# Patient Record
Sex: FEMALE | Race: WHITE | NOT HISPANIC OR LATINO | Employment: OTHER | URBAN - METROPOLITAN AREA
[De-identification: names, ages, dates, MRNs, and addresses within clinical notes are randomized per-mention and may not be internally consistent; named-entity substitution may affect disease eponyms.]

---

## 2017-01-09 ENCOUNTER — HOSPITAL ENCOUNTER (EMERGENCY)
Facility: MEDICAL CENTER | Age: 57
End: 2017-01-09
Attending: EMERGENCY MEDICINE
Payer: MEDICARE

## 2017-01-09 VITALS
DIASTOLIC BLOOD PRESSURE: 71 MMHG | BODY MASS INDEX: 40.92 KG/M2 | TEMPERATURE: 98.3 F | SYSTOLIC BLOOD PRESSURE: 111 MMHG | RESPIRATION RATE: 20 BRPM | HEART RATE: 63 BPM | WEIGHT: 270 LBS | HEIGHT: 68 IN

## 2017-01-09 DIAGNOSIS — M54.50 CHRONIC BILATERAL LOW BACK PAIN WITHOUT SCIATICA: ICD-10-CM

## 2017-01-09 DIAGNOSIS — F32.A DEPRESSION, UNSPECIFIED DEPRESSION TYPE: ICD-10-CM

## 2017-01-09 DIAGNOSIS — G89.29 CHRONIC BILATERAL LOW BACK PAIN WITHOUT SCIATICA: ICD-10-CM

## 2017-01-09 DIAGNOSIS — F11.90 CHRONIC, CONTINUOUS USE OF OPIOIDS: ICD-10-CM

## 2017-01-09 PROCEDURE — 96375 TX/PRO/DX INJ NEW DRUG ADDON: CPT

## 2017-01-09 PROCEDURE — 96374 THER/PROPH/DIAG INJ IV PUSH: CPT

## 2017-01-09 PROCEDURE — 700111 HCHG RX REV CODE 636 W/ 250 OVERRIDE (IP): Performed by: EMERGENCY MEDICINE

## 2017-01-09 PROCEDURE — 99284 EMERGENCY DEPT VISIT MOD MDM: CPT

## 2017-01-09 PROCEDURE — 99283 EMERGENCY DEPT VISIT LOW MDM: CPT

## 2017-01-09 RX ORDER — HYDROMORPHONE HYDROCHLORIDE 2 MG/ML
2 INJECTION, SOLUTION INTRAMUSCULAR; INTRAVENOUS; SUBCUTANEOUS ONCE
Status: DISCONTINUED | OUTPATIENT
Start: 2017-01-09 | End: 2017-01-09

## 2017-01-09 RX ORDER — KETOROLAC TROMETHAMINE 30 MG/ML
30 INJECTION, SOLUTION INTRAMUSCULAR; INTRAVENOUS ONCE
Status: DISCONTINUED | OUTPATIENT
Start: 2017-01-09 | End: 2017-01-09

## 2017-01-09 RX ORDER — KETOROLAC TROMETHAMINE 30 MG/ML
30 INJECTION, SOLUTION INTRAMUSCULAR; INTRAVENOUS ONCE
Status: COMPLETED | OUTPATIENT
Start: 2017-01-09 | End: 2017-01-09

## 2017-01-09 RX ADMIN — KETOROLAC TROMETHAMINE 30 MG: 30 INJECTION, SOLUTION INTRAMUSCULAR; INTRAVENOUS at 00:57

## 2017-01-09 RX ADMIN — HYDROMORPHONE HYDROCHLORIDE 1 MG: 1 INJECTION, SOLUTION INTRAMUSCULAR; INTRAVENOUS; SUBCUTANEOUS at 00:57

## 2017-01-09 ASSESSMENT — PAIN SCALES - GENERAL: PAINLEVEL_OUTOF10: 8

## 2017-01-09 NOTE — ED PROVIDER NOTES
"ED Provider Note    Scribed for Lonnie Cheatham M.D. by Yoselin Kennedy. 1/9/2017, 12:27 AM.    Primary care provider: Toni Grijalva M.D.  Means of arrival: [place]  History obtained from: Patient  History limited by: None    CHIEF COMPLAINT  Chief Complaint   Patient presents with   • Back Pain     pt co back pain for 2 hours denies any injury to back recently states in past has had injury to back       HPI  Kailey Velarde is a 56 y.o. female who presents to the Emergency Department brought in by ambulance for gradual onset of severe nonradiating lower back pain that feels like her prior back pains. She denies any recent injury to her back. She states that she has \"been working too hard over the past week bending over\". She denies any numbness, weakness, urinary incontinence, dysuria, or abdominal pain. The patient states that she has had an injury to her back in the past. She has a history of chronic back pain and has surgery in 2009.     REVIEW OF SYSTEMS  See HPI,  Negative for numbness, weakness, urinary incontinence, dysuria, or abdominal pain.     PAST MEDICAL HISTORY   has a past medical history of Indigestion; Backpain; Sciatica; Sick sinus syndrome (HCC) (November 2011); Chronic pain; Peripheral edema; Hypertension; Hyperlipidemia ( ); Dizziness ( ); Status post mitral valve repair (November 2011); Atrial myxoma (November 2011); Hypertriglyceridemia ( ); and CAD (coronary artery disease).    SURGICAL HISTORY   has past surgical history that includes mitral valve repair (11/17/2011); other orthopedic surgery; and pacemaker insertion (November 2011).    SOCIAL HISTORY  Social History   Substance Use Topics   • Smoking status: Current Every Day Smoker -- 0.25 packs/day for 30 years     Types: Cigarettes   • Smokeless tobacco: Never Used   • Alcohol Use: No      Comment: sober since 7/14/16      History   Drug Use No     Comment: hx of, denies currently       FAMILY HISTORY  Family History " "  Problem Relation Age of Onset   • Heart Attack Father        CURRENT MEDICATIONS  No current facility-administered medications on file prior to encounter.     Current Outpatient Prescriptions on File Prior to Encounter   Medication Sig Dispense Refill   • simvastatin (ZOCOR) 40 MG Tab Take 1 Tab by mouth every evening. 30 Tab 11   • furosemide (LASIX) 40 MG Tab Take 1 Tab by mouth every day. (Patient not taking: Reported on 11/21/2016) 30 Tab 3   • potassium chloride SA (K-DUR) 20 MEQ Tab CR Take 1 Tab by mouth 2 times a day. (Patient not taking: Reported on 11/21/2016) 60 Tab 11   • Omega-3 Fatty Acids (FISH OIL) 1000 MG Cap capsule Take 1,000 mg by mouth 3 times a day, with meals.     • vitamin D (CHOLECALCIFEROL) 1000 UNIT Tab Take 1,000 Units by mouth every day.     • Citalopram Hydrobromide (CELEXA PO) Take  by mouth.     • tizanidine (ZANAFLEX) 4 MG TABS Take  by mouth every day at 6 PM.     • methocarbamol (ROBAXIN) 500 MG TABS Take  by mouth as needed.     • gabapentin (NEURONTIN) 600 MG tablet Take 600 mg by mouth 3 times a day.     • oxycodone CR (OXYCONTIN) 10 MG TB12 Take 10 mg by mouth 3 times a day.     • morphine SR (MS CONTIN) 15 MG TB12 Take 1 Tab by mouth 3 times a day. 60 Tab 0   Reviewed.  See Encounter Summary.     ALLERGIES  Allergies   Allergen Reactions   • Penicillins Rash     rash       PHYSICAL EXAM  VITAL SIGNS: /71 mmHg  Pulse 63  Temp(Src) 36.8 °C (98.3 °F)  Resp 20  Ht 1.727 m (5' 8\")  Wt 122.471 kg (270 lb)  BMI 41.06 kg/m2  LMP 01/01/2010  Constitutional: Awake, alert in no apparent distress. Obese, appears uncomfortable.  HENT: Normocephalic, Bilateral external ears normal. Nose normal.   Eyes: Conjunctiva normal, non-icteric, EOMI.    Thorax & Lungs: Easy unlabored respirations clear to auscultation bilaterally.  Regular rate and rhythm, no murmurs rubs or gallops  Abdomen: Nondistended , obese, soft, nontender, no masses.  Skin: Visualized skin is  Dry, No erythema, " No rash.   Extremities:   No cyanosis, clubbing or edema  Neurologic: Alert, Grossly non-focal. No saddle paresthesias, straight leg raise reproduces pain, DTR 1+ bilateral patella, 5/5 dorsiflexion and plantar flexion bilateral lower extremities. Back does not have step-off, the patient's location of tenderness is lateral to the L5-S1 region bilaterally. There is no midline tenderness.  Psychiatric: Affect and Mood normal    DIAGNOSTIC STUDIES / PROCEDURES    COURSE & MEDICAL DECISION MAKING  Nursing notes and vital signs were reviewed. Pertinent Labs & Imaging studies reviewed. (See chart for details)    12:27 AM - Patient seen and examined at bedside. Patient will be treated with 30mg Toradol and 1mg Dilaudid. The patient was informed that she will be given pain medication. She was informed that this is most likely musculoskeletal and that stretching and icing the area will help alleviate these symptoms. She understood and verbalized agreement.     1:34 AM- patient notes moderate improvement in her back pain. She is asking to be admitted for her low back pain. She gives multiple reasons such as she states she is having too much pain to walk, she also reports that she has a dog at home and she needs help walking the dog. She states her neighbor were walk the dog if she is admitted but will not help her with the dog if she is at home. She also states that she has depression. At this time I do not have a medical indication for admission. We will attempt to ambulate the patient out of the emergency department.    1:58 AM- patient is irate about being discharged. She is tearful and states that that she would like to stay for a few more hours. She was able to get up from a seated position under her own power. She is not having signs of weakness. She was able to ambulate out of the emergency department under her own power.      Decision Making:  This is a 56 y.o. year old female who presents with acute on chronic lower  back pain. She does not have any direct trauma, she is neurologically intact, she does not have any signs of cauda equina. The location of her pain is not consistent with acute aortic dissection or abdominal aneurysm. The location is also not over the midline, not concerned about discitis. I do not feel that there is any indication for emergent imaging. She was given pain medications with some improvement of her symptoms. She does have chronic pain at baseline and takes morphine and oxycodone regularly. I do recommend she follow-up with her primary care physician and pain management for additional management of this.    DISPOSITION:  Patient will be discharged home in good condition.    The patient was discharged home (see d/c instructions) and told to return immediately for any signs or symptoms listed, or any worsening at all.  The patient verbally agreed to the discharge precautions and follow-up plan which is documented in EPIC.    FINAL IMPRESSION  1. Chronic bilateral low back pain without sciatica    2. Chronic, continuous use of opioids    3. Depression, unspecified depression type          I, Yoselin Kennedy (Alan), am scribing for, and in the presence of, Lonnie Cheatham M.D..    Electronically signed by: Yoselin Kennedy (Genovevaibmary), 1/9/2017    I, Lonnie Cheatham M.D. personally performed the services described in this documentation, as scribed by Yoselin Kennedy in my presence, and it is both accurate and complete.    The note accurately reflects work and decisions made by me.  Lonnie Cheatham  1/9/2017  1:59 AM

## 2017-01-09 NOTE — ED AVS SNAPSHOT
1/9/2017          Kailey Velarde  [address] [facility] [address]  [address]  [place] [address]    Dear Kailey:    [facility] wants to ensure your discharge home is safe and you or your loved ones have had all your questions answered regarding your care after you leave the hospital.    You may receive a telephone call within two days of your discharge.  This call is to make certain you understand your discharge instructions as well as ensure we provided you with the best care possible during your stay with us.     The call will only last approximately 3-5 minutes and will be done by a nurse.    Once again, we want to ensure your discharge home is safe and that you have a clear understanding of any next steps in your care.  If you have any questions or concerns, please do not hesitate to contact us, we are here for you.  Thank you for choosing [facility] for your healthcare needs.    Sincerely,    Lonnie Cortes    [facility]

## 2017-01-09 NOTE — DISCHARGE INSTRUCTIONS
Back Exercises  Back exercises help treat and prevent back injuries. The goal of back exercises is to increase the strength of your abdominal and back muscles and the flexibility of your back. These exercises should be started when you no longer have back pain. Back exercises include:  · Pelvic Tilt. Lie on your back with your knees bent. Tilt your pelvis until the lower part of your back is against the floor. Hold this position 5 to 10 sec and repeat 5 to 10 times.  · Knee to Chest. Pull first 1 knee up against your chest and hold for 20 to 30 seconds, repeat this with the other knee, and then both knees. This may be done with the other leg straight or bent, whichever feels better.  · Sit-Ups or Curl-Ups. Bend your knees 90 degrees. Start with tilting your pelvis, and do a partial, slow sit-up, lifting your trunk only 30 to 45 degrees off the floor. Take at least 2 to 3 seconds for each sit-up. Do not do sit-ups with your knees out straight. If partial sit-ups are difficult, simply do the above but with only tightening your abdominal muscles and holding it as directed.  · Hip-Lift. Lie on your back with your knees flexed 90 degrees. Push down with your feet and shoulders as you raise your hips a couple inches off the floor; hold for 10 seconds, repeat 5 to 10 times.  · Back arches. Lie on your stomach, propping yourself up on bent elbows. Slowly press on your hands, causing an arch in your low back. Repeat 3 to 5 times. Any initial stiffness and discomfort should lessen with repetition over time.  · Shoulder-Lifts. Lie face down with arms beside your body. Keep hips and torso pressed to floor as you slowly lift your head and shoulders off the floor.  Do not overdo your exercises, especially in the beginning. Exercises may cause you some mild back discomfort which lasts for a few minutes; however, if the pain is more severe, or lasts for more than 15 minutes, do not continue exercises until you see your caregiver.  Improvement with exercise therapy for back problems is slow.   See your caregivers for assistance with developing a proper back exercise program.     This information is not intended to replace advice given to you by your health care provider. Make sure you discuss any questions you have with your health care provider.     Document Released: 01/25/2006 Document Revised: 03/11/2013 Document Reviewed: 02/11/2016  Else[employer]ibe[employer] Interactive Patient Education ©2016 Elsevier Inc.

## 2017-01-09 NOTE — ED NOTES
..  Chief Complaint   Patient presents with   • Back Pain     pt co back pain for 2 hours denies any injury to back recently states in past has had injury to back     Pt able to transfer from ems gurney with stand and pivot method to er bed

## 2017-01-09 NOTE — ED AVS SNAPSHOT
After Visit Summary                                                                                                                Kailey Velarde   MRN: [id number]    Department:  [facility], Emergency Dept   Date of Visit:  1/9/2017            [facility], Emergency Dept    [address] [address]    Phone:  [phone]      You were seen by     Lonnie Cheatham M.D.      Your Diagnosis Was     Chronic bilateral low back pain without sciatica     M54.5, G89.29       These are the medications you received during your hospitalization from 01/09/2017 0022 to 01/09/2017 0147     Date/Time Order Dose Route Action    01/09/2017 0057 ketorolac (TORADOL) injection 30 mg 30 mg Intravenous Given    01/09/2017 0057 HYDROmorphone (DILAUDID) injection 1 mg 1 mg Intravenous Given      Follow-up Information     1. Follow up with Toni Grijalva M.D..    Specialty:  Family Medicine    Contact information    [address] [facility] [address]  [phone]        Medication Information     Review all of your home medications and newly ordered medications with your primary doctor and/or pharmacist as soon as possible. Follow medication instructions as directed by your doctor and/or pharmacist.     Please keep your complete medication list with you and share with your physician. Update the information when medications are discontinued, doses are changed, or new medications (including over-the-counter products) are added; and carry medication information at all times in the event of emergency situations.               Medication List      ASK your doctor about these medications        Instructions    CELEXA PO    Take  by mouth.       fish oil 1000 MG Caps capsule    Take 1,000 mg by mouth 3 times a day, with meals.   Dose:  1000 mg       furosemide 40 MG Tabs   Commonly known as:  LASIX    Take 1 Tab by mouth every day.   Dose:  40 mg       gabapentin 600 MG tablet   Commonly  known as:  NEURONTIN    Take 600 mg by mouth 3 times a day.   Dose:  600 mg       methocarbamol 500 MG Tabs   Commonly known as:  ROBAXIN    Take  by mouth as needed.       morphine SR 15 MG Tb12   Commonly known as:  MS CONTIN    Take 1 Tab by mouth 3 times a day.   Dose:  15 mg       oxycodone CR 10 MG Tb12   Commonly known as:  OXYCONTIN    Take 10 mg by mouth 3 times a day.   Dose:  10 mg       potassium chloride SA 20 MEQ Tbcr   Commonly known as:  K-DUR    Take 1 Tab by mouth 2 times a day.   Dose:  20 mEq       simvastatin 40 MG Tabs   Commonly known as:  ZOCOR    Take 1 Tab by mouth every evening.   Dose:  40 mg       tizanidine 4 MG Tabs   Commonly known as:  ZANAFLEX    Take  by mouth every day at 6 PM.       vitamin D 1000 UNIT Tabs   Commonly known as:  cholecalciferol    Take 1,000 Units by mouth every day.   Dose:  1000 Units                 Discharge Instructions       Back Exercises  Back exercises help treat and prevent back injuries. The goal of back exercises is to increase the strength of your abdominal and back muscles and the flexibility of your back. These exercises should be started when you no longer have back pain. Back exercises include:  · Pelvic Tilt. Lie on your back with your knees bent. Tilt your pelvis until the lower part of your back is against the floor. Hold this position 5 to 10 sec and repeat 5 to 10 times.  · Knee to Chest. Pull first 1 knee up against your chest and hold for 20 to 30 seconds, repeat this with the other knee, and then both knees. This may be done with the other leg straight or bent, whichever feels better.  · Sit-Ups or Curl-Ups. Bend your knees 90 degrees. Start with tilting your pelvis, and do a partial, slow sit-up, lifting your trunk only 30 to 45 degrees off the floor. Take at least 2 to 3 seconds for each sit-up. Do not do sit-ups with your knees out straight. If partial sit-ups are difficult, simply do the above but with only tightening your abdominal  muscles and holding it as directed.  · Hip-Lift. Lie on your back with your knees flexed 90 degrees. Push down with your feet and shoulders as you raise your hips a couple inches off the floor; hold for 10 seconds, repeat 5 to 10 times.  · Back arches. Lie on your stomach, propping yourself up on bent elbows. Slowly press on your hands, causing an arch in your low back. Repeat 3 to 5 times. Any initial stiffness and discomfort should lessen with repetition over time.  · Shoulder-Lifts. Lie face down with arms beside your body. Keep hips and torso pressed to floor as you slowly lift your head and shoulders off the floor.  Do not overdo your exercises, especially in the beginning. Exercises may cause you some mild back discomfort which lasts for a few minutes; however, if the pain is more severe, or lasts for more than 15 minutes, do not continue exercises until you see your caregiver. Improvement with exercise therapy for back problems is slow.   See your caregivers for assistance with developing a proper back exercise program.     This information is not intended to replace advice given to you by your health care provider. Make sure you discuss any questions you have with your health care provider.     Document Released: 01/25/2006 Document Revised: 03/11/2013 Document Reviewed: 02/11/2016  Elsevier Interactive Patient Education ©2016 Elsevier Inc.            Patient Information     Patient Information    Following emergency treatment: all patient requiring follow-up care must return either to a private physician or a clinic if your condition worsens before you are able to obtain further medical attention, please return to the emergency room.     Billing Information    At [facility], we work to make the billing process streamlined for our patients.  Our Representatives are here to answer any questions you may have regarding your hospital bill.  If you have insurance coverage and have supplied your insurance  information to us, we will submit a claim to your insurer on your behalf.  Should you have any questions regarding your bill, we can be reached online or by phone as follows:  Online: You are able pay your bills online or live chat with our representatives about any billing questions you may have. We are here to help Monday - Friday from 8:00am to 7:30pm and 9:00am - 12:00pm on Saturdays.  Please visit [email or web address]  for more information.   Phone:  [phone] or [phone]    Please note that your emergency physician, surgeon, pathologist, radiologist, anesthesiologist, and other specialists are not employed by [facility] and will therefore bill separately for their services.  Please contact them directly for any questions concerning their bills at the numbers below:     Emergency Physician Services:  [phone]  [facility]:  [phone]  [facility]:  [phone]  [facility]:  [phone]    1. Your final bill may vary from the amount quoted upon discharge if all procedures are not complete at that time, or if your doctor has additional procedures of which we are not aware. You will receive an additional bill if you return to the Emergency Department at [facility] for suture removal regardless of the facility of which the sutures were placed.     2. Please arrange for settlement of this account at the emergency registration.    3. All self-pay accounts are due in full at the time of treatment.  If you are unable to meet this obligation then payment is expected within 4-5 days.     4. If you have had radiology studies (CT, X-ray, Ultrasound, MRI), you have received a preliminary result during your emergency department visit. Please contact the radiology department [phone] to receive a copy of your final result. Please discuss the Final result with your primary physician or with the follow up physician  provided.     Crisis Hotline:  [place] Crisis Hotline:  2-859-ECCJRUQ or 1-727.755.1342  [place] Crisis Hotline:    1-882.810.4895 or 172-982-9697         ED Discharge Follow Up Questions    1. In order to provide you with very good care, we would like to follow up with a phone call in the next few days.  May we have your permission to contact you?     YES /  NO    2. What is the best phone number to call you? (       )_____-__________    3. What is the best time to call you?      Morning  /  Afternoon  /  Evening                   Patient Signature:  ____________________________________________________________    Date:  ____________________________________________________________      Your appointments     May 31, 2017 10:30 AM   PACER CHECK ONLY with PACER PHONE CHECK   [facility] (--)    [address] [place] [id number] [phone]            May 31, 2017 10:45 AM   FOLLOW UP with Toni Mullins M.D.   [facility] (--)    [address] [place] [address] [address]  [facility] [address]   [phone]

## 2017-01-09 NOTE — ED AVS SNAPSHOT
BountyJobs Access Code: [email or web address]  Expires: 1/16/2017  8:14 AM    Your email address is not on file at [facility].  Email Addresses are required for you to sign up for BountyJobs, please contact [phone] to verify your personal information and to provide your email address prior to attempting to register for BountyJobs.    Kailey Velarde  [address]  [address]  [place] [address]    BountyJobs  A secure, online tool to manage your health information     [facility]’s BountyJobs® is a secure, online tool that connects you to your personalized health information from the privacy of your home -- day or night - making it very easy for you to manage your healthcare. Once the activation process is completed, you can even access your medical information using the BountyJobs sue, which is available for free in the Apple Use store or Google Play store.     To learn more about BountyJobs, visit [email or web address]    There are two levels of access available (as shown below):   My Chart Features  [facility] Primary Care Doctor [facility]  Specialists [facility]  Urgent  Care Non-[facility] Primary Care Doctor   Email your healthcare team securely and privately 24/7 X X X    Manage appointments: schedule your next appointment; view details of past/upcoming appointments X      Request prescription refills. X      View recent personal medical records, including lab and immunizations X X X X   View health record, including health history, allergies, medications X X X X   Read reports about your outpatient visits, procedures, consult and ER notes X X X X   See your discharge summary, which is a recap of your hospital and/or ER visit that includes your diagnosis, lab results, and care plan X X  X     How to register for Viveraet:  Once your e-mail address has been verified, follow the following steps to sign up for Viveraet.     1. Go to  [email or web address]  2. Click on the Sign Up Now box, which takes you to the New Member Sign Up page.  You will need to provide the following information:  a. Enter your [employer] Access Code exactly as it appears at the top of this page. (You will not need to use this code after you’ve completed the sign-up process. If you do not sign up before the expiration date, you must request a new code.)   b. Enter your date of birth.   c. Enter your home email address.   d. Click Submit, and follow the next screen’s instructions.  3. Create a Bigpointt ID. This will be your [employer] login ID and cannot be changed, so think of one that is secure and easy to remember.  4. Create a [employer] password. You can change your password at any time.  5. Enter your Password Reset Question and Answer. This can be used at a later time if you forget your password.   6. Enter your e-mail address. This allows you to receive e-mail notifications when new information is available in [employer].  7. Click Sign Up. You can now view your health information.    For assistance activating your [employer] account, call [phone]

## 2017-01-09 NOTE — ED NOTES
Pt D/C'd.  Discharge instructions provided to pt.  Pt states understanding.  Pt states all questions have been answered.  Copy of discharge provided to pt.  Signed copy in chart. No prescriptions provided this visit. Pt states that all personal belongings are in possession.  Pt escorted off unit without incident.

## 2017-06-12 ENCOUNTER — HOSPITAL ENCOUNTER (OUTPATIENT)
Dept: RADIOLOGY | Facility: MEDICAL CENTER | Age: 57
End: 2017-06-12

## 2017-06-19 ENCOUNTER — OFFICE VISIT (OUTPATIENT)
Dept: PULMONOLOGY | Facility: HOSPICE | Age: 57
End: 2017-06-19
Payer: MEDICARE

## 2017-06-19 VITALS
HEART RATE: 63 BPM | OXYGEN SATURATION: 97 % | TEMPERATURE: 97 F | WEIGHT: 270 LBS | BODY MASS INDEX: 41.06 KG/M2 | RESPIRATION RATE: 16 BRPM

## 2017-06-19 DIAGNOSIS — G89.4 CHRONIC PAIN SYNDROME: ICD-10-CM

## 2017-06-19 DIAGNOSIS — I27.20 PULMONARY HYPERTENSION (HCC): ICD-10-CM

## 2017-06-19 DIAGNOSIS — J90 PLEURAL EFFUSION: ICD-10-CM

## 2017-06-19 DIAGNOSIS — G47.19 EXCESSIVE DAYTIME SLEEPINESS: ICD-10-CM

## 2017-06-19 PROCEDURE — 99204 OFFICE O/P NEW MOD 45 MIN: CPT | Performed by: INTERNAL MEDICINE

## 2017-06-19 NOTE — PROGRESS NOTES
Kailey Velarde is a 57 y.o. female here for right pleural effusion. Patient was referred by her primary care.    History of Present Illness:      This lady comes in for evaluation of a right pleural effusion. She has chronic pain syndrome on narcotics. She has alcohol use history, finished rehabilitation one week ago and has stopped drinking again.    She sees a cardiologist, an echocardiogram done in the past showed pulmonary hypertension, she has a follow-up in the near future to repeat, and I suspect she may have a myopathy with heart failure. She has chronic peripheral edema, the right pleural effusion, but also a smoking history. I reviewed the outside CAT scans, there is no obvious mass but obvious cardiomegaly with pacemaker are noted. She still smokes one half pack per day.    Options for the right pleural effusion at this time could include tapping, but it appears relatively small on CAT scan, although described as moderate is not easily ultrasound accessible for sampling. She also has marked peripheral edema, and with the cardiac history I suspect this is related to congestion and might resolve with diuresis. Fortunately, she restarted her Lasix one week ago, with potassium, and I think our first effort will be to review her x-ray after diuresis and if the pleural effusion has resolved then no need for considering Or further workup. However this will have to be followed closely as she does have a smoking history, but this points to a congestive pattern and we will tap if it enlarges or persists after diuresis. Cardiology assessment is scheduled as well in the near future. Stopping smoking is strongly encouraged.    She also is medically significant excessive weight, chronic pain, uses trazodone for insomnia which is in part related to chronic pain, but also with pulmonary hypertension and oropharyngeal crowding I think we should screen for sleep apnea. Overnight oximetry is requested, reassess that as  well at the next visit    Constitutional ROS: No unexpected change in weight, No unexplained fevers, sweats, or chills  Eyes: No change in vision or blurring or double vision  Mouth/Throat ROS: No sore throat, No recent change in voice or hoarseness  Pulmonary ROS: See present history for pertinent positives  Cardiovascular ROS: No chest pain  Gastrointestinal ROS: No abdominal pain, No abdominal bloating or early satiety  Musculoskeletal/Extremities ROS: No clubbing, No cyanosis  Hematologic/Lymphatic ROS: No abnormal bleeding  Neurologic ROS: No weakness  Psychiatric ROS: No depression  Allergic/Immunologic: No rhinitis or urticaria     Current Outpatient Prescriptions   Medication Sig Dispense Refill   • furosemide (LASIX) 40 MG Tab Take 1 Tab by mouth every day. 30 Tab 3   • potassium chloride SA (K-DUR) 20 MEQ Tab CR Take 1 Tab by mouth 2 times a day. 60 Tab 11   • simvastatin (ZOCOR) 40 MG Tab Take 1 Tab by mouth every evening. 30 Tab 11   • Omega-3 Fatty Acids (FISH OIL) 1000 MG Cap capsule Take 1,000 mg by mouth 3 times a day, with meals.     • vitamin D (CHOLECALCIFEROL) 1000 UNIT Tab Take 1,000 Units by mouth every day.     • Citalopram Hydrobromide (CELEXA PO) Take  by mouth.     • tizanidine (ZANAFLEX) 4 MG TABS Take  by mouth every day at 6 PM.     • methocarbamol (ROBAXIN) 500 MG TABS Take  by mouth as needed.     • gabapentin (NEURONTIN) 600 MG tablet Take 600 mg by mouth 3 times a day.     • oxycodone CR (OXYCONTIN) 10 MG TB12 Take 10 mg by mouth 3 times a day.     • morphine SR (MS CONTIN) 15 MG TB12 Take 1 Tab by mouth 3 times a day. 60 Tab 0     No current facility-administered medications for this visit.       Social History   Substance Use Topics   • Smoking status: Current Every Day Smoker -- 0.25 packs/day for 30 years     Types: Cigarettes   • Smokeless tobacco: Never Used   • Alcohol Use: No      Comment: sober since 7/14/16        Past Medical History   Diagnosis Date   • Indigestion    •  Backpain    • Sciatica    • Sick sinus syndrome (CMS-HCC) November 2011     Status post PPM implantation.   • Chronic pain    • Peripheral edema    • Hypertension    • Hyperlipidemia     • Dizziness     • Status post mitral valve repair November 2011     MV repair with 32mm Jonnie-Carpenter flexible annuloplasty ring.   • Atrial myxoma November 2011     Status post LA myxoma resection   • Hypertriglyceridemia     • CAD (coronary artery disease)      pacemaker       Past Surgical History   Procedure Laterality Date   • Mitral valve repair  11/17/2011     Performed by MARY ORTIZ at [facility]   • Other orthopedic surgery       Neck and back    • Pacemaker insertion  November 2011     Medtronic Versa [id number] implanted by Dr. Amaya.       Allergies: Penicillins    Family History   Problem Relation Age of Onset   • Heart Attack Father        Physical Examination    Filed Vitals:    06/19/17 1356   Weight: 122.471 kg (270 lb)   Weight % change since last entry.: 0 %   Pulse: 63   Resp: 16   Temp: 36.1 °C (97 °F)       General Appearance: alert, no distress  Skin: Skin color, texture, turgor normal. No rashes or lesions.  Eyes: negative  Oropharynx: Lips, mucosa, and tongue normal. Teeth and gums normal. Oropharynx moist and without lesion  Lungs: positive findings: Slightly diminished in the right base but clear otherwise  Heart: negative. RRR without murmur, gallop, or rubs.  No ectopy.  Abdomen: Abdomen soft, non-tender. BS normal. No masses,  No organomegaly  Extremities: 3+ pretibial edema, chronic skin discoloration  Peripheral Pulses: Normal  Neurologic: intact  Oropharyngeal crowding without pathology    III (soft palate, base of uvula visible)    Imaging: As discussed    PFTS: None presently      Assessment and Plan  1. Excessive daytime sleepiness  - OVERNIGHT OXIMETRY; Future    2. Pulmonary hypertension (CMS-HCC)  - OVERNIGHT OXIMETRY; Future    3. Pleural effusion  - DX-CHEST-2 VIEWS;  Future    4. Chronic pain syndrome    This lady comes in for evaluation of a right pleural effusion. She has chronic pain syndrome on narcotics. She has alcohol use history, finished rehabilitation one week ago and has stopped drinking again.    She sees a cardiologist, an echocardiogram done in the past showed pulmonary hypertension, she has a follow-up in the near future to repeat, and I suspect she may have a myopathy with heart failure. She has chronic peripheral edema, the right pleural effusion, but also a smoking history. I reviewed the outside CAT scans, there is no obvious mass but obvious cardiomegaly with pacemaker are noted. She still smokes one half pack per day.    Options for the right pleural effusion at this time could include tapping, but it appears relatively small on CAT scan, although described as moderate is not easily ultrasound accessible for sampling. She also has marked peripheral edema, and with the cardiac history I suspect this is related to congestion and might resolve with diuresis. Fortunately, she restarted her Lasix one week ago, with potassium, and I think our first effort will be to review her x-ray after diuresis and if the pleural effusion has resolved then no need for considering Or further workup. However this will have to be followed closely as she does have a smoking history, but this points to a congestive pattern and we will tap if it enlarges or persists after diuresis. Cardiology assessment is scheduled as well in the near future. Stopping smoking is strongly encouraged.    She also is medically significant excessive weight, chronic pain, uses trazodone for insomnia which is in part related to chronic pain, but also with pulmonary hypertension and oropharyngeal crowding I think we should screen for sleep apnea. Overnight oximetry is requested, reassess that as well at the next visit  Followup Return in about 8 weeks (around 8/14/2017) for follow up visit with pulmonary  physician, with Chest X-ray.

## 2017-06-19 NOTE — PATIENT INSTRUCTIONS
This lady comes in for evaluation of a right pleural effusion. She has chronic pain syndrome on narcotics. She has alcohol use history, finished rehabilitation one week ago and has stopped drinking again.    She sees a cardiologist, an echocardiogram done in the past showed pulmonary hypertension, she has a follow-up in the near future to repeat, and I suspect she may have a myopathy with heart failure. She has chronic peripheral edema, the right pleural effusion, but also a smoking history. I reviewed the outside CAT scans, there is no obvious mass but obvious cardiomegaly with pacemaker are noted. She still smokes one half pack per day.    Options for the right pleural effusion at this time could include tapping, but it appears relatively small on CAT scan, although described as moderate is not easily ultrasound accessible for sampling. She also has marked peripheral edema, and with the cardiac history I suspect this is related to congestion and might resolve with diuresis. Fortunately, she restarted her Lasix one week ago, with potassium, and I think our first effort will be to review her x-ray after diuresis and if the pleural effusion has resolved then no need for considering Or further workup. However this will have to be followed closely as she does have a smoking history, but this points to a congestive pattern and we will tap if it enlarges or persists after diuresis. Cardiology assessment is scheduled as well in the near future. Stopping smoking is strongly encouraged.    She also is medically significant excessive weight, chronic pain, uses trazodone for insomnia which is in part related to chronic pain, but also with pulmonary hypertension and oropharyngeal crowding I think we should screen for sleep apnea. Overnight oximetry is requested, reassess that as well at the next visit

## 2017-06-19 NOTE — MR AVS SNAPSHOT
Kailey F Syd   2017 2:00 PM   Office Visit   MRN: [id number]    Department:  [facility]   Dept Phone:  [phone]    Description:  Female : 1960   Provider:  Chayito Patel M.D.           Reason for Visit     Establish Care           Allergies as of 2017     Allergen Noted Reactions    Penicillins 2009   Rash    rash      You were diagnosed with     Excessive daytime sleepiness   [7251908]       Pulmonary hypertension (CMS-HCC)   [322790]       Pleural effusion   [764452]       Chronic pain syndrome   [338.4.ICD-9-CM]         Vital Signs     Pulse Temperature Respirations Weight Oxygen Saturation Last Menstrual Period    63 36.1 °C (97 °F) 16 122.471 kg (270 lb) 97% 2010    Smoking Status                   Current Every Day Smoker           Basic Information     Date Of Birth Sex Race Ethnicity Preferred Language    1960 Female White Non- English      Your appointments     Sep 25, 2017  1:00 PM   XRAY 15 with PULMONARY DX 1   [facility] - Pulmonary ([address])    [address]  [place] [address]               Sep 25, 2017  1:20 PM   Established Patient Pul with A Rotation   [facility] (--)    [address]  [address]  [place] [address]   [phone]            Sep 27, 2017 11:00 AM   PACER CHECK ONLY with PACER CHECK-[facility]   [facility] (--)    [address], [address]  [place] [address]   [phone]            Sep 27, 2017 11:30 AM   FOLLOW UP with Toni Mullins M.D.   [facility] (--)    [address], [address]  [place] [address]   [phone]              Problem List              ICD-10-CM Priority Class Noted - Resolved    Chronic low back pain M54.5, G89.29 Low  2011 - Present    Atrial myxoma D15.1 High  2011 - Present    Status post mitral valve repair Z98.890 High  2011 - Present    Chronic pain G89.29  Low  1/3/2012 - Present    Mixed hyperlipidemia E78.2 High  7/3/2012 - Present    Hypertriglyceridemia E78.1 High  7/3/2012 - Present    Vertigo R42 Medium  9/5/2012 - Present    Pacemaker Z95.0 High  11/26/2012 - Present    Sick sinus syndrome I49.5 High  10/1/2013 - Present    Chest pain R07.9   7/24/2014 - Present    Rib pain on right side R07.81   9/30/2015 - Present    Tobacco use disorder F17.200   9/30/2015 - Present    Excessive daytime sleepiness G47.19   8/4/2016 - Present    Localized edema R60.0   8/29/2016 - Present    Pulmonary hypertension (CMS-HCC) I27.2   6/19/2017 - Present    Pleural effusion J90   6/19/2017 - Present      Health Maintenance        Date Due Completion Dates    IMM DTaP/Tdap/Td Vaccine (1 - Tdap) 5/29/1980 ---    IMM PNEUMOCOCCAL 19-64 (ADULT) MEDIUM RISK SERIES (1 of 1 - PPSV23) 5/29/1980 ---    PAP SMEAR 5/29/1982 ---    MAMMOGRAM 5/29/2001 ---    COLONOSCOPY 5/29/2011 ---            Current Immunizations     No immunizations on file.      Below and/or attached are the medications your provider expects you to take. Review all of your home medications and newly ordered medications with your provider and/or pharmacist. Follow medication instructions as directed by your provider and/or pharmacist. Please keep your medication list with you and share with your provider. Update the information when medications are discontinued, doses are changed, or new medications (including over-the-counter products) are added; and carry medication information at all times in the event of emergency situations     Allergies:  PENICILLINS - Rash               Medications  Valid as of: June 19, 2017 -  3:02 PM    Generic Name Brand Name Tablet Size Instructions for use    Cholecalciferol (Tab) cholecalciferol 1000 UNIT Take 1,000 Units by mouth every day.        Citalopram Hydrobromide   Take  by mouth.        Furosemide (Tab) LASIX 40 MG Take 1 Tab by mouth every day.        Gabapentin (Tab) NEURONTIN  600 MG Take 600 mg by mouth 3 times a day.        Methocarbamol (Tab) ROBAXIN 500 MG Take  by mouth as needed.        Morphine Sulfate (TABLET SR 12 HR) MS CONTIN 15 MG Take 1 Tab by mouth 3 times a day.        Omega-3 Fatty Acids (Cap) fish oil 1000 MG Take 1,000 mg by mouth 3 times a day, with meals.        OxyCODONE HCl (TABLET SR 12 HR) OXYCONTIN 10 MG Take 10 mg by mouth 3 times a day.        Potassium Chloride Hazel CR (Tab CR) Kdur 20 MEQ Take 1 Tab by mouth 2 times a day.        Simvastatin (Tab) ZOCOR 40 MG Take 1 Tab by mouth every evening.        TiZANidine HCl (Tab) ZANAFLEX 4 MG Take  by mouth every day at 6 PM.        .                 Medicines prescribed today were sent to:     [id number] - [place], [place] - [address] [address]    Phone: [phone] Fax: [phone]    Open 24 Hours?: No      Medication refill instructions:       If your prescription bottle indicates you have medication refills left, it is not necessary to call your provider’s office. Please contact your pharmacy and they will refill your medication.    If your prescription bottle indicates you do not have any refills left, you may request refills at any time through one of the following ways: The online [employer] system (except Urgent Care), by calling your provider’s office, or by asking your pharmacy to contact your provider’s office with a refill request. Medication refills are processed only during regular business hours and may not be available until the next business day. Your provider may request additional information or to have a follow-up visit with you prior to refilling your medication.   *Please Note: Medication refills are assigned a new Rx number when refilled electronically. Your pharmacy may indicate that no refills were authorized even though a new prescription for the same medication is available at the pharmacy. Please request the medicine by name with the pharmacy before  contacting your provider for a refill.        Your To Do List     Future Labs/Procedures Complete By Expires    DX-CHEST-2 VIEWS  As directed 6/19/2018    OVERNIGHT OXIMETRY  As directed 6/19/2018      Instructions    This lady comes in for evaluation of a right pleural effusion. She has chronic pain syndrome on narcotics. She has alcohol use history, finished rehabilitation one week ago and has stopped drinking again.    She sees a cardiologist, an echocardiogram done in the past showed pulmonary hypertension, she has a follow-up in the near future to repeat, and I suspect she may have a myopathy with heart failure. She has chronic peripheral edema, the right pleural effusion, but also a smoking history. I reviewed the outside CAT scans, there is no obvious mass but obvious cardiomegaly with pacemaker are noted. She still smokes one half pack per day.    Options for the right pleural effusion at this time could include tapping, but it appears relatively small on CAT scan, although described as moderate is not easily ultrasound accessible for sampling. She also has marked peripheral edema, and with the cardiac history I suspect this is related to congestion and might resolve with diuresis. Fortunately, she restarted her Lasix one week ago, with potassium, and I think our first effort will be to review her x-ray after diuresis and if the pleural effusion has resolved then no need for considering Or further workup. However this will have to be followed closely as she does have a smoking history, but this points to a congestive pattern and we will tap if it enlarges or persists after diuresis. Cardiology assessment is scheduled as well in the near future. Stopping smoking is strongly encouraged.    She also is medically significant excessive weight, chronic pain, uses trazodone for insomnia which is in part related to chronic pain, but also with pulmonary hypertension and oropharyngeal crowding I think we should screen  for sleep apnea. Overnight oximetry is requested, reassess that as well at the next visit          Live[employer] Access Code: [email or web address]  Expires: 7/9/2017  4:15 AM    Your email address is not on file at [employer]  Email Addresses are required for you to sign up for [employer], please contact [phone] to verify your personal information and to provide your email address prior to attempting to register for Live[employer].    Kailey Velarde  [address]  [place] [address]    LiveClips  A secure, online tool to manage your health information     [employer]’s LiveClips® is a secure, online tool that connects you to your personalized health information from the privacy of your home -- day or night - making it very easy for you to manage your healthcare. Once the activation process is completed, you can even access your medical information using the LiveClips sue, which is available for free in the [employer] Sue store or Google Play store.     To learn more about LiveClips, visit [email or web address]    There are two levels of access available (as shown below):   My Chart Features  [facility] Primary Care Doctor [facility]  Specialists [facility]  Urgent  Care Non-[facility] Primary Care Doctor   Email your healthcare team securely and privately 24/7 X X X    Manage appointments: schedule your next appointment; view details of past/upcoming appointments X      Request prescription refills. X      View recent personal medical records, including lab and immunizations X X X X   View health record, including health history, allergies, medications X X X X   Read reports about your outpatient visits, procedures, consult and ER notes X X X X   See your discharge summary, which is a recap of your hospital and/or ER visit that includes your diagnosis, lab results, and care plan X X  X     How to register for LiveClips:  Once your e-mail address has been verified, follow the following steps to sign up for LiveClips.     1. Go to   [email or web address]  2. Click on the Sign Up Now box, which takes you to the New Member Sign Up page. You will need to provide the following information:  a. Enter your webme Access Code exactly as it appears at the top of this page. (You will not need to use this code after you’ve completed the sign-up process. If you do not sign up before the expiration date, you must request a new code.)   b. Enter your date of birth.   c. Enter your home email address.   d. Click Submit, and follow the next screen’s instructions.  3. Create a webme ID. This will be your webme login ID and cannot be changed, so think of one that is secure and easy to remember.  4. Create a webme password. You can change your password at any time.  5. Enter your Password Reset Question and Answer. This can be used at a later time if you forget your password.   6. Enter your e-mail address. This allows you to receive e-mail notifications when new information is available in webme.  7. Click Sign Up. You can now view your health information.    For assistance activating your webme account, call [phone]         Quit Tobacco Information     Do you want to quit using tobacco?    Quitting tobacco decreases risks of cancer, heart and lung disease, increases life expectancy, improves sense of taste and smell, and increases spending money, among other benefits.    If you are thinking about quitting, we can help.  • [facility] Quit Tobacco Program: [phone]  o Program occurs weekly for four weeks and includes pharmacist consultation on products to support quitting smoking or chewing tobacco. A provider referral is needed for pharmacist consultation.  • Tobacco Users Help Hotline: 0-800-QUIT-NOW (768-0331) or https://[place].quitlogix.org/  o Free, confidential telephone and online coaching for [place] residents. Sessions are designed on a schedule that is convenient for you. Eligible clients receive free nicotine replacement  therapy.  • Nationally: www.smokefree.gov  o Information and professional assistance to support both immediate and long-term needs as you become, and remain, a non-smoker. Smokefree.gov allows you to choose the help that best fits your needs.

## 2017-09-25 ENCOUNTER — APPOINTMENT (OUTPATIENT)
Dept: RADIOLOGY | Facility: IMAGING CENTER | Age: 57
End: 2017-09-25
Attending: INTERNAL MEDICINE
Payer: MEDICARE

## 2017-09-25 ENCOUNTER — APPOINTMENT (OUTPATIENT)
Dept: PULMONOLOGY | Facility: HOSPICE | Age: 57
End: 2017-09-25
Payer: MEDICARE

## 2017-09-27 ENCOUNTER — OFFICE VISIT (OUTPATIENT)
Dept: CARDIOLOGY | Facility: MEDICAL CENTER | Age: 57
End: 2017-09-27
Payer: MEDICARE

## 2017-09-27 ENCOUNTER — NON-PROVIDER VISIT (OUTPATIENT)
Dept: CARDIOLOGY | Facility: MEDICAL CENTER | Age: 57
End: 2017-09-27
Payer: MEDICARE

## 2017-09-27 VITALS
HEIGHT: 68 IN | WEIGHT: 264 LBS | SYSTOLIC BLOOD PRESSURE: 130 MMHG | BODY MASS INDEX: 40.01 KG/M2 | OXYGEN SATURATION: 97 % | DIASTOLIC BLOOD PRESSURE: 70 MMHG | HEART RATE: 98 BPM

## 2017-09-27 DIAGNOSIS — I27.20 PULMONARY HYPERTENSION (HCC): ICD-10-CM

## 2017-09-27 DIAGNOSIS — Z95.0 PACEMAKER: ICD-10-CM

## 2017-09-27 DIAGNOSIS — E78.2 MIXED HYPERLIPIDEMIA: ICD-10-CM

## 2017-09-27 DIAGNOSIS — Z98.890 STATUS POST MITRAL VALVE REPAIR: ICD-10-CM

## 2017-09-27 DIAGNOSIS — D15.1 ATRIAL MYXOMA: ICD-10-CM

## 2017-09-27 DIAGNOSIS — I49.5 SICK SINUS SYNDROME (HCC): ICD-10-CM

## 2017-09-27 PROCEDURE — 93280 PM DEVICE PROGR EVAL DUAL: CPT | Performed by: INTERNAL MEDICINE

## 2017-09-27 PROCEDURE — 99214 OFFICE O/P EST MOD 30 MIN: CPT | Performed by: INTERNAL MEDICINE

## 2017-09-27 RX ORDER — LISINOPRIL 10 MG/1
10 TABLET ORAL DAILY
COMMUNITY
End: 2018-03-12

## 2017-09-27 RX ORDER — PREGABALIN 200 MG/1
200 CAPSULE ORAL 3 TIMES DAILY
COMMUNITY

## 2017-09-27 ASSESSMENT — ENCOUNTER SYMPTOMS
BRUISES/BLEEDS EASILY: 0
SHORTNESS OF BREATH: 1
FALLS: 0
FOCAL WEAKNESS: 0
PALPITATIONS: 0
COUGH: 0
SORE THROAT: 0
CHILLS: 0
CLAUDICATION: 0
DIZZINESS: 0
WEAKNESS: 0
ABDOMINAL PAIN: 0
FEVER: 0
NAUSEA: 0
PND: 0
BLURRED VISION: 0

## 2017-09-27 NOTE — PROGRESS NOTES
Subjective:   Kailey Velarde is a 57 y.o. female who presents today For follow-up of her history of pacemaker with prior mitral valve repair with myxoma    Since her last she's been doing well she reports that her edema has been better    She continues to smoke and uses e-cigarette    She followed up with pulmonary was recommended testing which hasn't completed     Past Medical History:   Diagnosis Date   • Sick sinus syndrome (CMS-HCC) November 2011    Status post PPM implantation.   • Status post mitral valve repair November 2011    MV repair with 32mm Jonnie-Carpenter flexible annuloplasty ring.   • Atrial myxoma November 2011    Status post LA myxoma resection   • Backpain    • CAD (coronary artery disease)     pacemaker   • Chronic pain    • Dizziness     • Hyperlipidemia     • Hypertension    • Hypertriglyceridemia     • Indigestion    • Peripheral edema    • Sciatica      Past Surgical History:   Procedure Laterality Date   • MITRAL VALVE REPAIR  11/17/2011    Performed by MARY ORTIZ at SURGERY [facility]   • PACEMAKER INSERTION  November 2011    Medtronic Versa VEDR01 implanted by Dr. Amaya.   • OTHER ORTHOPEDIC SURGERY      Neck and back      Family History   Problem Relation Age of Onset   • Heart Attack Father      History   Smoking Status   • Current Every Day Smoker   • Packs/day: 0.25   • Years: 30.00   • Types: Cigarettes   Smokeless Tobacco   • Never Used     Allergies   Allergen Reactions   • Penicillins Rash     rash     Outpatient Encounter Prescriptions as of 9/27/2017   Medication Sig Dispense Refill   • Venlafaxine HCl (EFFEXOR PO) Take  by mouth.     • pregabalin (LYRICA) 200 MG capsule Take 200 mg by mouth 3 times a day.     • lisinopril (PRINIVIL) 10 MG Tab Take 10 mg by mouth every day.     • simvastatin (ZOCOR) 40 MG Tab Take 1 Tab by mouth every evening. 30 Tab 11   • furosemide (LASIX) 40 MG Tab Take 1 Tab by mouth every day. (Patient taking differently: Take 20 mg by mouth  "every day.) 30 Tab 3   • potassium chloride SA (K-DUR) 20 MEQ Tab CR Take 1 Tab by mouth 2 times a day. (Patient taking differently: Take 20 mEq by mouth.) 60 Tab 11   • vitamin D (CHOLECALCIFEROL) 1000 UNIT Tab Take 1,000 Units by mouth every day.     • oxycodone CR (OXYCONTIN) 10 MG TB12 Take 10 mg by mouth 3 times a day.     • morphine SR (MS CONTIN) 15 MG TB12 Take 1 Tab by mouth 3 times a day. 60 Tab 0   • Omega-3 Fatty Acids (FISH OIL) 1000 MG Cap capsule Take 1,000 mg by mouth 3 times a day, with meals.     • Citalopram Hydrobromide (CELEXA PO) Take  by mouth.     • tizanidine (ZANAFLEX) 4 MG TABS Take  by mouth every day at 6 PM.     • methocarbamol (ROBAXIN) 500 MG TABS Take  by mouth as needed.     • gabapentin (NEURONTIN) 600 MG tablet Take 600 mg by mouth 3 times a day.       No facility-administered encounter medications on file as of 9/27/2017.      Review of Systems   Constitutional: Positive for malaise/fatigue. Negative for chills and fever.   HENT: Negative for sore throat.    Eyes: Negative for blurred vision.   Respiratory: Positive for shortness of breath. Negative for cough.    Cardiovascular: Positive for leg swelling (intermittent). Negative for chest pain, palpitations, claudication and PND.   Gastrointestinal: Negative for abdominal pain and nausea.   Musculoskeletal: Negative for falls and joint pain.   Skin: Negative for rash.   Neurological: Negative for dizziness, focal weakness and weakness.   Endo/Heme/Allergies: Does not bruise/bleed easily.        Objective:   /70   Pulse 98   Ht 1.727 m (5' 8\")   Wt 119.7 kg (264 lb)   LMP 01/01/2010   SpO2 97%   BMI 40.14 kg/m²     Physical Exam   Constitutional: No distress.   HENT:   Mouth/Throat: Oropharynx is clear and moist.   Eyes: No scleral icterus.   Cardiovascular: Normal rate, regular rhythm and intact distal pulses.  Exam reveals no gallop and no friction rub.    No murmur heard.  Pulmonary/Chest: Effort normal and breath " sounds normal. She has no rales.   Abdominal: Bowel sounds are normal. There is no tenderness.   Musculoskeletal: She exhibits no edema.   Neurological: She is alert.   Skin: No rash noted. She is not diaphoretic.   Psychiatric: She has a normal mood and affect.     We reviewed the images of her CAT scan shows a right pleural effusion    Assessment:     1. Atrial myxoma     2. Status post mitral valve repair     3. Mixed hyperlipidemia     4. Pacemaker     5. Pulmonary hypertension (CMS-HCC)         Medical Decision Making:  Today's Assessment / Status / Plan:     It was my pleasure to meet with Ms. Velarde.    Her pacemaker check today finds his functioning appropriately    Encouraged her to complete overnight pulse oximeter and chest x-ray is recommended by pulmonary and follow-up with them on likely severe sleep apnea she doesn't believe that she would wear CPAP mask    I encouraged her to continue work on smoking cessation    Ms. Velarde will see Ms. Luis Eduardo RENNER in 6 months and can followup with me in 12 months, I encouraged her to call or e-mail using my [employer] in the interim should issues arise.    It is my pleasure to participate in the care of Ms. Velarde.  Please do not hesitate to contact me with questions or concerns.    Toni Mullins MD PhD  Cardiologist [facility]

## 2017-09-27 NOTE — LETTER
[facility]   [address]  [place] [address]  Phone: [phone]  Fax: [phone]              Kailey Velarde  1960    Encounter Date: 9/27/2017    Toni Mullins M.D.          PROGRESS NOTE:  Subjective:   Kailey Velarde is a 57 y.o. female who presents today For follow-up of her history of pacemaker with prior mitral valve repair with myxoma    Since her last she's been doing well she reports that her edema has been better    She continues to smoke and uses e-cigarette    She followed up with pulmonary was recommended testing which hasn't completed     Past Medical History:   Diagnosis Date   • Sick sinus syndrome (CMS-HCC) November 2011    Status post PPM implantation.   • Status post mitral valve repair November 2011    MV repair with 32mm Jonnie-Carpenter flexible annuloplasty ring.   • Atrial myxoma November 2011    Status post LA myxoma resection   • Backpain    • CAD (coronary artery disease)     pacemaker   • Chronic pain    • Dizziness     • Hyperlipidemia     • Hypertension    • Hypertriglyceridemia     • Indigestion    • Peripheral edema    • Sciatica      Past Surgical History:   Procedure Laterality Date   • MITRAL VALVE REPAIR  11/17/2011    Performed by MARY ORTIZ at SURGERY [facility]   • PACEMAKER INSERTION  November 2011    Medtronic Versa [id number] implanted by Dr. Amaya.   • OTHER ORTHOPEDIC SURGERY      Neck and back      Family History   Problem Relation Age of Onset   • Heart Attack Father      History   Smoking Status   • Current Every Day Smoker   • Packs/day: 0.25   • Years: 30.00   • Types: Cigarettes   Smokeless Tobacco   • Never Used     Allergies   Allergen Reactions   • Penicillins Rash     rash     Outpatient Encounter Prescriptions as of 9/27/2017   Medication Sig Dispense Refill   • Venlafaxine HCl (EFFEXOR PO) Take  by mouth.     • pregabalin (LYRICA) 200 MG capsule Take 200 mg by mouth 3 times  "a day.     • lisinopril (PRINIVIL) 10 MG Tab Take 10 mg by mouth every day.     • simvastatin (ZOCOR) 40 MG Tab Take 1 Tab by mouth every evening. 30 Tab 11   • furosemide (LASIX) 40 MG Tab Take 1 Tab by mouth every day. (Patient taking differently: Take 20 mg by mouth every day.) 30 Tab 3   • potassium chloride SA (K-DUR) 20 MEQ Tab CR Take 1 Tab by mouth 2 times a day. (Patient taking differently: Take 20 mEq by mouth.) 60 Tab 11   • vitamin D (CHOLECALCIFEROL) 1000 UNIT Tab Take 1,000 Units by mouth every day.     • oxycodone CR (OXYCONTIN) 10 MG TB12 Take 10 mg by mouth 3 times a day.     • morphine SR (MS CONTIN) 15 MG TB12 Take 1 Tab by mouth 3 times a day. 60 Tab 0   • Omega-3 Fatty Acids (FISH OIL) 1000 MG Cap capsule Take 1,000 mg by mouth 3 times a day, with meals.     • Citalopram Hydrobromide (CELEXA PO) Take  by mouth.     • tizanidine (ZANAFLEX) 4 MG TABS Take  by mouth every day at 6 PM.     • methocarbamol (ROBAXIN) 500 MG TABS Take  by mouth as needed.     • gabapentin (NEURONTIN) 600 MG tablet Take 600 mg by mouth 3 times a day.       No facility-administered encounter medications on file as of 9/27/2017.      Review of Systems   Constitutional: Positive for malaise/fatigue. Negative for chills and fever.   HENT: Negative for sore throat.    Eyes: Negative for blurred vision.   Respiratory: Positive for shortness of breath. Negative for cough.    Cardiovascular: Positive for leg swelling (intermittent). Negative for chest pain, palpitations, claudication and PND.   Gastrointestinal: Negative for abdominal pain and nausea.   Musculoskeletal: Negative for falls and joint pain.   Skin: Negative for rash.   Neurological: Negative for dizziness, focal weakness and weakness.   Endo/Heme/Allergies: Does not bruise/bleed easily.        Objective:   /70   Pulse 98   Ht 1.727 m (5' 8\")   Wt 119.7 kg (264 lb)   LMP 01/01/2010   SpO2 97%   BMI 40.14 kg/m²      Physical Exam   Constitutional: No " distress.   HENT:   Mouth/Throat: Oropharynx is clear and moist.   Eyes: No scleral icterus.   Cardiovascular: Normal rate, regular rhythm and intact distal pulses.  Exam reveals no gallop and no friction rub.    No murmur heard.  Pulmonary/Chest: Effort normal and breath sounds normal. She has no rales.   Abdominal: Bowel sounds are normal. There is no tenderness.   Musculoskeletal: She exhibits no edema.   Neurological: She is alert.   Skin: No rash noted. She is not diaphoretic.   Psychiatric: She has a normal mood and affect.     We reviewed the images of her CAT scan shows a right pleural effusion    Assessment:     1. Atrial myxoma     2. Status post mitral valve repair     3. Mixed hyperlipidemia     4. Pacemaker     5. Pulmonary hypertension (CMS-HCC)         Medical Decision Making:  Today's Assessment / Status / Plan:     It was my pleasure to meet with Ms. Velarde.    Her pacemaker check today finds his functioning appropriately    Encouraged her to complete overnight pulse oximeter and chest x-ray is recommended by pulmonary and follow-up with them on likely severe sleep apnea she doesn't believe that she would wear CPAP mask    I encouraged her to continue work on smoking cessation    Ms. Velarde will see Ms. Luis Eduardo RENNER in 6 months and can followup with me in 12 months, I encouraged her to call or e-mail using my [employer] in the interim should issues arise.    It is my pleasure to participate in the care of Ms. Velarde.  Please do not hesitate to contact me with questions or concerns.    Toni Mullins MD PhD  Cardiologist [facility]        Toni Grijalva M.D.  [address] [place] [id number]  VIA Facsimile: [phone]

## 2018-02-12 ENCOUNTER — TELEPHONE (OUTPATIENT)
Dept: CARDIOLOGY | Facility: MEDICAL CENTER | Age: 58
End: 2018-02-12

## 2018-02-12 NOTE — TELEPHONE ENCOUNTER
----- Message from Viky Weathers R.N. sent at 2/12/2018  3:03 PM PST -----  Regarding: FW: ADD call in absence of CW   Contact: [phone]      ----- Message -----  From: Ashley Peterson  Sent: 2/12/2018   2:26 PM  To: Viky Weathers R.N.  Subject: ADD call in absence of CW                        CR/Viky    Dr. Grijalva with [facility] is requesting to speak to ADD CR in the absence of CW. Pt in their office now with increased heart rate. He would please like a call back asap at [phone].

## 2018-02-12 NOTE — TELEPHONE ENCOUNTER
Dr. Sosa (ADD) attempted to call # without success.    2nd attempt to contact Dr. Grijalva for Dr. SHERWOOD.   Call put through to Dr. RICHEY

## 2018-02-14 ENCOUNTER — TELEPHONE (OUTPATIENT)
Dept: CARDIOLOGY | Facility: MEDICAL CENTER | Age: 58
End: 2018-02-14

## 2018-02-14 NOTE — TELEPHONE ENCOUNTER
----- Message from Toni Mullins M.D. sent at 2/14/2018  8:39 AM PST -----  That's fine but she needs a pm check on the same day    ----- Message -----  From: Mally Chowdhury R.N.  Sent: 2/13/2018   4:36 PM  To: Toni Mullins M.D.    Why can't she see SM on 2/22, Ailyn and Keerthi have nothing until March 7th, and the earl she has is next week?   ----- Message -----  From: Toni Mullins M.D.  Sent: 2/13/2018   4:32 PM  To: Mally Chowdhury R.N.    Yes it looks like flutter she should come in to see Kinza or Ailyn for an appointment with pacemaker check and she needs to start her on Coumadin    Thank you      ----- Message -----  From: Mally Chowdhury R.N.  Sent: 2/13/2018   3:59 PM  To: Toni Mullins M.D.    Please review EKG in media.  Yesterday ARISTEO MANSFIELD as ADD got a call from Dr. Grijalva who was not sure if patient was in flutter. CR never received the EKG and I got it today from medical records.   He advised metoprolol and patient is scheduled with [facility] on 2/22.  Dr. Grijalva phone [phone].  Thanks, let me know if anything else needs to be done.

## 2018-02-22 ENCOUNTER — OFFICE VISIT (OUTPATIENT)
Dept: CARDIOLOGY | Facility: MEDICAL CENTER | Age: 58
End: 2018-02-22
Payer: MEDICARE

## 2018-02-22 ENCOUNTER — NON-PROVIDER VISIT (OUTPATIENT)
Dept: CARDIOLOGY | Facility: MEDICAL CENTER | Age: 58
End: 2018-02-22
Payer: MEDICARE

## 2018-02-22 VITALS
BODY MASS INDEX: 41.28 KG/M2 | HEIGHT: 68 IN | SYSTOLIC BLOOD PRESSURE: 138 MMHG | WEIGHT: 272.4 LBS | HEART RATE: 130 BPM | OXYGEN SATURATION: 98 % | DIASTOLIC BLOOD PRESSURE: 84 MMHG

## 2018-02-22 DIAGNOSIS — R00.2 PALPITATIONS: ICD-10-CM

## 2018-02-22 DIAGNOSIS — E78.2 MIXED HYPERLIPIDEMIA: ICD-10-CM

## 2018-02-22 DIAGNOSIS — Z95.0 PACEMAKER: ICD-10-CM

## 2018-02-22 DIAGNOSIS — I10 ESSENTIAL HYPERTENSION: ICD-10-CM

## 2018-02-22 DIAGNOSIS — I48.0 PAF (PAROXYSMAL ATRIAL FIBRILLATION) (HCC): ICD-10-CM

## 2018-02-22 PROCEDURE — 93000 ELECTROCARDIOGRAM COMPLETE: CPT | Mod: 59 | Performed by: INTERNAL MEDICINE

## 2018-02-22 PROCEDURE — 93279 PRGRMG DEV EVAL PM/LDLS PM: CPT | Performed by: INTERNAL MEDICINE

## 2018-02-22 PROCEDURE — 99214 OFFICE O/P EST MOD 30 MIN: CPT | Mod: 25 | Performed by: NURSE PRACTITIONER

## 2018-02-22 RX ORDER — ATORVASTATIN CALCIUM 20 MG/1
20 TABLET, FILM COATED ORAL EVERY EVENING
Qty: 30 TAB | Refills: 11 | Status: SHIPPED | OUTPATIENT
Start: 2018-02-22 | End: 2019-02-28 | Stop reason: SDUPTHER

## 2018-02-22 RX ORDER — ARIPIPRAZOLE 10 MG/1
10 TABLET ORAL DAILY
COMMUNITY
End: 2018-03-28

## 2018-02-22 RX ORDER — DILTIAZEM HYDROCHLORIDE 180 MG/1
180 CAPSULE, EXTENDED RELEASE ORAL DAILY
Qty: 30 CAP | Refills: 11 | Status: SHIPPED | OUTPATIENT
Start: 2018-02-22 | End: 2018-03-12

## 2018-02-22 ASSESSMENT — ENCOUNTER SYMPTOMS
MYALGIAS: 0
SHORTNESS OF BREATH: 1
WEAKNESS: 0
PALPITATIONS: 1
ORTHOPNEA: 0
DIZZINESS: 0
DEPRESSION: 1
COUGH: 1
ABDOMINAL PAIN: 0
PND: 0
CLAUDICATION: 0

## 2018-02-22 NOTE — PROGRESS NOTES
"Subjective:   Kailey \"Melanie\" MUKUL Velarde is a 57 y.o. female who presents today  Due to referral from her primary care doctor, Dr. Grijalva, for a rapid heart rate.    She was seen in his office where she had a heart rate of 130 to 140. EKG revealed a rapid regular heart rate. It was felt this with either atrial flutter or SVT. She was started on metoprolol 25 mg half tablet twice a day and to continue on aspirin 81 mg.    She has a history of a pacemaker, mitral valve repair and atrial myxoma. She does have some pulmonary hypertension.    Today she complains of occasional palpitations particularly in the morning where she wakes up with a fast heart rate. She states she feels a fluttering sensation in her diaphragm. She denies any true chest pain. She has her usual amount of shortness of breath.    She fell metoprolol made her very fatigued and made her depression worse. She is seeing a psychiatrist and having her medications adjusted currently.    Unfortunately she continues to smoke and has started drinking some beer recently.    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   • Status post mitral valve repair November 2011    MV repair with 32mm Jonnie-Carpenter flexible annuloplasty ring.     Past Surgical History:   Procedure Laterality Date   • MITRAL VALVE REPAIR  11/17/2011    Performed by MARY ORTIZ at SURGERY [facility] ORS   • PACEMAKER INSERTION  November 2011    Medtronic Versa VEDR01 implanted by Dr. Amaya.   • OTHER ORTHOPEDIC SURGERY      Neck and back      Family History   Problem Relation Age of Onset   • Heart Attack Father      History "   Smoking Status   • Current Every Day Smoker   • Packs/day: 0.25   • Years: 30.00   • Types: Cigarettes   Smokeless Tobacco   • Never Used     Allergies   Allergen Reactions   • Penicillins Rash     rash     Outpatient Encounter Prescriptions as of 2/22/2018   Medication Sig Dispense Refill   • ARIPiprazole (ABILIFY) 10 MG Tab Take 10 mg by mouth every day.     • metoprolol (LOPRESSOR) 25 MG Tab Take 12.5 mg by mouth 2 times a day.     • rivaroxaban (XARELTO) 20 MG Tab tablet Take 1 Tab by mouth with dinner. 30 Tab 11   • diltiazem (DILACOR XR) 180 MG XR capsule Take 1 Cap by mouth every day. 30 Cap 11   • atorvastatin (LIPITOR) 20 MG Tab Take 1 Tab by mouth every evening. 30 Tab 11   • Venlafaxine HCl (EFFEXOR PO) Take  by mouth.     • pregabalin (LYRICA) 200 MG capsule Take 200 mg by mouth 3 times a day.     • lisinopril (PRINIVIL) 10 MG Tab Take 10 mg by mouth every day.     • furosemide (LASIX) 40 MG Tab Take 1 Tab by mouth every day. (Patient taking differently: Take 20 mg by mouth every day.) 30 Tab 3   • potassium chloride SA (K-DUR) 20 MEQ Tab CR Take 1 Tab by mouth 2 times a day. 60 Tab 11   • vitamin D (CHOLECALCIFEROL) 1000 UNIT Tab Take 1,000 Units by mouth every day.     • oxycodone CR (OXYCONTIN) 10 MG TB12 Take 10 mg by mouth 3 times a day.     • morphine SR (MS CONTIN) 15 MG TB12 Take 1 Tab by mouth 3 times a day. 60 Tab 0   • [DISCONTINUED] aspirin EC (ECOTRIN) 81 MG Tablet Delayed Response Take 81 mg by mouth every day.     • [DISCONTINUED] simvastatin (ZOCOR) 40 MG Tab Take 1 Tab by mouth every evening. 30 Tab 11   • [DISCONTINUED] Omega-3 Fatty Acids (FISH OIL) 1000 MG Cap capsule Take 1,000 mg by mouth 3 times a day, with meals.     • [DISCONTINUED] Citalopram Hydrobromide (CELEXA PO) Take  by mouth.     • [DISCONTINUED] tizanidine (ZANAFLEX) 4 MG TABS Take  by mouth every day at 6 PM.     • [DISCONTINUED] methocarbamol (ROBAXIN) 500 MG TABS Take  by mouth as needed.     • [DISCONTINUED]  "gabapentin (NEURONTIN) 600 MG tablet Take 600 mg by mouth 3 times a day.       No facility-administered encounter medications on file as of 2/22/2018.      Review of Systems   Constitutional: Positive for malaise/fatigue.   Respiratory: Positive for cough (in am.) and shortness of breath (walking on a flat surface.).    Cardiovascular: Positive for palpitations (rapid heart rate in am). Negative for chest pain, orthopnea, claudication, leg swelling and PND.   Gastrointestinal: Negative for abdominal pain.   Musculoskeletal: Negative for myalgias.   Neurological: Negative for dizziness and weakness.   Psychiatric/Behavioral: Positive for depression (followed by psychiatry.).        Objective:   /84   Pulse (!) 130   Ht 1.727 m (5' 8\")   Wt 123.6 kg (272 lb 6.4 oz)   LMP 01/01/2010   SpO2 98%   BMI 41.42 kg/m²     Physical Exam   Constitutional: She is oriented to person, place, and time. She appears well-developed and well-nourished.   HENT:   Head: Normocephalic.   Eyes: Conjunctivae are normal.   Neck: No JVD present. No thyromegaly present.   Cardiovascular: S1 normal and S2 normal.  An irregularly irregular rhythm present. Tachycardia present.  Exam reveals no gallop, no S3, no S4 and no friction rub.    Murmur heard.   Systolic murmur is present with a grade of 2/6   Pulmonary/Chest: Effort normal. No respiratory distress. She has decreased breath sounds (throughout with rare rhonchi and scattered wheezes. No rales.). She has no wheezes. She has no rales. She exhibits no tenderness.   Abdominal: Soft. Bowel sounds are normal. She exhibits no distension. There is no tenderness. There is no rebound and no guarding.   Chowdhury sign is negative.   Musculoskeletal: She exhibits edema (trace bilateral.).   Neurological: She is alert and oriented to person, place, and time.   Skin: Skin is warm and dry.   Psychiatric: She has a normal mood and affect.     Today: EKG: Shows a rapid irregular rhythm probably " atrial fibrillation.    Pacemaker check showed that she was having atrial fibrillation atrial fibrillation with a rapid response.    February 21, 2018: Current gr of metabolic panels within normal limits with exception of alkaline phosphatase 170, AST 97, ALT 40. TSH 0.016, free T4 1 0.26.      Assessment:     1. Palpitations     2. PAF (paroxysmal atrial fibrillation) (CMS-HCC)     3. Pacemaker     4. Essential hypertension     5. Mixed hyperlipidemia         Medical Decision Making:  Today's Assessment / Status / Plan:     Palpitations: They wake her at night and are probably due to the atrial fibrillation.    Paroxysmal atrial fibrillation: She is in atrial fibrillation as day as confirmed by her pacemaker check. I consulted with Dr Sosa regarding rate control and anticoagulation. Patient wants to be off metoprolol as it is making her very fatigued and worsening her depression. She is CHADS-vasc 2 based on  hypertension and female sex.    We will have her discontinue metoprolol and aspirin. She will start Xarelto 20 mg for anticoagulation. She will start diltiazem 180 mg for rate control.    Pacemaker appears to be functioning well.    Hypertension: Her blood pressure is well-controlled.    Hyperlipidemia: There is an interaction between simvastatin and diltiazem therefore I will discontinue the simvastatin. I will start her on atorvastatin 20 mg daily. Will check lipids in the future.    She will follow-up in 2 weeks with myself to monitor her rate and rhythm control. She will follow-up sooner if problems.    Collaborating Provider: Dr Sosa.

## 2018-02-22 NOTE — LETTER
"     [facility]   [address]  [place] [address]  Phone: [phone]  Fax: [phone]              Kailey Velarde  1960    Encounter Date: 2/22/2018    [facility]          PROGRESS NOTE:  Subjective:   Kailey \"Melanie\" MUKUL Velarde is a 57 y.o. female who presents today  under aggressive her primary care doctor, Dr. Grijalva, for a rapid heart rate.    She was seen in his office where she had a heart rate of 1:30 to 140. EKG revealed a rapid regular heart rate. It was felt this with either atrial flutter or SVT. She was started on metoprolol 25 mg half tablet twice a day and to continue on aspirin 81 mg.    She has a history of a pacemaker, mitral valve repair and atrial myxoma. She does have some pulmonary hypertension.    Today she complains of occasional palpitations particularly in the morning where she wakes up with a fast heart rate. She states she feels a fluttering sensation in her diaphragm. She denies any true chest pain. She has her usual amount of shortness of breath.    She fell metoprolol made her very fatigued and made her depression worse. She is seeing a psychiatrist and having her medications adjusted currently.    Unfortunately she continues to smoke and has started drinking some beer recently.    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   • Status post mitral valve repair November 2011    MV repair with 32mm Jonnie-Carpenter flexible annuloplasty ring.     Past Surgical History:   Procedure Laterality Date   • MITRAL VALVE REPAIR  11/17/2011    Performed by " MARY ORTIZ at SURGERY [facility] ORS   • PACEMAKER INSERTION  November 2011    Medtronic Versa VEDR01 implanted by Dr. Amaya.   • OTHER ORTHOPEDIC SURGERY      Neck and back      Family History   Problem Relation Age of Onset   • Heart Attack Father      History   Smoking Status   • Current Every Day Smoker   • Packs/day: 0.25   • Years: 30.00   • Types: Cigarettes   Smokeless Tobacco   • Never Used     Allergies   Allergen Reactions   • Penicillins Rash     rash     Outpatient Encounter Prescriptions as of 2/22/2018   Medication Sig Dispense Refill   • ARIPiprazole (ABILIFY) 10 MG Tab Take 10 mg by mouth every day.     • metoprolol (LOPRESSOR) 25 MG Tab Take 12.5 mg by mouth 2 times a day.     • rivaroxaban (XARELTO) 20 MG Tab tablet Take 1 Tab by mouth with dinner. 30 Tab 11   • diltiazem (DILACOR XR) 180 MG XR capsule Take 1 Cap by mouth every day. 30 Cap 11   • atorvastatin (LIPITOR) 20 MG Tab Take 1 Tab by mouth every evening. 30 Tab 11   • Venlafaxine HCl (EFFEXOR PO) Take  by mouth.     • pregabalin (LYRICA) 200 MG capsule Take 200 mg by mouth 3 times a day.     • lisinopril (PRINIVIL) 10 MG Tab Take 10 mg by mouth every day.     • furosemide (LASIX) 40 MG Tab Take 1 Tab by mouth every day. (Patient taking differently: Take 20 mg by mouth every day.) 30 Tab 3   • potassium chloride SA (K-DUR) 20 MEQ Tab CR Take 1 Tab by mouth 2 times a day. 60 Tab 11   • vitamin D (CHOLECALCIFEROL) 1000 UNIT Tab Take 1,000 Units by mouth every day.     • oxycodone CR (OXYCONTIN) 10 MG TB12 Take 10 mg by mouth 3 times a day.     • morphine SR (MS CONTIN) 15 MG TB12 Take 1 Tab by mouth 3 times a day. 60 Tab 0   • [DISCONTINUED] aspirin EC (ECOTRIN) 81 MG Tablet Delayed Response Take 81 mg by mouth every day.     • [DISCONTINUED] simvastatin (ZOCOR) 40 MG Tab Take 1 Tab by mouth every evening. 30 Tab 11   • [DISCONTINUED] Omega-3 Fatty Acids (FISH OIL) 1000 MG Cap capsule Take 1,000 mg by mouth 3 times a day, with meals.     "  • [DISCONTINUED] Citalopram Hydrobromide (CELEXA PO) Take  by mouth.     • [DISCONTINUED] tizanidine (ZANAFLEX) 4 MG TABS Take  by mouth every day at 6 PM.     • [DISCONTINUED] methocarbamol (ROBAXIN) 500 MG TABS Take  by mouth as needed.     • [DISCONTINUED] gabapentin (NEURONTIN) 600 MG tablet Take 600 mg by mouth 3 times a day.       No facility-administered encounter medications on file as of 2/22/2018.      Review of Systems   Constitutional: Positive for malaise/fatigue.   Respiratory: Positive for cough (in am.) and shortness of breath (walking on a flat surface.).    Cardiovascular: Positive for palpitations (rapid heart rate in am). Negative for chest pain, orthopnea, claudication, leg swelling and PND.   Gastrointestinal: Negative for abdominal pain.   Musculoskeletal: Negative for myalgias.   Neurological: Negative for dizziness and weakness.   Psychiatric/Behavioral: Positive for depression (followed by psychiatry.).        Objective:   /84   Pulse (!) 130   Ht 1.727 m (5' 8\")   Wt 123.6 kg (272 lb 6.4 oz)   LMP 01/01/2010   SpO2 98%   BMI 41.42 kg/m²      Physical Exam   Constitutional: She is oriented to person, place, and time. She appears well-developed and well-nourished.   HENT:   Head: Normocephalic.   Eyes: Conjunctivae are normal.   Neck: No JVD present. No thyromegaly present.   Cardiovascular: S1 normal and S2 normal.  An irregularly irregular rhythm present. Tachycardia present.  Exam reveals no gallop, no S3, no S4 and no friction rub.    Murmur heard.   Systolic murmur is present with a grade of 2/6   Pulmonary/Chest: Effort normal. No respiratory distress. She has decreased breath sounds (throughout with rare rhonchi and scattered wheezes. No rales.). She has no wheezes. She has no rales. She exhibits no tenderness.   Abdominal: Soft. Bowel sounds are normal. She exhibits no distension. There is no tenderness. There is no rebound and no guarding.   Chowdhury sign is negative. "   Musculoskeletal: She exhibits edema (trace bilateral.).   Neurological: She is alert and oriented to person, place, and time.   Skin: Skin is warm and dry.   Psychiatric: She has a normal mood and affect.     Today: EKG: Shows a rapid irregular rhythm probably atrial fibrillation.    Pacemaker check showed that she was having atrial fibrillation atrial fibrillation with a rapid response.    February 21, 2018: Current gr of metabolic panels within normal limits with exception of alkaline phosphatase 170, AST 97, ALT 40. TSH 0.016, free T4 1 0.26.      Assessment:     1. Palpitations     2. PAF (paroxysmal atrial fibrillation) (CMS-HCC)     3. Pacemaker     4. Essential hypertension     5. Mixed hyperlipidemia         Medical Decision Making:  Today's Assessment / Status / Plan:     Palpitations: They wake her at night and are probably due to the atrial fibrillation.    Paroxysmal atrial fibrillation: She is in atrial fibrillation as day as confirmed by her pacemaker check. I consulted with Dr Sosa regarding rate control and anticoagulation. Patient wants to be off metoprolol as it is making her very fatigued and worsening her depression. She is CHADS-vasc 2 based on  hypertension and female sex.    We will have her discontinue metoprolol and aspirin. She will start Xarelto 20 mg for anticoagulation. She will start diltiazem 180 mg for rate control.    Pacemaker appears to be functioning well.    Hypertension: Her blood pressure is well-controlled.    Hyperlipidemia: There is an interaction between simvastatin and diltiazem therefore I will discontinue the simvastatin. I will start her on atorvastatin 20 mg daily. Will check lipids in the future.    She will follow-up in 2 weeks with myself to monitor her rate and rhythm control. She will follow-up sooner if problems.    Collaborating Provider: Dr Sosa.        No Recipients

## 2018-02-23 LAB — EKG IMPRESSION: NORMAL

## 2018-03-12 ENCOUNTER — OFFICE VISIT (OUTPATIENT)
Dept: CARDIOLOGY | Facility: MEDICAL CENTER | Age: 58
End: 2018-03-12
Payer: MEDICARE

## 2018-03-12 VITALS
HEART RATE: 94 BPM | SYSTOLIC BLOOD PRESSURE: 122 MMHG | HEIGHT: 68 IN | OXYGEN SATURATION: 96 % | BODY MASS INDEX: 41.22 KG/M2 | DIASTOLIC BLOOD PRESSURE: 74 MMHG | WEIGHT: 272 LBS

## 2018-03-12 DIAGNOSIS — Z87.09 HX OF PLEURAL EFFUSION: ICD-10-CM

## 2018-03-12 DIAGNOSIS — R06.02 SHORTNESS OF BREATH: ICD-10-CM

## 2018-03-12 DIAGNOSIS — I48.0 PAF (PAROXYSMAL ATRIAL FIBRILLATION) (HCC): ICD-10-CM

## 2018-03-12 DIAGNOSIS — I10 ESSENTIAL HYPERTENSION: ICD-10-CM

## 2018-03-12 DIAGNOSIS — F17.200 TOBACCO USE DISORDER: ICD-10-CM

## 2018-03-12 PROCEDURE — 99214 OFFICE O/P EST MOD 30 MIN: CPT | Performed by: NURSE PRACTITIONER

## 2018-03-12 RX ORDER — DILTIAZEM HYDROCHLORIDE 240 MG/1
240 CAPSULE, COATED, EXTENDED RELEASE ORAL DAILY
Qty: 30 CAP | Refills: 11 | Status: ON HOLD | OUTPATIENT
Start: 2018-03-12 | End: 2018-04-06

## 2018-03-12 ASSESSMENT — ENCOUNTER SYMPTOMS
ORTHOPNEA: 0
CLAUDICATION: 0
DIZZINESS: 0
COUGH: 1
SHORTNESS OF BREATH: 1
MYALGIAS: 0
PALPITATIONS: 0
ABDOMINAL PAIN: 0
WEAKNESS: 0
PND: 0
DEPRESSION: 1

## 2018-03-12 NOTE — PROGRESS NOTES
"Subjective:   Kailey \"Melanie\"  MUKUL Velarde is a 57 y.o. female who presents today to follow-up on rapid atrial fibrillation. She was last seen by myself on February 22, 2018 at which time she was in rapid atrial rhythm. She was anticoagulated on Xarelto.    She was having rapid atrial fibrillation felt very fatigued which she thought was due to the metoprolol. We stopped metoprolol and lisinopril and gave her diltiazem 180 mg. He continues to feel fatigued and short of breath. She tells me that she's having trouble breathing when she lays down at night. She is awake every couple hours having trouble with her breathing. She remains on Lasix 20 mg daily.    She has a history of pleural effusions after her mitral valve repair in 2011. In June 2017 she also had a right pleural effusion. She was seen by pulmonary medicine in June 2017 and overnight oximetry was ordered which she did not complete.    Unfortunately, she continues to smoke about half pack of cigarettes. She would like to quit but finds it difficult.      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   • Status post mitral valve repair November 2011    MV repair with 32mm Jonnie-Carpenter flexible annuloplasty ring.     Past Surgical History:   Procedure Laterality Date   • MITRAL VALVE REPAIR  11/17/2011    Performed by MARY ORTIZ at SURGERY [facility] ORS   • PACEMAKER INSERTION  November 2011    Medtronic Versa VEDR01 implanted by Dr. Amaya.   • OTHER ORTHOPEDIC SURGERY      Neck and back      Family History   Problem Relation Age of Onset   • Heart Attack Father      History   Smoking Status   • " Current Every Day Smoker   • Packs/day: 0.25   • Years: 30.00   • Types: Cigarettes   Smokeless Tobacco   • Never Used     Allergies   Allergen Reactions   • Penicillins Rash     rash     Outpatient Encounter Prescriptions as of 3/12/2018   Medication Sig Dispense Refill   • Multiple Vitamin (MULTI-DAY PO) Take  by mouth.     • DILTIAZem CD (CARDIZEM CD) 240 MG CAPSULE SR 24 HR Take 1 Cap by mouth every day. 30 Cap 11   • ARIPiprazole (ABILIFY) 10 MG Tab Take 10 mg by mouth every day.     • rivaroxaban (XARELTO) 20 MG Tab tablet Take 1 Tab by mouth with dinner. 30 Tab 11   • atorvastatin (LIPITOR) 20 MG Tab Take 1 Tab by mouth every evening. 30 Tab 11   • Venlafaxine HCl (EFFEXOR PO) Take  by mouth.     • pregabalin (LYRICA) 200 MG capsule Take 200 mg by mouth 3 times a day.     • furosemide (LASIX) 40 MG Tab Take 1 Tab by mouth every day. (Patient taking differently: Take 20 mg by mouth every day.) 30 Tab 3   • potassium chloride SA (K-DUR) 20 MEQ Tab CR Take 1 Tab by mouth 2 times a day. 60 Tab 11   • vitamin D (CHOLECALCIFEROL) 1000 UNIT Tab Take 1,000 Units by mouth every day.     • oxycodone CR (OXYCONTIN) 10 MG TB12 Take 10 mg by mouth 3 times a day.     • morphine SR (MS CONTIN) 15 MG TB12 Take 1 Tab by mouth 3 times a day. 60 Tab 0   • [DISCONTINUED] metoprolol (LOPRESSOR) 25 MG Tab Take 12.5 mg by mouth 2 times a day.     • [DISCONTINUED] diltiazem (DILACOR XR) 180 MG XR capsule Take 1 Cap by mouth every day. 30 Cap 11   • [DISCONTINUED] lisinopril (PRINIVIL) 10 MG Tab Take 10 mg by mouth every day.       No facility-administered encounter medications on file as of 3/12/2018.      Review of Systems   Constitutional: Positive for malaise/fatigue.   Respiratory: Positive for cough (in am.) and shortness of breath (walking on a flat surface.).    Cardiovascular: Negative for chest pain, palpitations, orthopnea, claudication, leg swelling and PND.   Gastrointestinal: Negative for abdominal pain.  "  Musculoskeletal: Negative for myalgias.   Neurological: Negative for dizziness and weakness.   Psychiatric/Behavioral: Positive for depression (followed by psychiatry.).        Objective:   /74   Pulse 94   Ht 1.727 m (5' 8\")   Wt 123.4 kg (272 lb)   LMP 01/01/2010   SpO2 96%   BMI 41.36 kg/m²     Physical Exam   Constitutional: She is oriented to person, place, and time. She appears well-developed and well-nourished.   HENT:   Head: Normocephalic.   Eyes: Conjunctivae are normal.   Neck: No JVD present. No thyromegaly present.   Cardiovascular: Normal rate, S1 normal, S2 normal and normal heart sounds.  An irregularly irregular rhythm present. Exam reveals no gallop, no S3, no S4 and no friction rub.    No murmur heard.  Pulmonary/Chest: Effort normal and breath sounds normal. No respiratory distress. She has no wheezes. She has no rales. She exhibits no tenderness.   Pacemaker left upper chest without erosion, redness or fluctuance.   Abdominal: Soft. Bowel sounds are normal. She exhibits no distension. There is no tenderness. There is no rebound and no guarding.   Chowdhury sign is negative.   Musculoskeletal: She exhibits edema (trace bilateral.).   Neurological: She is alert and oriented to person, place, and time.   Skin: Skin is warm and dry.   Psychiatric: She has a normal mood and affect.     August 22, 2016: Transthoracic Echo Report:  Normal left ventricular size, wall thickness, and systolic function.  Left ventricular ejection fraction is visually estimated to be 60%.  Known mitral valve bioprosthesis which is functioning normally with   appropriate transvalvular gradient.  Aortic sclerosis without stenosis.  Estimated right ventricular systolic pressure is 60 mmHg.  Right heart pressures are consistent with moderate pulmonary   Hypertension.    Assessment:     1. PAF (paroxysmal atrial fibrillation) (CMS-HCC)     2. Shortness of breath     3. Essential hypertension     4. Tobacco use " disorder     5. Hx of pleural effusion      on right after mitral valve replacement 2011.       Medical Decision Making:  Today's Assessment / Status / Plan:     Paroxysmal atrial fibrillation: She is in atrial fibrillation today and she does have a rapid rate with any movement. Her heart rate which was in the 80s to 90s went to one 25 bpm when she went from the chair to the exam table. I will increase diltiazem to 240 mg daily. If her blood pressure becomes too low and her heart rate is controlled B can consider going back to diltiazem 180 mg.    Shortness of breath: She is short of breath at night which could possibly be some fluid overload. I would like her to take Lasix 40 mg for 2 days in a row and then return to 20 mg daily. I would like a chest x-ray to rule out pleural effusion. Also I would like her to have a updated echo since her last one was in 2016 and she may have a reduction in ejection fraction. She does have pulmonary hypertension which could be contributing to her shortness of breath.    Tobacco use: Unfortunately, she is continuing to smoke but wishes to quit. I've encouraged her to at least cut down.    History of pleural effusion: I do not hear anything that sounds like a pleural effusion on her exam today. However I would like a chest x-ray to verify.    She will follow-up in 2 weeks with myself to monitor her for rate control and shortness of breath. She will follow-up sooner if problems.    She would like to do chest x-ray and echocardiogram through the [facility] clinic as it will be much less cost to her. She will ask her primary care, Dr. Grijalva to order chest x-ray and echocardiogram.    Collaborating Provider: Dr Agee.

## 2018-03-12 NOTE — LETTER
"     [facility]   [address], [address]  [place] [address]  Phone: [phone]  Fax: [phone]              Kailey Velarde  1960    Encounter Date: 3/12/2018    [facility]          PROGRESS NOTE:  Subjective:   Kailey \"Melanie\"  MUKUL Velarde is a 57 y.o. female who presents today to follow-up on rapid atrial fibrillation. She was last seen by myself on February 22, 2018 at which time she was in rapid atrial rhythm. She was anticoagulated on Xarelto.    She was having rapid atrial fibrillation felt very fatigued which she thought was due to the metoprolol. We stopped metoprolol and lisinopril and gave her diltiazem 180 mg. He continues to feel fatigued and short of breath. She tells me that she's having trouble breathing when she lays down at night. She is awake every couple hours having trouble with her breathing. She remains on Lasix 20 mg daily.    She has a history of pleural effusions after her mitral valve repair in 2011. In June 2017 she also had a right pleural effusion. She was seen by pulmonary medicine in June 2017 and overnight oximetry was ordered which she did not complete.    Unfortunately, she continues to smoke about half pack of cigarettes. She would like to quit but finds it difficult.      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   • Status post mitral valve repair November 2011    MV repair with 32mm Jonnie-Carpenter flexible annuloplasty ring.     Past Surgical History:   Procedure Laterality Date   • MITRAL VALVE REPAIR  11/17/2011    Performed by MARY ORTIZ at " SURGERY [facility] ORS   • PACEMAKER INSERTION  November 2011    Medtronic Versa VEDR01 implanted by Dr. Amaya.   • OTHER ORTHOPEDIC SURGERY      Neck and back      Family History   Problem Relation Age of Onset   • Heart Attack Father      History   Smoking Status   • Current Every Day Smoker   • Packs/day: 0.25   • Years: 30.00   • Types: Cigarettes   Smokeless Tobacco   • Never Used     Allergies   Allergen Reactions   • Penicillins Rash     rash     Outpatient Encounter Prescriptions as of 3/12/2018   Medication Sig Dispense Refill   • Multiple Vitamin (MULTI-DAY PO) Take  by mouth.     • DILTIAZem CD (CARDIZEM CD) 240 MG CAPSULE SR 24 HR Take 1 Cap by mouth every day. 30 Cap 11   • ARIPiprazole (ABILIFY) 10 MG Tab Take 10 mg by mouth every day.     • rivaroxaban (XARELTO) 20 MG Tab tablet Take 1 Tab by mouth with dinner. 30 Tab 11   • atorvastatin (LIPITOR) 20 MG Tab Take 1 Tab by mouth every evening. 30 Tab 11   • Venlafaxine HCl (EFFEXOR PO) Take  by mouth.     • pregabalin (LYRICA) 200 MG capsule Take 200 mg by mouth 3 times a day.     • furosemide (LASIX) 40 MG Tab Take 1 Tab by mouth every day. (Patient taking differently: Take 20 mg by mouth every day.) 30 Tab 3   • potassium chloride SA (K-DUR) 20 MEQ Tab CR Take 1 Tab by mouth 2 times a day. 60 Tab 11   • vitamin D (CHOLECALCIFEROL) 1000 UNIT Tab Take 1,000 Units by mouth every day.     • oxycodone CR (OXYCONTIN) 10 MG TB12 Take 10 mg by mouth 3 times a day.     • morphine SR (MS CONTIN) 15 MG TB12 Take 1 Tab by mouth 3 times a day. 60 Tab 0   • [DISCONTINUED] metoprolol (LOPRESSOR) 25 MG Tab Take 12.5 mg by mouth 2 times a day.     • [DISCONTINUED] diltiazem (DILACOR XR) 180 MG XR capsule Take 1 Cap by mouth every day. 30 Cap 11   • [DISCONTINUED] lisinopril (PRINIVIL) 10 MG Tab Take 10 mg by mouth every day.       No facility-administered encounter medications on file as of 3/12/2018.      Review of Systems   Constitutional: Positive for  "malaise/fatigue.   Respiratory: Positive for cough (in am.) and shortness of breath (walking on a flat surface.).    Cardiovascular: Negative for chest pain, palpitations, orthopnea, claudication, leg swelling and PND.   Gastrointestinal: Negative for abdominal pain.   Musculoskeletal: Negative for myalgias.   Neurological: Negative for dizziness and weakness.   Psychiatric/Behavioral: Positive for depression (followed by psychiatry.).        Objective:   /74   Pulse 94   Ht 1.727 m (5' 8\")   Wt 123.4 kg (272 lb)   LMP 01/01/2010   SpO2 96%   BMI 41.36 kg/m²      Physical Exam   Constitutional: She is oriented to person, place, and time. She appears well-developed and well-nourished.   HENT:   Head: Normocephalic.   Eyes: Conjunctivae are normal.   Neck: No JVD present. No thyromegaly present.   Cardiovascular: Normal rate, S1 normal, S2 normal and normal heart sounds.  An irregularly irregular rhythm present. Exam reveals no gallop, no S3, no S4 and no friction rub.    No murmur heard.  Pulmonary/Chest: Effort normal and breath sounds normal. No respiratory distress. She has no wheezes. She has no rales. She exhibits no tenderness.   Pacemaker left upper chest without erosion, redness or fluctuance.   Abdominal: Soft. Bowel sounds are normal. She exhibits no distension. There is no tenderness. There is no rebound and no guarding.   Chowdhury sign is negative.   Musculoskeletal: She exhibits edema (trace bilateral.).   Neurological: She is alert and oriented to person, place, and time.   Skin: Skin is warm and dry.   Psychiatric: She has a normal mood and affect.     August 22, 2016: Transthoracic Echo Report:  Normal left ventricular size, wall thickness, and systolic function.  Left ventricular ejection fraction is visually estimated to be 60%.  Known mitral valve bioprosthesis which is functioning normally with   appropriate transvalvular gradient.  Aortic sclerosis without stenosis.  Estimated right " ventricular systolic pressure is 60 mmHg.  Right heart pressures are consistent with moderate pulmonary   Hypertension.    Assessment:     1. PAF (paroxysmal atrial fibrillation) (CMS-HCC)     2. Shortness of breath     3. Essential hypertension     4. Tobacco use disorder     5. Hx of pleural effusion      on right after mitral valve replacement 2011.       Medical Decision Making:  Today's Assessment / Status / Plan:     Paroxysmal atrial fibrillation: She is in atrial fibrillation today and she does have a rapid rate with any movement. Her heart rate which was in the 80s to 90s went to one 25 bpm when she went from the chair to the exam table. I will increase diltiazem to 240 mg daily. If her blood pressure becomes too low and her heart rate is controlled B can consider going back to diltiazem 180 mg.    Shortness of breath: She is short of breath at night which could possibly be some fluid overload. I would like her to take Lasix 40 mg for 2 days in a row and then return to 20 mg daily. I would like a chest x-ray to rule out pleural effusion. Also I would like her to have a updated echo since her last one was in 2016 and she may have a reduction in ejection fraction. She does have pulmonary hypertension which could be contributing to her shortness of breath.    Tobacco use: Unfortunately, she is continuing to smoke but wishes to quit. I've encouraged her to at least cut down.    History of pleural effusion: I do not hear anything that sounds like a pleural effusion on her exam today. However I would like a chest x-ray to verify.    She will follow-up in 2 weeks with myself to monitor her for rate control and shortness of breath. She will follow-up sooner if problems.    She would like to do chest x-ray and echocardiogram through the [facility] clinic as it will be much less cost to her. She will ask her primary care, Dr. Grijalva to order chest x-ray and echocardiogram.    Collaborating Provider:   Anuel.            No Recipients

## 2018-03-28 ENCOUNTER — RESOLUTE PROFESSIONAL BILLING HOSPITAL PROF FEE (OUTPATIENT)
Dept: HOSPITALIST | Facility: MEDICAL CENTER | Age: 58
End: 2018-03-28
Payer: MEDICARE

## 2018-03-28 ENCOUNTER — APPOINTMENT (OUTPATIENT)
Dept: RADIOLOGY | Facility: MEDICAL CENTER | Age: 58
DRG: 286 | End: 2018-03-28
Attending: EMERGENCY MEDICINE
Payer: MEDICARE

## 2018-03-28 ENCOUNTER — HOSPITAL ENCOUNTER (INPATIENT)
Facility: MEDICAL CENTER | Age: 58
LOS: 9 days | DRG: 286 | End: 2018-04-06
Attending: EMERGENCY MEDICINE | Admitting: INTERNAL MEDICINE
Payer: MEDICARE

## 2018-03-28 ENCOUNTER — NON-PROVIDER VISIT (OUTPATIENT)
Dept: CARDIOLOGY | Facility: MEDICAL CENTER | Age: 58
End: 2018-03-28
Payer: MEDICARE

## 2018-03-28 ENCOUNTER — OFFICE VISIT (OUTPATIENT)
Dept: CARDIOLOGY | Facility: MEDICAL CENTER | Age: 58
End: 2018-03-28
Payer: MEDICARE

## 2018-03-28 VITALS
HEIGHT: 68 IN | DIASTOLIC BLOOD PRESSURE: 56 MMHG | WEIGHT: 281 LBS | HEART RATE: 88 BPM | BODY MASS INDEX: 42.59 KG/M2 | SYSTOLIC BLOOD PRESSURE: 88 MMHG | OXYGEN SATURATION: 83 %

## 2018-03-28 DIAGNOSIS — I50.33 ACUTE ON CHRONIC DIASTOLIC CONGESTIVE HEART FAILURE (HCC): ICD-10-CM

## 2018-03-28 DIAGNOSIS — F17.200 TOBACCO USE DISORDER: ICD-10-CM

## 2018-03-28 DIAGNOSIS — R06.02 SHORTNESS OF BREATH: ICD-10-CM

## 2018-03-28 DIAGNOSIS — R00.2 PALPITATIONS: ICD-10-CM

## 2018-03-28 DIAGNOSIS — I27.20 PULMONARY HYPERTENSION (HCC): ICD-10-CM

## 2018-03-28 DIAGNOSIS — R09.02 HYPOXIA: ICD-10-CM

## 2018-03-28 DIAGNOSIS — Z95.0 PACEMAKER: ICD-10-CM

## 2018-03-28 DIAGNOSIS — I48.0 PAF (PAROXYSMAL ATRIAL FIBRILLATION) (HCC): ICD-10-CM

## 2018-03-28 PROBLEM — E66.9 OBESITY: Status: ACTIVE | Noted: 2018-03-28

## 2018-03-28 PROBLEM — E87.70 VOLUME OVERLOAD: Status: ACTIVE | Noted: 2018-03-28

## 2018-03-28 PROBLEM — D68.318 CIRCULATING ANTICOAGULANTS (HCC): Status: ACTIVE | Noted: 2018-03-28

## 2018-03-28 PROBLEM — I50.43 ACUTE ON CHRONIC SYSTOLIC AND DIASTOLIC HEART FAILURE, NYHA CLASS 3 (HCC): Status: ACTIVE | Noted: 2018-03-28

## 2018-03-28 LAB
ALBUMIN SERPL BCP-MCNC: 4 G/DL (ref 3.2–4.9)
ALBUMIN/GLOB SERPL: 1.4 G/DL
ALP SERPL-CCNC: 91 U/L (ref 30–99)
ALT SERPL-CCNC: 24 U/L (ref 2–50)
ANION GAP SERPL CALC-SCNC: 8 MMOL/L (ref 0–11.9)
APTT PPP: 45.7 SEC (ref 24.7–36)
AST SERPL-CCNC: 42 U/L (ref 12–45)
BASOPHILS # BLD AUTO: 0.6 % (ref 0–1.8)
BASOPHILS # BLD: 0.04 K/UL (ref 0–0.12)
BILIRUB SERPL-MCNC: 0.9 MG/DL (ref 0.1–1.5)
BLOOD CULTURE HOLD CXBCH: NORMAL
BNP SERPL-MCNC: 93 PG/ML (ref 0–100)
BUN SERPL-MCNC: 8 MG/DL (ref 8–22)
CALCIUM SERPL-MCNC: 9.6 MG/DL (ref 8.5–10.5)
CHLORIDE SERPL-SCNC: 100 MMOL/L (ref 96–112)
CO2 SERPL-SCNC: 23 MMOL/L (ref 20–33)
CREAT SERPL-MCNC: 0.62 MG/DL (ref 0.5–1.4)
EKG IMPRESSION: NORMAL
EOSINOPHIL # BLD AUTO: 0.15 K/UL (ref 0–0.51)
EOSINOPHIL NFR BLD: 2.1 % (ref 0–6.9)
ERYTHROCYTE [DISTWIDTH] IN BLOOD BY AUTOMATED COUNT: 49 FL (ref 35.9–50)
GLOBULIN SER CALC-MCNC: 2.9 G/DL (ref 1.9–3.5)
GLUCOSE SERPL-MCNC: 111 MG/DL (ref 65–99)
HCT VFR BLD AUTO: 39 % (ref 37–47)
HGB BLD-MCNC: 12.6 G/DL (ref 12–16)
IMM GRANULOCYTES # BLD AUTO: 0.03 K/UL (ref 0–0.11)
IMM GRANULOCYTES NFR BLD AUTO: 0.4 % (ref 0–0.9)
INR PPP: 2.06 (ref 0.87–1.13)
LIPASE SERPL-CCNC: 101 U/L (ref 11–82)
LYMPHOCYTES # BLD AUTO: 1.49 K/UL (ref 1–4.8)
LYMPHOCYTES NFR BLD: 20.7 % (ref 22–41)
MCH RBC QN AUTO: 30.4 PG (ref 27–33)
MCHC RBC AUTO-ENTMCNC: 32.3 G/DL (ref 33.6–35)
MCV RBC AUTO: 94 FL (ref 81.4–97.8)
MONOCYTES # BLD AUTO: 0.71 K/UL (ref 0–0.85)
MONOCYTES NFR BLD AUTO: 9.9 % (ref 0–13.4)
NEUTROPHILS # BLD AUTO: 4.77 K/UL (ref 2–7.15)
NEUTROPHILS NFR BLD: 66.3 % (ref 44–72)
NRBC # BLD AUTO: 0 K/UL
NRBC BLD-RTO: 0 /100 WBC
PLATELET # BLD AUTO: 137 K/UL (ref 164–446)
PMV BLD AUTO: 10.2 FL (ref 9–12.9)
POTASSIUM SERPL-SCNC: 4.5 MMOL/L (ref 3.6–5.5)
PROCALCITONIN SERPL-MCNC: <0.05 NG/ML
PROT SERPL-MCNC: 6.9 G/DL (ref 6–8.2)
PROTHROMBIN TIME: 22.9 SEC (ref 12–14.6)
RBC # BLD AUTO: 4.15 M/UL (ref 4.2–5.4)
SODIUM SERPL-SCNC: 131 MMOL/L (ref 135–145)
TROPONIN I SERPL-MCNC: <0.01 NG/ML (ref 0–0.04)
WBC # BLD AUTO: 7.2 K/UL (ref 4.8–10.8)

## 2018-03-28 PROCEDURE — 96374 THER/PROPH/DIAG INJ IV PUSH: CPT

## 2018-03-28 PROCEDURE — 304561 HCHG STAT O2

## 2018-03-28 PROCEDURE — 770020 HCHG ROOM/CARE - TELE (206)

## 2018-03-28 PROCEDURE — A9270 NON-COVERED ITEM OR SERVICE: HCPCS | Performed by: INTERNAL MEDICINE

## 2018-03-28 PROCEDURE — 93005 ELECTROCARDIOGRAM TRACING: CPT | Performed by: EMERGENCY MEDICINE

## 2018-03-28 PROCEDURE — 84145 PROCALCITONIN (PCT): CPT

## 2018-03-28 PROCEDURE — 83880 ASSAY OF NATRIURETIC PEPTIDE: CPT

## 2018-03-28 PROCEDURE — 83690 ASSAY OF LIPASE: CPT

## 2018-03-28 PROCEDURE — 99214 OFFICE O/P EST MOD 30 MIN: CPT | Performed by: NURSE PRACTITIONER

## 2018-03-28 PROCEDURE — 700111 HCHG RX REV CODE 636 W/ 250 OVERRIDE (IP): Performed by: EMERGENCY MEDICINE

## 2018-03-28 PROCEDURE — 700102 HCHG RX REV CODE 250 W/ 637 OVERRIDE(OP): Performed by: INTERNAL MEDICINE

## 2018-03-28 PROCEDURE — 85025 COMPLETE CBC W/AUTO DIFF WBC: CPT

## 2018-03-28 PROCEDURE — 85610 PROTHROMBIN TIME: CPT

## 2018-03-28 PROCEDURE — 99285 EMERGENCY DEPT VISIT HI MDM: CPT

## 2018-03-28 PROCEDURE — 99223 1ST HOSP IP/OBS HIGH 75: CPT | Performed by: INTERNAL MEDICINE

## 2018-03-28 PROCEDURE — 84484 ASSAY OF TROPONIN QUANT: CPT

## 2018-03-28 PROCEDURE — 93279 PRGRMG DEV EVAL PM/LDLS PM: CPT | Performed by: INTERNAL MEDICINE

## 2018-03-28 PROCEDURE — 71275 CT ANGIOGRAPHY CHEST: CPT

## 2018-03-28 PROCEDURE — 80053 COMPREHEN METABOLIC PANEL: CPT

## 2018-03-28 PROCEDURE — 71045 X-RAY EXAM CHEST 1 VIEW: CPT

## 2018-03-28 PROCEDURE — 700117 HCHG RX CONTRAST REV CODE 255: Performed by: EMERGENCY MEDICINE

## 2018-03-28 PROCEDURE — 85730 THROMBOPLASTIN TIME PARTIAL: CPT

## 2018-03-28 RX ORDER — BISACODYL 10 MG
10 SUPPOSITORY, RECTAL RECTAL
Status: DISCONTINUED | OUTPATIENT
Start: 2018-03-28 | End: 2018-04-06 | Stop reason: HOSPADM

## 2018-03-28 RX ORDER — OXYCODONE HYDROCHLORIDE 10 MG/1
10 TABLET ORAL 3 TIMES DAILY
Status: DISCONTINUED | OUTPATIENT
Start: 2018-03-28 | End: 2018-04-05

## 2018-03-28 RX ORDER — LORAZEPAM 2 MG/ML
0.5 INJECTION INTRAMUSCULAR ONCE
Status: DISPENSED | OUTPATIENT
Start: 2018-03-28 | End: 2018-03-29

## 2018-03-28 RX ORDER — VENLAFAXINE HYDROCHLORIDE 150 MG/1
CAPSULE, EXTENDED RELEASE ORAL EVERY MORNING
COMMUNITY
End: 2021-02-22

## 2018-03-28 RX ORDER — FUROSEMIDE 20 MG/1
20 TABLET ORAL EVERY MORNING
COMMUNITY
End: 2018-04-12 | Stop reason: SDUPTHER

## 2018-03-28 RX ORDER — DILTIAZEM HYDROCHLORIDE 240 MG/1
240 CAPSULE, COATED, EXTENDED RELEASE ORAL DAILY
Status: DISCONTINUED | OUTPATIENT
Start: 2018-03-29 | End: 2018-03-30

## 2018-03-28 RX ORDER — NICOTINE 21 MG/24HR
14 PATCH, TRANSDERMAL 24 HOURS TRANSDERMAL
Status: DISCONTINUED | OUTPATIENT
Start: 2018-03-28 | End: 2018-04-02

## 2018-03-28 RX ORDER — OXYCODONE HYDROCHLORIDE 10 MG/1
10 TABLET ORAL 2 TIMES DAILY
COMMUNITY

## 2018-03-28 RX ORDER — MORPHINE SULFATE 15 MG/1
15 TABLET, FILM COATED, EXTENDED RELEASE ORAL EVERY 8 HOURS
Status: DISCONTINUED | OUTPATIENT
Start: 2018-03-28 | End: 2018-04-04

## 2018-03-28 RX ORDER — VENLAFAXINE HYDROCHLORIDE 75 MG/1
150 CAPSULE, EXTENDED RELEASE ORAL DAILY
Status: DISCONTINUED | OUTPATIENT
Start: 2018-03-29 | End: 2018-04-06 | Stop reason: HOSPADM

## 2018-03-28 RX ORDER — AMOXICILLIN 250 MG
2 CAPSULE ORAL 2 TIMES DAILY
Status: DISCONTINUED | OUTPATIENT
Start: 2018-03-28 | End: 2018-04-06 | Stop reason: HOSPADM

## 2018-03-28 RX ORDER — ARIPIPRAZOLE 10 MG/1
10 TABLET ORAL DAILY
Status: DISCONTINUED | OUTPATIENT
Start: 2018-03-29 | End: 2018-04-06 | Stop reason: HOSPADM

## 2018-03-28 RX ORDER — ARIPIPRAZOLE 5 MG/1
5 TABLET ORAL EVERY MORNING
COMMUNITY
End: 2020-07-21

## 2018-03-28 RX ORDER — FUROSEMIDE 10 MG/ML
20 INJECTION INTRAMUSCULAR; INTRAVENOUS
Status: DISCONTINUED | OUTPATIENT
Start: 2018-03-28 | End: 2018-03-29

## 2018-03-28 RX ORDER — POLYETHYLENE GLYCOL 3350 17 G/17G
1 POWDER, FOR SOLUTION ORAL
Status: DISCONTINUED | OUTPATIENT
Start: 2018-03-28 | End: 2018-04-06 | Stop reason: HOSPADM

## 2018-03-28 RX ORDER — POTASSIUM CHLORIDE 20 MEQ/1
10 TABLET, EXTENDED RELEASE ORAL 2 TIMES DAILY
COMMUNITY
End: 2018-04-12 | Stop reason: SDUPTHER

## 2018-03-28 RX ORDER — POTASSIUM CHLORIDE 20 MEQ/1
20 TABLET, EXTENDED RELEASE ORAL 2 TIMES DAILY
Status: DISCONTINUED | OUTPATIENT
Start: 2018-03-28 | End: 2018-04-03

## 2018-03-28 RX ORDER — ATORVASTATIN CALCIUM 20 MG/1
20 TABLET, FILM COATED ORAL EVERY EVENING
Status: DISCONTINUED | OUTPATIENT
Start: 2018-03-28 | End: 2018-04-06 | Stop reason: HOSPADM

## 2018-03-28 RX ORDER — PREGABALIN 100 MG/1
200 CAPSULE ORAL 3 TIMES DAILY
Status: DISCONTINUED | OUTPATIENT
Start: 2018-03-28 | End: 2018-04-06 | Stop reason: HOSPADM

## 2018-03-28 RX ORDER — FUROSEMIDE 10 MG/ML
20 INJECTION INTRAMUSCULAR; INTRAVENOUS ONCE
Status: COMPLETED | OUTPATIENT
Start: 2018-03-28 | End: 2018-03-28

## 2018-03-28 RX ADMIN — RIVAROXABAN 20 MG: 20 TABLET, FILM COATED ORAL at 21:00

## 2018-03-28 RX ADMIN — MORPHINE SULFATE 15 MG: 15 TABLET, EXTENDED RELEASE ORAL at 21:00

## 2018-03-28 RX ADMIN — PREGABALIN 200 MG: 100 CAPSULE ORAL at 21:00

## 2018-03-28 RX ADMIN — OXYCODONE HYDROCHLORIDE 10 MG: 10 TABLET ORAL at 21:00

## 2018-03-28 RX ADMIN — ATORVASTATIN CALCIUM 20 MG: 20 TABLET, FILM COATED ORAL at 21:01

## 2018-03-28 RX ADMIN — FUROSEMIDE 20 MG: 10 INJECTION, SOLUTION INTRAMUSCULAR; INTRAVENOUS at 16:51

## 2018-03-28 RX ADMIN — POTASSIUM CHLORIDE 20 MEQ: 1500 TABLET, EXTENDED RELEASE ORAL at 21:01

## 2018-03-28 RX ADMIN — STANDARDIZED SENNA CONCENTRATE AND DOCUSATE SODIUM 2 TABLET: 8.6; 5 TABLET, FILM COATED ORAL at 21:01

## 2018-03-28 RX ADMIN — IOHEXOL 60 ML: 350 INJECTION, SOLUTION INTRAVENOUS at 15:00

## 2018-03-28 ASSESSMENT — ENCOUNTER SYMPTOMS
EYE PAIN: 0
ORTHOPNEA: 0
CLAUDICATION: 0
BLOOD IN STOOL: 0
CONSTIPATION: 0
COUGH: 1
CHILLS: 0
VOMITING: 0
DEPRESSION: 1
WEAKNESS: 0
ORTHOPNEA: 1
PALPITATIONS: 0
LOSS OF CONSCIOUSNESS: 0
MYALGIAS: 0
NAUSEA: 0
DIARRHEA: 0
FEVER: 0
WHEEZING: 0
DIZZINESS: 0
PND: 0
INSOMNIA: 0
MYALGIAS: 1
EYE REDNESS: 0
ABDOMINAL PAIN: 0
WEAKNESS: 0
PALPITATIONS: 0
HEMOPTYSIS: 0
COUGH: 1
SEIZURES: 0
TREMORS: 0
NERVOUS/ANXIOUS: 0
DIZZINESS: 0
SHORTNESS OF BREATH: 1
ABDOMINAL PAIN: 0
SHORTNESS OF BREATH: 1
FALLS: 0
FOCAL WEAKNESS: 0
BACK PAIN: 1
HEADACHES: 0

## 2018-03-28 ASSESSMENT — PATIENT HEALTH QUESTIONNAIRE - PHQ9
4. FEELING TIRED OR HAVING LITTLE ENERGY: NEARLY EVERY DAY
1. LITTLE INTEREST OR PLEASURE IN DOING THINGS: SEVERAL DAYS
3. TROUBLE FALLING OR STAYING ASLEEP OR SLEEPING TOO MUCH: NEARLY EVERY DAY
6. FEELING BAD ABOUT YOURSELF - OR THAT YOU ARE A FAILURE OR HAVE LET YOURSELF OR YOUR FAMILY DOWN: SEVERAL DAYS
5. POOR APPETITE OR OVEREATING: SEVERAL DAYS
8. MOVING OR SPEAKING SO SLOWLY THAT OTHER PEOPLE COULD HAVE NOTICED. OR THE OPPOSITE, BEING SO FIGETY OR RESTLESS THAT YOU HAVE BEEN MOVING AROUND A LOT MORE THAN USUAL: SEVERAL DAYS
9. THOUGHTS THAT YOU WOULD BE BETTER OFF DEAD, OR OF HURTING YOURSELF: NOT AT ALL
SUM OF ALL RESPONSES TO PHQ9 QUESTIONS 1 AND 2: 2
2. FEELING DOWN, DEPRESSED, IRRITABLE, OR HOPELESS: SEVERAL DAYS
SUM OF ALL RESPONSES TO PHQ QUESTIONS 1-9: 12
7. TROUBLE CONCENTRATING ON THINGS, SUCH AS READING THE NEWSPAPER OR WATCHING TELEVISION: SEVERAL DAYS

## 2018-03-28 ASSESSMENT — CHA2DS2 SCORE
SEX: FEMALE
HYPERTENSION: YES
PRIOR STROKE OR TIA OR THROMBOEMBOLISM: NO
DIABETES: NO
VASCULAR DISEASE: NO
CHF OR LEFT VENTRICULAR DYSFUNCTION: NO
AGE 75 OR GREATER: NO
AGE 65 TO 74: NO
CHA2DS2 VASC SCORE: 2

## 2018-03-28 ASSESSMENT — LIFESTYLE VARIABLES
TOTAL SCORE: 2
AVERAGE NUMBER OF DAYS PER WEEK YOU HAVE A DRINK CONTAINING ALCOHOL: 3
CONSUMPTION TOTAL: POSITIVE
HAVE PEOPLE ANNOYED YOU BY CRITICIZING YOUR DRINKING: YES
DOES PATIENT WANT TO STOP DRINKING: NO
HAVE YOU EVER FELT YOU SHOULD CUT DOWN ON YOUR DRINKING: YES
TOTAL SCORE: 2
TOTAL SCORE: 2
EVER HAD A DRINK FIRST THING IN THE MORNING TO STEADY YOUR NERVES TO GET RID OF A HANGOVER: NO
HOW MANY TIMES IN THE PAST YEAR HAVE YOU HAD 5 OR MORE DRINKS IN A DAY: 20
EVER FELT BAD OR GUILTY ABOUT YOUR DRINKING: NO
EVER_SMOKED: YES
ON A TYPICAL DAY WHEN YOU DRINK ALCOHOL HOW MANY DRINKS DO YOU HAVE: 3
EVER_SMOKED: YES
ALCOHOL_USE: YES

## 2018-03-28 ASSESSMENT — COPD QUESTIONNAIRES
COPD SCREENING SCORE: 5
DO YOU EVER COUGH UP ANY MUCUS OR PHLEGM?: NO/ONLY WITH OCCASIONAL COLDS OR INFECTIONS
HAVE YOU SMOKED AT LEAST 100 CIGARETTES IN YOUR ENTIRE LIFE: YES
DURING THE PAST 4 WEEKS HOW MUCH DID YOU FEEL SHORT OF BREATH: SOME OF THE TIME

## 2018-03-28 ASSESSMENT — PAIN SCALES - GENERAL
PAINLEVEL_OUTOF10: 8
PAINLEVEL_OUTOF10: 8
PAINLEVEL_OUTOF10: 5

## 2018-03-28 NOTE — PROGRESS NOTES
"Chief Complaint   Patient presents with   • Atrial Fibrillation     follow up        Subjective:   Kailey \"Melanie\" MUKUL Velarde is a 57 y.o. female who presents today to follow-up on atrial fibrillation and shortness of breath. I last saw her on March 12, 2018 at which time her atrial fibrillation was not rate controlled. I increased diltiazem from 180 mg to 240 mg daily.    She comes in today complaining of shortness of breath which has worsened. She has not been sleeping well and has some paroxysmal nocturnal dyspnea. She admits to weight gain and drinking lots of liquids.    She has some palpitations particularly at night. Her ankles have become swollen.    Unfortunately she continues to smoke but is trying to quit.    She has a history of a mitral valve repair, and atrial myxoma, hypertension, hyperlipidemia and pacemaker for sick sinus syndrome.      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   • Status post mitral valve repair November 2011    MV repair with 32mm Jonnie-Carpenter flexible annuloplasty ring.     Past Surgical History:   Procedure Laterality Date   • MITRAL VALVE REPAIR  11/17/2011    Performed by MARY ORTIZ at [facility]   • PACEMAKER INSERTION  November 2011    Medtronic Versa VEDR01 implanted by Dr. Amaya.   • OTHER ORTHOPEDIC SURGERY      Neck and back      Family History   Problem Relation Age of Onset   • Heart Attack Father      Social History     Social History   • Marital status: Single     Spouse name: N/A   • Number of children: N/A   • Years of education: N/A     Occupational History   • Not on file.     Social " History Main Topics   • Smoking status: Current Every Day Smoker     Packs/day: 0.25     Years: 30.00     Types: Cigarettes   • Smokeless tobacco: Never Used   • Alcohol use No      Comment: sober since 7/14/16   • Drug use: No      Comment: hx of, denies currently   • Sexual activity: Not on file     Other Topics Concern   • Not on file     Social History Narrative   • No narrative on file     Allergies   Allergen Reactions   • Penicillins Rash     rash     Outpatient Encounter Prescriptions as of 3/28/2018   Medication Sig Dispense Refill   • Multiple Vitamin (MULTI-DAY PO) Take  by mouth.     • DILTIAZem CD (CARDIZEM CD) 240 MG CAPSULE SR 24 HR Take 1 Cap by mouth every day. 30 Cap 11   • ARIPiprazole (ABILIFY) 10 MG Tab Take 10 mg by mouth every day.     • rivaroxaban (XARELTO) 20 MG Tab tablet Take 1 Tab by mouth with dinner. 30 Tab 11   • atorvastatin (LIPITOR) 20 MG Tab Take 1 Tab by mouth every evening. 30 Tab 11   • Venlafaxine HCl (EFFEXOR PO) Take  by mouth.     • pregabalin (LYRICA) 200 MG capsule Take 200 mg by mouth 3 times a day.     • furosemide (LASIX) 40 MG Tab Take 1 Tab by mouth every day. (Patient taking differently: Take 20 mg by mouth every day.) 30 Tab 3   • potassium chloride SA (K-DUR) 20 MEQ Tab CR Take 1 Tab by mouth 2 times a day. 60 Tab 11   • vitamin D (CHOLECALCIFEROL) 1000 UNIT Tab Take 1,000 Units by mouth every day.     • oxycodone CR (OXYCONTIN) 10 MG TB12 Take 10 mg by mouth 3 times a day.     • morphine SR (MS CONTIN) 15 MG TB12 Take 1 Tab by mouth 3 times a day. 60 Tab 0     No facility-administered encounter medications on file as of 3/28/2018.      Review of Systems   Constitutional: Positive for malaise/fatigue.   Respiratory: Positive for cough (in am.) and shortness of breath (walking on a flat surface.).    Cardiovascular: Negative for chest pain, palpitations, orthopnea, claudication, leg swelling and PND.   Gastrointestinal: Negative for abdominal pain.  "  Musculoskeletal: Negative for myalgias.   Neurological: Negative for dizziness and weakness.   Psychiatric/Behavioral: Positive for depression (followed by psychiatry.).        Objective:   BP (!) 88/56   Pulse 88   Ht 1.727 m (5' 8\")   Wt (!) 127.5 kg (281 lb)   LMP 01/01/2010   SpO2 (!) 83%   BMI 42.73 kg/m²     Physical Exam   Constitutional: She is oriented to person, place, and time. She appears well-developed and well-nourished.   HENT:   Head: Normocephalic.   Eyes: EOM are normal.   Neck: No JVD present.   Cardiovascular: Normal rate and normal heart sounds.  An irregularly irregular rhythm present.   Pulmonary/Chest: Effort normal. She has rales (faint rales in the bases. Otherwise clear.).   Abdominal: Soft. Bowel sounds are normal.   Musculoskeletal: She exhibits edema (2+ bilateral ankle edema.).   Neurological: She is alert and oriented to person, place, and time.   Skin: Skin is warm and dry.   Psychiatric: She has a normal mood and affect.   Tearful at times.     2016 last echocardiogram had normal ejection fraction.    Chest x-ray and echocardiogram were to be ordered through her primary care but were not done.    Assessment:     1. Hypoxia     2. Shortness of breath     3. PAF (paroxysmal atrial fibrillation) (CMS-HCC)     4. Palpitations     5. Tobacco use disorder         Medical Decision Making:  Today's Assessment / Status / Plan:     Hypoxia: Her oxygen saturation on room air at rest is in the low 80s. It came up to the low 90s with 2 L of oxygen by nasal cannula. The cause for her hypoxia is unclear. She does tell me that she missed some doses of the Xarelto therefore I'm concerned about pulmonary embolism.    Shortness of breath: She is dyspneic with exertion and also had some paroxysmal nocturnal dyspnea. She is probably in congestive heart failure as she has gained some water weight. However I do not hear significant rales.    Atrial fibrillation: She is in atrial fibrillation with " a good rate today in the office. She will continue on Xarelto.    Tobacco use: She would like to quit but finds it difficult.    She is hypoxic and hypotensive and it does not appear that she can be managed on outpatient basis. I'm concerned about pulmonary embolism, pleural effusion and congestive heart failure. For these reasons I am sending her to the emergency room for evaluation and treatment.    She will follow-up after her hospitalization as scheduled.

## 2018-03-28 NOTE — LETTER
"     [facility]   [address], [address]  [place] [address]  Phone: [phone]  Fax: [phone]              Kailey Velarde  1960    Encounter Date: 3/28/2018    [facility]          PROGRESS NOTE:  Chief Complaint   Patient presents with   • Atrial Fibrillation     follow up        Subjective:   Kailey \"Melanie\" MUKUL Velarde is a 57 y.o. female who presents today to follow-up on atrial fibrillation and shortness of breath. I last saw her on March 12, 2018 at which time her atrial fibrillation was not rate controlled. I increased diltiazem from 180 mg to 240 mg daily.    She comes in today complaining of shortness of breath which has worsened. She has not been sleeping well and has some paroxysmal nocturnal dyspnea. She admits to weight gain and drinking lots of liquids.    She has some palpitations particularly at night. Her ankles have become swollen.    Unfortunately she continues to smoke but is trying to quit.    She has a history of a mitral valve repair, and atrial myxoma, hypertension, hyperlipidemia and pacemaker for sick sinus syndrome.      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   • Status post mitral valve repair November 2011    MV repair with 32mm Jonnie-Carpenter flexible annuloplasty ring.     Past Surgical History:   Procedure Laterality Date   • MITRAL VALVE REPAIR  11/17/2011    Performed by MARY ORTIZ at [facility]   • PACEMAKER INSERTION  November 2011    Medtronic Versa VEDR01 implanted by Dr. Amaya.   • OTHER ORTHOPEDIC SURGERY      Neck and back      "     Family History   Problem Relation Age of Onset   • Heart Attack Father      Social History     Social History   • Marital status: Single     Spouse name: N/A   • Number of children: N/A   • Years of education: N/A     Occupational History   • Not on file.     Social History Main Topics   • Smoking status: Current Every Day Smoker     Packs/day: 0.25     Years: 30.00     Types: Cigarettes   • Smokeless tobacco: Never Used   • Alcohol use No      Comment: sober since 7/14/16   • Drug use: No      Comment: hx of, denies currently   • Sexual activity: Not on file     Other Topics Concern   • Not on file     Social History Narrative   • No narrative on file     Allergies   Allergen Reactions   • Penicillins Rash     rash     Outpatient Encounter Prescriptions as of 3/28/2018   Medication Sig Dispense Refill   • Multiple Vitamin (MULTI-DAY PO) Take  by mouth.     • DILTIAZem CD (CARDIZEM CD) 240 MG CAPSULE SR 24 HR Take 1 Cap by mouth every day. 30 Cap 11   • ARIPiprazole (ABILIFY) 10 MG Tab Take 10 mg by mouth every day.     • rivaroxaban (XARELTO) 20 MG Tab tablet Take 1 Tab by mouth with dinner. 30 Tab 11   • atorvastatin (LIPITOR) 20 MG Tab Take 1 Tab by mouth every evening. 30 Tab 11   • Venlafaxine HCl (EFFEXOR PO) Take  by mouth.     • pregabalin (LYRICA) 200 MG capsule Take 200 mg by mouth 3 times a day.     • furosemide (LASIX) 40 MG Tab Take 1 Tab by mouth every day. (Patient taking differently: Take 20 mg by mouth every day.) 30 Tab 3   • potassium chloride SA (K-DUR) 20 MEQ Tab CR Take 1 Tab by mouth 2 times a day. 60 Tab 11   • vitamin D (CHOLECALCIFEROL) 1000 UNIT Tab Take 1,000 Units by mouth every day.     • oxycodone CR (OXYCONTIN) 10 MG TB12 Take 10 mg by mouth 3 times a day.     • morphine SR (MS CONTIN) 15 MG TB12 Take 1 Tab by mouth 3 times a day. 60 Tab 0     No facility-administered encounter medications on file as of 3/28/2018.      Review of Systems   Constitutional: Positive for  "malaise/fatigue.   Respiratory: Positive for cough (in am.) and shortness of breath (walking on a flat surface.).    Cardiovascular: Negative for chest pain, palpitations, orthopnea, claudication, leg swelling and PND.   Gastrointestinal: Negative for abdominal pain.   Musculoskeletal: Negative for myalgias.   Neurological: Negative for dizziness and weakness.   Psychiatric/Behavioral: Positive for depression (followed by psychiatry.).        Objective:   BP (!) 88/56   Pulse 88   Ht 1.727 m (5' 8\")   Wt (!) 127.5 kg (281 lb)   LMP 01/01/2010   SpO2 (!) 83%   BMI 42.73 kg/m²      Physical Exam   Constitutional: She is oriented to person, place, and time. She appears well-developed and well-nourished.   HENT:   Head: Normocephalic.   Eyes: EOM are normal.   Neck: No JVD present.   Cardiovascular: Normal rate and normal heart sounds.  An irregularly irregular rhythm present.   Pulmonary/Chest: Effort normal. She has rales (faint rales in the bases. Otherwise clear.).   Abdominal: Soft. Bowel sounds are normal.   Musculoskeletal: She exhibits edema (2+ bilateral ankle edema.).   Neurological: She is alert and oriented to person, place, and time.   Skin: Skin is warm and dry.   Psychiatric: She has a normal mood and affect.   Tearful at times.     2016 last echocardiogram had normal ejection fraction.    Chest x-ray and echocardiogram were to be ordered through her primary care but were not done.    Assessment:     1. Hypoxia     2. Shortness of breath     3. PAF (paroxysmal atrial fibrillation) (CMS-HCC)     4. Palpitations     5. Tobacco use disorder         Medical Decision Making:  Today's Assessment / Status / Plan:     Hypoxia: Her oxygen saturation on room air at rest is in the low 80s. It came up to the low 90s with 2 L of oxygen by nasal cannula. The cause for her hypoxia is unclear. She does tell me that she missed some doses of the Xarelto therefore I'm concerned about pulmonary embolism.    Shortness " of breath: She is dyspneic with exertion and also had some paroxysmal nocturnal dyspnea. She is probably in congestive heart failure as she has gained some water weight. However I do not hear significant rales.    Atrial fibrillation: She is in atrial fibrillation with a good rate today in the office. She will continue on Xarelto.    Tobacco use: She would like to quit but finds it difficult.    She is hypoxic and hypotensive and it does not appear that she can be managed on outpatient basis. I'm concerned about pulmonary embolism, pleural effusion and congestive heart failure. For these reasons I am sending her to the emergency room for evaluation and treatment.    She will follow-up after her hospitalization as scheduled.      No Recipients

## 2018-03-28 NOTE — ED TRIAGE NOTES
Chief Complaint   Patient presents with   • Insomnia     pt reports increased SOB for the last 5-6 mos. pt sent by Cardiology due to pt's low sat/ pt denies N/V/fever/chills   • Shaking   • Shortness of Breath   • Fatigue     Explained to pt triage process, made pt aware to tell this RN of any changes/concerns, pt verbalized understanding of process and instructions given. Pt to ER nathan.

## 2018-03-28 NOTE — ED PROVIDER NOTES
"ED Provider Note    Scribed for Lit Conrad M.D. by Mary Trevizo. 3/28/2018, 1:00 PM.    Primary care provider: Toni Grijalva M.D.  Means of arrival: Walk-in  History obtained from: Patient  History limited by: None    CHIEF COMPLAINT  Chief Complaint   Patient presents with   • Insomnia     pt reports increased SOB for the last 5-6 mos. pt sent by Cardiology due to pt's low sat/ pt denies N/V/fever/chills   • Shaking   • Shortness of Breath   • Fatigue       HPI  Kailey Velarde is a 57 y.o. Female with a history of intermittent A-Fib who presents to the Emergency Department for evaluation of shortness of breath, \"low oxygen\", and \"low blood pressure\", onset today. The patient is not on home O2. She had an appointment with her Cardiologist this morning for medication follow-up and was sent here as her O2 levels were low. Patient endorses bilateral leg swelling and weight gain of 10 lbs over the past few weeks.      REVIEW OF SYSTEMS  Review of Systems   Constitutional:        Weight gain   Respiratory: Positive for shortness of breath.    Cardiovascular: Positive for leg swelling.   All other systems reviewed and are negative.  C.    PAST MEDICAL HISTORY   has a past medical history of Atrial myxoma (November 2011); Backpain; CAD (coronary artery disease); Chronic pain; Dizziness ( ); Hyperlipidemia ( ); Hypertension; Hypertriglyceridemia ( ); Indigestion; Peripheral edema; Sciatica; Sick sinus syndrome (CMS-HCC) (November 2011); and Status post mitral valve repair (November 2011).    SURGICAL HISTORY   has a past surgical history that includes mitral valve repair (11/17/2011); other orthopedic surgery; and pacemaker insertion (November 2011).    SOCIAL HISTORY  Social History   Substance Use Topics   • Smoking status: Current Every Day Smoker     Packs/day: 0.25     Years: 30.00     Types: Cigarettes   • Smokeless tobacco: Never Used   • Alcohol use No      Comment: sober since 7/14/16    "   History   Drug Use No     Comment: hx of, denies currently       FAMILY HISTORY  Family History   Problem Relation Age of Onset   • Heart Attack Father        CURRENT MEDICATIONS    Current Facility-Administered Medications:   •  LORazepam (ATIVAN) injection 0.5 mg, 0.5 mg, Intravenous, Once, Lit Conrad M.D., Stopped at 03/28/18 1500  •  [START ON 3/29/2018] ARIPiprazole (ABILIFY) tablet 10 mg, 10 mg, Oral, DAILY, Prashanth Damian M.D.  •  atorvastatin (LIPITOR) tablet 20 mg, 20 mg, Oral, Q EVENING, Prashanth Damian M.D.  •  [START ON 3/29/2018] DILTIAZem CD (CARDIZEM CD) capsule 240 mg, 240 mg, Oral, DAILY, Prashanth Damian M.D.  •  morphine ER (MS CONTIN) tablet 15 mg, 15 mg, Oral, Q8HRS, Prashanth Damian M.D.  •  oxyCODONE immediate release (ROXICODONE) tablet 10 mg, 10 mg, Oral, TID, Prashanth Damian M.D.  •  [START ON 3/29/2018] multivitamin (THERAGRAN) tablet 1 Tab, 1 Tab, Oral, DAILY, Prashanth Damian M.D.  •  potassium chloride SA (Kdur) tablet 20 mEq, 20 mEq, Oral, BID, Prashanth Damian M.D.  •  pregabalin (LYRICA) capsule 200 mg, 200 mg, Oral, TID, Prashanth Damian M.D.  •  rivaroxaban (XARELTO) tablet 20 mg, 20 mg, Oral, PM MEAL, Prashanth Damian M.D.  •  [START ON 3/29/2018] vitamin D (cholecalciferol) tablet 1,000 Units, 1,000 Units, Oral, DAILY, Prashanth Damian M.D.  •  [START ON 3/29/2018] venlafaxine XR (EFFEXOR XR) capsule 150 mg, 150 mg, Oral, DAILY, Prashanth Damian M.D.  •  senna-docusate (PERICOLACE or SENOKOT S) 8.6-50 MG per tablet 2 Tab, 2 Tab, Oral, BID **AND** polyethylene glycol/lytes (MIRALAX) PACKET 1 Packet, 1 Packet, Oral, QDAY PRN **AND** magnesium hydroxide (MILK OF MAGNESIA) suspension 30 mL, 30 mL, Oral, QDAY PRN **AND** bisacodyl (DULCOLAX) suppository 10 mg, 10 mg, Rectal, QDAY PRN, Prashanth Damian M.D.  •  Respiratory Care per Protocol, , Nebulization, Continuous RT, Prashanth Damian M.D.  •  INITIATE NICOTINE REPLACEMENT PROTOCOL , , , Once **AND**  "nicotine (NICODERM) 14 MG/24HR 14 mg, 14 mg, Transdermal, Daily-0600 **AND** Protocol 205 PATIENT EDUCATION MATERIALS, , , Once **AND** Protocol 205 Rotate nicotine patch application sites daily , , , CONTINUOUS **AND** nicotine polacrilex (NICORETTE) 2 MG piece 2 mg, 2 mg, Oral, Q HOUR PRN, Prashanth Damian M.D.  •  furosemide (LASIX) injection 20 mg, 20 mg, Intravenous, BID DIURETIC, Prashanth Damian M.D.    Current Outpatient Prescriptions:   •  oxyCODONE immediate release (ROXICODONE) 10 MG immediate release tablet, Take 10 mg by mouth 3 times a day. TID with MS Contin, Disp: , Rfl:   •  aripiprazole (ABILIFY) 5 MG tablet, Take 5 mg by mouth every morning., Disp: , Rfl:   •  furosemide (LASIX) 20 MG Tab, Take 20 mg by mouth every morning., Disp: , Rfl:   •  potassium chloride SA (KDUR) 20 MEQ Tab CR, Take 20 mEq by mouth every morning., Disp: , Rfl:   •  venlafaxine (EFFEXOR-XR) 150 MG extended-release capsule, Take 150 mg by mouth every morning., Disp: , Rfl:   •  Multiple Vitamin (MULTI-DAY PO), Take 1 Tab by mouth every morning., Disp: , Rfl:   •  DILTIAZem CD (CARDIZEM CD) 240 MG CAPSULE SR 24 HR, Take 1 Cap by mouth every day., Disp: 30 Cap, Rfl: 11  •  rivaroxaban (XARELTO) 20 MG Tab tablet, Take 1 Tab by mouth with dinner., Disp: 30 Tab, Rfl: 11  •  atorvastatin (LIPITOR) 20 MG Tab, Take 1 Tab by mouth every evening., Disp: 30 Tab, Rfl: 11  •  pregabalin (LYRICA) 200 MG capsule, Take 200 mg by mouth 3 times a day., Disp: , Rfl:   •  vitamin D (CHOLECALCIFEROL) 1000 UNIT Tab, Take 1,000 Units by mouth every morning., Disp: , Rfl:   •  morphine SR (MS CONTIN) 15 MG TB12, Take 1 Tab by mouth 3 times a day., Disp: 60 Tab, Rfl: 0    ALLERGIES  Allergies   Allergen Reactions   • Penicillins Rash             PHYSICAL EXAM  VITAL SIGNS: /78   Pulse 92   Temp 37.2 °C (99 °F)   Resp 18   Ht 1.727 m (5' 8\")   Wt (!) 127.5 kg (281 lb)   LMP 01/01/2010   SpO2 92%   BMI 42.73 kg/m²     Constitutional: " Well developed, Well nourished, No acute distress, Non-toxic appearance.   HENT: Normocephalic, Atraumatic, Bilateral external ears normal, oropharynx moist, No oral exudates, Nose normal.   Eyes:conjunctiva is normal, there are no signs of exudate.   Neck: Supple, no JVD   Cardiovascular: Diminished but regular rate and rhythm without murmurs gallops or rubs.   Thorax & Lungs: No respiratory distress. Breathing comfortably. Lungs are diminished but clear to auscultation bilaterally, there are no wheezes no rales. Chest wall is nontender.  Abdomen: Soft, nontender, nondistended. Bowel sounds are present.   Skin: Warm, Dry, No erythema,   Musculoskeletal: 1+ pitting edema to BLE. Good range of motion in all major joints. No tenderness to palpation or major deformities noted. Intact distal pulses, no clubbing, no cyanosis  Neurologic: Alert & oriented x 3, Moving all extremities. No gross abnormalities.    Psychiatric: Affect normal, Judgment normal, Mood normal.      LABS  Results for orders placed or performed during the hospital encounter of 03/28/18   CBC with Differential   Result Value Ref Range    WBC 7.2 4.8 - 10.8 K/uL    RBC 4.15 (L) 4.20 - 5.40 M/uL    Hemoglobin 12.6 12.0 - 16.0 g/dL    Hematocrit 39.0 37.0 - 47.0 %    MCV 94.0 81.4 - 97.8 fL    MCH 30.4 27.0 - 33.0 pg    MCHC 32.3 (L) 33.6 - 35.0 g/dL    RDW 49.0 35.9 - 50.0 fL    Platelet Count 137 (L) 164 - 446 K/uL    MPV 10.2 9.0 - 12.9 fL    Neutrophils-Polys 66.30 44.00 - 72.00 %    Lymphocytes 20.70 (L) 22.00 - 41.00 %    Monocytes 9.90 0.00 - 13.40 %    Eosinophils 2.10 0.00 - 6.90 %    Basophils 0.60 0.00 - 1.80 %    Immature Granulocytes 0.40 0.00 - 0.90 %    Nucleated RBC 0.00 /100 WBC    Neutrophils (Absolute) 4.77 2.00 - 7.15 K/uL    Lymphs (Absolute) 1.49 1.00 - 4.80 K/uL    Monos (Absolute) 0.71 0.00 - 0.85 K/uL    Eos (Absolute) 0.15 0.00 - 0.51 K/uL    Baso (Absolute) 0.04 0.00 - 0.12 K/uL    Immature Granulocytes (abs) 0.03 0.00 - 0.11  K/uL    NRBC (Absolute) 0.00 K/uL   Complete Metabolic Panel (CMP)   Result Value Ref Range    Sodium 131 (L) 135 - 145 mmol/L    Potassium 4.5 3.6 - 5.5 mmol/L    Chloride 100 96 - 112 mmol/L    Co2 23 20 - 33 mmol/L    Anion Gap 8.0 0.0 - 11.9    Glucose 111 (H) 65 - 99 mg/dL    Bun 8 8 - 22 mg/dL    Creatinine 0.62 0.50 - 1.40 mg/dL    Calcium 9.6 8.5 - 10.5 mg/dL    AST(SGOT) 42 12 - 45 U/L    ALT(SGPT) 24 2 - 50 U/L    Alkaline Phosphatase 91 30 - 99 U/L    Total Bilirubin 0.9 0.1 - 1.5 mg/dL    Albumin 4.0 3.2 - 4.9 g/dL    Total Protein 6.9 6.0 - 8.2 g/dL    Globulin 2.9 1.9 - 3.5 g/dL    A-G Ratio 1.4 g/dL   Btype Natriuretic Peptide (BNP)   Result Value Ref Range    B Natriuretic Peptide 93 0 - 100 pg/mL   Prothrombin Time (PT/INR)   Result Value Ref Range    PT 22.9 (H) 12.0 - 14.6 sec    INR 2.06 (H) 0.87 - 1.13   APTT   Result Value Ref Range    APTT 45.7 (H) 24.7 - 36.0 sec   Lipase   Result Value Ref Range    Lipase 101 (H) 11 - 82 U/L   Troponin STAT   Result Value Ref Range    Troponin I <0.01 0.00 - 0.04 ng/mL   ESTIMATED GFR   Result Value Ref Range    GFR If African American >60 >60 mL/min/1.73 m 2    GFR If Non African American >60 >60 mL/min/1.73 m 2   PROCALCITONIN   Result Value Ref Range    Procalcitonin <0.05 <0.25 ng/mL   BLOOD CULTURE,HOLD   Result Value Ref Range    Blood Culture Hold Collected    EKG (ER)   Result Value Ref Range    Report       [facility] Emergency Dept.    Test Date:  2018  Pt Name:    TR SANTOYO              Department: ER  MRN:        [id number]                      Room:        14  Gender:     Female                       Technician: [id number]  :        1960                   Requested By:DUNG BALTAZAR  Order #:    [id number]                    Reading MD: DUNG BALTAZAR MD    Measurements  Intervals                                Axis  Rate:       75                           P:  CA:                                      QRS:         90  QRSD:       92                           T:          51  QT:         420  QTc:        470    Interpretive Statements  A-FLUTTER W/ PREDOM 4:1 AV BLOCK, A-RATE 306  CONSIDER RIGHT VENTRICULAR HYPERTROPHY  Compared to ECG 02/22/2018 13:31:24  No significant changes    Electronically Signed On 3- 17:29:27 PDT by DUNG BALTAZAR MD     All labs reviewed by me.    EKG  Interpreted by me as above.    RADIOLOGY  CT-CTA CHEST PULMONARY ARTERY W/ RECONS   Final Result      No evidence pulmonary emboli.   Right lower lobe atelectasis.   Groundglass opacities throughout both lungs consistent with mild edema or pneumonitis.   Trace right pleural effusion.   Coronary artery calcifications.   Hepatic steatosis and splenomegaly.            DX-CHEST-PORTABLE (1 VIEW)   Final Result      Mild lung base linear atelectasis. No consolidation identified.      ECHOCARDIOGRAM COMP W/O CONT    (Results Pending)   The radiologist's interpretation of all radiological studies have been reviewed by me.    COURSE & MEDICAL DECISION MAKING  Pertinent Labs & Imaging studies reviewed. (See chart for details)    1:00 PM - Patient seen and examined at bedside. Ordered DX Chest, CTA Chest, CBC, CMP, BNP, PT/INR, APTT, Lipase, Troponin STAT, Estimated GFR, EKG to evaluate her symptoms. The differential diagnoses include but are not limited to: CHF, PE.     2:35 PM - Per nurse's report, patient is becoming increasingly anxious. Will order Ativan 0.5 mg at this time.    4:12 PM - Discussed patient's case and above findings with Dr. Damian, Hospitalist, who agreed to admit patient.    4:19 PM - Discussed lab and imaging results as noted above with patient. No evidence of blood clots. She appears to have trace fluid in her lungs. She denies any fevers or chills. Discussed plan for admission. The patient understands and agrees to plan.       Decision Making:  Patient presents with hypoxemia. She is not on oxygen home and yet here. She  was 81% on room air. CT scan as he above findings. White count normal. Laboratory studies are as above. At this point. She does have a history of atrial flutter, but with her increasing weight gain, leg swelling, its rate. Possible she may have heart failure. BNP is normal, but there is some pulmonary edema. Groundglass appearance. I did start the patient on Lasix. At this point, I feel the patient should be admitted to the hospital for further evaluation of her hypoxemia. I have spoken to hospice for this admission.    DISPOSITION:  Patient will be admitted to Dr. Damian, Hospitalist, in guarded condition.      FINAL IMPRESSION  1. Hypoxemia  2. , Shortness of breath       I, Mary Trevizo (Scribe), am scribing for, and in the presence of, Lit Conrad M.D..    Electronically signed by: Mary Trevizo (Genovevaibe), 3/28/2018    ILit M.D. personally performed the services described in this documentation, as scribed by Mary Trevizo in my presence, and it is both accurate and complete.    The note accurately reflects work and decisions made by me.  Lit Conrad  3/28/2018  7:33 PM

## 2018-03-29 LAB
ANION GAP SERPL CALC-SCNC: 8 MMOL/L (ref 0–11.9)
BNP SERPL-MCNC: 76 PG/ML (ref 0–100)
BUN SERPL-MCNC: 11 MG/DL (ref 8–22)
CALCIUM SERPL-MCNC: 9.1 MG/DL (ref 8.5–10.5)
CHLORIDE SERPL-SCNC: 102 MMOL/L (ref 96–112)
CO2 SERPL-SCNC: 25 MMOL/L (ref 20–33)
CREAT SERPL-MCNC: 0.73 MG/DL (ref 0.5–1.4)
ERYTHROCYTE [DISTWIDTH] IN BLOOD BY AUTOMATED COUNT: 50.1 FL (ref 35.9–50)
GLUCOSE SERPL-MCNC: 122 MG/DL (ref 65–99)
HCT VFR BLD AUTO: 37.3 % (ref 37–47)
HGB BLD-MCNC: 12 G/DL (ref 12–16)
LV EJECT FRACT  99904: 55
LV EJECT FRACT MOD 2C 99903: 56.74
LV EJECT FRACT MOD 4C 99902: 59.52
LV EJECT FRACT MOD BP 99901: 57.73
MCH RBC QN AUTO: 30.5 PG (ref 27–33)
MCHC RBC AUTO-ENTMCNC: 32.2 G/DL (ref 33.6–35)
MCV RBC AUTO: 94.7 FL (ref 81.4–97.8)
PLATELET # BLD AUTO: 123 K/UL (ref 164–446)
PMV BLD AUTO: 10.3 FL (ref 9–12.9)
POTASSIUM SERPL-SCNC: 4.5 MMOL/L (ref 3.6–5.5)
RBC # BLD AUTO: 3.94 M/UL (ref 4.2–5.4)
SODIUM SERPL-SCNC: 135 MMOL/L (ref 135–145)
T4 FREE SERPL-MCNC: 0.96 NG/DL (ref 0.53–1.43)
TROPONIN I SERPL-MCNC: <0.01 NG/ML (ref 0–0.04)
TSH SERPL DL<=0.005 MIU/L-ACNC: <0.005 UIU/ML (ref 0.38–5.33)
WBC # BLD AUTO: 5.7 K/UL (ref 4.8–10.8)

## 2018-03-29 PROCEDURE — 700102 HCHG RX REV CODE 250 W/ 637 OVERRIDE(OP): Performed by: INTERNAL MEDICINE

## 2018-03-29 PROCEDURE — 99232 SBSQ HOSP IP/OBS MODERATE 35: CPT | Performed by: FAMILY MEDICINE

## 2018-03-29 PROCEDURE — 84484 ASSAY OF TROPONIN QUANT: CPT

## 2018-03-29 PROCEDURE — 85027 COMPLETE CBC AUTOMATED: CPT

## 2018-03-29 PROCEDURE — 700111 HCHG RX REV CODE 636 W/ 250 OVERRIDE (IP): Performed by: INTERNAL MEDICINE

## 2018-03-29 PROCEDURE — 84439 ASSAY OF FREE THYROXINE: CPT

## 2018-03-29 PROCEDURE — A9270 NON-COVERED ITEM OR SERVICE: HCPCS | Performed by: INTERNAL MEDICINE

## 2018-03-29 PROCEDURE — 700101 HCHG RX REV CODE 250: Performed by: FAMILY MEDICINE

## 2018-03-29 PROCEDURE — 93306 TTE W/DOPPLER COMPLETE: CPT | Mod: 26 | Performed by: INTERNAL MEDICINE

## 2018-03-29 PROCEDURE — 84443 ASSAY THYROID STIM HORMONE: CPT

## 2018-03-29 PROCEDURE — 80048 BASIC METABOLIC PNL TOTAL CA: CPT

## 2018-03-29 PROCEDURE — 93306 TTE W/DOPPLER COMPLETE: CPT

## 2018-03-29 PROCEDURE — 770020 HCHG ROOM/CARE - TELE (206)

## 2018-03-29 PROCEDURE — 83880 ASSAY OF NATRIURETIC PEPTIDE: CPT

## 2018-03-29 PROCEDURE — 36415 COLL VENOUS BLD VENIPUNCTURE: CPT

## 2018-03-29 RX ORDER — FUROSEMIDE 10 MG/ML
40 INJECTION INTRAMUSCULAR; INTRAVENOUS
Status: DISCONTINUED | OUTPATIENT
Start: 2018-03-29 | End: 2018-04-03

## 2018-03-29 RX ORDER — METOPROLOL TARTRATE 1 MG/ML
5 INJECTION, SOLUTION INTRAVENOUS
Status: COMPLETED | OUTPATIENT
Start: 2018-03-29 | End: 2018-03-30

## 2018-03-29 RX ORDER — METOPROLOL TARTRATE 1 MG/ML
2.5 INJECTION, SOLUTION INTRAVENOUS ONCE
Status: COMPLETED | OUTPATIENT
Start: 2018-03-29 | End: 2018-03-29

## 2018-03-29 RX ORDER — SPIRONOLACTONE 25 MG/1
25 TABLET ORAL
Status: DISCONTINUED | OUTPATIENT
Start: 2018-03-29 | End: 2018-04-03

## 2018-03-29 RX ADMIN — ARIPIPRAZOLE 10 MG: 10 TABLET ORAL at 09:06

## 2018-03-29 RX ADMIN — FUROSEMIDE 20 MG: 10 INJECTION, SOLUTION INTRAMUSCULAR; INTRAVENOUS at 06:28

## 2018-03-29 RX ADMIN — POTASSIUM CHLORIDE 20 MEQ: 1500 TABLET, EXTENDED RELEASE ORAL at 20:51

## 2018-03-29 RX ADMIN — RIVAROXABAN 20 MG: 20 TABLET, FILM COATED ORAL at 17:23

## 2018-03-29 RX ADMIN — METOPROLOL TARTRATE 2.5 MG: 5 INJECTION INTRAVENOUS at 09:09

## 2018-03-29 RX ADMIN — SPIRONOLACTONE 25 MG: 25 TABLET, FILM COATED ORAL at 14:55

## 2018-03-29 RX ADMIN — DILTIAZEM HYDROCHLORIDE 240 MG: 240 CAPSULE, COATED, EXTENDED RELEASE ORAL at 09:06

## 2018-03-29 RX ADMIN — OXYCODONE HYDROCHLORIDE 10 MG: 10 TABLET ORAL at 20:48

## 2018-03-29 RX ADMIN — MORPHINE SULFATE 15 MG: 15 TABLET, EXTENDED RELEASE ORAL at 14:54

## 2018-03-29 RX ADMIN — VITAMIN D, TAB 1000IU (100/BT) 1000 UNITS: 25 TAB at 09:06

## 2018-03-29 RX ADMIN — PREGABALIN 200 MG: 100 CAPSULE ORAL at 14:55

## 2018-03-29 RX ADMIN — PREGABALIN 200 MG: 100 CAPSULE ORAL at 09:07

## 2018-03-29 RX ADMIN — PREGABALIN 200 MG: 100 CAPSULE ORAL at 20:47

## 2018-03-29 RX ADMIN — FUROSEMIDE 40 MG: 10 INJECTION, SOLUTION INTRAMUSCULAR; INTRAVENOUS at 14:59

## 2018-03-29 RX ADMIN — OXYCODONE HYDROCHLORIDE 10 MG: 10 TABLET ORAL at 09:06

## 2018-03-29 RX ADMIN — NICOTINE 14 MG: 14 PATCH, EXTENDED RELEASE TRANSDERMAL at 06:27

## 2018-03-29 RX ADMIN — VENLAFAXINE HYDROCHLORIDE 150 MG: 75 CAPSULE, EXTENDED RELEASE ORAL at 09:07

## 2018-03-29 RX ADMIN — STANDARDIZED SENNA CONCENTRATE AND DOCUSATE SODIUM 2 TABLET: 8.6; 5 TABLET, FILM COATED ORAL at 09:07

## 2018-03-29 RX ADMIN — ATORVASTATIN CALCIUM 20 MG: 20 TABLET, FILM COATED ORAL at 20:52

## 2018-03-29 RX ADMIN — MORPHINE SULFATE 15 MG: 15 TABLET, EXTENDED RELEASE ORAL at 06:28

## 2018-03-29 RX ADMIN — POTASSIUM CHLORIDE 20 MEQ: 1500 TABLET, EXTENDED RELEASE ORAL at 09:06

## 2018-03-29 RX ADMIN — OXYCODONE HYDROCHLORIDE 10 MG: 10 TABLET ORAL at 14:55

## 2018-03-29 RX ADMIN — THERA TABS 1 TABLET: TAB at 09:06

## 2018-03-29 RX ADMIN — MORPHINE SULFATE 15 MG: 15 TABLET, EXTENDED RELEASE ORAL at 20:48

## 2018-03-29 ASSESSMENT — PAIN SCALES - GENERAL
PAINLEVEL_OUTOF10: 0
PAINLEVEL_OUTOF10: 7
PAINLEVEL_OUTOF10: 7
PAINLEVEL_OUTOF10: 6
PAINLEVEL_OUTOF10: 7
PAINLEVEL_OUTOF10: 8
PAINLEVEL_OUTOF10: 6
PAINLEVEL_OUTOF10: 6

## 2018-03-29 ASSESSMENT — ENCOUNTER SYMPTOMS
FEVER: 0
SPUTUM PRODUCTION: 0
TINGLING: 0
HEMOPTYSIS: 0
NECK PAIN: 0
NAUSEA: 0
DIAPHORESIS: 0
MYALGIAS: 0
BACK PAIN: 0
ABDOMINAL PAIN: 0
HEADACHES: 0
BLURRED VISION: 0
VOMITING: 0
CLAUDICATION: 0
DIZZINESS: 0
DOUBLE VISION: 0
CHILLS: 0

## 2018-03-29 NOTE — CARE PLAN
Problem: Venous Thromboembolism (VTW)/Deep Vein Thrombosis (DVT) Prevention:  Goal: Patient will participate in Venous Thrombosis (VTE)/Deep Vein Thrombosis (DVT)Prevention Measures  Outcome: PROGRESSING SLOWER THAN EXPECTED  Pt on xarelto, refusing SCDs at this time.    Problem: Knowledge Deficit  Goal: Knowledge of disease process/condition, treatment plan, diagnostic tests, and medications will improve  Outcome: PROGRESSING AS EXPECTED  Dicussed heart failure, fluid overload, and fluid restriction with pt. Verbalizes understanding.

## 2018-03-29 NOTE — CARE PLAN
Problem: Pain Management  Goal: Pain level will decrease to patient's comfort goal  Outcome: PROGRESSING AS EXPECTED  Home pain regimen started--see MAR. Offer non-pharmacological intervention. Assess and reassess per protocol.

## 2018-03-29 NOTE — PROGRESS NOTES
Pt resting quietly in poc. Eyes closed, resprs even & unlabored, NAD noted. No needs identified. Will allow for rest and cont to monitor. Safety precautions in place.

## 2018-03-29 NOTE — CARE PLAN
Problem: Safety  Goal: Will remain free from falls  Outcome: PROGRESSING AS EXPECTED  No adverse events. Safety precautions in place.

## 2018-03-29 NOTE — PROGRESS NOTES
Morning meds administered as ordered--see MAR. Pt denies further needs or complaints at this time. Call light and belongings w/in reach. Bed alarm on. Will report to oncoming shift.

## 2018-03-29 NOTE — H&P
"Hospital Medicine History and Physical    Date of Service  3/28/2018    Chief Complaint  Chief Complaint   Patient presents with   • Insomnia     pt reports increased SOB for the last 5-6 mos. pt sent by Cardiology due to pt's low sat/ pt denies N/V/fever/chills   • Shaking   • Shortness of Breath   • Fatigue       History of Presenting Illness  57 y.o. female who presented 3/28/2018 with Insomnia (pt reports increased SOB for the last 5-6 mos. pt sent by Cardiology due to pt's low sat/ pt denies N/V/fever/chills); Shaking; Shortness of Breath; and Fatigue  She mentions symptoms of shortness of breath were nothing new. She was seen at Cardiology clinic who evaluated her. She was found to be hypoxic. She was therefore sent to the ER to  \"rule out PE\". She denied productive cough, no sick contacts. She did admit her swelling is worse. She is on Lasix. She has a history of MVR and is on Xarelto. LAst echo showed normal EF however she has pulmonary hypertension. She denied being tested for KAEL. She also is on narcotics at home and follows Dr. Ring, [facility] for chronic low back pain and history of back surgeries.   At ED, afebrile, hemodynamically stable. She was put on O2. CTA Chest showed no PE, but ground glass opacities that could be mild edema vs pneumonitis.   When I saw her at ED, she is in no acute distress at rest. Bibasilar crackles on auscultation. LE edema.    Primary Care Physician  Toni Grijalva M.D.    Consultants      Code Status  full    Review of Systems  Review of Systems   Constitutional: Negative for chills and fever.   HENT: Negative for congestion, hearing loss and nosebleeds.    Eyes: Negative for pain and redness.   Respiratory: Positive for cough and shortness of breath. Negative for hemoptysis and wheezing.    Cardiovascular: Positive for orthopnea and leg swelling. Negative for chest pain and palpitations.   Gastrointestinal: Negative for abdominal pain, blood in stool, " constipation, diarrhea, nausea and vomiting.   Genitourinary: Negative for dysuria, frequency and hematuria.   Musculoskeletal: Positive for back pain and myalgias. Negative for falls and joint pain.   Skin: Negative for rash.   Neurological: Negative for dizziness, tremors, focal weakness, seizures, loss of consciousness, weakness and headaches.   Psychiatric/Behavioral: The patient is not nervous/anxious and does not have insomnia.    All other systems reviewed and are negative.       Past Medical History  Past Medical History:   Diagnosis Date   • Sick sinus syndrome (CMS-HCC) November 2011    Status post PPM implantation.   • Status post mitral valve repair November 2011    MV repair with 32mm Jonnie-Carpenter flexible annuloplasty ring.   • Atrial myxoma November 2011    Status post LA myxoma resection   • Backpain    • CAD (coronary artery disease)     pacemaker   • Chronic pain    • Dizziness     • Hyperlipidemia     • Hypertension    • Hypertriglyceridemia     • Indigestion    • Peripheral edema    • Sciatica        Surgical History  Past Surgical History:   Procedure Laterality Date   • MITRAL VALVE REPAIR  11/17/2011    Performed by MARY ORTIZ at SURGERY [facility], [place]   • PACEMAKER INSERTION  November 2011    Medtronic Versa [id number] implanted by Dr. Amaya.   • OTHER ORTHOPEDIC SURGERY      Neck and back        Medications  No current facility-administered medications on file prior to encounter.      Current Outpatient Prescriptions on File Prior to Encounter   Medication Sig Dispense Refill   • Multiple Vitamin (MULTI-DAY PO) Take 1 Tab by mouth every morning.     • DILTIAZem CD (CARDIZEM CD) 240 MG CAPSULE SR 24 HR Take 1 Cap by mouth every day. 30 Cap 11   • rivaroxaban (XARELTO) 20 MG Tab tablet Take 1 Tab by mouth with dinner. 30 Tab 11   • atorvastatin (LIPITOR) 20 MG Tab Take 1 Tab by mouth every evening. 30 Tab 11   • pregabalin (LYRICA) 200 MG capsule Take 200 mg by mouth 3 times a day.      • vitamin D (CHOLECALCIFEROL) 1000 UNIT Tab Take 1,000 Units by mouth every morning.     • morphine SR (MS CONTIN) 15 MG TB12 Take 1 Tab by mouth 3 times a day. 60 Tab 0       Family History  Family History   Problem Relation Age of Onset   • Heart Attack Father        Social History  Social History   Substance Use Topics   • Smoking status: Current Every Day Smoker     Packs/day: 0.25     Years: 30.00     Types: Cigarettes   • Smokeless tobacco: Never Used   • Alcohol use No      Comment: sober since 17       Allergies  Allergies   Allergen Reactions   • Penicillins Rash              Physical Exam  Laboratory   Hemodynamics  Temp (24hrs), Av.2 °C (99 °F), Min:37.2 °C (99 °F), Max:37.2 °C (99 °F)   Temperature: 37.2 °C (99 °F)  Pulse  Av  Min: 66  Max: 92 Heart Rate (Monitored): 81  Blood Pressure: 113/78, NIBP: 110/69      Respiratory      Respiration: 16, Pulse Oximetry: 90 %, O2 Daily Delivery Respiratory : Nasal Cannula     Work Of Breathing / Effort: Moderate  RUL Breath Sounds: Diminished, RML Breath Sounds: Diminished, RLL Breath Sounds: Diminished, SANTANA Breath Sounds: Diminished, LLL Breath Sounds: Diminished    Physical Exam   Constitutional: She appears well-developed and well-nourished.   Obese   HENT:   Head: Normocephalic and atraumatic.   Eyes: Conjunctivae and EOM are normal. No scleral icterus.   Neck: Normal range of motion. Neck supple.   Cardiovascular: Exam reveals no gallop and no friction rub.    Murmur heard.  Irregular   Pulmonary/Chest: Effort normal. No respiratory distress. She has no wheezes. She has rales (Bibasilar crackles).   Abdominal: Soft. Bowel sounds are normal. She exhibits no distension. There is no tenderness. There is no rebound and no guarding.   Musculoskeletal: She exhibits edema (Bilateral lower extremity). She exhibits no tenderness.   Neurological: She is alert.   Skin: Skin is warm.   Psychiatric: She has a normal mood and affect. Her behavior is normal.        Recent Labs      03/28/18   1300   WBC  7.2   RBC  4.15*   HEMOGLOBIN  12.6   HEMATOCRIT  39.0   MCV  94.0   MCH  30.4   MCHC  32.3*   RDW  49.0   PLATELETCT  137*   MPV  10.2     Recent Labs      03/28/18   1300   SODIUM  131*   POTASSIUM  4.5   CHLORIDE  100   CO2  23   GLUCOSE  111*   BUN  8   CREATININE  0.62   CALCIUM  9.6     Recent Labs      03/28/18   1300   ALTSGPT  24   ASTSGOT  42   ALKPHOSPHAT  91   TBILIRUBIN  0.9   LIPASE  101*   GLUCOSE  111*     Recent Labs      03/28/18   1300   APTT  45.7*   INR  2.06*     Recent Labs      03/28/18   1300   BNPBTYPENAT  93         Lab Results   Component Value Date    TROPONINI <0.01 03/28/2018     Urinalysis:    Lab Results  Component Value Date/Time   SPECGRAVITY 1.008 07/17/2012 1335   GLUCOSEUR Negative 07/17/2012 1335   KETONES Negative 07/17/2012 1335   NITRITE Negative 07/17/2012 1335        Imaging  Dx-chest-portable (1 View)    Result Date: 3/28/2018  3/28/2018 1:25 PM HISTORY/REASON FOR EXAM:  Chest Pain. TECHNIQUE/EXAM DESCRIPTION AND NUMBER OF VIEWS: Single portable view of the chest. COMPARISON: 7/24/2014 FINDINGS: Stable position left transvenous electrodes. Mild lung base interstitial opacities. There is no evidence of pleural effusion. Cardiomegaly. Sternotomy wires.     Mild lung base linear atelectasis. No consolidation identified.    Ct-cta Chest Pulmonary Artery W/ Recons    Result Date: 3/28/2018  3/28/2018 2:44 PM HISTORY/REASON FOR EXAM:  Pain Increasing shortness of breath. Low oxygen saturation TECHNIQUE/EXAM DESCRIPTION: CT angiogram scan for pulmonary embolism with contrast, with reconstructions. 1.25 mm helical sections were obtained from the lung apices through the lung bases following the rapid bolus administration of 60 mL of Omnipaque 350 nonionic contrast. Thin-section overlapping reconstruction interval was utilized. Coronal reconstructions were generated from the axial data. MIP post processing was performed and utilized for  the interpretation. Low dose optimization technique was utilized for this CT exam including automated exposure control and adjustment of the mA and/or kV according to patient size. COMPARISON: None FINDINGS: Pulmonary Embolism: No. Main Pulmonary Arteries: No. Segmental branches: No. Subsegmental branches: No. Additional Comments: Mild cardiomegaly. Mild calcified plaque aorta. The aorta is not opacified due to timing bolus. Scattered coronary artery calcifications. Calcifications aortic valve. Sternotomy wires demonstrated. Left transvenous electrodes project into the right atrium and right ventricle.. Lungs: Right lower lobe subsegmental opacities and groundglass opacities. Trace right pleural effusion. Groundglass opacities throughout the remainder of both lungs. . No mediastinal adenopathy. Additional findings: Low-attenuation liver and splenomegaly..     No evidence pulmonary emboli. Right lower lobe atelectasis. Groundglass opacities throughout both lungs consistent with mild edema or pneumonitis. Trace right pleural effusion. Coronary artery calcifications. Hepatic steatosis and splenomegaly.      Assessment/Plan     I anticipate this patient will require at least two midnights for appropriate medical management, necessitating inpatient admission.    * Volume overload   Assessment & Plan    CXR showed possible mild edema  Worsening bilateral lower extremity edema  Last echo normal EF, pulmonary hypertension  Ordered TSH  IV Lasix, fluid restriction  Normotensive, lower side, if blood pressure elevated consider adding ACEI  Consult [facility] as needed  Recommend sleep study in the outpatient            History of sick sinus syndrome- (present on admission)   Assessment & Plan    Has pacemaker.         Pacemaker- (present on admission)   Assessment & Plan    Noted        Hyperlipidemia- (present on admission)   Assessment & Plan    Continue statin          Status post mitral valve repair- (present on  admission)   Assessment & Plan    On chronic anticoagulation. Continue.  Has CHF symptoms because of this, followed by [facility]        Acute respiratory failure with hypoxia (CMS-HCC)- (present on admission)   Assessment & Plan    Mild, was 87% on RA  CXR showed posible mild edema  Unlikely pneumonia, patient does not feel she has this, no clinical signs, I ordered procalcitonin and that's negative  CTA Chest no PE, and patient already on Xarelto anyway, no further work-up  Resp, O2 per protocol, IV diuretics        Obesity   Assessment & Plan    Body mass index is 42.73 kg/m².  When better, encourage weightloss and lifestyle modification        Circulating anticoagulants (CMS-HCC)   Assessment & Plan    For mitral valve  No active bleeding  Continue Xarelto        Pulmonary hypertension- (present on admission)   Assessment & Plan    Seen on echo  No PE on CTA Chest  On Xarelto anyway  IV diuretics        Tobacco use disorder- (present on admission)   Assessment & Plan    I spent 5 minutes, discussing tobacco dependence and cardiac as well as pulmonary risk. Nicotine replacement and Smoking cessation instructions. Code 23616          PAF (paroxysmal atrial fibrillation) (CMS-HCC)- (present on admission)   Assessment & Plan    S/p space maker  On chronic anticoagulation  Rate currently controlled        Chronic low back pain- (present on admission)   Assessment & Plan    History of back surgeries  Follows Dr. Ring, Pain clinic  COntinue her home morphine ER and oxycodone IR  Bowel regimen                VTE prophylaxis: Xarelto.    I spent 80 minutes, reviewing the chart, notes, vitals, labs, imaging, ordering labs, evaluating Kailey Velarde for assessment, enacting the plan above. 50% of the time was spent in counseling Kailey Velarde and answering questions. Discussed with ED physician. Medical decision making is therefore complex. Time was devoted to counseling and coordinating care including  review of records, pertinent lab data and studies, as well as discussing diagnostic evaluation and work up, planned therapeutic interventions and future disposition of care. Where indicated, the assessment and plan reflect discussion of patient with consultants, other healthcare providers, family members, and additional research needed to obtain further information in formulating the plan of care for Kailey Velarde.

## 2018-03-29 NOTE — PROGRESS NOTES
"Tele monitor called and said pt's rhythm is sustained in the 120's (has primarily just been in the 90's). Into room to assess pt. She is sleeping in poc on left side, awakens to name. She denies pain, Cp, SOB, N, V, palpitations or any other symptoms. She states \"oh yeah, that always happens about this time of night...that A fib is what keeps me up.\" No further needs identified. Will cont to monitor.   "

## 2018-03-29 NOTE — RESPIRATORY CARE
COPD EDUCATION by COPD CLINICAL EDUCATOR  3/29/2018 at 8:47 AM by Oksana Pedersen     Patient reviewed by COPD education team. Patient does not qualify for COPD program.

## 2018-03-29 NOTE — PROGRESS NOTES
Monitor Summary    SARA Lowe 102 - 127  Tachycardic at times up to the 150's  Rare PVCs  - / .08/ -

## 2018-03-29 NOTE — PROGRESS NOTES
Bedside report received from EDA Coles in Ed. All questions addressed and poc discussed. Pt taken by rodney (w/monitors) to T731-2, accompanied by this RN and SEAMUS Chapa. Pt amb to bed. Gait steady, c/o pain. Pt to bed to poc. Assessment performed, VS taken and stable, tele monitor applied. Pt is AAOx4 at this time, but she states she is having hallucinations. She says things just aren't making sense. No inappropriate wordes noticed by this Rn. Will cont to monitor. Oriented pt to room, bed, call light, tv, phone, unit routine, safety precautions, and welcome packet. Discussed poc and medications. Will return w/meds.

## 2018-03-29 NOTE — PROGRESS NOTES
"Pt A&O x 4, stating that she feels \"out of it\" at times. C/o pain to her back, baseline at 7/10. Scheduled oxycodone given. Pt denies numbness and tingling. Pt non-compliant with fluid restriction at this time, stating that she drinks a lot of liquid at home and plans to continue to do so. Discussed reason for fluid restriction and educated on heart failure. Pt stating she will attempt to stick to restriction but \"might be drinking from the sink\". Plan of care discussed. Padded oxygen tubing covers provided and non-slip socks. Pt calling appropriately at this time. Discussed BA with pt, refusing at this time.  "

## 2018-03-29 NOTE — ASSESSMENT & PLAN NOTE
Noted on echo  CTA neg for PE  S/p Rt heart cath  Patient will maintain close outpatient cardiology follow up

## 2018-03-29 NOTE — DIETARY
NUTRITION SERVICES: BMI - Pt with BMI >40 (=42.34). Weight loss counseling not appropriate in acute care setting. RECOMMEND - Referral to outpatient nutrition services for weight management after D/C.

## 2018-03-29 NOTE — PROGRESS NOTES
[facility] Progress Note    Date of Service: 3/29/2018    Chief Complaint  57 y.o. female admitted 3/28/2018 with chf and a.fib    Interval Problem Update  A.fib with rvr    Consultants/Specialty  cardiology    Disposition  pending        Review of Systems   Constitutional: Negative for chills, diaphoresis and fever.   HENT: Negative for ear discharge, ear pain and tinnitus.    Eyes: Negative for blurred vision and double vision.   Respiratory: Negative for hemoptysis and sputum production.    Cardiovascular: Negative for chest pain and claudication.   Gastrointestinal: Negative for abdominal pain, nausea and vomiting.   Genitourinary: Negative for dysuria, frequency and urgency.   Musculoskeletal: Negative for back pain, myalgias and neck pain.   Skin: Negative for itching and rash.   Neurological: Negative for dizziness, tingling and headaches.      Physical Exam  Laboratory/Imaging   Hemodynamics  Temp (24hrs), Av.3 °C (97.4 °F), Min:36.1 °C (96.9 °F), Max:37 °C (98.6 °F)   Temperature: 37 °C (98.6 °F)  Pulse  Av.5  Min: 57  Max: 129 Heart Rate (Monitored): 83  Blood Pressure: 111/73, NIBP: 123/73      Respiratory      Respiration: 16, Pulse Oximetry: 93 %, O2 Daily Delivery Respiratory : Nasal Cannula     Work Of Breathing / Effort: Moderate  RUL Breath Sounds: Clear, RML Breath Sounds: Diminished, RLL Breath Sounds: Diminished, SANTANA Breath Sounds: Clear, LLL Breath Sounds: Diminished    Fluids    Intake/Output Summary (Last 24 hours) at 18 1245  Last data filed at 18 0906   Gross per 24 hour   Intake             1650 ml   Output             1600 ml   Net               50 ml       Nutrition  Orders Placed This Encounter   Procedures   • Diet Order     Standing Status:   Standing     Number of Occurrences:   1     Order Specific Question:   Diet:     Answer:   Cardiac [6]     Order Specific Question:   Consistency/Fluid modifications:     Answer:   1500 ml Fluid Restriction [9]      Physical Exam   Constitutional: She is oriented to person, place, and time. No distress.   HENT:   Head: Normocephalic and atraumatic.   Eyes: Conjunctivae are normal. Pupils are equal, round, and reactive to light.   Neck: Neck supple. No tracheal deviation present. No thyromegaly present.   Cardiovascular:   Tachycardia     Pulmonary/Chest: She has no wheezes. She has no rales.   Decreased breath sounds bilaterally   Abdominal: Soft. There is no tenderness. There is no rebound.   Morbidly obese   Musculoskeletal: She exhibits edema.   Neurological: She is alert and oriented to person, place, and time.   Skin: Skin is warm and dry. She is not diaphoretic.       Recent Labs      03/28/18   1300  03/29/18   0305   WBC  7.2  5.7   RBC  4.15*  3.94*   HEMOGLOBIN  12.6  12.0   HEMATOCRIT  39.0  37.3   MCV  94.0  94.7   MCH  30.4  30.5   MCHC  32.3*  32.2*   RDW  49.0  50.1*   PLATELETCT  137*  123*   MPV  10.2  10.3     Recent Labs      03/28/18   1300  03/29/18   0305   SODIUM  131*  135   POTASSIUM  4.5  4.5   CHLORIDE  100  102   CO2  23  25   GLUCOSE  111*  122*   BUN  8  11   CREATININE  0.62  0.73   CALCIUM  9.6  9.1     Recent Labs      03/28/18   1300   APTT  45.7*   INR  2.06*     Recent Labs      03/28/18   1300  03/29/18   0305   BNPBTYPENAT  93  76              Assessment/Plan     * Volume overload   Assessment & Plan    cta shows mild edema  Lasix iv bid  2nd to chf with acute on chronic  Echo showed:CONCLUSIONS  Systolic function is difficult to assess in rapid atrial fibrillation,   but appears normal.  Severely dilated right ventricle.  Reduced right ventricular systolic function.  Mildly dilated left atrium.  Known mitral valve bioprosthesis which is functioning normally.  Estimated right ventricular systolic pressure  is 35 mmHg + JVP.  Compared to previous echocardiogram from 8/22/2016 the rhythm is now   rapid atrial fibrillation. Estimated right-sided pressures are lower in   this study, and the  mitral bioprosthesis continues to function   Normally.  On cardizam  No ACE 2nd to low bp            History of sick sinus syndrome- (present on admission)   Assessment & Plan    Has pacemaker.         Pacemaker- (present on admission)   Assessment & Plan    Noted        Hyperlipidemia- (present on admission)   Assessment & Plan    Continue statin          Status post mitral valve repair- (present on admission)   Assessment & Plan    On chronic anticoagulation. Continue.  Has CHF symptoms because of this, [facility]        Acute respiratory failure with hypoxia (CMS-HCC)- (present on admission)   Assessment & Plan    Mild, was 87% on RA  CXR showed posible mild edema  Unlikely pneumonia, patient does not feel she has this, no clinical signs, I ordered procalcitonin and that's negative  CTA Chest no PE, and patient already on Xarelto anyway, no further work-up  Resp, O2 per protocol, IV diuretics        Obesity   Assessment & Plan    Body mass index is 42.73 kg/m².  When better, encourage weightloss and lifestyle modification        Circulating anticoagulants (CMS-HCC)   Assessment & Plan    For mitral valve  No active bleeding  Continue Xarelto        Pulmonary hypertension- (present on admission)   Assessment & Plan    Seen on echo  No PE on CTA Chest  On Xarelto anyway  IV diuretics        Tobacco use disorder- (present on admission)   Assessment & Plan    I spent 5 minutes, discussing tobacco dependence and cardiac as well as pulmonary risk. Nicotine replacement and Smoking cessation instructions. Code 52923          PAF (paroxysmal atrial fibrillation) (CMS-HCC)- (present on admission)   Assessment & Plan    S/p space maker  On chronic anticoagulation  Rate not controlled  cardizam  Lopressor iv prn  Consulted cardiology        Chronic low back pain- (present on admission)   Assessment & Plan    History of back surgeries  Follows Dr. Ring, Pain clinic  COntinue her home morphine ER and  oxycodone IR  Bowel regimen          Quality-Core Measures   Farley catheter::  No Farley  DVT prophylaxis pharmacological::  Warfarin (Coumadin)

## 2018-03-29 NOTE — PROGRESS NOTES
Bedside report given to Glenny Aguilar. All questions addressed and poc discussed. Cares turned over at this time.

## 2018-03-29 NOTE — ED NOTES
Bedside report given to receiving RNMicheline for T731-1. All belongings accounted for at time of transport. Pt transported via gurney with receiving RN.

## 2018-03-29 NOTE — PROGRESS NOTES
Evening meds administered as ordered, as well as Oxy IR for back pain rated 8/10. Pt provided w/water and a snack. Call light and belongings w/in reach. Safety precautions and bed alarm in place. Will allow for rest and cont to monitor.

## 2018-03-29 NOTE — PROGRESS NOTES
Pt resting w/eyes closed, resprs even & unlabored, NAD noted. She awakens to name and is drowsy at this time, but answers questions appropriately. Admit profile completed. Pt denies further needs or complaints. Call light and belongings w/in reach. Bed alarm on. Will allow for rest and cont to monitor.

## 2018-03-29 NOTE — ASSESSMENT & PLAN NOTE
JULY today  cta shows mild edema and no PE  Cardiac cath was done   Lasix iv bid  2nd to chf with acute on chronic exacerbation  Diastolic dysfunction  Echo showed:CONCLUSIONS  Systolic function is difficult to assess in rapid atrial fibrillation,   but appears normal.  Severely dilated right ventricle.  Reduced right ventricular systolic function.  Mildly dilated left atrium.  Known mitral valve bioprosthesis which is functioning normally.  Estimated right ventricular systolic pressure  is 35 mmHg + JVP.  Compared to previous echocardiogram from 8/22/2016 the rhythm is now   rapid atrial fibrillation. Estimated right-sided pressures are lower in   this study, and the mitral bioprosthesis continues to function   Normally.  On cardizam  Lisinopril   Cardiology input is noted

## 2018-03-29 NOTE — PROGRESS NOTES
MS    A-flutter 80's-90's (increased to 120's, sustained, around 0400; pt asymptomatic; will cont to monitor)  Occasional Pacer Yohannes  R--PVC  -/10/-

## 2018-03-29 NOTE — ASSESSMENT & PLAN NOTE
On amiodarone and anticoagulation  Discussed with Dr. Bojorquez  Further recommendations per cardiology team  Cardioverted today

## 2018-03-30 LAB
ALBUMIN SERPL BCP-MCNC: 3.5 G/DL (ref 3.2–4.9)
ALBUMIN/GLOB SERPL: 1.3 G/DL
ALP SERPL-CCNC: 78 U/L (ref 30–99)
ALT SERPL-CCNC: 18 U/L (ref 2–50)
ANION GAP SERPL CALC-SCNC: 5 MMOL/L (ref 0–11.9)
AST SERPL-CCNC: 30 U/L (ref 12–45)
BASOPHILS # BLD AUTO: 0.8 % (ref 0–1.8)
BASOPHILS # BLD: 0.04 K/UL (ref 0–0.12)
BILIRUB SERPL-MCNC: 0.7 MG/DL (ref 0.1–1.5)
BUN SERPL-MCNC: 12 MG/DL (ref 8–22)
CALCIUM SERPL-MCNC: 8.8 MG/DL (ref 8.5–10.5)
CHLORIDE SERPL-SCNC: 103 MMOL/L (ref 96–112)
CO2 SERPL-SCNC: 29 MMOL/L (ref 20–33)
CREAT SERPL-MCNC: 0.73 MG/DL (ref 0.5–1.4)
EOSINOPHIL # BLD AUTO: 0.14 K/UL (ref 0–0.51)
EOSINOPHIL NFR BLD: 2.6 % (ref 0–6.9)
ERYTHROCYTE [DISTWIDTH] IN BLOOD BY AUTOMATED COUNT: 50.1 FL (ref 35.9–50)
GLOBULIN SER CALC-MCNC: 2.7 G/DL (ref 1.9–3.5)
GLUCOSE SERPL-MCNC: 100 MG/DL (ref 65–99)
HCT VFR BLD AUTO: 35.6 % (ref 37–47)
HGB BLD-MCNC: 11.4 G/DL (ref 12–16)
IMM GRANULOCYTES # BLD AUTO: 0.01 K/UL (ref 0–0.11)
IMM GRANULOCYTES NFR BLD AUTO: 0.2 % (ref 0–0.9)
LYMPHOCYTES # BLD AUTO: 1.49 K/UL (ref 1–4.8)
LYMPHOCYTES NFR BLD: 28.1 % (ref 22–41)
MCH RBC QN AUTO: 30.3 PG (ref 27–33)
MCHC RBC AUTO-ENTMCNC: 32 G/DL (ref 33.6–35)
MCV RBC AUTO: 94.7 FL (ref 81.4–97.8)
MONOCYTES # BLD AUTO: 0.66 K/UL (ref 0–0.85)
MONOCYTES NFR BLD AUTO: 12.5 % (ref 0–13.4)
NEUTROPHILS # BLD AUTO: 2.96 K/UL (ref 2–7.15)
NEUTROPHILS NFR BLD: 55.8 % (ref 44–72)
NRBC # BLD AUTO: 0 K/UL
NRBC BLD-RTO: 0 /100 WBC
PLATELET # BLD AUTO: 105 K/UL (ref 164–446)
PMV BLD AUTO: 10.3 FL (ref 9–12.9)
POTASSIUM SERPL-SCNC: 4.3 MMOL/L (ref 3.6–5.5)
PROT SERPL-MCNC: 6.2 G/DL (ref 6–8.2)
RBC # BLD AUTO: 3.76 M/UL (ref 4.2–5.4)
SODIUM SERPL-SCNC: 137 MMOL/L (ref 135–145)
WBC # BLD AUTO: 5.3 K/UL (ref 4.8–10.8)

## 2018-03-30 PROCEDURE — 36415 COLL VENOUS BLD VENIPUNCTURE: CPT

## 2018-03-30 PROCEDURE — 85025 COMPLETE CBC W/AUTO DIFF WBC: CPT

## 2018-03-30 PROCEDURE — A9270 NON-COVERED ITEM OR SERVICE: HCPCS | Performed by: INTERNAL MEDICINE

## 2018-03-30 PROCEDURE — 700102 HCHG RX REV CODE 250 W/ 637 OVERRIDE(OP): Performed by: INTERNAL MEDICINE

## 2018-03-30 PROCEDURE — 99232 SBSQ HOSP IP/OBS MODERATE 35: CPT | Performed by: FAMILY MEDICINE

## 2018-03-30 PROCEDURE — 770020 HCHG ROOM/CARE - TELE (206)

## 2018-03-30 PROCEDURE — 700101 HCHG RX REV CODE 250: Performed by: FAMILY MEDICINE

## 2018-03-30 PROCEDURE — 700111 HCHG RX REV CODE 636 W/ 250 OVERRIDE (IP): Performed by: INTERNAL MEDICINE

## 2018-03-30 PROCEDURE — 80053 COMPREHEN METABOLIC PANEL: CPT

## 2018-03-30 RX ORDER — DILTIAZEM HYDROCHLORIDE 180 MG/1
360 CAPSULE, COATED, EXTENDED RELEASE ORAL DAILY
Status: DISCONTINUED | OUTPATIENT
Start: 2018-03-31 | End: 2018-04-03

## 2018-03-30 RX ORDER — SODIUM CHLORIDE 9 MG/ML
INJECTION, SOLUTION INTRAVENOUS CONTINUOUS
Status: DISCONTINUED | OUTPATIENT
Start: 2018-03-30 | End: 2018-03-30

## 2018-03-30 RX ORDER — DIGOXIN 125 MCG
125 TABLET ORAL DAILY
Status: DISCONTINUED | OUTPATIENT
Start: 2018-03-30 | End: 2018-04-02

## 2018-03-30 RX ADMIN — MORPHINE SULFATE 15 MG: 15 TABLET, EXTENDED RELEASE ORAL at 15:38

## 2018-03-30 RX ADMIN — FUROSEMIDE 40 MG: 10 INJECTION, SOLUTION INTRAMUSCULAR; INTRAVENOUS at 15:39

## 2018-03-30 RX ADMIN — MORPHINE SULFATE 15 MG: 15 TABLET, EXTENDED RELEASE ORAL at 20:10

## 2018-03-30 RX ADMIN — DIGOXIN 125 MCG: 125 TABLET ORAL at 17:44

## 2018-03-30 RX ADMIN — FUROSEMIDE 40 MG: 10 INJECTION, SOLUTION INTRAMUSCULAR; INTRAVENOUS at 06:00

## 2018-03-30 RX ADMIN — STANDARDIZED SENNA CONCENTRATE AND DOCUSATE SODIUM 2 TABLET: 8.6; 5 TABLET, FILM COATED ORAL at 08:46

## 2018-03-30 RX ADMIN — METOPROLOL TARTRATE 5 MG: 5 INJECTION INTRAVENOUS at 12:10

## 2018-03-30 RX ADMIN — ARIPIPRAZOLE 10 MG: 10 TABLET ORAL at 08:46

## 2018-03-30 RX ADMIN — STANDARDIZED SENNA CONCENTRATE AND DOCUSATE SODIUM 2 TABLET: 8.6; 5 TABLET, FILM COATED ORAL at 20:14

## 2018-03-30 RX ADMIN — PREGABALIN 200 MG: 100 CAPSULE ORAL at 08:45

## 2018-03-30 RX ADMIN — POTASSIUM CHLORIDE 20 MEQ: 1500 TABLET, EXTENDED RELEASE ORAL at 08:46

## 2018-03-30 RX ADMIN — ATORVASTATIN CALCIUM 20 MG: 20 TABLET, FILM COATED ORAL at 20:14

## 2018-03-30 RX ADMIN — METOPROLOL TARTRATE 5 MG: 5 INJECTION INTRAVENOUS at 10:25

## 2018-03-30 RX ADMIN — PREGABALIN 200 MG: 100 CAPSULE ORAL at 15:39

## 2018-03-30 RX ADMIN — MORPHINE SULFATE 15 MG: 15 TABLET, EXTENDED RELEASE ORAL at 05:58

## 2018-03-30 RX ADMIN — POTASSIUM CHLORIDE 20 MEQ: 1500 TABLET, EXTENDED RELEASE ORAL at 20:10

## 2018-03-30 RX ADMIN — OXYCODONE HYDROCHLORIDE 10 MG: 10 TABLET ORAL at 08:45

## 2018-03-30 RX ADMIN — VITAMIN D, TAB 1000IU (100/BT) 1000 UNITS: 25 TAB at 08:45

## 2018-03-30 RX ADMIN — THERA TABS 1 TABLET: TAB at 08:45

## 2018-03-30 RX ADMIN — PREGABALIN 200 MG: 100 CAPSULE ORAL at 20:14

## 2018-03-30 RX ADMIN — SPIRONOLACTONE 25 MG: 25 TABLET, FILM COATED ORAL at 08:45

## 2018-03-30 RX ADMIN — VENLAFAXINE HYDROCHLORIDE 150 MG: 75 CAPSULE, EXTENDED RELEASE ORAL at 08:46

## 2018-03-30 RX ADMIN — NICOTINE 14 MG: 14 PATCH, EXTENDED RELEASE TRANSDERMAL at 05:58

## 2018-03-30 RX ADMIN — OXYCODONE HYDROCHLORIDE 10 MG: 10 TABLET ORAL at 15:38

## 2018-03-30 RX ADMIN — DILTIAZEM HYDROCHLORIDE 240 MG: 240 CAPSULE, COATED, EXTENDED RELEASE ORAL at 08:45

## 2018-03-30 RX ADMIN — OXYCODONE HYDROCHLORIDE 10 MG: 10 TABLET ORAL at 20:12

## 2018-03-30 ASSESSMENT — ENCOUNTER SYMPTOMS
DIAPHORESIS: 0
WEIGHT LOSS: 0
DIARRHEA: 0
EYE DISCHARGE: 0
SPUTUM PRODUCTION: 0
EYE PAIN: 0
ORTHOPNEA: 0
SENSORY CHANGE: 0
PALPITATIONS: 0
CLAUDICATION: 0
NECK PAIN: 0
BACK PAIN: 1
PALPITATIONS: 1
COUGH: 0
TINGLING: 0
HEARTBURN: 0
PHOTOPHOBIA: 0
VOMITING: 0
ABDOMINAL PAIN: 0
HEADACHES: 0
TREMORS: 0
SHORTNESS OF BREATH: 1
PND: 0

## 2018-03-30 ASSESSMENT — PAIN SCALES - GENERAL
PAINLEVEL_OUTOF10: 7
PAINLEVEL_OUTOF10: 8
PAINLEVEL_OUTOF10: 5
PAINLEVEL_OUTOF10: 7

## 2018-03-30 NOTE — PROGRESS NOTES
Plan for pt to go to cath lab tomorrow morning. Discussed NPO at midnight with pt. Consent for procedure signed and placed on chart.

## 2018-03-30 NOTE — PROGRESS NOTES
Cardiology Progress Note               Author: Russ Bah Date & Time created: 3/30/2018  1:53 PM       Consultation for RV failure      Admitted with increased shortness of breath ×5-6 months, fatigue, lower extremity edema, palpitations, pulmonary emboli was ruled out, was sent to ER from cardiology office secondary to current symptoms      History of mitral valve repair , on Xarelto, SSS s/p PPM  , left atrial myxoma resection, chronic pain, hypertension, obesity, tobacco use, paroxysmal A. fib, hyperlipidemia,      Labs reviewed  Sodium, potassium, creatinine stable  Troponin negative  BNP 76  INR 2      BP = 107/69  HR = 100 afib    Echocardiogram 3/29/18, known mitral valve bioprosthesis functioning normally, EF 55%, patient had rapid A. fib, severely dilated right ventricle, enlarged right atrium    Review of Systems   Respiratory: Positive for shortness of breath. Negative for cough.    Cardiovascular: Positive for palpitations. Negative for chest pain, orthopnea, claudication, leg swelling and PND.       Physical Exam   Constitutional: She is oriented to person, place, and time.   HENT:   Head: Normocephalic.   Eyes: Conjunctivae are normal.   Neck: JVD present.   Cardiovascular: Normal rate.  An irregularly irregular rhythm present.   Pulses:       Carotid pulses are 2+ on the right side, and 2+ on the left side.       Radial pulses are 2+ on the right side, and 2+ on the left side.        Posterior tibial pulses are 2+ on the right side, and 2+ on the left side.   Pulmonary/Chest: She has no wheezes.   Abdominal: Soft.   Musculoskeletal: She exhibits edema.   Neurological: She is alert and oriented to person, place, and time.   Skin: Skin is warm and dry.       Hemodynamics:  Temp (24hrs), Av.3 °C (97.4 °F), Min:36 °C (96.8 °F), Max:36.8 °C (98.3 °F)  Temperature: 36 °C (96.8 °F)  Pulse  Av.8  Min: 57  Max: 129   Blood Pressure: 107/69     Respiratory:    Respiration: 18, Pulse  Oximetry: 94 %     Work Of Breathing / Effort: Mild  RUL Breath Sounds: Clear, RML Breath Sounds: Clear, RLL Breath Sounds: Diminished, SANTANA Breath Sounds: Clear, LLL Breath Sounds: Diminished  Fluids:  Date 03/30/18 0700 - 03/31/18 0659   Shift 0715-5941 [id number] 24 Hour Total   I  N  T  A  K  E   P.O. 580   580    Shift Total 580   580   O  U  T  P  U  T   Urine 1600   1600    Shift Total 1600   1600   Weight (kg) 125.9 125.9 125.9 125.9       Weight: (!) 125.9 kg (277 lb 9 oz)  GI/Nutrition:  Orders Placed This Encounter   Procedures   • Diet Order     Standing Status:   Standing     Number of Occurrences:   1     Order Specific Question:   Diet:     Answer:   Cardiac [6]     Order Specific Question:   Consistency/Fluid modifications:     Answer:   1500 ml Fluid Restriction [9]     Lab Results:  Recent Labs      03/28/18   1300  03/29/18   0305  03/30/18   0227   WBC  7.2  5.7  5.3   RBC  4.15*  3.94*  3.76*   HEMOGLOBIN  12.6  12.0  11.4*   HEMATOCRIT  39.0  37.3  35.6*   MCV  94.0  94.7  94.7   MCH  30.4  30.5  30.3   MCHC  32.3*  32.2*  32.0*   RDW  49.0  50.1*  50.1*   PLATELETCT  137*  123*  105*   MPV  10.2  10.3  10.3     Recent Labs      03/28/18   1300  03/29/18   0305  03/30/18   0227   SODIUM  131*  135  137   POTASSIUM  4.5  4.5  4.3   CHLORIDE  100  102  103   CO2  23  25  29   GLUCOSE  111*  122*  100*   BUN  8  11  12   CREATININE  0.62  0.73  0.73   CALCIUM  9.6  9.1  8.8     Recent Labs      03/28/18   1300   APTT  45.7*   INR  2.06*     Recent Labs      03/28/18   1300  03/29/18   0305   BNPBTYPENAT  93  76     Recent Labs      03/28/18   1300  03/29/18   0305   TROPONINI  <0.01  <0.01   BNPBTYPENAT  93  76             Medical Decision Making, by Problem:  Active Hospital Problems    Diagnosis   • *Volume overload [E87.70]   • History of sick sinus syndrome [Z86.79]   • Pacemaker [Z95.0]   • Hyperlipidemia [E78.5]   • Status post mitral valve repair [Z98.890]   • Acute respiratory  failure with hypoxia (CMS-HCC) [J96.01]   • Chronic low back pain [M54.5, G89.29]   • Circulating anticoagulants (CMS-HCC) [D68.318]   • Obesity [E66.9]   • Pulmonary hypertension [I27.20]   • Tobacco use disorder [F17.200]   • PAF (paroxysmal atrial fibrillation) (CMS-HCC) [I48.0]       Assessment/Plan:    RV failure     - Etiology unclear  - PE was ruled out  - Continue with furosemide 40 IV BID   -  spironolactone 25 mg    A. fib RVR    - on digoxin 125 MCG   - Diltiazem 360  - rate better controlled this pm    Tobacco abuse   -  nicotine patch    Chronic pain   - On MS Contin 15 mg every 8 hours & oxycodone 10 mg 3 times daily    Plan for R heart cath in am, will schedule       Quality-Core Measures   Reviewed items::  Medications reviewed and Labs reviewed

## 2018-03-30 NOTE — PROGRESS NOTES
Pt A&O x 4, c/o pain to her back 8/10. Scheduled oxycodone given. Pt denies numbness and tingling. Strength 5/5. Up self ambulating in room. Pt HR increased to the 150s per monitor room this morning. MD aware. Scheduled meds given per MAR. Plan of care discussed with pt. Pt on 4L O2 at this time. Encouraging IS and ambulation, weaning O2 as able. Pt sitting up for breakfast at this time.

## 2018-03-30 NOTE — PROGRESS NOTES
[facility]    Date of Service: 3/30/2018    Chief Complaint  57 y.o. female admitted 3/28/2018 with chf and a.fib    Interval Problem Update  A.fib with rvr  Cath lab    Consultants/Specialty  cardiology    Disposition  pending        Review of Systems   Constitutional: Negative for diaphoresis and weight loss.   HENT: Negative for ear discharge, hearing loss and nosebleeds.    Eyes: Negative for photophobia, pain and discharge.   Respiratory: Positive for shortness of breath. Negative for cough and sputum production.    Cardiovascular: Negative for palpitations, orthopnea and claudication.   Gastrointestinal: Negative for abdominal pain, diarrhea, heartburn and vomiting.   Genitourinary: Negative for frequency, hematuria and urgency.   Musculoskeletal: Positive for back pain. Negative for joint pain and neck pain.   Skin: Negative for itching and rash.   Neurological: Negative for tingling, tremors, sensory change and headaches.      Physical Exam  Laboratory/Imaging   Hemodynamics  Temp (24hrs), Av.5 °C (97.7 °F), Min:36.1 °C (97 °F), Max:37 °C (98.6 °F)   Temperature: 36.2 °C (97.1 °F)  Pulse  Av.1  Min: 57  Max: 129    Blood Pressure: 117/67      Respiratory      Respiration: 16, Pulse Oximetry: 96 %     Work Of Breathing / Effort: Mild  RUL Breath Sounds: Clear, RML Breath Sounds: Clear, RLL Breath Sounds: Diminished, SANTANA Breath Sounds: Clear, LLL Breath Sounds: Diminished    Fluids    Intake/Output Summary (Last 24 hours) at 18 1043  Last data filed at 18 0800   Gross per 24 hour   Intake              300 ml   Output             2950 ml   Net            -2650 ml       Nutrition  Orders Placed This Encounter   Procedures   • Diet Order     Standing Status:   Standing     Number of Occurrences:   1     Order Specific Question:   Diet:     Answer:   Cardiac [6]     Order Specific Question:   Consistency/Fluid modifications:     Answer:   1500 ml Fluid Restriction [9]      Physical Exam   Constitutional: She is oriented to person, place, and time. No distress.   HENT:   Right Ear: External ear normal.   Left Ear: External ear normal.   Eyes: Right eye exhibits no discharge. Left eye exhibits no discharge.   Neck: Neck supple. No tracheal deviation present.   Cardiovascular:   Tachycardia     Pulmonary/Chest: No stridor. She has no wheezes. She has no rales.   Decreased breath sounds bilaterally   Abdominal: Soft. There is no tenderness. There is no rebound.   Morbidly obese   Musculoskeletal: She exhibits edema. She exhibits no tenderness.   Neurological: She is alert and oriented to person, place, and time.   Skin: Skin is warm and dry. She is not diaphoretic.       Recent Labs      03/28/18   1300  03/29/18   0305  03/30/18   0227   WBC  7.2  5.7  5.3   RBC  4.15*  3.94*  3.76*   HEMOGLOBIN  12.6  12.0  11.4*   HEMATOCRIT  39.0  37.3  35.6*   MCV  94.0  94.7  94.7   MCH  30.4  30.5  30.3   MCHC  32.3*  32.2*  32.0*   RDW  49.0  50.1*  50.1*   PLATELETCT  137*  123*  105*   MPV  10.2  10.3  10.3     Recent Labs      03/28/18   1300  03/29/18   0305  03/30/18   0227   SODIUM  131*  135  137   POTASSIUM  4.5  4.5  4.3   CHLORIDE  100  102  103   CO2  23  25  29   GLUCOSE  111*  122*  100*   BUN  8  11  12   CREATININE  0.62  0.73  0.73   CALCIUM  9.6  9.1  8.8     Recent Labs      03/28/18   1300   APTT  45.7*   INR  2.06*     Recent Labs      03/28/18   1300  03/29/18   0305   BNPBTYPENAT  93  76              Assessment/Plan     * Volume overload   Assessment & Plan    cta shows mild edema  Cardiac cath lab  Lasix iv bid  2nd to chf with acute on chronic  Echo showed:CONCLUSIONS  Systolic function is difficult to assess in rapid atrial fibrillation,   but appears normal.  Severely dilated right ventricle.  Reduced right ventricular systolic function.  Mildly dilated left atrium.  Known mitral valve bioprosthesis which is functioning normally.  Estimated right ventricular systolic  pressure  is 35 mmHg + JVP.  Compared to previous echocardiogram from 8/22/2016 the rhythm is now   rapid atrial fibrillation. Estimated right-sided pressures are lower in   this study, and the mitral bioprosthesis continues to function   Normally.  On cardizam  No ACE 2nd to low bp  Cardiology input is noted            History of sick sinus syndrome- (present on admission)   Assessment & Plan    Has pacemaker.         Pacemaker- (present on admission)   Assessment & Plan    Noted        Hyperlipidemia- (present on admission)   Assessment & Plan    Continue statin          Status post mitral valve repair- (present on admission)   Assessment & Plan    On chronic anticoagulation. Hold for cardiac cath  Has CHF symptoms because of this, followed by [facility]        Acute respiratory failure with hypoxia (CMS-HCC)- (present on admission)   Assessment & Plan    Mild, was 87% on RA  CXR showed posible mild edema  Unlikely pneumonia, patient does not feel she has this, no clinical signs, I ordered procalcitonin and that's negative  CTA Chest no PE, and patient already on Xarelto anyway, no further work-up  Resp, O2 per protocol, IV diuretics        Obesity   Assessment & Plan    Body mass index is 42.73 kg/m².  When better, encourage weightloss and lifestyle modification        Circulating anticoagulants (CMS-HCC)   Assessment & Plan    For mitral valve  No active bleeding  Continue Xarelto        Pulmonary hypertension- (present on admission)   Assessment & Plan    Seen on echo  No PE on CTA Chest  xarelto is on hold   IV diuretics        Tobacco use disorder- (present on admission)   Assessment & Plan    I spent 5 minutes, discussing tobacco dependence and cardiac as well as pulmonary risk. Nicotine replacement and Smoking cessation instructions. Code 96807          PAF (paroxysmal atrial fibrillation) (CMS-HCC)- (present on admission)   Assessment & Plan    S/p space maker  On chronic anticoagulation  Rate not  controlled  cardizam  Lopressor iv prn   cardiology input is noted        Chronic low back pain- (present on admission)   Assessment & Plan    History of back surgeries  Follows Dr. Ring, Pain clinic  COntinue her home morphine ER and oxycodone IR  Bowel regimen          Quality-Core Measures   Farley catheter::  No Farley  DVT prophylaxis pharmacological::  Warfarin (Coumadin)

## 2018-03-30 NOTE — CONSULTS
Cardiology Consult Note:    Toni Person  Date & Time note created:    3/29/2018   6:20 PM     Referring MD:  Dr. Damian    Patient ID:   Name:             Kailey Velarde   YOB: 1960  Age:                 57 y.o.  female   MRN:               [id number]                                                             Reason for Consult:      SOB, edema    History of Present Illness:    57-year-old female past medical history of atrial fibrillation as well as mitral fiber last underwent mitral regurgitation status post mitral valve repair. She presented to cardiology clinic 2 days ago with progressive shortness of breath palpitations PND or lower extremity edema. She was advised to go to the ER because her oxygen saturations throughout that time. She was brought to the ER and started on diuretics. Repeat echocardiogram showed a severely dilated right ventricle with reduced RV systolic function. Because of concern for pulmonary embolisms a CT chest PE protocol obtained that did not show any emboli. Her chest x-ray showed mild infiltrates but no effusion. She was also noted to have cardiomegaly. Her BNP was only elevated 76. She denies a history of sleep apnea although I don't she's ever been screened.    Review of Systems:      Constitutional: Denies fevers, Denies weight changes  Eyes: Denies changes in vision, no eye pain  Ears/Nose/Throat/Mouth: Denies nasal congestion or sore throat   Cardiovascular: + chest pain, no palpitations   Respiratory: + shortness of breath , Denies cough  Gastrointestinal/Hepatic: Denies abdominal pain, nausea, vomiting, diarrhea, constipation or GI bleeding   Genitourinary: Denies dysuria or frequency  Musculoskeletal/Rheum: Denies  joint pain and swelling, + edema  Skin: Denies rash  Neurological: Denies headache, confusion, memory loss or focal weakness/parasthesias  Psychiatric: denies mood disorder   Endocrine: Flori thyroid problems  Heme/Oncology/Lymph Nodes:  Denies enlarged lymph nodes, denies brusing or known bleeding disorder  All other systems were reviewed and are negative (AMA/CMS criteria)                Past Medical History:   Past Medical History:   Diagnosis Date   • Atrial myxoma November 2011    Status post LA myxoma resection   • Backpain    • CAD (coronary artery disease)     pacemaker   • Chronic pain    • Dizziness     • Hyperlipidemia     • Hypertension    • Hypertriglyceridemia     • Indigestion    • Peripheral edema    • Sciatica    • Sick sinus syndrome (CMS-HCC) November 2011    Status post PPM implantation.   • Status post mitral valve repair November 2011    MV repair with 32mm Jonnie-Carpenter flexible annuloplasty ring.     Active Hospital Problems    Diagnosis   • Volume overload [E87.70]     Priority: High   • History of sick sinus syndrome [Z86.79]     Priority: High   • Pacemaker [Z95.0]     Priority: High   • Hyperlipidemia [E78.5]     Priority: High   • Status post mitral valve repair [Z98.890]     Priority: High   • Acute respiratory failure with hypoxia (CMS-HCC) [J96.01]     Priority: High   • Chronic low back pain [M54.5, G89.29]     Priority: Low   • Circulating anticoagulants (CMS-HCC) [D68.318]   • Obesity [E66.9]   • Pulmonary hypertension [I27.20]   • Tobacco use disorder [F17.200]   • PAF (paroxysmal atrial fibrillation) (CMS-HCC) [I48.0]       Past Surgical History:  Past Surgical History:   Procedure Laterality Date   • MITRAL VALVE REPAIR  11/17/2011    Performed by MARY ORTIZ at [facility]   • PACEMAKER INSERTION  November 2011    Medtronic Versa VEDR01 implanted by Dr. Amaya.   • OTHER ORTHOPEDIC SURGERY      Neck and back        Hospital Medications:  Current Facility-Administered Medications   Medication Dose   • Metoprolol Tartrate (LOPRESSOR) injection 5 mg  5 mg   • furosemide (LASIX) injection 40 mg  40 mg   • spironolactone (ALDACTONE) tablet 25 mg  25 mg   • ARIPiprazole (ABILIFY) tablet 10 mg  10  mg   • atorvastatin (LIPITOR) tablet 20 mg  20 mg   • DILTIAZem CD (CARDIZEM CD) capsule 240 mg  240 mg   • morphine ER (MS CONTIN) tablet 15 mg  15 mg   • oxyCODONE immediate release (ROXICODONE) tablet 10 mg  10 mg   • multivitamin (THERAGRAN) tablet 1 Tab  1 Tab   • potassium chloride SA (Kdur) tablet 20 mEq  20 mEq   • pregabalin (LYRICA) capsule 200 mg  200 mg   • rivaroxaban (XARELTO) tablet 20 mg  20 mg   • vitamin D (cholecalciferol) tablet 1,000 Units  1,000 Units   • venlafaxine XR (EFFEXOR XR) capsule 150 mg  150 mg   • senna-docusate (PERICOLACE or SENOKOT S) 8.6-50 MG per tablet 2 Tab  2 Tab    And   • polyethylene glycol/lytes (MIRALAX) PACKET 1 Packet  1 Packet    And   • magnesium hydroxide (MILK OF MAGNESIA) suspension 30 mL  30 mL    And   • bisacodyl (DULCOLAX) suppository 10 mg  10 mg   • Respiratory Care per Protocol     • nicotine (NICODERM) 14 MG/24HR 14 mg  14 mg    And   • nicotine polacrilex (NICORETTE) 2 MG piece 2 mg  2 mg         Current Outpatient Medications:  Prescriptions Prior to Admission   Medication Sig Dispense Refill Last Dose   • oxyCODONE immediate release (ROXICODONE) 10 MG immediate release tablet Take 10 mg by mouth 3 times a day. TID with MS Contin   3/28/2018 at 0900   • aripiprazole (ABILIFY) 5 MG tablet Take 5 mg by mouth every morning.   3/28/2018 at 0900   • furosemide (LASIX) 20 MG Tab Take 20 mg by mouth every morning.   3/28/2018 at 0900   • potassium chloride SA (KDUR) 20 MEQ Tab CR Take 20 mEq by mouth every morning.   3/28/2018 at 0900   • venlafaxine (EFFEXOR-XR) 150 MG extended-release capsule Take 150 mg by mouth every morning.   3/28/2018 at 0900   • Multiple Vitamin (MULTI-DAY PO) Take 1 Tab by mouth every morning.   3/28/2018 at 0900   • DILTIAZem CD (CARDIZEM CD) 240 MG CAPSULE SR 24 HR Take 1 Cap by mouth every day. 30 Cap 11 3/28/2018 at 0900   • rivaroxaban (XARELTO) 20 MG Tab tablet Take 1 Tab by mouth with dinner. 30 Tab 11 3/27/2018 at 2100   •  "atorvastatin (LIPITOR) 20 MG Tab Take 1 Tab by mouth every evening. 30 Tab 11 3/27/2018 at 2100   • pregabalin (LYRICA) 200 MG capsule Take 200 mg by mouth 3 times a day.   3/28/2018 at 0900   • vitamin D (CHOLECALCIFEROL) 1000 UNIT Tab Take 1,000 Units by mouth every morning.   3/28/2018 at 0900   • morphine SR (MS CONTIN) 15 MG TB12 Take 1 Tab by mouth 3 times a day. 60 Tab 0 3/28/2018 at 0900       Medication Allergy:  Allergies   Allergen Reactions   • Penicillins Rash             Family History:  Family History   Problem Relation Age of Onset   • Heart Attack Father        Social History:  Social History     Social History   • Marital status: Single     Spouse name: N/A   • Number of children: N/A   • Years of education: N/A     Occupational History   • Not on file.     Social History Main Topics   • Smoking status: Current Every Day Smoker     Packs/day: 0.25     Years: 30.00     Types: Cigarettes   • Smokeless tobacco: Never Used   • Alcohol use No      Comment: sober since 7/14/16   • Drug use: No      Comment: hx of, denies currently   • Sexual activity: Not on file     Other Topics Concern   • Not on file     Social History Narrative   • No narrative on file         Physical Exam:  Vitals/ General Appearance:   Weight/BMI: Body mass index is 42.34 kg/m².  Blood pressure 112/72, pulse 90, temperature 36.1 °C (97 °F), resp. rate 16, height 1.727 m (5' 8\"), weight (!) 126.3 kg (278 lb 7.1 oz), last menstrual period 01/01/2010, SpO2 94 %, not currently breastfeeding.  Vitals:    03/29/18 0731 03/29/18 1132 03/29/18 1300 03/29/18 1600   BP: 115/73 111/73  112/72   Pulse: (!) 117 (!) 101 72 90   Resp: 17 16  16   Temp: 36.4 °C (97.5 °F) 37 °C (98.6 °F)  36.1 °C (97 °F)   SpO2: 92% 93%  94%   Weight:       Height:         Oxygen Therapy:  Pulse Oximetry: 94 %, O2 (LPM): 4, O2 Delivery: Silicone Nasal Cannula    Constitutional:   Well developed, Well nourished, No acute distress  HENMT:  Normocephalic, Atraumatic, " Oropharynx moist mucous membranes, No oral exudates, Nose normal.  No thyromegaly.  Eyes:  EOMI, Conjunctiva normal, No discharge.  Neck:  Normal range of motion, No cervical tenderness,  no JVD.  Cardiovascular:  Normal heart rate, Normal rhythm, No murmurs, No rubs, No gallops.   Extremitites with intact distal pulses, no cyanosis, or edema.  Lungs:  Normal breath sounds, breath sounds clear to auscultation bilaterally,  no crackles, no wheezing.   Abdomen: Bowel sounds normal, Soft, No tenderness, No guarding, No rebound, No masses, No hepatosplenomegaly.  Skin: Warm, Dry, No erythema, No rash, no induration.  Neurologic: Alert & oriented x 3, No focal deficits noted, cranial nerves II through X are grossly intact.  Psychiatric: Affect normal, Judgment normal, Mood normal.      MDM (Data Review):     Records reviewed and summarized in current documentation    Lab Data Review:  Recent Results (from the past 24 hour(s))   Basic Metabolic Panel (BMP)    Collection Time: 03/29/18  3:05 AM   Result Value Ref Range    Sodium 135 135 - 145 mmol/L    Potassium 4.5 3.6 - 5.5 mmol/L    Chloride 102 96 - 112 mmol/L    Co2 25 20 - 33 mmol/L    Glucose 122 (H) 65 - 99 mg/dL    Bun 11 8 - 22 mg/dL    Creatinine 0.73 0.50 - 1.40 mg/dL    Calcium 9.1 8.5 - 10.5 mg/dL    Anion Gap 8.0 0.0 - 11.9   CBC without Differential    Collection Time: 03/29/18  3:05 AM   Result Value Ref Range    WBC 5.7 4.8 - 10.8 K/uL    RBC 3.94 (L) 4.20 - 5.40 M/uL    Hemoglobin 12.0 12.0 - 16.0 g/dL    Hematocrit 37.3 37.0 - 47.0 %    MCV 94.7 81.4 - 97.8 fL    MCH 30.5 27.0 - 33.0 pg    MCHC 32.2 (L) 33.6 - 35.0 g/dL    RDW 50.1 (H) 35.9 - 50.0 fL    Platelet Count 123 (L) 164 - 446 K/uL    MPV 10.3 9.0 - 12.9 fL   TSH WITH REFLEX TO FT4    Collection Time: 03/29/18  3:05 AM   Result Value Ref Range    TSH <0.005 (L) 0.380 - 5.330 uIU/mL   BTYPE NATRIURETIC PEPTIDE    Collection Time: 03/29/18  3:05 AM   Result Value Ref Range    B Natriuretic  Peptide 76 0 - 100 pg/mL   TROPONIN    Collection Time: 03/29/18  3:05 AM   Result Value Ref Range    Troponin I <0.01 0.00 - 0.04 ng/mL   ESTIMATED GFR    Collection Time: 03/29/18  3:05 AM   Result Value Ref Range    GFR If African American >60 >60 mL/min/1.73 m 2    GFR If Non African American >60 >60 mL/min/1.73 m 2   FREE THYROXINE    Collection Time: 03/29/18  3:05 AM   Result Value Ref Range    Free T-4 0.96 0.53 - 1.43 ng/dL   ECHOCARDIOGRAM COMP W/O CONT    Collection Time: 03/29/18  9:44 AM   Result Value Ref Range    Eject.Frac. MOD BP 57.73     Eject.Frac. MOD 4C 59.52     Eject.Frac. MOD 2C 56.74     Left Ventrical Ejection Fraction 55        Imaging/Procedures Review:    Chest Xray:  Stable position left transvenous electrodes.  Mild lung base interstitial opacities.  There is no evidence of pleural effusion.  Cardiomegaly. Sternotomy wires.    EKG:   Personally reviewed by myself showing a flutter and possible IV for hypertrophy otherwise normal    ECHO:  Personally reviewed by myself showing:  Systolic function is difficult to assess in rapid atrial fibrillation,   but appears normal.  Severely dilated right ventricle.  Reduced right ventricular systolic function.  Mildly dilated left atrium.  Known mitral valve bioprosthesis which is functioning normally.  Estimated right ventricular systolic pressure  is 35 mmHg + JVP.  Compared to previous echocardiogram from 8/22/2016 the rhythm is now   rapid atrial fibrillation. Estimated right-sided pressures are lower in   this study, and the mitral bioprosthesis continues to function normally.    MDM (Assessment and Plan):     Active Hospital Problems    Diagnosis   • Volume overload [E87.70]     Priority: High   • History of sick sinus syndrome [Z86.79]     Priority: High   • Pacemaker [Z95.0]     Priority: High   • Hyperlipidemia [E78.5]     Priority: High   • Status post mitral valve repair [Z98.890]     Priority: High   • Acute respiratory failure with  hypoxia (CMS-HCC) [J96.01]     Priority: High   • Chronic low back pain [M54.5, G89.29]     Priority: Low   • Circulating anticoagulants (CMS-HCC) [D68.318]   • Obesity [E66.9]   • Pulmonary hypertension [I27.20]   • Tobacco use disorder [F17.200]   • PAF (paroxysmal atrial fibrillation) (CMS-HCC) [I48.0]     57-year-old female with a history of mitral valve repair and worsening lower extremity edema in the setting of a worsening RV systolic function of unknown etiology. We will follow her pulse ox is overnight. We will also obtain a right heart catheterization the morning to assess her pulmonary vascular resistance when she is more euvolemic. We will hold her oral anticoagulation for tonight. We will discuss with the cardiologist doing the procedures tomorrow with another related to this after holding her Xarelto for 36 hours.    1. RV failure    - unclear etiology    - Overnight O2 sats    - RHC cath in AM    - PFTs    - increase lasix to 40 IV BID    - start ramses 25    2. MVR    - TTE shows normal function    Thank for you allowing me to take part in your patient's care, please call should you have any questions or would like to discuss this patient.    Addendum:  Patient received a right coagulation at 5:30 tonight. We will discuss with the [facility] whether they would be willing to do this procedure through venous access on this medication. Otherwise we will wait 24-48 hours as we diurese her.

## 2018-03-30 NOTE — PROGRESS NOTES
Bedside report received, Pt care assumed. Tele box on. VSS. Pt assessment complete. Pt AOX4, no signs of distress noted at this time.  Pt c/o of 0/10 pain. Pt denies any additional needs at this time. Bed in lowest position, bed alarm refused, ambulates with standby assistance. POC discussed with Pt/family, verbalizes understanding, no questions at this time. Pt educated on fall risk and verbalizes understanding, call light within reach, will continue to monitor.

## 2018-03-30 NOTE — CARE PLAN
Problem: Bowel/Gastric:  Goal: Normal bowel function is maintained or improved  Outcome: PROGRESSING AS EXPECTED  Last BM prior to arrival, scheduled softeners given, bowel sounds normoactive.    Problem: Mobility  Goal: Risk for activity intolerance will decrease  Outcome: PROGRESSING AS EXPECTED  Pt up self, ambulating in room.

## 2018-03-31 LAB
ALBUMIN SERPL BCP-MCNC: 3.6 G/DL (ref 3.2–4.9)
ALBUMIN/GLOB SERPL: 1.3 G/DL
ALP SERPL-CCNC: 76 U/L (ref 30–99)
ALT SERPL-CCNC: 18 U/L (ref 2–50)
ANION GAP SERPL CALC-SCNC: 5 MMOL/L (ref 0–11.9)
AST SERPL-CCNC: 23 U/L (ref 12–45)
BASOPHILS # BLD AUTO: 0.8 % (ref 0–1.8)
BASOPHILS # BLD: 0.04 K/UL (ref 0–0.12)
BILIRUB SERPL-MCNC: 0.6 MG/DL (ref 0.1–1.5)
BUN SERPL-MCNC: 12 MG/DL (ref 8–22)
CALCIUM SERPL-MCNC: 8.9 MG/DL (ref 8.5–10.5)
CHLORIDE SERPL-SCNC: 101 MMOL/L (ref 96–112)
CO2 SERPL-SCNC: 33 MMOL/L (ref 20–33)
CREAT SERPL-MCNC: 0.72 MG/DL (ref 0.5–1.4)
EOSINOPHIL # BLD AUTO: 0.14 K/UL (ref 0–0.51)
EOSINOPHIL NFR BLD: 2.7 % (ref 0–6.9)
ERYTHROCYTE [DISTWIDTH] IN BLOOD BY AUTOMATED COUNT: 51.3 FL (ref 35.9–50)
GLOBULIN SER CALC-MCNC: 2.8 G/DL (ref 1.9–3.5)
GLUCOSE SERPL-MCNC: 104 MG/DL (ref 65–99)
HCT VFR BLD AUTO: 36.4 % (ref 37–47)
HGB BLD-MCNC: 11.4 G/DL (ref 12–16)
IMM GRANULOCYTES # BLD AUTO: 0.01 K/UL (ref 0–0.11)
IMM GRANULOCYTES NFR BLD AUTO: 0.2 % (ref 0–0.9)
INR BLD: 1.3
LYMPHOCYTES # BLD AUTO: 1.6 K/UL (ref 1–4.8)
LYMPHOCYTES NFR BLD: 31.2 % (ref 22–41)
MCH RBC QN AUTO: 29.8 PG (ref 27–33)
MCHC RBC AUTO-ENTMCNC: 31.3 G/DL (ref 33.6–35)
MCV RBC AUTO: 95.3 FL (ref 81.4–97.8)
MONOCYTES # BLD AUTO: 0.69 K/UL (ref 0–0.85)
MONOCYTES NFR BLD AUTO: 13.5 % (ref 0–13.4)
NEUTROPHILS # BLD AUTO: 2.65 K/UL (ref 2–7.15)
NEUTROPHILS NFR BLD: 51.6 % (ref 44–72)
NRBC # BLD AUTO: 0 K/UL
NRBC BLD-RTO: 0 /100 WBC
PLATELET # BLD AUTO: 106 K/UL (ref 164–446)
PMV BLD AUTO: 10.4 FL (ref 9–12.9)
POTASSIUM SERPL-SCNC: 4.1 MMOL/L (ref 3.6–5.5)
PROT SERPL-MCNC: 6.4 G/DL (ref 6–8.2)
RBC # BLD AUTO: 3.82 M/UL (ref 4.2–5.4)
SODIUM SERPL-SCNC: 139 MMOL/L (ref 135–145)
WBC # BLD AUTO: 5.1 K/UL (ref 4.8–10.8)

## 2018-03-31 PROCEDURE — 93451 RIGHT HEART CATH: CPT

## 2018-03-31 PROCEDURE — 99232 SBSQ HOSP IP/OBS MODERATE 35: CPT | Performed by: FAMILY MEDICINE

## 2018-03-31 PROCEDURE — 361014 HCHG 6FR ARROW SWAN/THERMO

## 2018-03-31 PROCEDURE — 700102 HCHG RX REV CODE 250 W/ 637 OVERRIDE(OP): Performed by: INTERNAL MEDICINE

## 2018-03-31 PROCEDURE — 700101 HCHG RX REV CODE 250

## 2018-03-31 PROCEDURE — 85610 PROTHROMBIN TIME: CPT

## 2018-03-31 PROCEDURE — A9270 NON-COVERED ITEM OR SERVICE: HCPCS | Performed by: INTERNAL MEDICINE

## 2018-03-31 PROCEDURE — 36415 COLL VENOUS BLD VENIPUNCTURE: CPT

## 2018-03-31 PROCEDURE — C1894 INTRO/SHEATH, NON-LASER: HCPCS

## 2018-03-31 PROCEDURE — 99152 MOD SED SAME PHYS/QHP 5/>YRS: CPT

## 2018-03-31 PROCEDURE — 85025 COMPLETE CBC W/AUTO DIFF WBC: CPT

## 2018-03-31 PROCEDURE — 770020 HCHG ROOM/CARE - TELE (206)

## 2018-03-31 PROCEDURE — 4A023N6 MEASUREMENT OF CARDIAC SAMPLING AND PRESSURE, RIGHT HEART, PERCUTANEOUS APPROACH: ICD-10-PCS | Performed by: INTERNAL MEDICINE

## 2018-03-31 PROCEDURE — 80053 COMPREHEN METABOLIC PANEL: CPT

## 2018-03-31 PROCEDURE — C1769 GUIDE WIRE: HCPCS

## 2018-03-31 PROCEDURE — 700111 HCHG RX REV CODE 636 W/ 250 OVERRIDE (IP)

## 2018-03-31 PROCEDURE — 700111 HCHG RX REV CODE 636 W/ 250 OVERRIDE (IP): Performed by: INTERNAL MEDICINE

## 2018-03-31 RX ORDER — LIDOCAINE HYDROCHLORIDE 10 MG/ML
INJECTION, SOLUTION INFILTRATION; PERINEURAL
Status: COMPLETED
Start: 2018-03-31 | End: 2018-03-31

## 2018-03-31 RX ORDER — HALOPERIDOL 5 MG/ML
1 INJECTION INTRAMUSCULAR EVERY 6 HOURS PRN
Status: DISCONTINUED | OUTPATIENT
Start: 2018-03-31 | End: 2018-04-05

## 2018-03-31 RX ORDER — SCOLOPAMINE TRANSDERMAL SYSTEM 1 MG/1
1 PATCH, EXTENDED RELEASE TRANSDERMAL
Status: DISCONTINUED | OUTPATIENT
Start: 2018-03-31 | End: 2018-04-05

## 2018-03-31 RX ORDER — DIPHENHYDRAMINE HYDROCHLORIDE 50 MG/ML
25 INJECTION INTRAMUSCULAR; INTRAVENOUS EVERY 6 HOURS PRN
Status: DISCONTINUED | OUTPATIENT
Start: 2018-03-31 | End: 2018-04-05

## 2018-03-31 RX ORDER — DEXAMETHASONE SODIUM PHOSPHATE 4 MG/ML
4 INJECTION, SOLUTION INTRA-ARTICULAR; INTRALESIONAL; INTRAMUSCULAR; INTRAVENOUS; SOFT TISSUE
Status: DISCONTINUED | OUTPATIENT
Start: 2018-03-31 | End: 2018-04-05

## 2018-03-31 RX ORDER — ONDANSETRON 2 MG/ML
4 INJECTION INTRAMUSCULAR; INTRAVENOUS EVERY 4 HOURS PRN
Status: DISCONTINUED | OUTPATIENT
Start: 2018-03-31 | End: 2018-04-06 | Stop reason: HOSPADM

## 2018-03-31 RX ORDER — MIDAZOLAM HYDROCHLORIDE 1 MG/ML
INJECTION INTRAMUSCULAR; INTRAVENOUS
Status: COMPLETED
Start: 2018-03-31 | End: 2018-03-31

## 2018-03-31 RX ORDER — HEPARIN SODIUM,PORCINE 1000/ML
VIAL (ML) INJECTION
Status: COMPLETED
Start: 2018-03-31 | End: 2018-03-31

## 2018-03-31 RX ADMIN — NICOTINE 14 MG: 14 PATCH, EXTENDED RELEASE TRANSDERMAL at 05:55

## 2018-03-31 RX ADMIN — STANDARDIZED SENNA CONCENTRATE AND DOCUSATE SODIUM 2 TABLET: 8.6; 5 TABLET, FILM COATED ORAL at 08:11

## 2018-03-31 RX ADMIN — VENLAFAXINE HYDROCHLORIDE 150 MG: 75 CAPSULE, EXTENDED RELEASE ORAL at 08:12

## 2018-03-31 RX ADMIN — HEPARIN SODIUM: 1000 INJECTION, SOLUTION INTRAVENOUS; SUBCUTANEOUS at 12:04

## 2018-03-31 RX ADMIN — FUROSEMIDE 40 MG: 10 INJECTION, SOLUTION INTRAMUSCULAR; INTRAVENOUS at 14:58

## 2018-03-31 RX ADMIN — DIGOXIN 125 MCG: 125 TABLET ORAL at 17:06

## 2018-03-31 RX ADMIN — OXYCODONE HYDROCHLORIDE 10 MG: 10 TABLET ORAL at 14:58

## 2018-03-31 RX ADMIN — STANDARDIZED SENNA CONCENTRATE AND DOCUSATE SODIUM 2 TABLET: 8.6; 5 TABLET, FILM COATED ORAL at 20:37

## 2018-03-31 RX ADMIN — ARIPIPRAZOLE 10 MG: 10 TABLET ORAL at 08:11

## 2018-03-31 RX ADMIN — DILTIAZEM HYDROCHLORIDE 360 MG: 180 CAPSULE, COATED, EXTENDED RELEASE ORAL at 08:11

## 2018-03-31 RX ADMIN — MORPHINE SULFATE 15 MG: 15 TABLET, EXTENDED RELEASE ORAL at 14:58

## 2018-03-31 RX ADMIN — MIDAZOLAM 2 MG: 1 INJECTION INTRAMUSCULAR; INTRAVENOUS at 12:05

## 2018-03-31 RX ADMIN — SPIRONOLACTONE 25 MG: 25 TABLET, FILM COATED ORAL at 08:11

## 2018-03-31 RX ADMIN — OXYCODONE HYDROCHLORIDE 10 MG: 10 TABLET ORAL at 08:11

## 2018-03-31 RX ADMIN — MORPHINE SULFATE 15 MG: 15 TABLET, EXTENDED RELEASE ORAL at 20:37

## 2018-03-31 RX ADMIN — HEPARIN SODIUM 2000 UNITS: 200 INJECTION, SOLUTION INTRAVENOUS at 12:04

## 2018-03-31 RX ADMIN — OXYCODONE HYDROCHLORIDE 10 MG: 10 TABLET ORAL at 20:37

## 2018-03-31 RX ADMIN — PREGABALIN 200 MG: 100 CAPSULE ORAL at 20:37

## 2018-03-31 RX ADMIN — PREGABALIN 200 MG: 100 CAPSULE ORAL at 08:12

## 2018-03-31 RX ADMIN — POTASSIUM CHLORIDE 20 MEQ: 1500 TABLET, EXTENDED RELEASE ORAL at 20:37

## 2018-03-31 RX ADMIN — LIDOCAINE HYDROCHLORIDE: 10 INJECTION, SOLUTION INFILTRATION; PERINEURAL at 12:04

## 2018-03-31 RX ADMIN — PREGABALIN 200 MG: 100 CAPSULE ORAL at 14:58

## 2018-03-31 RX ADMIN — ATORVASTATIN CALCIUM 20 MG: 20 TABLET, FILM COATED ORAL at 20:37

## 2018-03-31 RX ADMIN — FENTANYL CITRATE 100 MCG: 50 INJECTION, SOLUTION INTRAMUSCULAR; INTRAVENOUS at 12:20

## 2018-03-31 RX ADMIN — POTASSIUM CHLORIDE 20 MEQ: 1500 TABLET, EXTENDED RELEASE ORAL at 08:11

## 2018-03-31 RX ADMIN — FUROSEMIDE 40 MG: 10 INJECTION, SOLUTION INTRAMUSCULAR; INTRAVENOUS at 05:55

## 2018-03-31 RX ADMIN — THERA TABS 1 TABLET: TAB at 08:11

## 2018-03-31 RX ADMIN — MORPHINE SULFATE 15 MG: 15 TABLET, EXTENDED RELEASE ORAL at 05:55

## 2018-03-31 RX ADMIN — VITAMIN D, TAB 1000IU (100/BT) 1000 UNITS: 25 TAB at 08:11

## 2018-03-31 ASSESSMENT — ENCOUNTER SYMPTOMS
BACK PAIN: 1
PALPITATIONS: 0
SHORTNESS OF BREATH: 1
PND: 0
BLOOD IN STOOL: 0
EYE DISCHARGE: 0
SPUTUM PRODUCTION: 0
BLURRED VISION: 0
FLANK PAIN: 0
SHORTNESS OF BREATH: 0
HEMOPTYSIS: 0
FOCAL WEAKNESS: 0
FEVER: 0
SPEECH CHANGE: 0
CHILLS: 0
DIZZINESS: 0
CONSTIPATION: 0
CLAUDICATION: 0
MYALGIAS: 0
NAUSEA: 0
ORTHOPNEA: 0
DOUBLE VISION: 0
NECK PAIN: 0
DIARRHEA: 0
WEIGHT LOSS: 0
COUGH: 0

## 2018-03-31 ASSESSMENT — PAIN SCALES - GENERAL
PAINLEVEL_OUTOF10: 6
PAINLEVEL_OUTOF10: 5
PAINLEVEL_OUTOF10: 6
PAINLEVEL_OUTOF10: 8
PAINLEVEL_OUTOF10: 6
PAINLEVEL_OUTOF10: 5
PAINLEVEL_OUTOF10: 6
PAINLEVEL_OUTOF10: 6

## 2018-03-31 NOTE — CATH LAB
Immediate Post-Operative Note      PreOp Diagnosis: RV enlargment and dyspnea, Prior MV repair    PostOp Diagnosis: same    Procedure(s) Right heart cath  Surgeon(s):  Wilberto Cheatham M.D.    Type of Anesthesia: Moderate Sedation    Specimen: None    Estimated Blood Loss: < 10 cc cc's    Contrast Media:  none cc's    Fluoro Time: 1.0 min    Xray DAP: 1009    Findings: C.O. 5.4,  C.I. 2.3  RA 14,  RV 40,  PAW 29 w V waves 7-8    Complications: none      Wilberto Cheatham M.D.  3/31/2018 12:24 PM

## 2018-03-31 NOTE — CARE PLAN
Problem: Pain Management  Goal: Pain level will decrease to patient's comfort goal    Intervention: Educate and implement non-pharmacologic comfort measures. Examples: relaxation, distration, play therapy, activity therapy, massage, etc.  Discussed alternate methods of pain management

## 2018-03-31 NOTE — CARE PLAN
Problem: Venous Thromboembolism (VTW)/Deep Vein Thrombosis (DVT) Prevention:  Goal: Patient will participate in Venous Thrombosis (VTE)/Deep Vein Thrombosis (DVT)Prevention Measures  Outcome: PROGRESSING SLOWER THAN EXPECTED  Pt refusing SCDs, ambulating in room frequently. Xarelto discontinued for cath lab procedure today.    Problem: Mobility  Goal: Risk for activity intolerance will decrease  Outcome: PROGRESSING AS EXPECTED  Pt up SBA, ambulating frequently in room, steady on feet.

## 2018-03-31 NOTE — PROGRESS NOTES
"Pt back from cath lab. Tele monitor placed back on pt. Pt on bedrest at this time for 4 hours, with HOB up to 30 degrees only. Discussed with pt. Post op vitals in use. Pt irritated by blood pressure machine on L arm, stating \"you can't take my BP there\". Educated that post op vitals can't be done on her R arm due to the cath site and IV. Pt verbalizing understanding. Dressing with gauze and tegaderm to R arm and R neck both CDI. No swelling or drainage noted at this time.  "

## 2018-03-31 NOTE — PROGRESS NOTES
Bedside report received, Pt care assumed. Tele box on. VSS. Pt assessment complete. Pt AOX4, no signs of distress noted at this time.  Pt c/o of 7/10 pain. Administered pain medications see MAR Pt denies any additional needs at this time. Bed in lowest position, ambulates with standby assistance. POC discussed with Pt/family, verbalizes understanding, no questions at this time. Pt educated on fall risk and verbalizes understanding, call light within reach, will continue to monitor.

## 2018-03-31 NOTE — PROGRESS NOTES
Pt refusing to stay on bedrest at this time, wanting to get up to bathroom. Aware of the risks related to bleeding at cath sites. RN in room with 2 CNAs and pt up to commode (pt will to do this instead of the bathroom as a compromise). + Void. No dizziness noted. No new swelling or drainage noted at cath sites. Post op vitals still in use. BP low in the mid 80s/50s, pt asymptomatic.

## 2018-03-31 NOTE — PROGRESS NOTES
Pt A&O x 4, states pain controlled with scheduled medications, currently 6/10 in her back. Scheduled oxycodone given. Pt denies numbness and tingling. Up SBA to the bathroom, + void. Encouraging pt to utilize IS, pulling 2000 at this time. Pt NPO at this time. Plan for pt to go to cath lab today. Plan of care discussed with pt, consent on the chart. Pt resting at this time.

## 2018-03-31 NOTE — PROCEDURES
DATE OF SERVICE:  03/31/2018    PROCEDURE:  Right heart catheterization and supervision of conscious sedation.    INDICATIONS:  The patient is a 57-year-old female, status post mitral valve   repair, admitted to the hospital with dyspnea and evidence of RV enlargement   by echo.  Right heart catheterization is being performed to determine the   etiology of her RV enlargement.    DESCRIPTION OF PROCEDURE:  Initially, the right antecubital fossa was   sterilely prepped and draped in the usual fashion.  The patient was   premedicated with Versed 1 mg and fentanyl 50 mcg.  She received additional   Versed and fentanyl during the procedure.  Please refer to the nurses' notes   for dosages and timing.    A 20-gauge inner cath was placed by the nursing staff in the cath lab and this   was removed over a guidewire.  A sheath was placed, but the Roland-Sivakumar   catheter could not be advanced through the sheath.    Attention was then turned to the right neck, which was sterilely prepped and   draped in the usual fashion.  With the aid of a SonoSite, the right internal   jugular vein was easily identified and the area overlying the jugular vein was   anesthetized with 2% lidocaine.  The vein was easily entered with the aid of   a SonoSite and a 6-Fijian sheath was placed.  A Roland-Sivakumar catheter was   advanced under fluoroscopic guidance and the right heart catheterization was   performed in a standard fashion.  The catheter was then removed.  The venous   sheath in both the neck and arm were removed and hemostasis was obtained by   direct compression of the veins.    COMPLICATIONS:  There were no complications.    ESTIMATED BLOOD LOSS:  Less than 10 mL.    FLUOROSCOPY TIME:  1.0 minutes.    X-RAY DOSE-AREA PRODUCT:  1009.    TOTAL CONTRAST MEDIA:  No contrast was used.    CONSCIOUS SEDATION:  Start time was 11:59 a.m. completed at 12:20 p.m.    HEMODYNAMICS:  Revealed the patient was in atrial flutter during the   procedure.   Cardiac output is 5.4 liters per minute, cardiac index 2.3 liters   per minute per meter squared.  The following pressures are given in mmHg:    right atrial pressure mean of 15, V waves 17, pulmonary artery pressure 41/25,   and RV pressure 40/11.  Mean wedge pressure 29 with V waves of 36.  Oxygen   saturation was not performed.       ____________________________________     MD TIANA MOLINA / STEFANIA    DD:  03/31/2018 12:37:41  DT:  03/31/2018 13:02:08    D#:  [id number]  Job#:  [id number]

## 2018-03-31 NOTE — PROGRESS NOTES
Cardiology Progress Note               Author: Russ Bah Date & Time created: 3/31/2018  12:27 PM       Consultation for RV failure      Admitted with increased shortness of breath ×5-6 months, fatigue, lower extremity edema, palpitations, pulmonary emboli was ruled out, was sent to ER from cardiology office secondary to current symptoms      History of mitral valve repair , atrial fib first diagnosed on , on Xarelto, SSS s/p PPM  , left atrial myxoma resection, chronic pain, hypertension, obesity, tobacco use, hyperlipidemia,      Labs reviewed  Sodium, potassium, creatinine stable  Troponin negative  BNP 76  INR 2      BP = 113/62  HR = 100 afib    Echocardiogram 3/29/18, known mitral valve bioprosthesis functioning normally, EF 55%,  rapid A. fib, severely dilated right ventricle, enlarged right atrium    Review of Systems   Respiratory: Negative for cough and shortness of breath.    Cardiovascular: Negative for chest pain, palpitations, orthopnea, claudication, leg swelling and PND.       Physical Exam   Constitutional: She is oriented to person, place, and time.   HENT:   Head: Normocephalic.   Eyes: Conjunctivae are normal.   Neck: JVD present.   Cardiovascular: Normal rate.  An irregularly irregular rhythm present.   Pulses:       Carotid pulses are 2+ on the right side, and 2+ on the left side.       Radial pulses are 2+ on the right side, and 2+ on the left side.        Posterior tibial pulses are 2+ on the right side, and 2+ on the left side.   Pulmonary/Chest: She has no wheezes.   Abdominal: Soft.   Musculoskeletal: She exhibits edema.   Neurological: She is alert and oriented to person, place, and time.   Skin: Skin is warm and dry.       Hemodynamics:  Temp (24hrs), Av.1 °C (96.9 °F), Min:36 °C (96.8 °F), Max:36.2 °C (97.1 °F)  Temperature: 36.1 °C (97 °F)  Pulse  Av.6  Min: 57  Max: 129   Blood Pressure: 113/62     Respiratory:    Respiration: 16, Pulse Oximetry: 97 %      Work Of Breathing / Effort: Mild  RUL Breath Sounds: Clear, RML Breath Sounds: Clear, RLL Breath Sounds: Diminished, SANTANA Breath Sounds: Clear, LLL Breath Sounds: Diminished  Fluids:  Date 03/30/18 0700 - 03/31/18 0659   Shift 9228-2444 8451-1946 1359-4239 24 Hour Total   I  N  T  A  K  E   P.O. 580   580    Shift Total 580   580   O  U  T  P  U  T   Urine 1600   1600    Shift Total 1600   1600   Weight (kg) 125.9 125.9 125.9 125.9       Weight: (!) 125.8 kg (277 lb 5.4 oz)  GI/Nutrition:  Orders Placed This Encounter   Procedures   • Diet Order     Standing Status:   Standing     Number of Occurrences:   1     Order Specific Question:   Diet:     Answer:   Cardiac [6]     Lab Results:  Recent Labs      03/29/18   0305  03/30/18   0227 03/31/18   0331   WBC  5.7  5.3  5.1   RBC  3.94*  3.76*  3.82*   HEMOGLOBIN  12.0  11.4*  11.4*   HEMATOCRIT  37.3  35.6*  36.4*   MCV  94.7  94.7  95.3   MCH  30.5  30.3  29.8   MCHC  32.2*  32.0*  31.3*   RDW  50.1*  50.1*  51.3*   PLATELETCT  123*  105*  106*   MPV  10.3  10.3  10.4     Recent Labs      03/29/18   0305  03/30/18   0227  03/31/18   0331   SODIUM  135  137  139   POTASSIUM  4.5  4.3  4.1   CHLORIDE  102  103  101   CO2  25  29  33   GLUCOSE  122*  100*  104*   BUN  11  12  12   CREATININE  0.73  0.73  0.72   CALCIUM  9.1  8.8  8.9     Recent Labs      03/28/18   1300   APTT  45.7*   INR  2.06*     Recent Labs      03/28/18   1300  03/29/18   0305   BNPBTYPENAT  93  76     Recent Labs      03/28/18   1300  03/29/18   0305   TROPONINI  <0.01  <0.01   BNPBTYPENAT  93  76             Medical Decision Making, by Problem:  Active Hospital Problems    Diagnosis   • *Volume overload [E87.70]   • History of sick sinus syndrome [Z86.79]   • Pacemaker [Z95.0]   • Hyperlipidemia [E78.5]   • Status post mitral valve repair [Z98.890]   • Acute respiratory failure with hypoxia (CMS-HCC) [J96.01]   • Chronic low back pain [M54.5, G89.29]   • Circulating anticoagulants (CMS-HCC)  [D68.318]   • Obesity [E66.9]   • Pulmonary hypertension [I27.20]   • Tobacco use disorder [F17.200]   • PAF (paroxysmal atrial fibrillation) (CMS-HCC) [I48.0]       Assessment/Plan:    RV failure/dyspnea/hx of mitral valve repair in 2011     - Etiology unclear  - PE was ruled out  - Continue with furosemide 40 IV BID   -  spironolactone 25 mg    A. fib RVR    - on digoxin 125 MCG   - Diltiazem 360  - rate , touches 140's  - on Xarelto as out patient, question if need to switch to coumadin in the setting of Mitral valve repair and if possibly valvular afib, will discuss with DR Person    Tobacco abuse   -  nicotine patch    Chronic pain   - On MS Contin 15 mg every 8 hours & oxycodone 10 mg 3 times daily    Plan for R heart cath today      Quality-Core Measures   Reviewed items::  Medications reviewed and Labs reviewed

## 2018-03-31 NOTE — PROGRESS NOTES
[facility]    Date of Service: 3/31/2018    Chief Complaint  57 y.o. female admitted 3/28/2018 with chf and a.fib    Interval Problem Update  A.fib with rvr  Cath lab    Consultants/Specialty  cardiology    Disposition  pending        Review of Systems   Constitutional: Negative for chills, fever, malaise/fatigue and weight loss.   HENT: Negative for ear discharge, ear pain, nosebleeds and tinnitus.    Eyes: Negative for blurred vision, double vision and discharge.   Respiratory: Positive for shortness of breath. Negative for hemoptysis and sputum production.    Cardiovascular: Positive for leg swelling. Negative for chest pain and palpitations.   Gastrointestinal: Negative for blood in stool, constipation, diarrhea and nausea.   Genitourinary: Negative for dysuria, flank pain and hematuria.   Musculoskeletal: Positive for back pain. Negative for joint pain, myalgias and neck pain.   Skin: Negative.    Neurological: Negative for dizziness, speech change and focal weakness.      Physical Exam  Laboratory/Imaging   Hemodynamics  Temp (24hrs), Av.1 °C (96.9 °F), Min:36 °C (96.8 °F), Max:36.2 °C (97.1 °F)   Temperature: 36.1 °C (97 °F)  Pulse  Av.6  Min: 57  Max: 129    Blood Pressure: 113/62      Respiratory      Respiration: 16, Pulse Oximetry: 97 %     Work Of Breathing / Effort: Mild  RUL Breath Sounds: Clear, RML Breath Sounds: Clear, RLL Breath Sounds: Diminished, SANTANA Breath Sounds: Clear, LLL Breath Sounds: Diminished    Fluids    Intake/Output Summary (Last 24 hours) at 18 1146  Last data filed at 18 0811   Gross per 24 hour   Intake             1400 ml   Output             2200 ml   Net             -800 ml       Nutrition  Orders Placed This Encounter   Procedures   • DIET NPO     Standing Status:   Standing     Number of Occurrences:   8     Order Specific Question:   Restrict to:     Answer:   Sips with Medications [3]   • Diet NPO after Midnight     Standing Status:    Standing     Number of Occurrences:   8     Order Specific Question:   Restrict to:     Answer:   Sips with Medications [3]     Physical Exam   Constitutional: She is oriented to person, place, and time. No distress.   HENT:   Head: Normocephalic and atraumatic.   Eyes: Conjunctivae are normal. Pupils are equal, round, and reactive to light.   Neck: Neck supple. No thyromegaly present.   Cardiovascular: Normal rate and regular rhythm.    Tachycardia     Pulmonary/Chest: No stridor. No respiratory distress. She has no wheezes.   Decreased breath sounds bilaterally   Abdominal: She exhibits no distension. There is no tenderness. There is no guarding.   Morbidly obese   Musculoskeletal: She exhibits edema. She exhibits no tenderness.   Neurological: She is alert and oriented to person, place, and time.   Skin: Skin is warm and dry.       Recent Labs      03/29/18   0305  03/30/18   0227 03/31/18   0331   WBC  5.7  5.3  5.1   RBC  3.94*  3.76*  3.82*   HEMOGLOBIN  12.0  11.4*  11.4*   HEMATOCRIT  37.3  35.6*  36.4*   MCV  94.7  94.7  95.3   MCH  30.5  30.3  29.8   MCHC  32.2*  32.0*  31.3*   RDW  50.1*  50.1*  51.3*   PLATELETCT  123*  105*  106*   MPV  10.3  10.3  10.4     Recent Labs      03/29/18   0305  03/30/18   0227  03/31/18   0331   SODIUM  135  137  139   POTASSIUM  4.5  4.3  4.1   CHLORIDE  102  103  101   CO2  25  29  33   GLUCOSE  122*  100*  104*   BUN  11  12  12   CREATININE  0.73  0.73  0.72   CALCIUM  9.1  8.8  8.9     Recent Labs      03/28/18   1300   APTT  45.7*   INR  2.06*     Recent Labs      03/28/18   1300  03/29/18   0305   BNPBTYPENAT  93  76              Assessment/Plan     * Volume overload   Assessment & Plan    cta shows mild edema  Cardiac cath lab  Lasix iv bid  2nd to chf with acute on chronic  Diastolic dysfunction  Echo showed:CONCLUSIONS  Systolic function is difficult to assess in rapid atrial fibrillation,   but appears normal.  Severely dilated right ventricle.  Reduced right  ventricular systolic function.  Mildly dilated left atrium.  Known mitral valve bioprosthesis which is functioning normally.  Estimated right ventricular systolic pressure  is 35 mmHg + JVP.  Compared to previous echocardiogram from 8/22/2016 the rhythm is now   rapid atrial fibrillation. Estimated right-sided pressures are lower in   this study, and the mitral bioprosthesis continues to function   Normally.  On cardizam  No ACE 2nd to low bp  Cardiology input is noted            History of sick sinus syndrome- (present on admission)   Assessment & Plan    Has pacemaker.         Pacemaker- (present on admission)   Assessment & Plan    Noted        Hyperlipidemia- (present on admission)   Assessment & Plan    Continue statin          Status post mitral valve repair- (present on admission)   Assessment & Plan    On chronic anticoagulation. Hold for cardiac cath  Has CHF symptoms because of this, followed by [facility]        Acute respiratory failure with hypoxia (CMS-HCC)- (present on admission)   Assessment & Plan    2nd to chf  As per above        Obesity   Assessment & Plan    Body mass index is 42.73 kg/m².  When better, encourage weightloss and lifestyle modification        Circulating anticoagulants (CMS-HCC)   Assessment & Plan    For mitral valve  No active bleeding  Continue Xarelto        Pulmonary hypertension- (present on admission)   Assessment & Plan    Seen on echo  No PE on CTA Chest  xarelto is on hold   IV diuretics        Tobacco use disorder- (present on admission)   Assessment & Plan    I spent 5 minutes, discussing tobacco dependence and cardiac as well as pulmonary risk. Nicotine replacement and Smoking cessation instructions. Code 08508          PAF (paroxysmal atrial fibrillation) (CMS-HCC)- (present on admission)   Assessment & Plan    S/p space maker  On chronic anticoagulation  Digoxin   cardizam  Lopressor iv prn   cardiology input is noted        Chronic low back pain- (present on  admission)   Assessment & Plan    History of back surgeries  Follows Dr. Ring, Pain clinic  COntinue her home morphine ER and oxycodone IR  Bowel regimen          Quality-Core Measures   Farley catheter::  No Farley  DVT prophylaxis pharmacological::  Warfarin (Coumadin)

## 2018-04-01 LAB
ALBUMIN SERPL BCP-MCNC: 3.7 G/DL (ref 3.2–4.9)
ALBUMIN/GLOB SERPL: 1.3 G/DL
ALP SERPL-CCNC: 74 U/L (ref 30–99)
ALT SERPL-CCNC: 17 U/L (ref 2–50)
ANION GAP SERPL CALC-SCNC: 7 MMOL/L (ref 0–11.9)
AST SERPL-CCNC: 23 U/L (ref 12–45)
BASOPHILS # BLD AUTO: 1 % (ref 0–1.8)
BASOPHILS # BLD: 0.06 K/UL (ref 0–0.12)
BILIRUB SERPL-MCNC: 0.6 MG/DL (ref 0.1–1.5)
BUN SERPL-MCNC: 12 MG/DL (ref 8–22)
CALCIUM SERPL-MCNC: 8.6 MG/DL (ref 8.5–10.5)
CHLORIDE SERPL-SCNC: 99 MMOL/L (ref 96–112)
CO2 SERPL-SCNC: 32 MMOL/L (ref 20–33)
CREAT SERPL-MCNC: 0.74 MG/DL (ref 0.5–1.4)
EOSINOPHIL # BLD AUTO: 0.2 K/UL (ref 0–0.51)
EOSINOPHIL NFR BLD: 3.3 % (ref 0–6.9)
ERYTHROCYTE [DISTWIDTH] IN BLOOD BY AUTOMATED COUNT: 50.8 FL (ref 35.9–50)
GLOBULIN SER CALC-MCNC: 2.9 G/DL (ref 1.9–3.5)
GLUCOSE SERPL-MCNC: 127 MG/DL (ref 65–99)
HCT VFR BLD AUTO: 37.2 % (ref 37–47)
HGB BLD-MCNC: 11.7 G/DL (ref 12–16)
IMM GRANULOCYTES # BLD AUTO: 0.01 K/UL (ref 0–0.11)
IMM GRANULOCYTES NFR BLD AUTO: 0.2 % (ref 0–0.9)
INR PPP: 1.19 (ref 0.87–1.13)
LYMPHOCYTES # BLD AUTO: 1.74 K/UL (ref 1–4.8)
LYMPHOCYTES NFR BLD: 28.6 % (ref 22–41)
MCH RBC QN AUTO: 29.8 PG (ref 27–33)
MCHC RBC AUTO-ENTMCNC: 31.5 G/DL (ref 33.6–35)
MCV RBC AUTO: 94.9 FL (ref 81.4–97.8)
MONOCYTES # BLD AUTO: 0.81 K/UL (ref 0–0.85)
MONOCYTES NFR BLD AUTO: 13.3 % (ref 0–13.4)
NEUTROPHILS # BLD AUTO: 3.26 K/UL (ref 2–7.15)
NEUTROPHILS NFR BLD: 53.6 % (ref 44–72)
NRBC # BLD AUTO: 0 K/UL
NRBC BLD-RTO: 0 /100 WBC
PLATELET # BLD AUTO: 117 K/UL (ref 164–446)
PMV BLD AUTO: 10.3 FL (ref 9–12.9)
POTASSIUM SERPL-SCNC: 3.7 MMOL/L (ref 3.6–5.5)
PROT SERPL-MCNC: 6.6 G/DL (ref 6–8.2)
PROTHROMBIN TIME: 14.8 SEC (ref 12–14.6)
RBC # BLD AUTO: 3.92 M/UL (ref 4.2–5.4)
SODIUM SERPL-SCNC: 138 MMOL/L (ref 135–145)
WBC # BLD AUTO: 6.1 K/UL (ref 4.8–10.8)

## 2018-04-01 PROCEDURE — A9270 NON-COVERED ITEM OR SERVICE: HCPCS | Performed by: INTERNAL MEDICINE

## 2018-04-01 PROCEDURE — A9270 NON-COVERED ITEM OR SERVICE: HCPCS | Performed by: STUDENT IN AN ORGANIZED HEALTH CARE EDUCATION/TRAINING PROGRAM

## 2018-04-01 PROCEDURE — 36415 COLL VENOUS BLD VENIPUNCTURE: CPT

## 2018-04-01 PROCEDURE — 700102 HCHG RX REV CODE 250 W/ 637 OVERRIDE(OP): Performed by: INTERNAL MEDICINE

## 2018-04-01 PROCEDURE — 770020 HCHG ROOM/CARE - TELE (206)

## 2018-04-01 PROCEDURE — 85025 COMPLETE CBC W/AUTO DIFF WBC: CPT

## 2018-04-01 PROCEDURE — 99232 SBSQ HOSP IP/OBS MODERATE 35: CPT | Performed by: FAMILY MEDICINE

## 2018-04-01 PROCEDURE — 700102 HCHG RX REV CODE 250 W/ 637 OVERRIDE(OP): Performed by: FAMILY MEDICINE

## 2018-04-01 PROCEDURE — 85610 PROTHROMBIN TIME: CPT

## 2018-04-01 PROCEDURE — A9270 NON-COVERED ITEM OR SERVICE: HCPCS | Performed by: FAMILY MEDICINE

## 2018-04-01 PROCEDURE — 700111 HCHG RX REV CODE 636 W/ 250 OVERRIDE (IP): Performed by: INTERNAL MEDICINE

## 2018-04-01 PROCEDURE — 80053 COMPREHEN METABOLIC PANEL: CPT

## 2018-04-01 PROCEDURE — 700102 HCHG RX REV CODE 250 W/ 637 OVERRIDE(OP): Performed by: STUDENT IN AN ORGANIZED HEALTH CARE EDUCATION/TRAINING PROGRAM

## 2018-04-01 RX ORDER — ZOLPIDEM TARTRATE 5 MG/1
5 TABLET ORAL NIGHTLY PRN
Status: DISCONTINUED | OUTPATIENT
Start: 2018-04-01 | End: 2018-04-05

## 2018-04-01 RX ORDER — LISINOPRIL 5 MG/1
5 TABLET ORAL
Status: DISCONTINUED | OUTPATIENT
Start: 2018-04-01 | End: 2018-04-03

## 2018-04-01 RX ORDER — WARFARIN SODIUM 10 MG/1
10 TABLET ORAL
Status: COMPLETED | OUTPATIENT
Start: 2018-04-01 | End: 2018-04-02

## 2018-04-01 RX ORDER — WARFARIN SODIUM 5 MG/1
5 TABLET ORAL
Status: DISCONTINUED | OUTPATIENT
Start: 2018-04-03 | End: 2018-04-05

## 2018-04-01 RX ORDER — ALPRAZOLAM 0.25 MG/1
0.25 TABLET ORAL ONCE
Status: COMPLETED | OUTPATIENT
Start: 2018-04-01 | End: 2018-04-01

## 2018-04-01 RX ADMIN — DIGOXIN 125 MCG: 125 TABLET ORAL at 17:51

## 2018-04-01 RX ADMIN — SPIRONOLACTONE 25 MG: 25 TABLET, FILM COATED ORAL at 08:16

## 2018-04-01 RX ADMIN — DILTIAZEM HYDROCHLORIDE 360 MG: 180 CAPSULE, COATED, EXTENDED RELEASE ORAL at 08:16

## 2018-04-01 RX ADMIN — ALPRAZOLAM 0.25 MG: 0.25 TABLET ORAL at 02:38

## 2018-04-01 RX ADMIN — FUROSEMIDE 40 MG: 10 INJECTION, SOLUTION INTRAMUSCULAR; INTRAVENOUS at 15:01

## 2018-04-01 RX ADMIN — PREGABALIN 200 MG: 100 CAPSULE ORAL at 15:01

## 2018-04-01 RX ADMIN — LISINOPRIL 5 MG: 5 TABLET ORAL at 13:57

## 2018-04-01 RX ADMIN — ARIPIPRAZOLE 10 MG: 10 TABLET ORAL at 08:16

## 2018-04-01 RX ADMIN — MORPHINE SULFATE 15 MG: 15 TABLET, EXTENDED RELEASE ORAL at 20:33

## 2018-04-01 RX ADMIN — STANDARDIZED SENNA CONCENTRATE AND DOCUSATE SODIUM 2 TABLET: 8.6; 5 TABLET, FILM COATED ORAL at 08:16

## 2018-04-01 RX ADMIN — FUROSEMIDE 40 MG: 10 INJECTION, SOLUTION INTRAMUSCULAR; INTRAVENOUS at 05:53

## 2018-04-01 RX ADMIN — THERA TABS 1 TABLET: TAB at 08:15

## 2018-04-01 RX ADMIN — POTASSIUM CHLORIDE 20 MEQ: 1500 TABLET, EXTENDED RELEASE ORAL at 20:33

## 2018-04-01 RX ADMIN — NICOTINE 14 MG: 14 PATCH, EXTENDED RELEASE TRANSDERMAL at 05:53

## 2018-04-01 RX ADMIN — ZOLPIDEM TARTRATE 5 MG: 5 TABLET ORAL at 22:32

## 2018-04-01 RX ADMIN — VENLAFAXINE HYDROCHLORIDE 150 MG: 75 CAPSULE, EXTENDED RELEASE ORAL at 14:04

## 2018-04-01 RX ADMIN — MORPHINE SULFATE 15 MG: 15 TABLET, EXTENDED RELEASE ORAL at 05:55

## 2018-04-01 RX ADMIN — OXYCODONE HYDROCHLORIDE 10 MG: 10 TABLET ORAL at 15:00

## 2018-04-01 RX ADMIN — WARFARIN SODIUM 10 MG: 10 TABLET ORAL at 17:51

## 2018-04-01 RX ADMIN — STANDARDIZED SENNA CONCENTRATE AND DOCUSATE SODIUM 2 TABLET: 8.6; 5 TABLET, FILM COATED ORAL at 20:33

## 2018-04-01 RX ADMIN — VITAMIN D, TAB 1000IU (100/BT) 1000 UNITS: 25 TAB at 08:16

## 2018-04-01 RX ADMIN — PREGABALIN 200 MG: 100 CAPSULE ORAL at 20:33

## 2018-04-01 RX ADMIN — OXYCODONE HYDROCHLORIDE 10 MG: 10 TABLET ORAL at 08:16

## 2018-04-01 RX ADMIN — PREGABALIN 200 MG: 100 CAPSULE ORAL at 08:16

## 2018-04-01 RX ADMIN — POTASSIUM CHLORIDE 20 MEQ: 1500 TABLET, EXTENDED RELEASE ORAL at 08:16

## 2018-04-01 RX ADMIN — ATORVASTATIN CALCIUM 20 MG: 20 TABLET, FILM COATED ORAL at 20:33

## 2018-04-01 RX ADMIN — OXYCODONE HYDROCHLORIDE 10 MG: 10 TABLET ORAL at 20:33

## 2018-04-01 RX ADMIN — MORPHINE SULFATE 15 MG: 15 TABLET, EXTENDED RELEASE ORAL at 13:57

## 2018-04-01 ASSESSMENT — ENCOUNTER SYMPTOMS
NECK PAIN: 0
MYALGIAS: 0
WEIGHT LOSS: 0
ORTHOPNEA: 0
SHORTNESS OF BREATH: 0
SPUTUM PRODUCTION: 0
PALPITATIONS: 0
DIAPHORESIS: 0
HEADACHES: 0
VOMITING: 0
NAUSEA: 0
TINGLING: 0
CLAUDICATION: 0
COUGH: 0
PND: 0
PHOTOPHOBIA: 0
HEMOPTYSIS: 0
BACK PAIN: 1
DIZZINESS: 0
DOUBLE VISION: 0
BLURRED VISION: 0
HEARTBURN: 0

## 2018-04-01 ASSESSMENT — CHA2DS2 SCORE
HYPERTENSION: YES
AGE 75 OR GREATER: NO
DIABETES: NO
VASCULAR DISEASE: YES
CHF OR LEFT VENTRICULAR DYSFUNCTION: YES
SEX: FEMALE
CHA2DS2 VASC SCORE: 4
PRIOR STROKE OR TIA OR THROMBOEMBOLISM: NO
AGE 65 TO 74: NO

## 2018-04-01 ASSESSMENT — PAIN SCALES - GENERAL
PAINLEVEL_OUTOF10: 7
PAINLEVEL_OUTOF10: 5
PAINLEVEL_OUTOF10: 6
PAINLEVEL_OUTOF10: 8
PAINLEVEL_OUTOF10: 6
PAINLEVEL_OUTOF10: 7

## 2018-04-01 NOTE — PROGRESS NOTES
Received bedside shift report. Patient is AAOx4. No acute distress noted. Tele monitor in place. Post cath vitals continued per protocol. Patient's blood pressure remains low, but she is asymptomatic. Access sites to the right brachial and IJ are clean, dry, and intact. No signs of bleeding noted. Patient is sitting comfortably in bed at the lowest position with call bell in reach. Denies needs at this time.

## 2018-04-01 NOTE — PROGRESS NOTES
Patient is awake again and appears very anxious and agitated and states that she cannot get any sleep. Call placed to the hospitalist again and received a one time dose of xanax 0.25 mg. Will administer and reassess for relief.

## 2018-04-01 NOTE — PROGRESS NOTES
Assessment remains the same, patient stable, denies pain at this time after medication.    Less tearful as the day went on.

## 2018-04-01 NOTE — PROGRESS NOTES
Pt refuses to call for assistance, claims by her friend that she is stable enough on her feet and her pressure is inaccurate. RN Notified, pt trusts her friend and claims she is a nurse and is also saying she is fine. I was given in report that the pt is x2 to commode, refuses to call for assistance.

## 2018-04-01 NOTE — CARE PLAN
Problem: Pain Management  Goal: Pain level will decrease to patient's comfort goal  Outcome: PROGRESSING AS EXPECTED  Patient pain controlled with medication.  Asking for medication at appropriate times.

## 2018-04-01 NOTE — PROGRESS NOTES
AM meds administered. Patient is resting comfortably, states that she feels much less anxious. No acute distress noted. Tele monitor in place. Call bell within reach. Denies further needs at this time.

## 2018-04-01 NOTE — PROGRESS NOTES
RN assumes care of patient after receiving bedside report from Temitope RN  Call agustin with with in reach, patient has no needs at this time

## 2018-04-01 NOTE — CARE PLAN
Problem: Pain Management  Goal: Pain level will decrease to patient's comfort goal  Outcome: PROGRESSING AS EXPECTED  Pain assessed Q4H throughout the shift. Pain meds administered per MD order PRN.     Problem: Mobility  Goal: Risk for activity intolerance will decrease  Outcome: PROGRESSING AS EXPECTED  Patient ambulates frequently with steady gait.

## 2018-04-01 NOTE — PROGRESS NOTES
Inpatient Anticoagulation Service Note  Date: 4/1/2018  Reason for Anticoagulation: Atrial Fibrillation, Mitral Valve Repair   [id number] VASc Score: 4  Hemoglobin Value: (!) 11.7  Hematocrit Value: 37.2  Lab Platelet Value: (!) 117  Target INR: 2.0 to 3.0  INR from last 7 days     Date/Time INR Value    04/01/18 1354 (!)  1.19    03/28/18 1300 (!)  2.06        Dose from last 7 days     Date/Time Dose (mg)    04/01/18 1400  10        Average Dose (mg): 10 (new start VKA)  Significant Interactions: Statin, Other (Comments) (furosemide, MVI, spironolactone)  Bridge Therapy: No  Reversal Agent Administered: Not Applicable  Comments: INR at baseline. H/H low. PLT low. No overt bleeding noted. Valvular AFib with history of MV repair noted. Moved to warfarin from rivaroxaban. Warfarin interactions noted. Will give 10 mg today and trend INR with AM labs. Likely to need continued dose adjustments.    Plan:  Warfarin 10 mg 4/1/18  Education Material Provided?: No (needs education prior to discharge)  Pharmacist suggested discharge dosing: warfarin 5 mg daily (recommend INR within 24 hours of discharge)    Ameya Chowdhury, PharmD     Pharmacy Addendum:  Warfarin (Coumadin) education packet provided and questions answered as able. Anticoagulation clinic contact information provided. Reports prior warfarin exposure but unable to recall dose or duration. Pharmacy will follow and continue to adjust as appropriate.    Ameya Chowdhury, PharmD

## 2018-04-01 NOTE — PROGRESS NOTES
Bedrest completed, pt up ambulating in hallway with CNA, steady on feet. Denies any dizziness or feeling light headed. Dressings to cath sites CDI.

## 2018-04-01 NOTE — PROGRESS NOTES
Patient is tearful and worries about her dog, otherwise patient is stable, ambulating in hallway with assistance, steady on feet.

## 2018-04-01 NOTE — PROGRESS NOTES
Notified by my CNA that the patient is currently up walking around the room independently without calling. Assessed patient's gait and she is steady with no signs of weakness, however I did request that the patient call before getting up due to low blood pressures. Patient is refusing to call and refuses to use a bed alarm. She states she is not dizzy or weak and she is fine walking on her own. She says she will call if she begins to feel symptomatic.

## 2018-04-01 NOTE — PROGRESS NOTES
Did not receive a response from the hospitalist about the patient's sleeping pill. Went back to check on patient and she was resting comfortably, so did not re-page the hospitalist at this time.

## 2018-04-01 NOTE — PROGRESS NOTES
Patient is complaining that she is uncomfortable and anxious and cannot sleep. Vitals assessed. Blood pressure remains low when assessed on the left arm (89/64). However, patient states that her blood pressure is always low when assessed on the left arm. Rechecked the blood pressure on the leg bc and found it to be 127/72. All other vitals remain within normal limits. Patient states she does not feel sick, she is just anxious and needs something to help her sleep. Call placed to Dr. Benjamin to request a sleeping pill for the patient.

## 2018-04-01 NOTE — PROGRESS NOTES
[facility]    Date of Service: 2018    Chief Complaint  57 y.o. female admitted 3/28/2018 with chf and a.fib    Interval Problem Update  A.fib with rvr  S/p Cath lab    Consultants/Specialty  cardiology    Disposition  pending        Review of Systems   Constitutional: Negative for diaphoresis, malaise/fatigue and weight loss.   HENT: Negative for ear pain, hearing loss and tinnitus.    Eyes: Negative for blurred vision, double vision and photophobia.   Respiratory: Negative for cough, hemoptysis and sputum production.    Cardiovascular: Positive for leg swelling. Negative for chest pain, palpitations and orthopnea.   Gastrointestinal: Negative for heartburn, nausea and vomiting.   Genitourinary: Negative for dysuria and urgency.   Musculoskeletal: Positive for back pain. Negative for joint pain, myalgias and neck pain.   Skin: Negative.  Negative for itching.   Neurological: Negative for dizziness, tingling and headaches.      Physical Exam  Laboratory/Imaging   Hemodynamics  Temp (24hrs), Av.3 °C (97.3 °F), Min:35.9 °C (96.7 °F), Max:36.6 °C (97.8 °F)   Temperature: 36.3 °C (97.3 °F)  Pulse  Av.2  Min: 57  Max: 129    Blood Pressure: 102/70      Respiratory      Respiration: 18, Pulse Oximetry: 95 %        RUL Breath Sounds: Clear, RML Breath Sounds: Clear, RLL Breath Sounds: Diminished, SANTANA Breath Sounds: Clear, LLL Breath Sounds: Diminished    Fluids    Intake/Output Summary (Last 24 hours) at 18 1135  Last data filed at 18 1000   Gross per 24 hour   Intake             1480 ml   Output             2725 ml   Net            -1245 ml       Nutrition  Orders Placed This Encounter   Procedures   • Diet Order     Standing Status:   Standing     Number of Occurrences:   1     Order Specific Question:   Diet:     Answer:   Cardiac [6]     Order Specific Question:   Consistency/Fluid modifications:     Answer:   1500 ml Fluid Restriction [9]   • DIET NPO     Standing Status:    Standing     Number of Occurrences:   8     Order Specific Question:   Restrict to:     Answer:   Strict [1]     Physical Exam   Constitutional: She is oriented to person, place, and time. She appears well-nourished.   HENT:   Right Ear: External ear normal.   Left Ear: External ear normal.   Eyes: Right eye exhibits no discharge. Left eye exhibits no discharge.   Neck: Neck supple. No tracheal deviation present.   Cardiovascular: Normal rate.    Tachycardia     Pulmonary/Chest: No stridor. No respiratory distress. She has no wheezes.   Decreased breath sounds bilaterally   Abdominal: Soft. Bowel sounds are normal.   Morbidly obese   Musculoskeletal: She exhibits edema. She exhibits no tenderness.   Neurological: She is alert and oriented to person, place, and time.   Skin: Skin is warm and dry.       Recent Labs      03/30/18 0227 03/31/18 0331 04/01/18 0214   WBC  5.3  5.1  6.1   RBC  3.76*  3.82*  3.92*   HEMOGLOBIN  11.4*  11.4*  11.7*   HEMATOCRIT  35.6*  36.4*  37.2   MCV  94.7  95.3  94.9   MCH  30.3  29.8  29.8   MCHC  32.0*  31.3*  31.5*   RDW  50.1*  51.3*  50.8*   PLATELETCT  105*  106*  117*   MPV  10.3  10.4  10.3     Recent Labs      03/30/18 0227 03/31/18 0331 04/01/18 0214   SODIUM  137  139  138   POTASSIUM  4.3  4.1  3.7   CHLORIDE  103  101  99   CO2  29  33  32   GLUCOSE  100*  104*  127*   BUN  12  12  12   CREATININE  0.73  0.72  0.74   CALCIUM  8.8  8.9  8.6                      Assessment/Plan     * Volume overload   Assessment & Plan    cta shows mild edema  Cardiac cath was done yesterday  Lasix iv bid  2nd to chf with acute on chronic exacerbation  Diastolic dysfunction  Echo showed:CONCLUSIONS  Systolic function is difficult to assess in rapid atrial fibrillation,   but appears normal.  Severely dilated right ventricle.  Reduced right ventricular systolic function.  Mildly dilated left atrium.  Known mitral valve bioprosthesis which is functioning normally.  Estimated  right ventricular systolic pressure  is 35 mmHg + JVP.  Compared to previous echocardiogram from 8/22/2016 the rhythm is now   rapid atrial fibrillation. Estimated right-sided pressures are lower in   this study, and the mitral bioprosthesis continues to function   Normally.  On cardizam  Lisinopril   Cardiology input is noted            History of sick sinus syndrome- (present on admission)   Assessment & Plan    Has pacemaker.         Pacemaker- (present on admission)   Assessment & Plan    Noted        Hyperlipidemia- (present on admission)   Assessment & Plan    Continue statin          Status post mitral valve repair- (present on admission)   Assessment & Plan    On chronic anticoagulation. Hold for cardiac cath  Has CHF symptoms because of this, [facility]        Acute respiratory failure with hypoxia (CMS-HCC)- (present on admission)   Assessment & Plan    2nd to chf  As per above        Obesity   Assessment & Plan    Body mass index is 42.73 kg/m².  When better, encourage weightloss and lifestyle modification        Circulating anticoagulants (CMS-HCC)   Assessment & Plan    For mitral valve  No active bleeding  Continue Xarelto when it is re-started by cardiology        Pulmonary hypertension- (present on admission)   Assessment & Plan    Seen on echo  No PE on CTA Chest  xarelto is on hold  As per cardiology  IV diuretics        Tobacco use disorder- (present on admission)   Assessment & Plan    I spent 5 minutes, discussing tobacco dependence and cardiac as well as pulmonary risk. Nicotine replacement and Smoking cessation instructions. Code 56772          PAF (paroxysmal atrial fibrillation) (CMS-HCC)- (present on admission)   Assessment & Plan    S/p space maker  On chronic anticoagulation  Digoxin   cardizam  Lopressor iv prn   cardiology input is noted        Chronic low back pain- (present on admission)   Assessment & Plan    History of back surgeries  Follows Dr. Ring, Pain  clinic  COntinue her home morphine ER and oxycodone IR  Bowel regimen          Quality-Core Measures   Farley catheter::  No Farley

## 2018-04-01 NOTE — PROGRESS NOTES
Cardiology Progress Note               Author: Russ Bah Date & Time created: 2018  12:47 PM       Consultation for RV failure      Admitted with increased shortness of breath ×5-6 months, fatigue, lower extremity edema, palpitations, pulmonary emboli was ruled out, was sent to ER from cardiology office secondary to current symptoms      History of mitral valve repair , atrial fib first diagnosed on , on Xarelto, SSS s/p PPM  , left atrial myxoma resection, chronic pain, hypertension, obesity, tobacco use, hyperlipidemia,      Labs reviewed  Sodium, potassium, creatinine stable  Troponin negative  BNP 76        BP = 109/57  HR =  afib,     Echocardiogram 3/29/18, known mitral valve bioprosthesis functioning normally, EF 55%,  rapid A. fib, severely dilated right ventricle, enlarged right atrium    Review of Systems   Respiratory: Negative for cough and shortness of breath.    Cardiovascular: Negative for chest pain, palpitations, orthopnea, claudication, leg swelling and PND.       Physical Exam   Constitutional: She is oriented to person, place, and time.   HENT:   Head: Normocephalic.   Eyes: Conjunctivae are normal.   Neck: JVD present.   Cardiovascular: Normal rate.  An irregularly irregular rhythm present.   Pulses:       Carotid pulses are 2+ on the right side, and 2+ on the left side.       Radial pulses are 2+ on the right side, and 2+ on the left side.        Posterior tibial pulses are 2+ on the right side, and 2+ on the left side.   Pulmonary/Chest: She has no wheezes.   Abdominal: Soft.   Musculoskeletal: She exhibits edema.   Neurological: She is alert and oriented to person, place, and time.   Skin: Skin is warm and dry.       Hemodynamics:  Temp (24hrs), Av.2 °C (97.2 °F), Min:35.9 °C (96.7 °F), Max:36.6 °C (97.8 °F)  Temperature: 36 °C (96.8 °F)  Pulse  Av.3  Min: 57  Max: 129   Blood Pressure: 109/57     Respiratory:    Respiration: 18, Pulse Oximetry: 93 %         RUL Breath Sounds: Clear, RML Breath Sounds: Clear, RLL Breath Sounds: Diminished, SANTANA Breath Sounds: Clear, LLL Breath Sounds: Diminished  Fluids:  Date 03/30/18 0700 - 03/31/18 0659   Shift 7763-2827 8967-8068 7981-9012 24 Hour Total   I  N  T  A  K  E   P.O. 580   580    Shift Total 580   580   O  U  T  P  U  T   Urine 1600   1600    Shift Total 1600   1600   Weight (kg) 125.9 125.9 125.9 125.9       Weight: (!) 131 kg (288 lb 12.8 oz)  GI/Nutrition:  Orders Placed This Encounter   Procedures   • Diet Order     Standing Status:   Standing     Number of Occurrences:   1     Order Specific Question:   Diet:     Answer:   Cardiac [6]     Order Specific Question:   Consistency/Fluid modifications:     Answer:   1500 ml Fluid Restriction [9]   • DIET NPO     Standing Status:   Standing     Number of Occurrences:   8     Order Specific Question:   Restrict to:     Answer:   Strict [1]     Lab Results:  Recent Labs      03/30/18 0227 03/31/18 0331 04/01/18 0214   WBC  5.3  5.1  6.1   RBC  3.76*  3.82*  3.92*   HEMOGLOBIN  11.4*  11.4*  11.7*   HEMATOCRIT  35.6*  36.4*  37.2   MCV  94.7  95.3  94.9   MCH  30.3  29.8  29.8   MCHC  32.0*  31.3*  31.5*   RDW  50.1*  51.3*  50.8*   PLATELETCT  105*  106*  117*   MPV  10.3  10.4  10.3     Recent Labs      03/30/18 0227 03/31/18 0331 04/01/18   0214   SODIUM  137  139  138   POTASSIUM  4.3  4.1  3.7   CHLORIDE  103  101  99   CO2  29  33  32   GLUCOSE  100*  104*  127*   BUN  12  12  12   CREATININE  0.73  0.72  0.74   CALCIUM  8.8  8.9  8.6                         Medical Decision Making, by Problem:  Active Hospital Problems    Diagnosis   • *Volume overload [E87.70]   • History of sick sinus syndrome [Z86.79]   • Pacemaker [Z95.0]   • Hyperlipidemia [E78.5]   • Status post mitral valve repair [Z98.890]   • Acute respiratory failure with hypoxia (CMS-HCC) [J96.01]   • Chronic low back pain [M54.5, G89.29]   • Circulating anticoagulants (CMS-HCC) [D68.318]    • Obesity [E66.9]   • Pulmonary hypertension [I27.20]   • Tobacco use disorder [F17.200]   • PAF (paroxysmal atrial fibrillation) (CMS-HCC) [I48.0]       Assessment/Plan:    RV failure/dyspnea/hx of mitral valve repair in 2011     - Etiology unclear  - PE was ruled out  - Continue with furosemide 40 IV BID   -  spironolactone 25 mg    A. fib RVR    - on digoxin 125 MCG   - Diltiazem 360 mg daily  - rate  , touches 120's with exertion  - on Xarelto as out patient, will switch to coumadin in the setting of Mitral valve repair and valvular afib, w discussed with DR Person, patient is agreeable     Tobacco abuse   -  nicotine patch    Chronic pain   - On MS Contin 15 mg every 8 hours & oxycodone 10 mg 3 times daily    Plan for JULY in am     NPO after MN      Quality-Core Measures   Reviewed items::  Medications reviewed and Labs reviewed

## 2018-04-02 ENCOUNTER — PATIENT OUTREACH (OUTPATIENT)
Dept: HEALTH INFORMATION MANAGEMENT | Facility: OTHER | Age: 58
End: 2018-04-02

## 2018-04-02 LAB
ALBUMIN SERPL BCP-MCNC: 3.7 G/DL (ref 3.2–4.9)
ALBUMIN/GLOB SERPL: 1.2 G/DL
ALP SERPL-CCNC: 67 U/L (ref 30–99)
ALT SERPL-CCNC: 15 U/L (ref 2–50)
ANION GAP SERPL CALC-SCNC: 5 MMOL/L (ref 0–11.9)
AST SERPL-CCNC: 21 U/L (ref 12–45)
BASOPHILS # BLD AUTO: 0.9 % (ref 0–1.8)
BASOPHILS # BLD: 0.05 K/UL (ref 0–0.12)
BILIRUB SERPL-MCNC: 0.5 MG/DL (ref 0.1–1.5)
BUN SERPL-MCNC: 13 MG/DL (ref 8–22)
CALCIUM SERPL-MCNC: 8.8 MG/DL (ref 8.5–10.5)
CHLORIDE SERPL-SCNC: 98 MMOL/L (ref 96–112)
CO2 SERPL-SCNC: 35 MMOL/L (ref 20–33)
CREAT SERPL-MCNC: 0.77 MG/DL (ref 0.5–1.4)
DIGOXIN SERPL-MCNC: 0.5 NG/ML (ref 0.8–2)
EOSINOPHIL # BLD AUTO: 0.15 K/UL (ref 0–0.51)
EOSINOPHIL NFR BLD: 2.6 % (ref 0–6.9)
ERYTHROCYTE [DISTWIDTH] IN BLOOD BY AUTOMATED COUNT: 48.6 FL (ref 35.9–50)
GLOBULIN SER CALC-MCNC: 3 G/DL (ref 1.9–3.5)
GLUCOSE SERPL-MCNC: 111 MG/DL (ref 65–99)
HCT VFR BLD AUTO: 36.5 % (ref 37–47)
HGB BLD-MCNC: 11.9 G/DL (ref 12–16)
IMM GRANULOCYTES # BLD AUTO: 0.02 K/UL (ref 0–0.11)
IMM GRANULOCYTES NFR BLD AUTO: 0.3 % (ref 0–0.9)
INR PPP: 1.19 (ref 0.87–1.13)
LYMPHOCYTES # BLD AUTO: 1.66 K/UL (ref 1–4.8)
LYMPHOCYTES NFR BLD: 28.8 % (ref 22–41)
MCH RBC QN AUTO: 30.4 PG (ref 27–33)
MCHC RBC AUTO-ENTMCNC: 32.6 G/DL (ref 33.6–35)
MCV RBC AUTO: 93.4 FL (ref 81.4–97.8)
MONOCYTES # BLD AUTO: 0.7 K/UL (ref 0–0.85)
MONOCYTES NFR BLD AUTO: 12.2 % (ref 0–13.4)
NEUTROPHILS # BLD AUTO: 3.18 K/UL (ref 2–7.15)
NEUTROPHILS NFR BLD: 55.2 % (ref 44–72)
NRBC # BLD AUTO: 0 K/UL
NRBC BLD-RTO: 0 /100 WBC
PLATELET # BLD AUTO: 110 K/UL (ref 164–446)
PMV BLD AUTO: 10.6 FL (ref 9–12.9)
POTASSIUM SERPL-SCNC: 3.9 MMOL/L (ref 3.6–5.5)
PROT SERPL-MCNC: 6.7 G/DL (ref 6–8.2)
PROTHROMBIN TIME: 14.8 SEC (ref 12–14.6)
RBC # BLD AUTO: 3.91 M/UL (ref 4.2–5.4)
SODIUM SERPL-SCNC: 138 MMOL/L (ref 135–145)
WBC # BLD AUTO: 5.8 K/UL (ref 4.8–10.8)

## 2018-04-02 PROCEDURE — 700102 HCHG RX REV CODE 250 W/ 637 OVERRIDE(OP): Performed by: INTERNAL MEDICINE

## 2018-04-02 PROCEDURE — 700101 HCHG RX REV CODE 250

## 2018-04-02 PROCEDURE — A9270 NON-COVERED ITEM OR SERVICE: HCPCS | Performed by: STUDENT IN AN ORGANIZED HEALTH CARE EDUCATION/TRAINING PROGRAM

## 2018-04-02 PROCEDURE — A9270 NON-COVERED ITEM OR SERVICE: HCPCS | Performed by: FAMILY MEDICINE

## 2018-04-02 PROCEDURE — 770020 HCHG ROOM/CARE - TELE (206)

## 2018-04-02 PROCEDURE — 160002 HCHG RECOVERY MINUTES (STAT)

## 2018-04-02 PROCEDURE — 700102 HCHG RX REV CODE 250 W/ 637 OVERRIDE(OP): Performed by: FAMILY MEDICINE

## 2018-04-02 PROCEDURE — 700105 HCHG RX REV CODE 258: Performed by: STUDENT IN AN ORGANIZED HEALTH CARE EDUCATION/TRAINING PROGRAM

## 2018-04-02 PROCEDURE — A9270 NON-COVERED ITEM OR SERVICE: HCPCS | Performed by: INTERNAL MEDICINE

## 2018-04-02 PROCEDURE — 700111 HCHG RX REV CODE 636 W/ 250 OVERRIDE (IP)

## 2018-04-02 PROCEDURE — 93320 DOPPLER ECHO COMPLETE: CPT

## 2018-04-02 PROCEDURE — 85025 COMPLETE CBC W/AUTO DIFF WBC: CPT

## 2018-04-02 PROCEDURE — 700102 HCHG RX REV CODE 250 W/ 637 OVERRIDE(OP): Performed by: STUDENT IN AN ORGANIZED HEALTH CARE EDUCATION/TRAINING PROGRAM

## 2018-04-02 PROCEDURE — 700111 HCHG RX REV CODE 636 W/ 250 OVERRIDE (IP): Performed by: INTERNAL MEDICINE

## 2018-04-02 PROCEDURE — 80053 COMPREHEN METABOLIC PANEL: CPT

## 2018-04-02 PROCEDURE — 93312 ECHO TRANSESOPHAGEAL: CPT

## 2018-04-02 PROCEDURE — 85610 PROTHROMBIN TIME: CPT

## 2018-04-02 PROCEDURE — 93325 DOPPLER ECHO COLOR FLOW MAPG: CPT

## 2018-04-02 PROCEDURE — 93321 DOPPLER ECHO F-UP/LMTD STD: CPT

## 2018-04-02 PROCEDURE — 80162 ASSAY OF DIGOXIN TOTAL: CPT

## 2018-04-02 PROCEDURE — 99232 SBSQ HOSP IP/OBS MODERATE 35: CPT | Performed by: FAMILY MEDICINE

## 2018-04-02 RX ORDER — SODIUM CHLORIDE 9 MG/ML
250 INJECTION, SOLUTION INTRAVENOUS ONCE
Status: COMPLETED | OUTPATIENT
Start: 2018-04-02 | End: 2018-04-02

## 2018-04-02 RX ORDER — METOPROLOL SUCCINATE 50 MG/1
50 TABLET, EXTENDED RELEASE ORAL
Status: DISCONTINUED | OUTPATIENT
Start: 2018-04-02 | End: 2018-04-03

## 2018-04-02 RX ORDER — SODIUM CHLORIDE 9 MG/ML
INJECTION, SOLUTION INTRAVENOUS
Status: ACTIVE
Start: 2018-04-02 | End: 2018-04-03

## 2018-04-02 RX ORDER — NICOTINE 21 MG/24HR
21 PATCH, TRANSDERMAL 24 HOURS TRANSDERMAL
Status: DISCONTINUED | OUTPATIENT
Start: 2018-04-02 | End: 2018-04-06 | Stop reason: HOSPADM

## 2018-04-02 RX ADMIN — NICOTINE 21 MG: 21 PATCH, EXTENDED RELEASE TRANSDERMAL at 05:46

## 2018-04-02 RX ADMIN — THERA TABS 1 TABLET: TAB at 08:50

## 2018-04-02 RX ADMIN — LISINOPRIL 5 MG: 5 TABLET ORAL at 08:49

## 2018-04-02 RX ADMIN — VITAMIN D, TAB 1000IU (100/BT) 1000 UNITS: 25 TAB at 08:50

## 2018-04-02 RX ADMIN — DILTIAZEM HYDROCHLORIDE 360 MG: 180 CAPSULE, COATED, EXTENDED RELEASE ORAL at 08:49

## 2018-04-02 RX ADMIN — FUROSEMIDE 40 MG: 10 INJECTION, SOLUTION INTRAMUSCULAR; INTRAVENOUS at 15:07

## 2018-04-02 RX ADMIN — POTASSIUM CHLORIDE 20 MEQ: 1500 TABLET, EXTENDED RELEASE ORAL at 22:10

## 2018-04-02 RX ADMIN — PREGABALIN 200 MG: 100 CAPSULE ORAL at 08:49

## 2018-04-02 RX ADMIN — SODIUM CHLORIDE 250 ML: 9 INJECTION, SOLUTION INTRAVENOUS at 21:02

## 2018-04-02 RX ADMIN — POTASSIUM CHLORIDE 20 MEQ: 1500 TABLET, EXTENDED RELEASE ORAL at 08:50

## 2018-04-02 RX ADMIN — PREGABALIN 200 MG: 100 CAPSULE ORAL at 15:06

## 2018-04-02 RX ADMIN — MORPHINE SULFATE 15 MG: 15 TABLET, EXTENDED RELEASE ORAL at 05:32

## 2018-04-02 RX ADMIN — OXYCODONE HYDROCHLORIDE 10 MG: 10 TABLET ORAL at 05:45

## 2018-04-02 RX ADMIN — METOPROLOL SUCCINATE 50 MG: 50 TABLET, EXTENDED RELEASE ORAL at 17:27

## 2018-04-02 RX ADMIN — OXYCODONE HYDROCHLORIDE 10 MG: 10 TABLET ORAL at 15:06

## 2018-04-02 RX ADMIN — STANDARDIZED SENNA CONCENTRATE AND DOCUSATE SODIUM 2 TABLET: 8.6; 5 TABLET, FILM COATED ORAL at 08:50

## 2018-04-02 RX ADMIN — MORPHINE SULFATE 15 MG: 15 TABLET, EXTENDED RELEASE ORAL at 14:01

## 2018-04-02 RX ADMIN — FUROSEMIDE 40 MG: 10 INJECTION, SOLUTION INTRAMUSCULAR; INTRAVENOUS at 05:32

## 2018-04-02 RX ADMIN — VENLAFAXINE HYDROCHLORIDE 150 MG: 75 CAPSULE, EXTENDED RELEASE ORAL at 08:49

## 2018-04-02 RX ADMIN — ATORVASTATIN CALCIUM 20 MG: 20 TABLET, FILM COATED ORAL at 22:10

## 2018-04-02 RX ADMIN — ARIPIPRAZOLE 10 MG: 10 TABLET ORAL at 08:50

## 2018-04-02 RX ADMIN — SODIUM CHLORIDE 250 ML: 9 INJECTION, SOLUTION INTRAVENOUS at 22:13

## 2018-04-02 RX ADMIN — WARFARIN SODIUM 10 MG: 10 TABLET ORAL at 17:26

## 2018-04-02 RX ADMIN — SPIRONOLACTONE 25 MG: 25 TABLET, FILM COATED ORAL at 08:50

## 2018-04-02 RX ADMIN — STANDARDIZED SENNA CONCENTRATE AND DOCUSATE SODIUM 1 TABLET: 8.6; 5 TABLET, FILM COATED ORAL at 22:12

## 2018-04-02 ASSESSMENT — ENCOUNTER SYMPTOMS
CHILLS: 0
CLAUDICATION: 0
WEIGHT LOSS: 0
VOMITING: 0
SHORTNESS OF BREATH: 0
SPUTUM PRODUCTION: 0
BACK PAIN: 1
ORTHOPNEA: 0
TINGLING: 0
BRUISES/BLEEDS EASILY: 0
DOUBLE VISION: 0
ABDOMINAL PAIN: 0
NECK PAIN: 0
PALPITATIONS: 0
HEADACHES: 0
EYE PAIN: 0
PHOTOPHOBIA: 0
TREMORS: 0
FEVER: 0
HEMOPTYSIS: 0
POLYDIPSIA: 0
NAUSEA: 0

## 2018-04-02 ASSESSMENT — PAIN SCALES - GENERAL
PAINLEVEL_OUTOF10: 0
PAINLEVEL_OUTOF10: 0
PAINLEVEL_OUTOF10: 5
PAINLEVEL_OUTOF10: 0
PAINLEVEL_OUTOF10: 5
PAINLEVEL_OUTOF10: 0
PAINLEVEL_OUTOF10: 10
PAINLEVEL_OUTOF10: 8

## 2018-04-02 NOTE — PROGRESS NOTES
Inpatient Anticoagulation Service Note    Date: 4/2/2018  Reason for Anticoagulation: Atrial Fibrillation   [id number] VASc Score: 4    Hemoglobin Value: (!) 11.9  Hematocrit Value: (!) 36.5  Lab Platelet Value: (!) 110  Target INR: 2.0 to 3.0    INR from last 7 days     Date/Time INR Value    04/02/18 0102 (!)  1.19    04/01/18 1354 (!)  1.19    03/28/18 1300 (!)  2.06        Dose from last 7 days     Date/Time Dose (mg)    04/02/18 0900  10    04/01/18 1400  10        Average Dose (mg): 10 (new start VKA)  Significant Interactions: Statin  Bridge Therapy: No   Reversal Agent Administered: Not Applicable    HPI: New start warfarin this admission, converting from Xarelto therapy prior to arrival. Cardiology consulting, diagnosing with valvular afib s/p mitral valve repair versus replacement. INR goal of 2 - 3.     Assessment: INR remains SUBtherapeutic, steady at 1.19 following first dose of warfarin 10mg last night.   Potential drug-drug interactions identified with acute inpatient medications: None    Potential drug-drug interactions identified with chronic home medications: Atorvastatin which will become an established interaction.   *Inpatient Diet: Strict NPO    Plan:  Warfarin 10mg x second dose tonight, then warfarin 5mg po daily as initiated by clinical pharmacist. Customized dosing regimen yet to be determined for patient - dose adjustments likely indicated as therapy progresses and diet changes.     Education Material Provided?: No    Pharmacist suggested discharge dosing: To be determined      Kellen Arriaga PharmD

## 2018-04-02 NOTE — PROGRESS NOTES
Report received and Pt admitted to [facility] 6 s/p JULY.  Pt attached to all leads and monitors. VSS with no complaints of pain or nausea. Fluids and food offered. Orders reviewed.

## 2018-04-02 NOTE — PROGRESS NOTES
[facility] Hospitalist Progress Note    Date of Service: 2018    Chief Complaint  57 y.o. female admitted 3/28/2018 with chf and a.fib    Interval Problem Update  A.fib with rvr  S/p Cath lab  JULY IS PENDING    Consultants/Specialty  cardiology    Disposition  pending        Review of Systems   Constitutional: Negative for chills, fever and weight loss.   HENT: Negative for ear discharge, ear pain and tinnitus.    Eyes: Negative for double vision, photophobia and pain.   Respiratory: Negative for hemoptysis, sputum production and shortness of breath.    Cardiovascular: Positive for leg swelling. Negative for palpitations, orthopnea and claudication.   Gastrointestinal: Negative for abdominal pain, nausea and vomiting.   Genitourinary: Negative for dysuria, frequency and urgency.   Musculoskeletal: Positive for back pain. Negative for joint pain and neck pain.   Skin: Negative for itching and rash.   Neurological: Negative for tingling, tremors and headaches.      Physical Exam  Laboratory/Imaging   Hemodynamics  Temp (24hrs), Av.2 °C (97.1 °F), Min:35.8 °C (96.5 °F), Max:36.8 °C (98.3 °F)   Temperature: 36.8 °C (98.3 °F)  Pulse  Av.7  Min: 57  Max: 129    Blood Pressure: 121/63      Respiratory      Respiration: 18, Pulse Oximetry: 94 %     Work Of Breathing / Effort: Mild  RUL Breath Sounds: Clear, RML Breath Sounds: Clear, RLL Breath Sounds: Diminished, SANTANA Breath Sounds: Clear, LLL Breath Sounds: Diminished    Fluids    Intake/Output Summary (Last 24 hours) at 18 1011  Last data filed at 18 0900   Gross per 24 hour   Intake              940 ml   Output             1300 ml   Net             -360 ml       Nutrition  Orders Placed This Encounter   Procedures   • DIET NPO     Standing Status:   Standing     Number of Occurrences:   8     Order Specific Question:   Restrict to:     Answer:   Strict [1]     Physical Exam   Constitutional: She is oriented to person, place, and time. No distress.    HENT:   Head: Normocephalic and atraumatic.   Eyes: Conjunctivae are normal. Pupils are equal, round, and reactive to light.   Neck: Neck supple. No tracheal deviation present. No thyromegaly present.   Cardiovascular: Regular rhythm and normal heart sounds.    Tachycardia     Pulmonary/Chest: No stridor. No respiratory distress. She has no wheezes. She has no rales.   Decreased breath sounds bilaterally   Abdominal: Soft. There is no tenderness. There is no rebound and no guarding.   Morbidly obese   Musculoskeletal: She exhibits edema. She exhibits no tenderness.   Neurological: She is alert and oriented to person, place, and time.   Skin: Skin is warm and dry. She is not diaphoretic.       Recent Labs      03/31/18   0331 04/01/18   0214  04/02/18   0103   WBC  5.1  6.1  5.8   RBC  3.82*  3.92*  3.91*   HEMOGLOBIN  11.4*  11.7*  11.9*   HEMATOCRIT  36.4*  37.2  36.5*   MCV  95.3  94.9  93.4   MCH  29.8  29.8  30.4   MCHC  31.3*  31.5*  32.6*   RDW  51.3*  50.8*  48.6   PLATELETCT  106*  117*  110*   MPV  10.4  10.3  10.6     Recent Labs      03/31/18   0331  04/01/18   0214  04/02/18   0102   SODIUM  139  138  138   POTASSIUM  4.1  3.7  3.9   CHLORIDE  101  99  98   CO2  33  32  35*   GLUCOSE  104*  127*  111*   BUN  12  12  13   CREATININE  0.72  0.74  0.77   CALCIUM  8.9  8.6  8.8     Recent Labs      04/01/18   1354  04/02/18   0102   INR  1.19*  1.19*                  Assessment/Plan     * Volume overload   Assessment & Plan    JULY today  cta shows mild edema and no PE  Cardiac cath was done   Lasix iv bid  2nd to chf with acute on chronic exacerbation  Diastolic dysfunction  Echo showed:CONCLUSIONS  Systolic function is difficult to assess in rapid atrial fibrillation,   but appears normal.  Severely dilated right ventricle.  Reduced right ventricular systolic function.  Mildly dilated left atrium.  Known mitral valve bioprosthesis which is functioning normally.  Estimated right ventricular systolic  pressure  is 35 mmHg + JVP.  Compared to previous echocardiogram from 8/22/2016 the rhythm is now   rapid atrial fibrillation. Estimated right-sided pressures are lower in   this study, and the mitral bioprosthesis continues to function   Normally.  On cardizam  Lisinopril   Cardiology input is noted            History of sick sinus syndrome- (present on admission)   Assessment & Plan    Has pacemaker.         Pacemaker- (present on admission)   Assessment & Plan    Noted        Hyperlipidemia- (present on admission)   Assessment & Plan    Continue statin          Status post mitral valve repair- (present on admission)   Assessment & Plan    On chronic anticoagulation and as per cardiology pt is   Switched to coumadin  Cardiology input is noted        Acute respiratory failure with hypoxia (CMS-HCC)- (present on admission)   Assessment & Plan    2nd to chf  As per above        Obesity   Assessment & Plan    Body mass index is 42.73 kg/m².  When better, encourage weightloss and lifestyle modification        Circulating anticoagulants (CMS-HCC)   Assessment & Plan    For mitral valve  No active bleeding  Continue Xarelto when it is re-started by cardiology        Pulmonary hypertension- (present on admission)   Assessment & Plan    Seen on echo  No PE on CTA Chest  xarelto is dced  As per cardiology and the pt  Is started on coumadin  IV diuretics        Tobacco use disorder- (present on admission)   Assessment & Plan    I spent 5 minutes, discussing tobacco dependence and cardiac as well as pulmonary risk. Nicotine replacement and Smoking cessation instructions. Code 64494          PAF (paroxysmal atrial fibrillation) (CMS-HCC)- (present on admission)   Assessment & Plan    S/p space maker  On chronic anticoagulation/now coumadin  Digoxin   cardizam  Lopressor iv prn   cardiology input is noted        Chronic low back pain- (present on admission)   Assessment & Plan    History of back surgeries  Follows Dr. Ring  Pain clinic  COntinue her home morphine ER and oxycodone IR  Bowel regimen          Quality-Core Measures   Farley catheter::  No Farley  DVT prophylaxis pharmacological::  Warfarin (Coumadin)

## 2018-04-02 NOTE — PROGRESS NOTES
Cardiology Progress Note               Author: Leah Bojorquez Date & Time created: 2018  12:10 PM     Interval History:  She is now -4.5 L. She has been in atrial flutter with rate control. I performed a JULY today which showed that her mitral valve repair is still functioning well without significant MR or significant gradient across the repair. She has been switched from Xarelto to warfarin and I agree with this. She's not had any issues with bleeding, no chest pain, no palpitations, no dizziness or lightheadedness. She does have a pacemaker but has not been paced. On tele she goes up to 150 with activity.    I personally reviewed her last echocardiogram which showed an increase in gradient across the mitral valve. I do not see that today on her JULY. I reviewed her EKG which shows atrial fibrillation.    Chief Complaint:  Dyspnea    Review of Systems   Constitutional: Negative for chills and fever.   Cardiovascular: Negative for chest pain and palpitations.   Skin: Negative for itching and rash.   Endo/Heme/Allergies: Negative for polydipsia. Does not bruise/bleed easily.     FH/SH reviewed, unchanged  Both inpatient and outpatient medications were reviewed.    Physical Exam  General: No acute distress. Well nourished.  HEENT: EOM grossly intact, no scleral icterus, no pharyngeal erythema.   Neck:  No JVD, no bruits, trachea midline  CVS: RRR. Normal S1, S2. No appreciated M/R/G. trace LE edema.  2+ radial pulses, 2+ PT pulses, well-healed scar across the chest  Resp: CTAB. No wheezing or crackles/rhonchi. Normal respiratory effort.  Abdomen: Soft, NT, no maco hepatomegaly, obese.  MSK/Ext: No clubbing or cyanosis.  Skin: Warm and dry, no rashes.  Neurological: CN III-XII grossly intact. No focal deficits.   Psych: A&O x 3, appropriate affect, good judgement    Hemodynamics:  Temp (24hrs), Av.2 °C (97.2 °F), Min:35.8 °C (96.5 °F), Max:36.8 °C (98.3 °F)  Temperature: 36.8 °C (98.3 °F)  Pulse  Av.7   Min: 57  Max: 129   Blood Pressure: 121/63     Respiratory:    Respiration: 18, Pulse Oximetry: 94 %      Fluids:  Date 04/02/18 0700 - 04/03/18 0659   Shift 5455-5726 4973-0635 4725-2306 24 Hour Total   I  N  T  A  K  E   Shift Total       O  U  T  P  U  T   Urine 400   400    Shift Total 400   400   Weight (kg) 131.2 131.2 131.2 131.2       Weight: (!) 131.2 kg (289 lb 3.9 oz)  GI/Nutrition:  Orders Placed This Encounter   Procedures   • DIET NPO     Standing Status:   Standing     Number of Occurrences:   8     Order Specific Question:   Restrict to:     Answer:   Strict [1]     Lab Results:  Recent Labs      03/31/18   0331 04/01/18   0214  04/02/18   0103   WBC  5.1  6.1  5.8   RBC  3.82*  3.92*  3.91*   HEMOGLOBIN  11.4*  11.7*  11.9*   HEMATOCRIT  36.4*  37.2  36.5*   MCV  95.3  94.9  93.4   MCH  29.8  29.8  30.4   MCHC  31.3*  31.5*  32.6*   RDW  51.3*  50.8*  48.6   PLATELETCT  106*  117*  110*   MPV  10.4  10.3  10.6     Recent Labs      03/31/18   0331  04/01/18   0214  04/02/18   0102   SODIUM  139  138  138   POTASSIUM  4.1  3.7  3.9   CHLORIDE  101  99  98   CO2  33  32  35*   GLUCOSE  104*  127*  111*   BUN  12  12  13   CREATININE  0.72  0.74  0.77   CALCIUM  8.9  8.6  8.8     Recent Labs      04/01/18   1354  04/02/18   0102   INR  1.19*  1.19*                     Medical Decision Making, by Problem:  Active Hospital Problems    Diagnosis   • *Volume overload [E87.70]   • History of sick sinus syndrome [Z86.79]   • Pacemaker [Z95.0]   • Hyperlipidemia [E78.5]   • Status post mitral valve repair [Z98.890]   • Acute respiratory failure with hypoxia (CMS-HCC) [J96.01]   • Chronic low back pain [M54.5, G89.29]   • Circulating anticoagulants (CMS-HCC) [D68.318]   • Obesity [E66.9]   • Pulmonary hypertension [I27.20]   • Tobacco use disorder [F17.200]   • PAF (paroxysmal atrial fibrillation) (CMS-HCC) [I48.0]       A/Plan:  1. Acute on chronic diastolic CHF  2. Prior mitral valve repair  3. Persistent  atrial fibrillation/flutter, rates uncontrolled.  4. Secondary pulmonary hypertension  5. Tobacco use  6. RV dysfunction  7. Obesity  8. Warfarin therapy  9. Pacemaker in situ    I did discuss with her the possibility of trying to put her back in a normal rhythm that she would need to have a therapeutic INR or heparin bridging. We will discuss the possibility of a further date. I will discuss with her tomorrow about pursuing possible right heart catheterization but I think uncontrolled atrial flutter is the #1 suspect. She did appear to have some mild systolic dysfunction also on JULY. I will compare her JULY pictures to her prior echocardiogram. I am stopping dig and starting Toprol XL 50 mg daily.    Quality-Core Measures   Reviewed items::  EKG reviewed, Labs reviewed, Medications reviewed and Radiology images reviewed

## 2018-04-02 NOTE — PROGRESS NOTES
Patient awoke in 10/10 back pain and requested to take her scheduled 9am oxy early. Spoke with Dr. Benjamin and received okay to give the med early. Nursing communication order placed and med administered.

## 2018-04-02 NOTE — PROGRESS NOTES
Received bedside shift report. Patient is AAOx4. No acute distress noted. Tele monitor in place. Vitals stable. Access sites to the right brachial and IJ are clean, dry, and intact. No signs of bleeding noted. Patient is sitting comfortably in bed at the lowest position with call bell in reach. Denies needs at this time.

## 2018-04-02 NOTE — PROGRESS NOTES
Called report to Kristal Murrieta. All questions and concerns answered. Pt. Transported back up to room with no issues noted.

## 2018-04-02 NOTE — PROGRESS NOTES
RN assumes care of patient after receiving updates at bedside.  Patient is currently NPO for a JULY, call bell within reach, patient has no needs at this time

## 2018-04-02 NOTE — CARE PLAN
Problem: Venous Thromboembolism (VTW)/Deep Vein Thrombosis (DVT) Prevention:  Goal: Patient will participate in Venous Thrombosis (VTE)/Deep Vein Thrombosis (DVT)Prevention Measures  Coumadin started today for VTE prophylaxis.    Problem: Pain Management  Goal: Pain level will decrease to patient's comfort goal  Outcome: PROGRESSING AS EXPECTED  Pain level assessed Q4H throughout the shift. Pain meds administered as ordered.

## 2018-04-03 ENCOUNTER — APPOINTMENT (OUTPATIENT)
Dept: RADIOLOGY | Facility: MEDICAL CENTER | Age: 58
DRG: 286 | End: 2018-04-03
Attending: HOSPITALIST
Payer: MEDICARE

## 2018-04-03 PROBLEM — N17.9 AKI (ACUTE KIDNEY INJURY) (HCC): Status: ACTIVE | Noted: 2018-04-03

## 2018-04-03 PROBLEM — I50.33 ACUTE ON CHRONIC DIASTOLIC CONGESTIVE HEART FAILURE (HCC): Status: ACTIVE | Noted: 2018-04-03

## 2018-04-03 PROBLEM — E87.70 VOLUME OVERLOAD: Status: RESOLVED | Noted: 2018-03-28 | Resolved: 2018-04-03

## 2018-04-03 PROBLEM — I95.9 HYPOTENSION: Status: ACTIVE | Noted: 2018-04-03

## 2018-04-03 LAB
ALBUMIN SERPL BCP-MCNC: 3.7 G/DL (ref 3.2–4.9)
ALBUMIN/GLOB SERPL: 1.2 G/DL
ALP SERPL-CCNC: 59 U/L (ref 30–99)
ALT SERPL-CCNC: 18 U/L (ref 2–50)
ANION GAP SERPL CALC-SCNC: 10 MMOL/L (ref 0–11.9)
APTT PPP: 196 SEC (ref 24.7–36)
APTT PPP: 40.8 SEC (ref 24.7–36)
AST SERPL-CCNC: 30 U/L (ref 12–45)
BASOPHILS # BLD AUTO: 0.9 % (ref 0–1.8)
BASOPHILS # BLD: 0.06 K/UL (ref 0–0.12)
BILIRUB SERPL-MCNC: 0.5 MG/DL (ref 0.1–1.5)
BUN SERPL-MCNC: 19 MG/DL (ref 8–22)
CALCIUM SERPL-MCNC: 8.5 MG/DL (ref 8.4–10.2)
CHLORIDE SERPL-SCNC: 99 MMOL/L (ref 96–112)
CO2 SERPL-SCNC: 27 MMOL/L (ref 20–33)
CREAT SERPL-MCNC: 2.31 MG/DL (ref 0.5–1.4)
CREAT UR-MCNC: 199.5 MG/DL
EKG IMPRESSION: NORMAL
EOSINOPHIL # BLD AUTO: 0.16 K/UL (ref 0–0.51)
EOSINOPHIL NFR BLD: 2.3 % (ref 0–6.9)
ERYTHROCYTE [DISTWIDTH] IN BLOOD BY AUTOMATED COUNT: 50.3 FL (ref 35.9–50)
GLOBULIN SER CALC-MCNC: 3 G/DL (ref 1.9–3.5)
GLUCOSE SERPL-MCNC: 96 MG/DL (ref 65–99)
HCT VFR BLD AUTO: 36.4 % (ref 37–47)
HGB BLD-MCNC: 11.7 G/DL (ref 12–16)
IMM GRANULOCYTES # BLD AUTO: 0.01 K/UL (ref 0–0.11)
IMM GRANULOCYTES NFR BLD AUTO: 0.1 % (ref 0–0.9)
INR PPP: 1.28 (ref 0.87–1.13)
LACTATE BLD-SCNC: 1.5 MMOL/L (ref 0.5–2)
LV EJECT FRACT  99904: 50
LYMPHOCYTES # BLD AUTO: 1.96 K/UL (ref 1–4.8)
LYMPHOCYTES NFR BLD: 28.6 % (ref 22–41)
MCH RBC QN AUTO: 30.6 PG (ref 27–33)
MCHC RBC AUTO-ENTMCNC: 32.1 G/DL (ref 33.6–35)
MCV RBC AUTO: 95.3 FL (ref 81.4–97.8)
MONOCYTES # BLD AUTO: 0.88 K/UL (ref 0–0.85)
MONOCYTES NFR BLD AUTO: 12.8 % (ref 0–13.4)
NEUTROPHILS # BLD AUTO: 3.78 K/UL (ref 2–7.15)
NEUTROPHILS NFR BLD: 55.3 % (ref 44–72)
NRBC # BLD AUTO: 0 K/UL
NRBC BLD-RTO: 0 /100 WBC
PLATELET # BLD AUTO: 130 K/UL (ref 164–446)
PMV BLD AUTO: 10.4 FL (ref 9–12.9)
POTASSIUM SERPL-SCNC: 4.7 MMOL/L (ref 3.6–5.5)
PROT SERPL-MCNC: 6.7 G/DL (ref 6–8.2)
PROTHROMBIN TIME: 15.7 SEC (ref 12–14.6)
RBC # BLD AUTO: 3.82 M/UL (ref 4.2–5.4)
SODIUM SERPL-SCNC: 136 MMOL/L (ref 135–145)
SODIUM UR-SCNC: 11 MMOL/L
WBC # BLD AUTO: 6.9 K/UL (ref 4.8–10.8)

## 2018-04-03 PROCEDURE — 93005 ELECTROCARDIOGRAM TRACING: CPT | Performed by: INTERNAL MEDICINE

## 2018-04-03 PROCEDURE — 85730 THROMBOPLASTIN TIME PARTIAL: CPT

## 2018-04-03 PROCEDURE — 700102 HCHG RX REV CODE 250 W/ 637 OVERRIDE(OP): Performed by: INTERNAL MEDICINE

## 2018-04-03 PROCEDURE — 700105 HCHG RX REV CODE 258: Performed by: STUDENT IN AN ORGANIZED HEALTH CARE EDUCATION/TRAINING PROGRAM

## 2018-04-03 PROCEDURE — 84300 ASSAY OF URINE SODIUM: CPT

## 2018-04-03 PROCEDURE — 36415 COLL VENOUS BLD VENIPUNCTURE: CPT

## 2018-04-03 PROCEDURE — 700111 HCHG RX REV CODE 636 W/ 250 OVERRIDE (IP): Performed by: INTERNAL MEDICINE

## 2018-04-03 PROCEDURE — 85610 PROTHROMBIN TIME: CPT

## 2018-04-03 PROCEDURE — 770022 HCHG ROOM/CARE - ICU (200)

## 2018-04-03 PROCEDURE — 700105 HCHG RX REV CODE 258

## 2018-04-03 PROCEDURE — 700102 HCHG RX REV CODE 250 W/ 637 OVERRIDE(OP): Performed by: STUDENT IN AN ORGANIZED HEALTH CARE EDUCATION/TRAINING PROGRAM

## 2018-04-03 PROCEDURE — 700105 HCHG RX REV CODE 258: Performed by: HOSPITALIST

## 2018-04-03 PROCEDURE — 80053 COMPREHEN METABOLIC PANEL: CPT

## 2018-04-03 PROCEDURE — P9045 ALBUMIN (HUMAN), 5%, 250 ML: HCPCS | Mod: JG | Performed by: HOSPITALIST

## 2018-04-03 PROCEDURE — A9270 NON-COVERED ITEM OR SERVICE: HCPCS | Performed by: INTERNAL MEDICINE

## 2018-04-03 PROCEDURE — 99233 SBSQ HOSP IP/OBS HIGH 50: CPT | Performed by: HOSPITALIST

## 2018-04-03 PROCEDURE — 84520 ASSAY OF UREA NITROGEN: CPT

## 2018-04-03 PROCEDURE — 85025 COMPLETE CBC W/AUTO DIFF WBC: CPT

## 2018-04-03 PROCEDURE — 51798 US URINE CAPACITY MEASURE: CPT

## 2018-04-03 PROCEDURE — 700111 HCHG RX REV CODE 636 W/ 250 OVERRIDE (IP): Mod: JG | Performed by: HOSPITALIST

## 2018-04-03 PROCEDURE — 700105 HCHG RX REV CODE 258: Performed by: INTERNAL MEDICINE

## 2018-04-03 PROCEDURE — A9270 NON-COVERED ITEM OR SERVICE: HCPCS | Performed by: FAMILY MEDICINE

## 2018-04-03 PROCEDURE — 700102 HCHG RX REV CODE 250 W/ 637 OVERRIDE(OP): Performed by: FAMILY MEDICINE

## 2018-04-03 PROCEDURE — 83605 ASSAY OF LACTIC ACID: CPT

## 2018-04-03 PROCEDURE — A9270 NON-COVERED ITEM OR SERVICE: HCPCS | Performed by: STUDENT IN AN ORGANIZED HEALTH CARE EDUCATION/TRAINING PROGRAM

## 2018-04-03 PROCEDURE — 82570 ASSAY OF URINE CREATININE: CPT

## 2018-04-03 PROCEDURE — 93010 ELECTROCARDIOGRAM REPORT: CPT | Performed by: INTERNAL MEDICINE

## 2018-04-03 PROCEDURE — 76775 US EXAM ABDO BACK WALL LIM: CPT

## 2018-04-03 RX ORDER — SODIUM CHLORIDE 9 MG/ML
INJECTION, SOLUTION INTRAVENOUS CONTINUOUS
Status: DISCONTINUED | OUTPATIENT
Start: 2018-04-03 | End: 2018-04-04

## 2018-04-03 RX ORDER — ALBUMIN, HUMAN INJ 5% 5 %
12.5 SOLUTION INTRAVENOUS ONCE
Status: COMPLETED | OUTPATIENT
Start: 2018-04-03 | End: 2018-04-03

## 2018-04-03 RX ORDER — NOREPINEPHRINE BITARTRATE 1 MG/ML
INJECTION, SOLUTION INTRAVENOUS
Status: ACTIVE
Start: 2018-04-03 | End: 2018-04-04

## 2018-04-03 RX ORDER — HEPARIN SODIUM 1000 [USP'U]/ML
7000 INJECTION, SOLUTION INTRAVENOUS; SUBCUTANEOUS ONCE
Status: COMPLETED | OUTPATIENT
Start: 2018-04-03 | End: 2018-04-03

## 2018-04-03 RX ORDER — HEPARIN SODIUM 1000 [USP'U]/ML
3800 INJECTION, SOLUTION INTRAVENOUS; SUBCUTANEOUS PRN
Status: DISCONTINUED | OUTPATIENT
Start: 2018-04-03 | End: 2018-04-04

## 2018-04-03 RX ORDER — SODIUM CHLORIDE 9 MG/ML
500 INJECTION, SOLUTION INTRAVENOUS ONCE
Status: COMPLETED | OUTPATIENT
Start: 2018-04-03 | End: 2018-04-03

## 2018-04-03 RX ORDER — SODIUM CHLORIDE 9 MG/ML
INJECTION, SOLUTION INTRAVENOUS
Status: ACTIVE
Start: 2018-04-03 | End: 2018-04-04

## 2018-04-03 RX ORDER — SODIUM CHLORIDE 9 MG/ML
INJECTION, SOLUTION INTRAVENOUS
Status: COMPLETED
Start: 2018-04-03 | End: 2018-04-03

## 2018-04-03 RX ORDER — SODIUM CHLORIDE 9 MG/ML
250 INJECTION, SOLUTION INTRAVENOUS ONCE
Status: COMPLETED | OUTPATIENT
Start: 2018-04-03 | End: 2018-04-03

## 2018-04-03 RX ORDER — AMIODARONE HYDROCHLORIDE 200 MG/1
400 TABLET ORAL 3 TIMES DAILY
Status: DISCONTINUED | OUTPATIENT
Start: 2018-04-03 | End: 2018-04-06 | Stop reason: HOSPADM

## 2018-04-03 RX ADMIN — SODIUM CHLORIDE 500 ML: 9 INJECTION, SOLUTION INTRAVENOUS at 15:18

## 2018-04-03 RX ADMIN — PREGABALIN 200 MG: 100 CAPSULE ORAL at 20:54

## 2018-04-03 RX ADMIN — SODIUM CHLORIDE 250 ML: 9 INJECTION, SOLUTION INTRAVENOUS at 06:00

## 2018-04-03 RX ADMIN — ATORVASTATIN CALCIUM 20 MG: 20 TABLET, FILM COATED ORAL at 20:54

## 2018-04-03 RX ADMIN — PREGABALIN 200 MG: 100 CAPSULE ORAL at 15:27

## 2018-04-03 RX ADMIN — THERA TABS 1 TABLET: TAB at 08:06

## 2018-04-03 RX ADMIN — SODIUM CHLORIDE 250 ML: 9 INJECTION, SOLUTION INTRAVENOUS at 05:35

## 2018-04-03 RX ADMIN — SODIUM CHLORIDE 500 ML: 9 INJECTION, SOLUTION INTRAVENOUS at 07:15

## 2018-04-03 RX ADMIN — VENLAFAXINE HYDROCHLORIDE 150 MG: 75 CAPSULE, EXTENDED RELEASE ORAL at 08:06

## 2018-04-03 RX ADMIN — POTASSIUM CHLORIDE 20 MEQ: 1500 TABLET, EXTENDED RELEASE ORAL at 08:06

## 2018-04-03 RX ADMIN — VITAMIN D, TAB 1000IU (100/BT) 1000 UNITS: 25 TAB at 08:06

## 2018-04-03 RX ADMIN — STANDARDIZED SENNA CONCENTRATE AND DOCUSATE SODIUM 2 TABLET: 8.6; 5 TABLET, FILM COATED ORAL at 08:06

## 2018-04-03 RX ADMIN — WARFARIN SODIUM 5 MG: 5 TABLET ORAL at 16:54

## 2018-04-03 RX ADMIN — STANDARDIZED SENNA CONCENTRATE AND DOCUSATE SODIUM 2 TABLET: 8.6; 5 TABLET, FILM COATED ORAL at 20:54

## 2018-04-03 RX ADMIN — MORPHINE SULFATE 15 MG: 15 TABLET, EXTENDED RELEASE ORAL at 22:11

## 2018-04-03 RX ADMIN — ALBUMIN (HUMAN) 12.5 G: 2.5 SOLUTION INTRAVENOUS at 13:35

## 2018-04-03 RX ADMIN — ARIPIPRAZOLE 10 MG: 10 TABLET ORAL at 08:12

## 2018-04-03 RX ADMIN — HEPARIN SODIUM 7000 UNITS: 1000 INJECTION, SOLUTION INTRAVENOUS; SUBCUTANEOUS at 16:50

## 2018-04-03 RX ADMIN — AMIODARONE HYDROCHLORIDE 400 MG: 200 TABLET ORAL at 15:38

## 2018-04-03 RX ADMIN — OXYCODONE HYDROCHLORIDE 5 MG: 10 TABLET ORAL at 16:40

## 2018-04-03 RX ADMIN — SODIUM CHLORIDE: 9 INJECTION, SOLUTION INTRAVENOUS at 20:57

## 2018-04-03 RX ADMIN — AMIODARONE HYDROCHLORIDE 400 MG: 200 TABLET ORAL at 20:52

## 2018-04-03 RX ADMIN — NICOTINE 21 MG: 21 PATCH, EXTENDED RELEASE TRANSDERMAL at 06:02

## 2018-04-03 RX ADMIN — SODIUM CHLORIDE 500 ML: 9 INJECTION, SOLUTION INTRAVENOUS at 02:50

## 2018-04-03 RX ADMIN — PREGABALIN 200 MG: 100 CAPSULE ORAL at 08:06

## 2018-04-03 RX ADMIN — ALBUMIN (HUMAN) 12.5 G: 2.5 SOLUTION INTRAVENOUS at 11:23

## 2018-04-03 RX ADMIN — SODIUM CHLORIDE 500 ML: 9 INJECTION, SOLUTION INTRAVENOUS at 17:36

## 2018-04-03 RX ADMIN — SODIUM CHLORIDE: 9 INJECTION, SOLUTION INTRAVENOUS at 11:55

## 2018-04-03 RX ADMIN — HEPARIN SODIUM 1450 UNITS/HR: 5000 INJECTION, SOLUTION INTRAVENOUS; SUBCUTANEOUS at 16:49

## 2018-04-03 ASSESSMENT — PAIN SCALES - GENERAL
PAINLEVEL_OUTOF10: 3
PAINLEVEL_OUTOF10: 5
PAINLEVEL_OUTOF10: 6
PAINLEVEL_OUTOF10: 4
PAINLEVEL_OUTOF10: 6

## 2018-04-03 ASSESSMENT — ENCOUNTER SYMPTOMS
PALPITATIONS: 0
TINGLING: 0
VOMITING: 0
BACK PAIN: 1
NAUSEA: 0
HEADACHES: 0
PHOTOPHOBIA: 0
DIZZINESS: 0
SPUTUM PRODUCTION: 0
DOUBLE VISION: 0
CLAUDICATION: 0
SHORTNESS OF BREATH: 0
NERVOUS/ANXIOUS: 1
TREMORS: 0
BLURRED VISION: 0
DIAPHORESIS: 0
HEMOPTYSIS: 0
ABDOMINAL PAIN: 0
CHILLS: 0
ORTHOPNEA: 0
FEVER: 0
EYE PAIN: 0
NECK PAIN: 0

## 2018-04-03 NOTE — PROGRESS NOTES
Dr Ree Stroud updated of pressures in the 70s post the first albumin dose. Second albumin dose ordered. Central line and pressors considered

## 2018-04-03 NOTE — PROGRESS NOTES
MD Benjamin paged with result of BP. BP 70/49. Order for another 500 ml bolus of NS received. Page back after bolus is done.

## 2018-04-03 NOTE — PROGRESS NOTES
Renal ultrasound ordered as patient has not urinated with fluid boluses and bladder scan showing no retention

## 2018-04-03 NOTE — PROGRESS NOTES
Patient's BP has been labile throughout the shift. After 2000 ns bolus and 1000 albumin the patient had a BP 70s/40s with the patient awake, Dr Ree Stroud updated and the line cart was ready. Patient's BP improved to the 90s on the next cycle. Central line cart and pressors ready if ordered, bladder scan ordered as the patient has not urinated on my shift. RN will update with the next BP that is low if the patient continues to be hypotensive

## 2018-04-03 NOTE — PROGRESS NOTES
MD Us paged and updated with pt BP. Per MD give 250 NS bolus and hold with pm narcotics. Recheck BP after bolus and if SBP above 100 ok to give narcotics.

## 2018-04-03 NOTE — DISCHARGE PLANNING
Medical SW    Referral: Sw received IPCSS requesting resources for community counseling due to pt's Depression Scale score.    Sw met w/ bedside RN who indicated pt was currently receiving a bladder scan at bedside. Also, pt is A/O x4 and able to answer assessment questions.     Plan: Sw to assist w/ d/c planning as needed.

## 2018-04-03 NOTE — PROGRESS NOTES
[facility] Hospitalist Progress Note    Date of Service: 4/3/2018    Chief Complaint  57 y.o. female admitted 3/28/2018 with chf and a.fib    Interval Problem Update    hypotensive overnight transfered to ICU  Renal function worse poor urine output    Consultants/Specialty  cardiology    Disposition  pending        Review of Systems   Constitutional: Negative for chills, diaphoresis and fever.   HENT: Negative for ear discharge, ear pain and tinnitus.    Eyes: Negative for blurred vision, double vision, photophobia and pain.   Respiratory: Negative for hemoptysis, sputum production and shortness of breath.    Cardiovascular: Negative for palpitations, orthopnea and claudication.   Gastrointestinal: Negative for abdominal pain, nausea and vomiting.   Genitourinary: Negative for dysuria, frequency and urgency.   Musculoskeletal: Positive for back pain and joint pain. Negative for neck pain.   Skin: Negative for itching and rash.   Neurological: Negative for dizziness, tingling, tremors and headaches.   Psychiatric/Behavioral: The patient is nervous/anxious.    All other systems reviewed and are negative.     Physical Exam  Laboratory/Imaging   Hemodynamics  Temp (24hrs), Av.4 °C (97.5 °F), Min:36.1 °C (97 °F), Max:36.7 °C (98 °F)   Temperature: 36.4 °C (97.5 °F)  Pulse  Av.4  Min: 57  Max: 129 Heart Rate (Monitored): 67  Blood Pressure: (!) 68/52, NIBP: (!) 84/44      Respiratory      Respiration: (!) 21, Pulse Oximetry: 95 %     Work Of Breathing / Effort: Mild  RUL Breath Sounds: Clear;Diminished, RML Breath Sounds: Diminished, RLL Breath Sounds: Diminished, SANTANA Breath Sounds: Clear;Diminished, LLL Breath Sounds: Diminished    Fluids    Intake/Output Summary (Last 24 hours) at 18 1416  Last data filed at 18 0900   Gross per 24 hour   Intake             1810 ml   Output              200 ml   Net             1610 ml       Nutrition  Orders Placed This Encounter   Procedures   • DIET ORDER     Standing  Status:   Standing     Number of Occurrences:   1     Order Specific Question:   Diet:     Answer:   2 Gram Sodium [7]     Physical Exam   Constitutional: She is oriented to person, place, and time. She appears well-developed. No distress.   obese   HENT:   Head: Normocephalic and atraumatic.   Right Ear: External ear normal.   Left Ear: External ear normal.   Eyes: Conjunctivae are normal. Pupils are equal, round, and reactive to light.   Neck: Neck supple. No JVD present. No thyromegaly present.   Cardiovascular: Normal rate, regular rhythm and normal heart sounds.  Exam reveals no friction rub.    No murmur heard.       Pulmonary/Chest: No stridor. No respiratory distress. She has no wheezes. She exhibits no tenderness.   Decreased breath sounds bilaterally   Abdominal: Soft. There is no tenderness. There is no rebound and no guarding.   Morbidly obese   Musculoskeletal: She exhibits edema. She exhibits no tenderness.   Neurological: She is alert and oriented to person, place, and time. No cranial nerve deficit. She exhibits normal muscle tone.   Skin: Skin is warm and dry. She is not diaphoretic. No cyanosis or erythema. Nails show no clubbing.   Psychiatric: She has a normal mood and affect. Her behavior is normal.       Recent Labs      04/01/18   0214  04/02/18   0103  04/03/18   0002   WBC  6.1  5.8  6.9   RBC  3.92*  3.91*  3.82*   HEMOGLOBIN  11.7*  11.9*  11.7*   HEMATOCRIT  37.2  36.5*  36.4*   MCV  94.9  93.4  95.3   MCH  29.8  30.4  30.6   MCHC  31.5*  32.6*  32.1*   RDW  50.8*  48.6  50.3*   PLATELETCT  117*  110*  130*   MPV  10.3  10.6  10.4     Recent Labs      04/01/18   0214  04/02/18   0102  04/03/18   0002   SODIUM  138  138  136   POTASSIUM  3.7  3.9  4.7   CHLORIDE  99  98  99   CO2  32  35*  27   GLUCOSE  127*  111*  96   BUN  12  13  19   CREATININE  0.74  0.77  2.31*   CALCIUM  8.6  8.8  8.5     Recent Labs      04/01/18   1354  04/02/18   0102  04/03/18   0002   INR  1.19*  1.19*  1.28*                   Assessment/Plan     Hypotension   Assessment & Plan    Likely sec to overdiuresis  D/c lasix  D/c lisinopril/aldactone and cardizem  IVF and IV albumen  Close clinical monitoring  Discussed with cardiology        History of sick sinus syndrome- (present on admission)   Assessment & Plan    Has pacemaker.         Hyperlipidemia- (present on admission)   Assessment & Plan    lipitor          Status post mitral valve repair- (present on admission)   Assessment & Plan    On chronic anticoagulation   Switched to coumadin with heparin bridge          Acute respiratory failure with hypoxia (CMS-HCC)- (present on admission)   Assessment & Plan    Oxygen   RT protocol        Acute on chronic diastolic congestive heart failure (CMS-HCC)   Assessment & Plan    Hold diuretics given hypotension        FAN (acute kidney injury) (CMS-HCC)   Assessment & Plan    ? Contrast induced  Vs sec to hypotension overdiuresis  Hold diuretics and ACEI  IVF  Monitor renal function  F/u on urine lytes and check US        Obesity   Assessment & Plan    Body mass index is 42.73 kg/m².          Circulating anticoagulants (CMS-HCC)   Assessment & Plan    Heparin/warfarin        Pulmonary hypertension- (present on admission)   Assessment & Plan    Noted on echo  CTA neg for PE  S/p Rt heart cath        Tobacco use disorder- (present on admission)   Assessment & Plan              PAF (paroxysmal atrial fibrillation) (CMS-HCC)- (present on admission)   Assessment & Plan    Amiodarone started per cardiology  anticoagulation        Chronic low back pain- (present on admission)   Assessment & Plan    History of back surgeries  Follows Dr. Ring, Pain clinic  Hold narcotics until BP more stable discussed with pt          Quality-Core Measures   Reviewed items::  Labs reviewed, Medications reviewed and Radiology images reviewed  Farley catheter::  No Farley  DVT prophylaxis pharmacological::  Warfarin (Coumadin) and Heparin

## 2018-04-03 NOTE — PROGRESS NOTES
Cardiology paged with updates abut recent BP. Pt got metoprolol first time at 1700 and per MD hold the next one at am. Per MD low BP is due to the metoprolol most likely. Per cardiology contact hospitalist with interventions for low BP.

## 2018-04-03 NOTE — THERAPY
Holding PT eval per RN request. Pt desaturating with very basic activity. Will re-attempt as able when pt more able to participate.

## 2018-04-03 NOTE — DISCHARGE PLANNING
Medical SW    Sw attended AM IDT Rounds.    Per face sheet, pt resident of [facility], admitted 3/28 for CHF exacerbation, single, female, 56yo, and carries Medicare INS.    RN reports, pt stands at edge of bed w/ dizziness,     Plan: Sw to assist w/ d/c planning as needed.

## 2018-04-03 NOTE — PROGRESS NOTES
MD Benjamin paged with result of BP  68/52 after 500 ml of NS . Order to transfer to ICU received.

## 2018-04-03 NOTE — PROGRESS NOTES
Inpatient Anticoagulation Service Note    Date: 4/3/2018  Reason for Anticoagulation: Atrial Fibrillation   [id number] VASc Score: 4    Hemoglobin Value: (!) 11.7  Hematocrit Value: (!) 36.4  Lab Platelet Value: (!) 130  Target INR: 2.0 to 3.0    INR from last 7 days     Date/Time INR Value    04/03/18 0002 (!)  1.28    04/02/18 0102 (!)  1.19    04/01/18 1354 (!)  1.19    03/28/18 1300 (!)  2.06        Dose from last 7 days     Date/Time Dose (mg)    04/03/18 0002  5    04/02/18 0900  10    04/01/18 1400  10        Average Dose (mg): 10 (new start VKA)  Significant Interactions: Statin  Bridge Therapy: No     Reversal Agent Administered: Not Applicable  Comments: H/H is stable with no notation of bleeding. INR with a slight increase overnight. All doses charted as administered. Continue with plan for warfarin 5 mg daily to start tonight.    Plan:  INR with morning labs  Education Material Provided?: No  Pharmacist suggested discharge dosing: PRANAV Lau

## 2018-04-03 NOTE — PROGRESS NOTES
MD paged with update after 250 NS bolus. BP went up to 87/59, pt non symptomatic.   Per MD give another bolus of 250 ml and order lactic acid.

## 2018-04-03 NOTE — PROGRESS NOTES
Pt transferred to [facility] with 2 RN. Hospitalist paged for blood pressures, order for 250 bolus x2 if MAP less than 60 after first bolus. Hold blood pressure medications for SBP <110.

## 2018-04-04 PROBLEM — D69.6 THROMBOCYTOPENIA (HCC): Status: ACTIVE | Noted: 2018-04-04

## 2018-04-04 LAB
ANION GAP SERPL CALC-SCNC: 5 MMOL/L (ref 0–11.9)
APTT PPP: 110 SEC (ref 24.7–36)
BASOPHILS # BLD AUTO: 0.6 % (ref 0–1.8)
BASOPHILS # BLD: 0.03 K/UL (ref 0–0.12)
BODY FLD TYPE: NORMAL
BUN SERPL-MCNC: 20 MG/DL (ref 8–22)
CALCIUM SERPL-MCNC: 8.5 MG/DL (ref 8.5–10.5)
CHLORIDE SERPL-SCNC: 101 MMOL/L (ref 96–112)
CO2 SERPL-SCNC: 26 MMOL/L (ref 20–33)
CREAT SERPL-MCNC: 1.19 MG/DL (ref 0.5–1.4)
EOSINOPHIL # BLD AUTO: 0.14 K/UL (ref 0–0.51)
EOSINOPHIL NFR BLD: 2.9 % (ref 0–6.9)
ERYTHROCYTE [DISTWIDTH] IN BLOOD BY AUTOMATED COUNT: 49.8 FL (ref 35.9–50)
GLUCOSE SERPL-MCNC: 150 MG/DL (ref 65–99)
HCT VFR BLD AUTO: 34.3 % (ref 37–47)
HGB BLD-MCNC: 10.7 G/DL (ref 12–16)
IMM GRANULOCYTES # BLD AUTO: 0.01 K/UL (ref 0–0.11)
IMM GRANULOCYTES NFR BLD AUTO: 0.2 % (ref 0–0.9)
INR PPP: 1.69 (ref 0.87–1.13)
LYMPHOCYTES # BLD AUTO: 1.75 K/UL (ref 1–4.8)
LYMPHOCYTES NFR BLD: 36.8 % (ref 22–41)
MAGNESIUM SERPL-MCNC: 1.9 MG/DL (ref 1.5–2.5)
MCH RBC QN AUTO: 29.7 PG (ref 27–33)
MCHC RBC AUTO-ENTMCNC: 31.2 G/DL (ref 33.6–35)
MCV RBC AUTO: 95.3 FL (ref 81.4–97.8)
MONOCYTES # BLD AUTO: 0.46 K/UL (ref 0–0.85)
MONOCYTES NFR BLD AUTO: 9.7 % (ref 0–13.4)
NEUTROPHILS # BLD AUTO: 2.37 K/UL (ref 2–7.15)
NEUTROPHILS NFR BLD: 49.8 % (ref 44–72)
NRBC # BLD AUTO: 0 K/UL
NRBC BLD-RTO: 0 /100 WBC
PLATELET # BLD AUTO: 94 K/UL (ref 164–446)
PMV BLD AUTO: 11.1 FL (ref 9–12.9)
POTASSIUM SERPL-SCNC: 4.3 MMOL/L (ref 3.6–5.5)
PROTHROMBIN TIME: 19.6 SEC (ref 12–14.6)
RBC # BLD AUTO: 3.6 M/UL (ref 4.2–5.4)
SODIUM SERPL-SCNC: 132 MMOL/L (ref 135–145)
UREA NIT FLD-MCNC: 382 MG/DL
WBC # BLD AUTO: 4.8 K/UL (ref 4.8–10.8)

## 2018-04-04 PROCEDURE — 770020 HCHG ROOM/CARE - TELE (206)

## 2018-04-04 PROCEDURE — 85025 COMPLETE CBC W/AUTO DIFF WBC: CPT

## 2018-04-04 PROCEDURE — 80048 BASIC METABOLIC PNL TOTAL CA: CPT

## 2018-04-04 PROCEDURE — 700102 HCHG RX REV CODE 250 W/ 637 OVERRIDE(OP): Performed by: HOSPITALIST

## 2018-04-04 PROCEDURE — 83735 ASSAY OF MAGNESIUM: CPT

## 2018-04-04 PROCEDURE — 700102 HCHG RX REV CODE 250 W/ 637 OVERRIDE(OP): Performed by: FAMILY MEDICINE

## 2018-04-04 PROCEDURE — 700102 HCHG RX REV CODE 250 W/ 637 OVERRIDE(OP): Performed by: STUDENT IN AN ORGANIZED HEALTH CARE EDUCATION/TRAINING PROGRAM

## 2018-04-04 PROCEDURE — A9270 NON-COVERED ITEM OR SERVICE: HCPCS | Performed by: HOSPITALIST

## 2018-04-04 PROCEDURE — A9270 NON-COVERED ITEM OR SERVICE: HCPCS | Performed by: INTERNAL MEDICINE

## 2018-04-04 PROCEDURE — A9270 NON-COVERED ITEM OR SERVICE: HCPCS | Performed by: FAMILY MEDICINE

## 2018-04-04 PROCEDURE — 97161 PT EVAL LOW COMPLEX 20 MIN: CPT

## 2018-04-04 PROCEDURE — 700111 HCHG RX REV CODE 636 W/ 250 OVERRIDE (IP): Performed by: HOSPITALIST

## 2018-04-04 PROCEDURE — 700102 HCHG RX REV CODE 250 W/ 637 OVERRIDE(OP): Performed by: INTERNAL MEDICINE

## 2018-04-04 PROCEDURE — 85610 PROTHROMBIN TIME: CPT

## 2018-04-04 PROCEDURE — 700105 HCHG RX REV CODE 258: Performed by: HOSPITALIST

## 2018-04-04 PROCEDURE — G8978 MOBILITY CURRENT STATUS: HCPCS | Mod: CI

## 2018-04-04 PROCEDURE — 99232 SBSQ HOSP IP/OBS MODERATE 35: CPT | Performed by: HOSPITALIST

## 2018-04-04 PROCEDURE — G8979 MOBILITY GOAL STATUS: HCPCS | Mod: CI

## 2018-04-04 PROCEDURE — A9270 NON-COVERED ITEM OR SERVICE: HCPCS | Performed by: STUDENT IN AN ORGANIZED HEALTH CARE EDUCATION/TRAINING PROGRAM

## 2018-04-04 PROCEDURE — G8980 MOBILITY D/C STATUS: HCPCS | Mod: CI

## 2018-04-04 PROCEDURE — 85730 THROMBOPLASTIN TIME PARTIAL: CPT

## 2018-04-04 RX ORDER — MAGNESIUM SULFATE HEPTAHYDRATE 40 MG/ML
2 INJECTION, SOLUTION INTRAVENOUS ONCE
Status: COMPLETED | OUTPATIENT
Start: 2018-04-04 | End: 2018-04-04

## 2018-04-04 RX ORDER — MORPHINE SULFATE 15 MG/1
15 TABLET, FILM COATED, EXTENDED RELEASE ORAL EVERY 8 HOURS
Status: DISCONTINUED | OUTPATIENT
Start: 2018-04-04 | End: 2018-04-05

## 2018-04-04 RX ORDER — MORPHINE SULFATE 15 MG/1
15 TABLET, FILM COATED, EXTENDED RELEASE ORAL ONCE
Status: COMPLETED | OUTPATIENT
Start: 2018-04-04 | End: 2018-04-04

## 2018-04-04 RX ADMIN — NICOTINE 21 MG: 21 PATCH, EXTENDED RELEASE TRANSDERMAL at 06:49

## 2018-04-04 RX ADMIN — PREGABALIN 200 MG: 100 CAPSULE ORAL at 21:19

## 2018-04-04 RX ADMIN — SODIUM CHLORIDE: 9 INJECTION, SOLUTION INTRAVENOUS at 06:32

## 2018-04-04 RX ADMIN — WARFARIN SODIUM 5 MG: 5 TABLET ORAL at 22:06

## 2018-04-04 RX ADMIN — ENOXAPARIN SODIUM 120 MG: 150 INJECTION SUBCUTANEOUS at 13:45

## 2018-04-04 RX ADMIN — OXYCODONE HYDROCHLORIDE 10 MG: 10 TABLET ORAL at 21:17

## 2018-04-04 RX ADMIN — PREGABALIN 200 MG: 100 CAPSULE ORAL at 14:05

## 2018-04-04 RX ADMIN — VENLAFAXINE HYDROCHLORIDE 150 MG: 75 CAPSULE, EXTENDED RELEASE ORAL at 08:36

## 2018-04-04 RX ADMIN — THERA TABS 1 TABLET: TAB at 08:35

## 2018-04-04 RX ADMIN — AMIODARONE HYDROCHLORIDE 400 MG: 200 TABLET ORAL at 14:05

## 2018-04-04 RX ADMIN — ATORVASTATIN CALCIUM 20 MG: 20 TABLET, FILM COATED ORAL at 21:19

## 2018-04-04 RX ADMIN — MAGNESIUM SULFATE IN WATER 2 G: 40 INJECTION, SOLUTION INTRAVENOUS at 17:33

## 2018-04-04 RX ADMIN — STANDARDIZED SENNA CONCENTRATE AND DOCUSATE SODIUM 1 TABLET: 8.6; 5 TABLET, FILM COATED ORAL at 21:18

## 2018-04-04 RX ADMIN — ENOXAPARIN SODIUM 120 MG: 150 INJECTION SUBCUTANEOUS at 21:17

## 2018-04-04 RX ADMIN — OXYCODONE HYDROCHLORIDE 10 MG: 10 TABLET ORAL at 14:07

## 2018-04-04 RX ADMIN — MORPHINE SULFATE 15 MG: 15 TABLET, EXTENDED RELEASE ORAL at 15:50

## 2018-04-04 RX ADMIN — MORPHINE SULFATE 15 MG: 15 TABLET, EXTENDED RELEASE ORAL at 06:32

## 2018-04-04 RX ADMIN — ARIPIPRAZOLE 10 MG: 10 TABLET ORAL at 08:35

## 2018-04-04 RX ADMIN — AMIODARONE HYDROCHLORIDE 400 MG: 200 TABLET ORAL at 08:35

## 2018-04-04 RX ADMIN — VITAMIN D, TAB 1000IU (100/BT) 1000 UNITS: 25 TAB at 08:35

## 2018-04-04 RX ADMIN — AMIODARONE HYDROCHLORIDE 400 MG: 200 TABLET ORAL at 21:18

## 2018-04-04 RX ADMIN — OXYCODONE HYDROCHLORIDE 10 MG: 10 TABLET ORAL at 08:35

## 2018-04-04 RX ADMIN — PREGABALIN 200 MG: 100 CAPSULE ORAL at 08:35

## 2018-04-04 ASSESSMENT — ENCOUNTER SYMPTOMS
EYE REDNESS: 0
NAUSEA: 0
DOUBLE VISION: 0
BLURRED VISION: 0
TINGLING: 0
NERVOUS/ANXIOUS: 1
ABDOMINAL PAIN: 0
HEMOPTYSIS: 0
SHORTNESS OF BREATH: 0
PND: 0
PHOTOPHOBIA: 0
DIZZINESS: 0
PALPITATIONS: 0
FEVER: 0
BLOOD IN STOOL: 0
HEADACHES: 0
NECK PAIN: 0
SINUS PAIN: 0
MEMORY LOSS: 0
CHILLS: 0
TREMORS: 0
EYE PAIN: 0
DIARRHEA: 0
SPUTUM PRODUCTION: 0
BACK PAIN: 1
VOMITING: 0
FOCAL WEAKNESS: 0
CLAUDICATION: 0
DIAPHORESIS: 0
HALLUCINATIONS: 0

## 2018-04-04 ASSESSMENT — COGNITIVE AND FUNCTIONAL STATUS - GENERAL
SUGGESTED CMS G CODE MODIFIER MOBILITY: CJ
SUGGESTED CMS G CODE MODIFIER DAILY ACTIVITY: CH
MOBILITY SCORE: 22
SUGGESTED CMS G CODE MODIFIER MOBILITY: CH
DAILY ACTIVITIY SCORE: 24
MOBILITY SCORE: 24
CLIMB 3 TO 5 STEPS WITH RAILING: A LOT

## 2018-04-04 ASSESSMENT — GAIT ASSESSMENTS
DEVIATION: INCREASED BASE OF SUPPORT;SHUFFLED GAIT
GAIT LEVEL OF ASSIST: STAND BY ASSIST
DISTANCE (FEET): 250
ASSISTIVE DEVICE: FRONT WHEEL WALKER

## 2018-04-04 ASSESSMENT — PAIN SCALES - GENERAL
PAINLEVEL_OUTOF10: 3
PAINLEVEL_OUTOF10: 4
PAINLEVEL_OUTOF10: 4
PAINLEVEL_OUTOF10: 8
PAINLEVEL_OUTOF10: 6
PAINLEVEL_OUTOF10: 6
PAINLEVEL_OUTOF10: 4
PAINLEVEL_OUTOF10: 7
PAINLEVEL_OUTOF10: 6

## 2018-04-04 NOTE — PROGRESS NOTES
[facility] Hospitalist Progress Note    Date of Service: 2018    Chief Complaint  57 y.o. female admitted 3/28/2018 with chf and a.fib    Interval Problem Update    Blood pressure and urine output improved  Overall feels better    Consultants/Specialty  cardiology    Disposition  pending        Review of Systems   Constitutional: Negative for chills, diaphoresis, fever and malaise/fatigue.   HENT: Negative for congestion, ear discharge, ear pain, sinus pain and tinnitus.    Eyes: Negative for blurred vision, double vision, photophobia, pain and redness.   Respiratory: Negative for hemoptysis, sputum production and shortness of breath.    Cardiovascular: Negative for palpitations, claudication and PND.   Gastrointestinal: Negative for abdominal pain, blood in stool, diarrhea, nausea and vomiting.   Genitourinary: Negative for dysuria, frequency and urgency.   Musculoskeletal: Positive for back pain and joint pain (chronic). Negative for neck pain.   Skin: Negative for itching and rash.   Neurological: Negative for dizziness, tingling, tremors, focal weakness and headaches.   Psychiatric/Behavioral: Negative for hallucinations and memory loss. The patient is nervous/anxious.    All other systems reviewed and are negative.     Physical Exam  Laboratory/Imaging   Hemodynamics  Temp (24hrs), Av.4 °C (97.5 °F), Min:35.6 °C (96.1 °F), Max:37 °C (98.6 °F)   Temperature: (!) 35.8 °C (96.4 °F)  Pulse  Av.6  Min: 43  Max: 129 Heart Rate (Monitored): 76  Blood Pressure: 102/70, NIBP: 100/52      Respiratory      Respiration: 18, Pulse Oximetry: 93 %     Work Of Breathing / Effort: Mild  RUL Breath Sounds: Clear, RML Breath Sounds: Clear, RLL Breath Sounds: Clear, SANTANA Breath Sounds: Clear, LLL Breath Sounds: Clear    Fluids    Intake/Output Summary (Last 24 hours) at 18 1635  Last data filed at 18 1200   Gross per 24 hour   Intake          3816.65 ml   Output             1750 ml   Net          2066.65 ml        Nutrition  Orders Placed This Encounter   Procedures   • DIET ORDER     Standing Status:   Standing     Number of Occurrences:   1     Order Specific Question:   Diet:     Answer:   2 Gram Sodium [7]   • DIET NPO     Standing Status:   Standing     Number of Occurrences:   1     Order Specific Question:   Restrict to:     Answer:   Sips with Medications [3]     Physical Exam   Constitutional: She is oriented to person, place, and time. She appears well-developed.   obese   HENT:   Head: Normocephalic and atraumatic.   Mouth/Throat: Oropharynx is clear and moist.   Eyes: Conjunctivae are normal.   Neck: Neck supple. No JVD present. No thyromegaly present.   Cardiovascular: Normal rate, regular rhythm and normal heart sounds.  Exam reveals no gallop and no friction rub.    No murmur heard.       Pulmonary/Chest: No stridor. No respiratory distress. She has no wheezes. She has no rales. She exhibits no tenderness.   Decreased breath sounds bilaterally   Abdominal: Soft. There is no tenderness. There is no rebound and no guarding.   Morbidly obese   Musculoskeletal: She exhibits edema (trace). She exhibits no tenderness.   Neurological: She is alert and oriented to person, place, and time. No cranial nerve deficit. She exhibits normal muscle tone.   Skin: Skin is warm and dry. She is not diaphoretic. No cyanosis or erythema. Nails show no clubbing.   Psychiatric: She has a normal mood and affect. Her behavior is normal. Thought content normal.       Recent Labs      04/02/18   0103  04/03/18   0002  04/04/18   0505   WBC  5.8  6.9  4.8   RBC  3.91*  3.82*  3.60*   HEMOGLOBIN  11.9*  11.7*  10.7*   HEMATOCRIT  36.5*  36.4*  34.3*   MCV  93.4  95.3  95.3   MCH  30.4  30.6  29.7   MCHC  32.6*  32.1*  31.2*   RDW  48.6  50.3*  49.8   PLATELETCT  110*  130*  94*   MPV  10.6  10.4  11.1     Recent Labs      04/02/18   0102  04/03/18   0002  04/04/18   0505   SODIUM  138  136  132*   POTASSIUM  3.9  4.7  4.3   CHLORIDE   98  99  101   CO2  35*  27  26   GLUCOSE  111*  96  150*   BUN  13  19  20   CREATININE  0.77  2.31*  1.19   CALCIUM  8.8  8.5  8.5     Recent Labs      04/02/18   0102  04/03/18   0002  04/03/18   1546  04/03/18   2250  04/04/18   0505   APTT   --    --   40.8*  196.0*  110.0*   INR  1.19*  1.28*   --    --   1.69*                  Assessment/Plan     Hypotension   Assessment & Plan    Likely sec to overdiuresis  Improved will stop IV fluids and continue to monitor        History of sick sinus syndrome- (present on admission)   Assessment & Plan    Has pacemaker.         Hyperlipidemia- (present on admission)   Assessment & Plan    Continue lipitor          Status post mitral valve repair- (present on admission)   Assessment & Plan    On chronic anticoagulation   Continue warfarin  Switch heparin to Lovenox given improvement in renal function        Acute respiratory failure with hypoxia (CMS-HCC)- (present on admission)   Assessment & Plan    Oxygen   RT protocol        Thrombocytopenia (CMS-HCC)   Assessment & Plan    Acute on chronic  Monitor CBC and consider further workup if continues to trend down        Acute on chronic diastolic congestive heart failure (CMS-HCC)   Assessment & Plan     diuretics held given hypotension and acute kidney injury  DC IV fluids and monitor clinically          FAN (acute kidney injury) (CMS-HCC)   Assessment & Plan    Likely secondary to overdiuresis  Renal function improved with hydration  Continue to monitor  Avoid nephrotoxic agents        Obesity   Assessment & Plan    Body mass index is 42.73 kg/m².          Pulmonary hypertension- (present on admission)   Assessment & Plan    Noted on echo  CTA neg for PE  S/p Rt heart cath        Tobacco use disorder- (present on admission)   Assessment & Plan      Counseled on cessation        PAF (paroxysmal atrial fibrillation) (CMS-HCC)- (present on admission)   Assessment & Plan    On amiodarone and anticoagulation  Discussed with   [place]        Chronic low back pain- (present on admission)   Assessment & Plan    History of back surgeries  Follows Dr. Ring, Pain clinic  Continue home dose of narcotics          Quality-Core Measures   Reviewed items::  Labs reviewed, Medications reviewed and Radiology images reviewed  Farley catheter::  No Farley  DVT prophylaxis pharmacological::  Warfarin (Coumadin) and Heparin      Stable to transfer to telemetry  Plan of care reviewed with patient and discussed with nursing staff and cardiology

## 2018-04-04 NOTE — PROGRESS NOTES
Cardiology Progress Note               Author: Leah Bojorquez Date & Time created: 2018  1:44 PM     Interval History:  After fluid resuscitation she has done well and her blood pressure has come up. Her heart rates have been in the 60s and I do not think she is in sinus rhythm but rather atrial flutter with difficult to see flutter waves and intermittent atrial fibrillation. She denies significant dyspnea, chest pain, palpitations or dizziness. She is feeling better. She is on Abilify which can exacerbate tachyarrhythmias. I do not think we should adjust this medication though but just plan on using a more aggressive medication to control her age of fibrillation and flutter.    CT showed no PE. She had trace right pleural effusion and ground glass opacities.    I discussed again amiodarone therapy and the need for blood thinner. She verbalized understanding.    Chief Complaint:  Shortness of breath    Review of Systems   Constitutional: Negative for chills and fever.   Eyes: Negative for blurred vision and double vision.   Cardiovascular: Negative for chest pain and palpitations.       Physical Exam   General: No acute distress. Well nourished.  HEENT: EOM grossly intact, no scleral icterus, no pharyngeal erythema.   Neck:  No JVD, no bruits, trachea midline  CVS: Irregularly irregular today. Normal S1, S2. No appreciated M/R/G. trace LE edema.  2+ radial pulses, 2+ PT pulses, well-healed scar across the chest  Resp: CTAB. No wheezing or crackles/rhonchi. Normal respiratory effort.  Abdomen: Soft, NT, no maco hepatomegaly, obese.  MSK/Ext: No clubbing or cyanosis.  Skin: Warm and dry, no rashes.  Neurological: CN III-XII grossly intact. No focal deficits.   Psych: A&O x 3, appropriate affect, good judgement    Hemodynamics:  Temp (24hrs), Av.4 °C (97.5 °F), Min:35.6 °C (96.1 °F), Max:37 °C (98.6 °F)  Temperature: (!) 35.6 °C (96.1 °F)  Pulse  Av.8  Min: 43  Max: 129Heart Rate (Monitored):  76  Blood Pressure: 126/61, NIBP: 100/52     Respiratory:    Respiration: 18, Pulse Oximetry: 97 %     Fluids:  Date 04/04/18 0700 - 04/05/18 0659   Shift 4466-3734 0350-2056 1481-0096 24 Hour Total   I  N  T  A  K  E   P.O. 700   700    I.V. 502.3   502.3    Shift Total 1202.3   1202.3   O  U  T  P  U  T   Urine 200   200    Shift Total 200   200   Weight (kg) 130.6 130.6 130.6 130.6       Weight: (!) 130.6 kg (287 lb 14.7 oz)  GI/Nutrition:  Orders Placed This Encounter   Procedures   • DIET ORDER     Standing Status:   Standing     Number of Occurrences:   1     Order Specific Question:   Diet:     Answer:   2 Gram Sodium [7]     Lab Results:  Recent Labs      04/02/18   0103  04/03/18   0002  04/04/18   0505   WBC  5.8  6.9  4.8   RBC  3.91*  3.82*  3.60*   HEMOGLOBIN  11.9*  11.7*  10.7*   HEMATOCRIT  36.5*  36.4*  34.3*   MCV  93.4  95.3  95.3   MCH  30.4  30.6  29.7   MCHC  32.6*  32.1*  31.2*   RDW  48.6  50.3*  49.8   PLATELETCT  110*  130*  94*   MPV  10.6  10.4  11.1     Recent Labs      04/02/18   0102 04/03/18   0002  04/04/18   0505   SODIUM  138  136  132*   POTASSIUM  3.9  4.7  4.3   CHLORIDE  98  99  101   CO2  35*  27  26   GLUCOSE  111*  96  150*   BUN  13  19  20   CREATININE  0.77  2.31*  1.19   CALCIUM  8.8  8.5  8.5     Recent Labs      04/02/18   0102  04/03/18   0002  04/03/18   1546  04/03/18   2250  04/04/18   0505   APTT   --    --   40.8*  196.0*  110.0*   INR  1.19*  1.28*   --    --   1.69*                     Medical Decision Making, by Problem:  Active Hospital Problems    Diagnosis   • Hypotension [I95.9]   • History of sick sinus syndrome [Z86.79]   • Pacemaker [Z95.0]   • Hyperlipidemia [E78.5]   • Status post mitral valve repair [Z98.890]   • Acute respiratory failure with hypoxia (CMS-HCC) [J96.01]   • Chronic low back pain [M54.5, G89.29]   • FAN (acute kidney injury) (CMS-HCC) [N17.9]   • Acute on chronic diastolic congestive heart failure (CMS-HCC) [I50.33]   • Circulating  anticoagulants (CMS-HCC) [D68.318]   • Obesity [E66.9]   • Pulmonary hypertension [I27.20]   • Tobacco use disorder [F17.200]   • PAF (paroxysmal atrial fibrillation) (CMS-HCC) [I48.0]       Plan:  1. Acute on chronic diastolic CHF with subsequent overdiuresis  2. Prior mitral valve repair  3. Persistent atrial fibrillation/flutter, rates were uncontrolled, failed calcium channel blocker and metoprolol.  4. Secondary pulmonary hypertension with dilated RV and RV dysfunction.  5. Tobacco use  6.  Pacemaker in situ  7. Obesity  8. Warfarin therapy, smoke was seen in the left atrial appendage but no thrombus.  9. Hypotension, likely due to dehydration and multiple medications, resolved  10. Acute kidney injury, due to #9, resolved  11. Low-normal LV systolic function, EF 50%  12. Amiodarone therapy, she had a JULY and was anticoagulated so amiodarone although not risk-free in terms of stroke should be well-tolerated. LFTs normal on 4/3/2018. TSH was less than 0.005 on 3/29/2018.  13. Possible hyperthyroidism, low TSH this admission, will need to be followed closely.  14. Chronic pain syndrome  15. Dyslipidemia  16. Abilify therapy, can provoke tachycardias    At this point she can be switched over to Lovenox. We will continue with amiodarone therapy. I may consider cardioversion prior to discharge. Her last EKG reports sinus rhythm but I actually think it is flutter, her flutter waves are difficult to see. She has not had any further heart rates in the 150s. She is intermittently in atrial fibrillation as well. We could consider sotalol therapy to avoid long-term use of amiodarone. She will need outpatient surveillance with TSH, LFTs and thyroid function. I'm going to ask that she be nothing by mouth after midnight for possible cardioversion tomorrow.    Quality-Core Measures   Reviewed items::  EKG reviewed, Labs reviewed and Medications reviewed

## 2018-04-04 NOTE — CARE PLAN
Problem: Safety  Goal: Will remain free from falls  Outcome: PROGRESSING AS EXPECTED  Pt educated regarding the use of call light when needing assistance. Pt demonstrated and verbalized the proper use of a call light and to call for assistance. Fall precautions in place, treaded slippers on, personal belongings/phone in reach. Pt has a steady gate ambulating SBA and is encouraged to call if assistance is needed. Bed alarm is set.       Problem: Venous Thromboembolism (VTW)/Deep Vein Thrombosis (DVT) Prevention:  Goal: Patient will participate in Venous Thrombosis (VTE)/Deep Vein Thrombosis (DVT)Prevention Measures  Outcome: PROGRESSING AS EXPECTED   04/04/18 0237   OTHER   Pharmacologic Prophylaxis Used Warfarin (Coumadin)     Pt on heparin/coumadin bridge      Problem: Psychosocial Needs:  Goal: Level of anxiety will decrease  Outcome: PROGRESSING SLOWER THAN EXPECTED   04/04/18 0237   OTHER   Patient Behaviors Irritable     Pt very irritable at times and verbalized she would like to sleep tonight. Nursing care clustered to allow as much sleeping time as possible.     Problem: Mobility  Goal: Risk for activity intolerance will decrease  Outcome: PROGRESSING AS EXPECTED  Pt mobilized to and from bathroom with SBA and tolerated very well.     Problem: Urinary Elimination:  Goal: Ability to reestablish a normal urinary elimination pattern will improve  Outcome: PROGRESSING AS EXPECTED  Pt voided 750 mL tonight.

## 2018-04-04 NOTE — PROGRESS NOTES
Inpatient Anticoagulation Service Note    Date: 2018  Reason for Anticoagulation: Atrial Fibrillation   [id number] VASc Score: 4    Hemoglobin Value: (!) 10.7  Hematocrit Value: (!) 34.3  Lab Platelet Value: (!) 94  Target INR: 2.0 to 3.0    INR from last 7 days     Date/Time INR Value    18 0505 (!)  1.69    18 0002 (!)  1.28    18 0102 (!)  1.19    18 1354 (!)  1.19        Dose from last 7 days     Date/Time Dose (mg)    18 0505  5    18 0002  5    18 0900  10    18 1400  10        Average Dose (mg): 10 (new start VKA)  Significant Interactions: Statin, Amiodarone  Bridge Therapy: Yes (Lovenox 120 mg SQ BID)   Reversal Agent Administered: Not Applicable  Comments: H/H is stable with no notation of bleeding. INR with a big jump overnight. Continue with current plan for 5 mg daily.    Plan:  INR with morning labs  Education Material Provided?: No  Pharmacist suggested discharge dosin mg daily     Kaci Lau

## 2018-04-04 NOTE — PROGRESS NOTES
Cardiology Progress Note               Author: Leah Bojorquez Date & Time created: 4/3/2018  5:23 PM     Interval History:  Events noted. She received metoprolol succinate 50 mg and became hypotensive. Her creatinine is elevated consistent with low perfusion. She has not had chest pain. She has not had orthopnea or dyspnea. She does not express palpitations. In the ICU she was given some albumin The right thing to do and I've ordered also a fluid bolus. Her urine output has been low.    I reviewed her telemetry, she still appears to be in atrial flutter but her rate is controlled. I discussed with her about initiating blood thinner and amiodarone. She is not having any bleeding in her H&H is stable.    Chief Complaint:  Dyspnea    Review of Systems   Constitutional: Negative for chills and fever.   Cardiovascular: Negative for chest pain and palpitations.     Family history and social history are reviewed and are unchanged.    Physical Exam   General: No acute distress. Well nourished.  HEENT: EOM grossly intact, no scleral icterus, no pharyngeal erythema.   Neck:  No JVD, no bruits, trachea midline  CVS: RRR. Normal S1, S2. No appreciated M/R/G. trace LE edema.  2+ radial pulses, 2+ PT pulses, well-healed scar across the chest  Resp: CTAB. No wheezing or crackles/rhonchi. Normal respiratory effort.  Abdomen: Soft, NT, no maco hepatomegaly, obese.  MSK/Ext: No clubbing or cyanosis.  Skin: Warm and dry, no rashes.  Neurological: CN III-XII grossly intact. No focal deficits.   Psych: A&O x 3, appropriate affect, good judgement    Hemodynamics:  Temp (24hrs), Av.3 °C (97.4 °F), Min:36.1 °C (97 °F), Max:36.7 °C (98 °F)  Temperature: 36.2 °C (97.2 °F)  Pulse  Av  Min: 57  Max: 129Heart Rate (Monitored): 68  Blood Pressure: (!) 68/52, NIBP: (!) 94/48     Respiratory:    Respiration: 16, Pulse Oximetry: 95 %     Fluids:  Date 18 0700 - 18 0659   Shift 4366-6661 9303-2058 5713-0204 24 Hour Total    I  N  T  A  K  E   P.O. 350   350    I.V. 2000 1000  3000    Shift Total 2350 1000  3350   O  U  T  P  U  T   Shift Total       Weight (kg) 127.9 127.9 127.9 127.9       Weight: (!) 127.9 kg (281 lb 15.5 oz)  GI/Nutrition:  Orders Placed This Encounter   Procedures   • DIET ORDER     Standing Status:   Standing     Number of Occurrences:   1     Order Specific Question:   Diet:     Answer:   2 Gram Sodium [7]     Lab Results:  Recent Labs      04/01/18   0214  04/02/18   0103  04/03/18   0002   WBC  6.1  5.8  6.9   RBC  3.92*  3.91*  3.82*   HEMOGLOBIN  11.7*  11.9*  11.7*   HEMATOCRIT  37.2  36.5*  36.4*   MCV  94.9  93.4  95.3   MCH  29.8  30.4  30.6   MCHC  31.5*  32.6*  32.1*   RDW  50.8*  48.6  50.3*   PLATELETCT  117*  110*  130*   MPV  10.3  10.6  10.4     Recent Labs      04/01/18   0214  04/02/18   0102  04/03/18   0002   SODIUM  138  138  136   POTASSIUM  3.7  3.9  4.7   CHLORIDE  99  98  99   CO2  32  35*  27   GLUCOSE  127*  111*  96   BUN  12  13  19   CREATININE  0.74  0.77  2.31*   CALCIUM  8.6  8.8  8.5     Recent Labs      04/01/18   1354  04/02/18   0102  04/03/18   0002  04/03/18   1546   APTT   --    --    --   40.8*   INR  1.19*  1.19*  1.28*   --                      Medical Decision Making, by Problem:  Active Hospital Problems    Diagnosis   • Hypotension [I95.9]   • History of sick sinus syndrome [Z86.79]   • Pacemaker [Z95.0]   • Hyperlipidemia [E78.5]   • Status post mitral valve repair [Z98.890]   • Acute respiratory failure with hypoxia (CMS-HCC) [J96.01]   • Chronic low back pain [M54.5, G89.29]   • FAN (acute kidney injury) (CMS-[facility]) [N17.9]   • Acute on chronic diastolic congestive heart failure (CMS-HCC) [I50.33]   • Circulating anticoagulants (CMS-HCC) [D68.318]   • Obesity [E66.9]   • Pulmonary hypertension [I27.20]   • Tobacco use disorder [F17.200]   • PAF (paroxysmal atrial fibrillation) (CMS-HCC) [I48.0]       A/Plan:  1. Acute on chronic diastolic CHF  2. Prior mitral valve  repair  3. Persistent atrial fibrillation/flutter, rates uncontrolled.  4. Secondary pulmonary hypertension  5. Tobacco use  6. RV dysfunction  7. Obesity  8. Warfarin therapy  9. Pacemaker in situ  10. Hypotension, likely due to dehydration and multiple medications  11. Acute kidney injury, due to #10    At this point I think fluid resuscitation as the right way to go. If her urine output does not improve along with her blood pressure then I would consider repeating a limited echo to make sure she has not developed a pericardial effusion or some other reason for hypertension but this would be very unlikely. Most of her medications are being held, fortunately her heart rate is currently controlled. I'm concerned that she will ultimately failed diltiazem and metoprolol so I spoke with her today about initiating amiodarone therapy. Amiodarone will either achieve rate control or place her back in a normal rhythm. She is on a heparin drip and a JULY yesterday excluded thrombus in the left atrial appendage so it would be safe to proceed.    Quality-Core Measures   Reviewed items::  EKG reviewed, Labs reviewed, Medications reviewed and Radiology images reviewed

## 2018-04-04 NOTE — PROGRESS NOTES
Monitor summary: pt has been paced on demand (rarely not pacing).  HR: 60s-70s  Measurements: (-/10/-)  Ectopy: rare    12 hour CC

## 2018-04-04 NOTE — PROGRESS NOTES
Patient transferred to T7 on monitor with RNs, all possessions, chart, meds, clothes, phone , phone, dentures with patient. Report given to receiving RN

## 2018-04-04 NOTE — DISCHARGE PLANNING
Care Transition Team Assessment  Sw received IPCSS for Depression Scale. Sw woke pt to compete assessment prior to AM rounds. Pt was agitated. Pt reports she already has a counselor/therapist/psychologist and has no need for community counseling resources.  Sw received second IPCSS. Sw collected the following information regarding pt's newly dxed HF. Pt has a car, a phone, prescription coverage, and can purchase a scale for medical follow up. Pt has the ability to purchase low sodium groceries and knows where the food gurera are if she ever has any concerns.         Pt was visited later and provided resources for homemaking as OT indicates pt reports she would like support w/ chores at home due to weakness. Pt accepted these but indicates she will not and extra $20-40 due to other required monthly bills. Sw advised pt will currently not qualify for other forms of homemaking programs.    Pt reports she will drive her own car home once she is ready for d/c. Her car is at hospital now. She lives alone in a one story apartment and has only one support person who is her friend. She is fully independent w/ all I/ADLs. She occasionally uses a FWW and it is at home.      Information Source  Orientation : Oriented x 4  Information Given By: Patient  Informant's Name: Kailey GILMAN Syd  Who is responsible for making decisions for patient? : Patient    Readmission Evaluation  Is this a readmission?: No    Elopement Risk  Legal Hold: No  Ambulatory or Self Mobile in Wheelchair: No-Not an Elopement Risk  Disoriented: No  Psychiatric Symptoms: None  History of Wandering: No  Elopement this Admit: No  Vocalizing Wanting to Leave: No  Displays Behaviors, Body Language Wanting to Leave: No-Not at Risk for Elopement  Elopement Risk: Not at Risk for Elopement  Picture of Patient on Inside Chart Front Cover: No (See Comments)  Purple Armband on Patient: No (See Comments)    Interdisciplinary Discharge Planning  Does Admitting Nurse Feel  This Could be a Complex Discharge?: No  Primary Care Physician: Toni Grijalva   Lives with - Patient's Self Care Capacity: Alone and Able to Care For Self  Patient or legal guardian wants to designate a caregiver (see row info): No  Support Systems:  (self and one friend)  Housing / Facility: 1 Story Apartment / Condo  Do You Take your Prescribed Medications Regularly: Yes  Able to Return to Previous ADL's: Yes  Mobility Issues: No  Prior Services: None  Patient Expects to be Discharged to:: Home  Assistance Needed: No  Durable Medical Equipment: Not Applicable    Discharge Preparedness  What is your plan after discharge?: Other (comment) (home w/o help)  What are your discharge supports?:  (independent, drive self etc)  Prior Functional Level: Ambulatory, Drives Self, Independent with Activities of Daily Living, Independent with Medication Management    Functional Assesment  Prior Functional Level: Ambulatory, Drives Self, Independent with Activities of Daily Living, Independent with Medication Management    Finances  Financial Barriers to Discharge:  (gave resource list for homemakers may not be able $$)  Prescription Coverage: Yes ([employer])    Vision / Hearing Impairment  Vision Impairment : Yes  Right Eye Vision: Impaired, Wears Glasses  Left Eye Vision: Impaired, Wears Glasses  Hearing Impairment : No    Values / Beliefs / Concerns  Values / Beliefs Concerns : No    Advance Directive  Advance Directive?: None  Advance Directive offered?: AD Booklet refused    Domestic Abuse  Have you ever been the victim of abuse or violence?: No  Physical Abuse or Sexual Abuse: No  Verbal Abuse or Emotional Abuse: No  Possible Abuse Reported to:: Not Applicable              Anticipated Discharge Information  Anticipated discharge disposition: Home  Discharge Address: [address] [place] [address] [place] [id number]  Discharge Contact Phone Number: pt's cell [phone]

## 2018-04-04 NOTE — PROGRESS NOTES
Pt is having orthostatic hypotension, her BP is dependent on the angle of her bed, at 45 degrees she had a BP in the low 80s, with her head flat her BP was in the 100s. Dr Ree Stroud ordered another 500 ml bolus

## 2018-04-04 NOTE — PROGRESS NOTES
Critical PTT lab result was 196 at 0005. This value is within the defined limits of the Heparin Weight Based Protocol ordered by the physician for this patient. Heparin Weight Based Protocol will be followed for any adjustments, physician was not notified per protocol instructions.

## 2018-04-04 NOTE — THERAPY
"Physical Therapy Evaluation completed.   Bed Mobility:  Supine to Sit: Supervised  Transfers: Sit to Stand: Stand by Assist  Gait: Level Of Assist: Stand by Assist with Front-Wheel Walker       Plan of Care: Patient with no further skilled PT needs in the acute care setting at this time  Discharge Recommendations: Equipment: No Equipment Needed.     Pt presents very near her functional baseline. She has been educated on energy conservation, Talk Test, and pacing. Pt's largest concern is her ability to keep up with her housework. SW notified.  At this time, she does not require further acute skilled PT intervention.     See \"Rehab Therapy-Acute\" Patient Summary Report for complete documentation.     "

## 2018-04-04 NOTE — CARE PLAN
Problem: Safety  Goal: Will remain free from injury    Intervention: Provide assistance with mobility  Complete. One person standby assist and walker   Intervention: Collaborate with Interdisciplinary Team for safe transfer and mobilization techniques  Patient is stable, standby assist  Intervention: Educate patient and significant other/support system about adaptive mobility strategies and safe transfers  Complete. Patient educated to call for assistance with mobility and transfers.     Goal: Will remain free from falls  Outcome: PROGRESSING AS EXPECTED

## 2018-04-04 NOTE — DIETARY
Nutrition Services: consult received for heart failure diet education    RD visited pt on 4/1 and provided a diet education for overall heart health per cardiac rehab consult (documented in education tab). Diet education included importance of following a low sodium diet. Educational handouts were provided and left at bedside. Visited pt briefly today and went over low sodium portion of diet again. Pt had no questions and stated that she knows how to follow a low sodium diet already.     RD available prn

## 2018-04-04 NOTE — CARE PLAN
Problem: Safety  Goal: Will remain free from injury    Intervention: Provide assistance with mobility  Patient x1 assist      Problem: Pain Management  Goal: Pain level will decrease to patient's comfort goal    Intervention: Follow pain managment plan developed in collaboration with patient and Interdisciplinary Team  Pain meds held due to hypotension

## 2018-04-04 NOTE — PROGRESS NOTES
PT with scar to midline back, and bruises to bilat arms. Pt with dusky colored feet, varicose veins. Skin behind ears  And sacral skin reddened, blanchable.

## 2018-04-05 ENCOUNTER — PATIENT OUTREACH (OUTPATIENT)
Dept: HEALTH INFORMATION MANAGEMENT | Facility: OTHER | Age: 58
End: 2018-04-05

## 2018-04-05 LAB
ANION GAP SERPL CALC-SCNC: 5 MMOL/L (ref 0–11.9)
BASOPHILS # BLD AUTO: 0.8 % (ref 0–1.8)
BASOPHILS # BLD: 0.04 K/UL (ref 0–0.12)
BUN SERPL-MCNC: 13 MG/DL (ref 8–22)
CALCIUM SERPL-MCNC: 9.1 MG/DL (ref 8.5–10.5)
CHLORIDE SERPL-SCNC: 102 MMOL/L (ref 96–112)
CO2 SERPL-SCNC: 30 MMOL/L (ref 20–33)
CREAT SERPL-MCNC: 0.92 MG/DL (ref 0.5–1.4)
EOSINOPHIL # BLD AUTO: 0.15 K/UL (ref 0–0.51)
EOSINOPHIL NFR BLD: 3.2 % (ref 0–6.9)
ERYTHROCYTE [DISTWIDTH] IN BLOOD BY AUTOMATED COUNT: 49.4 FL (ref 35.9–50)
GLUCOSE SERPL-MCNC: 106 MG/DL (ref 65–99)
HCT VFR BLD AUTO: 33.5 % (ref 37–47)
HGB BLD-MCNC: 10.6 G/DL (ref 12–16)
IMM GRANULOCYTES # BLD AUTO: 0.01 K/UL (ref 0–0.11)
IMM GRANULOCYTES NFR BLD AUTO: 0.2 % (ref 0–0.9)
LYMPHOCYTES # BLD AUTO: 1.5 K/UL (ref 1–4.8)
LYMPHOCYTES NFR BLD: 31.7 % (ref 22–41)
MAGNESIUM SERPL-MCNC: 2.3 MG/DL (ref 1.5–2.5)
MCH RBC QN AUTO: 30.2 PG (ref 27–33)
MCHC RBC AUTO-ENTMCNC: 31.6 G/DL (ref 33.6–35)
MCV RBC AUTO: 95.4 FL (ref 81.4–97.8)
MONOCYTES # BLD AUTO: 0.52 K/UL (ref 0–0.85)
MONOCYTES NFR BLD AUTO: 11 % (ref 0–13.4)
NEUTROPHILS # BLD AUTO: 2.51 K/UL (ref 2–7.15)
NEUTROPHILS NFR BLD: 53.1 % (ref 44–72)
NRBC # BLD AUTO: 0 K/UL
NRBC BLD-RTO: 0 /100 WBC
PLATELET # BLD AUTO: 93 K/UL (ref 164–446)
PMV BLD AUTO: 10.9 FL (ref 9–12.9)
POTASSIUM SERPL-SCNC: 4.5 MMOL/L (ref 3.6–5.5)
RBC # BLD AUTO: 3.51 M/UL (ref 4.2–5.4)
SODIUM SERPL-SCNC: 137 MMOL/L (ref 135–145)
WBC # BLD AUTO: 4.7 K/UL (ref 4.8–10.8)

## 2018-04-05 PROCEDURE — A9270 NON-COVERED ITEM OR SERVICE: HCPCS | Performed by: INTERNAL MEDICINE

## 2018-04-05 PROCEDURE — 700111 HCHG RX REV CODE 636 W/ 250 OVERRIDE (IP)

## 2018-04-05 PROCEDURE — 5A2204Z RESTORATION OF CARDIAC RHYTHM, SINGLE: ICD-10-PCS | Performed by: INTERNAL MEDICINE

## 2018-04-05 PROCEDURE — 700102 HCHG RX REV CODE 250 W/ 637 OVERRIDE(OP): Performed by: INTERNAL MEDICINE

## 2018-04-05 PROCEDURE — A9270 NON-COVERED ITEM OR SERVICE: HCPCS | Performed by: STUDENT IN AN ORGANIZED HEALTH CARE EDUCATION/TRAINING PROGRAM

## 2018-04-05 PROCEDURE — 700102 HCHG RX REV CODE 250 W/ 637 OVERRIDE(OP): Performed by: HOSPITALIST

## 2018-04-05 PROCEDURE — 83735 ASSAY OF MAGNESIUM: CPT

## 2018-04-05 PROCEDURE — 85025 COMPLETE CBC W/AUTO DIFF WBC: CPT

## 2018-04-05 PROCEDURE — 36415 COLL VENOUS BLD VENIPUNCTURE: CPT

## 2018-04-05 PROCEDURE — 304952 HCHG R 2 PADS

## 2018-04-05 PROCEDURE — B24BZZ4 ULTRASONOGRAPHY OF HEART WITH AORTA, TRANSESOPHAGEAL: ICD-10-PCS | Performed by: INTERNAL MEDICINE

## 2018-04-05 PROCEDURE — 700102 HCHG RX REV CODE 250 W/ 637 OVERRIDE(OP): Performed by: STUDENT IN AN ORGANIZED HEALTH CARE EDUCATION/TRAINING PROGRAM

## 2018-04-05 PROCEDURE — 700111 HCHG RX REV CODE 636 W/ 250 OVERRIDE (IP): Performed by: HOSPITALIST

## 2018-04-05 PROCEDURE — 80048 BASIC METABOLIC PNL TOTAL CA: CPT

## 2018-04-05 PROCEDURE — 770020 HCHG ROOM/CARE - TELE (206)

## 2018-04-05 PROCEDURE — A9270 NON-COVERED ITEM OR SERVICE: HCPCS | Performed by: HOSPITALIST

## 2018-04-05 PROCEDURE — 99232 SBSQ HOSP IP/OBS MODERATE 35: CPT | Performed by: INTERNAL MEDICINE

## 2018-04-05 PROCEDURE — 92960 CARDIOVERSION ELECTRIC EXT: CPT

## 2018-04-05 RX ORDER — WARFARIN SODIUM 7.5 MG/1
7.5 TABLET ORAL
Status: DISCONTINUED | OUTPATIENT
Start: 2018-04-05 | End: 2018-04-05

## 2018-04-05 RX ORDER — AMIODARONE HYDROCHLORIDE 200 MG/1
TABLET ORAL
Qty: 60 TAB | Refills: 0 | Status: SHIPPED | OUTPATIENT
Start: 2018-04-05 | End: 2018-04-05

## 2018-04-05 RX ORDER — WARFARIN SODIUM 5 MG/1
5 TABLET ORAL
Qty: 10 TAB | Refills: 0 | Status: SHIPPED | OUTPATIENT
Start: 2018-04-06 | End: 2018-04-09 | Stop reason: SDUPTHER

## 2018-04-05 RX ORDER — AMIODARONE HYDROCHLORIDE 200 MG/1
TABLET ORAL
Qty: 60 TAB | Refills: 0 | Status: SHIPPED | OUTPATIENT
Start: 2018-04-05 | End: 2018-04-12 | Stop reason: SDUPTHER

## 2018-04-05 RX ORDER — WARFARIN SODIUM 10 MG/1
10 TABLET ORAL
Status: DISCONTINUED | OUTPATIENT
Start: 2018-04-05 | End: 2018-04-05

## 2018-04-05 RX ORDER — WARFARIN SODIUM 10 MG/1
10 TABLET ORAL
Status: DISPENSED | OUTPATIENT
Start: 2018-04-05 | End: 2018-04-05

## 2018-04-05 RX ORDER — WARFARIN SODIUM 5 MG/1
5 TABLET ORAL
Status: DISCONTINUED | OUTPATIENT
Start: 2018-04-06 | End: 2018-04-06 | Stop reason: HOSPADM

## 2018-04-05 RX ADMIN — VENLAFAXINE HYDROCHLORIDE 150 MG: 75 CAPSULE, EXTENDED RELEASE ORAL at 09:35

## 2018-04-05 RX ADMIN — THERA TABS 1 TABLET: TAB at 09:36

## 2018-04-05 RX ADMIN — STANDARDIZED SENNA CONCENTRATE AND DOCUSATE SODIUM 1 TABLET: 8.6; 5 TABLET, FILM COATED ORAL at 20:49

## 2018-04-05 RX ADMIN — STANDARDIZED SENNA CONCENTRATE AND DOCUSATE SODIUM 2 TABLET: 8.6; 5 TABLET, FILM COATED ORAL at 09:34

## 2018-04-05 RX ADMIN — PREGABALIN 200 MG: 100 CAPSULE ORAL at 20:48

## 2018-04-05 RX ADMIN — VITAMIN D, TAB 1000IU (100/BT) 1000 UNITS: 25 TAB at 09:35

## 2018-04-05 RX ADMIN — ATORVASTATIN CALCIUM 20 MG: 20 TABLET, FILM COATED ORAL at 20:49

## 2018-04-05 RX ADMIN — AMIODARONE HYDROCHLORIDE 400 MG: 200 TABLET ORAL at 09:31

## 2018-04-05 RX ADMIN — PREGABALIN 200 MG: 100 CAPSULE ORAL at 09:34

## 2018-04-05 RX ADMIN — ENOXAPARIN SODIUM 120 MG: 150 INJECTION SUBCUTANEOUS at 09:32

## 2018-04-05 RX ADMIN — ENOXAPARIN SODIUM 120 MG: 150 INJECTION SUBCUTANEOUS at 20:50

## 2018-04-05 RX ADMIN — ARIPIPRAZOLE 10 MG: 10 TABLET ORAL at 09:32

## 2018-04-05 RX ADMIN — AMIODARONE HYDROCHLORIDE 400 MG: 200 TABLET ORAL at 14:50

## 2018-04-05 RX ADMIN — AMIODARONE HYDROCHLORIDE 400 MG: 200 TABLET ORAL at 20:48

## 2018-04-05 RX ADMIN — MORPHINE SULFATE 15 MG: 15 TABLET, EXTENDED RELEASE ORAL at 16:36

## 2018-04-05 RX ADMIN — OXYCODONE HYDROCHLORIDE 10 MG: 10 TABLET ORAL at 20:49

## 2018-04-05 RX ADMIN — OXYCODONE HYDROCHLORIDE 10 MG: 10 TABLET ORAL at 14:50

## 2018-04-05 RX ADMIN — OXYCODONE HYDROCHLORIDE 10 MG: 10 TABLET ORAL at 10:33

## 2018-04-05 RX ADMIN — MORPHINE SULFATE 15 MG: 15 TABLET, EXTENDED RELEASE ORAL at 10:33

## 2018-04-05 RX ADMIN — PREGABALIN 200 MG: 100 CAPSULE ORAL at 14:50

## 2018-04-05 RX ADMIN — NICOTINE 21 MG: 21 PATCH, EXTENDED RELEASE TRANSDERMAL at 06:13

## 2018-04-05 ASSESSMENT — ENCOUNTER SYMPTOMS
ORTHOPNEA: 0
COUGH: 0
EYE REDNESS: 0
CLAUDICATION: 0
PND: 0
CHILLS: 0
NECK PAIN: 0
FOCAL WEAKNESS: 0
HALLUCINATIONS: 0
BLOOD IN STOOL: 0
ABDOMINAL PAIN: 0
PHOTOPHOBIA: 0
HEMOPTYSIS: 0
DIZZINESS: 0
SINUS PAIN: 0
TINGLING: 0
BLURRED VISION: 0
TREMORS: 0
HEADACHES: 0
PALPITATIONS: 0
SPUTUM PRODUCTION: 0
FEVER: 0
DIAPHORESIS: 0
BACK PAIN: 1
DIARRHEA: 0
NERVOUS/ANXIOUS: 1
MEMORY LOSS: 0
DOUBLE VISION: 0
SHORTNESS OF BREATH: 0
VOMITING: 0
NAUSEA: 0
EYE PAIN: 0

## 2018-04-05 ASSESSMENT — PAIN SCALES - GENERAL
PAINLEVEL_OUTOF10: 8
PAINLEVEL_OUTOF10: 7

## 2018-04-05 NOTE — PROGRESS NOTES
Inpatient Anticoagulation Service Note    Date: 4/5/2018  Reason for Anticoagulation: Atrial Fibrillation   [id number] VASc Score: 4    Hemoglobin Value: (!) 10.6  Hematocrit Value: (!) 33.5  Lab Platelet Value: (!) 93  Target INR: 2.0 to 3.0    INR from last 7 days     Date/Time INR Value    04/04/18 0505 (!)  1.69    04/03/18 0002 (!)  1.28    04/02/18 0102 (!)  1.19    04/01/18 1354 (!)  1.19        Dose from last 7 days     Date/Time Dose (mg)    04/05/18 1400  10    04/04/18 0505  5    04/03/18 0002  5    04/02/18 0900  10    04/01/18 1400  10        Average Dose (mg): 5  Significant Interactions: Amiodarone, Statin  Bridge Therapy: Yes  Reversal Agent Administered: Not Applicable    HPI: New start warfarin this admission for h/o Afib ([id number] VASc 4, low risk). Previously on Xarelto, discontinued d/t concerns for renal function. Bridging to therapeutic INR with Lovenox 1mg/kg every 12 hours.     Assessment: INR SUBtherapeutic with large trend upward overnight, 1.69 from 1.28, on day 5 of warfarin initiation. Expect warfarin ~ 5 days to achieve full anticoagulation, patient is right on target.   Potential drug-drug interactions identified with acute inpatient medications: None   Potential drug-drug interactions identified with chronic home medications: Amiodarone and Atorvastatin which will become established interactions with warfarin.     Plan: Warfarin 7.5mg x one tonight, then resume warfarin 5mg po daily. Plan for discharge tomorrow.     Education Material Provided?: No    Pharmacist suggested discharge dosing: Warfarin 5mg po daily      Kellen Arriaga, PharmD    Addendum:  Increase to 10mg x one now per physician in preparation for discharge

## 2018-04-05 NOTE — CARE PLAN
Problem: Safety  Goal: Will remain free from injury  Non slip socks on, bed in low position, 2 side rails up, call light within reach, will continue to monitor.    Problem: Knowledge Deficit  Goal: Knowledge of disease process/condition, treatment plan, diagnostic tests, and medications will improve  Patient updated on plan of care for the day, patient verbalized understanding. All questions and concerns addressed at this time.

## 2018-04-05 NOTE — FACE TO FACE
Face to Face Note  -  Durable Medical Equipment    Santhosh Fermin M.D. - NPI: [id number]  I certify that this patient is under my care and that they had a durable medical equipment(DME)face to face encounter by myself that meets the physician DME face-to-face encounter requirements with this patient on:    Date of encounter:   Patient:                    MRN:                       YOB: 2017  Kailey Velarde  [id number]  1960     The encounter with the patient was in whole, or in part, for the following medical condition, which is the primary reason for durable medical equipment:  CHF and Other - Pulmonary hypertension    I certify that, based on my findings, the following durable medical equipment is medically necessary:  Oxygen.    HOME O2 Saturation Measurements:(Values must be present for Home Oxygen orders)  Room air sat at rest: 82  Room air sat with amb: 80  With liters of O2: 3, O2 sat at rest with O2: 92  With Liters of O2: 3, O2 sat with amb with O2 : 90  Is the patient mobile?: Yes    My Clinical findings support the need for the above equipment due to:  Hypoxia    Supporting Symptoms: Hypoxia with pulmonary hypertension

## 2018-04-05 NOTE — DISCHARGE PLANNING
Anticipated Discharge Disposition: Home with O2    Action: LSW met with pt bedside about Home O2 choice. Pt chose [facility] LSW faxed choice to [facility].     Barriers to Discharge: none    Plan: Pt to dc with home O2 , lovenox and amiodarone.    LSW faxed prior auth for Lovenox to bed day [place] around 3:30 p.m.

## 2018-04-05 NOTE — PROGRESS NOTES
Received report and assumed care of patient. Patient is alert and oriented x4. Patient is in no signs of distress medicated per MAR for pain. Patient had minor nose bleed and has stopped now. Patient was updated on the plan of care for the day. Call light within reach, bed in low position, 2 side rails up. Educated on fall risk, verbalizes understanding. Will continue to monitor.

## 2018-04-05 NOTE — DISCHARGE PLANNING
Call placed to [facility] with [facility], patient referral is being processed and O2 equipment will be delivered today 4/5.

## 2018-04-05 NOTE — PROCEDURES
Cardioversion Procedure Note    Preoperative Diagnosis: Atrial fibrillation    Sedation: Anesthesia present to provide propofol.    Blood loss: none    Pre-procedure JULY: Yes, was performed 2 days prior.    After adequate sedation as above, the patient underwent synchronized cardioversion with 200 joules x one after which she returned to sinus rhythm with ventricular pacing.  The patient tolerated the procedure well without any immediate complications.    Leah Bojorquez MD, [facility]  Cardiologist, [facility] [place]

## 2018-04-05 NOTE — CARE PLAN
Problem: Communication  Goal: The ability to communicate needs accurately and effectively will improve  Outcome: PROGRESSING AS EXPECTED      Problem: Safety  Goal: Will remain free from injury  Outcome: PROGRESSING AS EXPECTED    Goal: Will remain free from falls  Outcome: PROGRESSING AS EXPECTED      Problem: Venous Thromboembolism (VTW)/Deep Vein Thrombosis (DVT) Prevention:  Goal: Patient will participate in Venous Thrombosis (VTE)/Deep Vein Thrombosis (DVT)Prevention Measures  Outcome: PROGRESSING AS EXPECTED      Problem: Knowledge Deficit  Goal: Knowledge of disease process/condition, treatment plan, diagnostic tests, and medications will improve  Outcome: PROGRESSING AS EXPECTED

## 2018-04-05 NOTE — PROGRESS NOTES
Bedside report received. Discussed pt hx, current status and POC. Pt awake, alert, sitting up in bed. Unlabored breathing with O2 via NC in place. Bed in low/locked position, treaded socks on pt. Call bell within reach. Will continue to monitor.

## 2018-04-05 NOTE — DISCHARGE PLANNING
Received choice form from [facility] for patients DME services, referral has been sent to [facility] per patient request.

## 2018-04-06 ENCOUNTER — TELEPHONE (OUTPATIENT)
Dept: CARDIOLOGY | Facility: MEDICAL CENTER | Age: 58
End: 2018-04-06

## 2018-04-06 ENCOUNTER — APPOINTMENT (OUTPATIENT)
Dept: VASCULAR LAB | Facility: MEDICAL CENTER | Age: 58
End: 2018-04-06
Attending: INTERNAL MEDICINE
Payer: MEDICARE

## 2018-04-06 VITALS
WEIGHT: 293 LBS | SYSTOLIC BLOOD PRESSURE: 123 MMHG | DIASTOLIC BLOOD PRESSURE: 74 MMHG | HEIGHT: 68 IN | RESPIRATION RATE: 14 BRPM | OXYGEN SATURATION: 96 % | HEART RATE: 71 BPM | BODY MASS INDEX: 44.41 KG/M2 | TEMPERATURE: 97.2 F

## 2018-04-06 DIAGNOSIS — G47.33 OSA (OBSTRUCTIVE SLEEP APNEA): ICD-10-CM

## 2018-04-06 LAB
ANION GAP SERPL CALC-SCNC: 3 MMOL/L (ref 0–11.9)
BUN SERPL-MCNC: 10 MG/DL (ref 8–22)
CALCIUM SERPL-MCNC: 9 MG/DL (ref 8.5–10.5)
CHLORIDE SERPL-SCNC: 102 MMOL/L (ref 96–112)
CO2 SERPL-SCNC: 30 MMOL/L (ref 20–33)
CREAT SERPL-MCNC: 0.67 MG/DL (ref 0.5–1.4)
ERYTHROCYTE [DISTWIDTH] IN BLOOD BY AUTOMATED COUNT: 49.1 FL (ref 35.9–50)
GLUCOSE SERPL-MCNC: 92 MG/DL (ref 65–99)
HCT VFR BLD AUTO: 32.5 % (ref 37–47)
HGB BLD-MCNC: 10.2 G/DL (ref 12–16)
INR PPP: 1.65 (ref 0.87–1.13)
MCH RBC QN AUTO: 30 PG (ref 27–33)
MCHC RBC AUTO-ENTMCNC: 31.4 G/DL (ref 33.6–35)
MCV RBC AUTO: 95.6 FL (ref 81.4–97.8)
PLATELET # BLD AUTO: 93 K/UL (ref 164–446)
PMV BLD AUTO: 10.9 FL (ref 9–12.9)
POTASSIUM SERPL-SCNC: 4.4 MMOL/L (ref 3.6–5.5)
PROTHROMBIN TIME: 19.2 SEC (ref 12–14.6)
RBC # BLD AUTO: 3.4 M/UL (ref 4.2–5.4)
SODIUM SERPL-SCNC: 135 MMOL/L (ref 135–145)
WBC # BLD AUTO: 4.6 K/UL (ref 4.8–10.8)

## 2018-04-06 PROCEDURE — A9270 NON-COVERED ITEM OR SERVICE: HCPCS | Performed by: INTERNAL MEDICINE

## 2018-04-06 PROCEDURE — 80048 BASIC METABOLIC PNL TOTAL CA: CPT

## 2018-04-06 PROCEDURE — A9270 NON-COVERED ITEM OR SERVICE: HCPCS | Performed by: STUDENT IN AN ORGANIZED HEALTH CARE EDUCATION/TRAINING PROGRAM

## 2018-04-06 PROCEDURE — 700102 HCHG RX REV CODE 250 W/ 637 OVERRIDE(OP): Performed by: STUDENT IN AN ORGANIZED HEALTH CARE EDUCATION/TRAINING PROGRAM

## 2018-04-06 PROCEDURE — 36415 COLL VENOUS BLD VENIPUNCTURE: CPT

## 2018-04-06 PROCEDURE — 700102 HCHG RX REV CODE 250 W/ 637 OVERRIDE(OP): Performed by: INTERNAL MEDICINE

## 2018-04-06 PROCEDURE — 99239 HOSP IP/OBS DSCHRG MGMT >30: CPT | Performed by: INTERNAL MEDICINE

## 2018-04-06 PROCEDURE — 700111 HCHG RX REV CODE 636 W/ 250 OVERRIDE (IP): Performed by: HOSPITALIST

## 2018-04-06 PROCEDURE — 85610 PROTHROMBIN TIME: CPT

## 2018-04-06 PROCEDURE — 85027 COMPLETE CBC AUTOMATED: CPT

## 2018-04-06 RX ADMIN — ENOXAPARIN SODIUM 120 MG: 150 INJECTION SUBCUTANEOUS at 08:19

## 2018-04-06 RX ADMIN — STANDARDIZED SENNA CONCENTRATE AND DOCUSATE SODIUM 2 TABLET: 8.6; 5 TABLET, FILM COATED ORAL at 08:18

## 2018-04-06 RX ADMIN — AMIODARONE HYDROCHLORIDE 400 MG: 200 TABLET ORAL at 08:18

## 2018-04-06 RX ADMIN — ARIPIPRAZOLE 10 MG: 10 TABLET ORAL at 08:19

## 2018-04-06 RX ADMIN — VENLAFAXINE HYDROCHLORIDE 150 MG: 75 CAPSULE, EXTENDED RELEASE ORAL at 08:18

## 2018-04-06 RX ADMIN — THERA TABS 1 TABLET: TAB at 08:18

## 2018-04-06 RX ADMIN — VITAMIN D, TAB 1000IU (100/BT) 1000 UNITS: 25 TAB at 08:19

## 2018-04-06 RX ADMIN — PREGABALIN 200 MG: 100 CAPSULE ORAL at 08:18

## 2018-04-06 RX ADMIN — NICOTINE 21 MG: 21 PATCH, EXTENDED RELEASE TRANSDERMAL at 06:07

## 2018-04-06 ASSESSMENT — ENCOUNTER SYMPTOMS
BLURRED VISION: 0
PALPITATIONS: 0
CHILLS: 0
FEVER: 0
DOUBLE VISION: 0

## 2018-04-06 NOTE — PROGRESS NOTES
Bedside report received. Discussed pt hx, current status and POC. Pt sleeping in bed. Unlabored breathing with O2 via NC in place. Bed in low/locked position, treaded socks on pt. Call bell within reach. Will continue to monitor.

## 2018-04-06 NOTE — PROGRESS NOTES
Cardiology Progress Note               Author: Leah Bojorquez Date & Time created: 2018  12:44 PM     Interval History:  She was feeling better and wanted to go home but became tearful this morning when she found out her insurance would not cover Lovenox. Fortunately our social workers were able to make arrangements and she will go home. She missed her dose of warfarin last night so her INR has not moved much. She must be on Lovenox bridge after cardioversion.    No chest pain, no significant dyspnea, no significant lower showing swelling. No palpitations, no dizziness, no lightheadedness.      Chief Complaint:  Shortness of breath    Review of Systems   Constitutional: Negative for chills and fever.   Eyes: Negative for blurred vision and double vision.   Cardiovascular: Negative for chest pain and palpitations.       Physical Exam   General: No acute distress. Well nourished.  HEENT: EOM grossly intact, no scleral icterus, no pharyngeal erythema.   Neck:  No JVD, no bruits, trachea midline  CVS: Irregularly irregular today. Normal S1, S2. No appreciated M/R/G. trace LE edema.  2+ radial pulses, 2+ PT pulses, well-healed scar across the chest  Resp: CTAB. No wheezing or crackles/rhonchi. Normal respiratory effort.  Abdomen: Soft, NT, no maco hepatomegaly, obese.  MSK/Ext: No clubbing or cyanosis.  Skin: Warm and dry, no rashes.  Neurological: CN III-XII grossly intact. No focal deficits.   Psych: A&O x 3, appropriate affect, good judgement    Hemodynamics:  Temp (24hrs), Av.3 °C (97.4 °F), Min:35.9 °C (96.6 °F), Max:37.1 °C (98.8 °F)  Temperature: 36.2 °C (97.2 °F)  Pulse  Av.9  Min: 43  Max: 129   Blood Pressure: 123/74     Respiratory:    Respiration: 14, Pulse Oximetry: 96 %     Fluids:  Date 18 0700 - 18 0659   Shift 5746-2287 9008-4808 1146-7116 24 Hour Total   I  N  T  A  K  E   P.O. 700   700    I.V. 502.3   502.3    Shift Total 1202.3   1202.3   O  U  T  P  U  T   Urine 200    200    Shift Total 200   200   Weight (kg) 130.6 130.6 130.6 130.6       Weight: (!) 135.6 kg (298 lb 15.1 oz)  GI/Nutrition:  Orders Placed This Encounter   Procedures   • DIET ORDER     Standing Status:   Standing     Number of Occurrences:   1     Order Specific Question:   Diet:     Answer:   2 Gram Sodium [7]     Lab Results:  Recent Labs      04/04/18   0505  04/05/18   0307  04/06/18   0513   WBC  4.8  4.7*  4.6*   RBC  3.60*  3.51*  3.40*   HEMOGLOBIN  10.7*  10.6*  10.2*   HEMATOCRIT  34.3*  33.5*  32.5*   MCV  95.3  95.4  95.6   MCH  29.7  30.2  30.0   MCHC  31.2*  31.6*  31.4*   RDW  49.8  49.4  49.1   PLATELETCT  94*  93*  93*   MPV  11.1  10.9  10.9     Recent Labs      04/04/18   0505  04/05/18   0307  04/06/18   0513   SODIUM  132*  137  135   POTASSIUM  4.3  4.5  4.4   CHLORIDE  101  102  102   CO2  26  30  30   GLUCOSE  150*  106*  92   BUN  20  13  10   CREATININE  1.19  0.92  0.67   CALCIUM  8.5  9.1  9.0     Recent Labs      04/03/18   1546  04/03/18   2250  04/04/18   0505  04/06/18   0513   APTT  40.8*  196.0*  110.0*   --    INR   --    --   1.69*  1.65*                     Medical Decision Making, by Problem:  Active Hospital Problems    Diagnosis   • Hypotension [I95.9]   • History of sick sinus syndrome [Z86.79]   • Pacemaker [Z95.0]   • Hyperlipidemia [E78.5]   • Status post mitral valve repair [Z98.890]   • Acute respiratory failure with hypoxia (CMS-HCC) [J96.01]   • Chronic low back pain [M54.5, G89.29]   • FAN (acute kidney injury) (CMS-HCC) [N17.9]   • Acute on chronic diastolic congestive heart failure (CMS-HCC) [I50.33]   • Circulating anticoagulants (CMS-[facility]) [D68.318]   • Obesity [E66.9]   • Pulmonary hypertension [I27.20]   • Tobacco use disorder [F17.200]   • PAF (paroxysmal atrial fibrillation) (CMS-HCC) [I48.0]       Plan:  1. Acute on chronic diastolic CHF with subsequent overdiuresis  2. Prior mitral valve repair  3. Persistent atrial fibrillation/flutter, rates were  uncontrolled, failed calcium channel blocker and metoprolol. Now status post cardioversion ×1 with return to sinus rhythm and ventricular pacing.  4. Secondary pulmonary hypertension with dilated RV and RV dysfunction, attributed to probable left-sided heart disease, untreated sleep apnea and untreated oxygen requirements. Her right heart catheterization 3/31/2018 did show an elevated wedge of 29, ratio pressure 15, ulnar artery artery pressure 41/25, RV pressure 40/11, cardiac output 5.4 L/m. There is concern for a large V wave however JULY showed no issue with her mitral valve repair.  5. Tobacco use  6.  Pacemaker in situ  7. Obesity  8. Warfarin therapy with Lovenox bridge, smoke was seen in the left atrial appendage but no thrombus.  9. Hypotension, likely due to dehydration and multiple medications, resolved  10. Acute kidney injury, due to #9, resolved  11. Low-normal LV systolic function, EF 50% and elevated wedge.  12. Amiodarone therapy, she had a JULY and was anticoagulated so amiodarone although not risk-free in terms of stroke should be well-tolerated. LFTs normal on 4/3/2018. TSH was less than 0.005 on 3/29/2018.  13. Possible hyperthyroidism, low TSH this admission, will need to be followed closely.  14. Chronic pain syndrome  15. Dyslipidemia  16. Abilify therapy, can provoke tachycardias  17. Oxygen requirement, possibly related to COPD and smoking  18. Probable KAEL    Is being discharged home on a Lovenox bridge. He must be anticoagulated because of the risk of stroke 1-2 weeks after cardioversion due to stunning of the left atrium despite being back in normal rhythm. She missed her dose of warfarin last night.    I discussed with her release having her evaluated for obstructive sleep apnea to know how bad her probable apnea is in that way at least we know in part why her RV is struggling somewhat. She did have an elevated wedge but again we overdiuresis in addition to blood pressures made her  hypotensive with acute kidney injury.    I've asked her to check her weights at home and write them down every day and taken to Jero our nurse practitioner who knows her well. She will be seen by her nurse practitioner on Monday in clinic. I suspect she may benefit from more diuresis which she might tolerate well given that she is back in normal rhythm and no longer on Cardizem and metoprolol which lowered her blood pressure. I have asked Jero to consider increasing her Lasix as an outpatient. Again her right heart catheterization did show an elevated wedge. I think she did not tolerate aggressive diuresis with the metoprolol and Cardizem but overall would need diuresis. Return to sinus rhythm might help her RV function as well. She had an abnormal TSH which was low but normal free T4 and normal LFTs. She will need PFTs as an outpatient for amiodarone therapy monitoring.    Quality-Core Measures   Reviewed items::  EKG reviewed, Labs reviewed and Medications reviewed

## 2018-04-06 NOTE — PROGRESS NOTES
[facility] Hospitalist Progress Note    Date of Service: 2018    Chief Complaint  57 y.o. female admitted 3/28/2018 with chf and a.fib    Interval Problem Update  Patient seen and evaluated on rounds  O2 requirements persistent  DME for O2 written  Wants to go home today but agreeable to staying overnight and early discharge tomorrow  Cardioversion today  Amiodarone continued  Discussed with Dr Bojorquez    Consultants/Specialty  cardiology    Disposition  She wants to discharge home tomorrow  Declined HHC  Ok per cardiology        Review of Systems   Constitutional: Negative for chills, diaphoresis, fever and malaise/fatigue.   HENT: Negative for congestion, ear discharge, ear pain, sinus pain and tinnitus.    Eyes: Negative for blurred vision, double vision, photophobia, pain and redness.   Respiratory: Negative for hemoptysis, sputum production and shortness of breath.    Cardiovascular: Negative for palpitations, claudication and PND.   Gastrointestinal: Negative for abdominal pain, blood in stool, diarrhea, nausea and vomiting.   Genitourinary: Negative for dysuria, frequency and urgency.   Musculoskeletal: Positive for back pain and joint pain (chronic). Negative for neck pain.   Skin: Negative for itching and rash.   Neurological: Negative for dizziness, tingling, tremors, focal weakness and headaches.   Psychiatric/Behavioral: Negative for hallucinations and memory loss. The patient is nervous/anxious.    All other systems reviewed and are negative.     Physical Exam  Laboratory/Imaging   Hemodynamics  Temp (24hrs), Av.2 °C (97.1 °F), Min:35.6 °C (96 °F), Max:36.6 °C (97.9 °F)   Temperature: 36.4 °C (97.6 °F)  Pulse  Av.1  Min: 43  Max: 129    Blood Pressure: 117/69      Respiratory      Respiration: 17, Pulse Oximetry: 94 %     Work Of Breathing / Effort: Mild  RUL Breath Sounds: Clear, RML Breath Sounds: Clear, RLL Breath Sounds: Clear, SANTANA Breath Sounds: Clear, LLL Breath Sounds:  Clear    Fluids    Intake/Output Summary (Last 24 hours) at 04/05/18 2150  Last data filed at 04/05/18 1930   Gross per 24 hour   Intake              400 ml   Output                0 ml   Net              400 ml       Nutrition  Orders Placed This Encounter   Procedures   • DIET ORDER     Standing Status:   Standing     Number of Occurrences:   1     Order Specific Question:   Diet:     Answer:   2 Gram Sodium [7]     Physical Exam   Constitutional: She is oriented to person, place, and time. She appears well-developed.   obese   HENT:   Head: Normocephalic and atraumatic.   Mouth/Throat: Oropharynx is clear and moist.   Eyes: Conjunctivae are normal.   Neck: Neck supple. No JVD present. No thyromegaly present.   Cardiovascular: Normal rate, regular rhythm and normal heart sounds.  Exam reveals no gallop and no friction rub.    No murmur heard.       Pulmonary/Chest: No stridor. No respiratory distress. She has no wheezes. She has no rales. She exhibits no tenderness.   Decreased breath sounds bilaterally   Abdominal: Soft. There is no tenderness. There is no rebound and no guarding.   Morbidly obese   Musculoskeletal: She exhibits edema (trace). She exhibits no tenderness.   Neurological: She is alert and oriented to person, place, and time. No cranial nerve deficit. She exhibits normal muscle tone.   Skin: Skin is warm and dry. She is not diaphoretic. No cyanosis or erythema. Nails show no clubbing.   Psychiatric: She has a normal mood and affect. Her behavior is normal. Thought content normal.       Recent Labs      04/03/18   0002  04/04/18   0505  04/05/18   0307   WBC  6.9  4.8  4.7*   RBC  3.82*  3.60*  3.51*   HEMOGLOBIN  11.7*  10.7*  10.6*   HEMATOCRIT  36.4*  34.3*  33.5*   MCV  95.3  95.3  95.4   MCH  30.6  29.7  30.2   MCHC  32.1*  31.2*  31.6*   RDW  50.3*  49.8  49.4   PLATELETCT  130*  94*  93*   MPV  10.4  11.1  10.9     Recent Labs      04/03/18   0002  04/04/18   0505  04/05/18   0307   SODIUM   136  132*  137   POTASSIUM  4.7  4.3  4.5   CHLORIDE  99  101  102   CO2  27  26  30   GLUCOSE  96  150*  106*   BUN  19  20  13   CREATININE  2.31*  1.19  0.92   CALCIUM  8.5  8.5  9.1     Recent Labs      04/03/18   0002  04/03/18   1546  04/03/18   2250  04/04/18   0505   APTT   --   40.8*  196.0*  110.0*   INR  1.28*   --    --   1.69*                  Assessment/Plan     Acute respiratory failure with hypoxia (CMS-HCC)- (present on admission)   Assessment & Plan    Pulm HTN related  Continue O2 support  O2 will be needed on discharge        Acute on chronic diastolic congestive heart failure (CMS-HCC)- (present on admission)   Assessment & Plan    Cardiology team on board  Further recommendations per cardiology team          Pulmonary hypertension- (present on admission)   Assessment & Plan    Noted on echo  CTA neg for PE  S/p Rt heart cath  Patient will maintain close outpatient cardiology follow up        History of sick sinus syndrome- (present on admission)   Assessment & Plan    Has pacemaker.         Status post mitral valve repair- (present on admission)   Assessment & Plan    Continue AC with coumadin given valvular A-Fib  Outpatient cardiology follow up        PAF (paroxysmal atrial fibrillation) (CMS-HCC)- (present on admission)   Assessment & Plan    On amiodarone and anticoagulation  Discussed with Dr. Bojorquez  Further recommendations per cardiology team  Cardioverted today        Thrombocytopenia (CMS-HCC)- (present on admission)   Assessment & Plan    Acute on chronic  Stable  Continue monitoring        FAN (acute kidney injury) (CMS-HCC)   Assessment & Plan    Improved  Monitor renal function / Avoid nephrotoxins and dose medications renally        Hypotension- (present on admission)   Assessment & Plan    Likely sec to overdiuresis  Monitor        Obesity- (present on admission)   Assessment & Plan    Body mass index is 42.73 kg/m².  Morbid        Tobacco use disorder- (present on admission)    Assessment & Plan      Counseled on cessation        Hyperlipidemia- (present on admission)   Assessment & Plan    Continue lipitor          Chronic low back pain- (present on admission)   Assessment & Plan    History of back surgeries  Follows Dr. Ring, Pain clinic  Continue home dose of narcotics          Quality-Core Measures   Reviewed items::  Labs reviewed, Medications reviewed and Radiology images reviewed  Farley catheter::  No Farley  DVT prophylaxis pharmacological::  Warfarin (Coumadin) and Heparin

## 2018-04-06 NOTE — PROGRESS NOTES
Pt back to room post cardioversion. Awake, alert, assisted to bed. Verified with monitor tech. Continuing to monitor.

## 2018-04-06 NOTE — DISCHARGE PLANNING
Received call from [employer] that Lovenox needed prior auth. After multiple calls LOvenox authed. Claim # [id number]. Co pay amount $3.35. Call placed to [employer] ext [phone].

## 2018-04-06 NOTE — DISCHARGE INSTRUCTIONS
Discharge Instructions    1) Stop Xarelto and start coumadin 5 mg daily.   2) Coumadin takes time to be effective. Your coumadin level also called INR needs to be greater then 2.0 for coumadin to be effective to decrease the risk of stroke with your atrial fibrillation. While coumadin levels or INR is less then 2.0 you need to be on the injection Lovenox twice a day as prescribed and discussed during your hospital stay. You will continue taking coumadin 5 mg daily and Lovenox injections  120 mg twice daily until Monday 04/09/2018. At this time you will follow up with the coumadin clinic on 04/09/2018 at 4.15 pm who will check your coumadin levels or INR and see if your Lovenox injections can be stopped.   3) You are noted to have atrial fibrillation. You were shocked out of this during your hospital stay. To prevent recurrence of this you will take amiodarone as prescribed to you. Also you will take blood thinners as discussed to decrease the risk of stroke. Follow up with heart doctors in outpatient setting for further recommendations. Off note per heart doctors recommendations you will stop Diltiazem also called cardizem at this time. Amiodarone can cause problems with thyroid function and hence you will need your PCP or heart doctors to check your thyroid function in four weeks of hospital discharge.   4) You are noted to have Right sided heart failure. You will need close follow up with heart doctors. Continue lasix you were using at home. Have your heart doctors or PCP monitor renal function and electrolytes in outpatient setting within one week of hospital discharge.   5) Follow up with PCP and Cardiology team as scheduled.   6) Have PCP chec blood counts in one week of hospital discharge and continue monitoring of your blood counts in outpatient setting.   7) Use oxygen as prescribed.   8) You are provided printed prescriptions for the Lovenox and Amiodarone. Other prescriptions including coumadin has been  E.Prescribed to your pharmacy [id number] - [place], [place] - [address]        Discharged to home by car with relative. Discharged via wheelchair, hospital escort: Yes.  Special equipment needed: Oxygen    Be sure to schedule a follow-up appointment with your primary care doctor or any specialists as instructed.     Discharge Plan:   Diet Plan: Discussed  Activity Level: Discussed  Smoking Cessation Offered: Patient Refused  Confirmed Follow up Appointment: Appointment Scheduled  Confirmed Symptoms Management: Discussed  Medication Reconciliation Updated: Yes  Influenza Vaccine Indication: Not indicated: Previously immunized this influenza season and > 8 years of age    I understand that a diet low in cholesterol, fat, and sodium is recommended for good health. Unless I have been given specific instructions below for another diet, I accept this instruction as my diet prescription.   Other diet: Cardiac, heart healthy diet     Special Instructions:   HF Patient Discharge Instructions  · Monitor your weight daily, and maintain a weight chart, to track your weight changes.   · Activity as tolerated, unless your Doctor has ordered otherwise. Other activity order: activity as tolerated.  · Follow a low fat, low cholesterol, low salt diet unless instructed otherwise by your Doctor. Read the labels on the back of food products and track your intake of fat, cholesterol and salt.   · Fluid Restriction No. If a Fluid Restriction has been ordered by your Doctor, measure fluids with a measuring cup to ensure that you are not exceeding the restriction.   · No smoking.  · Oxygen Yes. If your Doctor has ordered that you wear Oxygen at home, it is important to wear it as ordered.  · Did you receive an explanation from staff on the importance of taking each of your medications and why it is necessary to keep taking them unless your doctor says to stop? Yes  · Were all of your questions answered about how to manage  your heart failure and what to do if you have increased signs and symptoms after you go home? Yes  · Do you feel like your heart failure care team involved you in the care treatment plan and allowed you to make decisions regarding your care while in the hospital and addressed any discharge needs you might have? Yes    See the educational handout provided at discharge for more information on monitoring your daily weight, activity and diet. This also explains more about Heart Failure, symptoms of a flare-up and some of the tests that you have undergone.     Warning Signs of a Flare-Up include:  · Swelling in the ankles or lower legs.  · Shortness of breath, while at rest, or while doing normal activities.   · Shortness of breath at night when in bed, or coughing in bed.   · Requiring more pillows to sleep at night, or needing to sit up at night to sleep.  · Feeling weak, dizzy or fatigued.     When to call your Doctor:  · Call [facility] seven days a week from 8:00 a.m. to 8:00 p.m. for medical questions [phone].  · Call your Primary Care Physician or Cardiologist if:   1. You experience any pain radiating to your jaw or neck.  2. You have any difficulty breathing.  3. You experience weight gain of 3 lbs in a day or 5 lbs in a week.   4. You feel any palpitations or irregular heartbeats.  5. You become dizzy or lose consciousness.   If you have had an angiogram or had a pacemaker or AICD placed, and experience:  1. Bleeding, drainage or swelling at the surgical / puncture site.  2. Fever greater than 100.0 F  3. Shock from internal defibrillator.  4. Cool and / or numb extremities.      · Is patient discharged on Warfarin / Coumadin?   Yes    You are receiving the drug warfarin. Please understand the importance of monitoring warfarin with scheduled PT/INR blood draws.  Follow-up with the Coumadin Clinic in one week for INR lab..    IMPORTANT: HOW TO USE THIS INFORMATION:  This is a summary and does  "NOT have all possible information about this product. This information does not assure that this product is safe, effective, or appropriate for you. This information is not individual medical advice and does not substitute for the advice of your health care professional. Always ask your health care professional for complete information about this product and your specific health needs.      WARFARIN - ORAL (WARF-uh-rin)      COMMON BRAND NAME(S): Coumadin      WARNING:  Warfarin can cause very serious (possibly fatal) bleeding. This is more likely to occur when you first start taking this medication or if you take too much warfarin. To decrease your risk for bleeding, your doctor or other health care provider will monitor you closely and check your lab results (INR test) to make sure you are not taking too much warfarin. Keep all medical and laboratory appointments. Tell your doctor right away if you notice any signs of serious bleeding. See also Side Effects section.      USES:  This medication is used to treat blood clots (such as in deep vein thrombosis-DVT or pulmonary embolus-PE) and/or to prevent new clots from forming in your body. Preventing harmful blood clots helps to reduce the risk of a stroke or heart attack. Conditions that increase your risk of developing blood clots include a certain type of irregular heart rhythm (atrial fibrillation), heart valve replacement, recent heart attack, and certain surgeries (such as hip/knee replacement). Warfarin is commonly called a \"blood thinner,\" but the more correct term is \"anticoagulant.\" It helps to keep blood flowing smoothly in your body by decreasing the amount of certain substances (clotting proteins) in your blood.      HOW TO USE:  Read the Medication Guide provided by your pharmacist before you start taking warfarin and each time you get a refill. If you have any questions, ask your doctor or pharmacist. Take this medication by mouth with or without food " as directed by your doctor or other health care professional, usually once a day. It is very important to take it exactly as directed. Do not increase the dose, take it more frequently, or stop using it unless directed by your doctor. Dosage is based on your medical condition, laboratory tests (such as INR), and response to treatment. Your doctor or other health care provider will monitor you closely while you are taking this medication to determine the right dose for you. Use this medication regularly to get the most benefit from it. To help you remember, take it at the same time each day. It is important to eat a balanced, consistent diet while taking warfarin. Some foods can affect how warfarin works in your body and may affect your treatment and dose. Avoid sudden large increases or decreases in your intake of foods high in vitamin K (such as broccoli, cauliflower, cabbage, brussels sprouts, kale, spinach, and other green leafy vegetables, liver, green tea, certain vitamin supplements). If you are trying to lose weight, check with your doctor before you try to go on a diet. Cranberry products may also affect how your warfarin works. Limit the amount of cranberry juice (16 ounces/480 milliliters a day) or other cranberry products you may drink or eat.      SIDE EFFECTS:  Nausea, loss of appetite, or stomach/abdominal pain may occur. If any of these effects persist or worsen, tell your doctor or pharmacist promptly. Remember that your doctor has prescribed this medication because he or she has judged that the benefit to you is greater than the risk of side effects. Many people using this medication do not have serious side effects. This medication can cause serious bleeding if it affects your blood clotting proteins too much (shown by unusually high INR lab results). Even if your doctor stops your medication, this risk of bleeding can continue for up to a week. Tell your doctor right away if you have any signs of  serious bleeding, including: unusual pain/swelling/discomfort, unusual/easy bruising, prolonged bleeding from cuts or gums, persistent/frequent nosebleeds, unusually heavy/prolonged menstrual flow, pink/dark urine, coughing up blood, vomit that is bloody or looks like coffee grounds, severe headache, dizziness/fainting, unusual or persistent tiredness/weakness, bloody/black/tarry stools, chest pain, shortness of breath, difficulty swallowing. Tell your doctor right away if any of these unlikely but serious side effects occur: persistent nausea/vomiting, severe stomach/abdominal pain, yellowing eyes/skin. This drug rarely has caused very serious (possibly fatal) problems if its effects lead to small blood clots (usually at the beginning of treatment). This can lead to severe skin/tissue damage that may require surgery or amputation if left untreated. Patients with certain blood conditions (protein C or S deficiency) may be at greater risk. Get medical help right away if any of these rare but serious side effects occur: painful/red/purplish patches on the skin (such as on the toe, breast, abdomen), change in the amount of urine, vision changes, confusion, slurred speech, weakness on one side of the body. A very serious allergic reaction to this drug is rare. However, get medical help right away if you notice any symptoms of a serious allergic reaction, including: rash, itching/swelling (especially of the face/tongue/throat), severe dizziness, trouble breathing. This is not a complete list of possible side effects. If you notice other effects not listed above, contact your doctor or pharmacist. In the [place] - Call your doctor for medical advice about side effects. You may report side effects to FDA at 9-650-HRV-9516. In [place] - Call your doctor for medical advice about side effects. You may report side effects to [facility] [place] at [phone].      PRECAUTIONS:  Before taking warfarin, tell your doctor or pharmacist if  you are allergic to it; or if you have any other allergies. This product may contain inactive ingredients, which can cause allergic reactions or other problems. Talk to your pharmacist for more details. Before using this medication, tell your doctor or pharmacist your medical history, especially of: blood disorders (such as anemia, hemophilia), bleeding problems (such as bleeding of the stomach/intestines, bleeding in the brain), blood vessel disorders (such as aneurysms), recent major injury/surgery, liver disease, alcohol use, mental/mood disorders (including memory problems), frequent falls/injuries. It is important that all your doctors and dentists know that you take warfarin. Before having surgery or any medical/dental procedures, tell your doctor or dentist that you are taking this medication and about all the products you use (including prescription drugs, nonprescription drugs, and herbal products). Avoid getting injections into the muscles. If you must have an injection into a muscle (for example, a flu shot), it should be given in the arm. This way, it will be easier to check for bleeding and/or apply pressure bandages. This medication may cause stomach bleeding. Daily use of alcohol while using this medicine will increase your risk for stomach bleeding and may also affect how this medication works. Limit or avoid alcoholic beverages. If you have not been eating well, if you have an illness or infection that causes fever, vomiting, or diarrhea for more than 2 days, or if you start using any antibiotic medications, contact your doctor or pharmacist immediately because these conditions can affect how warfarin works. This medication can cause heavy bleeding. To lower the chance of getting cut, bruised, or injured, use great caution with sharp objects like safety razors and nail cutters. Use an electric razor when shaving and a soft toothbrush when brushing your teeth. Avoid activities such as contact sports.  "If you fall or injure yourself, especially if you hit your head, call your doctor immediately. Your doctor may need to check you. The Food & Drug Administration has stated that generic warfarin products are interchangeable. However, consult your doctor or pharmacist before switching warfarin products. Be careful not to take more than one medication that contains warfarin unless specifically directed by the doctor or health care provider who is monitoring your warfarin treatment. Older adults may be at greater risk for bleeding while using this drug. This medication is not recommended for use during pregnancy because of serious (possibly fatal) harm to an unborn baby. Discuss the use of reliable forms of birth control with your doctor. If you become pregnant or think you may be pregnant, tell your doctor immediately. If you are planning pregnancy, discuss a plan for managing your condition with your doctor before you become pregnant. Your doctor may switch the type of medication you use during pregnancy. Very small amounts of this medication may pass into breast milk but is unlikely to harm a nursing infant. Consult your doctor before breast-feeding.      DRUG INTERACTIONS:  Drug interactions may change how your medications work or increase your risk for serious side effects. This document does not contain all possible drug interactions. Keep a list of all the products you use (including prescription/nonprescription drugs and herbal products) and share it with your doctor and pharmacist. Do not start, stop, or change the dosage of any medicines without your doctor's approval. Warfarin interacts with many prescription, nonprescription, vitamin, and herbal products. This includes medications that are applied to the skin or inside the vagina or rectum. The interactions with warfarin usually result in an increase or decrease in the \"blood-thinning\" (anticoagulant) effect. Your doctor or other health care professional " should closely monitor you to prevent serious bleeding or clotting problems. While taking warfarin, it is very important to tell your doctor or pharmacist of any changes in medications, vitamins, or herbal products that you are taking. Some products that may interact with this drug include: capecitabine, imatinib, mifepristone. Aspirin, aspirin-like drugs (salicylates), and nonsteroidal anti-inflammatory drugs (NSAIDs such as ibuprofen, naproxen, celecoxib) may have effects similar to warfarin. These drugs may increase the risk of bleeding problems if taken during treatment with warfarin. Carefully check all prescription/nonprescription product labels (including drugs applied to the skin such as pain-relieving creams) since the products may contain NSAIDs or salicylates. Talk to your doctor about using a different medication (such as acetaminophen) to treat pain/fever. Low-dose aspirin and related drugs (such as clopidogrel, ticlopidine) should be continued if prescribed by your doctor for specific medical reasons such as heart attack or stroke prevention. Consult your doctor or pharmacist for more details. Many herbal products interact with warfarin. Tell your doctor before taking any herbal products, especially bromelains, coenzyme Q10, cranberry, danshen, dong quai, fenugreek, garlic, ginkgo biloba, ginseng, and Farber's wort, among others. This medication may interfere with a certain laboratory test to measure theophylline levels, possibly causing false test results. Make sure laboratory personnel and all your doctors know you use this drug.      OVERDOSE:  If overdose is suspected, contact a poison control center or emergency room immediately. [place] residents can call the [place] National Poison Hotline at 1-298.519.5562. [place] residents can call a provincial poison control center. Symptoms of overdose may include: bloody/black/tarry stools, pink/dark urine, unusual/prolonged bleeding.      NOTES:  Do not share this  medication with others. Laboratory and/or medical tests (such as INR, complete blood count) must be performed periodically to monitor your progress or check for side effects. Consult your doctor for more details.      MISSED DOSE:  For the best possible benefit, do not miss any doses. If you do miss a dose and remember on the same day, take it as soon as you remember. If you remember on the next day, skip the missed dose and resume your usual dosing schedule. Do not double the dose to catch up because this could increase your risk for bleeding. Keep a record of missed doses to give to your doctor or pharmacist. Contact your doctor or pharmacist if you miss 2 or more doses in a row.      STORAGE:  Store at room temperature away from light and moisture. Do not store in the bathroom. Keep all medications away from children and pets. Do not flush medications down the toilet or pour them into a drain unless instructed to do so. Properly discard this product when it is  or no longer needed. Consult your pharmacist or local waste disposal company for more details about how to safely discard your product.      MEDICAL ALERT:  Your condition and medication can cause complications in a medical emergency. For information about enrolling in MedicAlert, call 1-545.929.4038 ([place]) or 1-123.956.5913 ([place]).      Information last revised 2010 Copyright(c) 2010 [employer]             Depression / Suicide Risk    As you are discharged from this [facility] facility, it is important to learn how to keep safe from harming yourself.    Recognize the warning signs:  · Abrupt changes in personality, positive or negative- including increase in energy   · Giving away possessions  · Change in eating patterns- significant weight changes-  positive or negative  · Change in sleeping patterns- unable to sleep or sleeping all the time   · Unwillingness or inability to communicate  · Depression  · Unusual sadness,  discouragement and loneliness  · Talk of wanting to die  · Neglect of personal appearance   · Rebelliousness- reckless behavior  · Withdrawal from people/activities they love  · Confusion- inability to concentrate     If you or a loved one observes any of these behaviors or has concerns about self-harm, here's what you can do:  · Talk about it- your feelings and reasons for harming yourself  · Remove any means that you might use to hurt yourself (examples: pills, rope, extension cords, firearm)  · Get professional help from the community (Mental Health, Substance Abuse, psychological counseling)  · Do not be alone:Call your Safe Contact- someone whom you trust who will be there for you.  · Call your local CRISIS HOTLINE 066-7103 or 237-981-1752  · Call your local Children's Mobile Crisis Response Team [place] (322) 891-2204 or www.[employer]  · Call the toll free National Suicide Prevention Hotlines   · National Suicide Prevention Lifeline 046-089-KTRR (6713)  · National Hope Line Network 800-SUICIDE (155-3086)      Atrial Fibrillation  Introduction  Atrial fibrillation is a type of heartbeat that is irregular or fast (rapid). If you have this condition, your heart keeps quivering in a weird (chaotic) way. This condition can make it so your heart cannot pump blood normally. Having this condition gives a person more risk for stroke, heart failure, and other heart problems. There are different types of atrial fibrillation. Talk with your doctor to learn about the type that you have.  Follow these instructions at home:  · Take over-the-counter and prescription medicines only as told by your doctor.  · If your doctor prescribed a blood-thinning medicine, take it exactly as told. Taking too much of it can cause bleeding. If you do not take enough of it, you will not have the protection that you need against stroke and other problems.  · Do not use any tobacco products. These include cigarettes, chewing  tobacco, and e-cigarettes. If you need help quitting, ask your doctor.  · If you have apnea (obstructive sleep apnea), manage it as told by your doctor.  · Do not drink alcohol.  · Do not drink beverages that have caffeine. These include coffee, soda, and tea.  · Maintain a healthy weight. Do not use diet pills unless your doctor says they are safe for you. Diet pills may make heart problems worse.  · Follow diet instructions as told by your doctor.  · Exercise regularly as told by your doctor.  · Keep all follow-up visits as told by your doctor. This is important.  Contact a doctor if:  · You notice a change in the speed, rhythm, or strength of your heartbeat.  · You are taking a blood-thinning medicine and you notice more bruising.  · You get tired more easily when you move or exercise.  Get help right away if:  · You have pain in your chest or your belly (abdomen).  · You have sweating or weakness.  · You feel sick to your stomach (nauseous).  · You notice blood in your throw up (vomit), poop (stool), or pee (urine).  · You are short of breath.  · You suddenly have swollen feet and ankles.  · You feel dizzy.  · Your suddenly get weak or numb in your face, arms, or legs, especially if it happens on one side of your body.  · You have trouble talking, trouble understanding, or both.  · Your face or your eyelid droops on one side.  These symptoms may be an emergency. Do not wait to see if the symptoms will go away. Get medical help right away. Call your local emergency services (911 in the [place] Do not drive yourself to the hospital.   This information is not intended to replace advice given to you by your health care provider. Make sure you discuss any questions you have with your health care provider.  Document Released: 09/26/2009 Document Revised: 05/25/2017 Document Reviewed: 04/13/2016  © 2017 [employer]    Enoxaparin injection  What is this medicine?  ENOXAPARIN (ee nox a PA rin) is used after knee, hip, or  abdominal surgeries to prevent blood clotting. It is also used to treat existing blood clots in the lungs or in the veins.  This medicine may be used for other purposes; ask your health care provider or pharmacist if you have questions.  COMMON BRAND NAME(S): Lovenox  What should I tell my health care provider before I take this medicine?  They need to know if you have any of these conditions:  -bleeding disorders, hemorrhage, or hemophilia  -infection of the heart or heart valves  -kidney or liver disease  -previous stroke  -prosthetic heart valve  -recent surgery or delivery of a baby  -ulcer in the stomach or intestine, diverticulitis, or other bowel disease  -an unusual or allergic reaction to enoxaparin, heparin, pork or pork products, other medicines, foods, dyes, or preservatives  -pregnant or trying to get pregnant  -breast-feeding  How should I use this medicine?  This medicine is for injection under the skin. It is usually given by a health-care professional. You or a family member may be trained on how to give the injections. If you are to give yourself injections, make sure you understand how to use the syringe, measure the dose if necessary, and give the injection. To avoid bruising, do not rub the site where this medicine has been injected. Do not take your medicine more often than directed. Do not stop taking except on the advice of your doctor or health care professional.  Make sure you receive a puncture-resistant container to dispose of the needles and syringes once you have finished with them. Do not reuse these items. Return the container to your doctor or health care professional for proper disposal.  Talk to your pediatrician regarding the use of this medicine in children. Special care may be needed.  Overdosage: If you think you have taken too much of this medicine contact a poison control center or emergency room at once.  NOTE: This medicine is only for you. Do not share this medicine with  others.  What if I miss a dose?  If you miss a dose, take it as soon as you can. If it is almost time for your next dose, take only that dose. Do not take double or extra doses.  What may interact with this medicine?  -aspirin and aspirin-like medicines  -certain medicines that treat or prevent blood clots  -dipyridamole  -NSAIDs, medicines for pain and inflammation, like ibuprofen or naproxen  This list may not describe all possible interactions. Give your health care provider a list of all the medicines, herbs, non-prescription drugs, or dietary supplements you use. Also tell them if you smoke, drink alcohol, or use illegal drugs. Some items may interact with your medicine.  What should I watch for while using this medicine?  Visit your doctor or health care professional for regular checks on your progress. Your condition will be monitored carefully while you are receiving this medicine.  Notify your doctor or health care professional and seek emergency treatment if you develop breathing problems; changes in vision; chest pain; severe, sudden headache; pain, swelling, warmth in the leg; trouble speaking; sudden numbness or weakness of the face, arm, or leg. These can be signs that your condition has gotten worse.  If you are going to have surgery, tell your doctor or health care professional that you are taking this medicine.  Do not stop taking this medicine without first talking to your doctor. Be sure to refill your prescription before you run out of medicine.  Avoid sports and activities that might cause injury while you are using this medicine. Severe falls or injuries can cause unseen bleeding. Be careful when using sharp tools or knives. Consider using an electric razor. Take special care brushing or flossing your teeth. Report any injuries, bruising, or red spots on the skin to your doctor or health care professional.  What side effects may I notice from receiving this medicine?  Side effects that you  should report to your doctor or health care professional as soon as possible:  -allergic reactions like skin rash, itching or hives, swelling of the face, lips, or tongue  -feeling faint or lightheaded, falls  -signs and symptoms of bleeding such as bloody or black, tarry stools; red or dark-brown urine; spitting up blood or brown material that looks like coffee grounds; red spots on the skin; unusual bruising or bleeding from the eye, gums, or nose  Side effects that usually do not require medical attention (report to your doctor or health care professional if they continue or are bothersome):  -pain, redness, or irritation at site where injected  This list may not describe all possible side effects. Call your doctor for medical advice about side effects. You may report side effects to FDA at 6-280-FDA-6482.  Where should I keep my medicine?  Keep out of the reach of children.  Store at room temperature between 15 and 30 degrees C (59 and 86 degrees F). Do not freeze. If your injections have been specially prepared, you may need to store them in the refrigerator. Ask your pharmacist. Throw away any unused medicine after the expiration date.  NOTE: This sheet is a summary. It may not cover all possible information. If you have questions about this medicine, talk to your doctor, pharmacist, or health care provider.  © 2018 [employer]vier/Gold Standard (2015-04-21 16:06:21)    Amiodarone tablets  What is this medicine?  AMIODARONE (a ALEXIS oh da jane) is an antiarrhythmic drug. It helps make your heart beat regularly. Because of the side effects caused by this medicine, it is only used when other medicines have not worked. It is usually used for heartbeat problems that may be life threatening.  This medicine may be used for other purposes; ask your health care provider or pharmacist if you have questions.  COMMON BRAND NAME(S): Cordarone, Pacerone  What should I tell my health care provider before I take this medicine?  They  need to know if you have any of these conditions:  -liver disease  -lung disease  -other heart problems  -thyroid disease  -an unusual or allergic reaction to amiodarone, iodine, other medicines, foods, dyes, or preservatives  -pregnant or trying to get pregnant  -breast-feeding  How should I use this medicine?  Take this medicine by mouth with a glass of water. Follow the directions on the prescription label. You can take this medicine with or without food. However, you should always take it the same way each time. Take your doses at regular intervals. Do not take your medicine more often than directed. Do not stop taking except on the advice of your doctor or health care professional.  A special MedGuide will be given to you by the pharmacist with each prescription and refill. Be sure to read this information carefully each time.  Talk to your pediatrician regarding the use of this medicine in children. Special care may be needed.  Overdosage: If you think you have taken too much of this medicine contact a poison control center or emergency room at once.  NOTE: This medicine is only for you. Do not share this medicine with others.  What if I miss a dose?  If you miss a dose, take it as soon as you can. If it is almost time for your next dose, take only that dose. Do not take double or extra doses.  What may interact with this medicine?  Do not take this medicine with any of the following medications:  -abarelix  -apomorphine  -arsenic trioxide  -certain antibiotics like erythromycin, gemifloxacin, levofloxacin, pentamidine  -certain medicines for depression like amoxapine, tricyclic antidepressants  -certain medicines for fungal infections like fluconazole, itraconazole, ketoconazole, posaconazole, voriconazole  -certain medicines for irregular heart beat like disopyramide, dofetilide, dronedarone, ibutilide, propafenone, sotalol  -certain medicines for malaria like chloroquine,  halofantrine  -cisapride  -droperidol  -haloperidol  -hawthorn  -maprotiline  -methadone  -phenothiazines like chlorpromazine, mesoridazine, thioridazine  -pimozide  -ranolazine  -red yeast rice  -vardenafil  -ziprasidone  This medicine may also interact with the following medications:  -antiviral medicines for HIV or AIDS  -certain medicines for blood pressure, heart disease, irregular heart beat  -certain medicines for cholesterol like atorvastatin, cerivastatin, lovastatin, simvastatin  -certain medicines for hepatitis C like sofosbuvir and ledipasvir; sofosbuvir  -certain medicines for seizures like phenytoin  -certain medicines for thyroid problems  -certain medicines that treat or prevent blood clots like warfarin  -cholestyramine  -cimetidine  -clopidogrel  -cyclosporine  -dextromethorphan  -diuretics  -fentanyl  -general anesthetics  -grapefruit juice  -lidocaine  -loratadine  -methotrexate  -other medicines that prolong the QT interval (cause an abnormal heart rhythm)  -procainamide  -quinidine  -rifabutin, rifampin, or rifapentine  -Venkat's Wort  -trazodone  This list may not describe all possible interactions. Give your health care provider a list of all the medicines, herbs, non-prescription drugs, or dietary supplements you use. Also tell them if you smoke, drink alcohol, or use illegal drugs. Some items may interact with your medicine.  What should I watch for while using this medicine?  Your condition will be monitored closely when you first begin therapy. Often, this drug is first started in a hospital or other monitored health care setting. Once you are on maintenance therapy, visit your doctor or health care professional for regular checks on your progress. Because your condition and use of this medicine carry some risk, it is a good idea to carry an identification card, necklace or bracelet with details of your condition, medications, and doctor or health care professional.  You may get drowsy  or dizzy. Do not drive, use machinery, or do anything that needs mental alertness until you know how this medicine affects you. Do not stand or sit up quickly, especially if you are an older patient. This reduces the risk of dizzy or fainting spells.  This medicine can make you more sensitive to the sun. Keep out of the sun. If you cannot avoid being in the sun, wear protective clothing and use sunscreen. Do not use sun lamps or tanning beds/booths.  You should have regular eye exams before and during treatment. Call your doctor if you have blurred vision, see halos, or your eyes become sensitive to light. Your eyes may get dry. It may be helpful to use a lubricating eye solution or artificial tears solution.  If you are going to have surgery or a procedure that requires contrast dyes, tell your doctor or health care professional that you are taking this medicine.  What side effects may I notice from receiving this medicine?  Side effects that you should report to your doctor or health care professional as soon as possible:  -allergic reactions like skin rash, itching or hives, swelling of the face, lips, or tongue  -blue-gray coloring of the skin  -blurred vision, seeing blue green halos, increased sensitivity of the eyes to light  -breathing problems  -chest pain  -dark urine  -fast, irregular heartbeat  -feeling faint or light-headed  -intolerance to heat or cold  -nausea or vomiting  -pain and swelling of the scrotum  -pain, tingling, numbness in feet, hands  -redness, blistering, peeling or loosening of the skin, including inside the mouth  -spitting up blood  -stomach pain  -sweating  -unusual or uncontrolled movements of body  -unusually weak or tired  -weight gain or loss  -yellowing of the eyes or skin  Side effects that usually do not require medical attention (report to your doctor or health care professional if they continue or are bothersome):  -change in sex drive or  performance  -constipation  -dizziness  -headache  -loss of appetite  -trouble sleeping  This list may not describe all possible side effects. Call your doctor for medical advice about side effects. You may report side effects to FDA at 7-102-ETD-1642.  Where should I keep my medicine?  Keep out of the reach of children.  Store at room temperature between 20 and 25 degrees C (68 and 77 degrees F). Protect from light. Keep container tightly closed. Throw away any unused medicine after the expiration date.  NOTE: This sheet is a summary. It may not cover all possible information. If you have questions about this medicine, talk to your doctor, pharmacist, or health care provider.  © 2018 Elsevier/Gold Standard (2015-03-23 19:48:11)

## 2018-04-06 NOTE — PROGRESS NOTES
Monitor Summary     Aflutter 65-67  Paced 79-80 (post cardioversion)  SR 69-78 (post cardioversion)  .22/.08/.42

## 2018-04-06 NOTE — DISCHARGE SUMMARY
C O N F I D E N T I A L I N F O R M A T I O N   -------------------------------------------------------------------------------------------------------------------   DISCHARGE SUMMARY    Patient ID:  Kailey Velarde  [id number]  57 y.o.female  1960    Admit date: 3/28/2018    Discharge date and time: 04/06/18    Admitting Physician: Prashanth Damian M.D.    Discharge Physician: Santhosh Fermin    CODE STATUS: FULL CODE    Admission Diagnoses: CHF exacerbation (CMS-HCC)    Active Problems:    Acute respiratory failure with hypoxia (CMS-HCC)    PAF (paroxysmal atrial fibrillation) (CMS-HCC)    Status post mitral valve repair    History of sick sinus syndrome    Pulmonary hypertension    Acute on chronic diastolic congestive heart failure (CMS-HCC)    Chronic low back pain    Hyperlipidemia    Tobacco use disorder    Obesity    Hypotension    FAN (acute kidney injury) (CMS-HCC)    Thrombocytopenia (CMS-HCC)      Admission Condition: poor    Discharged Condition: good    Indication for Admission: Shortness of breath    Hospital Course: This is a 57 years old female with underlying history of morbid obesity and prior mitral valve repair with underlying atrial fibrillation who presented to the hospital on March 28, 2018 with shortness of breath and fatigue. Patient was noted to have acute hypoxemic respiration failure. CTA chest pulmonary arterial protocol obtained on presentation did not reveal any evidence of pulmonary emboli but revealed findings consistent with pulmonary edema. Echocardiogram obtained during the hospital stay revealed findings of pulmonary hypertension. Patient was noted to have fluid overload and RV failure. She was admitted to the hospital and IV diuresis was initiated. Cardiology consultation was obtained. There was concern for right ventricular failure. Patient subsequently underwent right heart catheterization for evaluation of underlying pressures on March 31, 2017 revealing a cardiac output of  5.4 L/m, cardiac index of 2.3 L/m/m². Right atrial pressure mean of 15, pulmonary arterial pressure of 41/25 and RV pressure of 40/11. Mean wedge pressure of 29. Patient underwent a transesophageal echocardiogram on April 3, 2018 revealing a low normal EF of 50%. Moderate right ventricular dilation with reduced systolic function of the right ventricle. Patient's hospital course was subsequently complicated by development of acute kidney injury and hypotension secondary to overdiuresis. Secondary to this she was transferred to the intensive care unit. Diuresis was stopped and patient responded well to IV fluid hydration with resolution of her acute kidney injury and improvement in her blood pressures. She was transferred out of the ICU to the telemetry nursing floor. Cardiology team remained on board. Her A-Fib is presumed valvular at this point and hence she was transitioned from oral xarelto to PO coumadin at this time. Subsequent DC cardioversion was performed by cardiology team on April 5, 2018. She was maintained on Lovenox to coumadin bridge during the hospital stay. She was initiated on amiodarone during the hospital stay. Patient's is very eager to go home on 04/06/18. She does not want to stay in the hospital any further. Her INR today is 1.65. Patient has already received lovenox education day prior. She will continue outpatient Lovenox to coumadin bridge at this time until Monday when she will follow up with the coumadin clinic. I discussed with Dr Bojorquez from cardiology, 04/06/18 regarding discharge recommendations. Patient will discharge on amiodarone taper, anticoagulation, stop cardizem, continue diuretics and follow up with heart failure clinic as scheduled when further recommendations to be provided. This has been discussed with the patient. Patient is very eager to be discharged home at this time. She does not want to stay in the hospital any further.  to have Lovenox arranged for the  patient. In addition DME for O2 written as patient has new onset O2 needs. In addition off note HHC was recommended to the patient but she declined this. Discharge planning discussed in detail with the patient. Outpatient f/u established for the patient. She is discharged home in stable condition at this time.     Things to follow up after discharge:   1) Stop Xarelto and start coumadin 5 mg daily.   2) Coumadin takes time to be effective. Your coumadin level also called INR needs to be greater then 2.0 for coumadin to be effective to decrease the risk of stroke with your atrial fibrillation. While coumadin levels or INR is less then 2.0 you need to be on the injection Lovenox twice a day as prescribed and discussed during your hospital stay. You will continue taking coumadin 5 mg daily and Lovenox injections  120 mg twice daily until Monday 04/09/2018. At this time you will follow up with the coumadin clinic on 04/09/2018 at 4.15 pm who will check your coumadin levels or INR and see if your Lovenox injections can be stopped.   3) You are noted to have atrial fibrillation. You were shocked out of this during your hospital stay. To prevent recurrence of this you will take amiodarone as prescribed to you. Also you will take blood thinners as discussed to decrease the risk of stroke. Follow up with heart doctors in outpatient setting for further recommendations. Off note per heart doctors recommendations you will stop Diltiazem also called cardizem at this time. Amiodarone can cause problems with thyroid function and hence you will need your PCP or heart doctors to check your thyroid function in four weeks of hospital discharge.   4) You are noted to have Right sided heart failure. You will need close follow up with heart doctors. Continue lasix you were using at home. Have your heart doctors or PCP monitor renal function and electrolytes in outpatient setting within one week of hospital discharge.   5) Follow up with  PCP and Cardiology team as scheduled.   6) Have PCP chec blood counts in one week of hospital discharge and continue monitoring of your blood counts in outpatient setting.   7) Use oxygen as prescribed.   8) You are provided printed prescriptions for the Lovenox and Amiodarone. Other prescriptions including coumadin has been E.Prescribed to your pharmacy [id number] - [place], [place] - [address]    Consults: cardiology    Significant Studies:   US-RENAL   Final Result      No evidence of hydronephrosis.      Heterogeneous and echogenic appearance of the liver can be seen in hepatic steatosis or hepatocellular disease.      TRANSESOPHAGEAL ECHO W/O CONT (Order not available for [facility])   Final Result      ECHOCARDIOGRAM COMP W/O CONT   Final Result      CT-CTA CHEST PULMONARY ARTERY W/ RECONS   Final Result      No evidence pulmonary emboli.   Right lower lobe atelectasis.   Groundglass opacities throughout both lungs consistent with mild edema or pneumonitis.   Trace right pleural effusion.   Coronary artery calcifications.   Hepatic steatosis and splenomegaly.            DX-CHEST-PORTABLE (1 VIEW)   Final Result      Mild lung base linear atelectasis. No consolidation identified.          Procedures Performed While Hospitalized: Right heart cath and DC cardioversion    Operations During Hospitalization: None    Disposition: Home    Patient Instructions: Given  Activity: activity as tolerated  Wound Care: none needed  Diet:   Orders Placed This Encounter   Procedures   • DIET ORDER     Standing Status:   Standing     Number of Occurrences:   1     Order Specific Question:   Diet:     Answer:   2 Gram Sodium [7]      Kailey Velarde   Home Medication Instructions DEYANIRA:[id number]    Printed on:04/06/18 3075   Medication Information                      amiodarone (CORDARONE) 200 MG Tab  Take 400 mg twice daily for one week, Then 400 mg once daily for one week, Then 200 mg once daily. Further  dosing recommendations per your heart doctors.             aripiprazole (ABILIFY) 5 MG tablet  Take 5 mg by mouth every morning.             atorvastatin (LIPITOR) 20 MG Tab  Take 1 Tab by mouth every evening.             enoxaparin (LOVENOX) 120 MG/0.8ML Solution inj  Inject 120 mg as instructed every 12 hours.             furosemide (LASIX) 20 MG Tab  Take 20 mg by mouth every morning.             morphine SR (MS CONTIN) 15 MG TB12  Take 1 Tab by mouth 3 times a day.             Multiple Vitamin (MULTI-DAY PO)  Take 1 Tab by mouth every morning.             oxyCODONE immediate release (ROXICODONE) 10 MG immediate release tablet  Take 10 mg by mouth 3 times a day. TID with MS Contin             potassium chloride SA (KDUR) 20 MEQ Tab CR  Take 20 mEq by mouth every morning.             pregabalin (LYRICA) 200 MG capsule  Take 200 mg by mouth 3 times a day.             venlafaxine (EFFEXOR-XR) 150 MG extended-release capsule  Take 150 mg by mouth every morning.             vitamin D (CHOLECALCIFEROL) 1000 UNIT Tab  Take 1,000 Units by mouth every morning.             warfarin (COUMADIN) 5 MG Tab  Take 1 Tab by mouth COUMADIN-DAILY.                 Opioid prescription history checked: N/A. None prescribed.     Time spend preparing discharge: 39 minutes. This included face to face with the patient, medication reconciliation, care co ordination with RN involved in patient care and discussion and co ordination with case management.     Signed:  Santhosh Fermin  04/06/18  8:37 AM

## 2018-04-06 NOTE — DISCHARGE PLANNING
Anticipated Discharge Disposition: Home with Lovenox and home O1    Action: LSW got approved service for 51.29$ for lovenox 10 syringes. LSW gave pt approved services form and medication. [facility]     Barriers to Discharge: None    Plan: Pt to dc home with medications and O2.

## 2018-04-06 NOTE — TELEPHONE ENCOUNTER
----- Message from Viky Weathers R.N. sent at 4/6/2018 10:24 AM PDT -----      ----- Message -----  From: PALMIRA Cantu  Sent: 4/6/2018  10:07 AM  To: Viky Weathers R.N.    Can you please refer Kailey Velarde [id number] to pulmonary for KAEL.  Thanks paloma

## 2018-04-06 NOTE — PROGRESS NOTES
Report received at bedside, patient care assumed, tele box on, patient care assumed. Pt AAO x 4, no signs of distress noted at this time. POC discussed with pt and all questions answered. Pt c/o 8/10 pain in lower back. Medicated per MAR. Pt denies any additional needs at this time. Bed in lowest position, call light and personal possessions within reach. Will continue to monitor.

## 2018-04-07 ENCOUNTER — PATIENT OUTREACH (OUTPATIENT)
Dept: HEALTH INFORMATION MANAGEMENT | Facility: OTHER | Age: 58
End: 2018-04-07

## 2018-04-07 NOTE — TELEPHONE ENCOUNTER
The referral to Pulmonology will be sent to [facility]. The contact number is [phone].    Patient still admitted, letter mailed.

## 2018-04-09 ENCOUNTER — TELEPHONE (OUTPATIENT)
Dept: VASCULAR LAB | Facility: MEDICAL CENTER | Age: 58
End: 2018-04-09

## 2018-04-09 ENCOUNTER — ANTICOAGULATION VISIT (OUTPATIENT)
Dept: VASCULAR LAB | Facility: MEDICAL CENTER | Age: 58
End: 2018-04-09
Attending: INTERNAL MEDICINE
Payer: MEDICARE

## 2018-04-09 DIAGNOSIS — Z95.3 HISTORY OF HEART VALVE REPLACEMENT WITH BIOPROSTHETIC VALVE: ICD-10-CM

## 2018-04-09 DIAGNOSIS — Z98.890 STATUS POST MITRAL VALVE REPAIR: ICD-10-CM

## 2018-04-09 DIAGNOSIS — I48.0 PAF (PAROXYSMAL ATRIAL FIBRILLATION) (HCC): ICD-10-CM

## 2018-04-09 LAB
INR BLD: 1.8 (ref 0.9–1.2)
INR PPP: 1.8 (ref 2–3.5)

## 2018-04-09 PROCEDURE — 85610 PROTHROMBIN TIME: CPT

## 2018-04-09 PROCEDURE — 99213 OFFICE O/P EST LOW 20 MIN: CPT | Performed by: PHARMACIST

## 2018-04-09 RX ORDER — WARFARIN SODIUM 5 MG/1
5 TABLET ORAL
Qty: 90 TAB | Refills: 2 | Status: SHIPPED | OUTPATIENT
Start: 2018-04-09 | End: 2018-11-20 | Stop reason: SDUPTHER

## 2018-04-10 ENCOUNTER — TELEPHONE (OUTPATIENT)
Dept: CARDIOLOGY | Facility: MEDICAL CENTER | Age: 58
End: 2018-04-10

## 2018-04-10 NOTE — PROGRESS NOTES
.  .  Anticoagulation Summary  As of 4/9/2018    INR goal:   2.0-3.0   TTR:   --   Today's INR:   1.8!   Maintenance plan:   5 mg (5 mg x 1) every day   Weekly total:   35 mg   Plan last modified:   Leighann Harvey, PharmD (4/9/2018)   Next INR check:   4/12/2018   Target end date:       Indications    PAF (paroxysmal atrial fibrillation) (CMS-HCC) [I48.0]  Long term current use of anticoagulant therapy (Resolved) [Z79.01]             Anticoagulation Episode Summary     INR check location:       Preferred lab:       Send INR reminders to:       Comments:           Anticoagulation Patient Findings      PCP - Augusto Grijalva  Cardiologist -  To  Pt hx - Pt was on warfarin prior and then was switched to Xarelto and now back to warfarin, as her Afib was presumed valvular. Cardioversion on 4/5/18.  CHADSVASC = 4     Pt is NOT new to warfarin and new to [facility].   Pt had left her oxygen in the car and was destating in the exam room, due to this didn't get to finish talking about warfarin new education with pt.     Pt has a large, what appears to be hematoma on her lower right abdomen. It's approx 12 inches long and 4-5 inches wide. Edges are deep purple and blue, and interior is warm, red, and has harden feel. Also has a large bruise on upper right arm. Wanted pt to wait to have APRN look at pt, however again she left as she was destating.     At this point discussed with pt, pt wants to stop lovenox.     Will have pt take a boost dose of 10mg x1 of warfarin tonight, and then 5mg tomorrow and Wednesday.   Return to clinic on Thursday.     Leighann Harvey, PharmD

## 2018-04-10 NOTE — TELEPHONE ENCOUNTER
Initial anticoag note and most recent cards note reviewed.  Pending any further recs, we will continue with indefinite anticoag with warfarin for h/o afib with bioprosthetic mitral valve replacement.   Patient also with h/o significant pulmonary htn seen on echo.  Will defer decision about whether or not patient should be on concomitant asa to cards.  Will defer all other cv care, aside from anticoag, to cardiology.    Michael Bloch, MD  Anticoagulation Clinic    Cc:      JANEE Madrid

## 2018-04-10 NOTE — TELEPHONE ENCOUNTER
Order was generated in hospital, per DB APN, order was resent to [facility] by .  Patient informed to call [facility] to make sure they received it.

## 2018-04-10 NOTE — TELEPHONE ENCOUNTER
----- Message from Olinda Sparks sent at 4/10/2018 12:03 PM PDT -----  Regarding: oxygen order  Contact: [phone]  JANESSA/sherly    Pt calling to ask you to fax order to [facility] for oxygen asap.  Please call pt for details & if there are any questions, [phone].

## 2018-04-11 PROBLEM — Z95.3 HISTORY OF HEART VALVE REPLACEMENT WITH BIOPROSTHETIC VALVE: Status: ACTIVE | Noted: 2018-04-11

## 2018-04-12 ENCOUNTER — ANTICOAGULATION VISIT (OUTPATIENT)
Dept: VASCULAR LAB | Facility: MEDICAL CENTER | Age: 58
End: 2018-04-12
Attending: INTERNAL MEDICINE
Payer: MEDICARE

## 2018-04-12 ENCOUNTER — OFFICE VISIT (OUTPATIENT)
Dept: CARDIOLOGY | Facility: MEDICAL CENTER | Age: 58
End: 2018-04-12
Payer: MEDICARE

## 2018-04-12 VITALS
SYSTOLIC BLOOD PRESSURE: 142 MMHG | BODY MASS INDEX: 42.89 KG/M2 | WEIGHT: 283 LBS | HEART RATE: 66 BPM | HEIGHT: 68 IN | DIASTOLIC BLOOD PRESSURE: 76 MMHG | OXYGEN SATURATION: 93 % | RESPIRATION RATE: 16 BRPM

## 2018-04-12 DIAGNOSIS — I50.810 RIGHT HEART FAILURE (HCC): ICD-10-CM

## 2018-04-12 DIAGNOSIS — I48.0 PAF (PAROXYSMAL ATRIAL FIBRILLATION) (HCC): ICD-10-CM

## 2018-04-12 DIAGNOSIS — Z98.890 STATUS POST MITRAL VALVE REPAIR: ICD-10-CM

## 2018-04-12 DIAGNOSIS — F17.200 TOBACCO USE DISORDER: ICD-10-CM

## 2018-04-12 DIAGNOSIS — L03.311 CELLULITIS OF ABDOMINAL WALL: ICD-10-CM

## 2018-04-12 DIAGNOSIS — R60.0 LOCALIZED EDEMA: ICD-10-CM

## 2018-04-12 DIAGNOSIS — J96.01 ACUTE RESPIRATORY FAILURE WITH HYPOXIA (HCC): ICD-10-CM

## 2018-04-12 LAB
INR BLD: 3.4 (ref 0.9–1.2)
INR PPP: 3.4 (ref 2–3.5)

## 2018-04-12 PROCEDURE — 99212 OFFICE O/P EST SF 10 MIN: CPT | Performed by: PHARMACIST

## 2018-04-12 PROCEDURE — 99214 OFFICE O/P EST MOD 30 MIN: CPT | Performed by: NURSE PRACTITIONER

## 2018-04-12 PROCEDURE — 85610 PROTHROMBIN TIME: CPT

## 2018-04-12 RX ORDER — POTASSIUM CHLORIDE 20 MEQ/1
20 TABLET, EXTENDED RELEASE ORAL DAILY
Qty: 30 TAB | Refills: 11 | Status: SHIPPED | OUTPATIENT
Start: 2018-04-12 | End: 2018-08-01 | Stop reason: SDUPTHER

## 2018-04-12 RX ORDER — AMIODARONE HYDROCHLORIDE 200 MG/1
200 TABLET ORAL DAILY
Qty: 30 TAB | Refills: 11 | Status: SHIPPED | OUTPATIENT
Start: 2018-04-12 | End: 2018-08-01 | Stop reason: SDUPTHER

## 2018-04-12 RX ORDER — FUROSEMIDE 20 MG/1
40 TABLET ORAL EVERY MORNING
Qty: 60 TAB | Refills: 11 | Status: SHIPPED | OUTPATIENT
Start: 2018-04-12 | End: 2018-07-25 | Stop reason: SDUPTHER

## 2018-04-12 ASSESSMENT — ENCOUNTER SYMPTOMS
ABDOMINAL PAIN: 0
PND: 0
DEPRESSION: 1
COUGH: 1
CLAUDICATION: 0
SHORTNESS OF BREATH: 1
DIZZINESS: 0
ORTHOPNEA: 0
PALPITATIONS: 0
WEAKNESS: 0
MYALGIAS: 0

## 2018-04-12 NOTE — PROGRESS NOTES
Anticoagulation Summary  As of 4/12/2018    INR goal:   2.0-3.0   TTR:   --   Today's INR:   3.4!   Maintenance plan:   2.5 mg (5 mg x 0.5) on Mon, Fri; 5 mg (5 mg x 1) all other days   Weekly total:   30 mg   Plan last modified:   Leighann Harvey, PharmZAY (4/12/2018)   Next INR check:   4/16/2018   Target end date:   Indefinite    Indications    PAF (paroxysmal atrial fibrillation) (CMS-HCC) [I48.0]  Long term current use of anticoagulant therapy (Resolved) [Z79.01]  Status post mitral valve repair [Z98.890]             Anticoagulation Episode Summary     INR check location:       Preferred lab:       Send INR reminders to:       Comments:         Anticoagulation Care Providers     Provider Role Specialty Phone number    [facility] Responsible  [phone]        Anticoagulation Patient Findings      HPI:  Kailey Machadoub seen in clinic today, on anticoagulation therapy with warfarin for PAF  Changes to current medical/health status since last appt: pt doing much better today  Denies signs/symptoms of bleeding and/or thrombosis since the last appt.    Denies any interval changes to diet  Denies any interval changes to medications since last appt.   Denies any complications or cost restrictions with current therapy.   BP declined bc she came from another appt.       A/P   INR  is SUPRA-therapeutic.   Will have pt take a reduced dose of 2.5mg x2 days, and then to the new potential weekly dose of 5mg daily except for 2.5mg on MF.     Follow up appointment in 4 day(s).    Leighann Harvey, LeonoraD

## 2018-04-12 NOTE — LETTER
"     [facility]   [address], [address]  [place] [address]  Phone: [phone]  Fax: [phone]              Kailey Velarde  1960    Encounter Date: 4/12/2018    P[facility] Card          PROGRESS NOTE:  Chief Complaint   Patient presents with   • Atrial Fibrillation       Subjective:   Kailey \"Melanie\"  MUKUL Velarde is a 57 y.o. female who presents today for hospital follow-up for atrial fibrillation and respiratory failure with congestive heart failure.    She was last seen in the office by myself at which time she was hypoxic and tachycardic. She was admitted to the hospital and found to have rapid atrial fibrillation, respiratory failure and fluid overload. She was diuresed but had acute kidney injury and had to be fluid resuscitated for her kidney function to improve. She underwent right heart catheter and was found to have elevated right-sided pressures. She is on continuous oxygen.     She did not tolerate diltiazem or metoprolol as rate controlling agents as she became hypotensive. She was started on amiodarone for rate and rhythm control. She underwent cardioversion to sinus rhythm. She has a pacemaker.    Since been out of the hospital, she has had worsening ankle edema and feels fluid overloaded. She admits to drinking about a 12 pack of beer a day and admits that she is an alcoholic. She wishes to stop drinking and stop smoking in the near future.    She combines a shortness of breath with exertion. She continues to have ankle edema which she feels is worsening. She denies any chest tightness, heaviness or pressure. No palpitations.    She complains of a red raised slightly tender area on the right lower part of her abdomen where she received an injection for Lovenox. The area feels hot to her.    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   • Status post mitral valve repair November 2011    MV repair with 32mm Jonnie-Carpenter flexible annuloplasty ring.     Past Surgical History:   Procedure Laterality Date   • CARDIAC CATH, RIGHT HEART  03/31/2018    Right heart cath with high pressures.   • MITRAL VALVE REPAIR  11/17/2011    Performed by MARY ORTIZ at SURGERY [facility]   • PACEMAKER INSERTION  November 2011    Medtronic Versa VEDR01 implanted by Dr. Amaya.   • OTHER ORTHOPEDIC SURGERY      Neck and back      Family History   Problem Relation Age of Onset   • Heart Attack Father      Social History     Social History   • Marital status: Single     Spouse name: N/A   • Number of children: N/A   • Years of education: N/A     Occupational History   • Not on file.     Social History Main Topics   • Smoking status: Current Every Day Smoker     Packs/day: 0.25     Years: 30.00     Types: Cigarettes   • Smokeless tobacco: Never Used      Comment: 4/12 Down to vapping now.    • Alcohol use No      Comment: sober since 7/14/16   • Drug use: No      Comment: hx of, denies currently   • Sexual activity: Not on file     Other Topics Concern   • Not on file     Social History Narrative   • No narrative on file     Allergies   Allergen Reactions   • Penicillins Rash           Outpatient Encounter Prescriptions as of 4/12/2018   Medication Sig Dispense Refill   • furosemide (LASIX) 20 MG Tab Take 2 Tabs by mouth every morning. 60 Tab 11   • potassium chloride SA (KDUR) 20 MEQ Tab CR Take 1 Tab by mouth every day. 30 Tab 11   • amiodarone (CORDARONE) 200 MG Tab Take 1 Tab by mouth every day. 30 Tab 11   • warfarin (COUMADIN) 5 MG Tab Take 1 Tab by mouth COUMADIN-DAILY. 90 Tab 2   • oxyCODONE  "immediate release (ROXICODONE) 10 MG immediate release tablet Take 10 mg by mouth 3 times a day. TID with MS Contin     • aripiprazole (ABILIFY) 5 MG tablet Take 5 mg by mouth every morning. (soon to increase to 10mg)     • venlafaxine (EFFEXOR-XR) 150 MG extended-release capsule Take 150 mg by mouth every morning.     • Multiple Vitamin (MULTI-DAY PO) Take 1 Tab by mouth every morning.     • atorvastatin (LIPITOR) 20 MG Tab Take 1 Tab by mouth every evening. 30 Tab 11   • pregabalin (LYRICA) 200 MG capsule Take 200 mg by mouth 3 times a day.     • vitamin D (CHOLECALCIFEROL) 1000 UNIT Tab Take 1,000 Units by mouth every morning.     • morphine SR (MS CONTIN) 15 MG TB12 Take 1 Tab by mouth 3 times a day. 60 Tab 0   • [DISCONTINUED] enoxaparin (LOVENOX) 120 MG/0.8ML Solution inj Inject 120 mg as instructed every 12 hours. (Patient not taking: Reported on 4/12/2018) 10 Syringe 0   • [DISCONTINUED] amiodarone (CORDARONE) 200 MG Tab Take 400 mg twice daily for one week, Then 400 mg once daily for one week, Then 200 mg once daily. Further dosing recommendations per your heart doctors. 60 Tab 0   • [DISCONTINUED] furosemide (LASIX) 20 MG Tab Take 20 mg by mouth every morning.     • [DISCONTINUED] potassium chloride SA (KDUR) 20 MEQ Tab CR Take 10 mEq by mouth 2 times a day.       No facility-administered encounter medications on file as of 4/12/2018.      Review of Systems   Constitutional: Positive for malaise/fatigue.   Respiratory: Positive for cough (in am.) and shortness of breath (walking on a flat surface.).    Cardiovascular: Negative for chest pain, palpitations, orthopnea, claudication, leg swelling and PND.   Gastrointestinal: Negative for abdominal pain.   Musculoskeletal: Negative for myalgias.   Neurological: Negative for dizziness and weakness.   Psychiatric/Behavioral: Positive for depression (followed by psychiatry.).        Objective:   /76   Pulse 66   Resp 16   Ht 1.727 m (5' 8\")   Wt (!) " 128.4 kg (283 lb)   [facility] 01/01/2010   SpO2 93%   BMI 43.03 kg/m²      Physical Exam   Constitutional: She is oriented to person, place, and time. She appears well-developed and well-nourished.   HENT:   Head: Normocephalic.   Eyes: EOM are normal.   Neck: No JVD present.   Cardiovascular: Normal rate, regular rhythm and normal heart sounds.    Pulmonary/Chest: Effort normal. She has no rales.   Abdominal: Soft. Bowel sounds are normal.   Red, hot area in the right lower abdominal skin that appears to be a cellulitis. No vesicles or oozing.   Musculoskeletal: She exhibits edema (1+ bilateral ankle edema.).   Neurological: She is alert and oriented to person, place, and time.   Skin: Skin is warm and dry.   Psychiatric: She has a normal mood and affect.     March 29, 2018: Transthoracic Echo Report  Systolic function is difficult to assess in rapid atrial fibrillation, but appears normal.  Severely dilated right ventricle.  Reduced right ventricular systolic function.  Mildly dilated left atrium.  Known mitral valve bioprosthesis which is functioning normally.  Estimated right ventricular systolic pressure  is 35 mmHg + JVP.  Compared to previous echocardiogram from 8/22/2016 the rhythm is now rapid atrial fibrillation. Estimated right-sided pressures are lower in this study, and the mitral bioprosthesis continues to function normally.      Assessment:     1. Acute respiratory failure with hypoxia (CMS-[facility])     2. Right heart failure     3. PAF (paroxysmal atrial fibrillation) (CMS-[facility])     4. Localized edema     5. Tobacco use disorder     6. Cellulitis of abdominal wall         Medical Decision Making:  Today's Assessment / Status / Plan:   Respiratory failure: She is on continuous oxygen and saturating well. Her lungs are clear. She does have elevated right heart pressures which probably contributed to her respiratory failure.    Right heart failure: She was a right heart catheter which showed elevated values. We  will treat her with diuretics. Since she has ankle edema I will have her increase to Lasix 40 mg daily at potassium 20 mEq daily.    Paroxysmal atrial fibrillation: She is in a regular rhythm today in the 60s therefore possibly she is pacing. She denies any palpitations. She is on warfarin for anticoagulation now.    Edema: Probably multifactorial related to right heart failure, dependency and fluid overload due to drinking a 12 pack of beer a day. I would like her to reduce her fluid intake.    Tobacco use: She is planning on quitting smoking and drinking at the same time.    Cellulitis: It appears she may have a superficial cellulitis of the abdominal skin which may be secondary to having received a Lovenox injection. I will like her to contact her primary care for evaluation and treatment as I do not know what antibiotic would be appropriate for cellulitis. She will contact her primary care tomorrow. She was afebrile with a temperature of 98.0.    She will follow-up in 2 weeks with myself to monitor fluid status. Her primary care has ordered TSH and BMP to be done as recommended bone hospital discharge. The patient also need referral to pulmonary medicine and pulmonary function testing. The patient tells me that her primary can make this referral.    I will have her follow-up with Dr. Leah Bojorquez to establish since patient would prefer to see her and Dr. Bojorquez knows her case.    She will follow up sooner if problem.    Collaborating Provider: Dr Mei        No Recipients

## 2018-04-12 NOTE — PROGRESS NOTES
"Chief Complaint   Patient presents with   • Atrial Fibrillation       Subjective:   Kailey \"Melanie\"  MUKUL Velarde is a 57 y.o. female who presents today for hospital follow-up for atrial fibrillation and respiratory failure with congestive heart failure.    She was last seen in the office by myself at which time she was hypoxic and tachycardic. She was admitted to the hospital and found to have rapid atrial fibrillation, respiratory failure and fluid overload. She was diuresed but had acute kidney injury and had to be fluid resuscitated for her kidney function to improve. She underwent right heart catheter and was found to have elevated right-sided pressures. She is on continuous oxygen.     She did not tolerate diltiazem or metoprolol as rate controlling agents as she became hypotensive. She was started on amiodarone for rate and rhythm control. She underwent cardioversion to sinus rhythm. She has a pacemaker.    Since been out of the hospital, she has had worsening ankle edema and feels fluid overloaded. She admits to drinking about a 12 pack of beer a day and admits that she is an alcoholic. She wishes to stop drinking and stop smoking in the near future.    She combines a shortness of breath with exertion. She continues to have ankle edema which she feels is worsening. She denies any chest tightness, heaviness or pressure. No palpitations.    She complains of a red raised slightly tender area on the right lower part of her abdomen where she received an injection for Lovenox. The area feels hot to her.    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   • Status post " mitral valve repair November 2011    MV repair with 32mm Jonnie-Carpenter flexible annuloplasty ring.     Past Surgical History:   Procedure Laterality Date   • CARDIAC CATH, RIGHT HEART  03/31/2018    Right heart cath with high pressures.   • MITRAL VALVE REPAIR  11/17/2011    Performed by MARY ORTIZ at SURGERY [facility]   • PACEMAKER INSERTION  November 2011    Medtronic Versa VEDR01 implanted by Dr. Amaya.   • OTHER ORTHOPEDIC SURGERY      Neck and back      Family History   Problem Relation Age of Onset   • Heart Attack Father      Social History     Social History   • Marital status: Single     Spouse name: N/A   • Number of children: N/A   • Years of education: N/A     Occupational History   • Not on file.     Social History Main Topics   • Smoking status: Current Every Day Smoker     Packs/day: 0.25     Years: 30.00     Types: Cigarettes   • Smokeless tobacco: Never Used      Comment: 4/12 Down to vapping now.    • Alcohol use No      Comment: sober since 7/14/16   • Drug use: No      Comment: hx of, denies currently   • Sexual activity: Not on file     Other Topics Concern   • Not on file     Social History Narrative   • No narrative on file     Allergies   Allergen Reactions   • Penicillins Rash           Outpatient Encounter Prescriptions as of 4/12/2018   Medication Sig Dispense Refill   • furosemide (LASIX) 20 MG Tab Take 2 Tabs by mouth every morning. 60 Tab 11   • potassium chloride SA (KDUR) 20 MEQ Tab CR Take 1 Tab by mouth every day. 30 Tab 11   • amiodarone (CORDARONE) 200 MG Tab Take 1 Tab by mouth every day. 30 Tab 11   • warfarin (COUMADIN) 5 MG Tab Take 1 Tab by mouth COUMADIN-DAILY. 90 Tab 2   • oxyCODONE immediate release (ROXICODONE) 10 MG immediate release tablet Take 10 mg by mouth 3 times a day. TID with MS Contin     • aripiprazole (ABILIFY) 5 MG tablet Take 5 mg by mouth every morning. (soon to increase to 10mg)     • venlafaxine (EFFEXOR-XR) 150 MG extended-release capsule  "Take 150 mg by mouth every morning.     • Multiple Vitamin (MULTI-DAY PO) Take 1 Tab by mouth every morning.     • atorvastatin (LIPITOR) 20 MG Tab Take 1 Tab by mouth every evening. 30 Tab 11   • pregabalin (LYRICA) 200 MG capsule Take 200 mg by mouth 3 times a day.     • vitamin D (CHOLECALCIFEROL) 1000 UNIT Tab Take 1,000 Units by mouth every morning.     • morphine SR (MS CONTIN) 15 MG TB12 Take 1 Tab by mouth 3 times a day. 60 Tab 0   • [DISCONTINUED] enoxaparin (LOVENOX) 120 MG/0.8ML Solution inj Inject 120 mg as instructed every 12 hours. (Patient not taking: Reported on 4/12/2018) 10 Syringe 0   • [DISCONTINUED] amiodarone (CORDARONE) 200 MG Tab Take 400 mg twice daily for one week, Then 400 mg once daily for one week, Then 200 mg once daily. Further dosing recommendations per your heart doctors. 60 Tab 0   • [DISCONTINUED] furosemide (LASIX) 20 MG Tab Take 20 mg by mouth every morning.     • [DISCONTINUED] potassium chloride SA (KDUR) 20 MEQ Tab CR Take 10 mEq by mouth 2 times a day.       No facility-administered encounter medications on file as of 4/12/2018.      Review of Systems   Constitutional: Positive for malaise/fatigue.   Respiratory: Positive for cough (in am.) and shortness of breath (walking on a flat surface.).    Cardiovascular: Negative for chest pain, palpitations, orthopnea, claudication, leg swelling and PND.   Gastrointestinal: Negative for abdominal pain.   Musculoskeletal: Negative for myalgias.   Neurological: Negative for dizziness and weakness.   Psychiatric/Behavioral: Positive for depression (followed by psychiatry.).        Objective:   /76   Pulse 66   Resp 16   Ht 1.727 m (5' 8\")   Wt (!) 128.4 kg (283 lb)   LMP 01/01/2010   SpO2 93%   BMI 43.03 kg/m²     Physical Exam   Constitutional: She is oriented to person, place, and time. She appears well-developed and well-nourished.   HENT:   Head: Normocephalic.   Eyes: EOM are normal.   Neck: No JVD present. "   Cardiovascular: Normal rate, regular rhythm and normal heart sounds.    Pulmonary/Chest: Effort normal. She has no rales.   Abdominal: Soft. Bowel sounds are normal.   Red, hot area in the right lower abdominal skin that appears to be a cellulitis. No vesicles or oozing.   Musculoskeletal: She exhibits edema (1+ bilateral ankle edema.).   Neurological: She is alert and oriented to person, place, and time.   Skin: Skin is warm and dry.   Psychiatric: She has a normal mood and affect.     March 29, 2018: Transthoracic Echo Report  Systolic function is difficult to assess in rapid atrial fibrillation, but appears normal.  Severely dilated right ventricle.  Reduced right ventricular systolic function.  Mildly dilated left atrium.  Known mitral valve bioprosthesis which is functioning normally.  Estimated right ventricular systolic pressure  is 35 mmHg + JVP.  Compared to previous echocardiogram from 8/22/2016 the rhythm is now rapid atrial fibrillation. Estimated right-sided pressures are lower in this study, and the mitral bioprosthesis continues to function normally.      Assessment:     1. Acute respiratory failure with hypoxia (CMS-[facility])     2. Right heart failure     3. PAF (paroxysmal atrial fibrillation) (CMS-[facility])     4. Localized edema     5. Tobacco use disorder     6. Cellulitis of abdominal wall         Medical Decision Making:  Today's Assessment / Status / Plan:   Respiratory failure: She is on continuous oxygen and saturating well. Her lungs are clear. She does have elevated right heart pressures which probably contributed to her respiratory failure.    Right heart failure: She was a right heart catheter which showed elevated values. We will treat her with diuretics. Since she has ankle edema I will have her increase to Lasix 40 mg daily at potassium 20 mEq daily.    Paroxysmal atrial fibrillation: She is in a regular rhythm today in the 60s therefore possibly she is pacing. She denies any palpitations.  She is on warfarin for anticoagulation now.    Edema: Probably multifactorial related to right heart failure, dependency and fluid overload due to drinking a 12 pack of beer a day. I would like her to reduce her fluid intake.    Tobacco use: She is planning on quitting smoking and drinking at the same time.    Cellulitis: It appears she may have a superficial cellulitis of the abdominal skin which may be secondary to having received a Lovenox injection. I will like her to contact her primary care for evaluation and treatment as I do not know what antibiotic would be appropriate for cellulitis. She will contact her primary care tomorrow. She was afebrile with a temperature of 98.0.    She will follow-up in 2 weeks with myself to monitor fluid status. Her primary care has ordered TSH and BMP to be done as recommended bone hospital discharge. The patient also need referral to pulmonary medicine and pulmonary function testing. The patient tells me that her primary can make this referral.    I will have her follow-up with Dr. Leah Bojorquez to establish since patient would prefer to see her and Dr. Bojorquez knows her case.    She will follow up sooner if problem.    Collaborating Provider: Dr Mei

## 2018-04-16 ENCOUNTER — TELEPHONE (OUTPATIENT)
Dept: CARDIOLOGY | Facility: MEDICAL CENTER | Age: 58
End: 2018-04-16

## 2018-04-16 ENCOUNTER — OFFICE VISIT (OUTPATIENT)
Dept: CARDIOLOGY | Facility: MEDICAL CENTER | Age: 58
End: 2018-04-16
Payer: MEDICARE

## 2018-04-16 ENCOUNTER — ANTICOAGULATION VISIT (OUTPATIENT)
Dept: VASCULAR LAB | Facility: MEDICAL CENTER | Age: 58
End: 2018-04-16
Attending: INTERNAL MEDICINE
Payer: MEDICARE

## 2018-04-16 VITALS
HEART RATE: 84 BPM | HEIGHT: 68 IN | OXYGEN SATURATION: 95 % | SYSTOLIC BLOOD PRESSURE: 118 MMHG | WEIGHT: 273 LBS | DIASTOLIC BLOOD PRESSURE: 72 MMHG | BODY MASS INDEX: 41.37 KG/M2

## 2018-04-16 DIAGNOSIS — I48.0 PAF (PAROXYSMAL ATRIAL FIBRILLATION) (HCC): ICD-10-CM

## 2018-04-16 DIAGNOSIS — I51.89 LEFT VENTRICULAR DIASTOLIC DYSFUNCTION, NYHA CLASS 3: ICD-10-CM

## 2018-04-16 DIAGNOSIS — G47.33 OSA (OBSTRUCTIVE SLEEP APNEA): ICD-10-CM

## 2018-04-16 DIAGNOSIS — Z98.890 STATUS POST MITRAL VALVE REPAIR: ICD-10-CM

## 2018-04-16 DIAGNOSIS — F17.200 TOBACCO USE DISORDER: ICD-10-CM

## 2018-04-16 DIAGNOSIS — I50.30 ACC/AHA STAGE C HEART FAILURE WITH PRESERVED EJECTION FRACTION (HCC): ICD-10-CM

## 2018-04-16 LAB
INR BLD: 1.7 (ref 0.9–1.2)
INR PPP: 1.7 (ref 2–3.5)

## 2018-04-16 PROCEDURE — 99212 OFFICE O/P EST SF 10 MIN: CPT | Performed by: NURSE PRACTITIONER

## 2018-04-16 PROCEDURE — 99215 OFFICE O/P EST HI 40 MIN: CPT | Performed by: INTERNAL MEDICINE

## 2018-04-16 PROCEDURE — 85610 PROTHROMBIN TIME: CPT

## 2018-04-16 ASSESSMENT — MINNESOTA LIVING WITH HEART FAILURE QUESTIONNAIRE (MLHF)
LOSS OF SELF CONTROL IN YOUR LIFE: 0
HAVING TO SIT OR LIE DOWN DURING THE DAY: 1
COSTING YOU MONEY FOR MEDICAL CARE: 4
DIFFICULTY SLEEPING WELL AT NIGHT: 0
MAKING YOU SHORT OF BREATH: 3
DIFFICULTY SOCIALIZING WITH FAMILY OR FRIENDS: 2
MAKING YOU STAY IN A HOSPITAL: 3
DIFFICULTY WORKING TO EARN A LIVING: 0
MAKING YOU WORRY: 4
EATING LESS FOODS YOU LIKE: 1
WORKING AROUND THE HOUSE OR YARD DIFFICULT: 3
SWELLING IN ANKLES OR LEGS: 4
DIFFICULTY TO CONCENTRATE OR REMEMBERING THINGS: 1
DIFFICULTY GOING AWAY FROM HOME: 4
TOTAL_SCORE: 39
MAKING YOU FEEL DEPRESSED: 4
DIFFICULTY WITH RECREATIONAL PASTIMES, SPORTS, HOBBIES: 0
DIFFICULTY WITH SEXUAL ACTIVITIES: 0
FEELING LIKE A BURDEN TO FAMILY AND FRIENDS: 0
TIRED, FATIGUED OR LOW ON ENERGY: 0
WALKING ABOUT OR CLIMBING STAIRS DIFFICULT: 3
GIVING YOU SIDE EFFECTS FROM TREATMENTS: 2

## 2018-04-16 ASSESSMENT — NEW YORK HEART ASSOCIATION (NYHA) CLASSIFICATION: NYHA FUNCTIONAL CLASS: CLASS III

## 2018-04-16 NOTE — LETTER
[facility]   [address]  [place] [address]  Phone: [phone]  Fax: [phone]              Kailey Velarde  1960    Encounter Date: 4/16/2018    George Richardson M.D.          PROGRESS NOTE:  Chief Complaint   Patient presents with   • Atrial Fibrillation     NEW HEART FAILURE       Subjective:   Kailey Velarde is a 57 y.o. female who presents today for cardiac care and management of heart failure with preserved left ventricular systolic function. Patient also had a history of paroxysmal atrial fibrillation. She went into the hospital and had atrial arrhythmias. She is now currently in sinus rhythm. She was diagnosed with heart failure exacerbation. She was diuresed and sent home. She had transthoracic echocardiogram showing normal left ventricular systolic function.    Patient still gets winded with daily living activities and exertion. No symptoms at rest.    Patient did not want to walk for 6 minutes walk test.    During her hospital stay, she also had a right heart catheterization showing cardiac index of 2.3, pulmonary arterial wedge pressure of 29 mean, mean arterial pressure of 41/25.    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   • Status post mitral valve repair November 2011    MV repair with 32mm Jonnie-Carpenter flexible annuloplasty ring.     Past Surgical History:   Procedure Laterality Date   • CARDIAC CATH, RIGHT HEART  03/31/2018    Right heart cath with high pressures.   • MITRAL VALVE REPAIR  11/17/2011    Performed by MARY ORTIZ at SURGERY [facility]   •  PACEMAKER INSERTION  November 2011    Medtronic Versa [id number] implanted by Dr. Amaya.   • OTHER ORTHOPEDIC SURGERY      Neck and back      Family History   Problem Relation Age of Onset   • Heart Attack Father      Social History     Social History   • Marital status: Single     Spouse name: N/A   • Number of children: N/A   • Years of education: N/A     Occupational History   • Not on file.     Social History Main Topics   • Smoking status: Current Every Day Smoker     Packs/day: 0.25     Years: 30.00     Types: Cigarettes   • Smokeless tobacco: Never Used      Comment: 4/12 Down to vapping now.    • Alcohol use No      Comment: sober since 7/14/16   • Drug use: No      Comment: hx of, denies currently   • Sexual activity: Not on file     Other Topics Concern   • Not on file     Social History Narrative   • No narrative on file     Allergies   Allergen Reactions   • Penicillins Rash           Outpatient Encounter Prescriptions as of 4/16/2018   Medication Sig Dispense Refill   • furosemide (LASIX) 20 MG Tab Take 2 Tabs by mouth every morning. 60 Tab 11   • potassium chloride SA (KDUR) 20 MEQ Tab CR Take 1 Tab by mouth every day. 30 Tab 11   • amiodarone (CORDARONE) 200 MG Tab Take 1 Tab by mouth every day. 30 Tab 11   • warfarin (COUMADIN) 5 MG Tab Take 1 Tab by mouth COUMADIN-DAILY. 90 Tab 2   • oxyCODONE immediate release (ROXICODONE) 10 MG immediate release tablet Take 10 mg by mouth 3 times a day. TID with MS Contin     • aripiprazole (ABILIFY) 5 MG tablet Take 5 mg by mouth every morning. (soon to increase to 10mg)     • venlafaxine (EFFEXOR-XR) 150 MG extended-release capsule Take 150 mg by mouth every morning.     • Multiple Vitamin (MULTI-DAY PO) Take 1 Tab by mouth every morning.     • atorvastatin (LIPITOR) 20 MG Tab Take 1 Tab by mouth every evening. 30 Tab 11   • pregabalin (LYRICA) 200 MG capsule Take 200 mg by mouth 3 times a day.     • vitamin D (CHOLECALCIFEROL) 1000 UNIT Tab Take 1,000 Units by mouth  "every morning.     • morphine SR (MS CONTIN) 15 MG TB12 Take 1 Tab by mouth 3 times a day. 60 Tab 0     No facility-administered encounter medications on file as of 4/16/2018.      Review of Systems   Constitutional: Negative for diaphoresis and fever.   HENT: Negative for nosebleeds.    Eyes: Negative for blurred vision and double vision.   Respiratory: Positive for shortness of breath. Negative for cough.    Cardiovascular: Negative for chest pain and palpitations.   Gastrointestinal: Negative for abdominal pain.   Genitourinary: Negative for dysuria and frequency.   Musculoskeletal: Negative for falls and myalgias.   Skin: Negative for rash.   Neurological: Negative for dizziness, sensory change and headaches.   Endo/Heme/Allergies: Does not bruise/bleed easily.   Psychiatric/Behavioral: Negative for depression and memory loss.        Objective:   /72   Pulse 84   Ht 1.727 m (5' 8\")   Wt 123.8 kg (273 lb)   LMP 01/01/2010   SpO2 95%   BMI 41.51 kg/m²      Physical Exam   Constitutional: She is oriented to person, place, and time. No distress.   HENT:   Head: Normocephalic and atraumatic.   Right Ear: External ear normal.   Left Ear: External ear normal.   Eyes: Right eye exhibits no discharge. Left eye exhibits no discharge.   Neck: No JVD present. No thyromegaly present.   Cardiovascular: Normal rate, regular rhythm, normal heart sounds and intact distal pulses.  Exam reveals no gallop and no friction rub.    No murmur heard.  Pulmonary/Chest: Breath sounds normal. No respiratory distress.   Abdominal: Bowel sounds are normal. She exhibits no distension. There is no tenderness.   Musculoskeletal: She exhibits no edema or tenderness.   Neurological: She is alert and oriented to person, place, and time. No cranial nerve deficit.   Skin: Skin is warm and dry. She is not diaphoretic.   Psychiatric: She has a normal mood and affect. Her behavior is normal.   Nursing note and vitals " reviewed.      Assessment:     1. PAF (paroxysmal atrial fibrillation) (CMS-HCC)  REFERRAL TO SLEEP STUDIES   2. ACC/AHA stage C heart failure with preserved ejection fraction (CMS-[facility])  REFERRAL TO SLEEP STUDIES   3. Left ventricular diastolic dysfunction, NYHA class 3  REFERRAL TO SLEEP STUDIES   4. KAEL (obstructive sleep apnea)  REFERRAL TO SLEEP STUDIES   5. Tobacco use disorder         Medical Decision Making:  Today's Assessment / Status / Plan:   Today, based on physical examination findings, patient is euvolemic. No JVD, lungs are clear to auscultation, no pitting edema in bilateral lower extremities, no ascites.    Dry weight is 273 lbs.    At this time, based on her intracardiac pressures, she does not have significant pulmonary hypertension. She does have elevated wedge pressure. Taking into consideration her clinical presentation along with intracardiac assessment, I do suspect that she has left atrial dysfunction syndrome secondary to her atrial arrhythmias. This might have been related to obesity along with possible obstructive sleep apnea. Therefore, I will send her for workup and possible treatment for obstructive sleep apnea.          Toni Grijalva M.D.  [address] [place] [id number]  VIA Facsimile: [phone]

## 2018-04-16 NOTE — TELEPHONE ENCOUNTER
"Lelia Fitzgerald - REFERRAL TO SLEEP STUDIES << Less Detail',event)\" href=\"javascript:;\">More Detail >>      REFERRAL TO SLEEP STUDIES   Lelia Fitzgerald   Sent: Mon April 16, 2018  8:58 AM   To: Flori Pulido R.N.                  Message     The referral to Sleep Studies will be sent to [facility]. The contact number is [phone].     Pt called. Informed of above referral and to call main scheduling at number provided to schedule.  "

## 2018-04-16 NOTE — PROGRESS NOTES
Anticoagulation Summary  As of 4/16/2018    INR goal:   2.0-3.0   TTR:   --   Today's INR:   1.7!   Maintenance plan:   2.5 mg (5 mg x 0.5) on Fri; 5 mg (5 mg x 1) all other days   Weekly total:   32.5 mg   Plan last modified:   Brittnee Cisneros APriscillaPSARITA (4/16/2018)   Next INR check:   4/20/2018   Target end date:   Indefinite    Indications    PAF (paroxysmal atrial fibrillation) (CMS-HCC) [I48.0]  Long term current use of anticoagulant therapy (Resolved) [Z79.01]  Status post mitral valve repair [Z98.890]             Anticoagulation Episode Summary     INR check location:       Preferred lab:       Send INR reminders to:       Comments:         Anticoagulation Care Providers     Provider Role Specialty Phone number    [facility] Responsible  [phone]        Anticoagulation Patient Findings      HPI:  Kailey Velarde seen in clinic today for follow up on anticoagulation therapy in the presence of AF. Denies any changes to current medical/health status since last appointment. Denies any medication or diet changes. No current symptoms of bleeding or thrombosis reported.    A/P:   INR subtherapeutic. Will increase regimen.     Follow up appointment on Friday.    Next Appointment: Friday , April 20, 2018 at 11:45 am.     Brittnee HENDERSON

## 2018-04-17 ASSESSMENT — ENCOUNTER SYMPTOMS
FEVER: 0
ABDOMINAL PAIN: 0
MEMORY LOSS: 0
SHORTNESS OF BREATH: 1
BRUISES/BLEEDS EASILY: 0
FALLS: 0
BLURRED VISION: 0
DIAPHORESIS: 0
PALPITATIONS: 0
DIZZINESS: 0
DEPRESSION: 0
COUGH: 0
DOUBLE VISION: 0
MYALGIAS: 0
SENSORY CHANGE: 0
HEADACHES: 0

## 2018-04-17 NOTE — PROGRESS NOTES
"Heart Failure New Appointment     6MWT- refused walk d/t back pain  MLWHF- 39    OP Heart Failure  Vitals  Appointment Type: Heart Failure New  Weight: 123.8 kg (273 lb)  How Weight Obtained: Stand Up Scale  Height: 172.7 cm (5' 8\")  BMI (Calculated): 41.51  Blood Pressure: 118/72  Pulse: 84    System Assessment  NYHA Functional Class Assessment: Class III  ACC/AHA HF Stage: C (HFpEF)    Smoking Hx  Have you Ever Smoked: Yes  Have you Smoked in the Last 12 Mos: Yes  Have you Quit Smoking: No   Smoking Cessation Offered: Patient Counseled    Alcohol Hx  Do you Drink?: No    Illicit Drug Hx  Illicit Drug History: No    Social Hx  Social History: Lives Alone  Level of Support: Unknown  Advance Directives: Began discussion, Paperwork provided    Education  Symptoms: Verbalizes understanding  Weighing: Verbalizes Understanding  Weight Gain Response: Verbalizes Understanding   Recording Data: Verbalizes understanding  Teach Back Failures: Teach Back Successful  Compliance:  (Plans to be compliant)     Medications  Medication Reconciliation : Complete  Medication Counseling Provided: Needs Reinforcement  Able to Accurately Identify Medication Indications: Some  Medication Discrepancies: None  Is Patient on an Evidence Based Beta Blocker: No (HFpEF)  Is Patient on ACE-1 or ARB: Yes    Dietary Assessment  Food Labels: Verbalizes Understanding  Foods High in Sodium: Verbalizes Understanding  Daily Sodium Intake: Verbalizes Understanding  Diet: Verbalizes Understanding  Food Preparation: Verbalizes Understanding  Eating Out Plan: Verbalizes understanding  Healthier Options: Verbalizes Understanding  Fluid Restriction: Not Applicable    MN Living with Heart Failure  Swelling in Ankles or Legs: 4  Having to Sit or Lie Down During the Day: 1  Walking About or Climbing Stairs Difficult: 3  Working Around the House or Yard Difficult: 3  Difficulty Going Away from Home: 4  Difficulty Sleeping Well at Night: 0  Difficulty Socializing " with Family or Friends: 2  Difficulty Working to Earn a Livin  Difficulty with Recreational Pastimes, Sports, Hobbies: 0  Difficulty with Sexual Activities: 0  Eating Less Foods You Like: 1  Making you Short of Breath: 3  Tired, Fatigued or Low on Energy: 0  Making you Stay in a Hospital: 3  Costing you Money for Medical Care?: 4  Giving you Side Effects from Treatments: 2  Feeling like a Ragland to Family and Friends: 0  Loss of Self Control in your Life: 0  Making You Worry: 4  Difficulty to Concentrate or Remembering Things: 1  Making you Feel Depressed: 4  MLHF Total Score : 39    6 Minute Walk Test  Baseline to end of test: refused d/t back pain          Education Narrative  Reviewed anatomy and physiology of heart failure with patient. Went over heart failure worksheet and patient's individual HF diagnosis, EF, risk factors, general medication classes and indications, as well as personal goals.  Goals: Patient's primary goal is to maintain her current state of health. Patient reports she cannot do much exercise because of her chronic back pain.     Discussed daily weights, sodium restriction, worsening signs and symptoms to report to physician, heart medications, and importance of adherence to medication regimen. Emphasized recommendation from AHA/AAHFN to keep daily sodium intake between 1500mg-2000mg.     Reviewed dietary handouts, advanced care planning classes, and advance directive planning handout. Discussed dietary considerations and reviewed Seven Day Heart Healthy Meal Plan by [employer].    Invited patient and family members/friends to HF support group and encouraged patient to call Heart Failure clinic during normal business hours with any questions.  Heart Failure program card with number given to patient.        Patient states full understanding of all information given.     Hermelinda HF RN  [phone]

## 2018-04-17 NOTE — PROGRESS NOTES
Chief Complaint   Patient presents with   • Atrial Fibrillation     NEW HEART FAILURE       Subjective:   Kailey Velarde is a 57 y.o. female who presents today for cardiac care and management of heart failure with preserved left ventricular systolic function. Patient also had a history of paroxysmal atrial fibrillation. She went into the hospital and had atrial arrhythmias. She is now currently in sinus rhythm. She was diagnosed with heart failure exacerbation. She was diuresed and sent home. She had transthoracic echocardiogram showing normal left ventricular systolic function.    Patient still gets winded with daily living activities and exertion. No symptoms at rest.    Patient did not want to walk for 6 minutes walk test.    During her hospital stay, she also had a right heart catheterization showing cardiac index of 2.3, pulmonary arterial wedge pressure of 29 mean, mean arterial pressure of 41/25.    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   • Status post mitral valve repair November 2011    MV repair with 32mm Jonnie-Carpenter flexible annuloplasty ring.     Past Surgical History:   Procedure Laterality Date   • CARDIAC CATH, RIGHT HEART  03/31/2018    Right heart cath with high pressures.   • MITRAL VALVE REPAIR  11/17/2011    Performed by MARY ORTIZ at SURGERY [facility]   • PACEMAKER INSERTION  November 2011    Medtronic Versa VEDR01 implanted by Dr. Amaya.   • OTHER ORTHOPEDIC SURGERY      Neck and back      Family History   Problem Relation Age of Onset   • Heart Attack Father      Social History     Social History   • Marital status:  Single     Spouse name: N/A   • Number of children: N/A   • Years of education: N/A     Occupational History   • Not on file.     Social History Main Topics   • Smoking status: Current Every Day Smoker     Packs/day: 0.25     Years: 30.00     Types: Cigarettes   • Smokeless tobacco: Never Used      Comment: 4/12 Down to vapping now.    • Alcohol use No      Comment: sober since 7/14/16   • Drug use: No      Comment: hx of, denies currently   • Sexual activity: Not on file     Other Topics Concern   • Not on file     Social History Narrative   • No narrative on file     Allergies   Allergen Reactions   • Penicillins Rash           Outpatient Encounter Prescriptions as of 4/16/2018   Medication Sig Dispense Refill   • furosemide (LASIX) 20 MG Tab Take 2 Tabs by mouth every morning. 60 Tab 11   • potassium chloride SA (KDUR) 20 MEQ Tab CR Take 1 Tab by mouth every day. 30 Tab 11   • amiodarone (CORDARONE) 200 MG Tab Take 1 Tab by mouth every day. 30 Tab 11   • warfarin (COUMADIN) 5 MG Tab Take 1 Tab by mouth COUMADIN-DAILY. 90 Tab 2   • oxyCODONE immediate release (ROXICODONE) 10 MG immediate release tablet Take 10 mg by mouth 3 times a day. TID with MS Contin     • aripiprazole (ABILIFY) 5 MG tablet Take 5 mg by mouth every morning. (soon to increase to 10mg)     • venlafaxine (EFFEXOR-XR) 150 MG extended-release capsule Take 150 mg by mouth every morning.     • Multiple Vitamin (MULTI-DAY PO) Take 1 Tab by mouth every morning.     • atorvastatin (LIPITOR) 20 MG Tab Take 1 Tab by mouth every evening. 30 Tab 11   • pregabalin (LYRICA) 200 MG capsule Take 200 mg by mouth 3 times a day.     • vitamin D (CHOLECALCIFEROL) 1000 UNIT Tab Take 1,000 Units by mouth every morning.     • morphine SR (MS CONTIN) 15 MG TB12 Take 1 Tab by mouth 3 times a day. 60 Tab 0     No facility-administered encounter medications on file as of 4/16/2018.      Review of Systems   Constitutional: Negative for diaphoresis and fever.   HENT:  "Negative for nosebleeds.    Eyes: Negative for blurred vision and double vision.   Respiratory: Positive for shortness of breath. Negative for cough.    Cardiovascular: Negative for chest pain and palpitations.   Gastrointestinal: Negative for abdominal pain.   Genitourinary: Negative for dysuria and frequency.   Musculoskeletal: Negative for falls and myalgias.   Skin: Negative for rash.   Neurological: Negative for dizziness, sensory change and headaches.   Endo/Heme/Allergies: Does not bruise/bleed easily.   Psychiatric/Behavioral: Negative for depression and memory loss.        Objective:   /72   Pulse 84   Ht 1.727 m (5' 8\")   Wt 123.8 kg (273 lb)   LMP 01/01/2010   SpO2 95%   BMI 41.51 kg/m²     Physical Exam   Constitutional: She is oriented to person, place, and time. No distress.   HENT:   Head: Normocephalic and atraumatic.   Right Ear: External ear normal.   Left Ear: External ear normal.   Eyes: Right eye exhibits no discharge. Left eye exhibits no discharge.   Neck: No JVD present. No thyromegaly present.   Cardiovascular: Normal rate, regular rhythm, normal heart sounds and intact distal pulses.  Exam reveals no gallop and no friction rub.    No murmur heard.  Pulmonary/Chest: Breath sounds normal. No respiratory distress.   Abdominal: Bowel sounds are normal. She exhibits no distension. There is no tenderness.   Musculoskeletal: She exhibits no edema or tenderness.   Neurological: She is alert and oriented to person, place, and time. No cranial nerve deficit.   Skin: Skin is warm and dry. She is not diaphoretic.   Psychiatric: She has a normal mood and affect. Her behavior is normal.   Nursing note and vitals reviewed.      Assessment:     1. PAF (paroxysmal atrial fibrillation) (CMS-HCC)  REFERRAL TO SLEEP STUDIES   2. ACC/AHA stage C heart failure with preserved ejection fraction (CMS-[facility])  REFERRAL TO SLEEP STUDIES   3. Left ventricular diastolic dysfunction, NYHA class 3  REFERRAL TO " SLEEP STUDIES   4. KAEL (obstructive sleep apnea)  REFERRAL TO SLEEP STUDIES   5. Tobacco use disorder         Medical Decision Making:  Today's Assessment / Status / Plan:   Today, based on physical examination findings, patient is euvolemic. No JVD, lungs are clear to auscultation, no pitting edema in bilateral lower extremities, no ascites.    Dry weight is 273 lbs.    At this time, based on her intracardiac pressures, she does not have significant pulmonary hypertension. She does have elevated wedge pressure. Taking into consideration her clinical presentation along with intracardiac assessment, I do suspect that she has left atrial dysfunction syndrome secondary to her atrial arrhythmias. This might have been related to obesity along with possible obstructive sleep apnea. Therefore, I will send her for workup and possible treatment for obstructive sleep apnea.

## 2018-04-20 ENCOUNTER — ANTICOAGULATION VISIT (OUTPATIENT)
Dept: VASCULAR LAB | Facility: MEDICAL CENTER | Age: 58
End: 2018-04-20
Attending: INTERNAL MEDICINE
Payer: MEDICARE

## 2018-04-20 DIAGNOSIS — I48.0 PAF (PAROXYSMAL ATRIAL FIBRILLATION) (HCC): ICD-10-CM

## 2018-04-20 DIAGNOSIS — Z98.890 STATUS POST MITRAL VALVE REPAIR: ICD-10-CM

## 2018-04-20 LAB
INR BLD: 1.8 (ref 0.9–1.2)
INR PPP: 1.8 (ref 2–3.5)

## 2018-04-20 PROCEDURE — 85610 PROTHROMBIN TIME: CPT

## 2018-04-20 PROCEDURE — 99212 OFFICE O/P EST SF 10 MIN: CPT

## 2018-04-20 NOTE — PROGRESS NOTES
Anticoagulation Summary  As of 4/20/2018    INR goal:   2.0-3.0   TTR:   --   Today's INR:   1.8!   Maintenance plan:   7.5 mg (5 mg x 1.5) on Mon; 5 mg (5 mg x 1) all other days   Weekly total:   37.5 mg   Plan last modified:   Perry Villa PharmD (4/20/2018)   Next INR check:   4/25/2018   Target end date:   Indefinite    Indications    PAF (paroxysmal atrial fibrillation) (CMS-HCC) [I48.0]  Long term current use of anticoagulant therapy (Resolved) [Z79.01]  Status post mitral valve repair [Z98.890]             Anticoagulation Episode Summary     INR check location:       Preferred lab:       Send INR reminders to:       Comments:         Anticoagulation Care Providers     Provider Role Specialty Phone number    [facility] Responsible  [phone]        Anticoagulation Patient Findings      HPI:  Kailey Velarde seen in clinic today, on anticoagulation therapy with warfarin for PAF.  Changes to current medical/health status since last appt: none  Denies signs/symptoms of bleeding and/or thrombosis since the last appt.    Denies any interval changes to diet  Denies any interval changes to medications since last appt.   Denies any complications or cost restrictions with current therapy.   BP recorded in vitals.  States she does consume alcohol.  In fact, pt admits she is alcoholic, she is aware this will effect her warfarin and INR.  Pt states she is will try stopping again soon.     A/P   INR  SUB-therapeutic.   Bolus today and begin increased regimen.   No bridging due to bleeding risks.    Follow up appointment in 1 week(s).    Perry Villa, Melissa

## 2018-04-25 ENCOUNTER — ANTICOAGULATION VISIT (OUTPATIENT)
Dept: VASCULAR LAB | Facility: MEDICAL CENTER | Age: 58
End: 2018-04-25
Attending: INTERNAL MEDICINE
Payer: MEDICARE

## 2018-04-25 DIAGNOSIS — I48.0 PAF (PAROXYSMAL ATRIAL FIBRILLATION) (HCC): ICD-10-CM

## 2018-04-25 DIAGNOSIS — Z98.890 STATUS POST MITRAL VALVE REPAIR: ICD-10-CM

## 2018-04-25 LAB
INR BLD: 2.2 (ref 0.9–1.2)
INR PPP: 2.2 (ref 2–3.5)

## 2018-04-25 PROCEDURE — 85610 PROTHROMBIN TIME: CPT

## 2018-04-25 PROCEDURE — 99211 OFF/OP EST MAY X REQ PHY/QHP: CPT | Performed by: NURSE PRACTITIONER

## 2018-04-25 NOTE — PROGRESS NOTES
Anticoagulation Summary  As of 4/25/2018    INR goal:   2.0-3.0   TTR:   47.1 % (6 d)   Today's INR:   2.2   Warfarin maintenance plan:   7.5 mg (5 mg x 1.5) on Fri; 5 mg (5 mg x 1) all other days   Weekly warfarin total:   37.5 mg   Plan last modified:   PALMIRA Kelly (4/25/2018)   Next INR check:   5/2/2018   Target end date:   Indefinite    Indications    PAF (paroxysmal atrial fibrillation) (CMS-HCC) [I48.0]  Long term current use of anticoagulant therapy (Resolved) [Z79.01]  Status post mitral valve repair [Z98.890]             Anticoagulation Episode Summary     INR check location:       Preferred lab:       Send INR reminders to:       Comments:         Anticoagulation Care Providers     Provider Role Specialty Phone number    [facility] Responsible  [phone]        Anticoagulation Patient Findings      HPI:  Kailey Velarde seen in clinic today for follow up on anticoagulation therapy in the presence of AF. Denies any changes to current medical/health status since last appointment. She took 5 mg on Monday instead of 7.5 mg. Denies any medication or diet changes. No current symptoms of bleeding or thrombosis reported.    A/P:   INR therapeutic. Continue current regimen (except move 7.5 mg day to Friday instead of Monday).    Follow up appointment in 1 week(s).    Next Appointment: Wednesday, May 2, 2018 at 2:15 pm.     Brittnee HENDERSON

## 2018-04-26 ENCOUNTER — OFFICE VISIT (OUTPATIENT)
Dept: CARDIOLOGY | Facility: MEDICAL CENTER | Age: 58
End: 2018-04-26
Payer: MEDICARE

## 2018-04-26 VITALS
HEIGHT: 68 IN | WEIGHT: 275 LBS | HEART RATE: 88 BPM | BODY MASS INDEX: 41.68 KG/M2 | OXYGEN SATURATION: 93 % | SYSTOLIC BLOOD PRESSURE: 98 MMHG | DIASTOLIC BLOOD PRESSURE: 56 MMHG

## 2018-04-26 DIAGNOSIS — M54.5 CHRONIC LOW BACK PAIN, UNSPECIFIED BACK PAIN LATERALITY, WITH SCIATICA PRESENCE UNSPECIFIED: ICD-10-CM

## 2018-04-26 DIAGNOSIS — I48.0 PAF (PAROXYSMAL ATRIAL FIBRILLATION) (HCC): ICD-10-CM

## 2018-04-26 DIAGNOSIS — F17.200 TOBACCO USE DISORDER: ICD-10-CM

## 2018-04-26 DIAGNOSIS — N17.9 AKI (ACUTE KIDNEY INJURY) (HCC): ICD-10-CM

## 2018-04-26 DIAGNOSIS — I50.30 HEART FAILURE WITH PRESERVED EJECTION FRACTION, BORDERLINE, CLASS II (HCC): ICD-10-CM

## 2018-04-26 DIAGNOSIS — J96.01 ACUTE RESPIRATORY FAILURE WITH HYPOXIA (HCC): ICD-10-CM

## 2018-04-26 DIAGNOSIS — G89.29 CHRONIC LOW BACK PAIN, UNSPECIFIED BACK PAIN LATERALITY, WITH SCIATICA PRESENCE UNSPECIFIED: ICD-10-CM

## 2018-04-26 PROCEDURE — 99214 OFFICE O/P EST MOD 30 MIN: CPT | Performed by: NURSE PRACTITIONER

## 2018-04-26 ASSESSMENT — ENCOUNTER SYMPTOMS
COUGH: 0
ABDOMINAL PAIN: 0
PND: 0
DEPRESSION: 1
WEAKNESS: 0
DIZZINESS: 1
MYALGIAS: 0
ORTHOPNEA: 0
SHORTNESS OF BREATH: 1
CLAUDICATION: 0
PALPITATIONS: 0

## 2018-04-26 NOTE — PROGRESS NOTES
"Chief Complaint   Patient presents with   • Atrial Fibrillation     follow up       Subjective:   Kailey \"Melanie\" MUKUL Velarde is a 57 y.o. female who presents today to follow-up for multiple medical problems including atrial fibrillation.    She states she feels improved. She continues to have dizziness especially with position change. Her blood pressure was very low and she went to see her pain medication doctor.  They were concerned that her blood pressure was 90 systolic and worried that if she took her pain medication at night that she might get into trouble.    She continues on oxygen and feels short of breath with exertion. She denies palpitations or chest discomfort. She feels that her ankle edema is improved.      Unfortunately, she is continuing to use nicotine in the form of vaping.  She is planning on going into a program to stop taking sometime late next month.    She was seen by Dr. Richardson who feels that she has heart failure with preserved ejection fraction. He also feels that sleep apnea may be contributing to her respiratory and cardiac problems. He has referred her for evaluation.    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   • Status post mitral valve repair November 2011    MV repair with 32mm Jonnie-Carpenter flexible annuloplasty ring.     Past Surgical History:   Procedure Laterality Date   • CARDIAC CATH, RIGHT HEART  03/31/2018    Right heart cath with high pressures.   • MITRAL VALVE REPAIR  11/17/2011    Performed by MARY ORTIZ at SURGERY [facility]   • PACEMAKER INSERTION  November 2011    Medtronic Versa [id number] implanted by Dr." [facility].   • OTHER ORTHOPEDIC SURGERY      Neck and back      Family History   Problem Relation Age of Onset   • Heart Attack Father      Social History     Social History   • Marital status: Single     Spouse name: N/A   • Number of children: N/A   • Years of education: N/A     Occupational History   • Not on file.     Social History Main Topics   • Smoking status: Current Every Day Smoker     Packs/day: 0.25     Years: 30.00     Types: Cigarettes   • Smokeless tobacco: Never Used      Comment: 4/12 Down to vapping now.    • Alcohol use No      Comment: sober since 7/14/16   • Drug use: No      Comment: hx of, denies currently   • Sexual activity: Not on file     Other Topics Concern   • Not on file     Social History Narrative   • No narrative on file     Allergies   Allergen Reactions   • Penicillins Rash           Outpatient Encounter Prescriptions as of 4/26/2018   Medication Sig Dispense Refill   • furosemide (LASIX) 20 MG Tab Take 2 Tabs by mouth every morning. 60 Tab 11   • potassium chloride SA (KDUR) 20 MEQ Tab CR Take 1 Tab by mouth every day. 30 Tab 11   • amiodarone (CORDARONE) 200 MG Tab Take 1 Tab by mouth every day. 30 Tab 11   • warfarin (COUMADIN) 5 MG Tab Take 1 Tab by mouth COUMADIN-DAILY. 90 Tab 2   • oxyCODONE immediate release (ROXICODONE) 10 MG immediate release tablet Take 10 mg by mouth 3 times a day. TID with MS Contin     • aripiprazole (ABILIFY) 5 MG tablet Take 5 mg by mouth every morning. (soon to increase to 10mg)     • venlafaxine (EFFEXOR-XR) 150 MG extended-release capsule Take 150 mg by mouth every morning.     • Multiple Vitamin (MULTI-DAY PO) Take 1 Tab by mouth every morning.     • atorvastatin (LIPITOR) 20 MG Tab Take 1 Tab by mouth every evening. 30 Tab 11   • pregabalin (LYRICA) 200 MG capsule Take 200 mg by mouth 3 times a day.     • vitamin D (CHOLECALCIFEROL) 1000 UNIT Tab Take 1,000 Units by mouth every morning.     • morphine SR (MS CONTIN) 15 MG TB12 Take 1 Tab by mouth 3  "times a day. 60 Tab 0     No facility-administered encounter medications on file as of 4/26/2018.      Review of Systems   Constitutional: Positive for malaise/fatigue (improved).   Respiratory: Positive for shortness of breath (walking on a flat surface.). Negative for cough.    Cardiovascular: Positive for leg swelling (improved.). Negative for chest pain, palpitations, orthopnea, claudication and PND.   Gastrointestinal: Negative for abdominal pain.   Musculoskeletal: Negative for myalgias.   Neurological: Positive for dizziness (position change). Negative for weakness.   Psychiatric/Behavioral: Positive for depression (followed by psychiatry.).        Objective:   BP (!) 98/56   Pulse 88   Ht 1.727 m (5' 8\")   Wt 124.7 kg (275 lb)   LMP 01/01/2010   SpO2 93%   BMI 41.81 kg/m²     Physical Exam   Constitutional: She is oriented to person, place, and time. She appears well-developed and well-nourished.   On oxygen by nasal cannula.   HENT:   Head: Normocephalic.   Eyes: EOM are normal.   Neck: No JVD present.   Cardiovascular: Normal rate, regular rhythm and normal heart sounds.    Pulmonary/Chest: Effort normal. She has decreased breath sounds (diminished throughout but clear.). She has no rales.   Pacemaker left upper chest without erosion, redness or fluctuance.   Abdominal: Soft. Bowel sounds are normal.   Red, hot area in the right lower abdominal skin that appears to be a cellulitis. No vesicles or oozing.   Musculoskeletal: She exhibits edema (trace to 1+ bilateral ankle edema.).   Neurological: She is alert and oriented to person, place, and time.   Skin: Skin is warm and dry.   Psychiatric: She has a normal mood and affect.     April 16, 2018: [facility] of metabolic panel is within normal limits with normal renal function. , TSH 0.7.    Assessment:     1. Heart failure with preserved ejection fraction, borderline, class II (CMS-HCC)     2. Acute respiratory failure with hypoxia (CMS-HCC)   "   3. PAF (paroxysmal atrial fibrillation) (CMS-[facility])     4. FAN (acute kidney injury) (CMS-[facility])     5. Tobacco use disorder     6. Chronic low back pain, unspecified back pain laterality, with sciatica presence unspecified         Medical Decision Making:  Today's Assessment / Status / Plan:     Heart failure with preserved ejection fraction: She has. Have some minor fluid overload therefore I will continue her on the Lasix 20 mg with additional doses as needed for weight gain or excessive ankle edema.    Respiratory failure: She remains on oxygen and appears to need it. Her lungs are clear but diminished.    Paroxysmal atrial fibrillation: She is in a regular rhythm today therefore she may be pacing.    Acute kidney injury: Appears to have resolved. Her last 3 tests have had normal renal function.    Tobacco use: She is strongly encouraged to not use any nicotine in any form including vaping. She states it's difficult to quit smoking when she is under stress. Also she wants to stop taking alcohol.    Chronic back pain: She sees a pain management, Tia Alejandre at [facility] pain clinic. She was worried the patient's blood pressure was too low. Patient is tolerating a low blood pressure and it is usually higher except when she takes the Lasix. I feel the patient may need to have her pain medications.    She is stable enough to follow up as scheduled with Dr. Leah Bojorquez on May 30, 2018. She will follow-up sooner if problems. She will follow with other providers as well.    Collaborating Provider: Dr. Toni Mullins.

## 2018-04-26 NOTE — LETTER
"     [facility]   [address], [address]  [place] [address]  Phone: [phone]  Fax: [phone]              Kailey Velarde  1960    Encounter Date: 4/26/2018    PALMIRA Card          PROGRESS NOTE:  Chief Complaint   Patient presents with   • Atrial Fibrillation     follow up       Subjective:   Kailey \"Melanie\" MUKUL Velarde is a 57 y.o. female who presents today to follow-up for multiple medical problems including atrial fibrillation.    She states she feels improved. She continues to have dizziness especially with position change. Her blood pressure was very low and she went to see her pain medication doctor.  They were concerned that her blood pressure was 90 systolic and worried that if she took her pain medication at night that she might get into trouble.    She continues on oxygen and feels short of breath with exertion. She denies palpitations or chest discomfort. She feels that her ankle edema is improved.      Unfortunately, she is continuing to use nicotine in the form of vaping.  She is planning on going into a program to stop taking sometime late next month.    She was seen by Dr. Richardson who feels that she has heart failure with preserved ejection fraction. He also feels that sleep apnea may be contributing to her respiratory and cardiac problems. He has referred her for evaluation.    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   • Status post mitral valve repair November 2011    MV repair with 32mm Jonnie-Carpenter flexible annuloplasty ring.     Past Surgical History:   Procedure " Laterality Date   • CARDIAC CATH, RIGHT HEART  03/31/2018    Right heart cath with high pressures.   • MITRAL VALVE REPAIR  11/17/2011    Performed by MARY ORTIZ at SURGERY [facility]   • PACEMAKER INSERTION  November 2011    Medtronic Versa VEDR01 implanted by Dr. Amaya.   • OTHER ORTHOPEDIC SURGERY      Neck and back      Family History   Problem Relation Age of Onset   • Heart Attack Father      Social History     Social History   • Marital status: Single     Spouse name: N/A   • Number of children: N/A   • Years of education: N/A     Occupational History   • Not on file.     Social History Main Topics   • Smoking status: Current Every Day Smoker     Packs/day: 0.25     Years: 30.00     Types: Cigarettes   • Smokeless tobacco: Never Used      Comment: 4/12 Down to vapping now.    • Alcohol use No      Comment: sober since 7/14/16   • Drug use: No      Comment: hx of, denies currently   • Sexual activity: Not on file     Other Topics Concern   • Not on file     Social History Narrative   • No narrative on file     Allergies   Allergen Reactions   • Penicillins Rash           Outpatient Encounter Prescriptions as of 4/26/2018   Medication Sig Dispense Refill   • furosemide (LASIX) 20 MG Tab Take 2 Tabs by mouth every morning. 60 Tab 11   • potassium chloride SA (KDUR) 20 MEQ Tab CR Take 1 Tab by mouth every day. 30 Tab 11   • amiodarone (CORDARONE) 200 MG Tab Take 1 Tab by mouth every day. 30 Tab 11   • warfarin (COUMADIN) 5 MG Tab Take 1 Tab by mouth COUMADIN-DAILY. 90 Tab 2   • oxyCODONE immediate release (ROXICODONE) 10 MG immediate release tablet Take 10 mg by mouth 3 times a day. TID with MS Contin     • aripiprazole (ABILIFY) 5 MG tablet Take 5 mg by mouth every morning. (soon to increase to 10mg)     • venlafaxine (EFFEXOR-XR) 150 MG extended-release capsule Take 150 mg by mouth every morning.     • Multiple Vitamin (MULTI-DAY PO) Take 1 Tab by mouth every morning.     • atorvastatin (LIPITOR) 20 MG  "Tab Take 1 Tab by mouth every evening. 30 Tab 11   • pregabalin (LYRICA) 200 MG capsule Take 200 mg by mouth 3 times a day.     • vitamin D (CHOLECALCIFEROL) 1000 UNIT Tab Take 1,000 Units by mouth every morning.     • morphine SR (MS CONTIN) 15 MG TB12 Take 1 Tab by mouth 3 times a day. 60 Tab 0     No facility-administered encounter medications on file as of 4/26/2018.      Review of Systems   Constitutional: Positive for malaise/fatigue (improved).   Respiratory: Positive for shortness of breath (walking on a flat surface.). Negative for cough.    Cardiovascular: Positive for leg swelling (improved.). Negative for chest pain, palpitations, orthopnea, claudication and PND.   Gastrointestinal: Negative for abdominal pain.   Musculoskeletal: Negative for myalgias.   Neurological: Positive for dizziness (position change). Negative for weakness.   Psychiatric/Behavioral: Positive for depression (followed by psychiatry.).        Objective:   BP (!) 98/56   Pulse 88   Ht 1.727 m (5' 8\")   Wt 124.7 kg (275 lb)   LMP 01/01/2010   SpO2 93%   BMI 41.81 kg/m²      Physical Exam   Constitutional: She is oriented to person, place, and time. She appears well-developed and well-nourished.   On oxygen by nasal cannula.   HENT:   Head: Normocephalic.   Eyes: EOM are normal.   Neck: No JVD present.   Cardiovascular: Normal rate, regular rhythm and normal heart sounds.    Pulmonary/Chest: Effort normal. She has decreased breath sounds (diminished throughout but clear.). She has no rales.   Pacemaker left upper chest without erosion, redness or fluctuance.   Abdominal: Soft. Bowel sounds are normal.   Red, hot area in the right lower abdominal skin that appears to be a cellulitis. No vesicles or oozing.   Musculoskeletal: She exhibits edema (trace to 1+ bilateral ankle edema.).   Neurological: She is alert and oriented to person, place, and time.   Skin: Skin is warm and dry.   Psychiatric: She has a normal mood and affect.     "     April 16, 2018: [facility] of metabolic panel is within normal limits with normal renal function. , TSH 0.7.    Assessment:     1. Heart failure with preserved ejection fraction, borderline, class II (CMS-[facility])     2. Acute respiratory failure with hypoxia (CMS-[facility])     3. PAF (paroxysmal atrial fibrillation) (CMS-[facility])     4. FAN (acute kidney injury) (CMS-[facility])     5. Tobacco use disorder     6. Chronic low back pain, unspecified back pain laterality, with sciatica presence unspecified         Medical Decision Making:  Today's Assessment / Status / Plan:     Heart failure with preserved ejection fraction: She has. Have some minor fluid overload therefore I will continue her on the Lasix 20 mg with additional doses as needed for weight gain or excessive ankle edema.    Respiratory failure: She remains on oxygen and appears to need it. Her lungs are clear but diminished.    Paroxysmal atrial fibrillation: She is in a regular rhythm today therefore she may be pacing.    Acute kidney injury: Appears to have resolved. Her last 3 tests have had normal renal function.    Tobacco use: She is strongly encouraged to not use any nicotine in any form including vaping. She states it's difficult to quit smoking when she is under stress. Also she wants to stop taking alcohol.    Chronic back pain: She sees a pain management, Tia Alejandre at [facility] pain clinic. She was worried the patient's blood pressure was too low. Patient is tolerating a low blood pressure and it is usually higher except when she takes the Lasix. I feel the patient may need to have her pain medications.    She is stable enough to follow up as scheduled with Dr. Leah Bojorquez on May 30, 2018. She will follow-up sooner if problems. She will follow with other providers as well.    Collaborating Provider: Dr. Toni Mullins.        No Recipients

## 2018-05-02 ENCOUNTER — ANTICOAGULATION VISIT (OUTPATIENT)
Dept: VASCULAR LAB | Facility: MEDICAL CENTER | Age: 58
End: 2018-05-02
Attending: INTERNAL MEDICINE
Payer: MEDICARE

## 2018-05-02 DIAGNOSIS — Z98.890 STATUS POST MITRAL VALVE REPAIR: ICD-10-CM

## 2018-05-02 DIAGNOSIS — I48.0 PAF (PAROXYSMAL ATRIAL FIBRILLATION) (HCC): ICD-10-CM

## 2018-05-02 LAB
INR BLD: 2.4 (ref 0.9–1.2)
INR PPP: 2.4 (ref 2–3.5)

## 2018-05-02 PROCEDURE — 99211 OFF/OP EST MAY X REQ PHY/QHP: CPT | Performed by: PHARMACIST

## 2018-05-02 PROCEDURE — 85610 PROTHROMBIN TIME: CPT

## 2018-05-02 NOTE — PROGRESS NOTES
Anticoagulation Summary  As of 5/2/2018    INR goal:   2.0-3.0   TTR:   75.6 % (1.9 wk)   Today's INR:   2.4   Warfarin maintenance plan:   7.5 mg (5 mg x 1.5) on Fri; 5 mg (5 mg x 1) all other days   Weekly warfarin total:   37.5 mg   Plan last modified:   PALMIRA Kelly (4/25/2018)   Next INR check:   5/16/2018   Target end date:   Indefinite    Indications    PAF (paroxysmal atrial fibrillation) (CMS-HCC) [I48.0]  Long term current use of anticoagulant therapy (Resolved) [Z79.01]  Status post mitral valve repair [Z98.890]             Anticoagulation Episode Summary     INR check location:       Preferred lab:       Send INR reminders to:       Comments:         Anticoagulation Care Providers     Provider Role Specialty Phone number    [facility] Responsible  [phone]        Anticoagulation Patient Findings  Patient Findings     Negatives:   Signs/symptoms of thrombosis, Signs/symptoms of bleeding, Laboratory test error suspected, Change in health, Change in alcohol use, Change in activity, Upcoming invasive procedure, Emergency department visit, Upcoming dental procedure, Missed doses, Extra doses, Change in medications, Change in diet/appetite, Hospital admission, Bruising, Other complaints        HPI:   Melanie Velarde seen in clinic today, on anticoagulation therapy with warfarin for stroke prevention due to history of paroxsymal atrial fibrillation.    Patient's previous INR was therapeutic at 2.2 on 4-25-18, at which time patient was instructed to continue with current warfarin regimen.  She returns to clinic today to recheck INR to ensure it is therapeutic and thus preventing possible clotting and/or bleeding/bruising complications.    CHADS-VASc = at least 3  (unadjusted ischemic stroke risk/year:  3.2%, which is moderate risk)    Does patient have any changes to current medical/health status since last appt (Y/N):  NO  Does patient have any signs/symptoms of bleeding and/or  "thrombosis since the last appt (Y/N):  NO  Does patient have any interval changes to diet or medications since last appt (Y/N):  NO  Are there any complications or cost restrictions with current therapy (Y/N):  NO      Vitals:  BP check declined at today's visit    Weight  declines   Height   5' 8\"     Asssessment:      INR remains therapeutic at 2.4, therefore decreasing patient's risk of stroke and/or bleeding complications.   Reason(s) for out of range INR today:  n/a      Plan:  Pt is to continue with current warfarin dosing regimen.     Follow up:  Because warfarin is a high risk medication and current CHEST guidelines recommend regular monitoring intervals (few days up to 12 weeks), will have patient return to clinic in 2 weeks to recheck INR.    Enzo Tinsley, PharmD    "

## 2018-05-08 NOTE — HEART FAILURE PROGRAM
"Cardiovascular Nurse Navigator () Progress Note:     Acutely decompensated HFpEF. AF (now rate controlled), hypotension. As of 4/4 heparin, levo, ns  ongoing. Cardiology following.  Pt has not been to the [facility] in the past. Follows with general cardiology, Dr. Mullins.    [facility] Plan Notes:   [facility] SW following, and depression screening  Therapy Notes:   Deferred due to O2 desaturation with activitiy  Insurance Coverage:  Medicare  Patient Residence:  [place]  Follow up appointment:   Pt must have an appointment scheduled within 7 days of discharge (Cardiology, PCP, or DC Clinic).      This [facility] has placed an order for Hospital Schedulers to arrange f/u appointment within seven calendar days of anticipated discharge date: 4/8. Get into HF Program eventually if not for seven day appt.    Education:  Bedside nursing to please provide patient with HF packet and begin teaching and documenting in education tab, each shift should teach sections until all are covered.     When completing the after visit summary (discharge instructions) please select \"Cardiac Diagnosis, and Heart Failure\" in the special instructions section to populate the heart failure specific discharge instructions.     Referrals Placed:    Social Work   Please assess for any social barriers in managing this disease process which includes: going to frequent specialist appointments, taking medications daily, weighing daily, monitoring symptoms and contacting MD for changes daily, eating low sodium foods.     Registered Dietician  To provide education on HF diet.    Thank you and please call RILEY Newsome with any questions: [phone].   " No. GUILHERME screening performed.  STOP BANG Legend: 0-2 = LOW Risk; 3-4 = INTERMEDIATE Risk; 5-8 = HIGH Risk

## 2018-05-23 ENCOUNTER — APPOINTMENT (OUTPATIENT)
Dept: VASCULAR LAB | Facility: MEDICAL CENTER | Age: 58
End: 2018-05-23
Attending: INTERNAL MEDICINE
Payer: MEDICARE

## 2018-05-25 ENCOUNTER — ANTICOAGULATION VISIT (OUTPATIENT)
Dept: VASCULAR LAB | Facility: MEDICAL CENTER | Age: 58
End: 2018-05-25
Attending: INTERNAL MEDICINE
Payer: MEDICARE

## 2018-05-25 VITALS — DIASTOLIC BLOOD PRESSURE: 76 MMHG | HEIGHT: 68 IN | SYSTOLIC BLOOD PRESSURE: 129 MMHG | HEART RATE: 92 BPM

## 2018-05-25 DIAGNOSIS — I48.0 PAF (PAROXYSMAL ATRIAL FIBRILLATION) (HCC): ICD-10-CM

## 2018-05-25 DIAGNOSIS — Z98.890 STATUS POST MITRAL VALVE REPAIR: ICD-10-CM

## 2018-05-25 LAB — INR PPP: 5 (ref 2–3.5)

## 2018-05-25 PROCEDURE — 99212 OFFICE O/P EST SF 10 MIN: CPT

## 2018-05-25 PROCEDURE — 85610 PROTHROMBIN TIME: CPT

## 2018-05-25 NOTE — PROGRESS NOTES
Anticoagulation Summary  As of 5/25/2018    INR goal:   2.0-3.0   TTR:   42.0 % (1.2 mo)   Today's INR:   5.0!   Warfarin maintenance plan:   7.5 mg (5 mg x 1.5) on Fri; 5 mg (5 mg x 1) all other days   Weekly warfarin total:   37.5 mg   Plan last modified:   PALMIRA Kelly (4/25/2018)   Next INR check:   6/1/2018   Target end date:   Indefinite    Indications    PAF (paroxysmal atrial fibrillation) (CMS-HCC) [I48.0]  Long term current use of anticoagulant therapy (Resolved) [Z79.01]  Status post mitral valve repair [Z98.890]             Anticoagulation Episode Summary     INR check location:       Preferred lab:       Send INR reminders to:       Comments:         Anticoagulation Care Providers     Provider Role Specialty Phone number    [facility] Responsible  [phone]        Anticoagulation Patient Findings      HPI:  Kailey Machadoub seen in clinic today for follow up on anticoagulation therapy in the presence of PAF and mitral valve repair. Denies any changes to current medical/health status since last appointment. Denies any medication or diet changes. No current symptoms of bleeding or thrombosis reported.    A/P:   INR is supra-therapeutic at 5.0., patient states that she is an alcoholic and consumes large amounts of alcohol, last drink was today at lunch she educated on the DDI between warfarin and alcohol.  She was instructed to HOLD warfarin tonight, then take 2.5 tomorrow, then continue current regimen. BP recorded in vitals.    Follow up appointment in 1 week(s).    Next Appointment: Friday , 06/01/2018 at 02.30pm     Bill Sepulveda, Pharm.D

## 2018-05-29 LAB — INR BLD: 5 (ref 0.9–1.2)

## 2018-05-30 ENCOUNTER — OFFICE VISIT (OUTPATIENT)
Dept: CARDIOLOGY | Facility: MEDICAL CENTER | Age: 58
End: 2018-05-30
Payer: MEDICARE

## 2018-05-30 VITALS
RESPIRATION RATE: 18 BRPM | SYSTOLIC BLOOD PRESSURE: 108 MMHG | BODY MASS INDEX: 42.58 KG/M2 | OXYGEN SATURATION: 90 % | HEART RATE: 80 BPM | WEIGHT: 280 LBS | DIASTOLIC BLOOD PRESSURE: 60 MMHG

## 2018-05-30 DIAGNOSIS — J96.01 ACUTE RESPIRATORY FAILURE WITH HYPOXIA (HCC): ICD-10-CM

## 2018-05-30 DIAGNOSIS — I50.30 HEART FAILURE WITH PRESERVED EJECTION FRACTION, BORDERLINE, CLASS II (HCC): ICD-10-CM

## 2018-05-30 DIAGNOSIS — I51.89 LEFT VENTRICULAR DIASTOLIC DYSFUNCTION, NYHA CLASS 3: ICD-10-CM

## 2018-05-30 DIAGNOSIS — E78.2 MIXED HYPERLIPIDEMIA: ICD-10-CM

## 2018-05-30 DIAGNOSIS — M54.5 CHRONIC LOW BACK PAIN, UNSPECIFIED BACK PAIN LATERALITY, WITH SCIATICA PRESENCE UNSPECIFIED: ICD-10-CM

## 2018-05-30 DIAGNOSIS — G47.33 OSA (OBSTRUCTIVE SLEEP APNEA): ICD-10-CM

## 2018-05-30 DIAGNOSIS — E66.01 MORBID OBESITY WITH BMI OF 40.0-44.9, ADULT (HCC): ICD-10-CM

## 2018-05-30 DIAGNOSIS — R00.2 PALPITATIONS: ICD-10-CM

## 2018-05-30 DIAGNOSIS — N17.9 AKI (ACUTE KIDNEY INJURY) (HCC): ICD-10-CM

## 2018-05-30 DIAGNOSIS — G89.29 CHRONIC LOW BACK PAIN, UNSPECIFIED BACK PAIN LATERALITY, WITH SCIATICA PRESENCE UNSPECIFIED: ICD-10-CM

## 2018-05-30 DIAGNOSIS — F17.200 TOBACCO USE DISORDER: ICD-10-CM

## 2018-05-30 DIAGNOSIS — I48.0 PAF (PAROXYSMAL ATRIAL FIBRILLATION) (HCC): ICD-10-CM

## 2018-05-30 DIAGNOSIS — I50.30 ACC/AHA STAGE C HEART FAILURE WITH PRESERVED EJECTION FRACTION (HCC): ICD-10-CM

## 2018-05-30 DIAGNOSIS — Z95.0 PACEMAKER: ICD-10-CM

## 2018-05-30 DIAGNOSIS — I50.812 CHRONIC RIGHT-SIDED HEART FAILURE (HCC): ICD-10-CM

## 2018-05-30 DIAGNOSIS — R60.0 LOCALIZED EDEMA: ICD-10-CM

## 2018-05-30 DIAGNOSIS — D69.6 THROMBOCYTOPENIA (HCC): ICD-10-CM

## 2018-05-30 DIAGNOSIS — I49.5 SICK SINUS SYNDROME (HCC): ICD-10-CM

## 2018-05-30 PROCEDURE — 99214 OFFICE O/P EST MOD 30 MIN: CPT | Performed by: INTERNAL MEDICINE

## 2018-05-30 RX ORDER — TRAZODONE HYDROCHLORIDE 100 MG/1
100 TABLET ORAL NIGHTLY
COMMUNITY

## 2018-05-30 ASSESSMENT — ENCOUNTER SYMPTOMS
INSOMNIA: 1
DIAPHORESIS: 1
NECK PAIN: 1
PALPITATIONS: 1
MEMORY LOSS: 1
BACK PAIN: 1
DEPRESSION: 1
BRUISES/BLEEDS EASILY: 1
COUGH: 1

## 2018-05-30 NOTE — LETTER
[facility]   [address]  [place] [address]  Phone: [phone]  Fax: [phone]              Kailey Velarde  1960    Encounter Date: 5/30/2018    Leah Bojorquez M.D.          PROGRESS NOTE:  Subjective:   Chief Complaint:   Chief Complaint   Patient presents with   • Congestive Heart Failure       Kailey Velarde is a 58 y.o. female who returns for evaluation of very complex heart disease.  She has a history of chronic diastolic congestive heart failure, prior mitral valve repair, paroxysmal and persistent atrial fibrillation and was cardioverted during the hospital stay recently in April 2018, JULY at that time revealed smoking left atrial appendage, she is on warfarin therapy, she has secondary pulmonary hypertension with dilated RV and RV dysfunction, she had undergone right heart catheterization in March 2018 which showed an elevated wedge of 29 and normal cardiac output and pulmonary pressure of 41/25 there is a large V wave but she did not have significant MR on JULY.  She has had a pacemaker for sick sinus syndrome.  She was previously over diuresis in the hospital and ended up in the ICU with low blood pressure.  She is now on amiodarone therapy and needs amiodarone surveillance.  She has chronic pain syndrome.  She takes Abilify which can provoke tachyarrhythmias.  She probably has sleep apnea.    She continues to have dyspnea on exertion. Saw Pulm at [place], supposed to be on O2, and taking spiriva.    She has gained 2.4 KG since her last visit. She has not been taking lasix as prescribed due to awkward urination and urgency/frequency. She thought she was back in Afib with more palpitations and shortness of breath but her ECG showed NSR    DATA REVIEWED by me:  ECG April 3, 2018 sinus rhythm, rate 68, anterior T-wave inversion  ECG 5-30-18, NSR, 67,  anterior T wave inversion    Echo 3/29/2018 patient in rapid atrial  fibrillation, severely developed dilated RV, mild left atrial enlargement, RVSP 35 mmHg above right atrial pressure, EF 55%     JULY 4/2/2018 EF 50%, moderate RV dilatation with systolic function reduction, mild biatrial enlargement, spontaneous contrast in the left atrial appendage but no thrombus, mitral valve ring in place    CT the chest March 28, 2018, no evidence of pulmonary embolism, right lower lobe atelectasis with groundglass opacities and mild edema, trace right pleural effusion, coronary artery calcifications, hepatic steatosis and splenomegaly    Cardioversion report 4/5/2018 converted with 200 J ×1 with return to sinus rhythm and ventricular pacing    Most recent labs:   April 6, 2018 hemoglobin 10.2, platelets 93, sodium 135, potassium 4.4, creatinine 0.67    Past Medical History:   Diagnosis Date   • Atrial myxoma November 2011    Status post LA myxoma resection   • Backpain    • CAD (coronary artery disease)     pacemaker   • Chronic pain    • Dizziness     • Hyperlipidemia     • Hypertension    • Hypertriglyceridemia     • Indigestion    • Peripheral edema    • Sciatica    • Sick sinus syndrome (HCC) November 2011    Status post PPM implantation.   • Status post mitral valve repair November 2011    MV repair with 32mm Jonnie-Carpenter flexible annuloplasty ring.     Past Surgical History:   Procedure Laterality Date   • CARDIAC CATH, RIGHT HEART  03/31/2018    Right heart cath with high pressures.   • MITRAL VALVE REPAIR  11/17/2011    Performed by MARY ORTIZ at [facility]   • PACEMAKER INSERTION  November 2011    Medtronic Versa VEDR01 implanted by Dr. Amaya.   • OTHER ORTHOPEDIC SURGERY      Neck and back      Family History   Problem Relation Age of Onset   • Heart Attack Father      Social History     Social History   • Marital status: Single     Spouse name: N/A   • Number of children: N/A   • Years of education: N/A     Occupational History   • Not on file.     Social  History Main Topics   • Smoking status: Current Every Day Smoker     Packs/day: 0.25     Years: 30.00     Types: Cigarettes   • Smokeless tobacco: Never Used      Comment: 4/12 Down to vapping now.    • Alcohol use Yes      Comment: occasional   • Drug use: No      Comment: hx of, denies currently   • Sexual activity: Not on file     Other Topics Concern   • Not on file     Social History Narrative   • No narrative on file     Allergies   Allergen Reactions   • Penicillins Rash             Current Outpatient Prescriptions   Medication Sig Dispense Refill   • traZODone (DESYREL) 100 MG Tab Take 100 mg by mouth every evening.     • furosemide (LASIX) 20 MG Tab Take 2 Tabs by mouth every morning. 60 Tab 11   • potassium chloride SA (KDUR) 20 MEQ Tab CR Take 1 Tab by mouth every day. 30 Tab 11   • amiodarone (CORDARONE) 200 MG Tab Take 1 Tab by mouth every day. 30 Tab 11   • warfarin (COUMADIN) 5 MG Tab Take 1 Tab by mouth COUMADIN-DAILY. 90 Tab 2   • oxyCODONE immediate release (ROXICODONE) 10 MG immediate release tablet Take 10 mg by mouth 3 times a day. TID with MS Contin     • aripiprazole (ABILIFY) 5 MG tablet Take 5 mg by mouth every morning. (soon to increase to 10mg)     • venlafaxine (EFFEXOR-XR) 150 MG extended-release capsule Take 150 mg by mouth every morning.     • Multiple Vitamin (MULTI-DAY PO) Take 1 Tab by mouth every morning.     • atorvastatin (LIPITOR) 20 MG Tab Take 1 Tab by mouth every evening. 30 Tab 11   • pregabalin (LYRICA) 200 MG capsule Take 200 mg by mouth 3 times a day.     • vitamin D (CHOLECALCIFEROL) 1000 UNIT Tab Take 1,000 Units by mouth every morning.     • morphine SR (MS CONTIN) 15 MG TB12 Take 1 Tab by mouth 3 times a day. 60 Tab 0     No current facility-administered medications for this visit.        Review of Systems   Constitutional: Positive for diaphoresis and malaise/fatigue.   HENT: Positive for tinnitus.    Respiratory: Positive for cough.    Cardiovascular: Positive for  palpitations and leg swelling.   Musculoskeletal: Positive for back pain and neck pain.   Endo/Heme/Allergies: Bruises/bleeds easily.   Psychiatric/Behavioral: Positive for depression and memory loss. The patient has insomnia.      All others systems reviewed and negative.     Objective:     Blood pressure 108/60, pulse 80, resp. rate 18, weight (!) 127 kg (280 lb), last menstrual period 01/01/2010, SpO2 90 %. Body mass index is 42.58 kg/m².    Physical Exam   General: No acute distress. Well nourished.  HEENT: EOM grossly intact, no scleral icterus, no pharyngeal erythema.   Neck:  No JVD, no bruits, trachea midline  CVS: RRR today. Normal S1, S2. No appreciated M/R/G. trace LE edema.  2+ radial pulses, 2+ PT pulses, well-healed scar across the chest, PM pocket intack  Resp: CTAB. No wheezing or crackles/rhonchi. Normal respiratory effort.  Abdomen: Soft, NT, no maco hepatomegaly, obese.  MSK/Ext: No clubbing or cyanosis.  Skin: Warm and dry, no rashes.  Neurological: CN III-XII grossly intact. No focal deficits.   Psych: A&O x 3, appropriate affect, good judgement      Assessment:     1. Heart failure with preserved ejection fraction, borderline, class II ([facility])  COMP METABOLIC PANEL    TSH   2. Acute respiratory failure with hypoxia ([facility])     3. FAN (acute kidney injury) ([facility])     4. Tobacco use disorder     5. Chronic low back pain, unspecified back pain laterality, with sciatica presence unspecified     6. ACC/AHA stage C heart failure with preserved ejection fraction ([facility])     7. Left ventricular diastolic dysfunction, NYHA class 3     8. KAEL (obstructive sleep apnea)     9. Chronic right-sided heart failure ([facility])     10. Localized edema     11. PAF (paroxysmal atrial fibrillation) ([facility])  EKG    CANCELED: JULY w/o Cont    CANCELED: CARDIOVERSION    CANCELED: PROTHROMBIN TIME   12. Palpitations     13. Pacemaker     14. Mixed hyperlipidemia     15. Sick sinus syndrome (HCC)     16. Morbid obesity with BMI of  40.0-44.9, adult (HCC)     17. Thrombocytopenia (HCC)         Medical Decision Making:  Today's Assessment / Status / Plan:     1. Cchronic diastolic CHF   2. Prior mitral valve repair  3. Paroxysmal/persistent atrial fibrillation/flutter, rates were uncontrolled, failed calcium channel blocker and metoprolol. Now status post cardioversion ×1 with return to sinus rhythm and ventricular pacing.  4. Secondary pulmonary hypertension with dilated RV and RV dysfunction, attributed to probable left-sided heart disease, untreated sleep apnea and untreated oxygen requirements. Her right heart catheterization 3/31/2018 did show an elevated wedge of 29, RAP 15, PAP 41/25, RV pressure 40/11, cardiac output 5.4 L/m. There is concern for a large V wave (17-36) however JULY showed no issue with her mitral valve repair.  5. Tobacco use, vaping  6.  Pacemaker in situ  7. Obesity  8. Warfarin therapy with Lovenox bridge, smoke was seen in the left atrial appendage but no thrombus.  9. Hypotension, likely due to dehydration and multiple medications, resolved  10. Acute kidney injury, due to #9, resolved  11. Low-normal LV systolic function, EF 50% and elevated wedge.  12. Amiodarone therapy. LFTs normal on 4/3/2018. TSH was less than 0.005 on 3/29/2018.  13. Possible hyperthyroidism, low TSH this admission, will need to be followed closely.  14. Chronic pain syndrome  15. Dyslipidemia  16. Abilify therapy, can provoke tachycardias  17. Oxygen requirement, possibly related to COPD and smoking  18. Probable KAEL  19. Fatty liver by CT scan 3-28-18  20. Calcified coronary disease CT 3-28-18    -LFT and T[facility] at the [facility]  -Go back to lasix 40 mg daily, instead of 20 mg  -RTC 3 months  -If she goes back into afib, we would do another CV with or w/o JULY and consider EP referral    Return in about 3 months (around 8/30/2018).    It is my pleasure to participate in the care of Ms. Velarde.  Please do not hesitate to contact me with  questions or concerns.    Leah Bojorquez MD, [facility]  Cardiologist [facility]    Please note that this dictation was created using voice recognition software. I have made every reasonable attempt to correct obvious errors, but it is possible there are errors of grammar and possibly content that I did not discover before finalizing the note.      Toni Grijalva M.D.  [address] [place] [address] [place] [address]  VIA Facsimile: [phone]

## 2018-05-30 NOTE — PROGRESS NOTES
Subjective:   Chief Complaint:   Chief Complaint   Patient presents with   • Congestive Heart Failure       Kailey Velarde is a 58 y.o. female who returns for evaluation of very complex heart disease.  She has a history of chronic diastolic congestive heart failure, prior mitral valve repair, paroxysmal and persistent atrial fibrillation and was cardioverted during the hospital stay recently in April 2018, JULY at that time revealed smoking left atrial appendage, she is on warfarin therapy, she has secondary pulmonary hypertension with dilated RV and RV dysfunction, she had undergone right heart catheterization in March 2018 which showed an elevated wedge of 29 and normal cardiac output and pulmonary pressure of 41/25 there is a large V wave but she did not have significant MR on JULY.  She has had a pacemaker for sick sinus syndrome.  She was previously over diuresis in the hospital and ended up in the ICU with low blood pressure.  She is now on amiodarone therapy and needs amiodarone surveillance.  She has chronic pain syndrome.  She takes Abilify which can provoke tachyarrhythmias.  She probably has sleep apnea.    She continues to have dyspnea on exertion. Saw Pulm at [place], supposed to be on O2, and taking spiriva.    She has gained 2.4 KG since her last visit. She has not been taking lasix as prescribed due to awkward urination and urgency/frequency. She thought she was back in Afib with more palpitations and shortness of breath but her ECG showed NSR    DATA REVIEWED by me:  ECG April 3, 2018 sinus rhythm, rate 68, anterior T-wave inversion  ECG 5-30-18, NSR, 67,  anterior T wave inversion    Echo 3/29/2018 patient in rapid atrial fibrillation, severely developed dilated RV, mild left atrial enlargement, RVSP 35 mmHg above right atrial pressure, EF 55%     JULY 4/2/2018 EF 50%, moderate RV dilatation with systolic function reduction, mild biatrial enlargement, spontaneous contrast in the left atrial  appendage but no thrombus, mitral valve ring in place    CT the chest March 28, 2018, no evidence of pulmonary embolism, right lower lobe atelectasis with groundglass opacities and mild edema, trace right pleural effusion, coronary artery calcifications, hepatic steatosis and splenomegaly    Cardioversion report 4/5/2018 converted with 200 J ×1 with return to sinus rhythm and ventricular pacing    Most recent labs:   April 6, 2018 hemoglobin 10.2, platelets 93, sodium 135, potassium 4.4, creatinine 0.67    Past Medical History:   Diagnosis Date   • Atrial myxoma November 2011    Status post LA myxoma resection   • Backpain    • CAD (coronary artery disease)     pacemaker   • Chronic pain    • Dizziness     • Hyperlipidemia     • Hypertension    • Hypertriglyceridemia     • Indigestion    • Peripheral edema    • Sciatica    • Sick sinus syndrome (HCC) November 2011    Status post PPM implantation.   • Status post mitral valve repair November 2011    MV repair with 32mm Jonnie-Carpenter flexible annuloplasty ring.     Past Surgical History:   Procedure Laterality Date   • CARDIAC CATH, RIGHT HEART  03/31/2018    Right heart cath with high pressures.   • MITRAL VALVE REPAIR  11/17/2011    Performed by MARY ORTIZ at [facility]   • PACEMAKER INSERTION  November 2011    Medtronic Versa [id number] implanted by Dr. Amaya.   • OTHER ORTHOPEDIC SURGERY      Neck and back      Family History   Problem Relation Age of Onset   • Heart Attack Father      Social History     Social History   • Marital status: Single     Spouse name: N/A   • Number of children: N/A   • Years of education: N/A     Occupational History   • Not on file.     Social History Main Topics   • Smoking status: Current Every Day Smoker     Packs/day: 0.25     Years: 30.00     Types: Cigarettes   • Smokeless tobacco: Never Used      Comment: 4/12 Down to vapping now.    • Alcohol use Yes      Comment: occasional   • Drug use: No      Comment: hx  of, denies currently   • Sexual activity: Not on file     Other Topics Concern   • Not on file     Social History Narrative   • No narrative on file     Allergies   Allergen Reactions   • Penicillins Rash             Current Outpatient Prescriptions   Medication Sig Dispense Refill   • traZODone (DESYREL) 100 MG Tab Take 100 mg by mouth every evening.     • furosemide (LASIX) 20 MG Tab Take 2 Tabs by mouth every morning. 60 Tab 11   • potassium chloride SA (KDUR) 20 MEQ Tab CR Take 1 Tab by mouth every day. 30 Tab 11   • amiodarone (CORDARONE) 200 MG Tab Take 1 Tab by mouth every day. 30 Tab 11   • warfarin (COUMADIN) 5 MG Tab Take 1 Tab by mouth COUMADIN-DAILY. 90 Tab 2   • oxyCODONE immediate release (ROXICODONE) 10 MG immediate release tablet Take 10 mg by mouth 3 times a day. TID with MS Contin     • aripiprazole (ABILIFY) 5 MG tablet Take 5 mg by mouth every morning. (soon to increase to 10mg)     • venlafaxine (EFFEXOR-XR) 150 MG extended-release capsule Take 150 mg by mouth every morning.     • Multiple Vitamin (MULTI-DAY PO) Take 1 Tab by mouth every morning.     • atorvastatin (LIPITOR) 20 MG Tab Take 1 Tab by mouth every evening. 30 Tab 11   • pregabalin (LYRICA) 200 MG capsule Take 200 mg by mouth 3 times a day.     • vitamin D (CHOLECALCIFEROL) 1000 UNIT Tab Take 1,000 Units by mouth every morning.     • morphine SR (MS CONTIN) 15 MG TB12 Take 1 Tab by mouth 3 times a day. 60 Tab 0     No current facility-administered medications for this visit.        Review of Systems   Constitutional: Positive for diaphoresis and malaise/fatigue.   HENT: Positive for tinnitus.    Respiratory: Positive for cough.    Cardiovascular: Positive for palpitations and leg swelling.   Musculoskeletal: Positive for back pain and neck pain.   Endo/Heme/Allergies: Bruises/bleeds easily.   Psychiatric/Behavioral: Positive for depression and memory loss. The patient has insomnia.      All others systems reviewed and negative.      Objective:     Blood pressure 108/60, pulse 80, resp. rate 18, weight (!) 127 kg (280 lb), last menstrual period 01/01/2010, SpO2 90 %. Body mass index is 42.58 kg/m².    Physical Exam   General: No acute distress. Well nourished.  HEENT: EOM grossly intact, no scleral icterus, no pharyngeal erythema.   Neck:  No JVD, no bruits, trachea midline  CVS: RRR today. Normal S1, S2. No appreciated M/R/G. trace LE edema.  2+ radial pulses, 2+ PT pulses, well-healed scar across the chest, PM pocket intack  Resp: CTAB. No wheezing or crackles/rhonchi. Normal respiratory effort.  Abdomen: Soft, NT, no maco hepatomegaly, obese.  MSK/Ext: No clubbing or cyanosis.  Skin: Warm and dry, no rashes.  Neurological: CN III-XII grossly intact. No focal deficits.   Psych: A&O x 3, appropriate affect, good judgement      Assessment:     1. Heart failure with preserved ejection fraction, borderline, class II ([facility])  COMP METABOLIC PANEL    TSH   2. Acute respiratory failure with hypoxia ([facility])     3. FAN (acute kidney injury) ([facility])     4. Tobacco use disorder     5. Chronic low back pain, unspecified back pain laterality, with sciatica presence unspecified     6. ACC/AHA stage C heart failure with preserved ejection fraction ([facility])     7. Left ventricular diastolic dysfunction, NYHA class 3     8. KAEL (obstructive sleep apnea)     9. Chronic right-sided heart failure ([facility])     10. Localized edema     11. PAF (paroxysmal atrial fibrillation) ([facility])  EKG    CANCELED: JULY w/o Cont    CANCELED: CARDIOVERSION    CANCELED: PROTHROMBIN TIME   12. Palpitations     13. Pacemaker     14. Mixed hyperlipidemia     15. Sick sinus syndrome ([facility])     16. Morbid obesity with BMI of 40.0-44.9, adult ([facility])     17. Thrombocytopenia ([facility])         Medical Decision Making:  Today's Assessment / Status / Plan:     1. Cchronic diastolic CHF   2. Prior mitral valve repair  3. Paroxysmal/persistent atrial fibrillation/flutter, rates were uncontrolled, failed calcium  channel blocker and metoprolol. Now status post cardioversion ×1 with return to sinus rhythm and ventricular pacing.  4. Secondary pulmonary hypertension with dilated RV and RV dysfunction, attributed to probable left-sided heart disease, untreated sleep apnea and untreated oxygen requirements. Her right heart catheterization 3/31/2018 did show an elevated wedge of 29, RAP 15, PAP 41/25, RV pressure 40/11, cardiac output 5.4 L/m. There is concern for a large V wave (17-36) however JULY showed no issue with her mitral valve repair.  5. Tobacco use, vaping  6.  Pacemaker in situ  7. Obesity  8. Warfarin therapy with Lovenox bridge, smoke was seen in the left atrial appendage but no thrombus.  9. Hypotension, likely due to dehydration and multiple medications, resolved  10. Acute kidney injury, due to #9, resolved  11. Low-normal LV systolic function, EF 50% and elevated wedge.  12. Amiodarone therapy. LFTs normal on 4/3/2018. TSH was less than 0.005 on 3/29/2018.  13. Possible hyperthyroidism, low TSH this admission, will need to be followed closely.  14. Chronic pain syndrome  15. Dyslipidemia  16. Abilify therapy, can provoke tachycardias  17. Oxygen requirement, possibly related to COPD and smoking  18. Probable KAEL  19. Fatty liver by CT scan 3-28-18  20. Calcified coronary disease CT 3-28-18    -[facility]  -Go back to lasix 40 mg daily, instead of 20 mg  -RTC 3 months  -If she goes back into afib, we would do another CV with or w/o JULY and consider EP referral    Return in about 3 months (around 8/30/2018).    It is my pleasure to participate in the care of Ms. Velarde.  Please do not hesitate to contact me with questions or concerns.    Leah Bojorquez MD, [facility]  Cardiologist [facility]    Please note that this dictation was created using voice recognition software. I have made every reasonable attempt to correct obvious errors, but it is possible there are  errors of grammar and possibly content that I did not discover before finalizing the note.

## 2018-05-30 NOTE — PATIENT INSTRUCTIONS
-Take lasix 40 mg every day, your weight should come back down  -Blood work (not fasting for me) CMP and TSH (liver, thyroid, kidney function and potassium)

## 2018-05-31 ENCOUNTER — TELEPHONE (OUTPATIENT)
Dept: CARDIOLOGY | Facility: MEDICAL CENTER | Age: 58
End: 2018-05-31

## 2018-05-31 NOTE — TELEPHONE ENCOUNTER
----- Message from Leah Bojorquez M.D. sent at 5/31/2018  4:34 PM PDT -----  Regarding: RE: JULY/Cardioversion  Sorry!  Thought I canceled it!  When they finally got the ECG she was back in sinus! :-) Sorry for the trouble!  LS    ----- Message -----  From: Shanae Gerber, Med Ass't  Sent: 5/31/2018   2:42 PM  To: Leah Bojorquez M.D.  Subject: JULY/Cardioversion                                Dr. Bojorquez,    I called this patient to schedule the JULY/Cardioversion. She thought you were going to cancel this procedure. Would you like her to still go forward in scheduling the JULY/Cardioversion or cancel this procedure?    Thank You,  Shanae

## 2018-06-01 ENCOUNTER — ANTICOAGULATION VISIT (OUTPATIENT)
Dept: VASCULAR LAB | Facility: MEDICAL CENTER | Age: 58
End: 2018-06-01
Attending: INTERNAL MEDICINE
Payer: MEDICARE

## 2018-06-01 VITALS — DIASTOLIC BLOOD PRESSURE: 60 MMHG | OXYGEN SATURATION: 90 % | HEART RATE: 69 BPM | SYSTOLIC BLOOD PRESSURE: 121 MMHG

## 2018-06-01 DIAGNOSIS — I48.0 PAF (PAROXYSMAL ATRIAL FIBRILLATION) (HCC): ICD-10-CM

## 2018-06-01 DIAGNOSIS — Z98.890 STATUS POST MITRAL VALVE REPAIR: ICD-10-CM

## 2018-06-01 LAB
INR BLD: 3.9 (ref 0.9–1.2)
INR PPP: 3.9 (ref 2–3.5)

## 2018-06-01 PROCEDURE — 99212 OFFICE O/P EST SF 10 MIN: CPT | Performed by: PHARMACIST

## 2018-06-01 PROCEDURE — 85610 PROTHROMBIN TIME: CPT | Performed by: PHARMACIST

## 2018-06-01 NOTE — PROGRESS NOTES
Anticoagulation Summary  As of 6/1/2018    INR goal:   2.0-3.0   TTR:   35.2 % (1.4 mo)   Today's INR:   3.9!   Warfarin maintenance plan:   5 mg (5 mg x 1) every day   Weekly warfarin total:   35 mg   Plan last modified:   Carlos Aquino, PharmD (6/1/2018)   Next INR check:   6/8/2018   Target end date:   Indefinite    Indications    PAF (paroxysmal atrial fibrillation) (CMS-HCC) [I48.0]  Long term current use of anticoagulant therapy (Resolved) [Z79.01]  Status post mitral valve repair [Z98.890]             Anticoagulation Episode Summary     INR check location:       Preferred lab:       Send INR reminders to:       Comments:         Anticoagulation Care Providers     Provider Role Specialty Phone number    [facility] Responsible  [phone]        Anticoagulation Patient Findings    HPI:  Kailey Velarde seen in clinic today for follow up on anticoagulation therapy in the presence of PAF and mitral valve repair. Denies any changes to current medical/health status since last appointment. Denies any medication or diet changes. No current symptoms of bleeding.    A:  Supratherapeutic INR    P:  HOLD warfarin x 1. Follow up INR in one week.    Carlos Aquino, PharmD

## 2018-06-08 ENCOUNTER — ANTICOAGULATION VISIT (OUTPATIENT)
Dept: VASCULAR LAB | Facility: MEDICAL CENTER | Age: 58
End: 2018-06-08
Attending: INTERNAL MEDICINE
Payer: MEDICARE

## 2018-06-08 DIAGNOSIS — Z98.890 STATUS POST MITRAL VALVE REPAIR: ICD-10-CM

## 2018-06-08 DIAGNOSIS — I48.0 PAF (PAROXYSMAL ATRIAL FIBRILLATION) (HCC): ICD-10-CM

## 2018-06-08 LAB — INR PPP: 4.3 (ref 2–3.5)

## 2018-06-08 PROCEDURE — 99212 OFFICE O/P EST SF 10 MIN: CPT

## 2018-06-08 PROCEDURE — 85610 PROTHROMBIN TIME: CPT

## 2018-06-08 NOTE — PROGRESS NOTES
Anticoagulation Summary  As of 6/8/2018    INR goal:   2.0-3.0   TTR:   30.3 % (1.7 mo)   Today's INR:   4.3!   Warfarin maintenance plan:   2.5 mg (5 mg x 0.5) on Mon, Wed, Fri; 5 mg (5 mg x 1) all other days   Weekly warfarin total:   27.5 mg   Plan last modified:   Alexa Stewart (6/8/2018)   Next INR check:   6/15/2018   Target end date:   Indefinite    Indications    PAF (paroxysmal atrial fibrillation) (CMS-HCC) [I48.0]  Long term current use of anticoagulant therapy (Resolved) [Z79.01]  Status post mitral valve repair [Z98.890]             Anticoagulation Episode Summary     INR check location:       Preferred lab:       Send INR reminders to:       Comments:         Anticoagulation Care Providers     Provider Role Specialty Phone number    [facility] Responsible  [phone]        Anticoagulation Patient Findings  Patient Findings     Negatives:   Signs/symptoms of thrombosis, Signs/symptoms of bleeding, Laboratory test error suspected, Change in health, Change in alcohol use, Change in activity, Upcoming invasive procedure, Emergency department visit, Upcoming dental procedure, Missed doses, Extra doses, Change in medications, Change in diet/appetite, Hospital admission, Bruising, Other complaints          HPI:  Kailey Velarde seen in clinic today, on anticoagulation therapy with warfarin for PAF and mitral valve repair.  Changes to current medical/health status since last appt: none  Denies signs/symptoms of bleeding and/or thrombosis since the last appt.    Denies any interval changes to diet  Denies any interval changes to medications since last appt.   Denies any complications or cost restrictions with current therapy.   BP refused.     A/P   INR was SUPRA -therapeutic again today, despite HOLDing a dose last visit.  Confirmed the current warfarin dosing regimen and patient compliance.  Patient stated that she does drink alcohol and some days quite heavily. I explained  that this is likely the reason for her elevated INR readings and she stated that she understands the impact, but has been unable to curb her drinking as she would like. Patient understands she needs help and states she will seek the help she needs.   Patient will HOLD dose TONIGHT ONLY, then will begin a decreased regimen of 2.5mg on Mon, Wed and Frid and 5mg ROW. Patient will follow up again in 1 week.    Next appt Friday Gabbie 15, 2015 11:45am    Josefina Stewart PharmD

## 2018-06-15 ENCOUNTER — ANTICOAGULATION VISIT (OUTPATIENT)
Dept: VASCULAR LAB | Facility: MEDICAL CENTER | Age: 58
End: 2018-06-15
Attending: INTERNAL MEDICINE
Payer: MEDICARE

## 2018-06-15 DIAGNOSIS — I48.0 PAF (PAROXYSMAL ATRIAL FIBRILLATION) (HCC): ICD-10-CM

## 2018-06-15 DIAGNOSIS — Z98.890 STATUS POST MITRAL VALVE REPAIR: ICD-10-CM

## 2018-06-15 LAB
INR BLD: 2 (ref 0.9–1.2)
INR PPP: 2 (ref 2–3.5)

## 2018-06-15 PROCEDURE — 99211 OFF/OP EST MAY X REQ PHY/QHP: CPT | Performed by: PHARMACIST

## 2018-06-15 PROCEDURE — 85610 PROTHROMBIN TIME: CPT

## 2018-06-15 NOTE — PROGRESS NOTES
Anticoagulation Summary  As of 6/15/2018    INR goal:   2.0-3.0   TTR:   31.9 % (1.9 mo)   Today's INR:   2.0   Warfarin maintenance plan:   2.5 mg (5 mg x 0.5) on Mon, Wed, Fri; 5 mg (5 mg x 1) all other days   Weekly warfarin total:   27.5 mg   Plan last modified:   Alexa Stewart (6/8/2018)   Next INR check:   6/27/2018   Target end date:   Indefinite    Indications    PAF (paroxysmal atrial fibrillation) (CMS-HCC) [I48.0]  Long term current use of anticoagulant therapy (Resolved) [Z79.01]  Status post mitral valve repair [Z98.890]             Anticoagulation Episode Summary     INR check location:       Preferred lab:       Send INR reminders to:       Comments:         Anticoagulation Care Providers     Provider Role Specialty Phone number    [facility] Responsible  [phone]        Anticoagulation Patient Findings      HPI:  Kailey Velarde seen in clinic today, on anticoagulation therapy with warfarin for PAF  Changes to current medical/health status since last appt: denies  Denies signs/symptoms of bleeding and/or thrombosis since the last appt.    Denies any interval changes to diet  Denies any interval changes to medications since last appt.   Denies any complications or cost restrictions with current therapy.   BP declined    A/P   INR  is-therapeutic.   Will have pt continue with the same warfarin dosing.     Follow up appointment in 2 week(s).    Leighann Harvey, PharmD

## 2018-06-27 ENCOUNTER — APPOINTMENT (OUTPATIENT)
Dept: VASCULAR LAB | Facility: MEDICAL CENTER | Age: 58
End: 2018-06-27
Attending: INTERNAL MEDICINE
Payer: MEDICARE

## 2018-07-02 ENCOUNTER — ANTICOAGULATION VISIT (OUTPATIENT)
Dept: VASCULAR LAB | Facility: MEDICAL CENTER | Age: 58
End: 2018-07-02
Attending: INTERNAL MEDICINE
Payer: MEDICARE

## 2018-07-02 DIAGNOSIS — Z98.890 STATUS POST MITRAL VALVE REPAIR: ICD-10-CM

## 2018-07-02 DIAGNOSIS — I48.0 PAF (PAROXYSMAL ATRIAL FIBRILLATION) (HCC): ICD-10-CM

## 2018-07-02 LAB
INR BLD: 1.6 (ref 0.9–1.2)
INR PPP: 1.6 (ref 2–3.5)

## 2018-07-02 PROCEDURE — 99212 OFFICE O/P EST SF 10 MIN: CPT

## 2018-07-02 PROCEDURE — 85610 PROTHROMBIN TIME: CPT

## 2018-07-02 NOTE — PROGRESS NOTES
Anticoagulation Summary  As of 7/2/2018    INR goal:   2.0-3.0   TTR:   24.6 % (2.5 mo)   Today's INR:   1.6!   Warfarin maintenance plan:   2.5 mg (5 mg x 0.5) on Wed, Fri; 5 mg (5 mg x 1) all other days   Weekly warfarin total:   30 mg   Plan last modified:   Alexa Stewart (7/2/2018)   Next INR check:   7/16/2018   Target end date:   Indefinite    Indications    PAF (paroxysmal atrial fibrillation) (CMS-HCC) [I48.0]  Long term current use of anticoagulant therapy (Resolved) [Z79.01]  Status post mitral valve repair [Z98.890]             Anticoagulation Episode Summary     INR check location:       Preferred lab:       Send INR reminders to:       Comments:         Anticoagulation Care Providers     Provider Role Specialty Phone number    [facility] Responsible  [phone]        Anticoagulation Patient Findings  Patient Findings     Negatives:   Signs/symptoms of thrombosis, Signs/symptoms of bleeding, Laboratory test error suspected, Change in health, Change in alcohol use, Change in activity, Upcoming invasive procedure, Emergency department visit, Upcoming dental procedure, Missed doses, Extra doses, Change in medications, Change in diet/appetite, Hospital admission, Bruising, Other complaints          HPI:  Kailey Velarde seen in clinic today, on anticoagulation therapy with warfarin for PAF.   Changes to current medical/health status since last appt: none  Denies signs/symptoms of bleeding and/or thrombosis since the last appt.    Denies any interval changes to diet.  Denies any interval changes to medications since last appt.   Denies any complications or cost restrictions with current therapy.   BP declined.  No missed doses reported.    A/P   INR was SUB-therapeutic today at 1.6.    Will have patient begin increased weekly regimen of 2.5mg on Wed & Fridays and 5mg ROW.  Patient will follow up again in 2 weeks.    Next appt: Monday July 16, 2016 1:15pm    Josefina Stewart   PharmD

## 2018-07-16 ENCOUNTER — APPOINTMENT (OUTPATIENT)
Dept: VASCULAR LAB | Facility: MEDICAL CENTER | Age: 58
End: 2018-07-16
Attending: INTERNAL MEDICINE
Payer: MEDICARE

## 2018-07-20 ENCOUNTER — TELEPHONE (OUTPATIENT)
Dept: CARDIOLOGY | Facility: MEDICAL CENTER | Age: 58
End: 2018-07-20

## 2018-07-20 DIAGNOSIS — I50.30 HEART FAILURE WITH PRESERVED EJECTION FRACTION, BORDERLINE, CLASS II (HCC): ICD-10-CM

## 2018-07-20 DIAGNOSIS — R60.0 LOCALIZED EDEMA: ICD-10-CM

## 2018-07-20 DIAGNOSIS — I10 ESSENTIAL HYPERTENSION: ICD-10-CM

## 2018-07-20 NOTE — TELEPHONE ENCOUNTER
Attempted to call pt to FU, no answer. L/m for her to please call back.    1355: S/w pt. She notes that her LE edema has been getting worse for the past 8 days. States that it was improving for a bit with taking an extra table to lasix but now is just getting worse. Pt has been taking lasix 40 mg daily with an extra one maybe every 2 days. She does not check her BP at home and notes she tries to stay hydrated and watch her sodium but she is a big beer drinker. Educated her that I will FU with LS on her recommendations. Pt appreciative of assistance.     To Dr. Bojorquez, please advise. Thank you!    ----- Message from Ju Hollingsworth sent at 7/20/2018 10:41 AM PDT -----  Regarding: Problems with swelling in lower legs, ankles and feet  [place]    Patient is having problems with swelling in her lower legs, ankles and feet. They also hurt and are cold. She wants a call back at [phone].

## 2018-07-21 NOTE — TELEPHONE ENCOUNTER
Leah Bojorquez M.D.   Bianka Orozco R.N. 26 minutes ago (5:21 PM)     The weight helps to know if it is fluid weight.  I don't mind if she increased lasix 40 mg to 60 mg for a few days to see if it helps.  Tell her to write down her weight every day!   Tx!   JERSEY (Routing comment)      Contacted patient, explained above recommendation.      Discussed s/s of chest pain, chest pressure, shortness of breath, difficulty breathing and when to initiate EMS. Advised to avoid salt, elevate legs, monitor fluid intake, weight, start a daily and log, report in 5-7 days weight values and status of edema. Discussed BP.  Patient does not own a BP cuff.  She states her BP is normally on the low, normal/low side. Patient denies CP. SOB is at baseline with exertion only, Advised If patient gains more than 2 lbs in one day or 5 lbs in one week she is to notify clinic. Discussed s/s of chest pain, chest pressure, shortness of breath, difficulty breathing and when to initiate EMS.  Patient verbalizes all understanding.           Patient does not own a BP cuff.  Patient requesting if a prescription for BP Cuff Kit can be written.         To JERSEY Re: request for written Rx for BP cuff kit

## 2018-07-24 ENCOUNTER — ANTICOAGULATION VISIT (OUTPATIENT)
Dept: VASCULAR LAB | Facility: MEDICAL CENTER | Age: 58
End: 2018-07-24
Attending: INTERNAL MEDICINE
Payer: MEDICARE

## 2018-07-24 VITALS — SYSTOLIC BLOOD PRESSURE: 161 MMHG | HEART RATE: 83 BPM | DIASTOLIC BLOOD PRESSURE: 78 MMHG

## 2018-07-24 DIAGNOSIS — Z98.890 STATUS POST MITRAL VALVE REPAIR: ICD-10-CM

## 2018-07-24 DIAGNOSIS — I50.30 HEART FAILURE WITH PRESERVED EJECTION FRACTION, BORDERLINE, CLASS II (HCC): ICD-10-CM

## 2018-07-24 DIAGNOSIS — I48.0 PAF (PAROXYSMAL ATRIAL FIBRILLATION) (HCC): ICD-10-CM

## 2018-07-24 DIAGNOSIS — I10 ESSENTIAL HYPERTENSION: ICD-10-CM

## 2018-07-24 LAB — INR PPP: 3 (ref 2–3.5)

## 2018-07-24 PROCEDURE — 85610 PROTHROMBIN TIME: CPT

## 2018-07-24 PROCEDURE — 99211 OFF/OP EST MAY X REQ PHY/QHP: CPT

## 2018-07-24 RX ORDER — MEDICAL SUPPLY, MISCELLANEOUS
EACH MISCELLANEOUS
Qty: 1 EACH | Refills: 0 | Status: SHIPPED | OUTPATIENT
Start: 2018-07-24 | End: 2021-07-01

## 2018-07-24 NOTE — TELEPHONE ENCOUNTER
Prescription submitted per .     Called to FU with pt. Notes that weight has increased 4 lbs in 4 days and have not seen much improvement in swelling. Reports that she has been taking Lasix 60mg for the past 3 days and her prescription will run out early. Educated her that I will FU on  recommendations for dose and will submit a refill. Pt's BP today at coumadin clinic 161/78 HR 83.    To     Leah Bojorquez M.D.  Bianka Orozco R.N.   Caller: Unspecified (4 days ago, 10:52 AM)      Could you figure out how to write and Rx for BP cuff?  Valarie Marie knows how but I don't... I would like to know how to write generic paper scripts in Epic.   PALMIRA Abrams R.N. Hey Veronica,     If you go under the Medications tab, search for BP cuff (it will show up under Database). There are several you can choose from.     Hope this helps!   AB

## 2018-07-25 ENCOUNTER — TELEPHONE (OUTPATIENT)
Dept: CARDIOLOGY | Facility: MEDICAL CENTER | Age: 58
End: 2018-07-25

## 2018-07-25 DIAGNOSIS — I10 ESSENTIAL HYPERTENSION: ICD-10-CM

## 2018-07-25 DIAGNOSIS — I51.89 LEFT VENTRICULAR DIASTOLIC DYSFUNCTION, NYHA CLASS 3: ICD-10-CM

## 2018-07-25 DIAGNOSIS — I50.30 HEART FAILURE WITH PRESERVED EJECTION FRACTION, BORDERLINE, CLASS II (HCC): ICD-10-CM

## 2018-07-25 DIAGNOSIS — R60.0 LOCALIZED EDEMA: ICD-10-CM

## 2018-07-25 RX ORDER — FUROSEMIDE 20 MG/1
40 TABLET ORAL EVERY MORNING
Qty: 90 TAB | Refills: 1 | Status: SHIPPED | OUTPATIENT
Start: 2018-07-25 | End: 2018-08-01

## 2018-07-25 NOTE — TELEPHONE ENCOUNTER
Pharmacy sent a PAR for the Blood Pressure Monitoring (B-D ASSURE BPM/AUTO ARM     Pharmacy needs to run device through medicare or patient has to get device from approved facility     Kailey Velarde Key: [id number] - Rx #: [id number]   Outcome   Additional Information Required   Couldn't find any matching drug [facility]. EOC can't be created.   DrugBD Assure BPM/Auto Arm Cuff XX MISC   Form[employer] Electronic PA Form   Original Claim Info70 NON-FORMULARY DRUG; CALL [phone] help: [id number]

## 2018-07-25 NOTE — TELEPHONE ENCOUNTER
Called to FU with pt. She is okay to get blood work done but is requesting lab slip to be faxed to her PCP @  [facility], Dr. Grijalva so they can put order in for her to get done there. Reassured her that order will be placed and faxed over. Pt is also worried about running out of her Lasix. She has only a few days left. Reassured her that I will place a refill, but will follow up once lab results are reviewed by Dr. Bojorquez. She is appreciative of assistance.     BMP faxed to PCP @[facility] [phone]    Leah Bojorquez M.D.  Bianka Orozco R.N.   Caller: Unspecified (5 days ago, 10:52 AM)      Please have her get a BMP in 5 days on Monday, plse order for me. Tx!

## 2018-07-31 ENCOUNTER — TELEPHONE (OUTPATIENT)
Dept: CARDIOLOGY | Facility: MEDICAL CENTER | Age: 58
End: 2018-07-31

## 2018-07-31 NOTE — TELEPHONE ENCOUNTER
Attempted to call pt to follow up. No answer, l/m for her to please call back.    1640:S/w pt. She notes that she had the repeat blood work done yesterday at the [facility]. Educated her that we have not received the results yet, but I will call them for it to be faxed over.   Pt reports that her swelling has not improved since increasing her lasix to 60mg daily. State that her weight over the last 7 days has gone from 290 lbs to 296.5.  She is feeling a little frustrated and does not know what she should do. Educated pt that it would be important for us to have her come into the office to be evaluated. Appointment scheduled with Dr. Bojorquez for 8/1 @1300. Pt appreciative of assistance and will follow up then.     ----- Message from Ju Hollingsworth sent at 7/31/2018 11:48 AM PDT -----  Regarding: Patient wants to discuss condition  [place]/Bianka    Patient had her lab test done and wants to discuss her condition. She can be reached at [phone].

## 2018-08-01 ENCOUNTER — NON-PROVIDER VISIT (OUTPATIENT)
Dept: CARDIOLOGY | Facility: MEDICAL CENTER | Age: 58
End: 2018-08-01
Payer: MEDICARE

## 2018-08-01 ENCOUNTER — TELEPHONE (OUTPATIENT)
Dept: CARDIOLOGY | Facility: MEDICAL CENTER | Age: 58
End: 2018-08-01

## 2018-08-01 ENCOUNTER — OFFICE VISIT (OUTPATIENT)
Dept: PULMONOLOGY | Facility: HOSPICE | Age: 58
End: 2018-08-01
Payer: MEDICARE

## 2018-08-01 ENCOUNTER — OFFICE VISIT (OUTPATIENT)
Dept: CARDIOLOGY | Facility: MEDICAL CENTER | Age: 58
End: 2018-08-01
Payer: MEDICARE

## 2018-08-01 VITALS
OXYGEN SATURATION: 93 % | BODY MASS INDEX: 44.41 KG/M2 | WEIGHT: 293 LBS | DIASTOLIC BLOOD PRESSURE: 50 MMHG | HEIGHT: 68 IN | SYSTOLIC BLOOD PRESSURE: 132 MMHG | HEART RATE: 82 BPM

## 2018-08-01 VITALS
HEIGHT: 67 IN | SYSTOLIC BLOOD PRESSURE: 130 MMHG | WEIGHT: 293 LBS | HEART RATE: 90 BPM | DIASTOLIC BLOOD PRESSURE: 70 MMHG | RESPIRATION RATE: 16 BRPM | BODY MASS INDEX: 45.99 KG/M2 | TEMPERATURE: 98.1 F | OXYGEN SATURATION: 89 %

## 2018-08-01 DIAGNOSIS — I50.30 HEART FAILURE WITH PRESERVED EJECTION FRACTION, BORDERLINE, CLASS II (HCC): ICD-10-CM

## 2018-08-01 DIAGNOSIS — Z95.0 PACEMAKER: ICD-10-CM

## 2018-08-01 DIAGNOSIS — I27.20 PULMONARY HYPERTENSION (HCC): ICD-10-CM

## 2018-08-01 DIAGNOSIS — I50.812 CHRONIC RIGHT-SIDED HEART FAILURE (HCC): ICD-10-CM

## 2018-08-01 DIAGNOSIS — G47.33 OSA (OBSTRUCTIVE SLEEP APNEA): ICD-10-CM

## 2018-08-01 DIAGNOSIS — R06.02 SHORTNESS OF BREATH: ICD-10-CM

## 2018-08-01 DIAGNOSIS — Z87.09 HX OF PLEURAL EFFUSION: ICD-10-CM

## 2018-08-01 DIAGNOSIS — Z79.899 HIGH RISK MEDICATION USE: ICD-10-CM

## 2018-08-01 DIAGNOSIS — I10 ESSENTIAL HYPERTENSION: ICD-10-CM

## 2018-08-01 DIAGNOSIS — I48.0 PAF (PAROXYSMAL ATRIAL FIBRILLATION) (HCC): ICD-10-CM

## 2018-08-01 DIAGNOSIS — R00.2 PALPITATIONS: ICD-10-CM

## 2018-08-01 DIAGNOSIS — D69.6 THROMBOCYTOPENIA (HCC): ICD-10-CM

## 2018-08-01 DIAGNOSIS — F17.200 TOBACCO USE DISORDER: ICD-10-CM

## 2018-08-01 DIAGNOSIS — I50.33 ACUTE ON CHRONIC DIASTOLIC (CONGESTIVE) HEART FAILURE (HCC): Primary | ICD-10-CM

## 2018-08-01 DIAGNOSIS — J44.9 CHRONIC OBSTRUCTIVE PULMONARY DISEASE, UNSPECIFIED COPD TYPE (HCC): ICD-10-CM

## 2018-08-01 DIAGNOSIS — I48.4 ATYPICAL ATRIAL FLUTTER (HCC): ICD-10-CM

## 2018-08-01 DIAGNOSIS — I49.5 SICK SINUS SYNDROME (HCC): ICD-10-CM

## 2018-08-01 DIAGNOSIS — R60.0 LOCALIZED EDEMA: ICD-10-CM

## 2018-08-01 DIAGNOSIS — Z78.9 ALCOHOL USE: ICD-10-CM

## 2018-08-01 PROCEDURE — 99214 OFFICE O/P EST MOD 30 MIN: CPT | Performed by: INTERNAL MEDICINE

## 2018-08-01 PROCEDURE — 99215 OFFICE O/P EST HI 40 MIN: CPT | Performed by: INTERNAL MEDICINE

## 2018-08-01 PROCEDURE — 93280 PM DEVICE PROGR EVAL DUAL: CPT | Performed by: INTERNAL MEDICINE

## 2018-08-01 RX ORDER — POTASSIUM CHLORIDE 20 MEQ/1
20 TABLET, EXTENDED RELEASE ORAL 2 TIMES DAILY
Qty: 60 TAB | Refills: 11 | Status: SHIPPED | OUTPATIENT
Start: 2018-08-01 | End: 2020-03-04 | Stop reason: SDUPTHER

## 2018-08-01 RX ORDER — TORSEMIDE 20 MG/1
40 TABLET ORAL DAILY
Qty: 67 TAB | Refills: 3 | OUTPATIENT
Start: 2018-08-01 | End: 2018-08-24

## 2018-08-01 RX ORDER — AMIODARONE HYDROCHLORIDE 200 MG/1
200 TABLET ORAL DAILY
Qty: 37 TAB | Refills: 11 | Status: SHIPPED | OUTPATIENT
Start: 2018-08-01 | End: 2019-08-16 | Stop reason: SDUPTHER

## 2018-08-01 RX ORDER — TORSEMIDE 20 MG/1
20 TABLET ORAL DAILY
Qty: 30 TAB | Refills: 3 | Status: SHIPPED | OUTPATIENT
Start: 2018-08-01 | End: 2018-08-01 | Stop reason: SDUPTHER

## 2018-08-01 ASSESSMENT — ENCOUNTER SYMPTOMS
BRUISES/BLEEDS EASILY: 1
PALPITATIONS: 1
DIAPHORESIS: 1
BACK PAIN: 1
MEMORY LOSS: 1
DEPRESSION: 1
COUGH: 1
INSOMNIA: 1
NECK PAIN: 1

## 2018-08-01 NOTE — LETTER
[facility]   [address], [address]  [place] [address]  Phone: [phone]  Fax: [phone]              Kailey Velarde  1960    Encounter Date: 8/1/2018    Leah Bojorquez M.D.          PROGRESS NOTE:  Subjective:   Chief Complaint:   Chief Complaint   Patient presents with   • Edema     Follow up       Kailey Velarde is a 58 y.o. female who returns for evaluation of very complex heart disease.  She has a history of chronic diastolic congestive heart failure, prior mitral valve repair, paroxysmal and persistent atrial fibrillation and was cardioverted during the hospital stay recently in April 2018, JULY at that time revealed smoking left atrial appendage, she is on warfarin therapy, she has secondary pulmonary hypertension with dilated RV and RV dysfunction, she had undergone right heart catheterization in March 2018 which showed an elevated wedge of 29 and normal cardiac output and pulmonary pressure of 41/25 there is a large V wave but she did not have significant MR on JULY.  She has had a pacemaker for sick sinus syndrome.  She was previously over diuresis in the hospital and ended up in the ICU with low blood pressure.  She is now on amiodarone therapy and needs amiodarone surveillance.  Her most recent LFTs were up but she also drinks alcohol. She has chronic pain syndrome.  She takes Abilify which can provoke tachyarrhythmias.  She probably has sleep apnea.    She continues to have dyspnea on exertion. Sees Pulm, now on O2 which she needs.    She has gained over 11 kg in 2 months. She has had dietary indiscretion with salt.  She is still smoking and drinking but wants to enter rehabilitation to quit drinking.  She has lower extremity edema.  She has [place] heart class III symptoms.  No chest pain.  No dizziness or lightheadedness.  No palpitations.  She had her device interrogated today which showed she has been in sinus rhythm since her  cardioversion.    DATA REVIEWED by me:  ECG April 3, 2018 sinus rhythm, rate 68, anterior T-wave inversion  ECG 5-30-18, NSR, 67,  anterior T wave inversion    Echo 3/29/2018 patient in rapid atrial fibrillation, severely developed dilated RV, mild left atrial enlargement, RVSP 35 mmHg above right atrial pressure, EF 55%     JULY 4/2/2018 EF 50%, moderate RV dilatation with systolic function reduction, mild biatrial enlargement, spontaneous contrast in the left atrial appendage but no thrombus, mitral valve ring in place    CT the chest March 28, 2018, no evidence of pulmonary embolism, right lower lobe atelectasis with groundglass opacities and mild edema, trace right pleural effusion, coronary artery calcifications, hepatic steatosis and splenomegaly    Cardioversion report 4/5/2018 converted with 200 J ×1 with return to sinus rhythm and ventricular pacing    Most recent labs:     April 6, 2018 hemoglobin 10.2, platelets 93, sodium 135, potassium 4.4, creatinine 0.67    7-30-18  Cr. 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   • Status post mitral valve repair November 2011    MV repair with 32mm Jonnie-Carpenter flexible annuloplasty ring.     Past Surgical History:   Procedure Laterality Date   • CARDIAC CATH, RIGHT HEART  03/31/2018    Right heart cath with high pressures.   • MITRAL VALVE REPAIR  11/17/2011    Performed by MARY ORTIZ at [facility]   • PACEMAKER INSERTION  November 2011    Medtronic Versa [id number] implanted by Dr. Amaya.   • OTHER  ORTHOPEDIC SURGERY      Neck and back      Family History   Problem Relation Age of Onset   • Heart Attack Father      Social History     Social History   • Marital status: Single     Spouse name: N/A   • Number of children: N/A   • Years of education: N/A     Occupational History   • Not on file.     Social History Main Topics   • Smoking status: Current Every Day Smoker     Packs/day: 0.25     Years: 30.00     Types: Cigarettes   • Smokeless tobacco: Never Used      Comment: 4/12 Down to vapping now.    • Alcohol use Yes      Comment: occasional   • Drug use: No      Comment: hx of, denies currently   • Sexual activity: Not on file     Other Topics Concern   • Not on file     Social History Narrative   • No narrative on file     Allergies   Allergen Reactions   • Penicillins Rash             Current Outpatient Prescriptions   Medication Sig Dispense Refill   • torsemide (DEMADEX) 20 MG Tab Take 1 Tab by mouth every day. 30 Tab 3   • potassium chloride SA (KDUR) 20 MEQ Tab CR Take 1 Tab by mouth 2 times a day. 60 Tab 11   • amiodarone (CORDARONE) 200 MG Tab Take 1 Tab by mouth every day. 37 Tab 11   • traZODone (DESYREL) 100 MG Tab Take 100 mg by mouth every evening.     • warfarin (COUMADIN) 5 MG Tab Take 1 Tab by mouth COUMADIN-DAILY. 90 Tab 2   • oxyCODONE immediate release (ROXICODONE) 10 MG immediate release tablet Take 10 mg by mouth 3 times a day. TID with MS Contin     • aripiprazole (ABILIFY) 5 MG tablet Take 5 mg by mouth every morning. (soon to increase to 10mg)     • venlafaxine (EFFEXOR-XR) 150 MG extended-release capsule Take 150 mg by mouth every morning.     • Multiple Vitamin (MULTI-DAY PO) Take 1 Tab by mouth every morning.     • atorvastatin (LIPITOR) 20 MG Tab Take 1 Tab by mouth every evening. 30 Tab 11   • pregabalin (LYRICA) 200 MG capsule Take 200 mg by mouth 3 times a day.     • vitamin D (CHOLECALCIFEROL) 1000 UNIT Tab Take 1,000 Units by mouth every morning.     • morphine SR (MS CONTIN)  "15 MG TB12 Take 1 Tab by mouth 3 times a day. 60 Tab 0   • Blood Pressure Monitoring (B-D ASSURE BPM/AUTO ARM CUFF) Misc Blood pressure home monitoring system 1 Each 0     No current facility-administered medications for this visit.        Review of Systems   Constitutional: Positive for diaphoresis and malaise/fatigue.   HENT: Positive for tinnitus.    Respiratory: Positive for cough.    Cardiovascular: Positive for palpitations and leg swelling.   Musculoskeletal: Positive for back pain and neck pain.   Endo/Heme/Allergies: Bruises/bleeds easily.   Psychiatric/Behavioral: Positive for depression and memory loss (  ). The patient has insomnia.      All others systems reviewed and negative.     Objective:     Blood pressure 132/50, pulse 82, height 1.727 m (5' 7.99\"), weight (!) 138.3 kg (305 lb), last menstrual period 01/01/2010, SpO2 93 %. Body mass index is 46.39 kg/m².    Physical Exam   General: No acute distress. Well nourished.  HEENT: EOM grossly intact, no scleral icterus, no pharyngeal erythema.   Neck:  No JVD, no bruits, trachea midline  CVS: RRR today. Normal S1, S2. 2/6 syst murmur. 2+ pititng LE edema.  2+ radial pulses, well-healed scar across the chest, PM pocket intack  Resp: CTAB. No wheezing or crackles/rhonchi. Normal respiratory effort.  Abdomen: Soft, NT, no maco hepatomegaly, obese.  MSK/Ext: No clubbing or cyanosis.  Skin: Warm and dry, no rashes.  Neurological: CN III-XII grossly intact. No focal deficits.   Psych: A&O x 3, appropriate affect, good judgement    Assessment:     1. Acute on chronic diastolic (congestive) heart failure (HCC)  BASIC METABOLIC PANEL   2. Atypical atrial flutter (HCC)  REFERRAL TO CARDIAC ELECTROPHYSIOLOGY   3. Heart failure with preserved ejection fraction, borderline, class II (HCC)     4. Essential hypertension     5. PAF (paroxysmal atrial fibrillation) (HCC)     6. Palpitations     7. Pacemaker     8. Sick sinus syndrome (HCC)     9. Chronic right-sided " heart failure (HCC)     10. Thrombocytopenia (HCC)     11. Pulmonary hypertension (HCC)     12. KAEL (obstructive sleep apnea)     13. Tobacco use disorder     14. Hx of pleural effusion     15. Shortness of breath     16. High risk medication use     17. Alcohol use         Medical Decision Making:  Today's Assessment / Status / Plan:     1. Acute on chronic diastolic CHF, weight is up   2. Prior mitral valve repair  3. Paroxysmal/persistent atrial flutter, rates were uncontrolled, failed calcium channel blocker and metoprolol. Now status post cardioversion ×1 with return to sinus rhythm and ventricular pacing, now back in flutter.  4. Secondary pulmonary hypertension with dilated RV and RV dysfunction, attributed to probable left-sided heart disease, untreated sleep apnea and untreated oxygen requirements. Her right heart catheterization 3/31/2018 did show an elevated wedge of 29, RAP 15, PAP 41/25, RV pressure 40/11, cardiac output 5.4 L/m. There is concern for a large V wave (17-36) however JULY showed no issue with her mitral valve repair.  5. Tobacco use, vaping  6.  Pacemaker in situ  7. Obesity  8. Warfarin therapy with Lovenox bridge, smoke was seen in the left atrial appendage but no thrombus.  9. Hypotension, likely due to dehydration and multiple medications, resolved  10. Acute kidney injury, due to #9, resolved  11. Low-normal LV systolic function, EF 50% and elevated wedge.  12. Amiodarone therapy. LFTs normal on 4/3/2018. TSH was less than 0.005 on 3/29/2018. High risk med  13. Possible hyperthyroidism, low TSH this admission, will need to be followed closely.  14. Chronic pain syndrome  15. Dyslipidemia  16. Abilify therapy, can provoke tachycardias  17. Oxygen requirement, possibly related to COPD and smoking  18. Probable KAEL  19. Fatty liver by CT scan 3-28-18  20. Calcified coronary disease CT 3-28-18  21. Mildly abnormal LFTs, etoh, fatty liver, amio    -Torsemide 60 mg daily for 7 days, then 40  mg daily  -Potassium increase from 20 daily to BID  -She wants to go into rehab for ETOH, not yet ready to stop smoking at this time  -LFTs, TSH 6 months, her LFTs are abnormal, needs to quit drinking    Written instructions given to her today:    Stop furosemide  Take Torsemide 60 mg (3 tablets) for 7 days then 40 mg (2 tablets daily)  Increase potassium to 20 meq twice a day  See Jero in 6 weeks  Call if you weight does not go down by 5 pounds in 5 days  See me in 6 months    Return in about 6 months (around 2/1/2019) for Jero 6 weeks, lyndsey 6 months.    It is my pleasure to participate in the care of Ms. Velarde.  Please do not hesitate to contact me with questions or concerns.    Leah Bojorquez MD, [facility]  Cardiologist [facility]    Please note that this dictation was created using voice recognition software. I have made every reasonable attempt to correct obvious errors, but it is possible there are errors of grammar and possibly content that I did not discover before finalizing the note.      Toni Grijalva M.D.  [address] [place] [address] [place] [address]  VIA Facsimile: [phone]

## 2018-08-01 NOTE — TELEPHONE ENCOUNTER
Followed up with Perry @ [facility] pharmacy. Educated him that prescription for Torsemide should be for 60mg for 7 days, then down to 40mg daily. Pharmacist states understanding. Prescription updated to Torsemide 20mg, 3 tabs daily x7 days then 2 tabs daily #67 with 3 refills.     Dr. Bojorquez's OV note:  Take Torsemide 60 mg (3 tablets) for 7 days then 40 mg (2 tablets daily)    ----- Message from Olinda Sparks sent at 8/1/2018  3:29 PM PDT -----  Regarding: torsemide clarification  Contact: [phone]  J[place]SEY/susan Amador, pharmacist at [facility] calling for clarification of torsemide script. Please call Perry at .

## 2018-08-01 NOTE — PROGRESS NOTES
Subjective:   Chief Complaint:   Chief Complaint   Patient presents with   • Edema     Follow up       Kailey Velarde is a 58 y.o. female who returns for evaluation of very complex heart disease.  She has a history of chronic diastolic congestive heart failure, prior mitral valve repair, paroxysmal and persistent atrial fibrillation and was cardioverted during the hospital stay recently in April 2018, JULY at that time revealed smoking left atrial appendage, she is on warfarin therapy, she has secondary pulmonary hypertension with dilated RV and RV dysfunction, she had undergone right heart catheterization in March 2018 which showed an elevated wedge of 29 and normal cardiac output and pulmonary pressure of 41/25 there is a large V wave but she did not have significant MR on JULY.  She has had a pacemaker for sick sinus syndrome.  She was previously over diuresis in the hospital and ended up in the ICU with low blood pressure.  She is now on amiodarone therapy and needs amiodarone surveillance.  Her most recent LFTs were up but she also drinks alcohol. She has chronic pain syndrome.  She takes Abilify which can provoke tachyarrhythmias.  She probably has sleep apnea.    She continues to have dyspnea on exertion. Sees Pulm, now on O2 which she needs.    She has gained over 11 kg in 2 months. She has had dietary indiscretion with salt.  She is still smoking and drinking but wants to enter rehabilitation to quit drinking.  She has lower extremity edema.  She has [place] heart class III symptoms.  No chest pain.  No dizziness or lightheadedness.  No palpitations.  She had her device interrogated today which showed she has been in sinus rhythm since her cardioversion.    DATA REVIEWED by me:  ECG April 3, 2018 sinus rhythm, rate 68, anterior T-wave inversion  ECG 5-30-18, NSR, 67,  anterior T wave inversion    Echo 3/29/2018 patient in rapid atrial fibrillation, severely developed dilated RV, mild left atrial  enlargement, RVSP 35 mmHg above right atrial pressure, EF 55%     JULY 4/2/2018 EF 50%, moderate RV dilatation with systolic function reduction, mild biatrial enlargement, spontaneous contrast in the left atrial appendage but no thrombus, mitral valve ring in place    CT the chest March 28, 2018, no evidence of pulmonary embolism, right lower lobe atelectasis with groundglass opacities and mild edema, trace right pleural effusion, coronary artery calcifications, hepatic steatosis and splenomegaly    Cardioversion report 4/5/2018 converted with 200 J ×1 with return to sinus rhythm and ventricular pacing    Most recent labs:     April 6, 2018 hemoglobin 10.2, platelets 93, sodium 135, potassium 4.4, creatinine 0.67    7-30-18  Cr. 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   • Status post mitral valve repair November 2011    MV repair with 32mm Jonnie-Carpenter flexible annuloplasty ring.     Past Surgical History:   Procedure Laterality Date   • CARDIAC CATH, RIGHT HEART  03/31/2018    Right heart cath with high pressures.   • MITRAL VALVE REPAIR  11/17/2011    Performed by MARY ORTIZ at SURGERY [facility]   • PACEMAKER INSERTION  November 2011    Medtronic Versa VEDR01 implanted by Dr. Amaya.   • OTHER ORTHOPEDIC SURGERY      Neck and back      Family History   Problem Relation Age of Onset   • Heart Attack Father      Social History     Social History   • Marital status: Single     Spouse name: N/A   • Number of children: N/A   • Years of education: N/A      Occupational History   • Not on file.     Social History Main Topics   • Smoking status: Current Every Day Smoker     Packs/day: 0.25     Years: 30.00     Types: Cigarettes   • Smokeless tobacco: Never Used      Comment: 4/12 Down to vapping now.    • Alcohol use Yes      Comment: occasional   • Drug use: No      Comment: hx of, denies currently   • Sexual activity: Not on file     Other Topics Concern   • Not on file     Social History Narrative   • No narrative on file     Allergies   Allergen Reactions   • Penicillins Rash             Current Outpatient Prescriptions   Medication Sig Dispense Refill   • torsemide (DEMADEX) 20 MG Tab Take 1 Tab by mouth every day. 30 Tab 3   • potassium chloride SA (KDUR) 20 MEQ Tab CR Take 1 Tab by mouth 2 times a day. 60 Tab 11   • amiodarone (CORDARONE) 200 MG Tab Take 1 Tab by mouth every day. 37 Tab 11   • traZODone (DESYREL) 100 MG Tab Take 100 mg by mouth every evening.     • warfarin (COUMADIN) 5 MG Tab Take 1 Tab by mouth COUMADIN-DAILY. 90 Tab 2   • oxyCODONE immediate release (ROXICODONE) 10 MG immediate release tablet Take 10 mg by mouth 3 times a day. TID with MS Contin     • aripiprazole (ABILIFY) 5 MG tablet Take 5 mg by mouth every morning. (soon to increase to 10mg)     • venlafaxine (EFFEXOR-XR) 150 MG extended-release capsule Take 150 mg by mouth every morning.     • Multiple Vitamin (MULTI-DAY PO) Take 1 Tab by mouth every morning.     • atorvastatin (LIPITOR) 20 MG Tab Take 1 Tab by mouth every evening. 30 Tab 11   • pregabalin (LYRICA) 200 MG capsule Take 200 mg by mouth 3 times a day.     • vitamin D (CHOLECALCIFEROL) 1000 UNIT Tab Take 1,000 Units by mouth every morning.     • morphine SR (MS CONTIN) 15 MG TB12 Take 1 Tab by mouth 3 times a day. 60 Tab 0   • Blood Pressure Monitoring (B-D ASSURE BPM/AUTO ARM CUFF) Misc Blood pressure home monitoring system 1 Each 0     No current facility-administered medications for this visit.        Review of Systems  "  Constitutional: Positive for diaphoresis and malaise/fatigue.   HENT: Positive for tinnitus.    Respiratory: Positive for cough.    Cardiovascular: Positive for palpitations and leg swelling.   Musculoskeletal: Positive for back pain and neck pain.   Endo/Heme/Allergies: Bruises/bleeds easily.   Psychiatric/Behavioral: Positive for depression and memory loss (  ). The patient has insomnia.      All others systems reviewed and negative.     Objective:     Blood pressure 132/50, pulse 82, height 1.727 m (5' 7.99\"), weight (!) 138.3 kg (305 lb), last menstrual period 01/01/2010, SpO2 93 %. Body mass index is 46.39 kg/m².    Physical Exam   General: No acute distress. Well nourished.  HEENT: EOM grossly intact, no scleral icterus, no pharyngeal erythema.   Neck:  No JVD, no bruits, trachea midline  CVS: RRR today. Normal S1, S2. 2/6 syst murmur. 2+ pititng LE edema.  2+ radial pulses, well-healed scar across the chest, PM pocket intack  Resp: CTAB. No wheezing or crackles/rhonchi. Normal respiratory effort.  Abdomen: Soft, NT, no maco hepatomegaly, obese.  MSK/Ext: No clubbing or cyanosis.  Skin: Warm and dry, no rashes.  Neurological: CN III-XII grossly intact. No focal deficits.   Psych: A&O x 3, appropriate affect, good judgement    Assessment:     1. Acute on chronic diastolic (congestive) heart failure (HCC)  BASIC METABOLIC PANEL   2. Atypical atrial flutter (HCC)  REFERRAL TO CARDIAC ELECTROPHYSIOLOGY   3. Heart failure with preserved ejection fraction, borderline, class II (HCC)     4. Essential hypertension     5. PAF (paroxysmal atrial fibrillation) (HCC)     6. Palpitations     7. Pacemaker     8. Sick sinus syndrome (HCC)     9. Chronic right-sided heart failure (HCC)     10. Thrombocytopenia (HCC)     11. Pulmonary hypertension (HCC)     12. KAEL (obstructive sleep apnea)     13. Tobacco use disorder     14. Hx of pleural effusion     15. Shortness of breath     16. High risk medication use     17. " Alcohol use         Medical Decision Making:  Today's Assessment / Status / Plan:     1. Acute on chronic diastolic CHF, weight is up   2. Prior mitral valve repair  3. Paroxysmal/persistent atrial flutter, rates were uncontrolled, failed calcium channel blocker and metoprolol. Now status post cardioversion ×1 with return to sinus rhythm and ventricular pacing, now back in flutter.  4. Secondary pulmonary hypertension with dilated RV and RV dysfunction, attributed to probable left-sided heart disease, untreated sleep apnea and untreated oxygen requirements. Her right heart catheterization 3/31/2018 did show an elevated wedge of 29, RAP 15, PAP 41/25, RV pressure 40/11, cardiac output 5.4 L/m. There is concern for a large V wave (17-36) however JULY showed no issue with her mitral valve repair.  5. Tobacco use, vaping  6.  Pacemaker in situ  7. Obesity  8. Warfarin therapy with Lovenox bridge, smoke was seen in the left atrial appendage but no thrombus.  9. Hypotension, likely due to dehydration and multiple medications, resolved  10. Acute kidney injury, due to #9, resolved  11. Low-normal LV systolic function, EF 50% and elevated wedge.  12. Amiodarone therapy. LFTs normal on 4/3/2018. TSH was less than 0.005 on 3/29/2018. High risk med  13. Possible hyperthyroidism, low TSH this admission, will need to be followed closely.  14. Chronic pain syndrome  15. Dyslipidemia  16. Abilify therapy, can provoke tachycardias  17. Oxygen requirement, possibly related to COPD and smoking  18. Probable KAEL  19. Fatty liver by CT scan 3-28-18  20. Calcified coronary disease CT 3-28-18  21. Mildly abnormal LFTs, etoh, fatty liver, amio    -Torsemide 60 mg daily for 7 days, then 40 mg daily  -Potassium increase from 20 daily to BID  -She wants to go into rehab for ETOH, not yet ready to stop smoking at this time  -LFTs, TSH 6 months, her LFTs are abnormal, needs to quit drinking    Written instructions given to her today:    Stop  furosemide  Take Torsemide 60 mg (3 tablets) for 7 days then 40 mg (2 tablets daily)  Increase potassium to 20 meq twice a day  See Jero in 6 weeks  Call if you weight does not go down by 5 pounds in 5 days  See me in 6 months    Return in about 6 months (around 2/1/2019) for Jero 6 weeks, lyndsey 6 months.    It is my pleasure to participate in the care of Ms. Velarde.  Please do not hesitate to contact me with questions or concerns.    Leah Bojorquez MD, [facility]  Cardiologist [facility]    Please note that this dictation was created using voice recognition software. I have made every reasonable attempt to correct obvious errors, but it is possible there are errors of grammar and possibly content that I did not discover before finalizing the note.

## 2018-08-01 NOTE — LETTER
[facility]   [address]  [place] [address]  Phone: [phone]  Fax: [phone]              Kailey Velarde  1960    Encounter Date: 8/1/2018    Leah Bojorquez M.D.          PROGRESS NOTE:  Subjective:   Chief Complaint:   Chief Complaint   Patient presents with   • Edema     Follow up       Kailey Velarde is a 58 y.o. female who returns for evaluation of very complex heart disease.  She has a history of chronic diastolic congestive heart failure, prior mitral valve repair, paroxysmal and persistent atrial fibrillation and was cardioverted during the hospital stay recently in April 2018, JULY at that time revealed smoking left atrial appendage, she is on warfarin therapy, she has secondary pulmonary hypertension with dilated RV and RV dysfunction, she had undergone right heart catheterization in March 2018 which showed an elevated wedge of 29 and normal cardiac output and pulmonary pressure of 41/25 there is a large V wave but she did not have significant MR on JULY.  She has had a pacemaker for sick sinus syndrome.  She was previously over diuresis in the hospital and ended up in the ICU with low blood pressure.  She is now on amiodarone therapy and needs amiodarone surveillance.  She has chronic pain syndrome.  She takes Abilify which can provoke tachyarrhythmias.  She probably has sleep apnea.    She continues to have dyspnea on exertion. Saw Pulm at [place], supposed to be on O2, and taking spiriva.    She has gained 2.4 KG since her last visit. She has not been taking lasix as prescribed due to awkward urination and urgency/frequency. She thought she was back in Afib with more palpitations and shortness of breath but her ECG showed NSR    She has gained over 11 kg in 2 months.    DATA REVIEWED by me:  ECG April 3, 2018 sinus rhythm, rate 68, anterior T-wave inversion  ECG 5-30-18, NSR, 67,  anterior T wave inversion    Echo  3/29/2018 patient in rapid atrial fibrillation, severely developed dilated RV, mild left atrial enlargement, RVSP 35 mmHg above right atrial pressure, EF 55%     JULY 4/2/2018 EF 50%, moderate RV dilatation with systolic function reduction, mild biatrial enlargement, spontaneous contrast in the left atrial appendage but no thrombus, mitral valve ring in place    CT the chest March 28, 2018, no evidence of pulmonary embolism, right lower lobe atelectasis with groundglass opacities and mild edema, trace right pleural effusion, coronary artery calcifications, hepatic steatosis and splenomegaly    Cardioversion report 4/5/2018 converted with 200 J ×1 with return to sinus rhythm and ventricular pacing    Most recent labs:     April 6, 2018 hemoglobin 10.2, platelets 93, sodium 135, potassium 4.4, creatinine 0.67    7-30-18  Cr. 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   • Status post mitral valve repair November 2011    MV repair with 32mm Jonnie-Carpenter flexible annuloplasty ring.     Past Surgical History:   Procedure Laterality Date   • CARDIAC CATH, RIGHT HEART  03/31/2018    Right heart cath with high pressures.   • MITRAL VALVE REPAIR  11/17/2011    Performed by MARY ORTIZ at SURGERY [facility]   • PACEMAKER INSERTION  November 2011    Medtronic Versa VEDR01 implanted by Dr. Amaya.   • OTHER ORTHOPEDIC SURGERY      Neck and back      Family History   Problem Relation Age of Onset   • Heart Attack Father      Social History     Social History   • Marital  status: Single     Spouse name: N/A   • Number of children: N/A   • Years of education: N/A     Occupational History   • Not on file.     Social History Main Topics   • Smoking status: Current Every Day Smoker     Packs/day: 0.25     Years: 30.00     Types: Cigarettes   • Smokeless tobacco: Never Used      Comment: 4/12 Down to vapping now.    • Alcohol use Yes      Comment: occasional   • Drug use: No      Comment: hx of, denies currently   • Sexual activity: Not on file     Other Topics Concern   • Not on file     Social History Narrative   • No narrative on file     Allergies   Allergen Reactions   • Penicillins Rash             Current Outpatient Prescriptions   Medication Sig Dispense Refill   • torsemide (DEMADEX) 20 MG Tab Take 1 Tab by mouth every day. 30 Tab 3   • potassium chloride SA (KDUR) 20 MEQ Tab CR Take 1 Tab by mouth 2 times a day. 60 Tab 11   • amiodarone (CORDARONE) 200 MG Tab Take 1 Tab by mouth every day. 37 Tab 11   • traZODone (DESYREL) 100 MG Tab Take 100 mg by mouth every evening.     • warfarin (COUMADIN) 5 MG Tab Take 1 Tab by mouth COUMADIN-DAILY. 90 Tab 2   • oxyCODONE immediate release (ROXICODONE) 10 MG immediate release tablet Take 10 mg by mouth 3 times a day. TID with MS Contin     • aripiprazole (ABILIFY) 5 MG tablet Take 5 mg by mouth every morning. (soon to increase to 10mg)     • venlafaxine (EFFEXOR-XR) 150 MG extended-release capsule Take 150 mg by mouth every morning.     • Multiple Vitamin (MULTI-DAY PO) Take 1 Tab by mouth every morning.     • atorvastatin (LIPITOR) 20 MG Tab Take 1 Tab by mouth every evening. 30 Tab 11   • pregabalin (LYRICA) 200 MG capsule Take 200 mg by mouth 3 times a day.     • vitamin D (CHOLECALCIFEROL) 1000 UNIT Tab Take 1,000 Units by mouth every morning.     • morphine SR (MS CONTIN) 15 MG TB12 Take 1 Tab by mouth 3 times a day. 60 Tab 0   • Blood Pressure Monitoring (B-D ASSURE BPM/AUTO ARM CUFF) Misc Blood pressure home monitoring system 1 Each  "0     No current facility-administered medications for this visit.        Review of Systems   Constitutional: Positive for diaphoresis and malaise/fatigue.   HENT: Positive for tinnitus.    Respiratory: Positive for cough.    Cardiovascular: Positive for palpitations and leg swelling.   Musculoskeletal: Positive for back pain and neck pain.   Endo/Heme/Allergies: Bruises/bleeds easily.   Psychiatric/Behavioral: Positive for depression and memory loss (  ). The patient has insomnia.      All others systems reviewed and negative.     Objective:     Blood pressure 132/50, pulse 82, height 1.727 m (5' 7.99\"), weight (!) 138.3 kg (305 lb), last menstrual period 01/01/2010, SpO2 93 %. Body mass index is 46.39 kg/m².    Physical Exam   General: No acute distress. Well nourished.  HEENT: EOM grossly intact, no scleral icterus, no pharyngeal erythema.   Neck:  No JVD, no bruits, trachea midline  CVS: RRR today. Normal S1, S2. 2/6 syst murmur. 2+ pititng LE edema.  2+ radial pulses, well-healed scar across the chest, PM pocket intack  Resp: CTAB. No wheezing or crackles/rhonchi. Normal respiratory effort.  Abdomen: Soft, NT, no maco hepatomegaly, obese.  MSK/Ext: No clubbing or cyanosis.  Skin: Warm and dry, no rashes.  Neurological: CN III-XII grossly intact. No focal deficits.   Psych: A&O x 3, appropriate affect, good judgement    Assessment:     1. Acute on chronic diastolic (congestive) heart failure (HCC)  BASIC METABOLIC PANEL   2. Atypical atrial flutter (HCC)  REFERRAL TO CARDIAC ELECTROPHYSIOLOGY   3. Heart failure with preserved ejection fraction, borderline, class II (HCC)     4. Essential hypertension     5. PAF (paroxysmal atrial fibrillation) (HCC)     6. Palpitations     7. Pacemaker     8. Sick sinus syndrome (HCC)     9. Chronic right-sided heart failure (HCC)     10. Thrombocytopenia (HCC)     11. Pulmonary hypertension (HCC)     12. KAEL (obstructive sleep apnea)     13. Tobacco use disorder     14. Hx of " pleural effusion     15. Shortness of breath     16. High risk medication use     17. Alcohol use         Medical Decision Making:  Today's Assessment / Status / Plan:     1. Acute on chronic diastolic CHF, weight is up   2. Prior mitral valve repair  3. Paroxysmal/persistent atrial flutter, rates were uncontrolled, failed calcium channel blocker and metoprolol. Now status post cardioversion ×1 with return to sinus rhythm and ventricular pacing, now back in flutter.  4. Secondary pulmonary hypertension with dilated RV and RV dysfunction, attributed to probable left-sided heart disease, untreated sleep apnea and untreated oxygen requirements. Her right heart catheterization 3/31/2018 did show an elevated wedge of 29, RAP 15, PAP 41/25, RV pressure 40/11, cardiac output 5.4 L/m. There is concern for a large V wave (17-36) however JULY showed no issue with her mitral valve repair.  5. Tobacco use, vaping  6.  Pacemaker in situ  7. Obesity  8. Warfarin therapy with Lovenox bridge, smoke was seen in the left atrial appendage but no thrombus.  9. Hypotension, likely due to dehydration and multiple medications, resolved  10. Acute kidney injury, due to #9, resolved  11. Low-normal LV systolic function, EF 50% and elevated wedge.  12. Amiodarone therapy. LFTs normal on 4/3/2018. TSH was less than 0.005 on 3/29/2018. High risk med  13. Possible hyperthyroidism, low TSH this admission, will need to be followed closely.  14. Chronic pain syndrome  15. Dyslipidemia  16. Abilify therapy, can provoke tachycardias  17. Oxygen requirement, possibly related to COPD and smoking  18. Probable KAEL  19. Fatty liver by CT scan 3-28-18  20. Calcified coronary disease CT 3-28-18  21. Mildly abnormal LFTs, etoh, fatty liver, amio    -Torsemide 60 mg daily for 7 days, then 40 mg daily  -Potassium increase from 20 daily to BID  -increase amio over next week  -She wants to go into rehab for ETOH, not yet ready to stop smoking at this  time  -After device check I will set up another JULY/CV, with INR that day (prior smoke in [place])  -EP referral  -LFTs, TSH 6 months, her LFTs are abnormal, needs to quit drinking    Written instructions given to her today:    Stop furosemide  Take Torsemide 60 mg (3 tablets) for 7 days then 40 mg (2 tablets daily)  Increase potassium to 20 meq twice a day  Increase your amiodarone to 200 mg twice a day for 7 days then return to daily  I will arrange a JULY/CV after your device check today  See Jero in 6 weeks  Call if you weight does not go down by 5 pounds in 5 days  See me in 6 months    Return in about 6 months (around 2/1/2019) for Jero 6 weeks, lyndsey 6 months.    It is my pleasure to participate in the care of Ms. Velarde.  Please do not hesitate to contact me with questions or concerns.    Leah Bojorquez MD, formerly [facility]  Cardiologist [facility]    Please note that this dictation was created using voice recognition software. I have made every reasonable attempt to correct obvious errors, but it is possible there are errors of grammar and possibly content that I did not discover before finalizing the note.      Toni Grijalva M.D.  [address] [place] [address] [place] [address]  VIA Facsimile: [phone]

## 2018-08-01 NOTE — PATIENT INSTRUCTIONS
Stop furosemide  Take Torsemide 60 mg (3 tablets) for 7 days then 40 mg (2 tablets daily)  Increase potassium to 20 meq twice a day  See Jero in 6 weeks  See me in 6 months  Call if you weight does not go down by 5 pounds in 5 days

## 2018-08-02 NOTE — PROGRESS NOTES
CC:  Chief Complaint   Patient presents with   • Follow-Up     Patient came today for follow-up, was seen by Dr. Patel more than a year ago for right-sided pleural effusion on the setting of congestive heart failure    HPI:   The patient is a 58 y.o. female. With history of congestive heart failure, came today for follow-up. The patient was seen by Dr. Patel more than year ago for right-sided pleural effusion chest x-ray was ordered at that time and she was given follow-up in 8 weeks however the patient did not keep this follow-up.    Her pleural effusion was thought to be secondary to heart failure at that time. She has hospitalization in March 2018 and she had a CT scan at that time which showed minimal right-sided effusion however it did show pulmonary edema consistent with her history of CHF     The patient continued to have fluid overload currently she is about 20 pounds above her baseline weight. She was just seen in the cardiology clinic. Hair diuretics were adjusted. Other important history includes alcohol abuse/dependence and active smoking. The patient uses Spiriva once a day however she never had official pulmonary function test to confirm the diagnosis of COPD.      ROS:   Constitutional: Denies fevers, chills, night sweats  Eyes: Denies vision loss, pain, drainage, double vision  Ears, Nose, Throat: Denies earache, difficulty hearing, tinnitus, nasal congestion, hoarseness  Cardiovascular: Denies chest pain, tightness, palpitations, orthopnea or edema  Respiratory: Please see HPI  GI: Denies heartburn, dysphagia, nausea, abdominal pain, diarrhea or constipation  : Denies frequent urination, hematuria, discharge or painful urination  Musculoskeletal: Denies back pain, painful joints, sore muscles  Neurological: Denies weakness or headaches  Skin: No rashes  All other ROS were negative except what mentioned in the HPI     Past Medical History:  Past Medical History:   Diagnosis Date   • Atrial myxoma  November 2011    Status post LA myxoma resection   • Backpain    • CAD (coronary artery disease)     pacemaker   • Chronic pain    • Dizziness     • Hyperlipidemia     • Hypertension    • Hypertriglyceridemia     • Indigestion    • Peripheral edema    • Sciatica    • Sick sinus syndrome (HCC) November 2011    Status post PPM implantation.   • Status post mitral valve repair November 2011    MV repair with 32mm Jonnie-Carpenter flexible annuloplasty ring.               Social History:  Social History     Social History   • Marital status: Single     Spouse name: N/A   • Number of children: N/A   • Years of education: N/A     Occupational History   • Not on file.     Social History Main Topics   • Smoking status: Current Every Day Smoker     Packs/day: 0.25     Years: 30.00     Types: Cigarettes   • Smokeless tobacco: Never Used      Comment: 4/12 Down to vapping now.    • Alcohol use Yes      Comment: occasional   • Drug use: No      Comment: hx of, denies currently   • Sexual activity: Not on file     Other Topics Concern   • Not on file     Social History Narrative   • No narrative on file             Family History:  Family History   Problem Relation Age of Onset   • Heart Attack Father        Current Outpatient Prescriptions on File Prior to Visit   Medication Sig Dispense Refill   • potassium chloride SA (KDUR) 20 MEQ Tab CR Take 1 Tab by mouth 2 times a day. 60 Tab 11   • amiodarone (CORDARONE) 200 MG Tab Take 1 Tab by mouth every day. 37 Tab 11   • Blood Pressure Monitoring (B-D ASSURE BPM/AUTO ARM CUFF) Misc Blood pressure home monitoring system 1 Each 0   • traZODone (DESYREL) 100 MG Tab Take 100 mg by mouth every evening.     • warfarin (COUMADIN) 5 MG Tab Take 1 Tab by mouth COUMADIN-DAILY. 90 Tab 2   • oxyCODONE immediate release (ROXICODONE) 10 MG immediate release tablet Take 10 mg by mouth 3 times a day. TID with MS Contin     • aripiprazole (ABILIFY) 5 MG tablet Take 5 mg by mouth every morning.  "(soon to increase to 10mg)     • venlafaxine (EFFEXOR-XR) 150 MG extended-release capsule Take 150 mg by mouth every morning.     • Multiple Vitamin (MULTI-DAY PO) Take 1 Tab by mouth every morning.     • atorvastatin (LIPITOR) 20 MG Tab Take 1 Tab by mouth every evening. 30 Tab 11   • pregabalin (LYRICA) 200 MG capsule Take 200 mg by mouth 3 times a day.     • vitamin D (CHOLECALCIFEROL) 1000 UNIT Tab Take 1,000 Units by mouth every morning.     • morphine SR (MS CONTIN) 15 MG TB12 Take 1 Tab by mouth 3 times a day. 60 Tab 0     No current facility-administered medications on file prior to visit.        Allergies:   Penicillins        Vitals:    08/01/18 1529   Height: 1.702 m (5' 7\")   Weight: (!) 138.3 kg (305 lb)   Weight % change since last entry.: 0 %   BP: 130/70   Pulse: 90   BMI (Calculated): 47.77   Resp: 16   Temp: 36.7 °C (98.1 °F)           Physical Exam:  Appearance:  in no acute distress  HEENT: Normocephalic, atraumatic, white sclera, PERRLA, oropharynx clear  Neck: No adenopathy or masses  Respiratory: no intercostal retractions or accessory muscle use  Lungs auscultation: Clear to auscultation bilaterally  Cardiovascular: Regular rate rhythm. No murmurs, rubs or gallops.  No LE edema  Abdomen: soft, nondistended  Gait: Normal  Digits: No clubbing, cyanosis  Motor: No focal deficits  Orientation: Oriented to time, person and place      DATA:    Labs:  Lab Results   Component Value Date/Time    WBC 4.6 (L) 04/06/2018 05:13 AM    RBC 3.40 (L) 04/06/2018 05:13 AM    HEMOGLOBIN 10.2 (L) 04/06/2018 05:13 AM    HEMATOCRIT 32.5 (L) 04/06/2018 05:13 AM    MCV 95.6 04/06/2018 05:13 AM    MCH 30.0 04/06/2018 05:13 AM    MCHC 31.4 (L) 04/06/2018 05:13 AM    MPV 10.9 04/06/2018 05:13 AM    NEUTSPOLYS 53.10 04/05/2018 03:07 AM    LYMPHOCYTES 31.70 04/05/2018 03:07 AM    MONOCYTES 11.00 04/05/2018 03:07 AM    EOSINOPHILS 3.20 04/05/2018 03:07 AM    BASOPHILS 0.80 04/05/2018 03:07 AM    HYPOCHROMIA 1+ 01/19/2012 " 08:47 AM    ANISOCYTOSIS 2+ 01/19/2012 08:47 AM      Lab Results   Component Value Date/Time    SODIUM 135 04/06/2018 05:13 AM    POTASSIUM 4.4 04/06/2018 05:13 AM    CHLORIDE 102 04/06/2018 05:13 AM    CO2 30 04/06/2018 05:13 AM    GLUCOSE 92 04/06/2018 05:13 AM    BUN 10 04/06/2018 05:13 AM    CREATININE 0.67 04/06/2018 05:13 AM    BUNCREATRAT 13 10/09/2015 02:11 PM                  Diagnosis:  1. Chronic obstructive pulmonary disease, unspecified COPD type (HCC)  AMB PULMONARY FUNCTION TEST/LAB    DX-CHEST-2 VIEWS    DISCONTINUED: Tiotropium Bromide Monohydrate (SPIRIVA RESPIMAT) 2.5 MCG/ACT Aero Soln   2. Class 3 obesity with serious comorbidity and body mass index (BMI) of 45.0 to 49.9 in adult, unspecified obesity type (HCC)         The patient is an active smoker.  She was given the diagnosis of COPD in the past however no pulmonary function test done previously.  She is on Spiriva.  She is asking for refill of Spiriva medications.  -She will need pulmonary function test prior to her next visit.  We will provide prescription for Spiriva.    Regarding the patient's right-sided pleural effusion, it has  almost resolved the latest CT scan.  It is very likely to be secondary to her congestive heart failure,  her diuretics were just adjusted by the patient's cardiologist.    Smoking cessation was strongly advised  Body mass index is 47.77 kg/m².   The patient is morbidly obese, counseling on diet and exercise and healthy lifestyle was provided.                      Return in about 6 weeks (around 9/12/2018) for With CXR, Follow up visit with APRN, With PFT.        This note was created using voice recognition software. I apologize for any overlooked obvious grammar or  vocabulary mistake

## 2018-08-21 ENCOUNTER — ANTICOAGULATION VISIT (OUTPATIENT)
Dept: VASCULAR LAB | Facility: MEDICAL CENTER | Age: 58
End: 2018-08-21
Attending: INTERNAL MEDICINE
Payer: MEDICARE

## 2018-08-21 VITALS — DIASTOLIC BLOOD PRESSURE: 66 MMHG | SYSTOLIC BLOOD PRESSURE: 119 MMHG | HEART RATE: 77 BPM

## 2018-08-21 DIAGNOSIS — I48.0 PAF (PAROXYSMAL ATRIAL FIBRILLATION) (HCC): ICD-10-CM

## 2018-08-21 DIAGNOSIS — Z98.890 STATUS POST MITRAL VALVE REPAIR: ICD-10-CM

## 2018-08-21 LAB
INR BLD: 2.5 (ref 0.9–1.2)
INR PPP: 2.5 (ref 2–3.5)

## 2018-08-21 PROCEDURE — 99211 OFF/OP EST MAY X REQ PHY/QHP: CPT

## 2018-08-21 PROCEDURE — 85610 PROTHROMBIN TIME: CPT

## 2018-08-21 NOTE — PROGRESS NOTES
Anticoagulation Summary  As of 8/21/2018    INR goal:   2.0-3.0   TTR:   49.9 % (4.1 mo)   Today's INR:   2.5   Warfarin maintenance plan:   15 mg (5 mg x 3) on Mon, Wed, Fri; 10 mg (5 mg x 2) all other days   Weekly warfarin total:   85 mg   Plan last modified:   Alexa Stewart (8/21/2018)   Next INR check:   8/29/2018   Target end date:   Indefinite    Indications    PAF (paroxysmal atrial fibrillation) (CMS-HCC) [I48.0]  Long term current use of anticoagulant therapy (Resolved) [Z79.01]  Status post mitral valve repair [Z98.890]             Anticoagulation Episode Summary     INR check location:       Preferred lab:       Send INR reminders to:       Comments:         Anticoagulation Care Providers     Provider Role Specialty Phone number    [facility] Responsible  [phone]        Anticoagulation Patient Findings      HPI:  Kailey F Syd seen in clinic today, on anticoagulation therapy with warfarin for PAF.    Changes to current medical/health status since last appt: Patient was in rehab for alcohol use/abuse and has had no alcohol since 8/7/18. She was monitored in rehab and reports that her dose of warfarin has increased to ~15mg & 10mg QOD but is unsure of exact scheduled days. She will call the clinic later today or tomorrow to inform us if the dose is different.    Denies signs/symptoms of bleeding and/or thrombosis since the last appt.    Denies any interval changes to diet  Denies any interval changes to medications since last appt.   Denies any complications or cost restrictions with current therapy.   BP recorded in vitals.    A/P   INR is therapeutic today at 2.5.  Will have patient continue with her current dosing regimen as established in rehab of 10mg & 15mg alternating daily.   Will follow up with patient in 1 week.    Josefina LeaD

## 2018-08-23 ENCOUNTER — TELEPHONE (OUTPATIENT)
Dept: CARDIOLOGY | Facility: MEDICAL CENTER | Age: 58
End: 2018-08-23

## 2018-08-23 NOTE — TELEPHONE ENCOUNTER
ALYM asking patient to call back into office to find out if she ever had the blood work done that was ordered by JERSEY on 08/01/18. Patient has an upcoming appt. With JERSEY on 8/24/18*MARGY

## 2018-08-24 ENCOUNTER — OFFICE VISIT (OUTPATIENT)
Dept: CARDIOLOGY | Facility: MEDICAL CENTER | Age: 58
End: 2018-08-24
Payer: MEDICARE

## 2018-08-24 VITALS
OXYGEN SATURATION: 91 % | HEART RATE: 78 BPM | DIASTOLIC BLOOD PRESSURE: 72 MMHG | WEIGHT: 293 LBS | BODY MASS INDEX: 45.99 KG/M2 | SYSTOLIC BLOOD PRESSURE: 132 MMHG | HEIGHT: 67 IN

## 2018-08-24 DIAGNOSIS — F17.200 TOBACCO USE DISORDER: ICD-10-CM

## 2018-08-24 DIAGNOSIS — I48.0 PAF (PAROXYSMAL ATRIAL FIBRILLATION) (HCC): ICD-10-CM

## 2018-08-24 DIAGNOSIS — I50.812 CHRONIC RIGHT-SIDED HEART FAILURE (HCC): ICD-10-CM

## 2018-08-24 DIAGNOSIS — I50.32 CHRONIC DIASTOLIC CONGESTIVE HEART FAILURE (HCC): ICD-10-CM

## 2018-08-24 DIAGNOSIS — Z95.0 PACEMAKER: ICD-10-CM

## 2018-08-24 DIAGNOSIS — I10 ESSENTIAL HYPERTENSION: ICD-10-CM

## 2018-08-24 DIAGNOSIS — Z79.899 HIGH RISK MEDICATION USE: ICD-10-CM

## 2018-08-24 DIAGNOSIS — I27.20 PULMONARY HYPERTENSION (HCC): ICD-10-CM

## 2018-08-24 DIAGNOSIS — I50.33 ACUTE ON CHRONIC DIASTOLIC (CONGESTIVE) HEART FAILURE (HCC): ICD-10-CM

## 2018-08-24 DIAGNOSIS — G47.33 OSA (OBSTRUCTIVE SLEEP APNEA): ICD-10-CM

## 2018-08-24 DIAGNOSIS — D69.6 THROMBOCYTOPENIA (HCC): ICD-10-CM

## 2018-08-24 DIAGNOSIS — E78.2 MIXED HYPERLIPIDEMIA: ICD-10-CM

## 2018-08-24 DIAGNOSIS — I50.30 HEART FAILURE WITH PRESERVED EJECTION FRACTION, BORDERLINE, CLASS II (HCC): ICD-10-CM

## 2018-08-24 DIAGNOSIS — Z98.890 STATUS POST MITRAL VALVE REPAIR: ICD-10-CM

## 2018-08-24 DIAGNOSIS — R60.0 LOCALIZED EDEMA: ICD-10-CM

## 2018-08-24 DIAGNOSIS — I49.5 SICK SINUS SYNDROME (HCC): ICD-10-CM

## 2018-08-24 DIAGNOSIS — I48.4 ATYPICAL ATRIAL FLUTTER (HCC): ICD-10-CM

## 2018-08-24 DIAGNOSIS — E66.01 MORBID OBESITY WITH BMI OF 40.0-44.9, ADULT (HCC): ICD-10-CM

## 2018-08-24 PROCEDURE — 99215 OFFICE O/P EST HI 40 MIN: CPT | Performed by: INTERNAL MEDICINE

## 2018-08-24 RX ORDER — TORSEMIDE 20 MG/1
60 TABLET ORAL DAILY
Qty: 90 TAB | Refills: 11 | Status: SHIPPED | OUTPATIENT
Start: 2018-08-24 | End: 2019-01-28 | Stop reason: SDUPTHER

## 2018-08-24 ASSESSMENT — ENCOUNTER SYMPTOMS
COUGH: 1
NECK PAIN: 1
DEPRESSION: 1
BRUISES/BLEEDS EASILY: 1
INSOMNIA: 1
MEMORY LOSS: 1
DIAPHORESIS: 1
BACK PAIN: 1
PALPITATIONS: 1

## 2018-08-24 NOTE — LETTER
[facility]   [address], [address]  [place] [address]  Phone: [phone]  Fax: [phone]              Kailey Velarde  1960    Encounter Date: 8/24/2018    Leah Bojorquez M.D.          PROGRESS NOTE:  Subjective:   Chief Complaint:   Chief Complaint   Patient presents with   • Atrial Fibrillation     dx: PAF/ follow up       Kailey Velarde is a 58 y.o. female who returns for evaluation of very complex heart disease.  She has a history of chronic diastolic congestive heart failure, prior mitral valve repair, paroxysmal atrial fibrillation/flutter and was cardioverted during the hospital stay in April 2018 after initiation of amiodarone therapy, JULY at that time revealed smoking left atrial appendage, she is on warfarin therapy, she has secondary pulmonary hypertension with dilated RV and RV dysfunction. She had undergone right heart catheterization in March 2018 which showed an elevated wedge of 29 and normal cardiac output and pulmonary pressure of 41/25 there is a large V wave but she did not have significant MR on JULY.  She has had a pacemaker for sick sinus syndrome.  She was previously over diuresis in the hospital and ended up in the ICU with low blood pressure.      We have been performing amiodarone surveillance.  Her most recent LFTs were up but she has been a heavy drinker. She went into inpt rehab, just got out less than a week ago and continues to be sober.    She has chronic pain syndrome.  She takes Abilify which can provoke tachyarrhythmias.  She probably has sleep apnea.    She continues to have dyspnea on exertion. Sees Pulm, now on O2 which she needs.     She had gained over 11 kg in 2 months so when I saw her 3 weeks ago we changed her to torsemide and gave her 60 mg for a few days then back down to 40 mg. She has lost almost 3 kg but still has edema.     She has had dietary indiscretion with salt while in rehab and drinking more  water to replace ETOH.  She is still smoking.  She has [place] heart class III symptoms.  No chest pain.  No dizziness or lightheadedness.  No palpitations.  She had her device interrogated today which showed she has been in sinus rhythm since her cardioversion.    Blood work done at lab core reveals LFTs still up a bit and for some reason no TSH.    DATA REVIEWED by me:  ECG April 3, 2018 sinus rhythm, rate 68, anterior T-wave inversion  ECG 5-30-18, NSR, 67,  anterior T wave inversion    Echo 3/29/2018 patient in rapid atrial fibrillation, severely developed dilated RV, mild left atrial enlargement, RVSP 35 mmHg above right atrial pressure, EF 55%     JULY 4/2/2018 EF 50%, moderate RV dilatation with systolic function reduction, mild biatrial enlargement, spontaneous contrast in the left atrial appendage but no thrombus, mitral valve ring in place    CT the chest March 28, 2018, no evidence of pulmonary embolism, right lower lobe atelectasis with groundglass opacities and mild edema, trace right pleural effusion, coronary artery calcifications, hepatic steatosis and splenomegaly    Cardioversion report 4/5/2018 converted with 200 J ×1 with return to sinus rhythm and ventricular pacing    Most recent labs:     April 6, 2018 hemoglobin 10.2, platelets 93, sodium 135, potassium 4.4, creatinine 0.67    7-30-18  Cr. 0.75, K 3.7, Na 142  AST 92, ALT 45, , TB 0.4  Hbg A1C 5.3  hgb 12.6, hct 38.9, plts 135    8-21-18 INR 2.5      Past Medical History:   Diagnosis Date   • Atrial myxoma November 2011    Status post LA myxoma resection   • Backpain    • CAD (coronary artery disease)     pacemaker   • Chronic pain    • Dizziness     • Hyperlipidemia     • Hypertension    • Hypertriglyceridemia     • Indigestion    • Peripheral edema    • Sciatica    • Sick sinus syndrome (HCC) November 2011    Status post PPM implantation.   • Status post mitral valve repair November 2011    MV repair with 32mm Jonnie-Carpenter  flexible annuloplasty ring.     Past Surgical History:   Procedure Laterality Date   • CARDIAC CATH, RIGHT HEART  03/31/2018    Right heart cath with high pressures.   • MITRAL VALVE REPAIR  11/17/2011    Performed by MARY ORTIZ at SURGERY [facility]   • PACEMAKER INSERTION  November 2011    Medtronic Versa [id number] implanted by Dr. Amaya.   • OTHER ORTHOPEDIC SURGERY      Neck and back      Family History   Problem Relation Age of Onset   • Heart Attack Father      Social History     Social History   • Marital status: Single     Spouse name: N/A   • Number of children: N/A   • Years of education: N/A     Occupational History   • Not on file.     Social History Main Topics   • Smoking status: Current Every Day Smoker     Packs/day: 0.25     Years: 30.00     Types: Cigarettes   • Smokeless tobacco: Never Used      Comment: 4/12 Down to vapping now.    • Alcohol use Yes      Comment: occasional   • Drug use: No      Comment: hx of, denies currently   • Sexual activity: Not on file     Other Topics Concern   • Not on file     Social History Narrative   • No narrative on file     Allergies   Allergen Reactions   • Penicillins Rash             Current Outpatient Prescriptions   Medication Sig Dispense Refill   • torsemide (DEMADEX) 20 MG Tab Take 3 Tabs by mouth every day. 90 Tab 11   • potassium chloride SA (KDUR) 20 MEQ Tab CR Take 1 Tab by mouth 2 times a day. 60 Tab 11   • amiodarone (CORDARONE) 200 MG Tab Take 1 Tab by mouth every day. 37 Tab 11   • Tiotropium Bromide Monohydrate (SPIRIVA RESPIMAT) 2.5 MCG/ACT Aero Soln Inhale 2 Inhalation by mouth every day. Two inhalations once daily.  Assemble and prime. 1 Inhaler 11   • traZODone (DESYREL) 100 MG Tab Take 100 mg by mouth every evening.     • warfarin (COUMADIN) 5 MG Tab Take 1 Tab by mouth COUMADIN-DAILY. 90 Tab 2   • oxyCODONE immediate release (ROXICODONE) 10 MG immediate release tablet Take 10 mg by mouth 3 times a day. TID with MS Contin     •  "aripiprazole (ABILIFY) 5 MG tablet Take 5 mg by mouth every morning. (soon to increase to 10mg)     • venlafaxine (EFFEXOR-XR) 150 MG extended-release capsule Take 150 mg by mouth every morning.     • Multiple Vitamin (MULTI-DAY PO) Take 1 Tab by mouth every morning.     • atorvastatin (LIPITOR) 20 MG Tab Take 1 Tab by mouth every evening. 30 Tab 11   • pregabalin (LYRICA) 200 MG capsule Take 200 mg by mouth 3 times a day.     • vitamin D (CHOLECALCIFEROL) 1000 UNIT Tab Take 1,000 Units by mouth every morning.     • morphine SR (MS CONTIN) 15 MG TB12 Take 1 Tab by mouth 3 times a day. 60 Tab 0   • Blood Pressure Monitoring (B-D ASSURE BPM/AUTO ARM CUFF) Misc Blood pressure home monitoring system 1 Each 0     No current facility-administered medications for this visit.        Review of Systems   Constitutional: Positive for diaphoresis and malaise/fatigue.   HENT: Positive for tinnitus.    Respiratory: Positive for cough.    Cardiovascular: Positive for palpitations and leg swelling.   Musculoskeletal: Positive for back pain and neck pain.   Endo/Heme/Allergies: Bruises/bleeds easily.   Psychiatric/Behavioral: Positive for depression and memory loss (  ). The patient has insomnia.      All others systems reviewed and negative.     Objective:     Blood pressure 132/72, pulse 78, height 1.702 m (5' 7\"), weight (!) 135.8 kg (299 lb 6.4 oz), last menstrual period 01/01/2010, SpO2 91 %. Body mass index is 46.89 kg/m².    Physical Exam   General: No acute distress. Well nourished.  HEENT: EOM grossly intact, no scleral icterus, no pharyngeal erythema.   Neck:  No JVD, no bruits, trachea midline  CVS: RRR today. Normal S1, S2. 2/6 syst murmur. 2+ pititng LE edema.  2+ radial pulses, well-healed scar across the chest, PM pocket intack  Resp: CTAB. No wheezing or crackles/rhonchi. Normal respiratory effort.  Abdomen: Soft, NT, no maco hepatomegaly, obese.  MSK/Ext: No clubbing or cyanosis.  Skin: Warm and dry, no " martin.  Neurological: CN III-XII grossly intact. No focal deficits.   Psych: A&O x 3, appropriate affect, good judgement        Assessment:     1. Acute on chronic diastolic (congestive) heart failure ([facility])     2. Chronic diastolic congestive heart failure ([facility])     3. PAF (paroxysmal atrial fibrillation) ([facility])     4. Atypical atrial flutter ([facility])     5. Pacemaker     6. Sick sinus syndrome ([facility])     7. Essential hypertension     8. Chronic right-sided heart failure ([facility])     9. Thrombocytopenia ([facility])     10. Localized edema     11. Pulmonary hypertension ([facility])     12. KAEL (obstructive sleep apnea)     13. Tobacco use disorder     14. High risk medication use  TSH WITH REFLEX TO FT4    PULMONARY FUNCTION TESTS Test requested: Complete Pulmonary Function Test   15. Mixed hyperlipidemia     16. Status post mitral valve repair     17. Morbid obesity with BMI of 40.0-44.9, adult ([facility])     18. Heart failure with preserved ejection fraction, borderline, class II ([facility])         Medical Decision Making:  Today's Assessment / Status / Plan:     1. Chronic diastolic CHF, weight is still up   2. Prior mitral valve repair  3. Paroxysmal/persistent atrial flutter, rates were uncontrolled, failed calcium channel blocker and metoprolol. Now status post cardioversion ×1 with return to sinus rhythm and ventricular pacing, on amiodarone  4. Secondary pulmonary hypertension with dilated RV and RV dysfunction, attributed to probable left-sided heart disease, untreated sleep apnea and untreated oxygen requirements. Her right heart catheterization 3/31/2018 did show an elevated wedge of 29, RAP 15, PAP 41/25, RV pressure 40/11, cardiac output 5.4 L/m. There is concern for a large V wave (17-36) however JULY showed no issue with her mitral valve repair.  5. Tobacco use, vaping  6.  Pacemaker in situ  7. Obesity  8. Warfarin therapy with Lovenox bridge, smoke was seen in the left atrial appendage but no thrombus.  9. Calcified coronary  disease CT 3-28-18  10. Acute kidney injury, due to #9, resolved  11. Low-normal LV systolic function, EF 50% and elevated wedge.  12. Amiodarone therapy, high risk med. LFTs normal on 4/3/2018 abnormal 8/2018 TSH was less than 0.005 on 3/29/2018. PFTs pending.  13. Possible hyperthyroidism, low TSH, needs recheck  14. Chronic pain syndrome  15. Dyslipidemia  16. Abilify therapy, can provoke tachycardias  17. Oxygen requirement, possibly related to COPD and smoking  18. Probable KAEL  19. Fatty liver by CT scan 3-28-18  20. Mildly abnormal LFTs, etoh, fatty liver, amio    -Torsemide 60 mg daily, increase to 80 mg daily if needed when she sees Jero in a few weeks.   -Continue ETOH abstinence  - her LFTs are abnormal, likely ETOH over amiodarone, will repeat in 6 months (Feb 2019)  -Needs PFTs and TSH, will order, also seeing Pulm  -See me in 4 months    Return in about 4 months (around 12/24/2018).    It is my pleasure to participate in the care of Ms. Velarde.  Please do not hesitate to contact me with questions or concerns.    Leah Bojorquez MD, [facility]  Cardiologist [facility]    Please note that this dictation was created using voice recognition software. I have made every reasonable attempt to correct obvious errors, but it is possible there are errors of grammar and possibly content that I did not discover before finalizing the note.      Toni Grijalva M.D.  [address] [place], [place] [address] [place] [address]  VIA Facsimile: [phone]

## 2018-08-24 NOTE — PROGRESS NOTES
Subjective:   Chief Complaint:   Chief Complaint   Patient presents with   • Atrial Fibrillation     dx: PAF/ follow up       Kailey Velarde is a 58 y.o. female who returns for evaluation of very complex heart disease.  She has a history of chronic diastolic congestive heart failure, prior mitral valve repair, paroxysmal atrial fibrillation/flutter and was cardioverted during the hospital stay in April 2018 after initiation of amiodarone therapy, JULY at that time revealed smoking left atrial appendage, she is on warfarin therapy, she has secondary pulmonary hypertension with dilated RV and RV dysfunction. She had undergone right heart catheterization in March 2018 which showed an elevated wedge of 29 and normal cardiac output and pulmonary pressure of 41/25 there is a large V wave but she did not have significant MR on JULY.  She has had a pacemaker for sick sinus syndrome.  She was previously over diuresis in the hospital and ended up in the ICU with low blood pressure.      We have been performing amiodarone surveillance.  Her most recent LFTs were up but she has been a heavy drinker. She went into inpt rehab, just got out less than a week ago and continues to be sober.    She has chronic pain syndrome.  She takes Abilify which can provoke tachyarrhythmias.  She probably has sleep apnea.    She continues to have dyspnea on exertion. Sees Pulm, now on O2 which she needs.     She had gained over 11 kg in 2 months so when I saw her 3 weeks ago we changed her to torsemide and gave her 60 mg for a few days then back down to 40 mg. She has lost almost 3 kg but still has edema.     She has had dietary indiscretion with salt while in rehab and drinking more water to replace ETOH.  She is still smoking.  She has [place] heart class III symptoms.  No chest pain.  No dizziness or lightheadedness.  No palpitations.  She had her device interrogated today which showed she has been in sinus rhythm since her  cardioversion.    Blood work done at lab core reveals LFTs still up a bit and for some reason no TSH.    DATA REVIEWED by me:  ECG April 3, 2018 sinus rhythm, rate 68, anterior T-wave inversion  ECG 5-30-18, NSR, 67,  anterior T wave inversion    Echo 3/29/2018 patient in rapid atrial fibrillation, severely developed dilated RV, mild left atrial enlargement, RVSP 35 mmHg above right atrial pressure, EF 55%     JULY 4/2/2018 EF 50%, moderate RV dilatation with systolic function reduction, mild biatrial enlargement, spontaneous contrast in the left atrial appendage but no thrombus, mitral valve ring in place    CT the chest March 28, 2018, no evidence of pulmonary embolism, right lower lobe atelectasis with groundglass opacities and mild edema, trace right pleural effusion, coronary artery calcifications, hepatic steatosis and splenomegaly    Cardioversion report 4/5/2018 converted with 200 J ×1 with return to sinus rhythm and ventricular pacing    Most recent labs:     April 6, 2018 hemoglobin 10.2, platelets 93, sodium 135, potassium 4.4, creatinine 0.67    7-30-18  Cr. 0.75, K 3.7, Na 142  AST 92, ALT 45, , TB 0.4  Hbg A1C 5.3  hgb 12.6, hct 38.9, plts 135    8-21-18 INR 2.5      Past Medical History:   Diagnosis Date   • Atrial myxoma November 2011    Status post LA myxoma resection   • Backpain    • CAD (coronary artery disease)     pacemaker   • Chronic pain    • Dizziness     • Hyperlipidemia     • Hypertension    • Hypertriglyceridemia     • Indigestion    • Peripheral edema    • Sciatica    • Sick sinus syndrome (HCC) November 2011    Status post PPM implantation.   • Status post mitral valve repair November 2011    MV repair with 32mm Jonnie-Carpenter flexible annuloplasty ring.     Past Surgical History:   Procedure Laterality Date   • CARDIAC CATH, RIGHT HEART  03/31/2018    Right heart cath with high pressures.   • MITRAL VALVE REPAIR  11/17/2011    Performed by MARY ORTIZ at SURGERY [facility]  PATTI ORS   • PACEMAKER INSERTION  November 2011    Medtronic Versa [id number] implanted by Dr. Amaya.   • OTHER ORTHOPEDIC SURGERY      Neck and back      Family History   Problem Relation Age of Onset   • Heart Attack Father      Social History     Social History   • Marital status: Single     Spouse name: N/A   • Number of children: N/A   • Years of education: N/A     Occupational History   • Not on file.     Social History Main Topics   • Smoking status: Current Every Day Smoker     Packs/day: 0.25     Years: 30.00     Types: Cigarettes   • Smokeless tobacco: Never Used      Comment: 4/12 Down to vapping now.    • Alcohol use Yes      Comment: occasional   • Drug use: No      Comment: hx of, denies currently   • Sexual activity: Not on file     Other Topics Concern   • Not on file     Social History Narrative   • No narrative on file     Allergies   Allergen Reactions   • Penicillins Rash             Current Outpatient Prescriptions   Medication Sig Dispense Refill   • torsemide (DEMADEX) 20 MG Tab Take 3 Tabs by mouth every day. 90 Tab 11   • potassium chloride SA (KDUR) 20 MEQ Tab CR Take 1 Tab by mouth 2 times a day. 60 Tab 11   • amiodarone (CORDARONE) 200 MG Tab Take 1 Tab by mouth every day. 37 Tab 11   • Tiotropium Bromide Monohydrate (SPIRIVA RESPIMAT) 2.5 MCG/ACT Aero Soln Inhale 2 Inhalation by mouth every day. Two inhalations once daily.  Assemble and prime. 1 Inhaler 11   • traZODone (DESYREL) 100 MG Tab Take 100 mg by mouth every evening.     • warfarin (COUMADIN) 5 MG Tab Take 1 Tab by mouth COUMADIN-DAILY. 90 Tab 2   • oxyCODONE immediate release (ROXICODONE) 10 MG immediate release tablet Take 10 mg by mouth 3 times a day. TID with MS Contin     • aripiprazole (ABILIFY) 5 MG tablet Take 5 mg by mouth every morning. (soon to increase to 10mg)     • venlafaxine (EFFEXOR-XR) 150 MG extended-release capsule Take 150 mg by mouth every morning.     • Multiple Vitamin (MULTI-DAY PO) Take 1 Tab by mouth every  "morning.     • atorvastatin (LIPITOR) 20 MG Tab Take 1 Tab by mouth every evening. 30 Tab 11   • pregabalin (LYRICA) 200 MG capsule Take 200 mg by mouth 3 times a day.     • vitamin D (CHOLECALCIFEROL) 1000 UNIT Tab Take 1,000 Units by mouth every morning.     • morphine SR (MS CONTIN) 15 MG TB12 Take 1 Tab by mouth 3 times a day. 60 Tab 0   • Blood Pressure Monitoring (B-D ASSURE BPM/AUTO ARM CUFF) Misc Blood pressure home monitoring system 1 Each 0     No current facility-administered medications for this visit.        Review of Systems   Constitutional: Positive for diaphoresis and malaise/fatigue.   HENT: Positive for tinnitus.    Respiratory: Positive for cough.    Cardiovascular: Positive for palpitations and leg swelling.   Musculoskeletal: Positive for back pain and neck pain.   Endo/Heme/Allergies: Bruises/bleeds easily.   Psychiatric/Behavioral: Positive for depression and memory loss (  ). The patient has insomnia.      All others systems reviewed and negative.     Objective:     Blood pressure 132/72, pulse 78, height 1.702 m (5' 7\"), weight (!) 135.8 kg (299 lb 6.4 oz), last menstrual period 01/01/2010, SpO2 91 %. Body mass index is 46.89 kg/m².    Physical Exam   General: No acute distress. Well nourished.  HEENT: EOM grossly intact, no scleral icterus, no pharyngeal erythema.   Neck:  No JVD, no bruits, trachea midline  CVS: RRR today. Normal S1, S2. 2/6 syst murmur. 2+ pititng LE edema.  2+ radial pulses, well-healed scar across the chest, PM pocket intack  Resp: CTAB. No wheezing or crackles/rhonchi. Normal respiratory effort.  Abdomen: Soft, NT, no maco hepatomegaly, obese.  MSK/Ext: No clubbing or cyanosis.  Skin: Warm and dry, no rashes.  Neurological: CN III-XII grossly intact. No focal deficits.   Psych: A&O x 3, appropriate affect, good judgement        Assessment:     1. Acute on chronic diastolic (congestive) heart failure (HCC)     2. Chronic diastolic congestive heart failure (HCC)     3. " PAF (paroxysmal atrial fibrillation) ([facility])     4. Atypical atrial flutter ([facility])     5. Pacemaker     6. Sick sinus syndrome ([facility])     7. Essential hypertension     8. Chronic right-sided heart failure ([facility])     9. Thrombocytopenia ([facility])     10. Localized edema     11. Pulmonary hypertension ([facility])     12. KAEL (obstructive sleep apnea)     13. Tobacco use disorder     14. High risk medication use  TSH WITH REFLEX TO FT4    PULMONARY FUNCTION TESTS Test requested: Complete Pulmonary Function Test   15. Mixed hyperlipidemia     16. Status post mitral valve repair     17. Morbid obesity with BMI of 40.0-44.9, adult ([facility])     18. Heart failure with preserved ejection fraction, borderline, class II ([facility])         Medical Decision Making:  Today's Assessment / Status / Plan:     1. Chronic diastolic CHF, weight is still up   2. Prior mitral valve repair  3. Paroxysmal/persistent atrial flutter, rates were uncontrolled, failed calcium channel blocker and metoprolol. Now status post cardioversion ×1 with return to sinus rhythm and ventricular pacing, on amiodarone  4. Secondary pulmonary hypertension with dilated RV and RV dysfunction, attributed to probable left-sided heart disease, untreated sleep apnea and untreated oxygen requirements. Her right heart catheterization 3/31/2018 did show an elevated wedge of 29, RAP 15, PAP 41/25, RV pressure 40/11, cardiac output 5.4 L/m. There is concern for a large V wave (17-36) however JULY showed no issue with her mitral valve repair.  5. Tobacco use, vaping  6.  Pacemaker in situ  7. Obesity  8. Warfarin therapy with Lovenox bridge, smoke was seen in the left atrial appendage but no thrombus.  9. Calcified coronary disease CT 3-28-18  10. Acute kidney injury, due to #9, resolved  11. Low-normal LV systolic function, EF 50% and elevated wedge.  12. Amiodarone therapy, high risk med. LFTs normal on 4/3/2018 abnormal 8/2018 TSH was less than 0.005 on 3/29/2018. PFTs pending.  13. Possible  hyperthyroidism, low TSH, needs recheck  14. Chronic pain syndrome  15. Dyslipidemia  16. Abilify therapy, can provoke tachycardias  17. Oxygen requirement, possibly related to COPD and smoking  18. Probable KAEL  19. Fatty liver by CT scan 3-28-18  20. Mildly abnormal LFTs, etoh, fatty liver, amio    -Torsemide 60 mg daily, increase to 80 mg daily if needed when she sees Jero in a few weeks.   -Continue ETOH abstinence  - her LFTs are abnormal, likely ETOH over amiodarone, will repeat in 6 months (Feb 2019)  -Needs PFTs and TSH, will order, also seeing Pulm  -See me in 4 months    Return in about 4 months (around 12/24/2018).    It is my pleasure to participate in the care of Ms. Velarde.  Please do not hesitate to contact me with questions or concerns.    Leah Bojorquez MD, [facility]  Cardiologist [facility]    Please note that this dictation was created using voice recognition software. I have made every reasonable attempt to correct obvious errors, but it is possible there are errors of grammar and possibly content that I did not discover before finalizing the note.

## 2018-08-29 ENCOUNTER — ANTICOAGULATION VISIT (OUTPATIENT)
Dept: VASCULAR LAB | Facility: MEDICAL CENTER | Age: 58
End: 2018-08-29
Attending: INTERNAL MEDICINE
Payer: MEDICARE

## 2018-08-29 VITALS — SYSTOLIC BLOOD PRESSURE: 114 MMHG | HEART RATE: 63 BPM | DIASTOLIC BLOOD PRESSURE: 59 MMHG

## 2018-08-29 DIAGNOSIS — Z98.890 STATUS POST MITRAL VALVE REPAIR: ICD-10-CM

## 2018-08-29 DIAGNOSIS — I48.0 PAF (PAROXYSMAL ATRIAL FIBRILLATION) (HCC): ICD-10-CM

## 2018-08-29 LAB — INR PPP: 2.4 (ref 2–3.5)

## 2018-08-29 PROCEDURE — 99212 OFFICE O/P EST SF 10 MIN: CPT

## 2018-08-29 PROCEDURE — 85610 PROTHROMBIN TIME: CPT

## 2018-08-29 NOTE — PROGRESS NOTES
Anticoagulation Summary  As of 8/29/2018    INR goal:   2.0-3.0   TTR:   52.9 % (4.4 mo)   Today's INR:   2.4   Warfarin maintenance plan:   15 mg (5 mg x 3) on Mon, Wed, Fri; 10 mg (5 mg x 2) all other days   Weekly warfarin total:   85 mg   No change documented:   Perry Villa, PharmD   Plan last modified:   Alexa Stewart (8/21/2018)   Next INR check:   9/5/2018   Target end date:   Indefinite    Indications    PAF (paroxysmal atrial fibrillation) (CMS-HCC) [I48.0]  Long term current use of anticoagulant therapy (Resolved) [Z79.01]  Status post mitral valve repair [Z98.890]             Anticoagulation Episode Summary     INR check location:       Preferred lab:       Send INR reminders to:       Comments:         Anticoagulation Care Providers     Provider Role Specialty Phone number    [facility] Responsible  [phone]        Anticoagulation Patient Findings      HPI:  Kailey F Syd seen in clinic today, on anticoagulation therapy with warfarin for PAF  Changes to current medical/health status since last appt: none  Denies signs/symptoms of bleeding and/or thrombosis since the last appt.    Denies any interval changes to diet  Denies any interval changes to medications since last appt. She missed one dose of 15mg last week.  Denies any complications or cost restrictions with current therapy.   BP recorded in vitals.      A/P   INR Therapeutic.     Continue warfarin regimen as described above.    Follow up appointment in 1 week(s).    Prery Villa, LeonoraD

## 2018-08-30 LAB — INR BLD: 2.4 (ref 0.9–1.2)

## 2018-09-05 ENCOUNTER — ANTICOAGULATION VISIT (OUTPATIENT)
Dept: VASCULAR LAB | Facility: MEDICAL CENTER | Age: 58
End: 2018-09-05
Attending: INTERNAL MEDICINE
Payer: MEDICARE

## 2018-09-05 DIAGNOSIS — I48.0 PAF (PAROXYSMAL ATRIAL FIBRILLATION) (HCC): ICD-10-CM

## 2018-09-05 DIAGNOSIS — Z98.890 STATUS POST MITRAL VALVE REPAIR: ICD-10-CM

## 2018-09-05 LAB — INR PPP: >8 (ref 2–3.5)

## 2018-09-05 PROCEDURE — 99212 OFFICE O/P EST SF 10 MIN: CPT | Performed by: NURSE PRACTITIONER

## 2018-09-05 PROCEDURE — 85610 PROTHROMBIN TIME: CPT

## 2018-09-05 NOTE — PROGRESS NOTES
Anticoagulation Summary  As of 9/5/2018    INR goal:   2.0-3.0   TTR:   52.9 % (4.4 mo)   Today's INR:   >8.0!   Warfarin maintenance plan:   10 mg (5 mg x 2) every day   Weekly warfarin total:   70 mg   Plan last modified:   PALMIRA Kelly (9/5/2018)   Next INR check:   9/13/2018   Target end date:   Indefinite    Indications    PAF (paroxysmal atrial fibrillation) (CMS-HCC) [I48.0]  Long term current use of anticoagulant therapy (Resolved) [Z79.01]  Status post mitral valve repair [Z98.890]             Anticoagulation Episode Summary     INR check location:       Preferred lab:       Send INR reminders to:       Comments:         Anticoagulation Care Providers     Provider Role Specialty Phone number    [facility] Responsible  [phone]        Anticoagulation Patient Findings      HPI:  Kailey F Syd seen in clinic today for follow up on anticoagulation therapy in the presence of AF. Denies any changes to current medical/health status since last appointment. Denies any medication or diet changes. No current symptoms of bleeding or thrombosis reported. Drinking cranberry juice the past couple of days. Denies ETOH or NSAIDs/ASA.    A/P:   INR supratherapeutic. INR > 8 - confirmed with 2nd POC. Declined venapuncture. Will HOLD three doses then began reduced regimen. She will go to the ER for any prolonged bleeding lasting > 20 min w/out stop or for any falls in which she hits her head.    Follow up appointment in 1 week(s) as this is the soonest she can return.    Next Appointment: Thursday, September 13, 2018 at 2:30  Pm.     Brittnee HENDERSON

## 2018-09-06 LAB — INR BLD: >8 (ref 0.9–1.2)

## 2018-09-11 ENCOUNTER — TELEPHONE (OUTPATIENT)
Dept: CARDIOLOGY | Facility: MEDICAL CENTER | Age: 58
End: 2018-09-11

## 2018-09-11 NOTE — TELEPHONE ENCOUNTER
Spoke with patient regarding her blood work that was order on August patient states that she had blood work done at [facility] and patient states that somebody had review it with her already. I will call [facility] and try to get labs.

## 2018-09-14 ENCOUNTER — ANTICOAGULATION VISIT (OUTPATIENT)
Dept: VASCULAR LAB | Facility: MEDICAL CENTER | Age: 58
End: 2018-09-14
Attending: INTERNAL MEDICINE
Payer: MEDICARE

## 2018-09-14 VITALS — SYSTOLIC BLOOD PRESSURE: 124 MMHG | DIASTOLIC BLOOD PRESSURE: 66 MMHG | HEART RATE: 61 BPM

## 2018-09-14 DIAGNOSIS — I48.0 PAF (PAROXYSMAL ATRIAL FIBRILLATION) (HCC): ICD-10-CM

## 2018-09-14 DIAGNOSIS — Z98.890 STATUS POST MITRAL VALVE REPAIR: ICD-10-CM

## 2018-09-14 LAB
INR BLD: 2.3 (ref 0.9–1.2)
INR PPP: 2.3 (ref 2–3.5)

## 2018-09-14 PROCEDURE — 99211 OFF/OP EST MAY X REQ PHY/QHP: CPT

## 2018-09-14 PROCEDURE — 85610 PROTHROMBIN TIME: CPT

## 2018-09-14 NOTE — PROGRESS NOTES
Anticoagulation Summary  As of 9/14/2018    INR goal:   2.0-3.0   TTR:   58.0 % (4.9 mo)   Today's INR:   2.3   Warfarin maintenance plan:   10 mg (5 mg x 2) every day   Weekly warfarin total:   70 mg   Plan last modified:   PALMIRA Kelly (9/5/2018)   Next INR check:   9/26/2018   Target end date:   Indefinite    Indications    PAF (paroxysmal atrial fibrillation) (CMS-HCC) [I48.0]  Long term current use of anticoagulant therapy (Resolved) [Z79.01]  Status post mitral valve repair [Z98.890]             Anticoagulation Episode Summary     INR check location:       Preferred lab:       Send INR reminders to:       Comments:         Anticoagulation Care Providers     Provider Role Specialty Phone number    [facility] Responsible  [phone]        Anticoagulation Patient Findings  Patient Findings     Negatives:   Signs/symptoms of thrombosis, Signs/symptoms of bleeding, Laboratory test error suspected, Change in health, Change in alcohol use, Change in activity, Upcoming invasive procedure, Emergency department visit, Upcoming dental procedure, Missed doses, Extra doses, Change in medications, Change in diet/appetite, Hospital admission, Bruising, Other complaints          HPI:  Kailey Velarde seen in clinic today, on anticoagulation therapy with warfarin for AF  Changes to current medical/health status since last appt: none  Denies signs/symptoms of bleeding and/or thrombosis since the last appt.    Denies any interval changes to diet  Denies any interval changes to medications since last appt.   Denies any complications or cost restrictions with current therapy.   BP recorded in vitals.      A/P   INR therapeutic.   Pt is to continue with current warfarin dosing regimen.    Follow up appointment in 2 week(s) per pt preference.    Aditi Gregory, LeonoraD

## 2018-09-25 ENCOUNTER — APPOINTMENT (OUTPATIENT)
Dept: PULMONOLOGY | Facility: HOSPICE | Age: 58
End: 2018-09-25
Payer: MEDICARE

## 2018-09-25 ENCOUNTER — APPOINTMENT (OUTPATIENT)
Dept: RADIOLOGY | Facility: IMAGING CENTER | Age: 58
End: 2018-09-25
Attending: INTERNAL MEDICINE
Payer: MEDICARE

## 2018-09-26 ENCOUNTER — ANTICOAGULATION VISIT (OUTPATIENT)
Dept: VASCULAR LAB | Facility: MEDICAL CENTER | Age: 58
End: 2018-09-26
Attending: INTERNAL MEDICINE
Payer: MEDICARE

## 2018-09-26 DIAGNOSIS — Z98.890 STATUS POST MITRAL VALVE REPAIR: ICD-10-CM

## 2018-09-26 DIAGNOSIS — I48.0 PAF (PAROXYSMAL ATRIAL FIBRILLATION) (HCC): ICD-10-CM

## 2018-09-26 LAB
INR BLD: 5.7 (ref 0.9–1.2)
INR PPP: 5.7 (ref 2–3.5)

## 2018-09-26 PROCEDURE — 85610 PROTHROMBIN TIME: CPT

## 2018-09-26 PROCEDURE — 99212 OFFICE O/P EST SF 10 MIN: CPT | Performed by: NURSE PRACTITIONER

## 2018-09-26 NOTE — PROGRESS NOTES
Anticoagulation Summary  As of 9/26/2018    INR goal:   2.0-3.0   TTR:   55.2 % (5.3 mo)   Today's INR:   5.7!   Warfarin maintenance plan:   5 mg (5 mg x 1) on Mon; 10 mg (5 mg x 2) all other days   Weekly warfarin total:   65 mg   Plan last modified:   SARA KellyPSARITA (9/26/2018)   Next INR check:   10/10/2018   Target end date:   Indefinite    Indications    PAF (paroxysmal atrial fibrillation) (CMS-HCC) [I48.0]  Long term current use of anticoagulant therapy (Resolved) [Z79.01]  Status post mitral valve repair [Z98.890]             Anticoagulation Episode Summary     INR check location:       Preferred lab:       Send INR reminders to:       Comments:         Anticoagulation Care Providers     Provider Role Specialty Phone number    [facility] Responsible  [phone]        Anticoagulation Patient Findings      HPI:  Kailey Velarde seen in clinic today for follow up on anticoagulation therapy in the presence of AF. Denies any changes to current medical/health status since last appointment. Denies any medication or diet changes. No current symptoms of bleeding or thrombosis reported. No ETOH or cranberries.     A/P:   INR supratherapeutic. HOLD two doses then start decreased regimen.    Follow up appointment in 2 week(s).    Next Appointment: Wednesday, October 10, 2018 at 2:15 pm.     Brittnee HENDERSON

## 2018-10-03 ENCOUNTER — TELEPHONE (OUTPATIENT)
Dept: CARDIOLOGY | Facility: MEDICAL CENTER | Age: 58
End: 2018-10-03

## 2018-10-03 NOTE — TELEPHONE ENCOUNTER
Pt called to report she has been having increased edema in her ankles. She states that the prescription she currently has for her Torsemide was only enough to cover her for two tabs daily instead of three, so the past 5 days she has gone down to only 40mg daily instead of her 60mg daily. Reassured her that she has an active prescription at [facility] pharmacy to  for torsemide 20mg, 3 tabs daily #90 with 11 refills. Pt state understanding and will  prescription tomorrow and will start back up on 3 tabs daily.   She has been unable to keep weight log due to scale at home being broken and unable to afford a new one. Reassured her to please just try to monitor her blood pressured and for her to follow up is swelling continues to worsen with increased dose of Torsemide.     Pt also wanted to see if [facility] still would like her to follow up with EP. Appointment was cancelled and pt was under the idea she didn't need to see them. Reassured I will ask LS. Lastly she notes that if LS wants her to get monthly BMP's done we have to send them over monthly to [facility] for her PCP to place orders. They will not do standing orders.     To LS. 1. Does pt still need to FU with EP 2. Do you need her to get monthly bmp's or just an updated one before she follow's up with you on 12/18. Thanks

## 2018-10-04 NOTE — TELEPHONE ENCOUNTER
She was right about the EP consult, I do not think she needs one at present.    I think her basic metabolic panel should be checked every 3 months but not monthly.  I usually check it earlier when we have done a medication change.  We could conceivably leave a standing paper order her that she could .  If she needs another basic chemistry written for the most recent medication change please write for that.    Thanks

## 2018-10-05 DIAGNOSIS — I50.33 ACUTE ON CHRONIC DIASTOLIC CONGESTIVE HEART FAILURE (HCC): ICD-10-CM

## 2018-10-05 NOTE — TELEPHONE ENCOUNTER
Called pt. To advise. Pre-visit lab order mailed to her to be drawn about 1 week before 12-18-18 FV.

## 2018-10-10 ENCOUNTER — ANTICOAGULATION VISIT (OUTPATIENT)
Dept: VASCULAR LAB | Facility: MEDICAL CENTER | Age: 58
End: 2018-10-10
Attending: INTERNAL MEDICINE
Payer: MEDICARE

## 2018-10-10 VITALS — DIASTOLIC BLOOD PRESSURE: 75 MMHG | SYSTOLIC BLOOD PRESSURE: 128 MMHG | HEART RATE: 63 BPM

## 2018-10-10 DIAGNOSIS — Z98.890 STATUS POST MITRAL VALVE REPAIR: ICD-10-CM

## 2018-10-10 DIAGNOSIS — Z23 NEED FOR VACCINATION: ICD-10-CM

## 2018-10-10 DIAGNOSIS — I48.0 PAF (PAROXYSMAL ATRIAL FIBRILLATION) (HCC): ICD-10-CM

## 2018-10-10 LAB
INR BLD: 2.8 (ref 0.9–1.2)
INR PPP: 2.8 (ref 2–3.5)

## 2018-10-10 PROCEDURE — 99211 OFF/OP EST MAY X REQ PHY/QHP: CPT | Mod: 25

## 2018-10-10 PROCEDURE — 85610 PROTHROMBIN TIME: CPT

## 2018-10-10 PROCEDURE — 90471 IMMUNIZATION ADMIN: CPT

## 2018-10-10 PROCEDURE — 90686 IIV4 VACC NO PRSV 0.5 ML IM: CPT

## 2018-10-10 NOTE — PROGRESS NOTES
Anticoagulation Summary  As of 10/10/2018    INR goal:   2.0-3.0   TTR:   51.3 % (5.8 mo)   Today's INR:   2.8   Warfarin maintenance plan:   10 mg (5 mg x 2) every day   Weekly warfarin total:   70 mg   Plan last modified:   Perry Villa, PharmD (10/10/2018)   Next INR check:   10/24/2018   Target end date:   Indefinite    Indications    PAF (paroxysmal atrial fibrillation) (CMS-HCC) [I48.0]  Long term current use of anticoagulant therapy (Resolved) [Z79.01]  Status post mitral valve repair [Z98.890]             Anticoagulation Episode Summary     INR check location:       Preferred lab:       Send INR reminders to:       Comments:         Anticoagulation Care Providers     Provider Role Specialty Phone number    [facility] Responsible  [phone]        Anticoagulation Patient Findings      HPI:  Kailey Velarde seen in clinic today, on anticoagulation therapy with warfarin for PAF.   Changes to current medical/health status since last appt: none  Denies signs/symptoms of bleeding and/or thrombosis since the last appt.    Denies any interval changes to diet  Denies any interval changes to medications since last appt.   Denies any complications or cost restrictions with current therapy.   BP recorded in vitals.  Confirmed dosing regimen. Pt took more warfarin than requested.     A/P   INR  therapeutic.   Will have pt continue her current regimen.  Pt consented to receiving immunization today.     Follow up appointment in 2 week(s).    Perry Villa, PharmD

## 2018-11-01 ENCOUNTER — ANTICOAGULATION VISIT (OUTPATIENT)
Dept: VASCULAR LAB | Facility: MEDICAL CENTER | Age: 58
End: 2018-11-01
Attending: INTERNAL MEDICINE
Payer: MEDICARE

## 2018-11-01 VITALS — DIASTOLIC BLOOD PRESSURE: 56 MMHG | HEART RATE: 80 BPM | SYSTOLIC BLOOD PRESSURE: 109 MMHG

## 2018-11-01 DIAGNOSIS — Z98.890 STATUS POST MITRAL VALVE REPAIR: ICD-10-CM

## 2018-11-01 DIAGNOSIS — I48.0 PAF (PAROXYSMAL ATRIAL FIBRILLATION) (HCC): ICD-10-CM

## 2018-11-01 LAB
INR PPP: >8 (ref 2–3.5)
INR PPP: >8 (ref 2–3.5)

## 2018-11-01 PROCEDURE — 85610 PROTHROMBIN TIME: CPT

## 2018-11-01 PROCEDURE — 99212 OFFICE O/P EST SF 10 MIN: CPT

## 2018-11-01 NOTE — Clinical Note
Hi Dr Bojorquez, this mutual pt is on warfarin for PAF with a history of mitral valve repair.  While mitral valve repair is not a direct contraindication for DOAC therapy, I wanted to check to see if you agree if it would appropriate.    Pt confirms she could afford the medication and I can arrange the transition.  Perry Villa, LeonoraD

## 2018-11-01 NOTE — PROGRESS NOTES
Anticoagulation Summary  As of 11/1/2018    INR goal:   2.0-3.0   TTR:   51.3 % (5.8 mo)   Today's INR:   >8!   Warfarin maintenance plan:   10 mg (5 mg x 2) every day   Weekly warfarin total:   70 mg   Plan last modified:   Perry Villa, PharmD (10/10/2018)   Next INR check:   11/5/2018   Target end date:   Indefinite    Indications    PAF (paroxysmal atrial fibrillation) (CMS-HCC) [I48.0]  Long term current use of anticoagulant therapy (Resolved) [Z79.01]  Status post mitral valve repair [Z98.890]             Anticoagulation Episode Summary     INR check location:       Preferred lab:       Send INR reminders to:       Comments:         Anticoagulation Care Providers     Provider Role Specialty Phone number    [facility] Responsible  [phone]        Anticoagulation Patient Findings      HPI:  Kailey Velarde seen in clinic today, on anticoagulation therapy with warfarin for AF  Changes to current medical/health status since last appt: none  Denies signs/symptoms of bleeding and/or thrombosis since the last appt.    Denies any interval changes to diet  Denies any interval changes to medications since last appt.   Denies any complications or cost restrictions with current therapy.   BP recorded in vitals.  Confirmed dosing regimen.     A/P   INR  SUPRA-therapeutic.   Tested INR twice, both above 8.  Declined confirmatory venous blood draw.   Hold warfarin until next visit.    Pt a great candidate for DOAC if cardiology agrees, will forward to Dr Bojorquez to advise.     Follow up appointment in 4 days.     Perry Villa, LeonoraD

## 2018-11-02 LAB — INR BLD: >8 (ref 0.9–1.2)

## 2018-11-05 ENCOUNTER — ANTICOAGULATION VISIT (OUTPATIENT)
Dept: VASCULAR LAB | Facility: MEDICAL CENTER | Age: 58
End: 2018-11-05
Attending: INTERNAL MEDICINE
Payer: MEDICARE

## 2018-11-05 DIAGNOSIS — I48.0 PAF (PAROXYSMAL ATRIAL FIBRILLATION) (HCC): ICD-10-CM

## 2018-11-05 DIAGNOSIS — Z98.890 STATUS POST MITRAL VALVE REPAIR: ICD-10-CM

## 2018-11-05 LAB
INR BLD: 1.5 (ref 0.9–1.2)
INR PPP: 1.5 (ref 2–3.5)

## 2018-11-05 PROCEDURE — 85610 PROTHROMBIN TIME: CPT

## 2018-11-05 PROCEDURE — 99212 OFFICE O/P EST SF 10 MIN: CPT | Performed by: NURSE PRACTITIONER

## 2018-11-05 NOTE — PROGRESS NOTES
Anticoagulation Summary  As of 11/5/2018    INR goal:   2.0-3.0   TTR:   52.5 % (6.7 mo)   Today's INR:   1.5!   Warfarin maintenance plan:   5 mg (5 mg x 1) on Wed; 10 mg (5 mg x 2) all other days   Weekly warfarin total:   65 mg   Plan last modified:   PALMIRA Kelly (11/5/2018)   Next INR check:   11/14/2018   Target end date:   Indefinite    Indications    PAF (paroxysmal atrial fibrillation) (CMS-HCC) [I48.0]  Long term current use of anticoagulant therapy (Resolved) [Z79.01]  Status post mitral valve repair [Z98.890]             Anticoagulation Episode Summary     INR check location:       Preferred lab:       Send INR reminders to:       Comments:         Anticoagulation Care Providers     Provider Role Specialty Phone number    [facility] Responsible  [phone]        Anticoagulation Patient Findings      HPI:  Kailey Velarde seen in clinic today for follow up on anticoagulation therapy in the presence of AF. Denies any changes to current medical/health status since last appointment. Denies any medication or diet changes. No current symptoms of bleeding or thrombosis reported.    A/P:   INR subtherapeutic. INR down from > 8 last week. Will have patient began reduced regimen regimen.     Follow up appointment in 1 week(s).    Next Appointment: Wednesday, November 14, 2018 at 11:15 am.     Brittnee HENDERSON

## 2018-11-14 ENCOUNTER — ANTICOAGULATION VISIT (OUTPATIENT)
Dept: VASCULAR LAB | Facility: MEDICAL CENTER | Age: 58
End: 2018-11-14
Attending: INTERNAL MEDICINE
Payer: MEDICARE

## 2018-11-14 ENCOUNTER — APPOINTMENT (OUTPATIENT)
Dept: VASCULAR LAB | Facility: MEDICAL CENTER | Age: 58
End: 2018-11-14
Payer: MEDICARE

## 2018-11-14 DIAGNOSIS — Z98.890 STATUS POST MITRAL VALVE REPAIR: ICD-10-CM

## 2018-11-14 DIAGNOSIS — I48.0 PAF (PAROXYSMAL ATRIAL FIBRILLATION) (HCC): ICD-10-CM

## 2018-11-14 LAB
INR BLD: 3.5 (ref 0.9–1.2)
INR PPP: 3.5 (ref 2–3.5)

## 2018-11-14 PROCEDURE — 99212 OFFICE O/P EST SF 10 MIN: CPT | Performed by: NURSE PRACTITIONER

## 2018-11-14 PROCEDURE — 85610 PROTHROMBIN TIME: CPT

## 2018-11-14 NOTE — PROGRESS NOTES
Anticoagulation Summary  As of 11/14/2018    INR goal:   2.0-3.0   TTR:   52.4 % (7 mo)   Today's INR:   3.5!   Warfarin maintenance plan:   5 mg (5 mg x 1) on Wed, Fri; 10 mg (5 mg x 2) all other days   Weekly warfarin total:   60 mg   Plan last modified:   PALMIRA Kelly (11/14/2018)   Next INR check:   11/28/2018   Target end date:   Indefinite    Indications    PAF (paroxysmal atrial fibrillation) (CMS-HCC) [I48.0]  Long term current use of anticoagulant therapy (Resolved) [Z79.01]  Status post mitral valve repair [Z98.890]             Anticoagulation Episode Summary     INR check location:       Preferred lab:       Send INR reminders to:       Comments:         Anticoagulation Care Providers     Provider Role Specialty Phone number    [facility] Responsible  [phone]        Anticoagulation Patient Findings      HPI:  Kailey Machadoub seen in clinic today for follow up on anticoagulation therapy in the presence of AF. Denies any changes to current medical/health status since last appointment. Denies any medication or diet changes. No current symptoms of bleeding or thrombosis reported. She missed last Wednesday's dose.     A/P:   INR supratherapeutic. HOLD tonight and decrease regimen.    Follow up appointment in 2 week(s).    Next Appointment: Wednesday, November 28, 2018 at 2:15 pm.     Brittnee HENDERSON

## 2018-11-20 RX ORDER — WARFARIN SODIUM 5 MG/1
TABLET ORAL
Qty: 90 TAB | Refills: 1 | Status: SHIPPED | OUTPATIENT
Start: 2018-11-20 | End: 2018-11-26 | Stop reason: SDUPTHER

## 2018-11-27 ENCOUNTER — ANTICOAGULATION VISIT (OUTPATIENT)
Dept: VASCULAR LAB | Facility: MEDICAL CENTER | Age: 58
End: 2018-11-27
Attending: INTERNAL MEDICINE
Payer: MEDICARE

## 2018-11-27 DIAGNOSIS — I48.0 PAF (PAROXYSMAL ATRIAL FIBRILLATION) (HCC): ICD-10-CM

## 2018-11-27 DIAGNOSIS — Z98.890 STATUS POST MITRAL VALVE REPAIR: ICD-10-CM

## 2018-11-27 LAB — INR PPP: 3.2 (ref 2–3.5)

## 2018-11-27 PROCEDURE — 99212 OFFICE O/P EST SF 10 MIN: CPT

## 2018-11-27 PROCEDURE — 85610 PROTHROMBIN TIME: CPT

## 2018-11-27 RX ORDER — WARFARIN SODIUM 5 MG/1
5-10 TABLET ORAL DAILY
Qty: 180 TAB | Refills: 1 | Status: SHIPPED | OUTPATIENT
Start: 2018-11-27 | End: 2019-09-27 | Stop reason: SDUPTHER

## 2018-11-27 NOTE — PROGRESS NOTES
Anticoagulation Summary  As of 11/27/2018    INR goal:   2.0-3.0   TTR:   52.4 % (7 mo)   Today's INR:      Warfarin maintenance plan:   5 mg (5 mg x 1) on Wed, Fri; 10 mg (5 mg x 2) all other days   Weekly warfarin total:   60 mg   Plan last modified:   PALMIRA Kelly (11/14/2018)   Next INR check:      Target end date:   Indefinite    Indications    PAF (paroxysmal atrial fibrillation) (CMS-[facility]) [I48.0]  Long term current use of anticoagulant therapy (Resolved) [Z79.01]  Status post mitral valve repair [Z98.890]             Anticoagulation Episode Summary     INR check location:       Preferred lab:       Send INR reminders to:       Comments:         Anticoagulation Care Providers     Provider Role Specialty Phone number    [facility] Responsible  [phone]        Anticoagulation Patient Findings  Patient Findings     Negatives:   Signs/symptoms of thrombosis, Signs/symptoms of bleeding, Laboratory test error suspected, Change in health, Change in alcohol use, Change in activity, Upcoming invasive procedure, Emergency department visit, Upcoming dental procedure, Missed doses, Extra doses, Change in medications, Change in diet/appetite, Hospital admission, Bruising, Other complaints          HPI:  Kailey Machadoub seen in clinic today, on anticoagulation therapy with warfarin for AF.  Changes to current medical/health status since last appt: none  Denies signs/symptoms of bleeding and/or thrombosis since the last appt.    Denies any interval changes to diet  Denies any interval changes to medications since last appt.   Denies any complications or cost restrictions with current therapy.   BP recorded in vitals.  Confirmed current dosing regimen.    Patient's previous INR was supratherapeutic at 3.5 on 11/14/18, at which time patient was instructed to HOLD then decrease warfarin regimen. She returns to clinic today to recheck INR to ensure it is therapeutic and thus preventing  possible clotting and/or bleeding/bruising complications.    A/P   INR was SUPRA-therapeutic today at 3.2.    Patient will decrease dose tonight to 5mg, then will begin decreased regimen of 5mg on Mon, Wed, Fri and 10mg ROW. Patient will follow up again in 2 weeks.    Next appt: Wed Dec 12, 2018 2:15pm    Josefina Stewart PharmD

## 2018-12-10 ENCOUNTER — APPOINTMENT (OUTPATIENT)
Dept: RADIOLOGY | Facility: IMAGING CENTER | Age: 58
End: 2018-12-10
Attending: INTERNAL MEDICINE
Payer: MEDICARE

## 2018-12-10 ENCOUNTER — APPOINTMENT (OUTPATIENT)
Dept: PULMONOLOGY | Facility: HOSPICE | Age: 58
End: 2018-12-10
Payer: MEDICARE

## 2018-12-12 ENCOUNTER — ANTICOAGULATION VISIT (OUTPATIENT)
Dept: VASCULAR LAB | Facility: MEDICAL CENTER | Age: 58
End: 2018-12-12
Attending: INTERNAL MEDICINE
Payer: MEDICARE

## 2018-12-12 DIAGNOSIS — I48.0 PAF (PAROXYSMAL ATRIAL FIBRILLATION) (HCC): ICD-10-CM

## 2018-12-12 DIAGNOSIS — Z98.890 STATUS POST MITRAL VALVE REPAIR: ICD-10-CM

## 2018-12-12 LAB
INR BLD: 3.4 (ref 0.9–1.2)
INR PPP: 3.4 (ref 2–3.5)

## 2018-12-12 PROCEDURE — 99212 OFFICE O/P EST SF 10 MIN: CPT | Performed by: PHARMACIST

## 2018-12-12 PROCEDURE — 85610 PROTHROMBIN TIME: CPT

## 2018-12-12 NOTE — PROGRESS NOTES
Anticoagulation Summary  As of 12/12/2018    INR goal:   2.0-3.0   TTR:   46.2 % (7.9 mo)   Today's INR:   3.4!   Warfarin maintenance plan:   10 mg (5 mg x 2) on Tue, Thu, Sat; 5 mg (5 mg x 1) all other days   Weekly warfarin total:   50 mg   Plan last modified:   Leighann Harvey, PharmD (12/12/2018)   Next INR check:   12/28/2018   Target end date:   Indefinite    Indications    PAF (paroxysmal atrial fibrillation) (CMS-HCC) [I48.0]  Long term current use of anticoagulant therapy (Resolved) [Z79.01]  Status post mitral valve repair [Z98.890]             Anticoagulation Episode Summary     INR check location:       Preferred lab:       Send INR reminders to:       Comments:         Anticoagulation Care Providers     Provider Role Specialty Phone number    [facility] Responsible  [phone]        Anticoagulation Patient Findings      HPI:  Kailey Velarde seen in clinic today, on anticoagulation therapy with warfarin for SP mitral valve repair, PAF  Changes to current medical/health status since last appt: denies  Denies signs/symptoms of bleeding and/or thrombosis since the last appt.    Denies any interval changes to diet  Denies any interval changes to medications since last appt.   Denies any complications or cost restrictions with current therapy.   BP declined      A/P   INR  is supra-therapeutic.   Will have pt hold her warfarin dose today and then start a 9% reduced weekly dosing regimen    Follow up appointment in 2 week(s).    Leighann Harvey, PharmD

## 2018-12-17 ENCOUNTER — TELEPHONE (OUTPATIENT)
Dept: CARDIOLOGY | Facility: MEDICAL CENTER | Age: 58
End: 2018-12-17

## 2018-12-17 NOTE — TELEPHONE ENCOUNTER
I spoke to the patient regarding lab completion. This was completed at [facility], I have called to obtain. She did not complete the PFT.

## 2019-01-03 ENCOUNTER — ANTICOAGULATION VISIT (OUTPATIENT)
Dept: VASCULAR LAB | Facility: MEDICAL CENTER | Age: 59
End: 2019-01-03
Attending: INTERNAL MEDICINE
Payer: MEDICARE

## 2019-01-03 VITALS — SYSTOLIC BLOOD PRESSURE: 119 MMHG | DIASTOLIC BLOOD PRESSURE: 60 MMHG | HEART RATE: 60 BPM

## 2019-01-03 DIAGNOSIS — I48.0 PAF (PAROXYSMAL ATRIAL FIBRILLATION) (HCC): ICD-10-CM

## 2019-01-03 DIAGNOSIS — Z98.890 STATUS POST MITRAL VALVE REPAIR: ICD-10-CM

## 2019-01-03 LAB
INR BLD: 2.7 (ref 0.9–1.2)
INR PPP: 2.7 (ref 2–3.5)

## 2019-01-03 PROCEDURE — 85610 PROTHROMBIN TIME: CPT

## 2019-01-03 PROCEDURE — 99211 OFF/OP EST MAY X REQ PHY/QHP: CPT

## 2019-01-04 NOTE — PROGRESS NOTES
Anticoagulation Summary  As of 1/3/2019    INR goal:   2.0-3.0   TTR:   45.9 % (8.6 mo)   Today's INR:   2.7   Warfarin maintenance plan:   10 mg (5 mg x 2) on Tue, Thu, Sat; 5 mg (5 mg x 1) all other days   Weekly warfarin total:   50 mg   Plan last modified:   Leonora WiseD (12/12/2018)   Next INR check:   1/30/2019   Target end date:   Indefinite    Indications    PAF (paroxysmal atrial fibrillation) (CMS-HCC) [I48.0]  Long term current use of anticoagulant therapy (Resolved) [Z79.01]  Status post mitral valve repair [Z98.890]             Anticoagulation Episode Summary     INR check location:       Preferred lab:       Send INR reminders to:       Comments:         Anticoagulation Care Providers     Provider Role Specialty Phone number    [facility] Responsible  [phone]        Anticoagulation Patient Findings      HPI:  Kailey Velarde seen in clinic today, on anticoagulation therapy with warfarin for AFib  Changes to current medical/health status since last appt: N/A  Denies signs/symptoms of bleeding and/or thrombosis since the last appt.    Denies any interval changes to diet  Denies any interval changes to medications since last appt.   Denies any complications or cost restrictions with current therapy.   BP recorded in vitals.    A/P   INR therapeutic.   Continue current warfarin regimen as outlined above.     Follow up appointment in 4 week(s) per patient preference.     Perry Villa, PharmD

## 2019-01-28 RX ORDER — TORSEMIDE 20 MG/1
60 TABLET ORAL DAILY
Qty: 90 TAB | Refills: 1 | Status: SHIPPED | OUTPATIENT
Start: 2019-01-28 | End: 2020-04-23

## 2019-01-30 ENCOUNTER — ANTICOAGULATION VISIT (OUTPATIENT)
Dept: VASCULAR LAB | Facility: MEDICAL CENTER | Age: 59
End: 2019-01-30
Attending: INTERNAL MEDICINE
Payer: MEDICARE

## 2019-01-30 DIAGNOSIS — I48.0 PAF (PAROXYSMAL ATRIAL FIBRILLATION) (HCC): ICD-10-CM

## 2019-01-30 DIAGNOSIS — Z98.890 STATUS POST MITRAL VALVE REPAIR: ICD-10-CM

## 2019-01-30 LAB — INR PPP: 5.4 (ref 2–3.5)

## 2019-01-30 PROCEDURE — 85610 PROTHROMBIN TIME: CPT

## 2019-01-30 PROCEDURE — 99212 OFFICE O/P EST SF 10 MIN: CPT | Performed by: NURSE PRACTITIONER

## 2019-01-30 NOTE — PROGRESS NOTES
Anticoagulation Summary  As of 2019    INR goal:   2.0-3.0   TTR:   42.6 % (9.5 mo)   INR used for dosin.4! (2019)   Warfarin maintenance plan:   10 mg (5 mg x 2) every Tue, Sat; 5 mg (5 mg x 1) all other days   Weekly warfarin total:   45 mg   Plan last modified:   Brittnee Cisneros, A.P.NPriscilla (2019)   Next INR check:   2019   Target end date:   Indefinite    Indications    PAF (paroxysmal atrial fibrillation) (CMS-HCC) [I48.0]  Long term current use of anticoagulant therapy (Resolved) [Z79.01]  Status post mitral valve repair [Z98.890]             Anticoagulation Episode Summary     INR check location:       Preferred lab:       Send INR reminders to:       Comments:         Anticoagulation Care Providers     Provider Role Specialty Phone number    [facility] Responsible  [phone]        Anticoagulation Patient Findings      HPI:  Kailey Velarde seen in clinic today for follow up on anticoagulation therapy in the presence of AF, MVR. Denies any changes to current medical/health status since last appointment. Denies any medication or diet changes. No current symptoms of bleeding or thrombosis reported.    A/P:   INR supratherapeutic. Will decrease regimen. BP declined. She will seek medical attention for any prolonged bleeding lasting > 20 min or for any falls in which she hits her head.    Follow up appointment in 2 week(s) per pt's preference.    Next Appointment:  at 2:15 pm.     Brittnee HENDERSON

## 2019-01-31 LAB — INR BLD: 5.4 (ref 0.9–1.2)

## 2019-02-14 ENCOUNTER — ANTICOAGULATION VISIT (OUTPATIENT)
Dept: VASCULAR LAB | Facility: MEDICAL CENTER | Age: 59
End: 2019-02-14
Attending: INTERNAL MEDICINE
Payer: MEDICARE

## 2019-02-14 DIAGNOSIS — I48.0 PAF (PAROXYSMAL ATRIAL FIBRILLATION) (HCC): ICD-10-CM

## 2019-02-14 DIAGNOSIS — Z98.890 STATUS POST MITRAL VALVE REPAIR: ICD-10-CM

## 2019-02-14 LAB
INR BLD: 1.8 (ref 0.9–1.2)
INR PPP: 1.8 (ref 2–3.5)

## 2019-02-14 PROCEDURE — 99212 OFFICE O/P EST SF 10 MIN: CPT

## 2019-02-14 PROCEDURE — 85610 PROTHROMBIN TIME: CPT

## 2019-02-14 NOTE — PROGRESS NOTES
Darky leafy vegetables more than usual    Anticoagulation Summary  As of 2019    INR goal:   2.0-3.0   TTR:   41.9 % (10 mo)   INR used for dosin.8! (2019)   Warfarin maintenance plan:   10 mg (5 mg x 2) every Mon, Fri; 5 mg (5 mg x 1) all other days   Weekly warfarin total:   45 mg   Plan last modified:   Perry Villa, PharmD (2019)   Next INR check:   2019   Target end date:   Indefinite    Indications    PAF (paroxysmal atrial fibrillation) (CMS-HCC) [I48.0]  Long term current use of anticoagulant therapy (Resolved) [Z79.01]  Status post mitral valve repair [Z98.890]             Anticoagulation Episode Summary     INR check location:       Preferred lab:       Send INR reminders to:       Comments:         Anticoagulation Care Providers     Provider Role Specialty Phone number    [facility] Responsible  [phone]        Anticoagulation Patient Findings      HPI:  Kailey Machadoub seen in clinic today, on anticoagulation therapy with warfarin for PAF  Changes to current medical/health status since last appt: patient admitted that she was eating more green vegetables than usual and said she wasn't taking her vitamins anymore.  Denies signs/symptoms of bleeding and/or thrombosis since the last appt.    Denies any complications or cost restrictions with current therapy.   Declined vitals today.     A/P   INR  SUB-therapeutic, likely due to diet.   Pt is to continue with current warfarin dosing regimen.     Follow up appointment in 2 week(s).    Ameya Diaz, Pharmacy Intern    Perry Villa, PharmD

## 2019-02-27 ENCOUNTER — ANTICOAGULATION VISIT (OUTPATIENT)
Dept: VASCULAR LAB | Facility: MEDICAL CENTER | Age: 59
End: 2019-02-27
Attending: INTERNAL MEDICINE
Payer: MEDICARE

## 2019-02-27 VITALS — DIASTOLIC BLOOD PRESSURE: 67 MMHG | HEART RATE: 68 BPM | SYSTOLIC BLOOD PRESSURE: 109 MMHG

## 2019-02-27 DIAGNOSIS — Z98.890 STATUS POST MITRAL VALVE REPAIR: ICD-10-CM

## 2019-02-27 DIAGNOSIS — I48.0 PAF (PAROXYSMAL ATRIAL FIBRILLATION) (HCC): ICD-10-CM

## 2019-02-27 LAB — INR PPP: 4.2 (ref 2–3.5)

## 2019-02-27 PROCEDURE — 99212 OFFICE O/P EST SF 10 MIN: CPT | Performed by: NURSE PRACTITIONER

## 2019-02-27 PROCEDURE — 85610 PROTHROMBIN TIME: CPT

## 2019-02-27 NOTE — PROGRESS NOTES
Anticoagulation Summary  As of 2019    INR goal:   2.0-3.0   TTR:   41.9 % (10.5 mo)   INR used for dosin.2! (2019)   Warfarin maintenance plan:   10 mg (5 mg x 2) every Mon; 5 mg (5 mg x 1) all other days   Weekly warfarin total:   40 mg   Plan last modified:   Brittnee Cisneros APriscillaPPriscillaNPriscilla (2019)   Next INR check:   3/13/2019   Target end date:   Indefinite    Indications    PAF (paroxysmal atrial fibrillation) (CMS-HCC) [I48.0]  Long term current use of anticoagulant therapy (Resolved) [Z79.01]  Status post mitral valve repair [Z98.890]             Anticoagulation Episode Summary     INR check location:       Preferred lab:       Send INR reminders to:       Comments:         Anticoagulation Care Providers     Provider Role Specialty Phone number    [facility] Responsible  [phone]        Anticoagulation Patient Findings      HPI:  Kailey Velarde seen in clinic today for follow up on anticoagulation therapy in the presence of AF. Denies any changes to current medical/health status since last appointment. Denies any medication or diet changes. No current symptoms of bleeding or thrombosis reported.    A/P:   INR supratherapeutic. HOLD tonight and decrease regimen. BP recorded in vitals.    Follow up appointment in 2 week(s).    Next Appointment: 2019 at 2:15 pm.     Brittnee HENDERSON

## 2019-02-28 DIAGNOSIS — I10 ESSENTIAL HYPERTENSION: Primary | ICD-10-CM

## 2019-02-28 LAB — INR BLD: 4.2 (ref 0.9–1.2)

## 2019-02-28 RX ORDER — ATORVASTATIN CALCIUM 20 MG/1
20 TABLET, FILM COATED ORAL EVERY EVENING
Qty: 90 TAB | Refills: 1 | Status: SHIPPED | OUTPATIENT
Start: 2019-02-28 | End: 2019-08-22

## 2019-03-13 ENCOUNTER — ANTICOAGULATION VISIT (OUTPATIENT)
Dept: VASCULAR LAB | Facility: MEDICAL CENTER | Age: 59
End: 2019-03-13
Attending: INTERNAL MEDICINE
Payer: MEDICARE

## 2019-03-13 DIAGNOSIS — Z98.890 STATUS POST MITRAL VALVE REPAIR: ICD-10-CM

## 2019-03-13 DIAGNOSIS — I48.0 PAF (PAROXYSMAL ATRIAL FIBRILLATION) (HCC): ICD-10-CM

## 2019-03-13 LAB — INR PPP: 2.7 (ref 2–3.5)

## 2019-03-13 PROCEDURE — 99211 OFF/OP EST MAY X REQ PHY/QHP: CPT | Performed by: PHARMACIST

## 2019-03-13 PROCEDURE — 85610 PROTHROMBIN TIME: CPT

## 2019-03-13 NOTE — PROGRESS NOTES
Anticoagulation Summary  As of 3/13/2019    INR goal:   2.0-3.0   TTR:   41.9 % (10.5 mo)   INR used for dosing:      Warfarin maintenance plan:   10 mg (5 mg x 2) every Mon; 5 mg (5 mg x 1) all other days   Weekly warfarin total:   40 mg   Plan last modified:   PALMIRA Kelly (2/27/2019)   Next INR check:      Target end date:   Indefinite    Indications    PAF (paroxysmal atrial fibrillation) (CMS-HCC) [I48.0]  Long term current use of anticoagulant therapy (Resolved) [Z79.01]  Status post mitral valve repair [Z98.890]             Anticoagulation Episode Summary     INR check location:       Preferred lab:       Send INR reminders to:       Comments:         Anticoagulation Care Providers     Provider Role Specialty Phone number    [facility] Responsible  [phone]        Anticoagulation Patient Findings    HPI:  Kailey Velarde seen in clinic today, on anticoagulation therapy with warfarin for PAF (paroxysmal atrial fibrillation) (CMS-HCC)   Long term current use of anticoagulant therapy (Resolved) Status post mitral valve repair   Changes to current medical/health status since last appt: None  Patient had a bloody nose this last week, but wasn't severe; and it resolved on its own  Denies any interval changes to diet  Patient admitted that she has missed a few of her venlafaxine doses  Denies any complications or cost restrictions with current therapy.   Patient denied vitals today      A/P   INR therapeutic.   Will have patient continue with current warfarin regimen at this time.     Follow up appointment in 2 week(s), on March 27th    Ameya Diaz, Pharmacy Intern  Leighann Harvey, PharmD

## 2019-03-14 LAB — INR BLD: 2.7 (ref 0.9–1.2)

## 2019-03-27 ENCOUNTER — ANTICOAGULATION VISIT (OUTPATIENT)
Dept: VASCULAR LAB | Facility: MEDICAL CENTER | Age: 59
End: 2019-03-27
Attending: INTERNAL MEDICINE
Payer: MEDICARE

## 2019-03-27 DIAGNOSIS — I48.0 PAF (PAROXYSMAL ATRIAL FIBRILLATION) (HCC): ICD-10-CM

## 2019-03-27 DIAGNOSIS — Z98.890 STATUS POST MITRAL VALVE REPAIR: ICD-10-CM

## 2019-03-27 LAB
INR BLD: 2.8 (ref 0.9–1.2)
INR PPP: 2.8 (ref 2–3.5)

## 2019-03-27 PROCEDURE — 99211 OFF/OP EST MAY X REQ PHY/QHP: CPT | Performed by: NURSE PRACTITIONER

## 2019-03-27 PROCEDURE — 85610 PROTHROMBIN TIME: CPT

## 2019-03-27 NOTE — PROGRESS NOTES
Anticoagulation Summary  As of 3/27/2019    INR goal:   2.0-3.0   TTR:   43.4 % (11.4 mo)   INR used for dosin.8 (3/27/2019)   Warfarin maintenance plan:   10 mg (5 mg x 2) every Sun; 5 mg (5 mg x 1) all other days   Weekly warfarin total:   40 mg   No change documented:   Brittnee Cisneros, A.P.NPriscilla   Plan last modified:   Leighann Harvey, PharmD (3/13/2019)   Next INR check:   2019   Target end date:   Indefinite    Indications    PAF (paroxysmal atrial fibrillation) (CMS-HCC) [I48.0]  Long term current use of anticoagulant therapy (Resolved) [Z79.01]  Status post mitral valve repair [Z98.890]             Anticoagulation Episode Summary     INR check location:       Preferred lab:       Send INR reminders to:       Comments:         Anticoagulation Care Providers     Provider Role Specialty Phone number    [facility] Responsible  [phone]        Anticoagulation Patient Findings      HPI:  Kailey Velarde seen in clinic today for follow up on anticoagulation therapy in the presence of AF. Denies any changes to current medical/health status since last appointment. Denies any medication or diet changes. No current symptoms of bleeding or thrombosis reported.    A/P:   INR therapeutic. Continue current regimen.     Follow up appointment in 3 week(s).    Next Appointment: 2019 at 2:15 pm.     Brittnee HENDERSON

## 2019-04-17 ENCOUNTER — ANTICOAGULATION VISIT (OUTPATIENT)
Dept: VASCULAR LAB | Facility: MEDICAL CENTER | Age: 59
End: 2019-04-17
Attending: INTERNAL MEDICINE
Payer: MEDICARE

## 2019-04-17 DIAGNOSIS — I48.0 PAF (PAROXYSMAL ATRIAL FIBRILLATION) (HCC): ICD-10-CM

## 2019-04-17 DIAGNOSIS — Z98.890 STATUS POST MITRAL VALVE REPAIR: ICD-10-CM

## 2019-04-17 LAB — INR PPP: 1.4 (ref 2–3.5)

## 2019-04-17 PROCEDURE — 99212 OFFICE O/P EST SF 10 MIN: CPT

## 2019-04-17 PROCEDURE — 85610 PROTHROMBIN TIME: CPT

## 2019-04-17 NOTE — PROGRESS NOTES
Anticoagulation Summary  As of 2019    INR goal:   2.0-3.0   TTR:   44.2 % (1 y)   INR used for dosin.4! (2019)   Warfarin maintenance plan:   10 mg (5 mg x 2) every Sun; 5 mg (5 mg x 1) all other days   Weekly warfarin total:   40 mg   Plan last modified:   Leighann Harvey, PharmD (3/13/2019)   Next INR check:   2019   Target end date:   Indefinite    Indications    PAF (paroxysmal atrial fibrillation) (CMS-HCC) [I48.0]  Long term current use of anticoagulant therapy (Resolved) [Z79.01]  Status post mitral valve repair [Z98.890]             Anticoagulation Episode Summary     INR check location:       Preferred lab:       Send INR reminders to:       Comments:         Anticoagulation Care Providers     Provider Role Specialty Phone number    [facility] Responsible  [phone]        Anticoagulation Patient Findings      HPI:  Kailey Velarde seen in clinic today, on anticoagulation therapy with warfarin for PAF.   Changes to current medical/health status since last appt: none  Denies signs/symptoms of bleeding and/or thrombosis since the last appt.    Denies any interval changes to diet  Denies any interval changes to medications since last appt.   Denies any complications or cost restrictions with current therapy.   Declines vitals.  Confirmed dosing regimen.     A/P   INR  SUB-therapeutic.   Bolus today then Pt is to continue with current warfarin dosing regimen.     Follow up appointment in 3 weeks per pt.     Perry Villa, PharmD

## 2019-04-19 LAB — INR BLD: 1.4 (ref 0.9–1.2)

## 2019-05-08 ENCOUNTER — ANTICOAGULATION VISIT (OUTPATIENT)
Dept: VASCULAR LAB | Facility: MEDICAL CENTER | Age: 59
End: 2019-05-08
Attending: INTERNAL MEDICINE
Payer: MEDICARE

## 2019-05-08 DIAGNOSIS — I48.0 PAF (PAROXYSMAL ATRIAL FIBRILLATION) (HCC): ICD-10-CM

## 2019-05-08 DIAGNOSIS — Z98.890 STATUS POST MITRAL VALVE REPAIR: ICD-10-CM

## 2019-05-08 LAB
INR BLD: 1.4 (ref 0.9–1.2)
INR PPP: 1.4 (ref 2–3.5)

## 2019-05-08 PROCEDURE — 99212 OFFICE O/P EST SF 10 MIN: CPT

## 2019-05-08 PROCEDURE — 85610 PROTHROMBIN TIME: CPT

## 2019-05-08 NOTE — PROGRESS NOTES
Anticoagulation Summary  As of 2019    INR goal:   2.0-3.0   TTR:   41.7 % (1.1 y)   INR used for dosin.40! (2019)   Warfarin maintenance plan:   10 mg (5 mg x 2) every Sun; 5 mg (5 mg x 1) all other days   Weekly warfarin total:   40 mg   Plan last modified:   Leighann Harvey, PharmD (3/13/2019)   Next INR check:   2019   Target end date:   Indefinite    Indications    PAF (paroxysmal atrial fibrillation) (CMS-HCC) [I48.0]  Long term current use of anticoagulant therapy (Resolved) [Z79.01]  Status post mitral valve repair [Z98.890]             Anticoagulation Episode Summary     INR check location:       Preferred lab:       Send INR reminders to:       Comments:         Anticoagulation Care Providers     Provider Role Specialty Phone number    [facility] Responsible  [phone]        Anticoagulation Patient Findings  Patient Findings     Negatives:   Signs/symptoms of thrombosis, Signs/symptoms of bleeding, Laboratory test error suspected, Change in health, Change in alcohol use, Change in activity, Upcoming invasive procedure, Emergency department visit, Upcoming dental procedure, Missed doses, Extra doses, Change in medications, Change in diet/appetite, Hospital admission, Bruising, Other complaints          HPI:  Kailey Velarde seen in clinic today, on anticoagulation therapy with warfarin for PAF.  Changes to current medical/health status since last appt: none  Denies signs/symptoms of bleeding and/or thrombosis since the last appt.    Denies any interval changes to diet  Denies any interval changes to medications since last appt.   Denies any complications or cost restrictions with current therapy.   BP declined today.  Confirmed current dosing regimen. Patient missed dose on Monday!    Patient's previous INR was subtherapeutic at 1.4 on 19, at which time patient was instructed to bolus, then continue with current warfarin regimen. She returns to clinic  today to recheck INR to ensure it is therapeutic and thus preventing possible clotting and/or bleeding/bruising complications.    A/P   INR is SUB-therapeutic today at 1.4.  Patient will bolus with warfarin 10mg TONIGHT, 7.5mg TOMORROW, then will resume her current dosing regimen. Patient will follow up again in 3 weeks (which will be her BIRTHDAY!).    Next appt: Wednesday, May 29, 2019  2:15pm    Josefina LeaD

## 2019-05-29 ENCOUNTER — APPOINTMENT (OUTPATIENT)
Dept: VASCULAR LAB | Facility: MEDICAL CENTER | Age: 59
End: 2019-05-29
Attending: INTERNAL MEDICINE
Payer: MEDICARE

## 2019-05-31 ENCOUNTER — ANTICOAGULATION VISIT (OUTPATIENT)
Dept: VASCULAR LAB | Facility: MEDICAL CENTER | Age: 59
End: 2019-05-31
Attending: INTERNAL MEDICINE
Payer: MEDICARE

## 2019-05-31 DIAGNOSIS — Z98.890 STATUS POST MITRAL VALVE REPAIR: ICD-10-CM

## 2019-05-31 DIAGNOSIS — I48.0 PAF (PAROXYSMAL ATRIAL FIBRILLATION) (HCC): ICD-10-CM

## 2019-05-31 LAB — INR PPP: 2.1 (ref 2–3.5)

## 2019-05-31 PROCEDURE — 85610 PROTHROMBIN TIME: CPT

## 2019-05-31 PROCEDURE — 99211 OFF/OP EST MAY X REQ PHY/QHP: CPT

## 2019-05-31 NOTE — PROGRESS NOTES
Anticoagulation Summary  As of 2019    INR goal:   2.0-3.0   TTR:   40.2 % (1.1 y)   INR used for dosin.10 (2019)   Warfarin maintenance plan:   10 mg (5 mg x 2) every Sun; 5 mg (5 mg x 1) all other days   Weekly warfarin total:   40 mg   Plan last modified:   Leighann Harvey, PharmD (3/13/2019)   Next INR check:   2019   Target end date:   Indefinite    Indications    PAF (paroxysmal atrial fibrillation) (CMS-HCC) [I48.0]  Long term current use of anticoagulant therapy (Resolved) [Z79.01]  Status post mitral valve repair [Z98.890]             Anticoagulation Episode Summary     INR check location:       Preferred lab:       Send INR reminders to:       Comments:         Anticoagulation Care Providers     Provider Role Specialty Phone number    [facility] Responsible  [phone]        Anticoagulation Patient Findings      HPI:  Kailey Velarde seen in clinic today for follow up on anticoagulation therapy in the presence of Afib and S/P MVR. Denies any changes to current medical/health status since last appointment. Denies any medication or diet changes. No current symptoms of bleeding or thrombosis reported.    A/P:   INR is therapeutic at 2.1 . Continue current regimen. Vitals declined today    Follow up appointment in 4 week(s) per pt request    Next Appointment: Wednesday, 2019     Bill Sepulveda, Pharm.D

## 2019-06-03 LAB — INR BLD: 2.1 (ref 0.9–1.2)

## 2019-06-26 ENCOUNTER — ANTICOAGULATION VISIT (OUTPATIENT)
Dept: VASCULAR LAB | Facility: MEDICAL CENTER | Age: 59
End: 2019-06-26
Attending: INTERNAL MEDICINE
Payer: MEDICARE

## 2019-06-26 VITALS — HEART RATE: 87 BPM | SYSTOLIC BLOOD PRESSURE: 140 MMHG | DIASTOLIC BLOOD PRESSURE: 72 MMHG

## 2019-06-26 DIAGNOSIS — I48.0 PAF (PAROXYSMAL ATRIAL FIBRILLATION) (HCC): ICD-10-CM

## 2019-06-26 DIAGNOSIS — Z98.890 STATUS POST MITRAL VALVE REPAIR: ICD-10-CM

## 2019-06-26 LAB
INR BLD: 2.1 (ref 0.9–1.2)
INR PPP: 2.1 (ref 2–3.5)

## 2019-06-26 PROCEDURE — 85610 PROTHROMBIN TIME: CPT

## 2019-06-26 PROCEDURE — 99211 OFF/OP EST MAY X REQ PHY/QHP: CPT | Performed by: NURSE PRACTITIONER

## 2019-06-26 NOTE — PROGRESS NOTES
Anticoagulation Summary  As of 2019    INR goal:   2.0-3.0   TTR:   43.8 % (1.2 y)   INR used for dosin.10 (2019)   Warfarin maintenance plan:   10 mg (5 mg x 2) every Sun; 5 mg (5 mg x 1) all other days   Weekly warfarin total:   40 mg   Plan last modified:   Leighann Harvey, PharmD (3/13/2019)   Next INR check:   2019   Target end date:   Indefinite    Indications    PAF (paroxysmal atrial fibrillation) (CMS-HCC) [I48.0]  Long term current use of anticoagulant therapy (Resolved) [Z79.01]  Status post mitral valve repair [Z98.890]             Anticoagulation Episode Summary     INR check location:       Preferred lab:       Send INR reminders to:       Comments:         Anticoagulation Care Providers     Provider Role Specialty Phone number    [facility] Responsible  [phone]        Anticoagulation Patient Findings      HPI:  Kailey Velarde seen in clinic today for follow up on anticoagulation therapy in the presence of AF.   Denies any changes to current medical/health status since last appointment.   Denies any medication or diet changes.   No current symptoms of bleeding or thrombosis reported.    A/P:   INR therapeutic.   Continue current regimen.   BP recorded in vitals.    Follow up appointment in 5 week(s).    Next Appointment: 2019 at 2:15 pm.    Brittnee HENDERSON

## 2019-07-18 ENCOUNTER — TELEPHONE (OUTPATIENT)
Dept: PULMONOLOGY | Facility: HOSPICE | Age: 59
End: 2019-07-18

## 2019-07-18 NOTE — TELEPHONE ENCOUNTER
Pt called and LM that she needed an apt.   I called pt back and LM to CB to schedule the apt. Last seen was on 08/01/2018 Mary

## 2019-07-23 ENCOUNTER — TELEPHONE (OUTPATIENT)
Dept: PULMONOLOGY | Facility: HOSPICE | Age: 59
End: 2019-07-23

## 2019-07-23 DIAGNOSIS — R06.02 SOB (SHORTNESS OF BREATH): ICD-10-CM

## 2019-07-24 ENCOUNTER — APPOINTMENT (OUTPATIENT)
Dept: VASCULAR LAB | Facility: MEDICAL CENTER | Age: 59
End: 2019-07-24
Payer: MEDICARE

## 2019-07-29 ENCOUNTER — ANTICOAGULATION VISIT (OUTPATIENT)
Dept: VASCULAR LAB | Facility: MEDICAL CENTER | Age: 59
End: 2019-07-29
Attending: INTERNAL MEDICINE
Payer: MEDICARE

## 2019-07-29 VITALS — HEART RATE: 86 BPM | SYSTOLIC BLOOD PRESSURE: 130 MMHG | DIASTOLIC BLOOD PRESSURE: 76 MMHG

## 2019-07-29 DIAGNOSIS — Z98.890 STATUS POST MITRAL VALVE REPAIR: ICD-10-CM

## 2019-07-29 DIAGNOSIS — I48.0 PAF (PAROXYSMAL ATRIAL FIBRILLATION) (HCC): ICD-10-CM

## 2019-07-29 LAB
INR BLD: 2.4 (ref 0.9–1.2)
INR PPP: 2.4 (ref 2–3.5)

## 2019-07-29 PROCEDURE — 99211 OFF/OP EST MAY X REQ PHY/QHP: CPT | Performed by: NURSE PRACTITIONER

## 2019-07-29 PROCEDURE — 85610 PROTHROMBIN TIME: CPT

## 2019-07-29 NOTE — PROGRESS NOTES
Anticoagulation Summary  As of 2019    INR goal:   2.0-3.0   TTR:   47.8 % (1.3 y)   INR used for dosin.40 (2019)   Warfarin maintenance plan:   10 mg (5 mg x 2) every Sun; 5 mg (5 mg x 1) all other days   Weekly warfarin total:   40 mg   Plan last modified:   Leighann Harvey, PharmD (3/13/2019)   Next INR check:   2019   Target end date:   Indefinite    Indications    PAF (paroxysmal atrial fibrillation) (CMS-HCC) [I48.0]  Long term current use of anticoagulant therapy (Resolved) [Z79.01]  Status post mitral valve repair [Z98.890]             Anticoagulation Episode Summary     INR check location:       Preferred lab:       Send INR reminders to:       Comments:         Anticoagulation Care Providers     Provider Role Specialty Phone number    [facility] Responsible  [phone]        Anticoagulation Patient Findings      HPI:  Kailey Velarde seen in clinic today for follow up on anticoagulation therapy in the presence of AF.   Denies any changes to current medical/health status since last appointment.   Denies any medication or diet changes.   No current symptoms of bleeding or thrombosis reported.    A/P:   INR therapeutic.   Continue current regimen.   BP recorded in vitals.    Follow up appointment in 6 week(s).    Next Appointment: 2019 at 11:15 am.    Brittnee HENDERSON

## 2019-08-16 DIAGNOSIS — I48.0 PAF (PAROXYSMAL ATRIAL FIBRILLATION) (HCC): Primary | ICD-10-CM

## 2019-08-16 RX ORDER — AMIODARONE HYDROCHLORIDE 200 MG/1
200 TABLET ORAL DAILY
Qty: 30 TAB | Refills: 0 | Status: SHIPPED | OUTPATIENT
Start: 2019-08-16 | End: 2019-08-22

## 2019-08-16 NOTE — TELEPHONE ENCOUNTER
LS    I spoke with this patient and scheduled them for 8/22/19 with SM. She would like to know if she can get her RX refilled, please.      Thank you for your time,  Valarie LY

## 2019-08-19 ENCOUNTER — APPOINTMENT (OUTPATIENT)
Dept: PULMONOLOGY | Facility: HOSPICE | Age: 59
End: 2019-08-19
Payer: MEDICARE

## 2019-08-22 ENCOUNTER — OFFICE VISIT (OUTPATIENT)
Dept: CARDIOLOGY | Facility: MEDICAL CENTER | Age: 59
End: 2019-08-22
Payer: MEDICARE

## 2019-08-22 VITALS
DIASTOLIC BLOOD PRESSURE: 58 MMHG | HEART RATE: 84 BPM | OXYGEN SATURATION: 92 % | WEIGHT: 283 LBS | HEIGHT: 68 IN | BODY MASS INDEX: 42.89 KG/M2 | SYSTOLIC BLOOD PRESSURE: 96 MMHG

## 2019-08-22 DIAGNOSIS — J96.01 ACUTE RESPIRATORY FAILURE WITH HYPOXIA (HCC): ICD-10-CM

## 2019-08-22 DIAGNOSIS — Z98.890 STATUS POST MITRAL VALVE REPAIR: ICD-10-CM

## 2019-08-22 DIAGNOSIS — I50.30 HEART FAILURE WITH PRESERVED EJECTION FRACTION, BORDERLINE, CLASS II (HCC): ICD-10-CM

## 2019-08-22 DIAGNOSIS — I10 ESSENTIAL HYPERTENSION: ICD-10-CM

## 2019-08-22 DIAGNOSIS — I48.0 PAF (PAROXYSMAL ATRIAL FIBRILLATION) (HCC): Primary | ICD-10-CM

## 2019-08-22 DIAGNOSIS — E78.2 MIXED HYPERLIPIDEMIA: ICD-10-CM

## 2019-08-22 PROCEDURE — 99214 OFFICE O/P EST MOD 30 MIN: CPT | Performed by: NURSE PRACTITIONER

## 2019-08-22 RX ORDER — ATORVASTATIN CALCIUM 20 MG/1
20 TABLET, FILM COATED ORAL EVERY EVENING
Qty: 90 TAB | Refills: 3 | Status: SHIPPED | OUTPATIENT
Start: 2019-08-22 | End: 2020-09-10

## 2019-08-22 RX ORDER — AMIODARONE HYDROCHLORIDE 200 MG/1
200 TABLET ORAL DAILY
Qty: 90 TAB | Refills: 3 | Status: SHIPPED | OUTPATIENT
Start: 2019-08-22 | End: 2020-09-10 | Stop reason: SDUPTHER

## 2019-08-22 ASSESSMENT — ENCOUNTER SYMPTOMS
ABDOMINAL PAIN: 0
PALPITATIONS: 0
PND: 0
SHORTNESS OF BREATH: 1
MYALGIAS: 0
COUGH: 0
WEAKNESS: 0
CLAUDICATION: 0
DIZZINESS: 1
ORTHOPNEA: 0

## 2019-08-22 NOTE — PROGRESS NOTES
Chief Complaint   Patient presents with   • CHF (Acute)       Subjective:   CARMEN Velarde is a 59 y.o. female who presents today to follow-up on multiple medical problems including congestive heart failure, atrial fibrillation, mitral valve repair and pulmonary hypertension.  She was last seen in August 2018 by Dr. Leah Bojorquez.  Since that time she has been stable.    She states she has gained weight since stopping drinking alcohol which she has replaced with eating sweets.  She does walk 15 minutes twice a day and tolerates it without any chest discomfort but some exertional shortness of breath.  Her oxygen runs low and she wears O2 at night.  She denies orthopnea or PND.  No ankle edema.  She has not been taking the torsemide except when absolutely needed therefore has not taken it in extended period of time.    She denies any chest pain, palpitations or ankle edema.  She does feel off balance especially with position change.    She stopped drinking on August 8, 2018 therefore she is over a year sober.  Unfortunately she is still using nicotine in the form of a vape pen.  She is at the lowest dose of nicotine and is trying to quit.    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   • Status post mitral valve repair November 2011    MV repair with 32mm Jonnie-Carpenter flexible annuloplasty ring.     Past Surgical History:   Procedure Laterality Date   • CARDIAC CATH, RIGHT HEART  03/31/2018    Right heart cath with high pressures.   • MITRAL VALVE REPAIR  11/17/2011    Performed by MARY ORTIZ at SURG[facility] ORS   • PACEMAKER INSERTION  November  2011    Medtronic Versa [id number] implanted by Dr. Amaya.   • OTHER ORTHOPEDIC SURGERY      Neck and back      Family History   Problem Relation Age of Onset   • Heart Attack Father      Social History     Socioeconomic History   • Marital status: Single     Spouse name: Not on file   • Number of children: Not on file   • Years of education: Not on file   • Highest education level: Not on file   Occupational History   • Not on file   Social Needs   • Financial resource strain: Not on file   • Food insecurity:     Worry: Not on file     Inability: Not on file   • Transportation needs:     Medical: Not on file     Non-medical: Not on file   Tobacco Use   • Smoking status: Former Smoker     Packs/day: 0.25     Years: 30.00     Pack years: 7.50   • Smokeless tobacco: Never Used   • Tobacco comment: 4/12 Down to vapping now.    Substance and Sexual Activity   • Alcohol use: Not Currently     Comment: sober since 8/8/18!   • Drug use: No     Types: Inhaled, Oral     Comment: hx of, denies currently   • Sexual activity: Not on file   Lifestyle   • Physical activity:     Days per week: Not on file     Minutes per session: Not on file   • Stress: Not on file   Relationships   • Social connections:     Talks on phone: Not on file     Gets together: Not on file     Attends [employer] service: Not on file     Active member of club or organization: Not on file     Attends meetings of clubs or organizations: Not on file     Relationship status: Not on file   • Intimate partner violence:     Fear of current or ex partner: Not on file     Emotionally abused: Not on file     Physically abused: Not on file     Forced sexual activity: Not on file   Other Topics Concern   • Not on file   Social History Narrative   • Not on file     Allergies   Allergen Reactions   • Penicillins Rash           Outpatient Encounter Medications as of 8/22/2019   Medication Sig Dispense Refill   • amiodarone (CORDARONE) 200 MG Tab Take 1 Tab by mouth every day.  90 Tab 3   • atorvastatin (LIPITOR) 20 MG Tab Take 1 Tab by mouth every evening. 90 Tab 3   • torsemide (DEMADEX) 20 MG Tab Take 3 Tabs by mouth every day. Requires Cardiology follow up for further refills. 90 Tab 1   • warfarin (COUMADIN) 5 MG Tab Take 1-2 Tabs by mouth every day. 180 Tab 1   • potassium chloride SA (KDUR) 20 MEQ Tab CR Take 1 Tab by mouth 2 times a day. 60 Tab 11   • Tiotropium Bromide Monohydrate (SPIRIVA RESPIMAT) 2.5 MCG/ACT Aero Soln Inhale 2 Inhalation by mouth every day. Two inhalations once daily.  Assemble and prime. 1 Inhaler 11   • Blood Pressure Monitoring (B-D ASSURE BPM/AUTO ARM CUFF) Misc Blood pressure home monitoring system 1 Each 0   • traZODone (DESYREL) 100 MG Tab Take 100 mg by mouth every evening.     • oxyCODONE immediate release (ROXICODONE) 10 MG immediate release tablet Take 10 mg by mouth 3 times a day. TID with MS Contin     • aripiprazole (ABILIFY) 5 MG tablet Take 5 mg by mouth every morning. (soon to increase to 10mg)     • venlafaxine (EFFEXOR-XR) 150 MG extended-release capsule Take 150 mg by mouth every morning.     • Multiple Vitamin (MULTI-DAY PO) Take 1 Tab by mouth every morning.     • pregabalin (LYRICA) 200 MG capsule Take 200 mg by mouth 3 times a day.     • vitamin D (CHOLECALCIFEROL) 1000 UNIT Tab Take 1,000 Units by mouth every morning.     • morphine SR (MS CONTIN) 15 MG TB12 Take 1 Tab by mouth 3 times a day. 60 Tab 0   • [DISCONTINUED] amiodarone (CORDARONE) 200 MG Tab Take 1 Tab by mouth every day. Needs to be seen for further refills. Thank you 30 Tab 0   • [DISCONTINUED] atorvastatin (LIPITOR) 20 MG Tab Take 1 Tab by mouth every evening. 90 Tab 1     No facility-administered encounter medications on file as of 8/22/2019.      Review of Systems   Constitutional: Negative for malaise/fatigue.   Respiratory: Positive for shortness of breath (exertion). Negative for cough.    Cardiovascular: Negative for chest pain, palpitations, orthopnea,  "claudication, leg swelling and PND.   Gastrointestinal: Negative for abdominal pain.   Musculoskeletal: Negative for myalgias.   Neurological: Positive for dizziness (feels off balance.). Negative for weakness.        Objective:   BP (!) 96/58 (BP Location: Left arm, Patient Position: Sitting, BP Cuff Size: Adult)   Pulse 84   Ht 1.727 m (5' 8\")   Wt (!) 128.4 kg (283 lb)   LMP 01/01/2010   SpO2 92%   BMI 43.03 kg/m²     Physical Exam   Constitutional: She is oriented to person, place, and time. She appears well-developed and well-nourished.   Obese female no acute distress.   HENT:   Head: Normocephalic.   Eyes: EOM are normal.   Neck: No JVD present.   Cardiovascular: Normal rate, regular rhythm and normal heart sounds.   Pulmonary/Chest: Effort normal. She has decreased breath sounds (Diminished but clear.).   Pacemaker left upper chest without erosion, redness or fluctuance.   Abdominal: Soft. Bowel sounds are normal.   Musculoskeletal: She exhibits no edema.   Neurological: She is alert and oriented to person, place, and time.   Skin: Skin is warm and dry.   Psychiatric: She has a normal mood and affect.     No recent lab.      April 2, 2018:Ttransesophageal Echo Report  CONCLUSIONS  Low normal LV systolic function, EF 50%.  Moderate RV dilation with reduced systolic function.  Mild biatrial enlargement.  Spontaneous echo contrast seen in the left atrial appendage but no   thrombus.  There is a mitral valve ring in place consistent with history of prior   repair, normal gradients, mild MR.    Compared to prior transthoracic echocardiogram, LVEF appears low   normal. RV still appears somewhat dilated with reduced function. The   mitral valve repair does not appear to be causing any hemodynamic   Issues.      Assessment:     1. PAF (paroxysmal atrial fibrillation) (CMS-[facility])  TSH    amiodarone (CORDARONE) 200 MG Tab   2. Heart failure with preserved ejection fraction, borderline, class II ([facility])     3. " Essential hypertension  atorvastatin (LIPITOR) 20 MG Tab   4. Acute respiratory failure with hypoxia (CMS-HCC)     5. Status post mitral valve repair     6. Mixed hyperlipidemia  LIPID PANEL    Comp Metabolic Panel       Medical Decision Making:  Today's Assessment / Status / Plan:   Paroxysmal atrial fibrillation: She is in a regular rhythm.  She denies any palpitations.  She continues on warfarin for anticoagulation.  It appears amiodarone may be maintaining her in a regular rhythm.  I will refill the amiodarone and check a TSH.    Heart failure: She has preserved ejection fraction and no fluid overload signs today.  Her that she has not been taking torsemide except when needed which is very rare.  Continue current regimen.    Hypertension: Blood pressure is low today but she is not on any blood pressure lowering medications.  This could explain her feeling off balance.    Respiratory failure with hypoxia: Her oxygen saturation can drop to 88% just with her talking while at rest.  She uses oxygen at night.  I will defer to pulmonary medicine.    Mitral valve repair: Appears to be functioning well.    Hyperlipidemia: She has not had labs for a year.  I will order lipids and metabolic panel.    We will have a follow-up in 6 months with Dr. Leah Bojorquez.  Will follow-up sooner if problems.    Collaborating Provider: Dr. Cheatham.    Please note that this dictation was created using voice recognition software. I have made every reasonable attempt to correct obvious errors, but it is possible there are errors of grammar and possibly content that I did not discover before finalizing the note.

## 2019-09-09 DIAGNOSIS — Z87.09 HX OF PLEURAL EFFUSION: ICD-10-CM

## 2019-09-09 DIAGNOSIS — R06.02 SOB (SHORTNESS OF BREATH): ICD-10-CM

## 2019-09-10 NOTE — PROGRESS NOTES
Diagnosis:  1. Chronic obstructive pulmonary disease, unspecified COPD type (HCC)  AMB PULMONARY FUNCTION TEST/LAB     DX-CHEST-2 VIEWS     DISCONTINUED: Tiotropium Bromide Monohydrate (SPIRIVA RESPIMAT) 2.5 MCG/ACT Aero Soln          Return in about 6 weeks (around 9/12/2018) for With CXR, Follow up visit with APRN, With PFT.    Dr. Hernandez did order a CXR but it was over 1 year ago and a new order need to be placed. The pt has not had any recent imaging.  There is a new PFT order already entered. Thanks!

## 2019-09-13 ENCOUNTER — NON-PROVIDER VISIT (OUTPATIENT)
Dept: PULMONOLOGY | Facility: HOSPICE | Age: 59
End: 2019-09-13
Attending: NURSE PRACTITIONER
Payer: MEDICARE

## 2019-09-13 ENCOUNTER — APPOINTMENT (OUTPATIENT)
Dept: RADIOLOGY | Facility: IMAGING CENTER | Age: 59
End: 2019-09-13
Payer: MEDICARE

## 2019-09-13 ENCOUNTER — OFFICE VISIT (OUTPATIENT)
Dept: PULMONOLOGY | Facility: HOSPICE | Age: 59
End: 2019-09-13
Payer: MEDICARE

## 2019-09-13 VITALS
HEART RATE: 72 BPM | HEIGHT: 68 IN | SYSTOLIC BLOOD PRESSURE: 122 MMHG | BODY MASS INDEX: 43.04 KG/M2 | WEIGHT: 284 LBS | DIASTOLIC BLOOD PRESSURE: 74 MMHG | OXYGEN SATURATION: 92 %

## 2019-09-13 VITALS — BODY MASS INDEX: 43.18 KG/M2 | WEIGHT: 284 LBS

## 2019-09-13 DIAGNOSIS — Z87.09 HX OF PLEURAL EFFUSION: ICD-10-CM

## 2019-09-13 DIAGNOSIS — R06.09 DYSPNEA ON EXERTION: ICD-10-CM

## 2019-09-13 DIAGNOSIS — E66.01 MORBID OBESITY WITH BMI OF 40.0-44.9, ADULT (HCC): ICD-10-CM

## 2019-09-13 DIAGNOSIS — F17.200 CURRENT SMOKER: ICD-10-CM

## 2019-09-13 DIAGNOSIS — R06.02 SOB (SHORTNESS OF BREATH): ICD-10-CM

## 2019-09-13 DIAGNOSIS — R00.2 PALPITATIONS: ICD-10-CM

## 2019-09-13 DIAGNOSIS — G47.33 OSA (OBSTRUCTIVE SLEEP APNEA): ICD-10-CM

## 2019-09-13 PROCEDURE — 94729 DIFFUSING CAPACITY: CPT | Performed by: INTERNAL MEDICINE

## 2019-09-13 PROCEDURE — 94726 PLETHYSMOGRAPHY LUNG VOLUMES: CPT | Performed by: INTERNAL MEDICINE

## 2019-09-13 PROCEDURE — 94060 EVALUATION OF WHEEZING: CPT | Performed by: INTERNAL MEDICINE

## 2019-09-13 PROCEDURE — 99214 OFFICE O/P EST MOD 30 MIN: CPT | Performed by: NURSE PRACTITIONER

## 2019-09-13 PROCEDURE — 71046 X-RAY EXAM CHEST 2 VIEWS: CPT | Mod: TC | Performed by: NURSE PRACTITIONER

## 2019-09-13 RX ORDER — VENLAFAXINE HYDROCHLORIDE 75 MG/1
75 CAPSULE, EXTENDED RELEASE ORAL DAILY
COMMUNITY

## 2019-09-13 RX ORDER — ALBUTEROL SULFATE 90 UG/1
2 AEROSOL, METERED RESPIRATORY (INHALATION) EVERY 4 HOURS PRN
Qty: 1 INHALER | Refills: 1 | Status: SHIPPED | OUTPATIENT
Start: 2019-09-13 | End: 2020-05-28

## 2019-09-13 RX ORDER — ZOLPIDEM TARTRATE 5 MG/1
5 TABLET ORAL NIGHTLY PRN
Qty: 3 TAB | Refills: 0 | Status: SHIPPED | OUTPATIENT
Start: 2019-09-13 | End: 2019-09-16

## 2019-09-13 ASSESSMENT — PULMONARY FUNCTION TESTS
FEV1/FVC: 85
FEV1_PERCENT_CHANGE: -2
FEV1_PERCENT_PREDICTED: 86
FEV1/FVC_PERCENT_LLN: 65
FEV1: 2.59
FEV1: 2.55
FVC_PERCENT_PREDICTED: 76
FVC_PREDICTED: 3.86
FVC: 2.95
FEV1/FVC_PREDICTED: 78
FEV1_PREDICTED: 2.99
FEV1/FVC_PERCENT_PREDICTED: 77
FVC_PERCENT_PREDICTED: 78
FEV1/FVC: 86.44
FEV1/FVC_PERCENT_CHANGE: 1
FEV1/FVC_PERCENT_CHANGE: 50
FEV1/FVC_PERCENT_PREDICTED: 109
FEV1_LLN: 2.50
FVC: 3.04
FEV1_PERCENT_CHANGE: -1
FEV1/FVC_PERCENT_PREDICTED: 110
FEV1/FVC: 85
FEV1/FVC: 86
FVC_LLN: 3.22
FEV1_PERCENT_PREDICTED: 85
FEV1/FVC_PERCENT_PREDICTED: 112
FEV1/FVC_PERCENT_PREDICTED: 110

## 2019-09-13 NOTE — PATIENT INSTRUCTIONS
1) Sleep study with Ambien Rx  2) Continue rescue inhaler as needed  3) Light conditioning and dietary changes encouraged  4) Encourage  smoking cessation   4) Vaccines: Up to date with Pneumovax 23  5) Return in about 2 months (around 11/13/2019) for sleep study results, follow up with BEATRIZ Dye, if not sooner.

## 2019-09-13 NOTE — PROCEDURES
Technician: Bessy Eric RRT   Good patient effort & cooperation.  The results of this test meet the ATS/ERS standards for acceptability & reproducibility.  Test was performed on the My[employer]ers Body Plethysmograph-Elite DX system.  Predicted values were [facility]-3 for spirometry, [facility] for DLCO, ITS for Lung Volumes.  The DLCO was uncorrected for Hgb.  A bronchodilator of Ventolin HFA -2puffs via spacer administered.  DLCO performed during dilation period.    1. Baseline spirometry demonstrates a normal  FEV1. FEV1/FVC ratio is normal.    2. After administration of an inhaled bronchodilator there is no improvement in FEV1.    3. Lung volumes demonstrate a total lung capacity of 86% of predicted.    4. Gas exchange as estimated by DLCO is 85% of predicted.    5. Airway resistance is normal.      Impression:    No definite evidence of significant obstructive or restrictive pulmonary disease is noted on the study.  The mild decrease in FVC may be due to the patient's BMI of 43.2.

## 2019-09-13 NOTE — PROGRESS NOTES
CC:  Here for f/u sleep and pulmonary issues as listed below    HPI:   CARMEN presents today for follow up for hx of pleural effusion.  She returns after a year hiatus after encouragement from a provider. PMH of pulmonary hypertension, sick sinus syndrome, PM, HTN, taking Coumadin, CHF, atrial myxoma.     PFTs from 9/2019 indicate a Fev1 of 2.59L or 86% predicted without bronchodilator response, Fev1/FVC ratio of 85, DLCO 85%, DLVA 108%. Former smoker, 30 pack year history, vaping with nicotine. CXR completed 9/13/2019, which I reviewed did not show active disease. CTA completed 3/28/2018 showed RLL atelectasis, groundglass opacities, trace pleural effusion, hepatic steatosis and splenomegaly. Echocardiogram from 3/2018 showed severely dilated right ventricle, dilated left atrium, estimated RVSP is 35MMHG.     She occasionally has dyspnea on exertion with overt exertion. She will occasionally use oxygen to recover with benefit. She finds her oxygen levels at home at 88-91%. She has occasional chronic palpitations. BMI is 43. She admits she does not exercise and eats poorly. Declines nutritionist. She has used Spiriva once every 3 weeks. Never used albuterol inhaler.     Patient is currently sleeping 6-7 hours per night with mutlitple nighttime awakenings. They have some trouble falling asleep. Takes Trazodone nightly. If she does not take it, she can't sleep. Takes her 1 1/2 hours to fall asleep with it.  They no feel refreshed in the morning and denies morning H/A. They feel tired throughout the day and naps 2-3x  per week for appx 30 min to 2 hours long.  Patient reports snoring and paroxysmal nocturnal dyspnea events. They have never fallen asleep in conversation, at the wheel, or at work.  They deny sleepwalking.  She has been sleeping in her chair, not a bed, because her bed hurts.       Patient Active Problem List    Diagnosis Date Noted   • Pacemaker 11/26/2012     Priority: High   • Atrial myxoma 12/07/2011      Priority: High   • Acute respiratory failure with hypoxia (CMS-HCC) 11/16/2011     Priority: High   • Acute on chronic diastolic congestive heart failure ([facility]) 04/03/2018     Priority: Medium   • Pulmonary hypertension ([facility]) 06/19/2017     Priority: Medium   • History of sick sinus syndrome 10/01/2013     Priority: Medium   • PAF (paroxysmal atrial fibrillation) (CMS-HCC) 12/07/2011     Priority: Medium   • Status post mitral valve repair 12/07/2011     Priority: Medium   • Thrombocytopenia ([facility]) 04/04/2018     Priority: Low   • FAN (acute kidney injury) ([facility]) 04/03/2018     Priority: Low   • Obesity 03/28/2018     Priority: Low   • Tobacco use disorder 09/30/2015     Priority: Low   • Mixed hyperlipidemia 07/03/2012     Priority: Low   • Chronic low back pain 11/16/2011     Priority: Low   • High risk medication use 08/01/2018   • Morbid obesity with BMI of 40.0-44.9, adult ([facility]) 05/30/2018   • Sick sinus syndrome ([facility]) 05/30/2018   • KAEL (obstructive sleep apnea) 05/30/2018   • Chronic right-sided heart failure ([facility]) 05/30/2018   • Heart failure with preserved ejection fraction, borderline, class II ([facility]) 05/30/2018   • Left ventricular diastolic dysfunction, NYHA class 3 05/30/2018   • Circulating anticoagulants ([facility]) 03/28/2018   • Hx of pleural effusion 03/12/2018   • Palpitations 02/22/2018   • Essential hypertension 02/22/2018   • Localized edema 08/29/2016       Past Medical History:   Diagnosis Date   • 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   • Status post mitral valve repair November 2011    MV repair with 32mm Jonnie-Carpenter flexible annuloplasty ring.       Past Surgical History:    Procedure Laterality Date   • CARDIAC CATH, RIGHT HEART  03/31/2018    Right heart cath with high pressures.   • MITRAL VALVE REPAIR  11/17/2011    Performed by MARY ORTIZ at [facility]   • PACEMAKER INSERTION  November 2011    Medtronic Versa [id number] implanted by Dr. Amaya.   • OTHER ORTHOPEDIC SURGERY      Neck and back        Family History   Problem Relation Age of Onset   • Heart Attack Father        Social History     Socioeconomic History   • Marital status: Single     Spouse name: Not on file   • Number of children: Not on file   • Years of education: Not on file   • Highest education level: Not on file   Occupational History   • Not on file   Social Needs   • Financial resource strain: Not on file   • Food insecurity:     Worry: Not on file     Inability: Not on file   • Transportation needs:     Medical: Not on file     Non-medical: Not on file   Tobacco Use   • Smoking status: Former Smoker     Packs/day: 0.25     Years: 30.00     Pack years: 7.50   • Smokeless tobacco: Never Used   • Tobacco comment: 4/12 Down to vapping now.    Substance and Sexual Activity   • Alcohol use: Not Currently     Comment: sober since 8/8/18!   • Drug use: No     Types: Inhaled, Oral     Comment: hx of, denies currently   • Sexual activity: Not on file   Lifestyle   • Physical activity:     Days per week: Not on file     Minutes per session: Not on file   • Stress: Not on file   Relationships   • Social connections:     Talks on phone: Not on file     Gets together: Not on file     Attends Congregation service: Not on file     Active member of club or organization: Not on file     Attends meetings of clubs or organizations: Not on file     Relationship status: Not on file   • Intimate partner violence:     Fear of current or ex partner: Not on file     Emotionally abused: Not on file     Physically abused: Not on file     Forced sexual activity: Not on file   Other Topics Concern   • Not on file   Social  History Narrative   • Not on file       Current Outpatient Medications   Medication Sig Dispense Refill   • venlafaxine XR (EFFEXOR XR) 75 MG CAPSULE SR 24 HR Take 75 mg by mouth every day.     • zolpidem (AMBIEN) 5 MG Tab Take 1 Tab by mouth at bedtime as needed for Sleep (1 to 3 po qhs prn insomnia/sleep study. Bring to sleep study.) for up to 3 days. 3 Tab 0   • albuterol (VENTOLIN HFA) 108 (90 Base) MCG/ACT Aero Soln inhalation aerosol Inhale 2 Puffs by mouth every four hours as needed for Shortness of Breath (wheeze). 1 Inhaler 1   • amiodarone (CORDARONE) 200 MG Tab Take 1 Tab by mouth every day. 90 Tab 3   • atorvastatin (LIPITOR) 20 MG Tab Take 1 Tab by mouth every evening. 90 Tab 3   • torsemide (DEMADEX) 20 MG Tab Take 3 Tabs by mouth every day. Requires Cardiology follow up for further refills. 90 Tab 1   • warfarin (COUMADIN) 5 MG Tab Take 1-2 Tabs by mouth every day. 180 Tab 1   • potassium chloride SA (KDUR) 20 MEQ Tab CR Take 1 Tab by mouth 2 times a day. 60 Tab 11   • Tiotropium Bromide Monohydrate (SPIRIVA RESPIMAT) 2.5 MCG/ACT Aero Soln Inhale 2 Inhalation by mouth every day. Two inhalations once daily.  Assemble and prime. 1 Inhaler 11   • Blood Pressure Monitoring (B-D ASSURE BPM/AUTO ARM CUFF) Misc Blood pressure home monitoring system 1 Each 0   • traZODone (DESYREL) 100 MG Tab Take 100 mg by mouth every evening.     • oxyCODONE immediate release (ROXICODONE) 10 MG immediate release tablet Take 10 mg by mouth 3 times a day. TID with MS Contin     • aripiprazole (ABILIFY) 5 MG tablet Take 5 mg by mouth every morning. (soon to increase to 10mg)     • venlafaxine (EFFEXOR-XR) 150 MG extended-release capsule Take  by mouth every morning.     • Multiple Vitamin (MULTI-DAY PO) Take 1 Tab by mouth every morning.     • pregabalin (LYRICA) 200 MG capsule Take 200 mg by mouth 3 times a day.     • vitamin D (CHOLECALCIFEROL) 1000 UNIT Tab Take 1,000 Units by mouth every morning.     • morphine SR (MS  "CONTIN) 15 MG TB12 Take 1 Tab by mouth 3 times a day. 60 Tab 0     No current facility-administered medications for this visit.           Allergies: Penicillins      ROS   Gen: Denies fever, chills, unintentional weight loss, fatigue, night sweats  E/N/T: Denies ear pain, nasal congestion  Resp:Denies   cough, sputum production, hemoptysis  CV: Denies chest pain, chest tightness, BLE edema  Sleep:Denies morning headache, insomnia,  apnea  Neuro: Denies frequent headaches, weakness, dizziness  GI: Denies N/V, acid reflux/heartburn  See HPI.  All other systems reviewed and negative      Vital signs for this encounter:  Vitals:    09/13/19 1249   Height: 1.727 m (5' 8\")   Weight: (!) 128.8 kg (284 lb)   Weight % change since last entry.: 0 %   BP: 122/74   Pulse: 72   BMI (Calculated): 43.18   O2 sat % room air: 92 %                 Physical Exam:   Appearance: well developed, well nourished, no acute distress.  Eyes: PERRL, EOM intact, sclere white, conjunctiva moist.  Ears: no lesions or deformities.  Hearing: grossly intact.  Nose: no lesions or deformities.  Dentition: good dentition.  Oropharynx: tongue normal, posterior pharynx without erythema or exudate.  Neck: supple, trachea midline, no masses.  Respiratory effort: no intercostal retractions or use of accessory muscles.  Lung auscultation: Bilateral diminished   Heart auscultation: no murmur, rub, or gallop.   Extremities: no cyanosis or edema.  Abdomen: soft, non-tender, no masses.  Gait and station: grossly normal   Digits and Nails: no clubbing, cyanosis, petechiae, or nodes.  Cranial nerves: grossly normal.  Motor: no focal deficits observed.  Skin: no rashes, lesions, or ulcers noted.  Orientation: oriented to time, place, and person.  Mood and affect: mood and affect appropriate, normal interaction with examiner.    Assessment   1. KAEL (obstructive sleep apnea)  zolpidem (AMBIEN) 5 MG Tab    Polysomnography, 4 or More    REFERRAL TO LUNG CANCER " SCREENING PROGRAM   2. Morbid obesity with BMI of 40.0-44.9, adult (HCC)  REFERRAL TO LUNG CANCER SCREENING PROGRAM   3. Dyspnea on exertion  Exercise Test for Bronchospasm / 6-Minute Walk    REFERRAL TO LUNG CANCER SCREENING PROGRAM   4. Current smoker  REFERRAL TO LUNG CANCER SCREENING PROGRAM   5. Palpitations  REFERRAL TO LUNG CANCER SCREENING PROGRAM       Patient was seen for 30 minutes, more than 50% of time spent in face to face review, counseling, and arranging future evaluation and follow up of medical conditions and care related to sleep study and what to expect. Stressed diet and exercise importance, as weight is likely responsible for dyspnea, along with sedentary lifestyle and pulmonary hypertension. PFTs were essentially normal. Patient is clinically stable and will proceed with following plan.     PLAN:   Patient Instructions   1) Home Sleep study. Could not to in lab b/c dog  2) Continue rescue inhaler as needed  3) Light conditioning and dietary changes encouraged  4) Encourage  smoking cessation   4) Vaccines: Up to date with Pneumovax 23  5) Return in about 2 months (around 11/13/2019) for sleep study results, follow up with BEATRIZ Dye, if not sooner.  6) referral to lung cancer screening program

## 2019-09-27 ENCOUNTER — ANTICOAGULATION VISIT (OUTPATIENT)
Dept: VASCULAR LAB | Facility: MEDICAL CENTER | Age: 59
End: 2019-09-27
Attending: INTERNAL MEDICINE
Payer: MEDICARE

## 2019-09-27 DIAGNOSIS — Z98.890 STATUS POST MITRAL VALVE REPAIR: ICD-10-CM

## 2019-09-27 DIAGNOSIS — I48.0 PAF (PAROXYSMAL ATRIAL FIBRILLATION) (HCC): ICD-10-CM

## 2019-09-27 LAB
INR BLD: 4.5 (ref 0.9–1.2)
INR PPP: 4.5 (ref 2–3.5)

## 2019-09-27 PROCEDURE — 99212 OFFICE O/P EST SF 10 MIN: CPT

## 2019-09-27 PROCEDURE — 85610 PROTHROMBIN TIME: CPT

## 2019-09-27 RX ORDER — WARFARIN SODIUM 5 MG/1
5-10 TABLET ORAL DAILY
Qty: 180 TAB | Refills: 1 | Status: SHIPPED | OUTPATIENT
Start: 2019-09-27 | End: 2020-02-26 | Stop reason: SDUPTHER

## 2019-09-27 NOTE — PROGRESS NOTES
Anticoagulation Summary  As of 2019    INR goal:   2.0-3.0   TTR:   45.6 % (1.4 y)   INR used for dosin.50! (2019)   Warfarin maintenance plan:   5 mg (5 mg x 1) every day   Weekly warfarin total:   35 mg   Plan last modified:   Bill Sepulveda PharmD (2019)   Next INR check:   10/9/2019   Target end date:   Indefinite    Indications    PAF (paroxysmal atrial fibrillation) (CMS-HCC) [I48.0]  Long term current use of anticoagulant therapy (Resolved) [Z79.01]  Status post mitral valve repair [Z98.890]             Anticoagulation Episode Summary     INR check location:       Preferred lab:       Send INR reminders to:       Comments:         Anticoagulation Care Providers     Provider Role Specialty Phone number    [facility] Responsible  [phone]        Anticoagulation Patient Findings      HPI:  Kailey Marisol Syd seen in clinic today for follow up on anticoagulation therapy in the presence of Afib and MVR.   Denies any changes to current medical/health status since last appointment.   Denies any medication or diet changes.   No current symptoms of bleeding or thrombosis reported.    A/P:   INR is supra-therapeutic at 4.5, patient has started drinking again, she states that she will continue drinking until she can make it to rehab. She was educated on her increased risk of bleeding with excessive alcohol consumption.    Pt to HOLD warfarin tonight, then take 2.5 mg tomorrow, since she plans to continue her alcohol consumption, pt to begin 12% decreased regimen    Follow up appointment in 2 week(s).    Next Appointment: 10/09/2019    Leonora Vieyra.D

## 2019-10-10 ENCOUNTER — TELEPHONE (OUTPATIENT)
Dept: HEMATOLOGY ONCOLOGY | Facility: MEDICAL CENTER | Age: 59
End: 2019-10-10

## 2019-10-10 NOTE — TELEPHONE ENCOUNTER
Received referral to lung cancer screening program.  Chart review to assess for lung cancer screening program eligibility.   1. Age 55-77 yrs of age? Yes 59 y.o.  2. 30 pack year hx of smoking, or greater? Yes 1 [id number]= [id number] hx  3. Current smoker or if quit, has pt quit within last 15 yrs?Yes  Quit 2018  4. Any signs or symptoms of lung cancer? None noted  5. Previous history of lung cancer? None noted  6. Chest CT within past 12 mos.? None noted  Patient does meet eligibility criteria. [facility] scheduling notified to schedule the shared decision making visit.

## 2019-10-21 ENCOUNTER — TELEPHONE (OUTPATIENT)
Dept: HEMATOLOGY ONCOLOGY | Facility: MEDICAL CENTER | Age: 59
End: 2019-10-21

## 2019-10-21 NOTE — TELEPHONE ENCOUNTER
"Patient called to reschedule upcoming [facility] appt. Patient stated that she is leaving on a trip soon and \"has too much on her plate.\" Rescheduled appt to 11/4/19 per patient request.  "

## 2019-10-22 ENCOUNTER — APPOINTMENT (OUTPATIENT)
Dept: HEMATOLOGY ONCOLOGY | Facility: MEDICAL CENTER | Age: 59
End: 2019-10-22
Payer: MEDICARE

## 2019-11-04 ENCOUNTER — OFFICE VISIT (OUTPATIENT)
Dept: HEMATOLOGY ONCOLOGY | Facility: MEDICAL CENTER | Age: 59
End: 2019-11-04
Payer: MEDICARE

## 2019-11-04 VITALS
OXYGEN SATURATION: 92 % | BODY MASS INDEX: 45.99 KG/M2 | SYSTOLIC BLOOD PRESSURE: 118 MMHG | TEMPERATURE: 98.2 F | DIASTOLIC BLOOD PRESSURE: 64 MMHG | HEIGHT: 67 IN | HEART RATE: 65 BPM | RESPIRATION RATE: 18 BRPM | WEIGHT: 293 LBS

## 2019-11-04 DIAGNOSIS — Z87.891 PERSONAL HISTORY OF NICOTINE DEPENDENCE: ICD-10-CM

## 2019-11-04 PROCEDURE — G0296 VISIT TO DETERM LDCT ELIG: HCPCS | Performed by: NURSE PRACTITIONER

## 2019-11-04 ASSESSMENT — ENCOUNTER SYMPTOMS
HEMOPTYSIS: 0
COUGH: 1
SPUTUM PRODUCTION: 0
WHEEZING: 1
SHORTNESS OF BREATH: 1
WEIGHT LOSS: 0

## 2019-11-04 ASSESSMENT — PAIN SCALES - GENERAL: PAINLEVEL: 6=MODERATE PAIN

## 2019-11-04 NOTE — PROGRESS NOTES
Subjective:      Kailey Velarde is a 59 y.o. female who presents with Lung Cancer Screening Program Prescreen (nicotine dependence/ Milvia Mendoza) for lung cancer screening shared decision making visit.         HPI    Patient seen today for initial lung cancer screening visit. Patient referred by pulmonary provider, BEATRIZ Dye. PCP is Dr. Pema Peña.      The patient meets eligibility criteria including age, smoking history (30+ pack years), if former smoker, quit in the last 15 years, and absence of signs or symptoms of lung cancer.    - Age - 59  - Smoking history - Patient has smoked for 39 years at an average of 1 ppd = 39 pack year smoking history.  - Current smoking status - Patient quit smoking cigarettes approx 2 years ago, and started on vaping.   - No symptoms of lung cancer and no previous history of lung cancer     Per patient she did state that she has been told she has a pulmonary lung nodule in her right lung in the past.     Allergies   Allergen Reactions   • Penicillins Rash           Current Outpatient Medications on File Prior to Visit   Medication Sig Dispense Refill   • warfarin (COUMADIN) 5 MG Tab Take 1-2 Tabs by mouth every day. 180 Tab 1   • venlafaxine XR (EFFEXOR XR) 75 MG CAPSULE SR 24 HR Take 75 mg by mouth every day.     • albuterol (VENTOLIN HFA) 108 (90 Base) MCG/ACT Aero Soln inhalation aerosol Inhale 2 Puffs by mouth every four hours as needed for Shortness of Breath (wheeze). 1 Inhaler 1   • amiodarone (CORDARONE) 200 MG Tab Take 1 Tab by mouth every day. 90 Tab 3   • atorvastatin (LIPITOR) 20 MG Tab Take 1 Tab by mouth every evening. 90 Tab 3   • torsemide (DEMADEX) 20 MG Tab Take 3 Tabs by mouth every day. Requires Cardiology follow up for further refills. 90 Tab 1   • potassium chloride SA (KDUR) 20 MEQ Tab CR Take 1 Tab by mouth 2 times a day. 60 Tab 11   • Blood Pressure Monitoring (B-D ASSURE BPM/AUTO ARM CUFF) Misc Blood pressure home monitoring  "system 1 Each 0   • traZODone (DESYREL) 100 MG Tab Take 100 mg by mouth every evening.     • oxyCODONE immediate release (ROXICODONE) 10 MG immediate release tablet Take 10 mg by mouth 3 times a day. TID with MS Contin     • aripiprazole (ABILIFY) 5 MG tablet Take 5 mg by mouth every morning. (soon to increase to 10mg)     • venlafaxine (EFFEXOR-XR) 150 MG extended-release capsule Take  by mouth every morning.     • Multiple Vitamin (MULTI-DAY PO) Take 1 Tab by mouth every morning.     • pregabalin (LYRICA) 200 MG capsule Take 200 mg by mouth 3 times a day.     • morphine SR (MS CONTIN) 15 MG TB12 Take 1 Tab by mouth 3 times a day. 60 Tab 0   • Tiotropium Bromide Monohydrate (SPIRIVA RESPIMAT) 2.5 MCG/ACT Aero Soln Inhale 2 Inhalation by mouth every day. Two inhalations once daily.  Assemble and prime. (Patient not taking: Reported on 11/4/2019) 1 Inhaler 11   • vitamin D (CHOLECALCIFEROL) 1000 UNIT Tab Take 1,000 Units by mouth every morning.       No current facility-administered medications on file prior to visit.        Review of Systems   Constitutional: Positive for malaise/fatigue (with increase in weight). Negative for weight loss.   Respiratory: Positive for cough (in the last 3 days thought to be a cold), shortness of breath (with activity on occasion) and wheezing. Negative for hemoptysis and sputum production.           Objective:     /64 (BP Location: Right arm, Patient Position: Sitting, BP Cuff Size: Adult)   Pulse 65   Temp 36.8 °C (98.2 °F) (Temporal)   Resp 18   Ht 1.702 m (5' 7\")   Wt (!) 135.3 kg (298 lb 4.5 oz)   LMP 01/01/2010   SpO2 92%   BMI 46.72 kg/m²      Physical Exam  Vitals signs reviewed.   Constitutional:       General: She is not in acute distress.     Appearance: She is well-developed. She is not diaphoretic.   HENT:      Head: Normocephalic and atraumatic.   Cardiovascular:      Rate and Rhythm: Normal rate and regular rhythm.      Heart sounds: Normal heart sounds. " No murmur. No friction rub. No gallop.    Pulmonary:      Effort: Pulmonary effort is normal. No respiratory distress.      Breath sounds: Normal breath sounds. No wheezing.   Musculoskeletal: Normal range of motion.   Skin:     General: Skin is warm and dry.   Neurological:      Mental Status: She is alert and oriented to person, place, and time.                 Assessment/Plan:       1. Personal history of nicotine dependence  CT-LUNG CANCER-SCREENING       We conducted a shared decision-making process using a decision aid. We reviewed benefits and harms of screening, including false positives and potential need for additional diagnostic testing, the possibility of over diagnosis, and total radiation exposure.    We discussed the importance of adhering to annual LDCT screening. We also discussed the impact of comorbities on the patient's the ability or willingness to undergo diagnostic procedure(s) and treatment.    Counseling on the importance of maintaining cigarette smoking abstinence if former smoker; or the importance of smoking cessation if current smoker and, if appropriate, furnishing of information about tobacco cessation interventions.    Based on our discussion, we have decided to begin annual lung cancer screening starting now.

## 2019-11-08 ENCOUNTER — APPOINTMENT (OUTPATIENT)
Dept: VASCULAR LAB | Facility: MEDICAL CENTER | Age: 59
End: 2019-11-08
Attending: INTERNAL MEDICINE
Payer: MEDICARE

## 2019-11-12 ENCOUNTER — ANTICOAGULATION VISIT (OUTPATIENT)
Dept: VASCULAR LAB | Facility: MEDICAL CENTER | Age: 59
End: 2019-11-12
Attending: INTERNAL MEDICINE
Payer: MEDICARE

## 2019-11-12 ENCOUNTER — APPOINTMENT (OUTPATIENT)
Dept: SLEEP MEDICINE | Facility: MEDICAL CENTER | Age: 59
End: 2019-11-12
Attending: NURSE PRACTITIONER
Payer: MEDICARE

## 2019-11-12 DIAGNOSIS — I48.0 PAF (PAROXYSMAL ATRIAL FIBRILLATION) (HCC): ICD-10-CM

## 2019-11-12 DIAGNOSIS — Z98.890 STATUS POST MITRAL VALVE REPAIR: ICD-10-CM

## 2019-11-12 LAB — INR PPP: 2.9 (ref 2–3.5)

## 2019-11-12 PROCEDURE — 85610 PROTHROMBIN TIME: CPT

## 2019-11-12 PROCEDURE — 99211 OFF/OP EST MAY X REQ PHY/QHP: CPT

## 2019-11-13 LAB — INR BLD: 2.9 (ref 0.9–1.2)

## 2019-11-13 NOTE — PROGRESS NOTES
Anticoagulation Summary  As of 2019    INR goal:   2.0-3.0   TTR:   42.4 % (1.6 y)   INR used for dosin.90 (2019)   Warfarin maintenance plan:   5 mg (5 mg x 1) every day   Weekly warfarin total:   35 mg   Plan last modified:   Bill Sepulveda PharmD (2019)   Next INR check:   2019   Target end date:   Indefinite    Indications    PAF (paroxysmal atrial fibrillation) (CMS-HCC) [I48.0]  Long term current use of anticoagulant therapy (Resolved) [Z79.01]  Status post mitral valve repair [Z98.890]             Anticoagulation Episode Summary     INR check location:       Preferred lab:       Send INR reminders to:       Comments:         Anticoagulation Care Providers     Provider Role Specialty Phone number    [facility] Responsible  [phone]        Anticoagulation Patient Findings      HPI:  Kailey Velarde seen in clinic today, on anticoagulation therapy with warfarin for PAF  Changes to current medical/health status since last appt: None  Denies signs/symptoms of bleeding and/or thrombosis since the last appt.    Denies any interval changes to diet  Denies any interval changes to medications since last appt.   Denies any complications or cost restrictions with current therapy.   Confirmed dosing regimen.    A/P   INR  -therapeutic.     Pt is to continue with current warfarin dosing regimen.    Follow up appointment in 2 week(s).    Perry Villa, PharmD

## 2019-11-20 ENCOUNTER — SLEEP STUDY (OUTPATIENT)
Dept: SLEEP MEDICINE | Facility: MEDICAL CENTER | Age: 59
End: 2019-11-20
Attending: NURSE PRACTITIONER
Payer: MEDICARE

## 2019-11-20 DIAGNOSIS — G47.33 OSA (OBSTRUCTIVE SLEEP APNEA): ICD-10-CM

## 2019-11-20 PROCEDURE — 95810 POLYSOM 6/> YRS 4/> PARAM: CPT | Performed by: FAMILY MEDICINE

## 2019-11-21 NOTE — PROCEDURES
Comments:  The patient underwent a diagnostic polysomnogram using the standard montage for measurement of parameters of sleep, respiratory events, movement abnormalities, and heart rate and rhythm.    A microphone was used to monitor snoring.    Interpretation:  Study start time was 09:21:09 PM.  Diagnostic recording time was 7h 11.0m with a total sleep time of 6h 35.5m resulting in a sleep efficiency of 91.76%%.  Sleep latency from the start of the study was 00 minutes and the latency from sleep to REM was 73 minutes.In total,82  arousals were scored for an arousal index of 12.4. Sleep stages showed normal SE, increased WASO of 35 min, normal N3 and decreased REM sleep.    Respiratory:  There were a total of 945 apneas consisting of 67 obstructive apneas, 0 mixed apneas, and 878 central apneas.  A total of 22 hypopneas were scored.The apnea index was 143.36 per hour and the hypopnea index was 3.34 per hour resulting in an overall AHI of 146.70.AHI during rem was 39.6 and AHI while supine was 0.00.   90% of the apneas were central apneas.     Oximetry:  There was a mean oxygen saturation of 87.0% with a minimum oxygen saturation of 75.0%.  Time spent with oxygen saturations below 89% was 264.1 minutes.    Cardiac:  The highest heart rate seen while awake was 90 BPM while the highest heart rate during sleep was 89 BPM with an average sleeping heart rate of 62 BPM.    Limb Movements:  There were a total of 19 PLMs during sleep, of which 2 were PLMS arousals.  This resulted in a PLMS index of 2.9 and a PLMS arousal index of 0.3.     CPAP was tried from 5cm to 7cm. BiPAP was tried at 9/4cm H2O. Patient was unable to tolerate treatment and declined to proceed with it, study was ran as a diagnostic for the remainder of the evening.     Impression:  1.  Severe central with AHI of 146/hr and O2 yogesh 76 %  2.  Severe sleep hypoxia       Recommendations:  She had a trial of CPAP and BiPAP for short period however she was  unable to tolerate the PAP therapy Recommend the patient return for a CPAP/BiPAP/ASV titration. In some cases alternative treatment options may prove effective in resolving sleep apnea and these options include upper airway surgery, the use of a dental orthotic or weight loss and positional therapy. Clinical correlation is required. In general patients with sleep apnea are advised to avoid alcohol and sedatives and to not operate a motor vehicle while drowsy and are at a greater risk for cardiovascular disease.

## 2019-11-22 ENCOUNTER — APPOINTMENT (OUTPATIENT)
Dept: PULMONOLOGY | Facility: HOSPICE | Age: 59
End: 2019-11-22
Payer: MEDICARE

## 2019-11-27 ENCOUNTER — APPOINTMENT (OUTPATIENT)
Dept: VASCULAR LAB | Facility: MEDICAL CENTER | Age: 59
End: 2019-11-27
Attending: INTERNAL MEDICINE
Payer: MEDICARE

## 2019-12-03 ENCOUNTER — HOSPITAL ENCOUNTER (OUTPATIENT)
Dept: RADIOLOGY | Facility: MEDICAL CENTER | Age: 59
End: 2019-12-03
Attending: NURSE PRACTITIONER
Payer: MEDICARE

## 2019-12-03 DIAGNOSIS — Z87.891 PERSONAL HISTORY OF NICOTINE DEPENDENCE: ICD-10-CM

## 2019-12-03 PROCEDURE — G0297 LDCT FOR LUNG CA SCREEN: HCPCS

## 2019-12-04 ENCOUNTER — TELEPHONE (OUTPATIENT)
Dept: HEMATOLOGY ONCOLOGY | Facility: MEDICAL CENTER | Age: 59
End: 2019-12-04

## 2019-12-04 ENCOUNTER — ANTICOAGULATION VISIT (OUTPATIENT)
Dept: VASCULAR LAB | Facility: MEDICAL CENTER | Age: 59
End: 2019-12-04
Attending: INTERNAL MEDICINE
Payer: MEDICARE

## 2019-12-04 DIAGNOSIS — Z98.890 STATUS POST MITRAL VALVE REPAIR: ICD-10-CM

## 2019-12-04 DIAGNOSIS — I48.0 PAF (PAROXYSMAL ATRIAL FIBRILLATION) (HCC): ICD-10-CM

## 2019-12-04 LAB — INR PPP: 3.8 (ref 2–3.5)

## 2019-12-04 PROCEDURE — 99212 OFFICE O/P EST SF 10 MIN: CPT | Performed by: NURSE PRACTITIONER

## 2019-12-04 PROCEDURE — 85610 PROTHROMBIN TIME: CPT

## 2019-12-04 NOTE — TELEPHONE ENCOUNTER
Phoned patient with results of LDCT exam performed 12/3/19.  Notified her that the results showed no pulmonary nodules and no concern for lung cancer.  Recommend follow up CT in 12 months. Informed patient of incidental findings of-    The previously demonstrated diffuse groundglass opacities within the lungs have cleared.  There is atelectasis within the right lower lobe unchanged.  Trace right pleural effusion.     Heart is borderline enlarged.  Mild calcified plaque aorta.  Calcifications within the aortic and mitral valves.  Coronary artery calcifications.     Left subclavian electrodes project into the right atrium and right ventricle.  Limited evaluation of the mediastinum and hilar without contrast. No mediastinal adenopathy.     Prominent liver and spleen. Hepatic steatosis.   with recommendation to follow up with PCP.       Patient agrees to all recommendations. Referring provider Milvia HENDERSON notified of results and incidental findings.  Health maintenance updated and patient sent lung cancer screening result letter.

## 2019-12-04 NOTE — LETTER
" [address]  [place] [address]  P [phone]  F [phone]         Date: December 4, 2019    Kailey Marisol Syd  [address]  [place] [address]    Re:  Low-dose chest CT performed on 12/3/2019    Medical Record Number: [id number]    Dear Kailey,    We are pleased to let you know that the results of your recent low-dose chest CT (LDCT) examination were negative, or showed no evidence of lung nodule or mass.  The radiologist recommends to continue annual screening with LDCT in 12 months.  In the event that any additional \"incidental\" findings were identified from this exam, we have communicated back to your primary care provider for follow-up.    Here are some other important points you should know:  · Your low-dose Chest CT report has been sent to your referring or primary health care provider and is available to participants in [facility].  As a part of our Lung Cancer Screening program we will remind you and your referring health care provider when your next LDCT screening is due.  · Although low-dose chest CT is very effective at finding lung cancer early, it cannot find all lung cancers. If you develop any new symptoms such as shortness of breath, chest pain, or coughing up blood, please call your doctor.  · Please keep in mind that good health involves quitting smoking (for help, call [facility] Quit Tobacco program at [phone]), an annual physical exam, and continued screening with low-dose chest CT.    Thank you for participating in the Lung Cancer Screening program. If you have any questions about this letter or our program, please call our Nurse at [phone].    Sincerely,  Chitra Azar MD, [facility]  Medical Director  [facility] Lung Cancer Screening Program    "

## 2019-12-04 NOTE — PROGRESS NOTES
Anticoagulation Summary  As of 12/4/2019    INR goal:   2.0-3.0   TTR:   41.3 % (1.6 y)   INR used for dosing:   3.80! (12/4/2019)   Warfarin maintenance plan:   2.5 mg (5 mg x 0.5) every Wed, Sat; 5 mg (5 mg x 1) all other days   Weekly warfarin total:   30 mg   Plan last modified:   Brittnee Cisneros, A.P.NPriscilla (12/4/2019)   Next INR check:   12/18/2019   Target end date:   Indefinite    Indications    PAF (paroxysmal atrial fibrillation) (CMS-HCC) [I48.0]  Long term current use of anticoagulant therapy (Resolved) [Z79.01]  Status post mitral valve repair [Z98.890]             Anticoagulation Episode Summary     INR check location:       Preferred lab:       Send INR reminders to:       Comments:         Anticoagulation Care Providers     Provider Role Specialty Phone number    [facility] Responsible  [phone]        Anticoagulation Patient Findings      HPI:  Kailey Marisol Syd seen in clinic today for follow up on anticoagulation therapy in the presence of AF, MV repair.   Denies any changes to current medical/health status since last appointment.   Denies any medication or diet changes.   No current symptoms of bleeding or thrombosis reported.  Pt has a hx of alcoholism. Has been sober for 13 months but recently relapsed. She is drinking 8 beers per day. She knows alcohol can increase her risk of bleeding. She plans to stay consistent with the amount.    A/P:   INR supratherapeutic.   Will decrease regimen. Reminded about the importance of avoiding ETOH when taking warfarin. She does not want to stop at this point. She will monitor extra closely for any bleeding and seek medical attention for any prolonged bleeding.  BP declined.    Follow up appointment in 2 week(s) per pt's request.    Next Appointment: Wednesday, Dec 18, 2019 at 2:45 pm.    Brittnee HENDERSON

## 2019-12-05 LAB — INR BLD: 3.8 (ref 0.9–1.2)

## 2019-12-18 ENCOUNTER — ANTICOAGULATION VISIT (OUTPATIENT)
Dept: VASCULAR LAB | Facility: MEDICAL CENTER | Age: 59
End: 2019-12-18
Attending: INTERNAL MEDICINE
Payer: MEDICARE

## 2019-12-18 VITALS — HEART RATE: 59 BPM | DIASTOLIC BLOOD PRESSURE: 56 MMHG | SYSTOLIC BLOOD PRESSURE: 106 MMHG

## 2019-12-18 DIAGNOSIS — I48.0 PAF (PAROXYSMAL ATRIAL FIBRILLATION) (HCC): ICD-10-CM

## 2019-12-18 DIAGNOSIS — Z98.890 STATUS POST MITRAL VALVE REPAIR: ICD-10-CM

## 2019-12-18 LAB
INR BLD: 2.3 (ref 0.9–1.2)
INR PPP: 2.3 (ref 2–3.5)

## 2019-12-18 PROCEDURE — 99212 OFFICE O/P EST SF 10 MIN: CPT

## 2019-12-18 PROCEDURE — 85610 PROTHROMBIN TIME: CPT

## 2019-12-18 NOTE — PROGRESS NOTES
Anticoagulation Summary  As of 2019    INR goal:   2.0-3.0   TTR:   41.4 % (1.7 y)   INR used for dosin.30 (2019)   Warfarin maintenance plan:   2.5 mg (5 mg x 0.5) every Sat; 5 mg (5 mg x 1) all other days   Weekly warfarin total:   32.5 mg   Plan last modified:   Perry Villa, PharmD (2019)   Next INR check:   2020   Target end date:   Indefinite    Indications    PAF (paroxysmal atrial fibrillation) (CMS-HCC) [I48.0]  Long term current use of anticoagulant therapy (Resolved) [Z79.01]  Status post mitral valve repair [Z98.890]             Anticoagulation Episode Summary     INR check location:       Preferred lab:       Send INR reminders to:       Comments:         Anticoagulation Care Providers     Provider Role Specialty Phone number    [facility] Responsible  [phone]        Anticoagulation Patient Findings      HPI:  Kailey Marisol Syd seen in clinic today, on anticoagulation therapy with warfarin for PAF.   Changes to current medical/health status since last appt: Pt is consuming less ETOH than she was at the last visit.   Denies signs/symptoms of bleeding and/or thrombosis since the last appt.    Denies any interval changes to diet  Denies any interval changes to medications since last appt.   Denies any complications or cost restrictions with current therapy.   BP recorded in vitals.  Confirmed dosing regimen.     A/P   INR  therapeutic.   Pt plans to continue decreasing ETOH intake, therefore pt will begin slightly increased warfarin regimen.  Pt seems very confident about reducing ETOH.     Follow up appointment in 3 weeks per pt availability.     Perry Villa, PharmD

## 2020-01-08 ENCOUNTER — ANTICOAGULATION VISIT (OUTPATIENT)
Dept: VASCULAR LAB | Facility: MEDICAL CENTER | Age: 60
End: 2020-01-08
Attending: INTERNAL MEDICINE
Payer: MEDICARE

## 2020-01-08 VITALS — HEART RATE: 69 BPM | SYSTOLIC BLOOD PRESSURE: 130 MMHG | DIASTOLIC BLOOD PRESSURE: 64 MMHG

## 2020-01-08 DIAGNOSIS — Z98.890 STATUS POST MITRAL VALVE REPAIR: ICD-10-CM

## 2020-01-08 DIAGNOSIS — I48.0 PAF (PAROXYSMAL ATRIAL FIBRILLATION) (HCC): ICD-10-CM

## 2020-01-08 LAB — INR PPP: 1.3 (ref 2–3.5)

## 2020-01-08 PROCEDURE — 85610 PROTHROMBIN TIME: CPT

## 2020-01-08 PROCEDURE — 99212 OFFICE O/P EST SF 10 MIN: CPT | Performed by: NURSE PRACTITIONER

## 2020-01-08 NOTE — PROGRESS NOTES
Anticoagulation Summary  As of 2020    INR goal:   2.0-3.0   TTR:   41.0 % (1.7 y)   INR used for dosin.30! (2020)   Warfarin maintenance plan:   7.5 mg (5 mg x 1.5) every Wed; 5 mg (5 mg x 1) all other days   Weekly warfarin total:   37.5 mg   Plan last modified:   Brittnee Cisneros A.P.NPriscilla (2020)   Next INR check:   2020   Target end date:   Indefinite    Indications    PAF (paroxysmal atrial fibrillation) (CMS-HCC) [I48.0]  Long term current use of anticoagulant therapy (Resolved) [Z79.01]  Status post mitral valve repair [Z98.890]             Anticoagulation Episode Summary     INR check location:       Preferred lab:       Send INR reminders to:       Comments:         Anticoagulation Care Providers     Provider Role Specialty Phone number    [facility] Responsible  [phone]        Anticoagulation Patient Findings      HPI:  Kailey Velarde seen in clinic today for follow up on anticoagulation therapy in the presence of AF, MV repair.   Denies any changes to current medical/health status since last appointment.   Stopped drinking ETOH 10 days ago. She is taking 5 mg daily (not 2.5 mg on  as previously charted). She isn't sure if she will start drinking again but knows she should avoid while on warfarin.  Denies any medication or diet changes.   No current symptoms of bleeding or thrombosis reported.    A/P:   INR subtherapeutic. CHADS 2 score = 2. No hx of CVA. Will increase regimen cautiously as pt may drink again.    BP recorded in vitals.    Follow up appointment in 2 week(s).    Next Appointment:  at 1:45 pm    Brittnee HENDERSON

## 2020-01-09 LAB — INR BLD: 1.3 (ref 0.9–1.2)

## 2020-01-22 ENCOUNTER — APPOINTMENT (OUTPATIENT)
Dept: VASCULAR LAB | Facility: MEDICAL CENTER | Age: 60
End: 2020-01-22
Attending: INTERNAL MEDICINE
Payer: MEDICARE

## 2020-01-23 ENCOUNTER — TELEPHONE (OUTPATIENT)
Dept: VASCULAR LAB | Facility: MEDICAL CENTER | Age: 60
End: 2020-01-23

## 2020-01-24 ENCOUNTER — ANTICOAGULATION VISIT (OUTPATIENT)
Dept: VASCULAR LAB | Facility: MEDICAL CENTER | Age: 60
End: 2020-01-24
Attending: INTERNAL MEDICINE
Payer: MEDICARE

## 2020-01-24 DIAGNOSIS — Z98.890 STATUS POST MITRAL VALVE REPAIR: ICD-10-CM

## 2020-01-24 DIAGNOSIS — I48.0 PAF (PAROXYSMAL ATRIAL FIBRILLATION) (HCC): ICD-10-CM

## 2020-01-24 LAB
INR BLD: 3.1 (ref 0.9–1.2)
INR PPP: 3.1 (ref 2–3.5)

## 2020-01-24 PROCEDURE — 85610 PROTHROMBIN TIME: CPT

## 2020-01-24 PROCEDURE — 99212 OFFICE O/P EST SF 10 MIN: CPT

## 2020-01-24 NOTE — PROGRESS NOTES
Anticoagulation Summary  As of 1/24/2020    INR goal:   2.0-3.0   TTR:   41.4 % (1.8 y)   INR used for dosing:   3.10! (1/24/2020)   Warfarin maintenance plan:   7.5 mg (5 mg x 1.5) every Wed; 5 mg (5 mg x 1) all other days   Weekly warfarin total:   37.5 mg   Plan last modified:   PALMIRA Kelly (1/8/2020)   Next INR check:   2/5/2020   Target end date:   Indefinite    Indications    PAF (paroxysmal atrial fibrillation) (CMS-HCC) [I48.0]  Long term current use of anticoagulant therapy (Resolved) [Z79.01]  Status post mitral valve repair [Z98.890]             Anticoagulation Episode Summary     INR check location:       Preferred lab:       Send INR reminders to:       Comments:         Anticoagulation Care Providers     Provider Role Specialty Phone number    [facility] Responsible  [phone]        Anticoagulation Patient Findings      HPI:  Kailey Velarde seen in clinic today for follow up on anticoagulation therapy in the presence of Afib.   Denies any changes to current medical/health status since last appointment.   Denies any medication or diet changes.   No current symptoms of bleeding or thrombosis reported.    A/P:   INR is supra-therapeutic.   Patient to take decreased dose of 2.5 mg tonight then continue current regimen.     Follow up appointment in 2 week(s).    Next Appointment: 02/05/2020    Bill Sepulveda, Pharm.D

## 2020-01-24 NOTE — TELEPHONE ENCOUNTER
[facility]    Pt did not make their appointment today, will forward to our MA to contact the pt to reschedule.    Perry Villa, PharmD    CC  Gretchen Giron MA

## 2020-02-05 ENCOUNTER — ANTICOAGULATION VISIT (OUTPATIENT)
Dept: VASCULAR LAB | Facility: MEDICAL CENTER | Age: 60
End: 2020-02-05
Attending: INTERNAL MEDICINE
Payer: MEDICARE

## 2020-02-05 DIAGNOSIS — Z98.890 STATUS POST MITRAL VALVE REPAIR: ICD-10-CM

## 2020-02-05 DIAGNOSIS — I48.0 PAF (PAROXYSMAL ATRIAL FIBRILLATION) (HCC): ICD-10-CM

## 2020-02-05 LAB
INR PPP: 6.1 (ref 2–3.5)
INR PPP: 6.4 (ref 2–3.5)

## 2020-02-05 PROCEDURE — 99212 OFFICE O/P EST SF 10 MIN: CPT

## 2020-02-05 PROCEDURE — 85610 PROTHROMBIN TIME: CPT

## 2020-02-05 NOTE — PROGRESS NOTES
Anticoagulation Summary  As of 2020    INR goal:   2.0-3.0   TTR:   40.6 % (1.8 y)   INR used for dosin.40! (2020)   Warfarin maintenance plan:   5 mg (5 mg x 1) every day   Weekly warfarin total:   35 mg   Plan last modified:   Aditi Gregory, PharmD (2020)   Next INR check:   2020   Target end date:   Indefinite    Indications    PAF (paroxysmal atrial fibrillation) (CMS-HCC) [I48.0]  Long term current use of anticoagulant therapy (Resolved) [Z79.01]  Status post mitral valve repair [Z98.890]             Anticoagulation Episode Summary     INR check location:       Preferred lab:       Send INR reminders to:       Comments:         Anticoagulation Care Providers     Provider Role Specialty Phone number    [facility] Responsible  [phone]                Anticoagulation Patient Findings  Patient Findings     Positives:   Change in alcohol use (quit EtOH starting today)    Negatives:   Signs/symptoms of thrombosis, Signs/symptoms of bleeding, Laboratory test error suspected, Change in health, Change in activity, Upcoming invasive procedure, Emergency department visit, Upcoming dental procedure, Missed doses, Extra doses, Change in medications, Change in diet/appetite, Hospital admission, Bruising, Other complaints          HPI:  Kailey Velarde seen in clinic today, on anticoagulation therapy with warfarin for atrial fibrillation  Changes to current medical/health status since last appt: none  Denies signs/symptoms of bleeding and/or thrombosis since the last appt.    Denies any interval changes to medications since last appt.   Denies any complications or cost restrictions with current therapy.   Verified current warfarin dosing schedule.   BP check declined      A/P   INR  supra-therapeutic. Pt refused venipuncture draw. Pt informed me she quit drinking alcohol today.  Hold x 2 days, take 2.5 mg on , then 5 mg daily starting     Follow up appointment  in 1 week as this is the earliest the pt can return.    Aditi Gregory, PharmD          03/20/2020 ADDENDUM: CHARGES REVIEWED.  Margareth Ewing, Clinical Pharmacist, CDE, CACP

## 2020-02-06 LAB — INR BLD: 6.4 (ref 0.9–1.2)

## 2020-02-12 ENCOUNTER — ANTICOAGULATION VISIT (OUTPATIENT)
Dept: VASCULAR LAB | Facility: MEDICAL CENTER | Age: 60
End: 2020-02-12
Attending: INTERNAL MEDICINE
Payer: MEDICARE

## 2020-02-12 DIAGNOSIS — I48.0 PAF (PAROXYSMAL ATRIAL FIBRILLATION) (HCC): ICD-10-CM

## 2020-02-12 DIAGNOSIS — Z98.890 STATUS POST MITRAL VALVE REPAIR: ICD-10-CM

## 2020-02-12 LAB
INR BLD: 1.5 (ref 0.9–1.2)
INR PPP: 1.5 (ref 2–3.5)

## 2020-02-12 PROCEDURE — 85610 PROTHROMBIN TIME: CPT

## 2020-02-12 PROCEDURE — 99212 OFFICE O/P EST SF 10 MIN: CPT | Performed by: PHARMACIST

## 2020-02-12 NOTE — PROGRESS NOTES
Anticoagulation Summary  As of 2020    INR goal:   2.0-3.0   TTR:   40.4 % (1.8 y)   INR used for dosin.50! (2020)   Warfarin maintenance plan:   5 mg (5 mg x 1) every day   Weekly warfarin total:   35 mg   Plan last modified:   Aditi Gregory, PharmD (2020)   Next INR check:   2020   Target end date:   Indefinite    Indications    PAF (paroxysmal atrial fibrillation) (CMS-HCC) [I48.0]  Long term current use of anticoagulant therapy (Resolved) [Z79.01]  Status post mitral valve repair [Z98.890]             Anticoagulation Episode Summary     INR check location:       Preferred lab:       Send INR reminders to:       Comments:         Anticoagulation Care Providers     Provider Role Specialty Phone number    [facility] Responsible  [phone]        Anticoagulation Patient Findings  Patient Findings     Positives:   Change in alcohol use, Change in diet/appetite    Negatives:   Signs/symptoms of thrombosis, Signs/symptoms of bleeding, Laboratory test error suspected, Change in health, Change in activity, Upcoming invasive procedure, Emergency department visit, Upcoming dental procedure, Missed doses, Extra doses, Change in medications, Hospital admission, Bruising, Other complaints          HPI:  Kailey Marisol Machadoub seen in clinic today, on anticoagulation therapy with warfarin due to hx of PAF.  Changes to current medical/health status since last appt: excessive ETOH this week as well as spinach salad on one occasion.  Denies signs/symptoms of bleeding and/or thrombosis since the last appt.    Denies any interval changes to diet  Denies any interval changes to medications since last appt.   Denies any complications or cost restrictions with current therapy.   Verified current warfarin dosing schedule.   Vitals declined today.      A/P   INR  sub-therapeutic at 1.5.   Given critically high INR last week and hx of lability, will have patient continue without  bolus dose in order to trend INR to therapeutic.    Follow up appointment in 1 week(s).    Enzo Tinsley, LeonoraD

## 2020-02-19 ENCOUNTER — APPOINTMENT (OUTPATIENT)
Dept: VASCULAR LAB | Facility: MEDICAL CENTER | Age: 60
End: 2020-02-19
Attending: INTERNAL MEDICINE
Payer: MEDICARE

## 2020-02-26 ENCOUNTER — ANTICOAGULATION VISIT (OUTPATIENT)
Dept: VASCULAR LAB | Facility: MEDICAL CENTER | Age: 60
End: 2020-02-26
Attending: INTERNAL MEDICINE
Payer: MEDICARE

## 2020-02-26 VITALS — DIASTOLIC BLOOD PRESSURE: 39 MMHG | SYSTOLIC BLOOD PRESSURE: 77 MMHG

## 2020-02-26 DIAGNOSIS — Z98.890 STATUS POST MITRAL VALVE REPAIR: ICD-10-CM

## 2020-02-26 DIAGNOSIS — I48.0 PAF (PAROXYSMAL ATRIAL FIBRILLATION) (HCC): ICD-10-CM

## 2020-02-26 LAB
INR BLD: 4.4 (ref 0.9–1.2)
INR PPP: 4.4 (ref 2–3.5)

## 2020-02-26 PROCEDURE — 85610 PROTHROMBIN TIME: CPT

## 2020-02-26 PROCEDURE — 99212 OFFICE O/P EST SF 10 MIN: CPT | Performed by: NURSE PRACTITIONER

## 2020-02-26 RX ORDER — WARFARIN SODIUM 5 MG/1
5-10 TABLET ORAL DAILY
Qty: 180 TAB | Refills: 1 | Status: SHIPPED | OUTPATIENT
Start: 2020-02-26 | End: 2020-10-19 | Stop reason: SDUPTHER

## 2020-02-26 NOTE — PROGRESS NOTES
"Anticoagulation Summary  As of 2020    INR goal:   2.0-3.0   TTR:   40.3 % (1.9 y)   INR used for dosin.40! (2020)   Warfarin maintenance plan:   2.5 mg (5 mg x 0.5) every Thu; 5 mg (5 mg x 1) all other days   Weekly warfarin total:   32.5 mg   Plan last modified:   PALMIRA Kelly (2020)   Next INR check:   3/11/2020   Target end date:   Indefinite    Indications    PAF (paroxysmal atrial fibrillation) (CMS-HCC) [I48.0]  Long term current use of anticoagulant therapy (Resolved) [Z79.01]  Status post mitral valve repair [Z98.890]             Anticoagulation Episode Summary     INR check location:       Preferred lab:       Send INR reminders to:       Comments:         Anticoagulation Care Providers     Provider Role Specialty Phone number    [facility] Responsible  [phone]        Anticoagulation Patient Findings      HPI:  Kailey Marisol Syd seen in clinic today for follow up on anticoagulation therapy in the presence of AF, MV repair.   States she feels a little dizzy and lightheaded for the past 2 days.   Denies any medication or diet changes.   No current symptoms of bleeding or thrombosis reported.  She is drinking alcohol regularly she states. Aware of the risk of bleeding. No plans to stop.    A/P:   INR supratherapeutic.   HOLD tonight and began reduced regimen. Reminded about the importance of avoiding alcohol while taking warfarin.  BP recorded in vitals. She is hypotensive x 2. She is diaphoretic. Alert and answers questions appropriate but given she is dizzy, I instructed her to go to the ER. She declines at this time, stating she will call her \"nurse friend.\" Confirmed she has a BP monitor at home. She will check herself as soon as she gets home. If remains low, instructed go to the ER or call 911, especially if dizziness worsens or persists. She understands it is my recommendation that she go to the ER but she declines.    Follow up appointment " in 2 week(s).    Next Appointment: Wednesday, March 11, 2020 at 2:15 pm.    Brittnee HENDERSON

## 2020-03-04 DIAGNOSIS — R60.0 LOCALIZED EDEMA: ICD-10-CM

## 2020-03-04 DIAGNOSIS — I10 ESSENTIAL HYPERTENSION: ICD-10-CM

## 2020-03-04 DIAGNOSIS — I50.30 HEART FAILURE WITH PRESERVED EJECTION FRACTION, BORDERLINE, CLASS II (HCC): ICD-10-CM

## 2020-03-04 RX ORDER — POTASSIUM CHLORIDE 20 MEQ/1
20 TABLET, EXTENDED RELEASE ORAL 2 TIMES DAILY
Qty: 60 TAB | Refills: 11 | Status: SHIPPED | OUTPATIENT
Start: 2020-03-04 | End: 2020-07-21 | Stop reason: SDUPTHER

## 2020-03-05 DIAGNOSIS — Z79.01 CHRONIC ANTICOAGULATION: ICD-10-CM

## 2020-03-11 ENCOUNTER — ANTICOAGULATION VISIT (OUTPATIENT)
Dept: VASCULAR LAB | Facility: MEDICAL CENTER | Age: 60
End: 2020-03-11
Attending: INTERNAL MEDICINE
Payer: MEDICARE

## 2020-03-11 DIAGNOSIS — Z98.890 STATUS POST MITRAL VALVE REPAIR: ICD-10-CM

## 2020-03-11 DIAGNOSIS — I48.0 PAF (PAROXYSMAL ATRIAL FIBRILLATION) (HCC): ICD-10-CM

## 2020-03-11 LAB — INR PPP: 4.3 (ref 2–3.5)

## 2020-03-11 PROCEDURE — 85610 PROTHROMBIN TIME: CPT | Mod: QW

## 2020-03-11 PROCEDURE — 99212 OFFICE O/P EST SF 10 MIN: CPT | Performed by: PHARMACIST

## 2020-03-11 NOTE — PROGRESS NOTES
Anticoagulation Summary  As of 3/11/2020    INR goal:   2.0-3.0   TTR:   39.5 % (1.9 y)   INR used for dosin.30! (3/11/2020)   Warfarin maintenance plan:   2.5 mg (5 mg x 0.5) every Fri; 5 mg (5 mg x 1) all other days   Weekly warfarin total:   32.5 mg   Plan last modified:   Leighann Harvey, PharmD (3/11/2020)   Next INR check:   3/25/2020   Target end date:   Indefinite    Indications    PAF (paroxysmal atrial fibrillation) (CMS-HCC) [I48.0]  Long term current use of anticoagulant therapy (Resolved) [Z79.01]  Status post mitral valve repair [Z98.890]             Anticoagulation Episode Summary     INR check location:       Preferred lab:       Send INR reminders to:       Comments:   Right arm only for BP checks      Anticoagulation Care Providers     Provider Role Specialty Phone number    [facility] Responsible  [phone]                Anticoagulation Patient Findings      HPI:  Kailey Velarde seen in clinic today, on anticoagulation therapy with warfarin for PAF  Changes to current medical/health status since last appt: pt's BP was taken on wrong arm last time, once home it was normal. Pt has been drinking Etoh.  Denies signs/symptoms of bleeding and/or thrombosis since the last appt.    Denies any interval changes to diet  Denies any interval changes to medications since last appt.   Denies any complications or cost restrictions with current therapy.   Verified current warfarin dosing schedule.   BP declined, states she's fine      A/P   INR  is supra-therapeutic.   INR most likely elevated due to etoh, however pt is planning on not drinking anymore for the next 6 days due to an upcoming apt, then plans to resume. Thus will have pt HOLD her warfarin dose today, take a reduced dose tomorrow of 2.5mg x1, and then resume the 5mg daily except 2.5mg on . As pt plans to be drinking again in 7 days, in a week will have her take a lower weekly dose of 5mg daily except  2.5mg on Mondays and Fridays.     Follow up appointment in 2 week(s) per pt.    Leighann Harvey, PharmD, BCACP, CDCES

## 2020-03-13 LAB — INR BLD: 4.3 (ref 0.9–1.2)

## 2020-03-25 ENCOUNTER — ANTICOAGULATION VISIT (OUTPATIENT)
Dept: VASCULAR LAB | Facility: MEDICAL CENTER | Age: 60
End: 2020-03-25
Attending: INTERNAL MEDICINE
Payer: MEDICARE

## 2020-03-25 DIAGNOSIS — I48.0 PAF (PAROXYSMAL ATRIAL FIBRILLATION) (HCC): ICD-10-CM

## 2020-03-25 DIAGNOSIS — Z98.890 STATUS POST MITRAL VALVE REPAIR: ICD-10-CM

## 2020-03-25 LAB
INR BLD: 3.3 (ref 0.9–1.2)
INR PPP: 3.3 (ref 2–3.5)

## 2020-03-25 PROCEDURE — 85610 PROTHROMBIN TIME: CPT

## 2020-03-25 PROCEDURE — 99212 OFFICE O/P EST SF 10 MIN: CPT | Performed by: NURSE PRACTITIONER

## 2020-03-25 NOTE — PROGRESS NOTES
Anticoagulation Summary  As of 3/25/2020    INR goal:   2.0-3.0   TTR:   38.7 % (1.9 y)   INR used for dosing:   3.30! (3/25/2020)   Warfarin maintenance plan:   2.5 mg (5 mg x 0.5) every Mon, Wed, Fri; 5 mg (5 mg x 1) all other days   Weekly warfarin total:   27.5 mg   Plan last modified:   Brittnee Cisneros, A.P.N. (3/25/2020)   Next INR check:   4/8/2020   Target end date:   Indefinite    Indications    PAF (paroxysmal atrial fibrillation) (CMS-HCC) [I48.0]  Long term current use of anticoagulant therapy (Resolved) [Z79.01]  Status post mitral valve repair [Z98.890]             Anticoagulation Episode Summary     INR check location:       Preferred lab:       Send INR reminders to:       Comments:   Right arm only for BP checks      Anticoagulation Care Providers     Provider Role Specialty Phone number    [facility] Responsible  [phone]        Anticoagulation Patient Findings      HPI:  Kailey Velarde seen in clinic today for follow up on anticoagulation therapy in the presence of AF, MV repair.   Denies any changes to current medical/health status since last appointment.   Denies any medication or diet changes.   No current symptoms of bleeding or thrombosis reported.  Pt has been drinking alcohol.  Confirmed dose. She is taking her warfarin exactly as instructed.    A/P:   INR supratherapeutic.   Will decrease regimen. She understands that she should avoid ETOH while taking warfarin to minimize her risk of bleeding.  BP recorded in vitals.    Follow up appointment in 2 week(s).    Next Appointment: Wednesday, April 8, 2020 at 2:15 pm.    Brittnee HENDERSON

## 2020-04-08 ENCOUNTER — APPOINTMENT (OUTPATIENT)
Dept: VASCULAR LAB | Facility: MEDICAL CENTER | Age: 60
End: 2020-04-08
Attending: INTERNAL MEDICINE
Payer: MEDICARE

## 2020-04-09 ENCOUNTER — APPOINTMENT (OUTPATIENT)
Dept: VASCULAR LAB | Facility: MEDICAL CENTER | Age: 60
End: 2020-04-09
Attending: INTERNAL MEDICINE
Payer: MEDICARE

## 2020-04-14 ENCOUNTER — ANTICOAGULATION VISIT (OUTPATIENT)
Dept: VASCULAR LAB | Facility: MEDICAL CENTER | Age: 60
End: 2020-04-14
Attending: INTERNAL MEDICINE
Payer: MEDICARE

## 2020-04-14 DIAGNOSIS — I48.0 PAF (PAROXYSMAL ATRIAL FIBRILLATION) (HCC): ICD-10-CM

## 2020-04-14 DIAGNOSIS — Z98.890 STATUS POST MITRAL VALVE REPAIR: ICD-10-CM

## 2020-04-14 LAB — INR PPP: 2.7 (ref 2–3.5)

## 2020-04-14 PROCEDURE — 99211 OFF/OP EST MAY X REQ PHY/QHP: CPT

## 2020-04-14 PROCEDURE — 85610 PROTHROMBIN TIME: CPT

## 2020-04-14 NOTE — PROGRESS NOTES
Anticoagulation Summary  As of 2020    INR goal:   2.0-3.0   TTR:   39.0 % (2 y)   INR used for dosin.70 (2020)   Warfarin maintenance plan:   5 mg (5 mg x 1) every Mon, Wed, Fri; 2.5 mg (5 mg x 0.5) all other days   Weekly warfarin total:   25 mg   Plan last modified:   Margareth Ewing (2020)   Next INR check:   2020   Target end date:   Indefinite    Indications    PAF (paroxysmal atrial fibrillation) (CMS-HCC) [I48.0]  Long term current use of anticoagulant therapy (Resolved) [Z79.01]  Status post mitral valve repair [Z98.890]             Anticoagulation Episode Summary     INR check location:       Preferred lab:       Send INR reminders to:       Comments:   Right arm only for BP checks      Anticoagulation Care Providers     Provider Role Specialty Phone number    [facility] Responsible  [phone]        Anticoagulation Patient Findings  Patient Findings     Positives:   Missed doses    Negatives:   Signs/symptoms of thrombosis, Signs/symptoms of bleeding, Laboratory test error suspected, Change in health, Change in alcohol use, Change in activity, Upcoming invasive procedure, Emergency department visit, Upcoming dental procedure, Extra doses, Change in medications, Change in diet/appetite, Hospital admission, Bruising, Other complaints              History of Present Illness: follow up appointment for chronic anticoagulation with the high risk medication, warfarin for atrial fibrillation    Last INR was out of range, dosage adjusted: pt is now at goal.  Pt missed her dose last night, so we will adjust weekly dose by 10% (less 1/2 tablet per week). Follow up in 4 weeks, to reduce the risk of adverse events related to this high risk medication, warfarin.    Unable to perform vitals as visit conducted in [place]    Margareth Kaylin, Clinical Pharmacist

## 2020-04-23 RX ORDER — TORSEMIDE 20 MG/1
60 TABLET ORAL DAILY
Qty: 270 TAB | Refills: 1 | Status: SHIPPED | OUTPATIENT
Start: 2020-04-23 | End: 2020-07-21 | Stop reason: SDUPTHER

## 2020-04-30 ENCOUNTER — SLEEP CENTER VISIT (OUTPATIENT)
Dept: SLEEP MEDICINE | Facility: MEDICAL CENTER | Age: 60
End: 2020-04-30
Payer: MEDICARE

## 2020-04-30 VITALS
WEIGHT: 293 LBS | RESPIRATION RATE: 16 BRPM | HEART RATE: 89 BPM | SYSTOLIC BLOOD PRESSURE: 132 MMHG | BODY MASS INDEX: 44.41 KG/M2 | DIASTOLIC BLOOD PRESSURE: 60 MMHG | HEIGHT: 68 IN

## 2020-04-30 DIAGNOSIS — I27.20 PULMONARY HYPERTENSION (HCC): Chronic | ICD-10-CM

## 2020-04-30 DIAGNOSIS — G47.31 CENTRAL SLEEP APNEA: ICD-10-CM

## 2020-04-30 DIAGNOSIS — I10 ESSENTIAL HYPERTENSION: Chronic | ICD-10-CM

## 2020-04-30 DIAGNOSIS — Z87.891 FORMER SMOKER: ICD-10-CM

## 2020-04-30 DIAGNOSIS — I50.812 CHRONIC RIGHT-SIDED HEART FAILURE (HCC): Chronic | ICD-10-CM

## 2020-04-30 PROBLEM — G47.33 OSA (OBSTRUCTIVE SLEEP APNEA): Chronic | Status: ACTIVE | Noted: 2018-05-30

## 2020-04-30 PROBLEM — I50.33 ACUTE ON CHRONIC DIASTOLIC CONGESTIVE HEART FAILURE (HCC): Chronic | Status: ACTIVE | Noted: 2018-04-03

## 2020-04-30 PROBLEM — G47.33 OSA (OBSTRUCTIVE SLEEP APNEA): Chronic | Status: RESOLVED | Noted: 2018-05-30 | Resolved: 2020-04-30

## 2020-04-30 PROBLEM — E66.01 MORBID OBESITY WITH BMI OF 40.0-44.9, ADULT (HCC): Status: RESOLVED | Noted: 2018-05-30 | Resolved: 2020-04-30

## 2020-04-30 PROCEDURE — 99214 OFFICE O/P EST MOD 30 MIN: CPT | Performed by: NURSE PRACTITIONER

## 2020-04-30 RX ORDER — ZOLPIDEM TARTRATE 5 MG/1
5 TABLET ORAL NIGHTLY PRN
Qty: 2 TAB | Refills: 0 | Status: SHIPPED | OUTPATIENT
Start: 2020-04-30 | End: 2020-05-06 | Stop reason: SDUPTHER

## 2020-04-30 NOTE — LETTER
MELLY Sorenson  [facility]   [address] [place] [address]  Phone: [phone] - Fax: [phone]           Encounter Date: 4/30/2020  Provider: MELLY Sorenson  Location of Care: [facility]  [id number]      Patient:   Kailey Velarde   MR Number: [id number]   YOB: 1960     PROGRESS NOTE:  Chief Complaint   Patient presents with   • Results     SS        HPI:  Kailey Velarde is a 59 y.o. year old female here today for follow-up on sleep study results.  Last office visit 9/13/2019 with BEATRIZ Dye.  PMH of pulmonary hypertension, sick sinus syndrome, PM, HTN, taking Coumadin, CHF, atrial myxoma.   BMI 46.  PSG 11/20/2019 indicated severe central sleep apnea with an /h and O2 yogesh 76%.  Severe sleep hypoxia.  I reviewed finds with patient.  She was titrated on CPAP and BiPAP but declined to proceed with further titration.  I reviewed findings with patient.  Recommendation is dedicated titration study to BiPAP/ASV due to significant central events noted during study.  We reviewed the pathophysiology of untreated sleep apnea.    Echo 3/28/2018 indicated an EF of 55% and RVSP 35 mmHg.    She currently uses trazodone 100mg nightly but denies side effects.  She uses oxycodone 10mg TID for back pain.  She uses demadex daily for swelling.    She did note shortness of breath with any type of walking or exertion.  She denies regular cough or phlegm.  No chest pain or tightness.    ROS: As per HPI and otherwise negative if not stated.    Past Medical History:   Diagnosis Date   • Atrial myxoma November 2011    Status post LA myxoma resection   • Backpain    • CAD (coronary artery disease)     pacemaker   • Chronic pain    • Dizziness     • Hyperlipidemia     • Hypertension    • Hypertriglyceridemia     • Indigestion    • Peripheral edema    • Sciatica    • Sick sinus syndrome  ([facility]) 2011    Status post PPM implantation.   • Status post mitral valve repair 2011    MV repair with 32mm Jonnie-Carpenter flexible annuloplasty ring.       Past Surgical History:   Procedure Laterality Date   • CARDIAC CATH, RIGHT HEART  2018    Right heart cath with high pressures.   • MITRAL VALVE REPAIR  2011    Performed by MARY ORTIZ at SURGERY [facility]   • PACEMAKER INSERTION  2011    Medtronic Versa VEDR01 implanted by Dr. Amaya.   • OTHER ORTHOPEDIC SURGERY      Neck and back        Family History   Problem Relation Age of Onset   • Heart Attack Father        Social History     Socioeconomic History   • Marital status: Single     Spouse name: Not on file   • Number of children: Not on file   • Years of education: Not on file   • Highest education level: Not on file   Occupational History   • Not on file   Social Needs   • Financial resource strain: Not on file   • Food insecurity     Worry: Not on file     Inability: Not on file   • Transportation needs     Medical: Not on file     Non-medical: Not on file   Tobacco Use   • Smoking status: Former Smoker     Packs/day: 1.00     Years: 39.00     Pack years: 39.00     Types: Cigarettes     Last attempt to quit: 2017     Years since quittin.4   • Smokeless tobacco: Never Used   • Tobacco comment:  Down to vapping now.    Substance and Sexual Activity   • Alcohol use: Not Currently     Comment: sober since 19!   • Drug use: No     Types: Inhaled, Oral     Comment: hx of, denies currently   • Sexual activity: Not on file   Lifestyle   • Physical activity     Days per week: Not on file     Minutes per session: Not on file   • Stress: Not on file   Relationships   • Social connections     Talks on phone: Not on file     Gets together: Not on file     Attends Sikh service: Not on file     Active member of club or organization: Not on file     Attends meetings of clubs or organizations: Not  "on file     Relationship status: Not on file   • Intimate partner violence     Fear of current or ex partner: Not on file     Emotionally abused: Not on file     Physically abused: Not on file     Forced sexual activity: Not on file   Other Topics Concern   • Not on file   Social History Narrative   • Not on file       Allergies as of 04/30/2020 - Reviewed 11/04/2019   Allergen Reaction Noted   • Penicillins Rash 06/07/2009        Vitals:  /60 (BP Location: Left arm, Patient Position: Sitting, BP Cuff Size: Adult)   Pulse 89   Resp 16   Ht 1.727 m (5' 8\")   Wt (!) 139.3 kg (307 lb)     Current medications as of today   Current Outpatient Medications   Medication Sig Dispense Refill   • zolpidem (AMBIEN) 5 MG Tab Take 1 Tab by mouth at bedtime as needed for Sleep (1 to 2 po qhs prn insomnia/sleep study. Bring to sleep study.) for up to 2 days. 2 Tab 0   • torsemide (DEMADEX) 20 MG Tab Take 3 Tabs by mouth every day. Requires Cardiology follow up for further refills. 270 Tab 1   • potassium chloride SA (KDUR) 20 MEQ Tab CR Take 1 Tab by mouth 2 times a day. 60 Tab 11   • warfarin (COUMADIN) 5 MG Tab Take 1-2 Tabs by mouth every day. 180 Tab 1   • venlafaxine XR (EFFEXOR XR) 75 MG CAPSULE SR 24 HR Take 75 mg by mouth every day.     • albuterol (VENTOLIN HFA) 108 (90 Base) MCG/ACT Aero Soln inhalation aerosol Inhale 2 Puffs by mouth every four hours as needed for Shortness of Breath (wheeze). 1 Inhaler 1   • amiodarone (CORDARONE) 200 MG Tab Take 1 Tab by mouth every day. 90 Tab 3   • atorvastatin (LIPITOR) 20 MG Tab Take 1 Tab by mouth every evening. 90 Tab 3   • Blood Pressure Monitoring (B-D ASSURE BPM/AUTO ARM CUFF) Misc Blood pressure home monitoring system 1 Each 0   • traZODone (DESYREL) 100 MG Tab Take 100 mg by mouth every evening.     • oxyCODONE immediate release (ROXICODONE) 10 MG immediate release tablet Take 10 mg by mouth 3 times a day. TID with MS Contin     • aripiprazole (ABILIFY) 5 MG tablet " Take 5 mg by mouth every morning. (soon to increase to 10mg)     • venlafaxine (EFFEXOR-XR) 150 MG extended-release capsule Take  by mouth every morning.     • Multiple Vitamin (MULTI-DAY PO) Take 1 Tab by mouth every morning.     • pregabalin (LYRICA) 200 MG capsule Take 200 mg by mouth 3 times a day.     • vitamin D (CHOLECALCIFEROL) 1000 UNIT Tab Take 1,000 Units by mouth every morning.     • morphine SR (MS CONTIN) 15 MG TB12 Take 1 Tab by mouth 3 times a day. 60 Tab 0     No current facility-administered medications for this visit.          Physical Exam:   Gen:           Alert and oriented, No apparent distress. Mood and affect appropriate, normal interaction with examiner.  Eyes:          PERRL, EOM intact, sclere white, conjunctive moist.  Ears:          Not examined.   Hearing:     Grossly intact.  Nose:          Normal, no lesions or deformities.  Dentition:    Good dentition.  Oropharynx:   Tongue normal, posterior pharynx without erythema or exudate.  Mallampati Classification: not examined; mask  Neck:        Supple, trachea midline, no masses.  Respiratory Effort: No intercostal retractions or use of accessory muscles.   Lung Auscultation:      Clear to auscultation bilaterally; no rales, rhonchi or wheezing.  CV:            Regular rate and rhythm. No murmurs, rubs or gallops.  Abd:           Not examined.   Lymphadenopathy: Not examined.  Gait and Station: Normal.  Digits and Nails: No clubbing, cyanosis, petechiae, or nodes.   Cranial Nerves: II-XII grossly intact.  Skin:        No rashes, lesions or ulcers noted.               Ext:           BLE edema.      Assessment:  1. KAEL (obstructive sleep apnea)     2. Pulmonary hypertension (HCC)     3. Chronic right-sided heart failure (HCC)     4. Essential hypertension     5. BMI 45.0-49.9, adult ([facility])  Height And Weight   6. Former smoker     7. Central sleep apnea  Polysomnography Titration    zolpidem (AMBIEN) 5 MG Tab        Immunizations:    Flu:10/2019  Pneumovax 23:2/2017  Prevnar 13:not due    Plan:  1.  BiPAP/ASV titration study now.  Bleeding O2.  Patient has significant central sleep apnea and it is medically necessary this is corrected to prevent adverse events such as death.  2.  Discussed sleep hygiene.  3.  Encourage weight loss through diet and exercise.  4.  Follow-up with primary care and cardiology as scheduled for management of other comorbidities.  5.  Follow-up in 2 weeks for sleep study results to initiate therapy, sooner if needed.    Please note that this dictation was created using voice recognition software. I have made every reasonable attempt to correct obvious errors, but it is possible there are errors of grammar and possibly content that I did not discover before finalizing the note.      Electronically signed by RAFAEL SorensonRPriscillaNPriscilla  on 04/30/20    Pema Peña M.D.  [address] [facility] [address]  VIA Facsimile: [phone]

## 2020-04-30 NOTE — PATIENT INSTRUCTIONS
Sleep Apnea  Sleep apnea is a condition that affects breathing. People with sleep apnea have moments during sleep when their breathing pauses briefly or gets shallow. Sleep apnea can cause these symptoms:  · Trouble staying asleep.  · Sleepiness or tiredness during the day.  · Irritability.  · Loud snoring.  · Morning headaches.  · Trouble concentrating.  · Forgetting things.  · Less interest in sex.  · Being sleepy for no reason.  · Mood swings.  · Personality changes.  · Depression.  · Waking up a lot during the night to pee (urinate).  · Dry mouth.  · Sore throat.  Follow these instructions at home:  · Make any changes in your routine that your doctor recommends.  · Eat a healthy, well-balanced diet.  ·   · Take over-the-counter and prescription medicines only as told by your doctor.  · Avoid using alcohol, calming medicines (sedatives), and narcotic medicines.  · Take steps to lose weight if you are overweight.  · If you were given a machine (device) to use while you sleep, use it only as told by your doctor.  · Do not use any tobacco products, such as cigarettes, chewing tobacco, and e-cigarettes. If you need help quitting, ask your doctor.  · Keep all follow-up visits as told by your doctor. This is important.  Contact a doctor if:  · The machine that you were given to use during sleep is uncomfortable or does not seem to be working.  · Your symptoms do not get better.  · Your symptoms get worse.  Get help right away if:  · Your chest hurts.  · You have trouble breathing in enough air (shortness of breath).  · You have an uncomfortable feeling in your back, arms, or stomach.  · You have trouble talking.  · One side of your body feels weak.  · A part of your face is hanging down (drooping).  These symptoms may be an emergency. Do not wait to see if the symptoms will go away. Get medical help right away. Call your local emergency services (911 in the [place] Do not drive yourself to the hospital.   This  "information is not intended to replace advice given to you by your health care provider. Make sure you discuss any questions you have with your health care provider.    CPAP and BIPAP Information    CPAP and BIPAP are methods of helping you breathe with the use of air pressure. CPAP stands for \"continuous positive airway pressure.\" BIPAP stands for \"bi-level positive airway pressure.\" In both methods, air is blown into your air passages to help keep you breathing well. With CPAP, the amount of pressure stays the same while you breathe in and out. CPAP is most commonly used for obstructive sleep apnea. For obstructive sleep apnea, CPAP works by holding your airways open so that they do not collapse when your muscles relax during sleep. BIPAP is similar to CPAP except the amount of pressure is increased when you inhale. This helps you take larger breaths. Your health care provider will recommend whether CPAP or BIPAP would be more helpful for you.   WHY ARE CPAP AND BIPAP TREATMENTS USED?  CPAP or BIPAP can be helpful if you have:   · Sleep apnea.    · Chronic obstructive pulmonary disease (COPD).    · Diseases that weaken the muscles of the chest, including muscular dystrophy or neurological diseases such as amyotrophic lateral sclerosis (ALS).  · Other problems that cause breathing to be weak, abnormal, or difficult.    HOW IS CPAP OR BIPAP ADMINISTERED?  Both CPAP and BIPAP are provided by a small machine with a flexible plastic tube that attaches to a plastic mask. The mask fits on your face, and air is blown into your air passages through your nose or mouth. The amount of pressure that is used to blow the air into your air passages can be set on the machine. Your health care provider will determine the pressure setting that should be used based on your individual needs.  WHEN SHOULD CPAP OR BIPAP BE USED?  In most cases, the mask is worn only when sleeping. Generally, you will need to wear the mask throughout the " night and during the daytime if you take a nap. In a few cases involving certain medical conditions, people also need to wear the mask at other times when they are awake. Follow your health care provider's instructions for when to use the machine.   USING THE MASK  · Because the mask needs to be snug, some people feel a trapped or closed-in feeling (claustrophobic) when first using the mask. You may need to get used to the mask gradually. To do this, you can first hold the mask loosely over your nose or mouth. Gradually apply the mask more snugly. You can also gradually increase the amount of time that you use the mask.  · Masks are available in various types and sizes. Some fit over your mouth and nose, and some fit over just your nose. If your mask does not fit well, talk to your health care provider about getting a different one.  · If you are using a nasal mask and you tend to breathe through your mouth, a chin strap may be applied to help keep your mouth closed.    · The CPAP and BIPAP machines have alarms that may sound if the mask comes off or develops a leak.  · If you have trouble with the mask, it is very important that you talk to your health care provider about finding a way to make the mask easier to tolerate. Do not stop using the mask. This could have a negative impact on your health.  TIPS FOR USING THE MACHINE  · Place your CPAP or BIPAP machine on a secure table or stand near an electrical outlet.    · Know where the on-off switch is located on the machine.  · Follow your health care provider's instructions for how to set the pressure on your machine and when you should use it.  · Do not eat or drink while the CPAP or BIPAP machine is on. Food or fluids could get pushed into your lungs by the pressure of the CPAP or BIPAP.  · Do not smoke. Tobacco smoke residue can damage the machine.    · For home use, CPAP and BIPAP machines can be rented or purchased through home health care companies. Many  different brands of machines are available. Renting a machine before purchasing may help you find out which particular machine works well for you.  SEEK IMMEDIATE MEDICAL CARE IF:  · You have redness or open areas around your nose or mouth where the mask fits.    · You have trouble operating the CPAP or BIPAP machine.    · You cannot tolerate wearing the CPAP or BIPAP mask.    This information is not intended to replace advice given to you by your health care provider. Make sure you discuss any questions you have with your health care provider.

## 2020-04-30 NOTE — PROGRESS NOTES
Chief Complaint   Patient presents with   • Results     SS        HPI:  Kailey Velarde is a 59 y.o. year old female here today for follow-up on sleep study results.  Last office visit 9/13/2019 with BEATRIZ Dye.  PMH of pulmonary hypertension, sick sinus syndrome, PM, HTN, taking Coumadin, CHF, atrial myxoma.   BMI 46.  PSG 11/20/2019 indicated severe central sleep apnea with an /h and O2 yogesh 76%.  Severe sleep hypoxia.  I reviewed finds with patient.  She was titrated on CPAP and BiPAP but declined to proceed with further titration.  I reviewed findings with patient.  Recommendation is dedicated titration study to BiPAP/ASV due to significant central events noted during study.  We reviewed the pathophysiology of untreated sleep apnea.     Echo 3/28/2018 indicated an EF of 55% and RVSP 35 mmHg.     She currently uses trazodone 100mg nightly but denies side effects.  She uses oxycodone 10mg TID for back pain.  She uses demadex daily for swelling.     She did note shortness of breath with any type of walking or exertion.  She denies regular cough or phlegm.  No chest pain or tightness.    ROS: As per HPI and otherwise negative if not stated.    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   • Status post mitral valve repair November 2011    MV repair with 32mm Jonnie-Carpenter flexible annuloplasty ring.       Past Surgical History:   Procedure Laterality Date   • CARDIAC CATH, RIGHT HEART  03/31/2018    Right heart cath with high pressures.   • MITRAL VALVE REPAIR  11/17/2011    Performed by MARY ORTIZ at SURGERY [facility]   •  PACEMAKER INSERTION  2011    Medtronic Versa VEDR01 implanted by Dr. Amaya.   • OTHER ORTHOPEDIC SURGERY      Neck and back        Family History   Problem Relation Age of Onset   • Heart Attack Father        Social History     Socioeconomic History   • Marital status: Single     Spouse name: Not on file   • Number of children: Not on file   • Years of education: Not on file   • Highest education level: Not on file   Occupational History   • Not on file   Social Needs   • Financial resource strain: Not on file   • Food insecurity     Worry: Not on file     Inability: Not on file   • Transportation needs     Medical: Not on file     Non-medical: Not on file   Tobacco Use   • Smoking status: Former Smoker     Packs/day: 1.00     Years: 39.00     Pack years: 39.00     Types: Cigarettes     Last attempt to quit: 2017     Years since quittin.4   • Smokeless tobacco: Never Used   • Tobacco comment:  Down to vapping now.    Substance and Sexual Activity   • Alcohol use: Not Currently     Comment: sober since 19!   • Drug use: No     Types: Inhaled, Oral     Comment: hx of, denies currently   • Sexual activity: Not on file   Lifestyle   • Physical activity     Days per week: Not on file     Minutes per session: Not on file   • Stress: Not on file   Relationships   • Social connections     Talks on phone: Not on file     Gets together: Not on file     Attends [employer] service: Not on file     Active member of club or organization: Not on file     Attends meetings of clubs or organizations: Not on file     Relationship status: Not on file   • Intimate partner violence     Fear of current or ex partner: Not on file     Emotionally abused: Not on file     Physically abused: Not on file     Forced sexual activity: Not on file   Other Topics Concern   • Not on file   Social History Narrative   • Not on file       Allergies as of 2020 - Reviewed 2019   Allergen Reaction Noted   • Penicillins  "Rash 06/07/2009        Vitals:  /60 (BP Location: Left arm, Patient Position: Sitting, BP Cuff Size: Adult)   Pulse 89   Resp 16   Ht 1.727 m (5' 8\")   Wt (!) 139.3 kg (307 lb)     Current medications as of today   Current Outpatient Medications   Medication Sig Dispense Refill   • zolpidem (AMBIEN) 5 MG Tab Take 1 Tab by mouth at bedtime as needed for Sleep (1 to 2 po qhs prn insomnia/sleep study. Bring to sleep study.) for up to 2 days. 2 Tab 0   • torsemide (DEMADEX) 20 MG Tab Take 3 Tabs by mouth every day. Requires Cardiology follow up for further refills. 270 Tab 1   • potassium chloride SA (KDUR) 20 MEQ Tab CR Take 1 Tab by mouth 2 times a day. 60 Tab 11   • warfarin (COUMADIN) 5 MG Tab Take 1-2 Tabs by mouth every day. 180 Tab 1   • venlafaxine XR (EFFEXOR XR) 75 MG CAPSULE SR 24 HR Take 75 mg by mouth every day.     • albuterol (VENTOLIN HFA) 108 (90 Base) MCG/ACT Aero Soln inhalation aerosol Inhale 2 Puffs by mouth every four hours as needed for Shortness of Breath (wheeze). 1 Inhaler 1   • amiodarone (CORDARONE) 200 MG Tab Take 1 Tab by mouth every day. 90 Tab 3   • atorvastatin (LIPITOR) 20 MG Tab Take 1 Tab by mouth every evening. 90 Tab 3   • Blood Pressure Monitoring (B-D ASSURE BPM/AUTO ARM CUFF) Misc Blood pressure home monitoring system 1 Each 0   • traZODone (DESYREL) 100 MG Tab Take 100 mg by mouth every evening.     • oxyCODONE immediate release (ROXICODONE) 10 MG immediate release tablet Take 10 mg by mouth 3 times a day. TID with MS Contin     • aripiprazole (ABILIFY) 5 MG tablet Take 5 mg by mouth every morning. (soon to increase to 10mg)     • venlafaxine (EFFEXOR-XR) 150 MG extended-release capsule Take  by mouth every morning.     • Multiple Vitamin (MULTI-DAY PO) Take 1 Tab by mouth every morning.     • pregabalin (LYRICA) 200 MG capsule Take 200 mg by mouth 3 times a day.     • vitamin D (CHOLECALCIFEROL) 1000 UNIT Tab Take 1,000 Units by mouth every morning.     • morphine SR " (MS CONTIN) 15 MG TB12 Take 1 Tab by mouth 3 times a day. 60 Tab 0     No current facility-administered medications for this visit.          Physical Exam:   Gen:           Alert and oriented, No apparent distress. Mood and affect appropriate, normal interaction with examiner.  Eyes:          PERRL, EOM intact, sclere white, conjunctive moist.  Ears:          Not examined.   Hearing:     Grossly intact.  Nose:          Normal, no lesions or deformities.  Dentition:    Good dentition.  Oropharynx:   Tongue normal, posterior pharynx without erythema or exudate.  Mallampati Classification: not examined; mask  Neck:        Supple, trachea midline, no masses.  Respiratory Effort: No intercostal retractions or use of accessory muscles.   Lung Auscultation:      Clear to auscultation bilaterally; no rales, rhonchi or wheezing.  CV:            Regular rate and rhythm. No murmurs, rubs or gallops.  Abd:           Not examined.   Lymphadenopathy: Not examined.  Gait and Station: Normal.  Digits and Nails: No clubbing, cyanosis, petechiae, or nodes.   Cranial Nerves: II-XII grossly intact.  Skin:        No rashes, lesions or ulcers noted.               Ext:           BLe edema.      Assessment:  1. Central sleep apnea  Polysomnography Titration    zolpidem (AMBIEN) 5 MG Tab   2. Pulmonary hypertension (HCC)     3. Chronic right-sided heart failure (HCC)     4. Essential hypertension     5. BMI 45.0-49.9, adult (HCC)  Height And Weight   6. Former smoker         Immunizations:    Flu:10/2019  Pneumovax 23:2/2017  Prevnar 13:not due    Plan:  1.  BiPAP/ASV titration study now.  Bleeding O2.  Patient has significant central sleep apnea and it is medically necessary this is corrected to prevent adverse events such as death.  2.  Discussed sleep hygiene.  3.  Encourage weight loss through diet and exercise.  4.  Follow-up with primary care and cardiology as scheduled for management of other comorbidities.  5.  Follow-up in 2 weeks  for sleep study results to initiate therapy, sooner if needed.      Please note that this dictation was created using voice recognition software. I have made every reasonable attempt to correct obvious errors, but it is possible there are errors of grammar and possibly content that I did not discover before finalizing the note.

## 2020-04-30 NOTE — PROGRESS NOTES
Chief Complaint   Patient presents with   • Results     SS        HPI:  Kailey Velarde is a 59 y.o. year old female here today for follow-up on sleep study results.  Last office visit 9/13/2019 with BEATRIZ Dye.  PMH of pulmonary hypertension, sick sinus syndrome, PM, HTN, taking Coumadin, CHF, atrial myxoma.   BMI 46.  PSG 11/20/2019 indicated severe central sleep apnea with an /h and O2 yogesh 76%.  Severe sleep hypoxia.  I reviewed finds with patient.  She was titrated on CPAP and BiPAP but declined to proceed with further titration.  I reviewed findings with patient.  Recommendation is dedicated titration study to BiPAP/ASV due to significant central events noted during study.    Echo 3/28/2018 indicated an EF of 55% and RVSP 35 mmHg.      ROS: As per HPI and otherwise negative if not stated.    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   • Status post mitral valve repair November 2011    MV repair with 32mm Jonnie-Carpenter flexible annuloplasty ring.       Past Surgical History:   Procedure Laterality Date   • CARDIAC CATH, RIGHT HEART  03/31/2018    Right heart cath with high pressures.   • MITRAL VALVE REPAIR  11/17/2011    Performed by MARY ORTIZ at [facility]   • PACEMAKER INSERTION  November 2011    Medtronic Versa [id number] implanted by Dr. Amaya.   • OTHER ORTHOPEDIC SURGERY      Neck and back        Family History   Problem Relation Age of Onset   • Heart Attack Father        Social History     Socioeconomic History   • Marital status: Single     Spouse name: Not on file   • Number of children: Not on file   •  "Years of education: Not on file   • Highest education level: Not on file   Occupational History   • Not on file   Social Needs   • Financial resource strain: Not on file   • Food insecurity     Worry: Not on file     Inability: Not on file   • Transportation needs     Medical: Not on file     Non-medical: Not on file   Tobacco Use   • Smoking status: Former Smoker     Packs/day: 1.00     Years: 39.00     Pack years: 39.00     Types: Cigarettes     Last attempt to quit: 2017     Years since quittin.4   • Smokeless tobacco: Never Used   • Tobacco comment:  Down to vapping now.    Substance and Sexual Activity   • Alcohol use: Not Currently     Comment: sober since 19!   • Drug use: No     Types: Inhaled, Oral     Comment: hx of, denies currently   • Sexual activity: Not on file   Lifestyle   • Physical activity     Days per week: Not on file     Minutes per session: Not on file   • Stress: Not on file   Relationships   • Social connections     Talks on phone: Not on file     Gets together: Not on file     Attends Hoahaoism service: Not on file     Active member of club or organization: Not on file     Attends meetings of clubs or organizations: Not on file     Relationship status: Not on file   • Intimate partner violence     Fear of current or ex partner: Not on file     Emotionally abused: Not on file     Physically abused: Not on file     Forced sexual activity: Not on file   Other Topics Concern   • Not on file   Social History Narrative   • Not on file       Allergies as of 2020 - Reviewed 2019   Allergen Reaction Noted   • Penicillins Rash 2009        Vitals:  /60 (BP Location: Left arm, Patient Position: Sitting, BP Cuff Size: Adult)   Pulse 89   Resp 16   Ht 1.727 m (5' 8\")   Wt (!) 139.3 kg (307 lb)     Current medications as of today   Current Outpatient Medications   Medication Sig Dispense Refill   • torsemide (DEMADEX) 20 MG Tab Take 3 Tabs by mouth every day. " Requires Cardiology follow up for further refills. 270 Tab 1   • potassium chloride SA (KDUR) 20 MEQ Tab CR Take 1 Tab by mouth 2 times a day. 60 Tab 11   • warfarin (COUMADIN) 5 MG Tab Take 1-2 Tabs by mouth every day. 180 Tab 1   • venlafaxine XR (EFFEXOR XR) 75 MG CAPSULE SR 24 HR Take 75 mg by mouth every day.     • albuterol (VENTOLIN HFA) 108 (90 Base) MCG/ACT Aero Soln inhalation aerosol Inhale 2 Puffs by mouth every four hours as needed for Shortness of Breath (wheeze). 1 Inhaler 1   • amiodarone (CORDARONE) 200 MG Tab Take 1 Tab by mouth every day. 90 Tab 3   • atorvastatin (LIPITOR) 20 MG Tab Take 1 Tab by mouth every evening. 90 Tab 3   • Blood Pressure Monitoring (B-D ASSURE BPM/AUTO ARM CUFF) Misc Blood pressure home monitoring system 1 Each 0   • traZODone (DESYREL) 100 MG Tab Take 100 mg by mouth every evening.     • oxyCODONE immediate release (ROXICODONE) 10 MG immediate release tablet Take 10 mg by mouth 3 times a day. TID with MS Contin     • aripiprazole (ABILIFY) 5 MG tablet Take 5 mg by mouth every morning. (soon to increase to 10mg)     • venlafaxine (EFFEXOR-XR) 150 MG extended-release capsule Take  by mouth every morning.     • Multiple Vitamin (MULTI-DAY PO) Take 1 Tab by mouth every morning.     • pregabalin (LYRICA) 200 MG capsule Take 200 mg by mouth 3 times a day.     • vitamin D (CHOLECALCIFEROL) 1000 UNIT Tab Take 1,000 Units by mouth every morning.     • morphine SR (MS CONTIN) 15 MG TB12 Take 1 Tab by mouth 3 times a day. 60 Tab 0     No current facility-administered medications for this visit.          Physical Exam: ***  Gen:           Alert and oriented, No apparent distress. Mood and affect appropriate, normal interaction with examiner.  Eyes:          PERRL, EOM intact, sclere white, conjunctive moist.  Ears:          Not examined.   Hearing:     Grossly intact.  Nose:          Normal, no lesions or deformities.  Dentition:    Good dentition.  Oropharynx:   Tongue normal,  posterior pharynx without erythema or exudate.  Mallampati Classification: ***  Neck:        Supple, trachea midline, no masses.  Respiratory Effort: No intercostal retractions or use of accessory muscles.   Lung Auscultation:      Clear to auscultation bilaterally; no rales, rhonchi or wheezing.  CV:            Regular rate and rhythm. No murmurs, rubs or gallops.  Abd:           Not examined.   Lymphadenopathy: Not examined.  Gait and Station: Normal.  Digits and Nails: No clubbing, cyanosis, petechiae, or nodes.   Cranial Nerves: II-XII grossly intact.  Skin:        No rashes, lesions or ulcers noted.               Ext:           No cyanosis or edema.      Assessment:  1. KAEL (obstructive sleep apnea)     2. Pulmonary hypertension (HCC)     3. Chronic right-sided heart failure (HCC)     4. Essential hypertension     5. BMI 45.0-49.9, adult (HCC)  Height And Weight   6. Former smoker         Immunizations:    Flu:***  Pneumovax 23:***  Prevnar 13:***    Plan:  ***    Please note that this dictation was created using voice recognition software. I have made every reasonable attempt to correct obvious errors, but it is possible there are errors of grammar and possibly content that I did not discover before finalizing the note.

## 2020-04-30 NOTE — LETTER
MELLY Sorenson  [facility]   [address] [place] [address]  Phone: [phone] - Fax: [phone]           Encounter Date: 4/30/2020  Provider: MELLY Sorenson  Location of Care: [facility]  [id number]      Patient:   Kailey Velarde   MR Number: [id number]   YOB: 1960     PROGRESS NOTE:  Chief Complaint   Patient presents with   • Results     SS        HPI:  Kailey Velarde is a 59 y.o. year old female here today for follow-up on sleep study results.  Last office visit 9/13/2019 with BEATRIZ Dye.  PMH of pulmonary hypertension, sick sinus syndrome, PM, HTN, taking Coumadin, CHF, atrial myxoma.   BMI 46.  PSG 11/20/2019 indicated severe central sleep apnea with an /h and O2 yogesh 76%.  Severe sleep hypoxia.  I reviewed finds with patient.  She was titrated on CPAP and BiPAP but declined to proceed with further titration.  I reviewed findings with patient.  Recommendation is dedicated titration study to BiPAP/ASV due to significant central events noted during study.    Echo 3/28/2018 indicated an EF of 55% and RVSP 35 mmHg.    She currently uses trazodone 100mg nightly but denies side effects.  She uses oxycodone 10mg TID for back pain.  She uses demadex daily for swelling.    ROS: As per HPI and otherwise negative if not stated.    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   • Status post mitral valve repair November 2011    MV repair with 32mm Jonnie-Carpenter flexible annuloplasty ring.       Past Surgical  History:   Procedure Laterality Date   • CARDIAC CATH, RIGHT HEART  2018    Right heart cath with high pressures.   • MITRAL VALVE REPAIR  2011    Performed by MARY ORTIZ at SURGERY [facility]   • PACEMAKER INSERTION  2011    Medtronic Versa [id number] implanted by Dr. Amaya.   • OTHER ORTHOPEDIC SURGERY      Neck and back        Family History   Problem Relation Age of Onset   • Heart Attack Father        Social History     Socioeconomic History   • Marital status: Single     Spouse name: Not on file   • Number of children: Not on file   • Years of education: Not on file   • Highest education level: Not on file   Occupational History   • Not on file   Social Needs   • Financial resource strain: Not on file   • Food insecurity     Worry: Not on file     Inability: Not on file   • Transportation needs     Medical: Not on file     Non-medical: Not on file   Tobacco Use   • Smoking status: Former Smoker     Packs/day: 1.00     Years: 39.00     Pack years: 39.00     Types: Cigarettes     Last attempt to quit: 2017     Years since quittin.4   • Smokeless tobacco: Never Used   • Tobacco comment:  Down to vapping now.    Substance and Sexual Activity   • Alcohol use: Not Currently     Comment: sober since 19!   • Drug use: No     Types: Inhaled, Oral     Comment: hx of, denies currently   • Sexual activity: Not on file   Lifestyle   • Physical activity     Days per week: Not on file     Minutes per session: Not on file   • Stress: Not on file   Relationships   • Social connections     Talks on phone: Not on file     Gets together: Not on file     Attends Sabianist service: Not on file     Active member of club or organization: Not on file     Attends meetings of clubs or organizations: Not on file     Relationship status: Not on file   • Intimate partner violence     Fear of current or ex partner: Not on file     Emotionally abused: Not on file     Physically abused: Not on file    "    Forced sexual activity: Not on file   Other Topics Concern   • Not on file   Social History Narrative   • Not on file       Allergies as of 04/30/2020 - Reviewed 11/04/2019   Allergen Reaction Noted   • Penicillins Rash 06/07/2009        Vitals:  /60 (BP Location: Left arm, Patient Position: Sitting, BP Cuff Size: Adult)   Pulse 89   Resp 16   Ht 1.727 m (5' 8\")   Wt (!) 139.3 kg (307 lb)     Current medications as of today   Current Outpatient Medications   Medication Sig Dispense Refill   • zolpidem (AMBIEN) 5 MG Tab Take 1 Tab by mouth at bedtime as needed for Sleep (1 to 2 po qhs prn insomnia/sleep study. Bring to sleep study.) for up to 2 days. 2 Tab 0   • torsemide (DEMADEX) 20 MG Tab Take 3 Tabs by mouth every day. Requires Cardiology follow up for further refills. 270 Tab 1   • potassium chloride SA (KDUR) 20 MEQ Tab CR Take 1 Tab by mouth 2 times a day. 60 Tab 11   • warfarin (COUMADIN) 5 MG Tab Take 1-2 Tabs by mouth every day. 180 Tab 1   • venlafaxine XR (EFFEXOR XR) 75 MG CAPSULE SR 24 HR Take 75 mg by mouth every day.     • albuterol (VENTOLIN HFA) 108 (90 Base) MCG/ACT Aero Soln inhalation aerosol Inhale 2 Puffs by mouth every four hours as needed for Shortness of Breath (wheeze). 1 Inhaler 1   • amiodarone (CORDARONE) 200 MG Tab Take 1 Tab by mouth every day. 90 Tab 3   • atorvastatin (LIPITOR) 20 MG Tab Take 1 Tab by mouth every evening. 90 Tab 3   • Blood Pressure Monitoring (B-D ASSURE BPM/AUTO ARM CUFF) Misc Blood pressure home monitoring system 1 Each 0   • traZODone (DESYREL) 100 MG Tab Take 100 mg by mouth every evening.     • oxyCODONE immediate release (ROXICODONE) 10 MG immediate release tablet Take 10 mg by mouth 3 times a day. TID with MS Contin     • aripiprazole (ABILIFY) 5 MG tablet Take 5 mg by mouth every morning. (soon to increase to 10mg)     • venlafaxine (EFFEXOR-XR) 150 MG extended-release capsule Take  by mouth every morning.     • Multiple Vitamin (MULTI-DAY PO) " Take 1 Tab by mouth every morning.     • pregabalin (LYRICA) 200 MG capsule Take 200 mg by mouth 3 times a day.     • vitamin D (CHOLECALCIFEROL) 1000 UNIT Tab Take 1,000 Units by mouth every morning.     • morphine SR (MS CONTIN) 15 MG TB12 Take 1 Tab by mouth 3 times a day. 60 Tab 0     No current facility-administered medications for this visit.          Physical Exam: ***  Gen:           Alert and oriented, No apparent distress. Mood and affect appropriate, normal interaction with examiner.  Eyes:          PERRL, EOM intact, sclere white, conjunctive moist.  Ears:          Not examined.   Hearing:     Grossly intact.  Nose:          Normal, no lesions or deformities.  Dentition:    Good dentition.  Oropharynx:   Tongue normal, posterior pharynx without erythema or exudate.  Mallampati Classification: ***  Neck:        Supple, trachea midline, no masses.  Respiratory Effort: No intercostal retractions or use of accessory muscles.   Lung Auscultation:      Clear to auscultation bilaterally; no rales, rhonchi or wheezing.  CV:            Regular rate and rhythm. No murmurs, rubs or gallops.  Abd:           Not examined.   Lymphadenopathy: Not examined.  Gait and Station: Normal.  Digits and Nails: No clubbing, cyanosis, petechiae, or nodes.   Cranial Nerves: II-XII grossly intact.  Skin:        No rashes, lesions or ulcers noted.               Ext:           No cyanosis or edema.      Assessment:  1. KAEL (obstructive sleep apnea)     2. Pulmonary hypertension (HCC)     3. Chronic right-sided heart failure (HCC)     4. Essential hypertension     5. BMI 45.0-49.9, adult ([facility])  Height And Weight   6. Former smoker     7. Central sleep apnea  Polysomnography Titration    zolpidem (AMBIEN) 5 MG Tab       Immunizations:    Flu:***  Pneumovax 23:***  Prevnar 13:***    Plan:  ***    Please note that this dictation was created using voice recognition software. I have made every reasonable attempt to correct obvious errors,  but it is possible there are errors of grammar and possibly content that I did not discover before finalizing the note.         Electronically signed by MELLY Sorenson  on 04/30/20    Pema Peña M.D.  [address] [place] [id number]  VIA Facsimile: [phone]

## 2020-04-30 NOTE — PROGRESS NOTES
Chief Complaint   Patient presents with   • Results     SS        HPI:  Kailey Velarde is a 59 y.o. year old female here today for follow-up on sleep study results.  Last office visit 9/13/2019 with BEATRIZ Dye.  PMH of pulmonary hypertension, sick sinus syndrome, PM, HTN, taking Coumadin, CHF, atrial myxoma.   BMI 46.  PSG 11/20/2019 indicated severe central sleep apnea with an /h and O2 yogesh 76%.  Severe sleep hypoxia.  I reviewed finds with patient.  She was titrated on CPAP and BiPAP but declined to proceed with further titration.  I reviewed findings with patient.  Recommendation is dedicated titration study to BiPAP/ASV due to significant central events noted during study.  We reviewed the pathophysiology of untreated sleep apnea.    Echo 3/28/2018 indicated an EF of 55% and RVSP 35 mmHg.    She currently uses trazodone 100mg nightly but denies side effects.  She uses oxycodone 10mg TID for back pain.  She uses demadex daily for swelling.    She did note shortness of breath with any type of walking or exertion.  She denies regular cough or phlegm.  No chest pain or tightness.    ROS: As per HPI and otherwise negative if not stated.    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   • Status post mitral valve repair November 2011    MV repair with 32mm Jonnie-Carpenter flexible annuloplasty ring.       Past Surgical History:   Procedure Laterality Date   • CARDIAC CATH, RIGHT HEART  03/31/2018    Right heart cath with high pressures.   • MITRAL VALVE REPAIR  11/17/2011    Performed by MARY ORTIZ at SUR[facility]ERY [facility]   •  PACEMAKER INSERTION  2011    Medtronic Versa VEDR01 implanted by Dr. Amaya.   • OTHER ORTHOPEDIC SURGERY      Neck and back        Family History   Problem Relation Age of Onset   • Heart Attack Father        Social History     Socioeconomic History   • Marital status: Single     Spouse name: Not on file   • Number of children: Not on file   • Years of education: Not on file   • Highest education level: Not on file   Occupational History   • Not on file   Social Needs   • Financial resource strain: Not on file   • Food insecurity     Worry: Not on file     Inability: Not on file   • Transportation needs     Medical: Not on file     Non-medical: Not on file   Tobacco Use   • Smoking status: Former Smoker     Packs/day: 1.00     Years: 39.00     Pack years: 39.00     Types: Cigarettes     Last attempt to quit: 2017     Years since quittin.4   • Smokeless tobacco: Never Used   • Tobacco comment:  Down to vapping now.    Substance and Sexual Activity   • Alcohol use: Not Currently     Comment: sober since 19!   • Drug use: No     Types: Inhaled, Oral     Comment: hx of, denies currently   • Sexual activity: Not on file   Lifestyle   • Physical activity     Days per week: Not on file     Minutes per session: Not on file   • Stress: Not on file   Relationships   • Social connections     Talks on phone: Not on file     Gets together: Not on file     Attends Advent service: Not on file     Active member of club or organization: Not on file     Attends meetings of clubs or organizations: Not on file     Relationship status: Not on file   • Intimate partner violence     Fear of current or ex partner: Not on file     Emotionally abused: Not on file     Physically abused: Not on file     Forced sexual activity: Not on file   Other Topics Concern   • Not on file   Social History Narrative   • Not on file       Allergies as of 2020 - Reviewed 2019   Allergen Reaction Noted   • Penicillins  "Rash 06/07/2009        Vitals:  /60 (BP Location: Left arm, Patient Position: Sitting, BP Cuff Size: Adult)   Pulse 89   Resp 16   Ht 1.727 m (5' 8\")   Wt (!) 139.3 kg (307 lb)     Current medications as of today   Current Outpatient Medications   Medication Sig Dispense Refill   • zolpidem (AMBIEN) 5 MG Tab Take 1 Tab by mouth at bedtime as needed for Sleep (1 to 2 po qhs prn insomnia/sleep study. Bring to sleep study.) for up to 2 days. 2 Tab 0   • torsemide (DEMADEX) 20 MG Tab Take 3 Tabs by mouth every day. Requires Cardiology follow up for further refills. 270 Tab 1   • potassium chloride SA (KDUR) 20 MEQ Tab CR Take 1 Tab by mouth 2 times a day. 60 Tab 11   • warfarin (COUMADIN) 5 MG Tab Take 1-2 Tabs by mouth every day. 180 Tab 1   • venlafaxine XR (EFFEXOR XR) 75 MG CAPSULE SR 24 HR Take 75 mg by mouth every day.     • albuterol (VENTOLIN HFA) 108 (90 Base) MCG/ACT Aero Soln inhalation aerosol Inhale 2 Puffs by mouth every four hours as needed for Shortness of Breath (wheeze). 1 Inhaler 1   • amiodarone (CORDARONE) 200 MG Tab Take 1 Tab by mouth every day. 90 Tab 3   • atorvastatin (LIPITOR) 20 MG Tab Take 1 Tab by mouth every evening. 90 Tab 3   • Blood Pressure Monitoring (B-D ASSURE BPM/AUTO ARM CUFF) Misc Blood pressure home monitoring system 1 Each 0   • traZODone (DESYREL) 100 MG Tab Take 100 mg by mouth every evening.     • oxyCODONE immediate release (ROXICODONE) 10 MG immediate release tablet Take 10 mg by mouth 3 times a day. TID with MS Contin     • aripiprazole (ABILIFY) 5 MG tablet Take 5 mg by mouth every morning. (soon to increase to 10mg)     • venlafaxine (EFFEXOR-XR) 150 MG extended-release capsule Take  by mouth every morning.     • Multiple Vitamin (MULTI-DAY PO) Take 1 Tab by mouth every morning.     • pregabalin (LYRICA) 200 MG capsule Take 200 mg by mouth 3 times a day.     • vitamin D (CHOLECALCIFEROL) 1000 UNIT Tab Take 1,000 Units by mouth every morning.     • morphine SR " (MS CONTIN) 15 MG TB12 Take 1 Tab by mouth 3 times a day. 60 Tab 0     No current facility-administered medications for this visit.          Physical Exam:   Gen:           Alert and oriented, No apparent distress. Mood and affect appropriate, normal interaction with examiner.  Eyes:          PERRL, EOM intact, sclere white, conjunctive moist.  Ears:          Not examined.   Hearing:     Grossly intact.  Nose:          Normal, no lesions or deformities.  Dentition:    Good dentition.  Oropharynx:   Tongue normal, posterior pharynx without erythema or exudate.  Mallampati Classification: not examined; mask  Neck:        Supple, trachea midline, no masses.  Respiratory Effort: No intercostal retractions or use of accessory muscles.   Lung Auscultation:      Clear to auscultation bilaterally; no rales, rhonchi or wheezing.  CV:            Regular rate and rhythm. No murmurs, rubs or gallops.  Abd:           Not examined.   Lymphadenopathy: Not examined.  Gait and Station: Normal.  Digits and Nails: No clubbing, cyanosis, petechiae, or nodes.   Cranial Nerves: II-XII grossly intact.  Skin:        No rashes, lesions or ulcers noted.               Ext:           BLE edema.      Assessment:  1. KAEL (obstructive sleep apnea)     2. Pulmonary hypertension (HCC)     3. Chronic right-sided heart failure (HCC)     4. Essential hypertension     5. BMI 45.0-49.9, adult (HCC)  Height And Weight   6. Former smoker     7. Central sleep apnea  Polysomnography Titration    zolpidem (AMBIEN) 5 MG Tab       Immunizations:    Flu:10/2019  Pneumovax 23:2/2017  Prevnar 13:not due    Plan:  1.  BiPAP/ASV titration study now.  Bleeding O2.  Patient has significant central sleep apnea and it is medically necessary this is corrected to prevent adverse events such as death.  2.  Discussed sleep hygiene.  3.  Encourage weight loss through diet and exercise.  4.  Follow-up with primary care and cardiology as scheduled for management of other  comorbidities.  5.  Follow-up in 2 weeks for sleep study results to initiate therapy, sooner if needed.    Please note that this dictation was created using voice recognition software. I have made every reasonable attempt to correct obvious errors, but it is possible there are errors of grammar and possibly content that I did not discover before finalizing the note.

## 2020-05-06 DIAGNOSIS — G47.31 CENTRAL SLEEP APNEA: ICD-10-CM

## 2020-05-06 RX ORDER — ZOLPIDEM TARTRATE 5 MG/1
5 TABLET ORAL NIGHTLY PRN
Qty: 2 TAB | Refills: 0 | Status: SHIPPED
Start: 2020-05-06 | End: 2020-06-06

## 2020-05-06 NOTE — TELEPHONE ENCOUNTER
Left vm to notify pt that Rx was sent to   [id number] - [place], [place] - [address]  [address]  [place] [address]  Phone: [phone] Fax: [phone]

## 2020-05-06 NOTE — TELEPHONE ENCOUNTER
Pt called and stated that she let her Rx for Ambien that was supposed to be used the night of her study . She is asking for a new Rx be called in to   [id number] - [place], [place] - [address]  [place] [address]  Phone: [phone] Fax: [phone]     Narcotics: 512(Value from [employer] System.)    Sedatives: 511(Value from [employer] System.)    Stimulants: 0(Value from [employer] System.)     NARxSCORES can range from 000 to 999. This first two digits represent the composite percentile risk based on an overall analysis of prescription drug use. The third digit represents the number of active prescriptions. The distribution of scores in the population is such that approximately 75% fall below 200, 95% fall below 500 and 99% fall below 650.     SELECT THE LINK BELOW TO REVIEW THE NARPulmocideheck REPORT  or  SELECT THE 'ACCEPT' BUTTON TO CONTINUTE AFTER REVIEWING THE SCORES

## 2020-05-11 ENCOUNTER — APPOINTMENT (OUTPATIENT)
Dept: SLEEP MEDICINE | Facility: MEDICAL CENTER | Age: 60
End: 2020-05-11
Attending: NURSE PRACTITIONER
Payer: MEDICARE

## 2020-05-16 ENCOUNTER — SLEEP STUDY (OUTPATIENT)
Dept: SLEEP MEDICINE | Facility: MEDICAL CENTER | Age: 60
End: 2020-05-16
Attending: NURSE PRACTITIONER
Payer: MEDICARE

## 2020-05-16 DIAGNOSIS — G47.31 CENTRAL SLEEP APNEA: ICD-10-CM

## 2020-05-19 ENCOUNTER — ANTICOAGULATION VISIT (OUTPATIENT)
Dept: VASCULAR LAB | Facility: MEDICAL CENTER | Age: 60
End: 2020-05-19
Attending: INTERNAL MEDICINE
Payer: MEDICARE

## 2020-05-19 DIAGNOSIS — Z98.890 STATUS POST MITRAL VALVE REPAIR: ICD-10-CM

## 2020-05-19 DIAGNOSIS — I48.0 PAF (PAROXYSMAL ATRIAL FIBRILLATION) (HCC): ICD-10-CM

## 2020-05-19 LAB — INR PPP: 3 (ref 2–3.5)

## 2020-05-19 PROCEDURE — 99211 OFF/OP EST MAY X REQ PHY/QHP: CPT

## 2020-05-19 PROCEDURE — 85610 PROTHROMBIN TIME: CPT

## 2020-05-19 PROCEDURE — 95811 POLYSOM 6/>YRS CPAP 4/> PARM: CPT | Performed by: FAMILY MEDICINE

## 2020-05-19 NOTE — PROCEDURES
Technical summary: The patient underwent a CPAP titration.  This was a 16 channel montage study to include a 6 channel EEG, a 2 channel EOG, and chin EMG, left and right leg EMG, a snore channel, and a CFLOW pressure transducer.   Respiratory effort was assessed with the use of a thoracic and abdominal monitor and overnight oximetry was obtained. Audio and video recordings were reviewed. This was a fully attended study and sleep stage scoring was performed. The test was technically adequate.    Interpretation:  Study start time was 09:02:36 PM. Diagnostic recording time was 6h 12.5m with a total sleep time of 4h 27.5m resulting in a sleep efficiency of 71.81%%. Sleep latency from the start of the study was 05 minutes and the latency from sleep to REM was 83 minutes.In total,31 arousals were scored for an arousal index of 7.0.    Respiratory:  There were a total of 75 apneas consisting of 6 obstructive apneas, 0 mixed apneas, and 69 central apneas. A total of 142 hypopneas were scored.The apnea index was 16.82 per hour and the hypopnea index was 31.85 per hour resulting in an overall AHI of 48.67.AHI during rem was 11.3 and AHI while supine was 48.67.  31% of the apneas were central in nature.    Oximetry:  There was a mean oxygen saturation of 90.0% with a minimum oxygen saturation of 77.0%. Time spent with oxygen saturations below 89% was 108.5 minutes.    Cardiac:  The highest heart rate seen while awake was 79 BPM while the highest heart rate during sleep was 76 BPM with an average sleeping heart rate of 61 BPM.    Limb Movements:  There were a total of 0 PLMs during sleep, of which 0 were PLMS arousals. This resulted in a PLMS index of 0.0 and a PLMS arousal index of 0.0.    BiPAP was tried from 9/5 to 22/17cm H2O.      CPAP Titration:  The PAP titration was initiated with BiPAP 9/5 cm of water and the pressure which was slowly titrated up in an attempt to eliminate sleep disordered breathing and snoring. The  final pressure tested during the study was BiPAP 22/17 cm water. It was an incomplete titration, the best tolerated pressurse was BiPAP 18/14cm. At this pressure the patient was observed in the supine in the REM sleep stage. The apnea hypopnea index improved to 14.1 per hour and O2 yogesh 83%. The average O2 stauration was 88%. She spent 89 min of sleep time below 89% O2 saturation. Snoring was resolved. The patient utilized small F30i mask with heated humidification. The CPAP was well-tolerated and there were minimal air leaks.     Impression:  1.  Complex sleep apnea   2.  Sleep related hypoxia    Recommendations:  No definitive pressure can be extrapolated from the titration, however BiPAP 18/14 can be tried with O2 bleed in. Consider supplemental O2 bleed in and f/u with OPO on the recommended pressure due to residual sleep hypoxia. In some cases alternative treatment options may prove effective in resolving sleep apnea and these options include upper airway surgery, the use of a dental orthotic or weight loss and positional therapy. Clinical correlation is required. In general patients with sleep apnea are advised to avoid alcohol and sedatives and to not operate a motor vehicle while drowsy and are at a greater risk for cardiovascular disease.

## 2020-05-19 NOTE — PROGRESS NOTES
OP Anticoagulation Service Note    Date: 5/19/2020    Anticoagulation Summary  As of 5/19/2020    INR goal:   2.0-3.0   TTR:   41.8 % (2.1 y)   INR used for dosing:   3.00 (5/19/2020)   Warfarin maintenance plan:   5 mg (5 mg x 1) every Mon, Wed, Fri; 2.5 mg (5 mg x 0.5) all other days   Weekly warfarin total:   25 mg   Plan last modified:   Margareth Ewing (4/14/2020)   Next INR check:   6/30/2020   Target end date:   Indefinite    Indications    PAF (paroxysmal atrial fibrillation) (CMS-HCC) [I48.0]  Long term current use of anticoagulant therapy (Resolved) [Z79.01]  Status post mitral valve repair [Z98.890]             Anticoagulation Episode Summary     INR check location:       Preferred lab:       Send INR reminders to:       Comments:   Right arm only for BP checks      Anticoagulation Care Providers     Provider Role Specialty Phone number    [facility] Responsible  [phone]        Anticoagulation Patient Findings      HPI:   Kailey Velarde seen in clinic today, they are here today for a INR check on anticoagulation therapy     The reason for today's visit is to prevent morbidity and mortality from a blood clot or stroke and to reduce the risk of bleeding while on a anticoagulant.     Dose the patient in the medical group have a [facility] primary care provider and proper insurance?  Pema Peña M.D.  [address] [place] [phone]      Additional education provided today regarding reducing bleed risk and dietary constraints:  About how vitamin K and foods work with warfarin and the bleeding risk on a anticoagulant     Any upcoming procedures:   none    Confirmed warfarin dosing regimen  Interval Changes with foods rich in vitamin K: No  Interval Changes in ETOH:   No  Interval Changes in smoking status:  No  Interval Changes in medication:  No   Cost restriction:  No  S/S of bleeding or bruising:  No  Signs/symptoms  thrombosis since the last appt:  No  Bleed risk is:   moderate,       Assessment:   INR  therapeutic.     Medications reviewed and updated--    3 vitals included with today's appt :  (BP, HR, weight, ht, RR)   There were no vitals filed for this visit.      Plan:  Continue weekly warfarin dose as noted      Follow up:  Follow up appointment in 6 week(s)       Other info:  Pt educated to contact our clinic with any changes in medications or s/s of bleeding or thrombosis    CHEST guidelines recommend frequent INR monitoring at regular intervals (a few days up to a max of 12 weeks) to ensure they are on the proper dose of warfarin and not having any complications from therapy.  INRs can dramatically change over a short time period due to diet, medications, and medical conditions.     César Gonzalez, PharmD, MS, BCACP, LCC    This note was created using voice recognition software (Dragon). The accuracy of the dictation is limited by the abilities of the software. I have reviewed the note prior to signing, however some errors in grammar and context are still possible. If you have any questions related to this note please do not hesitate to contact our office.

## 2020-05-21 ENCOUNTER — SLEEP CENTER VISIT (OUTPATIENT)
Dept: SLEEP MEDICINE | Facility: MEDICAL CENTER | Age: 60
End: 2020-05-21
Payer: MEDICARE

## 2020-05-21 VITALS
DIASTOLIC BLOOD PRESSURE: 58 MMHG | WEIGHT: 293 LBS | HEIGHT: 68 IN | OXYGEN SATURATION: 90 % | HEART RATE: 92 BPM | RESPIRATION RATE: 16 BRPM | SYSTOLIC BLOOD PRESSURE: 130 MMHG | BODY MASS INDEX: 44.41 KG/M2

## 2020-05-21 DIAGNOSIS — I48.0 PAF (PAROXYSMAL ATRIAL FIBRILLATION) (HCC): ICD-10-CM

## 2020-05-21 DIAGNOSIS — I27.20 PULMONARY HYPERTENSION (HCC): ICD-10-CM

## 2020-05-21 DIAGNOSIS — G89.4 CHRONIC PAIN SYNDROME: ICD-10-CM

## 2020-05-21 DIAGNOSIS — Z87.891 FORMER SMOKER: ICD-10-CM

## 2020-05-21 DIAGNOSIS — G47.33 OBSTRUCTIVE SLEEP APNEA SYNDROME: ICD-10-CM

## 2020-05-21 DIAGNOSIS — I50.812 CHRONIC RIGHT-SIDED HEART FAILURE (HCC): Chronic | ICD-10-CM

## 2020-05-21 DIAGNOSIS — J96.11 CHRONIC HYPOXEMIC RESPIRATORY FAILURE (HCC): ICD-10-CM

## 2020-05-21 DIAGNOSIS — G47.34 NOCTURNAL HYPOXEMIA: ICD-10-CM

## 2020-05-21 PROCEDURE — 99214 OFFICE O/P EST MOD 30 MIN: CPT | Mod: 25 | Performed by: INTERNAL MEDICINE

## 2020-05-21 PROCEDURE — 94761 N-INVAS EAR/PLS OXIMETRY MLT: CPT | Performed by: INTERNAL MEDICINE

## 2020-05-21 ASSESSMENT — PATIENT HEALTH QUESTIONNAIRE - PHQ9: CLINICAL INTERPRETATION OF PHQ2 SCORE: 0

## 2020-05-21 NOTE — PROGRESS NOTES
CC: Sleep apnea hypopnea syndrome.    HPI:   Ms. Velarde was last seen here on April 30, 2020 and returns now to review the results of a titration polysomnogram.      She was evaluated here last fall for daytime somnolence and a history of pulmonary hypertension.  A polysomnogram on November 20, 2019 demonstrated an apnea hypopnea index of 146.7 events per hour, primarily consisting of central apnea episodes, with a lowest arterial oxygen saturation is 75%.  He spent 86% of the diagnostic time with a saturation below 90%.  A dedicated titration polysomnogram was recommended but not completed until last week.  She remains and fatigued during the day.  She does use nocturnal oxygen each night.    The titration polysomnogram dated May 16, 2020 is reviewed with the patient.  It demonstrates fragmented sleep with increased wake after sleep onset time but 48 minutes of REM sleep time are included.  She awoke at about 3 AM and was unable to return to sleep.  She was titrated to BiPAP at 18/14 cmH2O pressure with an apnea hypotony index of 14.6 events per hour, a mean arterial oxygen saturation of 88% and a minimum saturation of 83%.    Her medical history is notable for paroxysmal atrial fibrillation and sick sinus syndrome with a permanent transvenous pacemaker, systemic arterial hypertension, atrial myxoma, right-sided heart strain and mild pulmonary hypertension.  She underwent mitral valve repair in 2011.  An echocardiogram on March 28, 2018 demonstrated preserved left ventricular systolic ejection fraction of 55% but a severely dilated right ventricle with decreased systolic function.  The estimated right ventricular systolic pressure was 35 mmHg.  He also has a chronic pain syndrome and uses extended release morphine at 15 mg 3 times a day and oxycodone at 10 mg up to 3 times a day as needed.        Patient Active Problem List    Diagnosis Date Noted   • Pacemaker 11/26/2012     Priority: High   • Atrial myxoma  12/07/2011     Priority: High   • Acute on chronic diastolic congestive heart failure (HCC) 04/03/2018     Priority: Medium   • Pulmonary hypertension ([facility]) 06/19/2017     Priority: Medium   • History of sick sinus syndrome 10/01/2013     Priority: Medium   • PAF (paroxysmal atrial fibrillation) (CMS-[facility]) 12/07/2011     Priority: Medium   • Status post mitral valve repair 12/07/2011     Priority: Medium   • Thrombocytopenia ([facility]) 04/04/2018     Priority: Low   • FAN (acute kidney injury) ([facility]) 04/03/2018     Priority: Low   • Obesity 03/28/2018     Priority: Low   • Mixed hyperlipidemia 07/03/2012     Priority: Low   • Chronic low back pain 11/16/2011     Priority: Low   • Central sleep apnea 04/30/2020   • High risk medication use 08/01/2018   • Sick sinus syndrome ([facility]) 05/30/2018   • Chronic right-sided heart failure ([facility]) 05/30/2018   • Heart failure with preserved ejection fraction, borderline, class II ([facility]) 05/30/2018   • Left ventricular diastolic dysfunction, NYHA class 3 05/30/2018   • Circulating anticoagulants ([facility]) 03/28/2018   • Hx of pleural effusion 03/12/2018   • Palpitations 02/22/2018   • Essential hypertension 02/22/2018   • Localized edema 08/29/2016       Past Medical History:   Diagnosis Date   • Atrial myxoma November 2011    Status post LA myxoma resection   • Backpain    • CAD (coronary artery disease)     pacemaker   • Chronic pain    • Dizziness     • Hyperlipidemia     • Hypertension    • Hypertriglyceridemia     • Indigestion    • Peripheral edema    • Sciatica    • Sick sinus syndrome ([facility]) November 2011    Status post PPM implantation.   • Status post mitral valve repair November 2011    MV repair with 32mm Jonnie-Carpenter flexible annuloplasty ring.       Past Surgical History:   Procedure Laterality Date   • CARDIAC CATH, RIGHT HEART  03/31/2018    Right heart cath with high pressures.   • MITRAL VALVE REPAIR  11/17/2011    Performed by MARY ORTIZ at [facility]    • PACEMAKER INSERTION  2011    Medtronic Versa [id number] implanted by Dr. Amaya.   • OTHER ORTHOPEDIC SURGERY      Neck and back        Family History   Problem Relation Age of Onset   • Heart Attack Father        Social History     Socioeconomic History   • Marital status: Single     Spouse name: Not on file   • Number of children: Not on file   • Years of education: Not on file   • Highest education level: Not on file   Occupational History   • Not on file   Social Needs   • Financial resource strain: Not on file   • Food insecurity     Worry: Not on file     Inability: Not on file   • Transportation needs     Medical: Not on file     Non-medical: Not on file   Tobacco Use   • Smoking status: Former Smoker     Packs/day: 1.00     Years: 39.00     Pack years: 39.00     Types: Cigarettes     Last attempt to quit: 2017     Years since quittin.5   • Smokeless tobacco: Never Used   • Tobacco comment:  Down to vapping now.    Substance and Sexual Activity   • Alcohol use: Not Currently     Comment: sober since 19!   • Drug use: No     Types: Inhaled, Oral     Comment: hx of, denies currently   • Sexual activity: Not on file   Lifestyle   • Physical activity     Days per week: Not on file     Minutes per session: Not on file   • Stress: Not on file   Relationships   • Social connections     Talks on phone: Not on file     Gets together: Not on file     Attends Zoroastrianism service: Not on file     Active member of club or organization: Not on file     Attends meetings of clubs or organizations: Not on file     Relationship status: Not on file   • Intimate partner violence     Fear of current or ex partner: Not on file     Emotionally abused: Not on file     Physically abused: Not on file     Forced sexual activity: Not on file   Other Topics Concern   • Not on file   Social History Narrative   • Not on file       Current Outpatient Medications   Medication Sig Dispense Refill   • torsemide (DEMADEX)  "20 MG Tab Take 3 Tabs by mouth every day. Requires Cardiology follow up for further refills. 270 Tab 1   • potassium chloride SA (KDUR) 20 MEQ Tab CR Take 1 Tab by mouth 2 times a day. 60 Tab 11   • warfarin (COUMADIN) 5 MG Tab Take 1-2 Tabs by mouth every day. 180 Tab 1   • venlafaxine XR (EFFEXOR XR) 75 MG CAPSULE SR 24 HR Take 75 mg by mouth every day.     • albuterol (VENTOLIN HFA) 108 (90 Base) MCG/ACT Aero Soln inhalation aerosol Inhale 2 Puffs by mouth every four hours as needed for Shortness of Breath (wheeze). 1 Inhaler 1   • amiodarone (CORDARONE) 200 MG Tab Take 1 Tab by mouth every day. 90 Tab 3   • atorvastatin (LIPITOR) 20 MG Tab Take 1 Tab by mouth every evening. 90 Tab 3   • Blood Pressure Monitoring (B-D ASSURE BPM/AUTO ARM CUFF) Misc Blood pressure home monitoring system 1 Each 0   • traZODone (DESYREL) 100 MG Tab Take 100 mg by mouth every evening.     • oxyCODONE immediate release (ROXICODONE) 10 MG immediate release tablet Take 10 mg by mouth 3 times a day. TID with MS Contin     • aripiprazole (ABILIFY) 5 MG tablet Take 5 mg by mouth every morning. (soon to increase to 10mg)     • venlafaxine (EFFEXOR-XR) 150 MG extended-release capsule Take  by mouth every morning.     • Multiple Vitamin (MULTI-DAY PO) Take 1 Tab by mouth every morning.     • pregabalin (LYRICA) 200 MG capsule Take 200 mg by mouth 3 times a day.     • vitamin D (CHOLECALCIFEROL) 1000 UNIT Tab Take 1,000 Units by mouth every morning.     • morphine SR (MS CONTIN) 15 MG TB12 Take 1 Tab by mouth 3 times a day. 60 Tab 0   • zolpidem (AMBIEN) 5 MG Tab Take 1 Tab by mouth at bedtime as needed for Sleep (1 to 2 po qhs prn insomnia/sleep study. Bring to sleep study.) for up to 2 doses. (Patient not taking: Reported on 5/21/2020) 2 Tab 0     No current facility-administered medications for this visit.     \"CURRENT RX\"      Allergies: Penicillins      ROS  Unchanged from prior and positive for the sleep, cardiac and chronic pain issues " "reviewed above as well as the items in the problem list and past medical history.      Physical Exam:   /58 (BP Location: Right arm, Patient Position: Sitting, BP Cuff Size: Large adult)   Pulse 92   Resp 16   Ht 1.727 m (5' 8\")   Wt (!) 140.2 kg (309 lb)   LMP 01/01/2010   SpO2 90%   BMI 46.98 kg/m²    Limited by coronavirus precautions.  She is a well-developed, well-nourished woman who is alert, oriented and appropriately responsive.  She does not appear dyspneic and is not coughing.  She is wearing a mask.      Problems:  1.  Central sleep apnea syndrome  She has very severe central sleep apnea hypopnea with an apnea hypopnea index of 146.7 events per hour and a lowest arterial oxygen saturation is 75% on room air.  Effective treatment is required to improve daytime alertness and reduce further cardiac and neurologic risks associated with untreated sleep disordered breathing.  Recent titration polysomnogram suggests a good response to BiPAP therapy but with persisting hypoxemia that requires continuation of supplemental oxygen therapy in addition to BiPAP.    2. Nocturnal hypoxemia  Confirmed by the recent polysomnogram.    3. Chronic hypoxemic respiratory failure (HCC)  Ambulatory oximetry today demonstrates a lowest arterial oxygen saturation of 86% on room air, 87% on oxygen at 2 L/min and a lowest saturation of 90% on 3 L/min.    4. Pulmonary hypertension (HCC)  Although the right ventricular systolic pressure was only mildly elevated by echocardiography that study also suggested severe right ventricular dilatation with reduced systolic function, suggesting right heart strain.  Preservation of arterial oxygen saturation during the day and at night will be essential in managing this problem.    5. PAF (paroxysmal atrial fibrillation) (CMS-HCC)  She is currently in a regular rhythm and anticoagulated on warfarin.    6. Chronic right-sided heart failure (HCC)  Although the right ventricular " systolic pressure was only mildly elevated by echocardiography that study also suggested severe right ventricular dilatation with reduced systolic function, suggesting right heart strain.    7. Chronic pain syndrome  Requiring narcotic analgesia.    8. Former smoker    9. BMI 45.0-49.9, adult (HCC)  - Height And Weight        Plan:   1.  Initiate BiPAP therapy at 18/14 cmH2O with oxygen bleed in at 2 L/min.  She did best with a small F 30 fullface mask.  We have talked about acclimating to the BiPAP with proper mask fit, effective humidification and consistent use.    2.  Return visit here about 2 months after starting BiPAP therapy and bringing the data recording chip with her.  After she has acclimated to the BiPAP and oxygen nocturnal oximetry will be done to confirm preservation of oxygen saturation on treatment.    3.  I have encouraged her to use the supplemental oxygen at 2 to 3 L/min throughout the day in addition to the oxygen bleed and with BiPAP at night.    We appreciate the opportunity to assist in her care.  Return in about 3 months (around 8/21/2020).

## 2020-05-21 NOTE — PROCEDURES
Multi-Ox Readings  Multi Ox #1 2 LPM   O2 sat % at rest 90   O2 sat % on exertion 87   O2 sat average on exertion     Multi Ox #2 3 LPM   O2 sat % at rest 91   O2 sat % on exertion 90   O2 sat average on exertion       Oxygen Use 3   Oxygen Frequency Nocturnal   Duration of need     Is the patient mobile within the home?     CPAP Use?     BIPAP Use?     Servo Titration

## 2020-05-28 RX ORDER — ALBUTEROL SULFATE 90 UG/1
AEROSOL, METERED RESPIRATORY (INHALATION)
Qty: 1 INHALER | Refills: 3 | Status: SHIPPED | OUTPATIENT
Start: 2020-05-28 | End: 2021-07-01

## 2020-05-28 NOTE — TELEPHONE ENCOUNTER
Have we ever prescribed this med? Yes.  If yes, what date? 09/13/19    Last OV: 09/13/19 with Milvia HENDERSON at [facility].    Next OV: 10/05/2020 with Dr Akers at the sleep center.    DX:    Medications:   Requested Prescriptions     Pending Prescriptions Disp Refills   • VENTOLIN  (90 Base) MCG/ACT Aero Soln inhalation aerosol [Pharmacy Med Name: VENTOLIN HFA        AER GLAX] 18 g 0     Sig: INHALE TWO PUFFS BY MOUTH EVERY FOUR HOURS AS NEEDED FOR SHORTNESS OF BREATH

## 2020-06-01 NOTE — TELEPHONE ENCOUNTER
RL Sorenson.  You 4 days ago      Needs pulm OV for continued refills; we also treat for sleep apnea/keep sleep appt       Called pt and l/m. We have refilled one of her medications for 4 months but she needs to make an appt at our pulm office for continued refills.

## 2020-06-19 ENCOUNTER — TELEPHONE (OUTPATIENT)
Dept: MEDICAL GROUP | Facility: PHYSICIAN GROUP | Age: 60
End: 2020-06-19

## 2020-06-19 NOTE — TELEPHONE ENCOUNTER
"Received VM from pt:    \"Pt accidentally peeled off a scab from leg and was bleeding \"pretty bad.\" She is concerned that her INR might be tohigh as it was extremely runny.   She is wondering if she needs to hold warfarin\"    Spoke w/ pt. She states she had to use 3 bandages to control the bleed. She denies any s/s of internal bleeding. Advised pt not to hold warfarin unless experiencing blood in the urine, stool or vomit. Advised her to use gauze with a large bandaid if bleeding through the 3 bandaids.    Pt agrees with plan. She will check INR on 6/30    Aditi Gregory, PharmD    "

## 2020-06-30 ENCOUNTER — APPOINTMENT (OUTPATIENT)
Dept: VASCULAR LAB | Facility: MEDICAL CENTER | Age: 60
End: 2020-06-30
Attending: INTERNAL MEDICINE
Payer: MEDICARE

## 2020-07-01 ENCOUNTER — ANTICOAGULATION VISIT (OUTPATIENT)
Dept: VASCULAR LAB | Facility: MEDICAL CENTER | Age: 60
End: 2020-07-01
Attending: INTERNAL MEDICINE
Payer: MEDICARE

## 2020-07-01 DIAGNOSIS — Z98.890 STATUS POST MITRAL VALVE REPAIR: ICD-10-CM

## 2020-07-01 DIAGNOSIS — I48.0 PAF (PAROXYSMAL ATRIAL FIBRILLATION) (HCC): ICD-10-CM

## 2020-07-01 LAB — INR PPP: 1.5 (ref 2–3.5)

## 2020-07-01 PROCEDURE — 85610 PROTHROMBIN TIME: CPT

## 2020-07-01 PROCEDURE — 99212 OFFICE O/P EST SF 10 MIN: CPT | Performed by: PHARMACIST

## 2020-07-01 NOTE — PROGRESS NOTES
Anticoagulation Summary  As of 2020    INR goal:   2.0-3.0   TTR:   43.1 % (2.2 y)   INR used for dosin.50! (2020)   Warfarin maintenance plan:   5 mg (5 mg x 1) every Mon, Wed, Fri; 2.5 mg (5 mg x 0.5) all other days   Weekly warfarin total:   25 mg   Plan last modified:   Margareth BARRON Filter (2020)   Next INR check:   7/15/2020   Target end date:   Indefinite    Indications    PAF (paroxysmal atrial fibrillation) (CMS-HCC) [I48.0]  Long term current use of anticoagulant therapy (Resolved) [Z79.01]  Status post mitral valve repair [Z98.890]             Anticoagulation Episode Summary     INR check location:       Preferred lab:       Send INR reminders to:       Comments:   Right arm only for BP checks      Anticoagulation Care Providers     Provider Role Specialty Phone number    [facility] Responsible  [phone]                Anticoagulation Patient Findings  Patient Findings     Negatives:   Signs/symptoms of thrombosis, Signs/symptoms of bleeding, Laboratory test error suspected, Change in health, Change in alcohol use, Change in activity, Upcoming invasive procedure, Emergency department visit, Upcoming dental procedure, Missed doses, Extra doses, Change in medications, Change in diet/appetite, Hospital admission, Bruising, Other complaints          HPI:  Kailey Velarde seen in clinic today, on anticoagulation therapy with warfarin for PAF  Changes to current medical/health status since last appt: NONE  Denies signs/symptoms of bleeding and/or thrombosis since the last appt.    Denies any interval changes to diet  Denies any interval changes to medications since last appt.   Denies any complications or cost restrictions with current therapy.   Verified current warfarin dosing schedule.   BP DECLINED      A/P   INR  SUB-therapeutic.   INSTRUCTED PT TO BOLUS WITH 10MG X 1 THEN RESUME CURRENT WARFARIN REGIMEN    Follow up appointment in 2 weeks  Kellen Patel PharmD  candidate  Enzo Tinsley, PharmD

## 2020-07-15 ENCOUNTER — APPOINTMENT (OUTPATIENT)
Dept: VASCULAR LAB | Facility: MEDICAL CENTER | Age: 60
End: 2020-07-15
Attending: INTERNAL MEDICINE
Payer: MEDICARE

## 2020-07-20 ENCOUNTER — ANTICOAGULATION VISIT (OUTPATIENT)
Dept: VASCULAR LAB | Facility: MEDICAL CENTER | Age: 60
End: 2020-07-20
Attending: INTERNAL MEDICINE
Payer: MEDICARE

## 2020-07-20 VITALS — SYSTOLIC BLOOD PRESSURE: 147 MMHG | DIASTOLIC BLOOD PRESSURE: 70 MMHG | HEART RATE: 88 BPM

## 2020-07-20 DIAGNOSIS — I48.0 PAF (PAROXYSMAL ATRIAL FIBRILLATION) (HCC): ICD-10-CM

## 2020-07-20 DIAGNOSIS — Z98.890 STATUS POST MITRAL VALVE REPAIR: ICD-10-CM

## 2020-07-20 LAB — INR PPP: 1.6 (ref 2–3.5)

## 2020-07-20 PROCEDURE — 85610 PROTHROMBIN TIME: CPT

## 2020-07-20 PROCEDURE — 99212 OFFICE O/P EST SF 10 MIN: CPT | Performed by: NURSE PRACTITIONER

## 2020-07-20 NOTE — PROGRESS NOTES
Anticoagulation Summary  As of 2020    INR goal:   2.0-3.0   TTR:   42.1 % (2.3 y)   INR used for dosin.60! (2020)   Warfarin maintenance plan:   2.5 mg (5 mg x 0.5) every Sun, Tue, Thu; 5 mg (5 mg x 1) all other days   Weekly warfarin total:   27.5 mg   Plan last modified:   Brittnee Cisneros, A.P.N. (2020)   Next INR check:   2020   Target end date:   Indefinite    Indications    PAF (paroxysmal atrial fibrillation) (CMS-HCC) [I48.0]  Long term current use of anticoagulant therapy (Resolved) [Z79.01]  Status post mitral valve repair [Z98.890]             Anticoagulation Episode Summary     INR check location:       Preferred lab:       Send INR reminders to:       Comments:   Right arm only for BP checks      Anticoagulation Care Providers     Provider Role Specialty Phone number    [facility] Responsible  [phone]        Anticoagulation Patient Findings      HPI:  Kailey Montenegros Syd seen in clinic today for follow up on anticoagulation therapy in the presence of AF, MV repair.   Denies any changes to current medical/health status since last appointment.   She is eating more greens. Denies any medication changes.   No current symptoms of bleeding or thrombosis reported.    A/P:   INR subtherapeutic. CHADS 2 score = 2. Reminded to stay consistent with greens. Will increase regimen.   BP recorded in vitals.    Follow up appointment in 2 week(s).    Next Appointment: 2020 at 8:45 am.    Brittnee HENDERSON

## 2020-07-21 ENCOUNTER — NON-PROVIDER VISIT (OUTPATIENT)
Dept: CARDIOLOGY | Facility: MEDICAL CENTER | Age: 60
End: 2020-07-21
Payer: MEDICARE

## 2020-07-21 ENCOUNTER — OFFICE VISIT (OUTPATIENT)
Dept: CARDIOLOGY | Facility: MEDICAL CENTER | Age: 60
End: 2020-07-21
Payer: MEDICARE

## 2020-07-21 VITALS
BODY MASS INDEX: 45.99 KG/M2 | DIASTOLIC BLOOD PRESSURE: 72 MMHG | WEIGHT: 293 LBS | HEART RATE: 88 BPM | OXYGEN SATURATION: 92 % | HEIGHT: 67 IN | SYSTOLIC BLOOD PRESSURE: 156 MMHG

## 2020-07-21 DIAGNOSIS — R06.02 SHORTNESS OF BREATH: ICD-10-CM

## 2020-07-21 DIAGNOSIS — R00.2 PALPITATIONS: ICD-10-CM

## 2020-07-21 DIAGNOSIS — G47.33 OSA (OBSTRUCTIVE SLEEP APNEA): ICD-10-CM

## 2020-07-21 DIAGNOSIS — Z79.899 HIGH RISK MEDICATION USE: ICD-10-CM

## 2020-07-21 DIAGNOSIS — E66.01 MORBID OBESITY WITH BMI OF 40.0-44.9, ADULT (HCC): ICD-10-CM

## 2020-07-21 DIAGNOSIS — I49.5 SICK SINUS SYNDROME (HCC): ICD-10-CM

## 2020-07-21 DIAGNOSIS — Z98.890 STATUS POST MITRAL VALVE REPAIR: ICD-10-CM

## 2020-07-21 DIAGNOSIS — I10 ESSENTIAL HYPERTENSION: ICD-10-CM

## 2020-07-21 DIAGNOSIS — I50.812 CHRONIC RIGHT-SIDED HEART FAILURE (HCC): ICD-10-CM

## 2020-07-21 DIAGNOSIS — I48.0 PAF (PAROXYSMAL ATRIAL FIBRILLATION) (HCC): ICD-10-CM

## 2020-07-21 DIAGNOSIS — Z95.0 PACEMAKER: ICD-10-CM

## 2020-07-21 DIAGNOSIS — I50.30 HEART FAILURE WITH PRESERVED EJECTION FRACTION, BORDERLINE, CLASS II (HCC): ICD-10-CM

## 2020-07-21 DIAGNOSIS — E78.2 MIXED HYPERLIPIDEMIA: ICD-10-CM

## 2020-07-21 DIAGNOSIS — Z87.09 HX OF PLEURAL EFFUSION: ICD-10-CM

## 2020-07-21 DIAGNOSIS — I48.4 ATYPICAL ATRIAL FLUTTER (HCC): ICD-10-CM

## 2020-07-21 DIAGNOSIS — F17.200 TOBACCO USE DISORDER: ICD-10-CM

## 2020-07-21 DIAGNOSIS — R60.0 LOCALIZED EDEMA: ICD-10-CM

## 2020-07-21 DIAGNOSIS — D69.6 THROMBOCYTOPENIA (HCC): ICD-10-CM

## 2020-07-21 DIAGNOSIS — I27.20 PULMONARY HYPERTENSION (HCC): ICD-10-CM

## 2020-07-21 DIAGNOSIS — Z78.9 ALCOHOL USE: ICD-10-CM

## 2020-07-21 LAB — INR BLD: 1.6 (ref 0.9–1.2)

## 2020-07-21 PROCEDURE — 99214 OFFICE O/P EST MOD 30 MIN: CPT | Performed by: INTERNAL MEDICINE

## 2020-07-21 PROCEDURE — 93280 PM DEVICE PROGR EVAL DUAL: CPT | Performed by: INTERNAL MEDICINE

## 2020-07-21 RX ORDER — MORPHINE SULFATE 15 MG/1
TABLET, FILM COATED, EXTENDED RELEASE ORAL
COMMUNITY
Start: 2020-06-22 | End: 2020-07-21

## 2020-07-21 RX ORDER — POTASSIUM CHLORIDE 20 MEQ/1
20 TABLET, EXTENDED RELEASE ORAL DAILY
Qty: 100 TAB | Refills: 3 | Status: SHIPPED | OUTPATIENT
Start: 2020-07-21 | End: 2022-08-29

## 2020-07-21 RX ORDER — TORSEMIDE 20 MG/1
20 TABLET ORAL DAILY
Qty: 90 TAB | Refills: 3 | Status: ON HOLD
Start: 2020-07-21 | End: 2021-07-03

## 2020-07-21 RX ORDER — METHOCARBAMOL 750 MG/1
750 TABLET, FILM COATED ORAL 3 TIMES DAILY PRN
Status: ON HOLD | COMMUNITY
Start: 2020-06-29 | End: 2023-08-29

## 2020-07-21 RX ORDER — ARIPIPRAZOLE 10 MG/1
10 TABLET ORAL DAILY
COMMUNITY
Start: 2020-07-11

## 2020-07-21 NOTE — LETTER
"     [facility]   [address], [address]  [place] [address]  Phone: [phone]  Fax: [phone]              Kailey Velarde  1960    Encounter Date: 7/21/2020    Leah Bojorquez M.D.          PROGRESS NOTE:  Subjective:   Chief Complaint:   Chief Complaint   Patient presents with   • Follow-Up     Acute on Chronic Diastolic Congestive Heart Failure     Kailey \"Melanie\" MUKUL Velarde is a 60 y.o. female who returns for evaluation of very complex heart disease.      She has chronic diastolic congestive heart failure, prior mitral valve repair, paroxysmal atrial fibrillation/flutter and was cardioverted during the hospital stay in April 2018 after initiation of amiodarone therapy, JULY at that time revealed smoking left atrial appendage. She is on warfarin therapy, CHADS2 vasc of 4, no prior TIA/CVA.    She has had a pacemaker for sick sinus syndrome.     A paced 42%, V paced 1 %.  Feels heart racing at times.  Sometimes dizzy, no syncope.    We have been performing amiodarone surveillance.    Her most recent LFTs were up but she has been a heavy drinker. Still drinking.   She went into inpt rehab, just got out less than a week ago and continues to be sober.  Fatty liver by CT scan 3-28-18, mildly abnormal LFTs, etoh, fatty liver, amio.  LFTs normal on 4/3/2018 abnormal 8/2018 TSH was less than 0.005 on 3/29/2018. PFTs pending.    She has secondary pulmonary hypertension with dilated RV and RV dysfunction.   She had undergone right heart catheterization in March 2018 which showed an elevated wedge of 29 and normal cardiac output and pulmonary pressure of 41/25 there is a large V wave but she did not have significant MR on JULY.      Calcified coronary disease CT 3-28-18, warfarin, statin  Has HLP, on statin, no recent lipids.    She continues to have dyspnea on exertion.   Sees Pulm, now on O2 which she needs but not wearing it today.  Has KAEL but cannot " tolerate the first 2 masks, cannot pay for the third.  No concerns up to this point about amio toxicity.    She has [place] heart class III symptoms.   Weight has been up and and down.  Taking lasix PRN.    She has chronic pain syndrome.    She takes Abilify which can provoke tachyarrhythmias.     She has had dietary indiscretion with salt while in rehab and drinking more water to replace ETOH.    She quit smoking, now vapes.     No chest pain.  Has chronic right chest wall pain.    She had her device interrogated today which showed she has been in sinus rhythm since her cardioversion.    Blood work done at lab core reveals LFTs still up a bit and for some reason no TSH.    DATA REVIEWED by me:  ECG April 3, 2018 sinus rhythm, rate 68, anterior T-wave inversion  ECG 5-30-18, NSR, 67,  anterior T wave inversion    PM 7-   A paced 42%, V paced 1 %, no mode whiches.    Echo 3/29/2018 patient in rapid atrial fibrillation, severely developed dilated RV, mild left atrial enlargement, RVSP 35 mmHg above right atrial pressure, EF 55%     JULY 4/2/2018 EF 50%, moderate RV dilatation with systolic function reduction, mild biatrial enlargement, spontaneous contrast in the left atrial appendage but no thrombus, mitral valve ring in place    CT the chest March 28, 2018, no evidence of pulmonary embolism, right lower lobe atelectasis with groundglass opacities and mild edema, trace right pleural effusion, coronary artery calcifications, hepatic steatosis and splenomegaly    Cardioversion report 4/5/2018 converted with 200 J ×1 with return to sinus rhythm and ventricular pacing    Most recent labs:     Lab Results   Component Value Date/Time    HEMOGLOBIN 10.2 (L) 04/06/2018 05:13 AM    HEMATOCRIT 32.5 (L) 04/06/2018 05:13 AM    MCV 95.6 04/06/2018 05:13 AM    INR 1.60 07/20/2020      Lab Results   Component Value Date/Time    SODIUM 135 04/06/2018 05:13 AM    POTASSIUM 4.4 04/06/2018 05:13 AM    CHLORIDE 102 04/06/2018  05:13 AM    CO2 30 04/06/2018 05:13 AM    GLUCOSE 92 04/06/2018 05:13 AM    BUN 10 04/06/2018 05:13 AM    CREATININE 0.67 04/06/2018 05:13 AM      Lab Results   Component Value Date/Time    ASTSGOT 30 04/03/2018 12:02 AM    ALTSGPT 18 04/03/2018 12:02 AM    ALBUMIN 3.7 04/03/2018 12:02 AM      Lab Results   Component Value Date/Time    CHOLSTRLTOT 200 (H) 07/25/2014 01:30 AM    LDL see below 07/25/2014 01:30 AM    HDL 37 (A) 07/25/2014 01:30 AM    TRIGLYCERIDE 574 (H) 07/25/2014 01:30 AM       April 6, 2018 hemoglobin 10.2, platelets 93, sodium 135, potassium 4.4, creatinine 0.67    7-30-18  Cr. 0.75, K 3.7, Na 142  AST 92, ALT 45, , TB 0.4  Hbg A1C 5.3  hgb 12.6, hct 38.9, plts 135    8-21-18 INR 2.5      Past Medical History:   Diagnosis Date   • Atrial myxoma November 2011    Status post LA myxoma resection   • Backpain    • CAD (coronary artery disease)     pacemaker   • Chronic pain    • Dizziness     • Hyperlipidemia     • Hypertension    • Hypertriglyceridemia     • Indigestion    • Peripheral edema    • Sciatica    • Sick sinus syndrome (HCC) November 2011    Status post PPM implantation.   • Status post mitral valve repair November 2011    MV repair with 32mm Jonnie-Carpenter flexible annuloplasty ring.     Past Surgical History:   Procedure Laterality Date   • CARDIAC CATH, RIGHT HEART  03/31/2018    Right heart cath with high pressures.   • MITRAL VALVE REPAIR  11/17/2011    Performed by MARY ORTIZ at [facility]   • PACEMAKER INSERTION  November 2011    Medtronic Versa VEDR01 implanted by Dr. Amaya.   • OTHER ORTHOPEDIC SURGERY      Neck and back      Family History   Problem Relation Age of Onset   • Heart Attack Father      Social History     Socioeconomic History   • Marital status: Single     Spouse name: Not on file   • Number of children: Not on file   • Years of education: Not on file   • Highest education level: Not on file   Occupational History   • Not on file   Social  Needs   • Financial resource strain: Not on file   • Food insecurity     Worry: Not on file     Inability: Not on file   • Transportation needs     Medical: Not on file     Non-medical: Not on file   Tobacco Use   • Smoking status: Former Smoker     Packs/day: 1.00     Years: 39.00     Pack years: 39.00     Types: Cigarettes     Last attempt to quit: 2017     Years since quittin.7   • Smokeless tobacco: Never Used   • Tobacco comment:  Down to vapping now.    Substance and Sexual Activity   • Alcohol use: Not Currently     Comment: sober since 18!   • Drug use: No     Types: Inhaled, Oral     Comment: hx of, denies currently   • Sexual activity: Not on file   Lifestyle   • Physical activity     Days per week: Not on file     Minutes per session: Not on file   • Stress: Not on file   Relationships   • Social connections     Talks on phone: Not on file     Gets together: Not on file     Attends Orthodoxy service: Not on file     Active member of club or organization: Not on file     Attends meetings of clubs or organizations: Not on file     Relationship status: Not on file   • Intimate partner violence     Fear of current or ex partner: Not on file     Emotionally abused: Not on file     Physically abused: Not on file     Forced sexual activity: Not on file   Other Topics Concern   • Not on file   Social History Narrative   • Not on file     Allergies   Allergen Reactions   • Penicillins Rash             Current Outpatient Medications   Medication Sig Dispense Refill   • methocarbamol (ROBAXIN) 750 MG Tab      • ARIPiprazole (ABILIFY) 10 MG Tab      • torsemide (DEMADEX) 20 MG Tab Take 1 Tab by mouth every day. 90 Tab 3   • potassium chloride SA (KDUR) 20 MEQ Tab CR Take 1 Tab by mouth every day. 100 Tab 3   • VENTOLIN  (90 Base) MCG/ACT Aero Soln inhalation aerosol INHALE TWO PUFFS BY MOUTH EVERY FOUR HOURS AS NEEDED FOR SHORTNESS OF BREATH 1 Inhaler 3   • warfarin (COUMADIN) 5 MG Tab Take  "1-2 Tabs by mouth every day. 180 Tab 1   • venlafaxine XR (EFFEXOR XR) 75 MG CAPSULE SR 24 HR Take 75 mg by mouth every day.     • amiodarone (CORDARONE) 200 MG Tab Take 1 Tab by mouth every day. 90 Tab 3   • atorvastatin (LIPITOR) 20 MG Tab Take 1 Tab by mouth every evening. 90 Tab 3   • Blood Pressure Monitoring (B-D ASSURE BPM/AUTO ARM CUFF) Misc Blood pressure home monitoring system 1 Each 0   • traZODone (DESYREL) 100 MG Tab Take 100 mg by mouth every evening.     • oxyCODONE immediate release (ROXICODONE) 10 MG immediate release tablet Take 10 mg by mouth 3 times a day. TID with MS Contin     • venlafaxine (EFFEXOR-XR) 150 MG extended-release capsule Take  by mouth every morning.     • Multiple Vitamin (MULTI-DAY PO) Take 1 Tab by mouth every morning.     • pregabalin (LYRICA) 200 MG capsule Take 200 mg by mouth 3 times a day.     • vitamin D (CHOLECALCIFEROL) 1000 UNIT Tab Take 1,000 Units by mouth every morning.     • morphine SR (MS CONTIN) 15 MG TB12 Take 1 Tab by mouth 3 times a day. 60 Tab 0     No current facility-administered medications for this visit.        ROS  All others systems reviewed and negative.     Objective:     /72 (BP Location: Left arm, Patient Position: Sitting, BP Cuff Size: Adult)   Pulse 88   Ht 1.702 m (5' 7\")   Wt (!) 137.8 kg (303 lb 12.7 oz)   SpO2 92%  Body mass index is 47.58 kg/m².    Physical Exam   General: No acute distress. Well nourished.  HEENT: EOM grossly intact, no scleral icterus, no pharyngeal erythema.   Neck:  No JVD, no bruits, trachea midline  CVS: RRR today. Normal S1, S2. 2/6 syst murmur. 2+ pititng LE edema.  2+ radial pulses, well-healed scar across the chest, PM pocket intack  Resp: CTAB. No wheezing or crackles/rhonchi. Normal respiratory effort.  Abdomen: Soft, NT, no maco hepatomegaly, obese.  MSK/Ext: No clubbing or cyanosis.  Skin: Warm and dry, no rashes.  Neurological: CN III-XII grossly intact. No focal deficits.   Psych: A&O x 3, " appropriate affect, good judgement    Physical exam performed today and unchanged, except what is noted, compared to 8-24-18      Assessment:     1. Heart failure with preserved ejection fraction, borderline, class II ([facility])  Comp Metabolic Panel    potassium chloride SA (KDUR) 20 MEQ Tab CR   2. Essential hypertension  potassium chloride SA (KDUR) 20 MEQ Tab CR   3. PAF (paroxysmal atrial fibrillation) (CMS-HCC)  TSH WITH REFLEX TO FT4   4. Status post mitral valve repair     5. Mixed hyperlipidemia  Lipid Profile   6. Atypical atrial flutter (HCC)     7. Pacemaker     8. Sick sinus syndrome (HCC)     9. Chronic right-sided heart failure (HCC)     10. Thrombocytopenia (HCC)     11. Pulmonary hypertension (HCC)     12. KAEL (obstructive sleep apnea)     13. Tobacco use disorder     14. High risk medication use     15. Morbid obesity with BMI of 40.0-44.9, adult ([facility])     16. Palpitations     17. Hx of pleural effusion     18. Alcohol use     19. Shortness of breath     20. Localized edema  potassium chloride SA (KDUR) 20 MEQ Tab CR       Medical Decision Making:  Today's Assessment / Status / Plan:     -I agree taking diuretic daily is best idea  -Needs updated blood work, will take to [facility], LFTs, TSH  -PFTS for amio if lungs worsen  -Cont PM checks   -Cont warfarin  -Cont primary prevention statin  -Refills on torsemide and K today  -RTC 3 months MD JUD 6 months    Written instructions given today:    -Get blood work done, fasting if possible, take to [facility]  -Take the torsemide and potassium every day, morning or afternoon depending on your convenience.     Return in about 3 months (around 10/21/2020).    It is my pleasure to participate in the care of Ms. Velarde.  Please do not hesitate to contact me with questions or concerns.    Leah Bojorquez MD, [facility]  Cardiologist [facility]    Please note that this dictation was created using voice recognition software. I have made every  reasonable attempt to correct obvious errors, but it is possible there are errors of grammar and possibly content that I did not discover before finalizing the note.      Pema Peña M.D.  [address] [place] [id number]  VIA Facsimile: [phone]

## 2020-07-21 NOTE — PATIENT INSTRUCTIONS
-Get blood work done, fasting if possible, take to [facility]  -Take the torsemide and potassium every day, morning or afternoon depending on your convenience.

## 2020-07-21 NOTE — PROGRESS NOTES
"Subjective:   Chief Complaint:   Chief Complaint   Patient presents with   • Follow-Up     Acute on Chronic Diastolic Congestive Heart Failure     Kailey \"Melanie\" MUKUL Velarde is a 60 y.o. female who returns for evaluation of very complex heart disease.      She has chronic diastolic congestive heart failure, prior mitral valve repair, paroxysmal atrial fibrillation/flutter and was cardioverted during the hospital stay in April 2018 after initiation of amiodarone therapy, JULY at that time revealed smoking left atrial appendage. She is on warfarin therapy, CHADS2 vasc of 4, no prior TIA/CVA.    She has had a pacemaker for sick sinus syndrome.     A paced 42%, V paced 1 %.  Feels heart racing at times.  Sometimes dizzy, no syncope.    We have been performing amiodarone surveillance.    Her most recent LFTs were up but she has been a heavy drinker. Still drinking.   She went into inpt rehab, just got out less than a week ago and continues to be sober.  Fatty liver by CT scan 3-28-18, mildly abnormal LFTs, etoh, fatty liver, amio.  LFTs normal on 4/3/2018 abnormal 8/2018 TSH was less than 0.005 on 3/29/2018. PFTs pending.    She has secondary pulmonary hypertension with dilated RV and RV dysfunction.   She had undergone right heart catheterization in March 2018 which showed an elevated wedge of 29 and normal cardiac output and pulmonary pressure of 41/25 there is a large V wave but she did not have significant MR on JULY.      Calcified coronary disease CT 3-28-18, warfarin, statin  Has HLP, on statin, no recent lipids.    She continues to have dyspnea on exertion.   Sees Pulm, now on O2 which she needs but not wearing it today.  Has KAEL but cannot tolerate the first 2 masks, cannot pay for the third.  No concerns up to this point about amio toxicity.    She has [place] heart class III symptoms.   Weight has been up and and down.  Taking lasix PRN.    She has chronic pain syndrome.    She takes Abilify which can provoke " tachyarrhythmias.     She has had dietary indiscretion with salt while in rehab and drinking more water to replace ETOH.    She quit smoking, now vapes.     No chest pain.  Has chronic right chest wall pain.    She had her device interrogated today which showed she has been in sinus rhythm since her cardioversion.    Blood work done at lab core reveals LFTs still up a bit and for some reason no TSH.    DATA REVIEWED by me:  ECG April 3, 2018 sinus rhythm, rate 68, anterior T-wave inversion  ECG 5-30-18, NSR, 67,  anterior T wave inversion    PM 7-   A paced 42%, V paced 1 %, no mode whiches.    Echo 3/29/2018 patient in rapid atrial fibrillation, severely developed dilated RV, mild left atrial enlargement, RVSP 35 mmHg above right atrial pressure, EF 55%     JULY 4/2/2018 EF 50%, moderate RV dilatation with systolic function reduction, mild biatrial enlargement, spontaneous contrast in the left atrial appendage but no thrombus, mitral valve ring in place    CT the chest March 28, 2018, no evidence of pulmonary embolism, right lower lobe atelectasis with groundglass opacities and mild edema, trace right pleural effusion, coronary artery calcifications, hepatic steatosis and splenomegaly    Cardioversion report 4/5/2018 converted with 200 J ×1 with return to sinus rhythm and ventricular pacing    Most recent labs:     Lab Results   Component Value Date/Time    HEMOGLOBIN 10.2 (L) 04/06/2018 05:13 AM    HEMATOCRIT 32.5 (L) 04/06/2018 05:13 AM    MCV 95.6 04/06/2018 05:13 AM    INR 1.60 07/20/2020      Lab Results   Component Value Date/Time    SODIUM 135 04/06/2018 05:13 AM    POTASSIUM 4.4 04/06/2018 05:13 AM    CHLORIDE 102 04/06/2018 05:13 AM    CO2 30 04/06/2018 05:13 AM    GLUCOSE 92 04/06/2018 05:13 AM    BUN 10 04/06/2018 05:13 AM    CREATININE 0.67 04/06/2018 05:13 AM      Lab Results   Component Value Date/Time    ASTSGOT 30 04/03/2018 12:02 AM    ALTSGPT 18 04/03/2018 12:02 AM    ALBUMIN 3.7 04/03/2018  12:02 AM      Lab Results   Component Value Date/Time    CHOLSTRLTOT 200 (H) 07/25/2014 01:30 AM    LDL see below 07/25/2014 01:30 AM    HDL 37 (A) 07/25/2014 01:30 AM    TRIGLYCERIDE 574 (H) 07/25/2014 01:30 AM       April 6, 2018 hemoglobin 10.2, platelets 93, sodium 135, potassium 4.4, creatinine 0.67    7-30-18  Cr. 0.75, K 3.7, Na 142  AST 92, ALT 45, , TB 0.4  Hbg A1C 5.3  hgb 12.6, hct 38.9, plts 135    8-21-18 INR 2.5      Past Medical History:   Diagnosis Date   • Atrial myxoma November 2011    Status post LA myxoma resection   • Backpain    • CAD (coronary artery disease)     pacemaker   • Chronic pain    • Dizziness     • Hyperlipidemia     • Hypertension    • Hypertriglyceridemia     • Indigestion    • Peripheral edema    • Sciatica    • Sick sinus syndrome (HCC) November 2011    Status post PPM implantation.   • Status post mitral valve repair November 2011    MV repair with 32mm Jonnie-Carpenter flexible annuloplasty ring.     Past Surgical History:   Procedure Laterality Date   • CARDIAC CATH, RIGHT HEART  03/31/2018    Right heart cath with high pressures.   • MITRAL VALVE REPAIR  11/17/2011    [place] [facility] [place]   • PACEMAKER INSERTION  November 2011    Medtronic Versa [id number] implanted by Dr. Amaya.   • OTHER ORTHOPEDIC SURGERY      Neck and back      Family History   Problem Relation Age of Onset   • Heart Attack Father      Social History     Socioeconomic History   • Marital status: Single     Spouse name: Not on file   • Number of children: Not on file   • Years of education: Not on file   • Highest education level: Not on file   Occupational History   • Not on file   Social Needs   • Financial resource strain: Not on file   • Food insecurity     Worry: Not on file     Inability: Not on file   • Transportation needs     Medical: Not on file     Non-medical: Not on file   Tobacco Use   • Smoking status: Former Smoker     Packs/day: 1.00     Years: 39.00      Pack years: 39.00     Types: Cigarettes     Last attempt to quit: 2017     Years since quittin.7   • Smokeless tobacco: Never Used   • Tobacco comment:  Down to vapping now.    Substance and Sexual Activity   • Alcohol use: Not Currently     Comment: sober since 18!   • Drug use: No     Types: Inhaled, Oral     Comment: hx of, denies currently   • Sexual activity: Not on file   Lifestyle   • Physical activity     Days per week: Not on file     Minutes per session: Not on file   • Stress: Not on file   Relationships   • Social connections     Talks on phone: Not on file     Gets together: Not on file     Attends Anabaptism service: Not on file     Active member of club or organization: Not on file     Attends meetings of clubs or organizations: Not on file     Relationship status: Not on file   • Intimate partner violence     Fear of current or ex partner: Not on file     Emotionally abused: Not on file     Physically abused: Not on file     Forced sexual activity: Not on file   Other Topics Concern   • Not on file   Social History Narrative   • Not on file     Allergies   Allergen Reactions   • Penicillins Rash             Current Outpatient Medications   Medication Sig Dispense Refill   • methocarbamol (ROBAXIN) 750 MG Tab      • ARIPiprazole (ABILIFY) 10 MG Tab      • torsemide (DEMADEX) 20 MG Tab Take 1 Tab by mouth every day. 90 Tab 3   • potassium chloride SA (KDUR) 20 MEQ Tab CR Take 1 Tab by mouth every day. 100 Tab 3   • VENTOLIN  (90 Base) MCG/ACT Aero Soln inhalation aerosol INHALE TWO PUFFS BY MOUTH EVERY FOUR HOURS AS NEEDED FOR SHORTNESS OF BREATH 1 Inhaler 3   • warfarin (COUMADIN) 5 MG Tab Take 1-2 Tabs by mouth every day. 180 Tab 1   • venlafaxine XR (EFFEXOR XR) 75 MG CAPSULE SR 24 HR Take 75 mg by mouth every day.     • amiodarone (CORDARONE) 200 MG Tab Take 1 Tab by mouth every day. 90 Tab 3   • atorvastatin (LIPITOR) 20 MG Tab Take 1 Tab by mouth every evening. 90 Tab 3  "  • Blood Pressure Monitoring (B-D ASSURE BPM/AUTO ARM CUFF) Misc Blood pressure home monitoring system 1 Each 0   • traZODone (DESYREL) 100 MG Tab Take 100 mg by mouth every evening.     • oxyCODONE immediate release (ROXICODONE) 10 MG immediate release tablet Take 10 mg by mouth 3 times a day. TID with MS Contin     • venlafaxine (EFFEXOR-XR) 150 MG extended-release capsule Take  by mouth every morning.     • Multiple Vitamin (MULTI-DAY PO) Take 1 Tab by mouth every morning.     • pregabalin (LYRICA) 200 MG capsule Take 200 mg by mouth 3 times a day.     • vitamin D (CHOLECALCIFEROL) 1000 UNIT Tab Take 1,000 Units by mouth every morning.     • morphine SR (MS CONTIN) 15 MG TB12 Take 1 Tab by mouth 3 times a day. 60 Tab 0     No current facility-administered medications for this visit.        ROS  All others systems reviewed and negative.     Objective:     /72 (BP Location: Left arm, Patient Position: Sitting, BP Cuff Size: Adult)   Pulse 88   Ht 1.702 m (5' 7\")   Wt (!) 137.8 kg (303 lb 12.7 oz)   SpO2 92%  Body mass index is 47.58 kg/m².    Physical Exam   General: No acute distress. Well nourished.  HEENT: EOM grossly intact, no scleral icterus, no pharyngeal erythema.   Neck:  No JVD, no bruits, trachea midline  CVS: RRR today. Normal S1, S2. 2/6 syst murmur. 2+ pititng LE edema.  2+ radial pulses, well-healed scar across the chest, PM pocket intack  Resp: CTAB. No wheezing or crackles/rhonchi. Normal respiratory effort.  Abdomen: Soft, NT, no maco hepatomegaly, obese.  MSK/Ext: No clubbing or cyanosis.  Skin: Warm and dry, no rashes.  Neurological: CN III-XII grossly intact. No focal deficits.   Psych: A&O x 3, appropriate affect, good judgement    Physical exam performed today and unchanged, except what is noted, compared to 8-24-18      Assessment:     1. Heart failure with preserved ejection fraction, borderline, class II (HCC)  Comp Metabolic Panel    potassium chloride SA (KDUR) 20 MEQ Tab CR "   2. Essential hypertension  potassium chloride SA (KDUR) 20 MEQ Tab CR   3. PAF (paroxysmal atrial fibrillation) (CMS-HCC)  TSH WITH REFLEX TO FT4   4. Status post mitral valve repair     5. Mixed hyperlipidemia  Lipid Profile   6. Atypical atrial flutter (HCC)     7. Pacemaker     8. Sick sinus syndrome (HCC)     9. Chronic right-sided heart failure (HCC)     10. Thrombocytopenia (HCC)     11. Pulmonary hypertension (HCC)     12. KAEL (obstructive sleep apnea)     13. Tobacco use disorder     14. High risk medication use     15. Morbid obesity with BMI of 40.0-44.9, adult (HCC)     16. Palpitations     17. Hx of pleural effusion     18. Alcohol use     19. Shortness of breath     20. Localized edema  potassium chloride SA (KDUR) 20 MEQ Tab CR       Medical Decision Making:  Today's Assessment / Status / Plan:     -I agree taking diuretic daily is best idea  -Needs updated blood work, will take to [facility], LFTs, TSH  -PFTS for amio if lungs worsen  -Cont PM checks   -Cont warfarin  -Cont primary prevention statin  -Refills on torsemide and K today  -RTC 3 months MD JUD 6 months    Written instructions given today:    -Get blood work done, fasting if possible, take to [facility]  -Take the torsemide and potassium every day, morning or afternoon depending on your convenience.     Return in about 3 months (around 10/21/2020).    It is my pleasure to participate in the care of Ms. Velarde.  Please do not hesitate to contact me with questions or concerns.    Leah Bojorquez MD, [facility]  Cardiologist [facility]    Please note that this dictation was created using voice recognition software. I have made every reasonable attempt to correct obvious errors, but it is possible there are errors of grammar and possibly content that I did not discover before finalizing the note.

## 2020-08-06 ENCOUNTER — APPOINTMENT (OUTPATIENT)
Dept: VASCULAR LAB | Facility: MEDICAL CENTER | Age: 60
End: 2020-08-06
Attending: INTERNAL MEDICINE
Payer: MEDICARE

## 2020-08-10 ENCOUNTER — ANTICOAGULATION VISIT (OUTPATIENT)
Dept: VASCULAR LAB | Facility: MEDICAL CENTER | Age: 60
End: 2020-08-10
Attending: INTERNAL MEDICINE
Payer: MEDICARE

## 2020-08-10 VITALS — DIASTOLIC BLOOD PRESSURE: 82 MMHG | SYSTOLIC BLOOD PRESSURE: 141 MMHG | HEART RATE: 82 BPM

## 2020-08-10 DIAGNOSIS — I48.0 PAF (PAROXYSMAL ATRIAL FIBRILLATION) (HCC): ICD-10-CM

## 2020-08-10 DIAGNOSIS — Z98.890 STATUS POST MITRAL VALVE REPAIR: ICD-10-CM

## 2020-08-10 LAB
INR BLD: 3.1 (ref 0.9–1.2)
INR PPP: 3.1 (ref 2–3.5)

## 2020-08-10 PROCEDURE — 99212 OFFICE O/P EST SF 10 MIN: CPT | Performed by: NURSE PRACTITIONER

## 2020-08-10 PROCEDURE — 85610 PROTHROMBIN TIME: CPT

## 2020-08-10 NOTE — PROGRESS NOTES
Anticoagulation Summary  As of 8/10/2020    INR goal:  2.0-3.0   TTR:  42.7 % (2.3 y)   INR used for dosing:  3.10 (8/10/2020)   Warfarin maintenance plan:  2.5 mg (5 mg x 0.5) every Sun, Tue, Thu; 5 mg (5 mg x 1) all other days   Weekly warfarin total:  27.5 mg   Plan last modified:  Brittnee Cisneros, A.P.N. (7/20/2020)   Next INR check:  8/31/2020   Target end date:  Indefinite    Indications    PAF (paroxysmal atrial fibrillation) (CMS-HCC) [I48.0]  Long term current use of anticoagulant therapy (Resolved) [Z79.01]  Status post mitral valve repair [Z98.890]             Anticoagulation Episode Summary     INR check location:      Preferred lab:      Send INR reminders to:      Comments:  Right arm only for BP checks      Anticoagulation Care Providers     Provider Role Specialty Phone number    [facility] Responsible  [phone]        Anticoagulation Patient Findings      HPI:  Kailey Velarde seen in clinic today for follow up on anticoagulation therapy in the presence of AF.   Denies any changes to current medical/health status since last appointment.   Ate a little less greens than normal. Denies any medication changes.   No current symptoms of bleeding or thrombosis reported.  Took 2.5 mg instead of 5 mg on Saturday by mistake.    A/P:   INR supratherapeutic.   Will decrease one dose then continue current regimen. Encouraged pt to resume her usual vit k intake and to stay consistent.   BP recorded in vitals.    Follow up appointment in 3 week(s) per pt's request.    Next Appointment: Monday, August 31, 2020 at 11:45 am.    Brittnee HENDERSON

## 2020-08-31 ENCOUNTER — APPOINTMENT (OUTPATIENT)
Dept: VASCULAR LAB | Facility: MEDICAL CENTER | Age: 60
End: 2020-08-31
Attending: INTERNAL MEDICINE
Payer: MEDICARE

## 2020-09-04 ENCOUNTER — ANTICOAGULATION VISIT (OUTPATIENT)
Dept: VASCULAR LAB | Facility: MEDICAL CENTER | Age: 60
End: 2020-09-04
Attending: INTERNAL MEDICINE
Payer: MEDICARE

## 2020-09-04 DIAGNOSIS — I48.0 PAF (PAROXYSMAL ATRIAL FIBRILLATION) (HCC): ICD-10-CM

## 2020-09-04 DIAGNOSIS — Z98.890 STATUS POST MITRAL VALVE REPAIR: ICD-10-CM

## 2020-09-04 LAB
INR BLD: 4.6 (ref 0.9–1.2)
INR PPP: 4.6 (ref 2–3.5)

## 2020-09-04 PROCEDURE — 85610 PROTHROMBIN TIME: CPT

## 2020-09-04 PROCEDURE — 99212 OFFICE O/P EST SF 10 MIN: CPT

## 2020-09-04 NOTE — PROGRESS NOTES
Anticoagulation Summary  As of 2020    INR goal:  2.0-3.0   TTR:  41.5 % (2.4 y)   INR used for dosin.60 (2020)   Warfarin maintenance plan:  2.5 mg (5 mg x 0.5) every Sun, e, Thu; 5 mg (5 mg x 1) all other days   Weekly warfarin total:  27.5 mg   Plan last modified:  Brittnee Cisneros APriscillaPPriscillaNPriscilla (2020)   Next INR check:  2020   Target end date:  Indefinite    Indications    PAF (paroxysmal atrial fibrillation) (CMS-HCC) [I48.0]  Long term current use of anticoagulant therapy (Resolved) [Z79.01]  Status post mitral valve repair [Z98.890]             Anticoagulation Episode Summary     INR check location:      Preferred lab:      Send INR reminders to:      Comments:  Right arm only for BP checks      Anticoagulation Care Providers     Provider Role Specialty Phone number    [facility] Responsible  [phone]        Anticoagulation Patient Findings      HPI:  Kailey Velarde seen in clinic today, on anticoagulation therapy with warfarin for PAF, s/p MVR  Changes to current medical/health status since last appt: None  Denies signs/symptoms of bleeding and/or thrombosis since the last appt.    Denies any interval changes to diet.  Denies any interval changes to medications since last appt.   Denies any complications or cost restrictions with current therapy.   BP declined.    A/P   INR  SUPRA-therapeutic.   Pt endorses drinking more beer lately - she states a 12 pack per day (she was very hesitant to tell me this information). Counseled pt on bleeding and fall risk with her alcohol consumption.    Counseled pt to hold warfarin x 2 doses and to then continue on with her current regimen.     Established two goals with pt for her to work on prior to next visit:  1. Cut down to at least 3/4 the amount of beer she is consuming on a daily basis  2. Walk to the park with her dog at least once per week.    Follow up appointment in 2 week(s) per pt.    Arnulfo Stovall, LeonoraD

## 2020-09-08 DIAGNOSIS — I10 ESSENTIAL HYPERTENSION: ICD-10-CM

## 2020-09-10 DIAGNOSIS — I48.0 PAF (PAROXYSMAL ATRIAL FIBRILLATION) (HCC): ICD-10-CM

## 2020-09-10 RX ORDER — ATORVASTATIN CALCIUM 20 MG/1
TABLET, FILM COATED ORAL
Qty: 90 TAB | Refills: 2 | Status: SHIPPED | OUTPATIENT
Start: 2020-09-10 | End: 2021-05-18

## 2020-09-10 RX ORDER — AMIODARONE HYDROCHLORIDE 200 MG/1
200 TABLET ORAL DAILY
Qty: 90 TAB | Refills: 3 | Status: SHIPPED | OUTPATIENT
Start: 2020-09-10 | End: 2021-09-13

## 2020-09-18 ENCOUNTER — APPOINTMENT (OUTPATIENT)
Dept: VASCULAR LAB | Facility: MEDICAL CENTER | Age: 60
End: 2020-09-18
Attending: INTERNAL MEDICINE
Payer: MEDICARE

## 2020-09-21 ENCOUNTER — ANTICOAGULATION VISIT (OUTPATIENT)
Dept: VASCULAR LAB | Facility: MEDICAL CENTER | Age: 60
End: 2020-09-21
Attending: INTERNAL MEDICINE
Payer: MEDICARE

## 2020-09-21 DIAGNOSIS — I48.0 PAF (PAROXYSMAL ATRIAL FIBRILLATION) (HCC): ICD-10-CM

## 2020-09-21 DIAGNOSIS — Z98.890 STATUS POST MITRAL VALVE REPAIR: ICD-10-CM

## 2020-09-21 LAB — INR PPP: 1.5 (ref 2–3.5)

## 2020-09-21 PROCEDURE — 99212 OFFICE O/P EST SF 10 MIN: CPT

## 2020-09-21 PROCEDURE — 85610 PROTHROMBIN TIME: CPT

## 2020-09-21 NOTE — PROGRESS NOTES
Anticoagulation Summary  As of 2020    INR goal:  2.0-3.0   TTR:  41.3 % (2.4 y)   INR used for dosin.50 (2020)   Warfarin maintenance plan:  2.5 mg (5 mg x 0.5) every Sun, e, Thu; 5 mg (5 mg x 1) all other days   Weekly warfarin total:  27.5 mg   Plan last modified:  Brittnee Cisneros A.P.NPriscilla (2020)   Next INR check:  10/5/2020   Target end date:  Indefinite    Indications    PAF (paroxysmal atrial fibrillation) (CMS-HCC) [I48.0]  Long term current use of anticoagulant therapy (Resolved) [Z79.01]  Status post mitral valve repair [Z98.890]             Anticoagulation Episode Summary     INR check location:      Preferred lab:      Send INR reminders to:      Comments:  Right arm only for BP checks      Anticoagulation Care Providers     Provider Role Specialty Phone number    [facility] Responsible  [phone]                Anticoagulation Patient Findings      HPI:  Kailey Velarde seen in clinic today, on anticoagulation therapy with warfarin for afib and s/p mitral valve repair.  Changes to current medical/health status since last appt: no  Denies signs/symptoms of bleeding and/or thrombosis since the last appt.    See from last note that patient drinks 12 pack of beer daily, and that she would try to but down drinking to 3/4 of current amount. However, today patient did not wish to discuss more about her drinking habits. Says that she has been getting out more to walk her dog to the park.  Denies any interval changes to medications since last appt.   Denies any complications or cost restrictions with current therapy.   Verified current warfarin dosing schedule.   Pt declined vitals.      A/P   INR  is now sub-therapeutic.   Pt has been on this regimen for a while and has been trending supratherapeutic until today. Will bolus 1.5 tablets (7.5mg) today and then resume current regimen.    Follow up appointment in 2 week(s).    Shanae Rodrigues, PharmD

## 2020-09-22 LAB — INR BLD: 1.5 (ref 0.9–1.2)

## 2020-10-05 ENCOUNTER — TELEMEDICINE (OUTPATIENT)
Dept: SLEEP MEDICINE | Facility: MEDICAL CENTER | Age: 60
End: 2020-10-05
Payer: COMMERCIAL

## 2020-10-05 DIAGNOSIS — Z53.8 APPOINTMENT CANCELED BY HOSPITAL: ICD-10-CM

## 2020-10-05 PROCEDURE — 99999 PR NO CHARGE: CPT | Mod: 95 | Performed by: INTERNAL MEDICINE

## 2020-10-07 ENCOUNTER — APPOINTMENT (OUTPATIENT)
Dept: VASCULAR LAB | Facility: MEDICAL CENTER | Age: 60
End: 2020-10-07
Payer: MEDICARE

## 2020-10-10 NOTE — PROGRESS NOTES
The patient canceled today's visit and rescheduled for future date.  No evaluation or patient contact occurred on this date.

## 2020-10-14 ENCOUNTER — TELEPHONE (OUTPATIENT)
Dept: CARDIOLOGY | Facility: MEDICAL CENTER | Age: 60
End: 2020-10-14

## 2020-10-14 NOTE — TELEPHONE ENCOUNTER
Chart Prep    Spoke to patient regarding fasting labs ordered by LS, Lipid, CMP, and TSH. Patient states she usually uses the lab at [facility]. Patient requested paper copies of her lab slips, total of 3 slips mailed to patients home address, verified over the phone.  Will follow up with [facility] for lab results once that are completed.

## 2020-10-16 NOTE — TELEPHONE ENCOUNTER
"Received call from [facility] [place] 10/16 1:43pm    \"wants to speak with Dr Bojorquez's M/A.\"    Please call the Pt back at [phone].    Thank you,  Valarie LY"

## 2020-10-19 ENCOUNTER — ANTICOAGULATION VISIT (OUTPATIENT)
Dept: VASCULAR LAB | Facility: MEDICAL CENTER | Age: 60
End: 2020-10-19
Attending: INTERNAL MEDICINE
Payer: MEDICARE

## 2020-10-19 DIAGNOSIS — Z98.890 STATUS POST MITRAL VALVE REPAIR: ICD-10-CM

## 2020-10-19 DIAGNOSIS — I48.0 PAF (PAROXYSMAL ATRIAL FIBRILLATION) (HCC): ICD-10-CM

## 2020-10-19 LAB
INR BLD: 1.4 (ref 0.9–1.2)
INR PPP: 1.4 (ref 2–3.5)

## 2020-10-19 PROCEDURE — 85610 PROTHROMBIN TIME: CPT

## 2020-10-19 PROCEDURE — 99212 OFFICE O/P EST SF 10 MIN: CPT | Performed by: NURSE PRACTITIONER

## 2020-10-19 RX ORDER — WARFARIN SODIUM 5 MG/1
TABLET ORAL
Qty: 90 TAB | Refills: 1 | Status: SHIPPED
Start: 2020-10-19 | End: 2021-07-01

## 2020-10-19 NOTE — PROGRESS NOTES
Anticoagulation Summary  As of 10/19/2020    INR goal:  2.0-3.0   TTR:  40.1 % (2.5 y)   INR used for dosin.40 (10/19/2020)   Warfarin maintenance plan:  2.5 mg (5 mg x 0.5) every Sun, Tue, Thu; 5 mg (5 mg x 1) all other days   Weekly warfarin total:  27.5 mg   Plan last modified:  Brittnee Cisneros, A.P.N. (2020)   Next INR check:  2020   Target end date:  Indefinite    Indications    PAF (paroxysmal atrial fibrillation) (CMS-HCC) [I48.0]  Long term current use of anticoagulant therapy (Resolved) [Z79.01]  Status post mitral valve repair [Z98.890]             Anticoagulation Episode Summary     INR check location:      Preferred lab:      Send INR reminders to:      Comments:  Right arm only for BP checks      Anticoagulation Care Providers     Provider Role Specialty Phone number    [facility] Responsible  [phone]        Anticoagulation Patient Findings      HPI:  Kailey Velarde seen in clinic today for follow up on anticoagulation therapy in the presence of AF.   Pt missed her appt 2 weeks ago due to problems with her insurance being out of network. She thinks she has it worked out now.  Reports having no alcohol for the past 4-5 days. Plans to resume drinking beer today. Is fully aware that alcohol and concurrent warfarin increase her risk of bleeding. She has no plans to stop completely.  Denies any medication or diet changes.   Recently had a scratch to her left forearm from her dog. She has the area covered with a bandage. No other symptoms of bleeding or thrombosis reported.    A/P:   INR subtherapeutic, likely due to no ETOH as she is a chronic drinker. I recommend she avoid ETOH.  Will given two loading doses then continue current regimen.     Follow up appointment in 2 week(s) per pt request.    Next Appointment:  at 11:00 am.    Brittnee HENDERSON

## 2020-11-02 ENCOUNTER — APPOINTMENT (OUTPATIENT)
Dept: VASCULAR LAB | Facility: MEDICAL CENTER | Age: 60
End: 2020-11-02
Attending: INTERNAL MEDICINE
Payer: COMMERCIAL

## 2020-11-04 ENCOUNTER — ANTICOAGULATION VISIT (OUTPATIENT)
Dept: VASCULAR LAB | Facility: MEDICAL CENTER | Age: 60
End: 2020-11-04
Attending: INTERNAL MEDICINE
Payer: MEDICARE

## 2020-11-04 DIAGNOSIS — Z98.890 STATUS POST MITRAL VALVE REPAIR: ICD-10-CM

## 2020-11-04 DIAGNOSIS — I48.0 PAF (PAROXYSMAL ATRIAL FIBRILLATION) (HCC): ICD-10-CM

## 2020-11-04 LAB — INR PPP: 1.7 (ref 2–3.5)

## 2020-11-04 PROCEDURE — 99212 OFFICE O/P EST SF 10 MIN: CPT | Performed by: PHARMACIST

## 2020-11-04 PROCEDURE — 85610 PROTHROMBIN TIME: CPT

## 2020-11-04 NOTE — PROGRESS NOTES
Anticoagulation Summary  As of 2020    INR goal:  2.0-3.0   TTR:  39.4 % (2.5 y)   INR used for dosin.70 (2020)   Warfarin maintenance plan:  2.5 mg (5 mg x 0.5) every Tue, Thu; 5 mg (5 mg x 1) all other days   Weekly warfarin total:  30 mg   Plan last modified:  Enzo Tinsley, PharmD (2020)   Next INR check:  2020   Target end date:  Indefinite    Indications    PAF (paroxysmal atrial fibrillation) (CMS-HCC) [I48.0]  Long term current use of anticoagulant therapy (Resolved) [Z79.01]  Status post mitral valve repair [Z98.890]             Anticoagulation Episode Summary     INR check location:      Preferred lab:      Send INR reminders to:      Comments:  Right arm only for BP checks      Anticoagulation Care Providers     Provider Role Specialty Phone number    [facility] Responsible  [phone]        Anticoagulation Patient Findings  Patient Findings     Negatives:  Signs/symptoms of thrombosis, Signs/symptoms of bleeding, Laboratory test error suspected, Change in health, Change in alcohol use, Change in activity, Upcoming invasive procedure, Emergency department visit, Upcoming dental procedure, Missed doses, Extra doses, Change in medications, Change in diet/appetite, Hospital admission, Bruising, Other complaints          HPI:  Kailey Velarde seen in clinic today, on anticoagulation therapy with warfarin due to hx of PAF and mitral valve repair.  Changes to current medical/health status since last appt: NONE  Denies signs/symptoms of bleeding and/or thrombosis since the last appt.    Ate whole can of spinach last week.  Denies any interval changes to medications since last appt.   Denies any complications or cost restrictions with current therapy.   Verified current warfarin dosing schedule.   BP declined  Medications reconciled.      A/P   INR  remains sub-therapeutic.   Instructed patient to bolus with 7.5mg X 1, then increase weekly warfarin  regimen as detailed above.    Encouraged patient to be consistent with green vegetable and alcohol intake.    Follow up appointment in 2 week(s).    Enzo Tinsley, PharmD

## 2020-11-05 LAB — INR BLD: 1.7 (ref 0.9–1.2)

## 2020-11-18 ENCOUNTER — ANTICOAGULATION VISIT (OUTPATIENT)
Dept: VASCULAR LAB | Facility: MEDICAL CENTER | Age: 60
End: 2020-11-18
Attending: INTERNAL MEDICINE
Payer: MEDICARE

## 2020-11-18 DIAGNOSIS — Z98.890 STATUS POST MITRAL VALVE REPAIR: ICD-10-CM

## 2020-11-18 DIAGNOSIS — I48.0 PAF (PAROXYSMAL ATRIAL FIBRILLATION) (HCC): ICD-10-CM

## 2020-11-18 LAB
INR BLD: 1.5 (ref 0.9–1.2)
INR PPP: 1.5 (ref 2–3.5)

## 2020-11-18 PROCEDURE — 85610 PROTHROMBIN TIME: CPT

## 2020-11-18 PROCEDURE — 99212 OFFICE O/P EST SF 10 MIN: CPT | Performed by: NURSE PRACTITIONER

## 2020-11-18 NOTE — PROGRESS NOTES
Anticoagulation Summary  As of 2020    INR goal:  2.0-3.0   TTR:  38.8 % (2.6 y)   INR used for dosin.50 (2020)   Warfarin maintenance plan:  5 mg (5 mg x 1) every day   Weekly warfarin total:  35 mg   Plan last modified:  SARA KellyPSARITA (2020)   Next INR check:  2020   Target end date:  Indefinite    Indications    PAF (paroxysmal atrial fibrillation) (CMS-HCC) [I48.0]  Long term current use of anticoagulant therapy (Resolved) [Z79.01]  Status post mitral valve repair [Z98.890]             Anticoagulation Episode Summary     INR check location:      Preferred lab:      Send INR reminders to:      Comments:  Right arm only for BP checks      Anticoagulation Care Providers     Provider Role Specialty Phone number    [facility] Responsible  [phone]        Anticoagulation Patient Findings      HPI:  Kailey Velarde seen in clinic today for follow up on anticoagulation therapy in the presence of AF.   Denies any changes to current medical/health status since last appointment.   Denies any medication or diet changes.   No current symptoms of bleeding or thrombosis reported.  Confirmed dose. No missed doses.    A/P:   INR subtherapeutic. INR low for several visits. CHADS 2 score = 2. Will increase current regimen.   BP declined.    Follow up appointment in 1.5 week(s).    Next Appointment: Monday, 2020 at 11:45 am.    Brittnee HENDERSON

## 2020-11-30 ENCOUNTER — ANTICOAGULATION VISIT (OUTPATIENT)
Dept: VASCULAR LAB | Facility: MEDICAL CENTER | Age: 60
End: 2020-11-30
Attending: INTERNAL MEDICINE
Payer: MEDICARE

## 2020-11-30 DIAGNOSIS — I48.0 PAF (PAROXYSMAL ATRIAL FIBRILLATION) (HCC): ICD-10-CM

## 2020-11-30 DIAGNOSIS — Z98.890 STATUS POST MITRAL VALVE REPAIR: ICD-10-CM

## 2020-11-30 LAB — INR PPP: 2.7 (ref 2–3.5)

## 2020-11-30 PROCEDURE — 99211 OFF/OP EST MAY X REQ PHY/QHP: CPT

## 2020-11-30 PROCEDURE — 85610 PROTHROMBIN TIME: CPT

## 2020-11-30 NOTE — PROGRESS NOTES
Anticoagulation Summary  As of 2020    INR goal:  2.0-3.0   TTR:  39.0 % (2.6 y)   INR used for dosin.70 (2020)   Warfarin maintenance plan:  5 mg (5 mg x 1) every day   Weekly warfarin total:  35 mg   Plan last modified:  PALMIRA Kelly (2020)   Next INR check:  2020   Target end date:  Indefinite    Indications    PAF (paroxysmal atrial fibrillation) (CMS-HCC) [I48.0]  Long term current use of anticoagulant therapy (Resolved) [Z79.01]  Status post mitral valve repair [Z98.890]             Anticoagulation Episode Summary     INR check location:      Preferred lab:      Send INR reminders to:      Comments:  Right arm only for BP checks      Anticoagulation Care Providers     Provider Role Specialty Phone number    [facility] Responsible  [phone]                Anticoagulation Patient Findings      HPI:  Kailey Velarde seen in clinic today, on anticoagulation therapy with warfarin for afib, s/p mitral valve repair  Changes to current medical/health status since last appt: No  Denies signs/symptoms of bleeding and/or thrombosis since the last appt.    Denies any interval changes to diet  Denies any interval changes to medications since last appt.   Denies any complications or cost restrictions with current therapy.   Verified current warfarin dosing schedule.   Pt declined vitals  Medications reconciled      A/P   INR  is now therapeutic.   Continue current regimen.     Follow up appointment in 3 week(s) per patient.    Shanae Rodrigues, PharmD

## 2020-12-01 LAB — INR BLD: 2.7 (ref 0.9–1.2)

## 2020-12-21 ENCOUNTER — ANTICOAGULATION VISIT (OUTPATIENT)
Dept: VASCULAR LAB | Facility: MEDICAL CENTER | Age: 60
End: 2020-12-21
Attending: INTERNAL MEDICINE
Payer: MEDICARE

## 2020-12-21 DIAGNOSIS — I48.0 PAF (PAROXYSMAL ATRIAL FIBRILLATION) (HCC): ICD-10-CM

## 2020-12-21 DIAGNOSIS — Z98.890 STATUS POST MITRAL VALVE REPAIR: ICD-10-CM

## 2020-12-21 LAB
INR BLD: 1.5 (ref 0.9–1.2)
INR PPP: 1.5 (ref 2–3.5)

## 2020-12-21 PROCEDURE — 85610 PROTHROMBIN TIME: CPT

## 2020-12-21 PROCEDURE — 99212 OFFICE O/P EST SF 10 MIN: CPT

## 2020-12-21 NOTE — PROGRESS NOTES
Anticoagulation Summary  As of 2020    INR goal:  2.0-3.0   TTR:  39.5 % (2.7 y)   INR used for dosin.50 (2020)   Warfarin maintenance plan:  5 mg (5 mg x 1) every day   Weekly warfarin total:  35 mg   Plan last modified:  PALMIRA Kelly (2020)   Next INR check:  2021   Target end date:  Indefinite    Indications    PAF (paroxysmal atrial fibrillation) (CMS-HCC) [I48.0]  Long term current use of anticoagulant therapy (Resolved) [Z79.01]  Status post mitral valve repair [Z98.890]             Anticoagulation Episode Summary     INR check location:      Preferred lab:      Send INR reminders to:      Comments:  Right arm only for BP checks      Anticoagulation Care Providers     Provider Role Specialty Phone number    [facility] Responsible  [phone]                Anticoagulation Patient Findings      HPI:  Kailey Damon Syd seen in clinic today, on anticoagulation therapy with warfarin for afib, s/p mitral valve repair  Changes to current medical/health status since last appt: Patient says she missed a dose of warfarin sometime last week, updated dosing chart.  Denies signs/symptoms of bleeding and/or thrombosis since the last appt.    Denies any interval changes to diet  Denies any interval changes to medications since last appt.   Denies any complications or cost restrictions with current therapy.   Verified current warfarin dosing schedule.   Pt declined vitals  Medications reconciled      A/P   INR is now sub-therapeutic.   Patient's CHADSVASC Score is 3 - low risk for stroke so will defer bridging at this time. INR is likely low because of missed dose last week. Will have patient bolus 10mg (2 tablets) today then resume current regimen.     Follow up appointment in 2 week(s) per patient.    Shanae Rodrigues, PharmD

## 2021-01-04 ENCOUNTER — ANTICOAGULATION VISIT (OUTPATIENT)
Dept: VASCULAR LAB | Facility: MEDICAL CENTER | Age: 61
End: 2021-01-04
Attending: INTERNAL MEDICINE
Payer: MEDICARE

## 2021-01-04 DIAGNOSIS — I48.0 PAF (PAROXYSMAL ATRIAL FIBRILLATION) (HCC): ICD-10-CM

## 2021-01-04 DIAGNOSIS — Z98.890 STATUS POST MITRAL VALVE REPAIR: ICD-10-CM

## 2021-01-04 LAB
INR BLD: 3.9 (ref 0.9–1.2)
INR PPP: 3.9 (ref 2–3.5)

## 2021-01-04 PROCEDURE — 85610 PROTHROMBIN TIME: CPT

## 2021-01-04 PROCEDURE — 99212 OFFICE O/P EST SF 10 MIN: CPT | Performed by: NURSE PRACTITIONER

## 2021-01-04 NOTE — PROGRESS NOTES
Anticoagulation Summary  As of 1/4/2021    INR goal:  2.0-3.0   TTR:  39.5 % (2.7 y)   INR used for dosing:  3.90 (1/4/2021)   Warfarin maintenance plan:  5 mg (5 mg x 1) every day   Weekly warfarin total:  35 mg   Plan last modified:  Brittnee Cisneros A.P.NPriscilla (11/18/2020)   Next INR check:  1/18/2021   Target end date:  Indefinite    Indications    PAF (paroxysmal atrial fibrillation) (CMS-HCC) [I48.0]  Long term current use of anticoagulant therapy (Resolved) [Z79.01]  Status post mitral valve repair [Z98.890]             Anticoagulation Episode Summary     INR check location:      Preferred lab:      Send INR reminders to:      Comments:  Right arm only for BP checks      Anticoagulation Care Providers     Provider Role Specialty Phone number    [facility] Responsible  [phone]        Anticoagulation Patient Findings      HPI:  Kailey Velarde seen in clinic today for follow up on anticoagulation therapy in the presence of AF, MV repair.   Denies any changes to current medical/health status since last appointment.   Denies any medication or diet changes.   No current symptoms of bleeding or thrombosis reported.    Pt on antiplatelet therapy - n/a.    A/P:   INR supratherapeutic.   HOLD one dose then continue current regimen.   BP declined.    Follow up appointment in 2 week(s).    Next Appointment: Monday, Jan 18, 2021 at 11:45 am.    Brittnee HENDERSON

## 2021-01-18 ENCOUNTER — ANTICOAGULATION VISIT (OUTPATIENT)
Dept: VASCULAR LAB | Facility: MEDICAL CENTER | Age: 61
End: 2021-01-18
Attending: INTERNAL MEDICINE
Payer: MEDICARE

## 2021-01-18 DIAGNOSIS — I48.0 PAF (PAROXYSMAL ATRIAL FIBRILLATION) (HCC): ICD-10-CM

## 2021-01-18 DIAGNOSIS — Z98.890 STATUS POST MITRAL VALVE REPAIR: ICD-10-CM

## 2021-01-18 LAB
INR BLD: 2.5 (ref 0.9–1.2)
INR PPP: 2.5 (ref 2–3.5)

## 2021-01-18 PROCEDURE — 85610 PROTHROMBIN TIME: CPT

## 2021-01-18 PROCEDURE — 99211 OFF/OP EST MAY X REQ PHY/QHP: CPT

## 2021-01-18 NOTE — PROGRESS NOTES
Anticoagulation Summary  As of 2021    INR goal:  2.0-3.0   TTR:  39.4 % (2.8 y)   INR used for dosin.50 (2021)   Warfarin maintenance plan:  5 mg (5 mg x 1) every day   Weekly warfarin total:  35 mg   Plan last modified:  PALMIRA Kelly (2020)   Next INR check:  2021   Target end date:  Indefinite    Indications    PAF (paroxysmal atrial fibrillation) (CMS-HCC) [I48.0]  Long term current use of anticoagulant therapy (Resolved) [Z79.01]  Status post mitral valve repair [Z98.890]             Anticoagulation Episode Summary     INR check location:      Preferred lab:      Send INR reminders to:      Comments:  Right arm only for BP checks      Anticoagulation Care Providers     Provider Role Specialty Phone number    [facility] Responsible  [phone]        Anticoagulation Patient Findings      HPI:  Kailey Velarde seen in clinic today, on anticoagulation therapy with warfarin for PAF, MV repair  Changes to current medical/health status since last appt: None  Denies signs/symptoms of bleeding and/or thrombosis since the last appt.    Denies any interval changes to diet.  Denies any interval changes to medications since last appt.   Denies any complications or cost restrictions with current therapy.   BP declined.    A/P   INR therapeutic. Of note, pt states she thinks that she accidentally took an extra dose last Friday.  Instructed pt to continue on with current regimen.    Follow up appointment in 2 week(s).    Arnulfo Stovall, LeonoraD

## 2021-02-01 ENCOUNTER — APPOINTMENT (OUTPATIENT)
Dept: VASCULAR LAB | Facility: MEDICAL CENTER | Age: 61
End: 2021-02-01
Payer: MEDICARE

## 2021-02-02 ENCOUNTER — APPOINTMENT (OUTPATIENT)
Dept: VASCULAR LAB | Facility: MEDICAL CENTER | Age: 61
End: 2021-02-02
Payer: MEDICARE

## 2021-02-04 ENCOUNTER — OFFICE VISIT (OUTPATIENT)
Dept: SLEEP MEDICINE | Facility: MEDICAL CENTER | Age: 61
End: 2021-02-04
Payer: MEDICARE

## 2021-02-04 VITALS
HEIGHT: 67 IN | OXYGEN SATURATION: 91 % | BODY MASS INDEX: 45.99 KG/M2 | RESPIRATION RATE: 16 BRPM | HEART RATE: 68 BPM | DIASTOLIC BLOOD PRESSURE: 70 MMHG | SYSTOLIC BLOOD PRESSURE: 130 MMHG | WEIGHT: 293 LBS

## 2021-02-04 DIAGNOSIS — F51.04 CHRONIC INSOMNIA: ICD-10-CM

## 2021-02-04 DIAGNOSIS — G47.31 CENTRAL SLEEP APNEA: Chronic | ICD-10-CM

## 2021-02-04 DIAGNOSIS — I50.30 HEART FAILURE WITH PRESERVED EJECTION FRACTION, BORDERLINE, CLASS II (HCC): ICD-10-CM

## 2021-02-04 DIAGNOSIS — I48.0 PAF (PAROXYSMAL ATRIAL FIBRILLATION) (HCC): ICD-10-CM

## 2021-02-04 DIAGNOSIS — I27.20 PULMONARY HYPERTENSION (HCC): Chronic | ICD-10-CM

## 2021-02-04 DIAGNOSIS — I10 ESSENTIAL HYPERTENSION: Chronic | ICD-10-CM

## 2021-02-04 DIAGNOSIS — Z87.891 FORMER SMOKER: ICD-10-CM

## 2021-02-04 PROCEDURE — 99214 OFFICE O/P EST MOD 30 MIN: CPT | Performed by: NURSE PRACTITIONER

## 2021-02-04 RX ORDER — ZOLPIDEM TARTRATE 5 MG/1
5 TABLET ORAL NIGHTLY PRN
Qty: 2 TAB | Refills: 0 | Status: SHIPPED | OUTPATIENT
Start: 2021-02-04 | End: 2021-02-06

## 2021-02-04 NOTE — PROGRESS NOTES
Chief Complaint   Patient presents with   • Apnea     Last Seen 5/21/2020        HPI:  Kailey Velarde is a 60 y.o. year old female here today for follow-up on central sleep apnea with chronic respiratory failure. Hx of CHF with EF and pulm htn.  Last OV 5/12/20 with Dr. Akers    At last OV, patient started on BIPAP 18/14 cm with 2LPM bleed in O2. She did not tolerate mask and returned device. She did try 2 different masks from [facility] but failed to reach out to clinic for help.  We reviewed the necessity of PAP therapy due to her central apnea and cardiac history. She understands the benefit of therapy and willing to update titration study to restart therapy. She will require continue O2 use 24/7 as well.  She notes cardiac and respiratory status to be stable. Chronic pedal edema.  Hx of CHF (last JULY EF 50%), A. Fib, HTN, obesity, pulm htn, former smoker.    Sleep hx:  PSG split night 11/20/19:  Impression:   1.  Severe central with AHI of 146/hr and O2 yogesh 76 %   2.  Severe sleep hypoxia   Titration 5/16/20:  CPAP Titration:  The PAP titration was initiated with BiPAP 9/5   cm of water and the pressure which was slowly titrated up in an   attempt to eliminate sleep disordered breathing and snoring. The   final pressure tested during the study was BiPAP 22/17 cm water.   It was an incomplete titration, the best tolerated pressurse was   BiPAP 18/14cm. At this pressure the patient was observed in the   supine in the REM sleep stage. The apnea hypopnea index improved   to 14.1 per hour and O2 yogesh 83%. The average O2 stauration was   88%. She spent 89 min of sleep time below 89% O2 saturation.   Snoring was resolved. The patient utilized small F30i mask with   heated humidification. The CPAP was well-tolerated and there were   minimal air leaks.     Impression:   1.  Complex sleep apnea   2.  Sleep related hypoxia   Therapy started on BIPAP 18/14cm w/2LPM bleed in O2.         ROS: As per HPI and otherwise  negative if not stated.    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   • Status post mitral valve repair November 2011    MV repair with 32mm Jonnie-Carpenter flexible annuloplasty ring.       Past Surgical History:   Procedure Laterality Date   • CARDIAC CATH, RIGHT HEART  03/31/2018    Right heart cath with high pressures.   • MITRAL VALVE REPAIR  11/17/2011    Performed by MARY ORTIZ at [facility]   • PACEMAKER INSERTION  November 2011    Medtronic Versa [id number] implanted by Dr. Amaya.   • OTHER ORTHOPEDIC SURGERY      Neck and back        Family History   Problem Relation Age of Onset   • Heart Attack Father        Social History     Socioeconomic History   • Marital status: Single     Spouse name: Not on file   • Number of children: Not on file   • Years of education: Not on file   • Highest education level: Not on file   Occupational History   • Not on file   Social Needs   • Financial resource strain: Not on file   • Food insecurity     Worry: Not on file     Inability: Not on file   • Transportation needs     Medical: Not on file     Non-medical: Not on file   Tobacco Use   • Smoking status: Former Smoker     Packs/day: 1.00     Years: 39.00     Pack years: 39.00     Types: Cigarettes     Quit date: 11/4/2017     Years since quitting: 3.2   • Smokeless tobacco: Never Used   • Tobacco comment: 4/12 Down to vapping now.    Substance and Sexual Activity   • Alcohol use: Not Currently     Comment: sober since 8/8/18!   • Drug use: No     Types: Inhaled, Oral     Comment: hx of, denies currently   • Sexual activity: Not on file   Lifestyle   •  "Physical activity     Days per week: Not on file     Minutes per session: Not on file   • Stress: Not on file   Relationships   • Social connections     Talks on phone: Not on file     Gets together: Not on file     Attends [employer] service: Not on file     Active member of club or organization: Not on file     Attends meetings of clubs or organizations: Not on file     Relationship status: Not on file   • Intimate partner violence     Fear of current or ex partner: Not on file     Emotionally abused: Not on file     Physically abused: Not on file     Forced sexual activity: Not on file   Other Topics Concern   • Not on file   Social History Narrative   • Not on file       Allergies as of 02/04/2021 - Reviewed 02/04/2021   Allergen Reaction Noted   • Penicillins Rash 06/07/2009        Vitals:  /70 (BP Location: Left arm, Patient Position: Sitting, BP Cuff Size: Adult)   Pulse 68   Resp 16   Ht 1.702 m (5' 7\")   Wt (!) 141 kg (311 lb)   SpO2 91%     Current medications as of today   Current Outpatient Medications   Medication Sig Dispense Refill   • warfarin (COUMADIN) 5 MG Tab Take 0.5-1 tabs by mouth daily or as directed by the anticoagulation clinic. 90 Tab 1   • atorvastatin (LIPITOR) 20 MG Tab TAKE ONE TABLET BY MOUTH ONE TIME DAILY IN THE EVENING 90 Tab 2   • amiodarone (CORDARONE) 200 MG Tab Take 1 Tab by mouth every day. 90 Tab 3   • methocarbamol (ROBAXIN) 750 MG Tab      • ARIPiprazole (ABILIFY) 10 MG Tab      • torsemide (DEMADEX) 20 MG Tab Take 1 Tab by mouth every day. 90 Tab 3   • potassium chloride SA (KDUR) 20 MEQ Tab CR Take 1 Tab by mouth every day. 100 Tab 3   • VENTOLIN  (90 Base) MCG/ACT Aero Soln inhalation aerosol INHALE TWO PUFFS BY MOUTH EVERY FOUR HOURS AS NEEDED FOR SHORTNESS OF BREATH 1 Inhaler 3   • venlafaxine XR (EFFEXOR XR) 75 MG CAPSULE SR 24 HR Take 75 mg by mouth every day.     • Blood Pressure Monitoring (B-D ASSURE BPM/AUTO ARM CUFF) Misc Blood pressure home " monitoring system 1 Each 0   • traZODone (DESYREL) 100 MG Tab Take 100 mg by mouth every evening.     • oxyCODONE immediate release (ROXICODONE) 10 MG immediate release tablet Take 10 mg by mouth 3 times a day. TID with MS Contin     • venlafaxine (EFFEXOR-XR) 150 MG extended-release capsule Take  by mouth every morning.     • Multiple Vitamin (MULTI-DAY PO) Take 1 Tab by mouth every morning.     • pregabalin (LYRICA) 200 MG capsule Take 200 mg by mouth 3 times a day.     • vitamin D (CHOLECALCIFEROL) 1000 UNIT Tab Take 1,000 Units by mouth every morning.     • morphine SR (MS CONTIN) 15 MG TB12 Take 1 Tab by mouth 3 times a day. 60 Tab 0     No current facility-administered medications for this visit.          Physical Exam:   Gen:           Alert and oriented, No apparent distress. Mood and affect appropriate, normal interaction with examiner.  Eyes:          PERRL, EOM intact, sclere white, conjunctive moist.  Ears:          Not examined.   Hearing:     Grossly intact.  Nose:          Normal, no lesions or deformities.  Dentition:    mask  Oropharynx:   mask  Mallampati Classification: mask  Neck:        Supple, trachea midline, no masses.  Respiratory Effort: No intercostal retractions or use of accessory muscles.   Lung Auscultation:      Clear to auscultation bilaterally; no rales, rhonchi or wheezing.  CV:            Regular rate and rhythm. No murmurs, rubs or gallops.  Abd:           Not examined.   Lymphadenopathy: Not examined.  Gait and Station: Normal.  Digits and Nails: No clubbing, cyanosis, petechiae, or nodes.   Cranial Nerves: II-XII grossly intact.  Skin:        No rashes, lesions or ulcers noted.               Ext:           No cyanosis or edema.      Assessment:  1. Central sleep apnea     2. Chronic insomnia     3. Pulmonary hypertension (HCC)     4. Essential hypertension     5. PAF (paroxysmal atrial fibrillation) (CMS-HCC)     6. Heart failure with preserved ejection fraction, borderline,  class II (HCC)     7. BMI 45.0-49.9, adult (HCC)  Height And Weight   8. Former smoker         Immunizations:    Flu:9/2020  Pneumovax 23:2017  Prevnar 13:due age 66    Plan:  1. BIPAP titration study now to re-initiate therapy. Pending results start PAP therapy with O2 bleed in if indicated. Results may be called to patient.  Patient understands they may have mask fits within first 30 days of therapy covered by insurance to obtain best fit.  Patient understands the need to use device every night for >4hrs to meet compliance standards for insurance purposes.  2. Discussed sleep hygiene.  3. F/u with Cardio as scheduled for ongoing management of cardiac issues  4. F/u in 3 mos for first compliance check, sooner if needed. Advised patient to contact me via [employer] if having issues.    Please note that this dictation was created using voice recognition software. I have made every reasonable attempt to correct obvious errors, but it is possible there are errors of grammar and possibly content that I did not discover before finalizing the note.

## 2021-02-05 ENCOUNTER — ANTICOAGULATION VISIT (OUTPATIENT)
Dept: VASCULAR LAB | Facility: MEDICAL CENTER | Age: 61
End: 2021-02-05
Attending: INTERNAL MEDICINE
Payer: MEDICARE

## 2021-02-05 DIAGNOSIS — I48.0 PAF (PAROXYSMAL ATRIAL FIBRILLATION) (HCC): ICD-10-CM

## 2021-02-05 DIAGNOSIS — Z98.890 STATUS POST MITRAL VALVE REPAIR: ICD-10-CM

## 2021-02-05 LAB — INR PPP: 3.2 (ref 2–3.5)

## 2021-02-05 PROCEDURE — 85610 PROTHROMBIN TIME: CPT

## 2021-02-05 PROCEDURE — 99212 OFFICE O/P EST SF 10 MIN: CPT

## 2021-02-05 NOTE — PROGRESS NOTES
Anticoagulation Summary  As of 2/5/2021    INR goal:  2.0-3.0   TTR:  40.0 % (2.8 y)   INR used for dosing:  3.20 (2/5/2021)   Warfarin maintenance plan:  5 mg (5 mg x 1) every day   Weekly warfarin total:  35 mg   Plan last modified:  PALMIRA Kelly (11/18/2020)   Next INR check:  2/22/2021   Target end date:  Indefinite    Indications    PAF (paroxysmal atrial fibrillation) (CMS-HCC) [I48.0]  Long term current use of anticoagulant therapy (Resolved) [Z79.01]  Status post mitral valve repair [Z98.890]             Anticoagulation Episode Summary     INR check location:      Preferred lab:      Send INR reminders to:      Comments:  Right arm only for BP checks      Anticoagulation Care Providers     Provider Role Specialty Phone number    [facility] Responsible  [phone]        Anticoagulation Patient Findings      HPI:  Kailey Damon Syd seen in clinic today, on anticoagulation therapy with warfarin for PAF.   Pt is not on antiplatelet therapy.  Changes to current medical/health status since last appt: none  Denies signs/symptoms of bleeding and/or thrombosis since the last appt.    Denies any interval changes to diet  Denies any interval changes to medications since last appt.   Denies any complications or cost restrictions with current therapy.   Confirmed dosing regimen.     A/P   INR  SUPRA-therapeutic.   Pt had 6 ETOH drinks yesterday. Provided education of how this can effect safety and the medication.   Reduce dose today then Pt is to continue with current warfarin dosing regimen.     Follow up appointment in 2 week(s).    Perry Villa, LeonoraD

## 2021-02-08 LAB — INR BLD: 3.2 (ref 0.9–1.2)

## 2021-02-18 DIAGNOSIS — Z79.01 CHRONIC ANTICOAGULATION: ICD-10-CM

## 2021-02-22 ENCOUNTER — ANTICOAGULATION VISIT (OUTPATIENT)
Dept: VASCULAR LAB | Facility: MEDICAL CENTER | Age: 61
End: 2021-02-22
Attending: INTERNAL MEDICINE
Payer: MEDICARE

## 2021-02-22 VITALS — HEART RATE: 89 BPM | SYSTOLIC BLOOD PRESSURE: 152 MMHG | DIASTOLIC BLOOD PRESSURE: 62 MMHG

## 2021-02-22 DIAGNOSIS — Z98.890 STATUS POST MITRAL VALVE REPAIR: ICD-10-CM

## 2021-02-22 DIAGNOSIS — I48.0 PAF (PAROXYSMAL ATRIAL FIBRILLATION) (HCC): ICD-10-CM

## 2021-02-22 LAB
INR BLD: 4.2 (ref 0.9–1.2)
INR PPP: 4.2 (ref 2–3.5)

## 2021-02-22 PROCEDURE — 99212 OFFICE O/P EST SF 10 MIN: CPT | Performed by: NURSE PRACTITIONER

## 2021-02-22 PROCEDURE — 85610 PROTHROMBIN TIME: CPT

## 2021-02-22 NOTE — PROGRESS NOTES
Anticoagulation Summary  As of 2021    INR goal:  2.0-3.0   TTR:  39.3 % (2.8 y)   INR used for dosin.20 (2021)   Warfarin maintenance plan:  2.5 mg (5 mg x 0.5) every Tue; 5 mg (5 mg x 1) all other days   Weekly warfarin total:  32.5 mg   Plan last modified:  Brittnee Cisneros, A.P.N. (2021)   Next INR check:  3/15/2021   Target end date:  Indefinite    Indications    PAF (paroxysmal atrial fibrillation) (CMS-HCC) [I48.0]  Long term current use of anticoagulant therapy (Resolved) [Z79.01]  Status post mitral valve repair [Z98.890]             Anticoagulation Episode Summary     INR check location:      Preferred lab:      Send INR reminders to:      Comments:  Right arm only for BP checks      Anticoagulation Care Providers     Provider Role Specialty Phone number    [facility] Responsible  [phone]        Anticoagulation Patient Findings      HPI:  Kailey Velarde seen in clinic today for follow up on anticoagulation therapy in the presence of AF, MV repair.   Denies any changes to current medical/health status since last appointment.   Denies any medication or diet changes.   No current symptoms of bleeding or thrombosis reported.  Admits to drinking at least 2 beers per day but the amount varies. She knows alcohol increases her risk of bleeding and she should avoid. No plans to stop per pt.    Pt on antiplatelet therapy - none.    A/P:   INR supratherapeutic.   HOLD tonight and began reduced regimen. She will go to the ER for any prolonged bleeding.  BP recorded in vitals.    Follow up appointment in 3 week per pt.    Brittnee HENDERSON

## 2021-02-23 ENCOUNTER — SLEEP STUDY (OUTPATIENT)
Dept: SLEEP MEDICINE | Facility: MEDICAL CENTER | Age: 61
End: 2021-02-23
Attending: NURSE PRACTITIONER
Payer: MEDICARE

## 2021-02-23 DIAGNOSIS — I27.20 PULMONARY HYPERTENSION (HCC): Chronic | ICD-10-CM

## 2021-02-23 DIAGNOSIS — I50.30 HEART FAILURE WITH PRESERVED EJECTION FRACTION, BORDERLINE, CLASS II (HCC): ICD-10-CM

## 2021-02-23 DIAGNOSIS — F51.04 CHRONIC INSOMNIA: ICD-10-CM

## 2021-02-23 DIAGNOSIS — G47.31 CENTRAL SLEEP APNEA: Chronic | ICD-10-CM

## 2021-02-24 NOTE — PROCEDURES
Technical summary: The patient underwent a split-night polysomnogram. This was a 16 channel montage study to include a 6 channel EEG, a 2 channel EOG, and chin EMG, left and right leg EMG, a snore channel, a nasal pressure transducer, and a nasal oral airflow  thermistor and a CFLOW pressure transducer.   Respiratory effort was assessed with the use of a thoracic and abdominal monitor and overnight oximetry was obtained. Audio and video recordings were reviewed. This was a fully attended study and sleep stage scoring was performed. The test was technically adequate.    Scoring Criteria: A modification of the the AASM Manual for the Scoring of Sleep and Associated Events, 2012, was used.   Obstructive apnea was scored by cessation of airflow for at least 10 seconds with continuing respiratory effort.  Central apnea was scored by cessation of airflow for at least 10 seconds with no effort.  Hypopnea was scored by a 30% or more reduction in airflow for at least 10 seconds accompanied by an arterial oxygen desaturation of 3% or more.  (For Medicare patients, hypopneas were scored by a 30% or more reduction in airflow for at least 10 seconds accompanied by an arterial oxygen saturation of 4% or more, as required by their insurance, CMS.      Interpretation:  Study start time was 09:26:50 PM. Total recording time was 4h 9.5m (249 minutes) with a total sleep time of 2h 38.0m (158 minutes) resulting in a sleep efficiency of 63.33%.  Sleep latency from the start fo the study was 25 minutes minutes and REM latency from sleep onset was 189 minutes minutes.    Respiratory:   There were 236 apneas in total consisting of 119 obstructive apneas, 0 mixed apneas, and 117 central apneas. There were 132 hypopneas in total.  The apnea index was 89.62 per hour and the hypopnea index was 50.13 per hour.The overall AHI was 139.7, with a REM AHI of 180.00, and a supine AHI of 139.75.  31% of the apneas were central in nature    Limb  Movements:  There were a total of 42 periodic leg movements, of which 1 were PLMS arousals. This resulted in a PLMS index of 15.9 and a PLMS arousal index of 0.4    Oximetry:  The mean SaO2 was 86.0% for the diagnostic portion of the study, with a minimum SaO2 of 73.0%.   Treatment:  Interpretation:  Treatment recording time was 2h 35.5m (155 minutes) with a total sleep time of 1h 43.5m (103 minutes) resulting in a sleep efficiency of 66.6%.   Sleep latency from the start of treatment was 04 minutes minutes and REM latency from sleep onset was 1h 50.0m minutes. The patient had 93 arousals in total for an arousal index of 53.9.    Respiratory:   There were 204 apneas in total consisting of 43 obstructive apneas, 160 central apneas, and 1 mixed apneas for an apnea index of 118.26.   The patient had 23 hypopneas in total, which resulted in a hypopnea index of 13.33. The overall AHI was 131.59, with a REM AHI of 60.00, and a supine AHI of 131.59.   70% of the apneas were central in nature    Limb Movements:  There were a total of 30 periodic leg movements, of which 2 were PLMS arousals. This resulted in a PLMS index of 17.4 and a PLMS arousal index of 1.2.    Oximetry:  The mean SaO2 during treatment was 89.0%, with a minimum oxygen saturation of 78.0%.    CPAP was tried from 6cm to 9cm H2O. Bilevel was tried from 13/9cm to 17/12cm. Backup rate was added ranging from 12-14.      CPAP Titration:  Due to the significant number of obstructive respiratory events observed during the diagnostic portion of the study a CPAP titration trial was performed during the second half of the night. The CPAP pressure was initiated at 5 cm of water and the pressure was increased in an attempt to eliminate all sleep disordered breathing and snoring. CPAP was increased to 9  Cm before switching to BiPAP. The BiPAP was titrated between 13/9 cm to 15/10 cm. Due to residual  Central apneas, BiPAP ST was titrated as well between 15/10 cm back  up rate 17/12 to 1/12 cm back up rate 14 BPM. At this final pressure the patient was observed in the supine but not REM sleep stage.The apnea hypopnea index was 122/hr .She spent 68 min of sleep time below 89% O2 saturation. The patient utilized small dreamwear mask with heated humidification.    Impression:  1.  Severe complex sleep apnea with AHI of 139.7/hr and O2 yogesh 73 %. Due to severity of the disease she met the split study protocol. The titration started with CPAP 5 cm and the highest tested pressure was BiPAP ST 17/12  Cm back up rate 14. The AHI was 122/hr.  2.  Sleep related hypoxia      Recommendations:  No definitive pressure can be extrapolated from the titration, I recommend dedicated BiPAP ST/ASV titration. In some cases alternative treatment options may prove effective in resolving sleep apnea and these options include upper airway surgery, the use of a dental orthotic or weight loss and positional therapy. Clinical correlation is required. In general patients with sleep apnea are advised to avoid alcohol and sedatives and to not operate a motor vehicle while drowsy and are at a greater risk for cardiovascular disease.

## 2021-03-15 ENCOUNTER — ANTICOAGULATION VISIT (OUTPATIENT)
Dept: VASCULAR LAB | Facility: MEDICAL CENTER | Age: 61
End: 2021-03-15
Attending: INTERNAL MEDICINE
Payer: MEDICARE

## 2021-03-15 DIAGNOSIS — Z98.890 STATUS POST MITRAL VALVE REPAIR: ICD-10-CM

## 2021-03-15 DIAGNOSIS — Z23 NEED FOR VACCINATION: ICD-10-CM

## 2021-03-15 DIAGNOSIS — I48.0 PAF (PAROXYSMAL ATRIAL FIBRILLATION) (HCC): ICD-10-CM

## 2021-03-15 LAB
INR BLD: 1.7 (ref 0.9–1.2)
INR PPP: 1.7 (ref 2–3.5)

## 2021-03-15 PROCEDURE — 99211 OFF/OP EST MAY X REQ PHY/QHP: CPT

## 2021-03-15 PROCEDURE — 85610 PROTHROMBIN TIME: CPT

## 2021-03-15 NOTE — PROGRESS NOTES
Anticoagulation Summary  As of 3/15/2021    INR goal:  2.0-3.0   TTR:  39.4 % (2.9 y)   INR used for dosin.70 (3/15/2021)   Warfarin maintenance plan:  2.5 mg (5 mg x 0.5) every Tue; 5 mg (5 mg x 1) all other days   Weekly warfarin total:  32.5 mg   Plan last modified:  SARA KellyPSARITA (2021)   Next INR check:  2021   Target end date:  Indefinite    Indications    PAF (paroxysmal atrial fibrillation) (CMS-HCC) [I48.0]  Long term current use of anticoagulant therapy (Resolved) [Z79.01]  Status post mitral valve repair [Z98.890]             Anticoagulation Episode Summary     INR check location:      Preferred lab:      Send INR reminders to:      Comments:  Right arm only for BP checks      Anticoagulation Care Providers     Provider Role Specialty Phone number    [facility] Responsible  [phone]                Anticoagulation Patient Findings      HPI:  Kailey Velarde seen in clinic today, on anticoagulation therapy with warfarin for afib, MVR  Changes to current medical/health status since last appt: Patient said that she stopped drinking for the last three days, normally drinks 8-9 beers daily. Patient aware that etoh intake affects INR.  Denies signs/symptoms of bleeding and/or thrombosis since the last appt.    Denies any interval changes to diet  Denies any interval changes to medications since last appt.   Denies any complications or cost restrictions with current therapy.   Verified current warfarin dosing schedule.   Pt declined vitals  Medications reconciled      A/P   INR is now sub-therapeutic.   INR likely sub due to decreased alcohol intake over last 3 days. Patient stated that she plans to resume drinking 8-9 beers daily starting today and is adamant about continuing to drink. As patient will be taking full tablet today and is going to resume drinking, will continue current regimen and not bolus today. Patient low risk of stroke (CHADSVASC 3) so  will defer bridging at this time.    Follow up appointment in 3 week(s) per patient due to high cost of visits.    Shanae Rodrigues, PharmD

## 2021-03-30 ENCOUNTER — IMMUNIZATION (OUTPATIENT)
Dept: FAMILY PLANNING/WOMEN'S HEALTH CLINIC | Facility: IMMUNIZATION CENTER | Age: 61
End: 2021-03-30
Attending: INTERNAL MEDICINE
Payer: MEDICARE

## 2021-03-30 DIAGNOSIS — Z23 ENCOUNTER FOR VACCINATION: Primary | ICD-10-CM

## 2021-03-30 DIAGNOSIS — Z23 NEED FOR VACCINATION: ICD-10-CM

## 2021-03-30 PROCEDURE — 91300 PFIZER SARS-COV-2 VACCINE: CPT

## 2021-03-30 PROCEDURE — 0001A PFIZER SARS-COV-2 VACCINE: CPT

## 2021-04-01 DIAGNOSIS — G47.31 CENTRAL SLEEP APNEA: ICD-10-CM

## 2021-04-01 DIAGNOSIS — G47.00 INSOMNIA, UNSPECIFIED TYPE: ICD-10-CM

## 2021-04-01 RX ORDER — ZOLPIDEM TARTRATE 5 MG/1
5 TABLET ORAL NIGHTLY PRN
Qty: 2 TABLET | Refills: 0 | Status: SHIPPED
Start: 2021-04-01 | End: 2021-04-15

## 2021-04-01 NOTE — TELEPHONE ENCOUNTER
Called and let the patient know that her sleep study is scheduled for 4/8/21 at 9 pm.  I will put in for the ambien.

## 2021-04-01 NOTE — TELEPHONE ENCOUNTER
The patient called to ask if she needs to do another sleep study.  She said that the last time she had to do another one with her mask on.  Please advise, thank you.

## 2021-04-01 NOTE — TELEPHONE ENCOUNTER
Just to clarify, the patient did a titration study on 2/23/21.  She needs another one?  Please advise, thank you.

## 2021-04-01 NOTE — TELEPHONE ENCOUNTER
Patient does need another titration study; she had 70% central events, needs further titration on BIPAP ST and ASV.  She may request sleep aide for study once study scheduled.

## 2021-04-01 NOTE — TELEPHONE ENCOUNTER
Per OV 2/4/21; yes she needs a bipap titration study. She will wear a mask for bipap use during study.

## 2021-04-08 ENCOUNTER — SLEEP STUDY (OUTPATIENT)
Dept: SLEEP MEDICINE | Facility: MEDICAL CENTER | Age: 61
End: 2021-04-08
Attending: NURSE PRACTITIONER
Payer: MEDICARE

## 2021-04-08 DIAGNOSIS — G47.31 CENTRAL SLEEP APNEA: ICD-10-CM

## 2021-04-09 NOTE — PROCEDURES
The patient underwent a BiPAP ST Titration using the standard montage for measurement of parameters of sleep, respiratory events, movement abnormalities, and heart rate and rhythm.   A microphone was used to monitor snoring.  Interpretation:  Study start time was 10:42:23 PM. Diagnostic recording time was 7h 33.5m with a total sleep time of 7h 12.0m resulting in a sleep efficiency of 95.26%%.   Sleep latency from the start of the study was 09 minutes and the latency from sleep to REM was 146 minutes.  In total,38 arousals were scored for an arousal index of 5.3.  Respiratory:  There were a total of 10 apneas consisting of 0 obstructive apneas, 0 mixed apneas, and 10 central apneas. A total of 38 hypopneas were scored.  The apnea index was 1.39 per hour and the hypopnea index was 5.28 per hour resulting in an overall AHI of 6.67.AHI during rem was 5.0 and AHI while supine was 6.67.  20% of the apneas were central apneas     Oximetry:  There was a mean oxygen saturation of X% with a minimum oxygen saturation of X%. Time spent with oxygen saturations below 89% was X minutes.  Cardiac:  The highest heart rate seen while awake was 88 BPM while the highest heart rate during sleep was 70 BPM with an average sleeping heart rate of 60 BPM.  Limb Movements:  There were a total of 0 PLMs during sleep, of which 0 were PLMS arousals. This resulted in a PLMS index of 0.0 and a PLMS arousal index of 0.0.      CPAP Titration:  The PAP titration was initiated with BiPAP ST 14/10 cm of water 10 BPM  and the pressure which was slowly titrated up in an attempt to eliminate sleep disordered breathing and snoring. The final pressure tested during the study was BiPAP 21/17 cm water and 16 BPM. Lowest bedt tolerated pressure was BiPAP ST 19/14 cm 16 BPM and O2 bleed in.A final pressure the patient was observed in the supine position  and in the REM sleep stage. The apnea hypopnea index improved to 2.1 per hour and O2 yogesh 85%.  She spent  256 min of sleep time below 89% O2 saturation. Snoring was resolved.  The patient utilized small umm view mask with heated humidification. The CPAP was well-tolerated and there were minimal air leaks. \    Impression:  1.  Central sleep apnea   2.  Sleep related hypoxia      Recommendations:  I recommend BiPAP 19/14 cm with back up rate 16 BPM with O2 bleed in at 1 LPM and amaraview mask. Recommended 30 day compliance download to assess the efficacy of the recommended pressure and compliance for further outpatient monitoring and management of CPAP therapy. In some cases alternative treatment options may prove effective in resolving sleep apnea and these options include upper airway surgery, the use of a dental orthotic or weight loss and positional therapy. Clinical correlation is required. In general patients with sleep apnea are advised to avoid alcohol and sedatives and to not operate a motor vehicle while drowsy and are at a greater risk for cardiovascular disease.

## 2021-04-11 PROCEDURE — 95811 POLYSOM 6/>YRS CPAP 4/> PARM: CPT | Performed by: FAMILY MEDICINE

## 2021-04-12 ENCOUNTER — ANTICOAGULATION VISIT (OUTPATIENT)
Dept: VASCULAR LAB | Facility: MEDICAL CENTER | Age: 61
End: 2021-04-12
Attending: INTERNAL MEDICINE
Payer: MEDICARE

## 2021-04-12 DIAGNOSIS — I48.0 PAF (PAROXYSMAL ATRIAL FIBRILLATION) (HCC): ICD-10-CM

## 2021-04-12 DIAGNOSIS — Z98.890 STATUS POST MITRAL VALVE REPAIR: ICD-10-CM

## 2021-04-12 LAB — INR PPP: 1.8 (ref 2–3.5)

## 2021-04-12 PROCEDURE — 85610 PROTHROMBIN TIME: CPT

## 2021-04-12 PROCEDURE — 99212 OFFICE O/P EST SF 10 MIN: CPT | Performed by: NURSE PRACTITIONER

## 2021-04-12 NOTE — PROGRESS NOTES
Anticoagulation Summary  As of 2021    INR goal:  2.0-3.0   TTR:  38.3 % (3 y)   INR used for dosin.80 (2021)   Warfarin maintenance plan:  2.5 mg (5 mg x 0.5) every Tue; 5 mg (5 mg x 1) all other days   Weekly warfarin total:  32.5 mg   Plan last modified:  Brittnee Cisneros, A.P.NPriscilla (2021)   Next INR check:  2021   Target end date:  Indefinite    Indications    PAF (paroxysmal atrial fibrillation) (CMS-HCC) [I48.0]  Long term current use of anticoagulant therapy (Resolved) [Z79.01]  Status post mitral valve repair [Z98.890]             Anticoagulation Episode Summary     INR check location:      Preferred lab:      Send INR reminders to:      Comments:  Right arm only for BP checks      Anticoagulation Care Providers     Provider Role Specialty Phone number    [facility] Responsible  [phone]        Anticoagulation Patient Findings      HPI:  Kailey Velarde seen in clinic today for follow up on anticoagulation therapy in the presence of AF, MV repair.   Denies any changes to current medical/health status since last appointment.   Denies any medication or diet changes.   No current symptoms of bleeding or thrombosis reported.    Pt is NOT on antiplatelet therapy.    A/P:   INR subtherapeutic. CHADS 2 score = 2. INR low last visit. We discussed a weekly dose increase but patient prefers to take a one time dose increase then continue current regimen for now.       Pt educated to contact our clinic with any changes in medications or s/s of bleeding or thrombosis. Pt is aware to seek immediate medical attention for falls, head injury or deep cuts    Follow up appointment in 2 week(s).    Next Appointment: 2021 at 11:30 am.    Brittnee HENDERSON

## 2021-04-15 ENCOUNTER — OFFICE VISIT (OUTPATIENT)
Dept: SLEEP MEDICINE | Facility: MEDICAL CENTER | Age: 61
End: 2021-04-15
Payer: MEDICARE

## 2021-04-15 VITALS
HEIGHT: 67 IN | SYSTOLIC BLOOD PRESSURE: 130 MMHG | RESPIRATION RATE: 16 BRPM | BODY MASS INDEX: 45.99 KG/M2 | HEART RATE: 84 BPM | DIASTOLIC BLOOD PRESSURE: 76 MMHG | WEIGHT: 293 LBS | OXYGEN SATURATION: 92 %

## 2021-04-15 DIAGNOSIS — I27.20 PULMONARY HYPERTENSION (HCC): Chronic | ICD-10-CM

## 2021-04-15 DIAGNOSIS — J96.11 CHRONIC RESPIRATORY FAILURE WITH HYPOXIA (HCC): ICD-10-CM

## 2021-04-15 DIAGNOSIS — Z87.891 FORMER SMOKER: ICD-10-CM

## 2021-04-15 DIAGNOSIS — I10 ESSENTIAL HYPERTENSION: Chronic | ICD-10-CM

## 2021-04-15 DIAGNOSIS — I50.812 CHRONIC RIGHT-SIDED HEART FAILURE (HCC): Chronic | ICD-10-CM

## 2021-04-15 DIAGNOSIS — G47.31 COMPLEX SLEEP APNEA SYNDROME: ICD-10-CM

## 2021-04-15 PROBLEM — G47.39 COMPLEX SLEEP APNEA SYNDROME: Status: ACTIVE | Noted: 2020-04-30

## 2021-04-15 PROBLEM — I50.33 ACUTE ON CHRONIC DIASTOLIC CONGESTIVE HEART FAILURE (HCC): Chronic | Status: RESOLVED | Noted: 2018-04-03 | Resolved: 2021-04-15

## 2021-04-15 PROBLEM — G47.39 COMPLEX SLEEP APNEA SYNDROME: Chronic | Status: ACTIVE | Noted: 2020-04-30

## 2021-04-15 PROCEDURE — 99214 OFFICE O/P EST MOD 30 MIN: CPT | Performed by: NURSE PRACTITIONER

## 2021-04-15 NOTE — PROGRESS NOTES
Chief Complaint   Patient presents with   • Apnea     Last Seen 2/4/2021    • Results     SS        HPI:  Kailey Velarde is a 60 y.o. year old female here today for follow-up on sleep study results.  Last OV 2/4/21    Since last office visit, patient underwent BiPAP titration study to reinitiate therapy 2/23/2021 which noted no definitive pressure could be extrapolated from the study.  Recommendation was dedicated BiPAP ST/ASV titration.  This was completed 4/8/21 with sleep efficiency of 95%, with the impression of central sleep apnea and sleep-related hypoxia.  Patient did best on BiPAP ST 19/14 cm backup rate 16 bpm.  Patient had a reduced AHI of 2.1/h with an O2 yogesh of 85%.  Patient spent 256 minutes less than 89% ox saturation and snoring was resolved.  Patient did well with small Amera view mask.  Recommendation was adding bleeding O2 of 1 to 2 L/min.  Reviewed all studies with patient.    Past medical history significant for chronic right-sided heart failure, paroxysmal atrial fibrillation, status post mitral valve repair, pulmonary hypertension, hypertension, obesity with BMI 45, former smoker quitting in 2017.  Patient does have chronic respiratory failure and currently owns an iNOGEN device that she uses during the daytime.  Prior PFT noted no obstructive or restrictive pulmonary disease September 2019.  Mild decrease in FVC could be due to patient's BMI of 43.2 at that time.  Lung cancer screening CT 12/3/19 noted previously demonstrated diffuse groundglass opacities cleared.  Right lower lobe atelectasis and trace right pleural effusion similar to previous.  No pulmonary nodules identified.  Atherosclerotic changes.  Hepatic steatosis and splenomegaly.  Recommendation was follow-up in 12 months due to smoking history.  Former smoker, quit in 2017 with 39-pack-year history.  As of current she is now only vaping.  She denies cardiac or respiratory symptoms today. She is motivated to start  therapy.    Sleep hx:  PSG split night 11/20/19:  Impression:   1.  Severe central with AHI of 146/hr and O2 yogesh 76 %   2.  Severe sleep hypoxia   Titration 5/16/20:  CPAP Titration:  The PAP titration was initiated with BiPAP 9/5   cm of water and the pressure which was slowly titrated up in an   attempt to eliminate sleep disordered breathing and snoring. The   final pressure tested during the study was BiPAP 22/17 cm water.   It was an incomplete titration, the best tolerated pressurse was   BiPAP 18/14cm. At this pressure the patient was observed in the   supine in the REM sleep stage. The apnea hypopnea index improved   to 14.1 per hour and O2 yogesh 83%. The average O2 stauration was   88%. She spent 89 min of sleep time below 89% O2 saturation.   Snoring was resolved. The patient utilized small F30i mask with   heated humidification. The CPAP was well-tolerated and there were   minimal air leaks.   Impression:   1.  Complex sleep apnea   2.  Sleep related hypoxia   Therapy started on BIPAP 18/14cm w/2LPM bleed in O2.     ROS: As per HPI and otherwise negative if not stated.    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   • Status post mitral valve repair November 2011    MV repair with 32mm Jonnie-Carpenter flexible annuloplasty ring.       Past Surgical History:   Procedure Laterality Date   • CARDIAC CATH, RIGHT HEART  03/31/2018    Right heart cath with high pressures.   • MITRAL VALVE REPAIR  11/17/2011    Performed by MARY ORTIZ at [facility] ORS   • PACEMAKER INSERTION  November 2011    Medtronic Versa [id number] implanted by Dr. Amaya.   •  OTHER ORTHOPEDIC SURGERY      Neck and back        Family History   Problem Relation Age of Onset   • Heart Attack Father        Social History     Socioeconomic History   • Marital status: Single     Spouse name: Not on file   • Number of children: Not on file   • Years of education: Not on file   • Highest education level: Not on file   Occupational History   • Not on file   Tobacco Use   • Smoking status: Former Smoker     Packs/day: 1.00     Years: 39.00     Pack years: 39.00     Types: Cigarettes     Quit date: 11/4/2017     Years since quitting: 3.4   • Smokeless tobacco: Never Used   • Tobacco comment: 4/12 Down to vapping now.    Substance and Sexual Activity   • Alcohol use: Not Currently     Comment: sober since 8/8/18!   • Drug use: No     Types: Inhaled, Oral     Comment: hx of, denies currently   • Sexual activity: Not on file   Other Topics Concern   • Not on file   Social History Narrative   • Not on file     Social Determinants of Health     Financial Resource Strain:    • Difficulty of Paying Living Expenses:    Food Insecurity:    • Worried About Running Out of Food in the Last Year:    • Ran Out of Food in the Last Year:    Transportation Needs:    • Lack of Transportation (Medical):    • Lack of Transportation (Non-Medical):    Physical Activity:    • Days of Exercise per Week:    • Minutes of Exercise per Session:    Stress:    • Feeling of Stress :    Social Connections:    • Frequency of Communication with Friends and Family:    • Frequency of Social Gatherings with Friends and Family:    • Attends [employer] Services:    • Active Member of Clubs or Organizations:    • Attends Club or Organization Meetings:    • Marital Status:    Intimate Partner Violence:    • Fear of Current or Ex-Partner:    • Emotionally Abused:    • Physically Abused:    • Sexually Abused:        Allergies as of 04/15/2021 - Reviewed 04/15/2021   Allergen Reaction Noted   • Penicillins Rash 06/07/2009        Vitals:  BP  "130/76 (BP Location: Left arm, Patient Position: Sitting, BP Cuff Size: Adult)   Pulse 84   Resp 16   Ht 1.702 m (5' 7\")   Wt (!) 140 kg (308 lb)   SpO2 92%     Current medications as of today   Current Outpatient Medications   Medication Sig Dispense Refill   • warfarin (COUMADIN) 5 MG Tab Take 0.5-1 tabs by mouth daily or as directed by the anticoagulation clinic. 90 Tab 1   • atorvastatin (LIPITOR) 20 MG Tab TAKE ONE TABLET BY MOUTH ONE TIME DAILY IN THE EVENING 90 Tab 2   • amiodarone (CORDARONE) 200 MG Tab Take 1 Tab by mouth every day. 90 Tab 3   • methocarbamol (ROBAXIN) 750 MG Tab      • ARIPiprazole (ABILIFY) 10 MG Tab      • torsemide (DEMADEX) 20 MG Tab Take 1 Tab by mouth every day. 90 Tab 3   • potassium chloride SA (KDUR) 20 MEQ Tab CR Take 1 Tab by mouth every day. 100 Tab 3   • VENTOLIN  (90 Base) MCG/ACT Aero Soln inhalation aerosol INHALE TWO PUFFS BY MOUTH EVERY FOUR HOURS AS NEEDED FOR SHORTNESS OF BREATH 1 Inhaler 3   • venlafaxine XR (EFFEXOR XR) 75 MG CAPSULE SR 24 HR Take 75 mg by mouth every day.     • Blood Pressure Monitoring (B-D ASSURE BPM/AUTO ARM CUFF) Misc Blood pressure home monitoring system 1 Each 0   • traZODone (DESYREL) 100 MG Tab Take 100 mg by mouth every evening.     • oxyCODONE immediate release (ROXICODONE) 10 MG immediate release tablet Take 10 mg by mouth 3 times a day. TID with MS Contin     • Multiple Vitamin (MULTI-DAY PO) Take 1 Tab by mouth every morning.     • pregabalin (LYRICA) 200 MG capsule Take 200 mg by mouth 3 times a day.     • vitamin D (CHOLECALCIFEROL) 1000 UNIT Tab Take 1,000 Units by mouth every morning.     • morphine SR (MS CONTIN) 15 MG TB12 Take 1 Tab by mouth 3 times a day. 60 Tab 0     No current facility-administered medications for this visit.         Physical Exam:   Gen:           Alert and oriented, No apparent distress. Mood and affect appropriate, normal interaction with examiner.  Eyes:          PERRL, EOM intact, sclere white, " conjunctive moist.  Ears:          Not examined.   Hearing:     Grossly intact.  Nose:          Normal, no lesions or deformities.  Dentition:    mask  Oropharynx:   mask  Mallampati Classification: mask  Neck:        Supple, trachea midline, no masses.  Respiratory Effort: No intercostal retractions or use of accessory muscles.   Lung Auscultation:      Clear to auscultation bilaterally; no rales, rhonchi or wheezing.  CV:            Regular rate and rhythm. No murmurs, rubs or gallops.  Abd:           Not examined.   Lymphadenopathy: Not examined.  Gait and Station: Normal.  Digits and Nails: No clubbing, cyanosis, petechiae, or nodes.   Cranial Nerves: II-XII grossly intact.  Skin:        No rashes, lesions or ulcers noted.               Ext:           BLE mild edema      Assessment:  1. Complex sleep apnea syndrome     2. Chronic respiratory failure with hypoxia (HCC)     3. Chronic right-sided heart failure (HCC)     4. Pulmonary hypertension (HCC)     5. Essential hypertension     6. BMI 45.0-49.9, adult (HCC)     7. Former smoker         Immunizations:    Flu:9/2020  Pneumovax 23:2017  Prevnar 13:due age 66  COVID-19: 3/30/21, pending    Plan:  1. DME BIPAP ST 19/14cm, 16bpm with 2LPM bleed in O2.  DME O2  Patient will re-initiate therapy  Patient understands they may have mask fits within first 30 days of therapy covered by insurance to obtain best fit.  Patient understands the need to use device every night for >4hrs to meet compliance standards for insurance purposes.  2. Discussed sleep hygiene and handout provided  3. Encourage weight loss through diet/exercise  4. F/u in 3 mos for compliance check, sooner if needed.    Please note that this dictation was created using voice recognition software. I have made every reasonable attempt to correct obvious errors, but it is possible there are errors of grammar and possibly content that I did not discover before finalizing the note.

## 2021-04-16 LAB — INR BLD: 1.8 (ref 0.9–1.2)

## 2021-04-22 ENCOUNTER — IMMUNIZATION (OUTPATIENT)
Dept: FAMILY PLANNING/WOMEN'S HEALTH CLINIC | Facility: IMMUNIZATION CENTER | Age: 61
End: 2021-04-22
Attending: INTERNAL MEDICINE
Payer: MEDICARE

## 2021-04-22 DIAGNOSIS — Z23 ENCOUNTER FOR VACCINATION: Primary | ICD-10-CM

## 2021-04-22 PROCEDURE — 91300 PFIZER SARS-COV-2 VACCINE: CPT

## 2021-04-22 PROCEDURE — 0002A PFIZER SARS-COV-2 VACCINE: CPT

## 2021-04-28 ENCOUNTER — ANTICOAGULATION VISIT (OUTPATIENT)
Dept: VASCULAR LAB | Facility: MEDICAL CENTER | Age: 61
End: 2021-04-28
Attending: INTERNAL MEDICINE
Payer: MEDICARE

## 2021-04-28 DIAGNOSIS — Z98.890 STATUS POST MITRAL VALVE REPAIR: ICD-10-CM

## 2021-04-28 DIAGNOSIS — I48.0 PAF (PAROXYSMAL ATRIAL FIBRILLATION) (HCC): ICD-10-CM

## 2021-04-28 LAB
INR BLD: 5 (ref 0.9–1.2)
INR PPP: 5 (ref 2–3.5)

## 2021-04-28 PROCEDURE — 85610 PROTHROMBIN TIME: CPT

## 2021-04-28 PROCEDURE — 99212 OFFICE O/P EST SF 10 MIN: CPT | Performed by: NURSE PRACTITIONER

## 2021-04-28 NOTE — PROGRESS NOTES
Anticoagulation Summary  As of 2021    INR goal:  2.0-3.0   TTR:  38.2 % (3 y)   INR used for dosin.00 (2021)   Warfarin maintenance plan:  2.5 mg (5 mg x 0.5) every Tue; 5 mg (5 mg x 1) all other days   Weekly warfarin total:  32.5 mg   Plan last modified:  SARA KellyPSARITA (2021)   Next INR check:  2021   Target end date:  Indefinite    Indications    PAF (paroxysmal atrial fibrillation) (CMS-HCC) [I48.0]  Long term current use of anticoagulant therapy (Resolved) [Z79.01]  Status post mitral valve repair [Z98.890]             Anticoagulation Episode Summary     INR check location:      Preferred lab:      Send INR reminders to:      Comments:  Right arm only for BP checks      Anticoagulation Care Providers     Provider Role Specialty Phone number    [facility] Responsible  [phone]        Anticoagulation Patient Findings      HPI:  Kailey Velarde seen in clinic today for follow up on anticoagulation therapy in the presence of AF, MV repair.   Denies any changes to current medical/health status since last appointment.   Denies any medication or diet changes.   No current symptoms of bleeding or thrombosis reported.  Daily ETOH consumption. She knows alcohol can increase her risk of bleeding.  Pt is not on antiplatelet therapy.    A/P:   INR supratherapeutic.   HOLD two doses then continue current regimen. Cautioned about avoiding injuries and monitoring for prolonged bleeding.      Pt educated to contact our clinic with any changes in medications or s/s of bleeding or thrombosis. Pt is aware to seek immediate medical attention for falls, head injury or deep cuts    Follow up appointment in 1 week(s).    Next Appointment: Wednesday, May 5, 2021 at 1:30 pm.    Brittnee HENDERSON

## 2021-05-05 ENCOUNTER — ANTICOAGULATION VISIT (OUTPATIENT)
Dept: VASCULAR LAB | Facility: MEDICAL CENTER | Age: 61
End: 2021-05-05
Attending: INTERNAL MEDICINE
Payer: MEDICARE

## 2021-05-05 DIAGNOSIS — Z98.890 STATUS POST MITRAL VALVE REPAIR: ICD-10-CM

## 2021-05-05 DIAGNOSIS — I48.0 PAF (PAROXYSMAL ATRIAL FIBRILLATION) (HCC): ICD-10-CM

## 2021-05-05 LAB — INR PPP: 2.1 (ref 2–3.5)

## 2021-05-05 PROCEDURE — 85610 PROTHROMBIN TIME: CPT

## 2021-05-05 PROCEDURE — 99211 OFF/OP EST MAY X REQ PHY/QHP: CPT | Performed by: NURSE PRACTITIONER

## 2021-05-05 NOTE — PROGRESS NOTES
Anticoagulation Summary  As of 2021    INR goal:  2.0-3.0   TTR:  38.2 % (3 y)   INR used for dosin.10 (2021)   Warfarin maintenance plan:  2.5 mg (5 mg x 0.5) every Tue; 5 mg (5 mg x 1) all other days   Weekly warfarin total:  32.5 mg   No change documented:  SARA KellyPSARITA   Plan last modified:  SARA KellyPSARITA (2021)   Next INR check:  2021   Target end date:  Indefinite    Indications    PAF (paroxysmal atrial fibrillation) (CMS-HCC) [I48.0]  Long term current use of anticoagulant therapy (Resolved) [Z79.01]  Status post mitral valve repair [Z98.890]             Anticoagulation Episode Summary     INR check location:      Preferred lab:      Send INR reminders to:      Comments:  Right arm only for BP checks      Anticoagulation Care Providers     Provider Role Specialty Phone number    [facility] Responsible  [phone]        Anticoagulation Patient Findings      HPI:  Kailey Velarde seen in clinic today for follow up on anticoagulation therapy in the presence of AF, MV repair.   INR 5.0 last visit - likely ETOH related.  Denies any medication or diet changes.   No current symptoms of bleeding or thrombosis reported.    Pt is not on antiplatelet therapy.    A/P:   INR therapeutic. INR down from 5.0 with two dose holds. Will have patient resume her previous regimen.  BP declined.    Pt educated to contact our clinic with any changes in medications or s/s of bleeding or thrombosis. Pt is aware to seek immediate medical attention for falls, head injury or deep cuts    Follow up appointment in 2 week(s) per pt's preference.    Next Appointment: Wednesday, May 19, 2021 at 11:15 am.    Brittnee HENDERSON

## 2021-05-10 LAB — INR BLD: 2.1 (ref 0.9–1.2)

## 2021-05-17 DIAGNOSIS — I10 ESSENTIAL HYPERTENSION: ICD-10-CM

## 2021-05-18 RX ORDER — ATORVASTATIN CALCIUM 20 MG/1
TABLET, FILM COATED ORAL
Qty: 90 TABLET | Refills: 0 | Status: SHIPPED
Start: 2021-05-18 | End: 2021-07-01

## 2021-05-19 ENCOUNTER — ANTICOAGULATION VISIT (OUTPATIENT)
Dept: VASCULAR LAB | Facility: MEDICAL CENTER | Age: 61
End: 2021-05-19
Attending: INTERNAL MEDICINE
Payer: MEDICARE

## 2021-05-19 DIAGNOSIS — I48.0 PAF (PAROXYSMAL ATRIAL FIBRILLATION) (HCC): ICD-10-CM

## 2021-05-19 DIAGNOSIS — Z98.890 STATUS POST MITRAL VALVE REPAIR: ICD-10-CM

## 2021-05-19 LAB — INR PPP: 2.6 (ref 2–3.5)

## 2021-05-19 PROCEDURE — 99211 OFF/OP EST MAY X REQ PHY/QHP: CPT | Performed by: PHARMACIST

## 2021-05-19 PROCEDURE — 85610 PROTHROMBIN TIME: CPT

## 2021-05-19 NOTE — PROGRESS NOTES
Anticoagulation Summary  As of 2021    INR goal:  2.0-3.0   TTR:  39.0 % (3.1 y)   INR used for dosin.60 (2021)   Warfarin maintenance plan:  2.5 mg (5 mg x 0.5) every Tue; 5 mg (5 mg x 1) all other days   Weekly warfarin total:  32.5 mg   Plan last modified:  Brittnee Cisneros APriscillaP.NPriscilla (2021)   Next INR check:  2021   Target end date:  Indefinite    Indications    PAF (paroxysmal atrial fibrillation) (CMS-HCC) [I48.0]  Long term current use of anticoagulant therapy (Resolved) [Z79.01]  Status post mitral valve repair [Z98.890]             Anticoagulation Episode Summary     INR check location:      Preferred lab:      Send INR reminders to:      Comments:  Right arm only for BP checks      Anticoagulation Care Providers     Provider Role Specialty Phone number    [facility] Responsible  [phone]                Anticoagulation Patient Findings  Patient Findings     Negatives:  Signs/symptoms of thrombosis, Signs/symptoms of bleeding, Laboratory test error suspected, Change in health, Change in alcohol use, Change in activity, Upcoming invasive procedure, Emergency department visit, Upcoming dental procedure, Missed doses, Extra doses, Change in medications, Change in diet/appetite, Hospital admission, Bruising, Other complaints          HPI:  Kailey Velarde seen in clinic today, on anticoagulation therapy with warfarin for stroke prophylaxis   Changes to current medical/health status since last appt: No  Denies signs/symptoms of bleeding and/or thrombosis since the last appt.    Denies any interval changes to diet  Denies any interval changes to medications since last appt.   Denies any complications or cost restrictions with current therapy.   Verified current warfarin dosing schedule.   Medications reconciled  Pt not antiplatelet therapy       A/P   INR  -therapeutic.   2.6    Pt is to continue with current warfarin dosing regimen.      Follow up appointment in  4 weeks    Sai Weinberg student pharmacist    Enzo Tinsley, PharmD

## 2021-05-26 ENCOUNTER — OFFICE VISIT (OUTPATIENT)
Dept: SLEEP MEDICINE | Facility: MEDICAL CENTER | Age: 61
End: 2021-05-26
Payer: MEDICARE

## 2021-05-26 VITALS
BODY MASS INDEX: 44.41 KG/M2 | HEART RATE: 63 BPM | OXYGEN SATURATION: 94 % | WEIGHT: 293 LBS | SYSTOLIC BLOOD PRESSURE: 130 MMHG | HEIGHT: 68 IN | DIASTOLIC BLOOD PRESSURE: 72 MMHG

## 2021-05-26 DIAGNOSIS — I50.30 HEART FAILURE WITH PRESERVED EJECTION FRACTION, BORDERLINE, CLASS II (HCC): ICD-10-CM

## 2021-05-26 DIAGNOSIS — G47.31 CENTRAL SLEEP APNEA: ICD-10-CM

## 2021-05-26 DIAGNOSIS — I27.20 PULMONARY HYPERTENSION (HCC): Chronic | ICD-10-CM

## 2021-05-26 DIAGNOSIS — Z79.891 CHRONIC PRESCRIPTION OPIATE USE: ICD-10-CM

## 2021-05-26 DIAGNOSIS — I48.0 PAF (PAROXYSMAL ATRIAL FIBRILLATION) (HCC): ICD-10-CM

## 2021-05-26 DIAGNOSIS — J96.11 CHRONIC RESPIRATORY FAILURE WITH HYPOXIA (HCC): Chronic | ICD-10-CM

## 2021-05-26 DIAGNOSIS — Z95.0 PACEMAKER: ICD-10-CM

## 2021-05-26 DIAGNOSIS — I10 ESSENTIAL HYPERTENSION: Chronic | ICD-10-CM

## 2021-05-26 DIAGNOSIS — F51.04 CHRONIC INSOMNIA: ICD-10-CM

## 2021-05-26 PROCEDURE — 99214 OFFICE O/P EST MOD 30 MIN: CPT | Performed by: NURSE PRACTITIONER

## 2021-05-26 NOTE — PROGRESS NOTES
Chief Complaint   Patient presents with   • Follow-Up     Central Sleep Apnea- Last seen 04/15/2021       HPI:      Mrs. Velarde is a 61 y/o female patient who is in today for CSA f/u. PMH includes chronic right-sided heart failure, paroxysmal atrial fibrillation, status post mitral valve repair, pulmonary hypertension, hypertension, obesity with BMI 45, former smoker quitting in 2017,     Patient was set up with BiPAP ST on 5/10/2021 but follows up today due to pressure intolerance.  This visit is technically not a first compliance visit.  Compliance report from 4/27/21-5/26/21 was downloaded and reviewed with the patient which showed BiPAP ST IPAP/EPAP 19/14 cmH2O with 2L O2 bleed in, RR 16 bpm, 30% compliance, 1 hrs 25 min use (0% compliance), AHI of 0.7. She is not tolerating the pressure well, but the nasal mask works well.  She states that she is not able to stay asleep with the BiPAP ST machine because she feels the pressure is too high and sometimes she also feels that the machine makes her bruits more.  She goes to bed at 11 pm and wakes up at 7 am.  She has trouble with staying asleep and wakes up through the night.  She is utilizing trazodone 100 mg 3 tabs nightly to help with sleep.  She denies morning headache or snoring. She denies any new health problems or medications.    Sleep/Card Study History:   PSG titration study from 4/8/21 indicated central sleep apnea. Sleep related hypoxia. The PAP titration was initiated with BiPAP ST 14/10 cm of water 10 BPM  and the pressure which was slowly titrated up in an attempt to eliminate sleep disordered breathing and snoring. The final pressure tested during the study was BiPAP 21/17 cm water and 16 BPM. Lowest bedt tolerated pressure was BiPAP ST 19/14 cm 16 BPM and O2 bleed in.A final pressure the patient was observed in the supine position  and in the REM sleep stage. The apnea hypopnea index improved to 2.1 per hour and O2 yogesh 85%.  She spent 256 min of  sleep time below 89% O2 saturation. Snoring was resolved.     PSG study from 11/20/19 indicated severe central with AHI of 146/hr and O2 yogesh 76 %. Severe sleep hypoxia.     Echo from 3/29/18 indicated Systolic function is difficult to assess in rapid atrial fibrillation, but appears normal. Severely dilated right ventricle. Reduced right ventricular systolic function. Mildly dilated left atrium. Known mitral valve bioprosthesis which is functioning normally. Estimated right ventricular systolic pressure is 35 mmHg + JVP. Compared to previous echocardiogram from 8/22/2016 the rhythm is now  rapid atrial fibrillation. Estimated right-sided pressures are lower in this study, and the mitral bioprosthesis continues to function normally.    ROS:    Constitutional: Denies fevers, Denies weight changes  Eyes: Denies changes in vision, no eye pain  Ears/Nose/Throat/Mouth: Denies nasal congestion or sore throat   Cardiovascular: Denies chest pain or palpitations   Respiratory: Denies shortness of breath , Denies cough  Gastrointestinal/Hepatic: Denies abdominal pain, nausea, vomiting,   Skin/Breast: Denies rash,   Neurological: Denies headache, confusion,   Psychiatric: denies mood disorder   Sleep: denies snoring       Past Medical History:   Diagnosis Date   • Atrial myxoma November 2011    Status post LA myxoma resection   • Backpain    • CAD (coronary artery disease)     pacemaker   • Chronic pain    • Dizziness     • Hyperlipidemia     • Hypertension    • Hypertriglyceridemia     • Indigestion    • Peripheral edema    • Sciatica    • Sick sinus syndrome (HCC) November 2011    Status post PPM implantation.   • Status post mitral valve repair November 2011    MV repair with 32mm Jonnie-Carpenter flexible annuloplasty ring.       Past Surgical History:   Procedure Laterality Date   • CARDIAC CATH, RIGHT HEART  03/31/2018    Right heart cath with high pressures.   • MITRAL VALVE REPAIR  11/17/2011    Performed by  MARY ORTIZ at SURGERY [facility] ORS   • PACEMAKER INSERTION  November 2011    Medtronic Versa [id number] implanted by Dr. Amaya.   • OTHER ORTHOPEDIC SURGERY      Neck and back        Family History   Problem Relation Age of Onset   • Heart Attack Father        Social History     Socioeconomic History   • Marital status: Single     Spouse name: Not on file   • Number of children: Not on file   • Years of education: Not on file   • Highest education level: Not on file   Occupational History   • Not on file   Tobacco Use   • Smoking status: Former Smoker     Packs/day: 1.00     Years: 39.00     Pack years: 39.00     Types: Cigarettes     Quit date: 11/4/2017     Years since quitting: 3.5   • Smokeless tobacco: Never Used   • Tobacco comment: 4/12 Down to vapping now.    Vaping Use   • Vaping Use: Every day   • Start date: 11/4/2017   Substance and Sexual Activity   • Alcohol use: Not Currently     Comment: sober since 8/8/18!   • Drug use: No     Types: Inhaled, Oral     Comment: hx of, denies currently   • Sexual activity: Not on file   Other Topics Concern   • Not on file   Social History Narrative   • Not on file     Social Determinants of Health     Financial Resource Strain:    • Difficulty of Paying Living Expenses:    Food Insecurity:    • Worried About Running Out of Food in the Last Year:    • Ran Out of Food in the Last Year:    Transportation Needs:    • Lack of Transportation (Medical):    • Lack of Transportation (Non-Medical):    Physical Activity:    • Days of Exercise per Week:    • Minutes of Exercise per Session:    Stress:    • Feeling of Stress :    Social Connections:    • Frequency of Communication with Friends and Family:    • Frequency of Social Gatherings with Friends and Family:    • Attends Holiness Services:    • Active Member of Clubs or Organizations:    • Attends Club or Organization Meetings:    • Marital Status:    Intimate Partner Violence:    • Fear of Current or Ex-Partner:    •  Emotionally Abused:    • Physically Abused:    • Sexually Abused:        Allergies as of 05/26/2021 - Reviewed 05/26/2021   Allergen Reaction Noted   • Penicillins Rash 06/07/2009        Vitals:  Vitals:    05/26/21 0817   BP: 130/72   Pulse: 63   SpO2: 94%       Current medications as of today   Current Outpatient Medications   Medication Sig Dispense Refill   • atorvastatin (LIPITOR) 20 MG Tab TAKE ONE TABLET BY MOUTH ONCE A DAY  90 tablet 0   • warfarin (COUMADIN) 5 MG Tab Take 0.5-1 tabs by mouth daily or as directed by the anticoagulation clinic. 90 Tab 1   • amiodarone (CORDARONE) 200 MG Tab Take 1 Tab by mouth every day. 90 Tab 3   • methocarbamol (ROBAXIN) 750 MG Tab      • ARIPiprazole (ABILIFY) 10 MG Tab      • torsemide (DEMADEX) 20 MG Tab Take 1 Tab by mouth every day. 90 Tab 3   • potassium chloride SA (KDUR) 20 MEQ Tab CR Take 1 Tab by mouth every day. 100 Tab 3   • VENTOLIN  (90 Base) MCG/ACT Aero Soln inhalation aerosol INHALE TWO PUFFS BY MOUTH EVERY FOUR HOURS AS NEEDED FOR SHORTNESS OF BREATH 1 Inhaler 3   • venlafaxine XR (EFFEXOR XR) 75 MG CAPSULE SR 24 HR Take 75 mg by mouth every day.     • traZODone (DESYREL) 100 MG Tab Take 100 mg by mouth every evening.     • oxyCODONE immediate release (ROXICODONE) 10 MG immediate release tablet Take 10 mg by mouth 3 times a day. TID with MS Contin     • Multiple Vitamin (MULTI-DAY PO) Take 1 Tab by mouth every morning.     • pregabalin (LYRICA) 200 MG capsule Take 200 mg by mouth 3 times a day.     • vitamin D (CHOLECALCIFEROL) 1000 UNIT Tab Take 1,000 Units by mouth every morning.     • morphine SR (MS CONTIN) 15 MG TB12 Take 1 Tab by mouth 3 times a day. 60 Tab 0   • Blood Pressure Monitoring (B-D ASSURE BPM/AUTO ARM CUFF) Misc Blood pressure home monitoring system 1 Each 0     No current facility-administered medications for this visit.         Physical Exam: Limited by COVID-19 precautions.  Appearance: Well developed, well nourished, no acute  distress  Eyes: PERRL, EOM intact, sclera white, conjunctiva moist  Ears: no lesions or deformities  Hearing: grossly intact  Nose: no lesions or deformities  Respiratory effort: no intercostal retractions or use of accessory muscles. Portable O2 concentrator via NC in place.   Extremities: no cyanosis or edema  Abdomen: soft, large  Gait and Station: normal  Digits and nails: no clubbing, cyanosis, petechiae or nodes.  Cranial nerves: grossly intact  Skin: no visible rashes, lesions or ulcers noted  Orientation: Oriented to time, person and place  Mood and affect: mood and affect appropriate, normal interaction with examiner  Judgement: Intact    Assessment:  1. Central sleep apnea     2. Chronic respiratory failure with hypoxia ([facility])     3. Heart failure with preserved ejection fraction, borderline, class II ([facility])     4. Pacemaker     5. PAF (paroxysmal atrial fibrillation) ([facility])     6. Essential hypertension     7. Pulmonary hypertension ([facility])     8. BMI 45.0-49.9, adult ([facility])     9. Chronic prescription opiate use     10. Chronic insomnia           Plan  Discussed the cardiovascular and neuropsychiatric risks of untreated CSA; including but not limited to: HTN, DM, MI, ASCVD, CVA, CHF, traffic accidents.     1. Compliance report from 4/27/21-5/26/21 was downloaded and reviewed with the patient which showed BiPAP ST IPAP/EPAP 19/14 cmH2O, RR 16 bpm, 30% compliance, 1 hrs 25 min use (0% compliance), AHI of 0.7. Continue BiPAP ST and 2L O2. Patient is not compliant, but benefiting from BiPAP ST and 2L O2 bleed in therapy for management of CSA. Advised patient to use the BiPAP ST every night for more than four hours for optimal health benefit and to meet the health insurance 70% compliance guideline.     *DME order ([facility]) for mask (nasal or MOC) and supplies was not needed today. Continue to clean mask and supplies weekly with soap and water, and change supplies per insurance guidelines.     *DME order to  decrease BiPAP ST IPAP/EPAP to 17/11 and decrase RR to 14 bpm due to pressure intolerance. Will assess AHI at next office visit but the goal will be to increase BiPAP ST back to 19/14 with respiratory rate of 16 after patient acclimates to therapy.    2. Currently using O2 at 2L 24/7.   3-7. Continue to f/u with cardiology.  Patient is on Coumadin.  8. Continue to stay active.   9.  Continue to follow-up with [place] [facility].  Currently patient is taking MS Contin ER 15 mg 3 times daily, oxycodone 10 mg 3 times daily and methocarbamol 750 mg.  10.  Sleep hygiene discussed. Recommend keeping a set sleep/wake schedule. Logging enough hours of sleep. Limiting/Avoiding naps. No caffeine after noon and no heavy meals in the evening.   Chronic insomnia: Patient is currently taking trazodone 100 mg 3 tabs nightly as needed insomnia.  11. Follow up with the appropriate healthcare practitioners for all other medical problems.   12. F/u in 2 months for CSA, patient has apt scheduled for 7/19 with BEATRIZ Nunes.       Elena Ortega A.P.R.ADALI.      This dictation was created using voice recognition software. The accuracy of the dictation is limited to the abilities of the software. I expect there may be some errors of grammar and possibly content.

## 2021-06-14 ENCOUNTER — DOCUMENTATION (OUTPATIENT)
Dept: VASCULAR LAB | Facility: MEDICAL CENTER | Age: 61
End: 2021-06-14

## 2021-06-14 NOTE — PROGRESS NOTES
S/w patient today regarding clearance from Dr Chaves's office to have INR <2.0 for upcoming oral surgery.  She confirms surgery date of 6-24-21.  Clearance sent back to Dr Chaves and placed in bin in back office.  Provider to discuss instructions with patient at appt on 6-16-21.  Enzo Tinsley, PharmD, BCACP

## 2021-06-16 ENCOUNTER — ANTICOAGULATION VISIT (OUTPATIENT)
Dept: VASCULAR LAB | Facility: MEDICAL CENTER | Age: 61
End: 2021-06-16
Attending: INTERNAL MEDICINE
Payer: MEDICARE

## 2021-06-16 DIAGNOSIS — Z98.890 STATUS POST MITRAL VALVE REPAIR: ICD-10-CM

## 2021-06-16 DIAGNOSIS — I48.0 PAF (PAROXYSMAL ATRIAL FIBRILLATION) (HCC): ICD-10-CM

## 2021-06-16 LAB — INR PPP: 1.9 (ref 2–3.5)

## 2021-06-16 PROCEDURE — 99212 OFFICE O/P EST SF 10 MIN: CPT | Performed by: NURSE PRACTITIONER

## 2021-06-16 PROCEDURE — 85610 PROTHROMBIN TIME: CPT

## 2021-06-16 NOTE — PROGRESS NOTES
Anticoagulation Summary  As of 2021    INR goal:  2.0-3.0   TTR:  40.1 % (3.2 y)   INR used for dosin.90 (2021)   Warfarin maintenance plan:  2.5 mg (5 mg x 0.5) every Tue; 5 mg (5 mg x 1) all other days   Weekly warfarin total:  32.5 mg   Plan last modified:  SARA KellyPPriscillaNPriscilla (2021)   Next INR check:  2021   Target end date:  Indefinite    Indications    PAF (paroxysmal atrial fibrillation) (CMS-HCC) [I48.0]  Long term current use of anticoagulant therapy (Resolved) [Z79.01]  Status post mitral valve repair [Z98.890]             Anticoagulation Episode Summary     INR check location:      Preferred lab:      Send INR reminders to:      Comments:  Right arm only for BP checks      Anticoagulation Care Providers     Provider Role Specialty Phone number    [facility] Responsible  [phone]        Anticoagulation Patient Findings      HPI:  Kailey Velarde seen in clinic today for follow up on anticoagulation therapy in the presence of AF, MV repair.   She is having oral surgery on 21 and needs INR to be <2.0.  Denies any medication or diet changes.   No current symptoms of bleeding or thrombosis reported.  Verified dose with patient.  BP declined.    Pt is not on antiplatelet therapy.    A/P:   INR slightly subtherapeutic. Will have patient take 7.5 mg tonight then resume her usual regimen.  INRs in the past have been extremely labile, often very supratherapeutic. CHADS 2 score = 2 (chf, htn). Given low CHADS 2 score, will have patient HOLD two doses of warfarin prior to next visit to help ensure INR < 2.0 on a procedure date. Will fax note to Dr Chaves's office.    Pt educated to contact our clinic with any changes in medications or s/s of bleeding or thrombosis. Pt is aware to seek immediate medical attention for falls, head injury or deep cuts    Follow up appointment in 1 week(s).    Next Appointment: 2021 at 11:45 am.    Brittnee  Blayne HENDERSON    Cc: Dr Chaves

## 2021-06-18 LAB — INR BLD: 1.9 (ref 0.9–1.2)

## 2021-06-23 ENCOUNTER — APPOINTMENT (OUTPATIENT)
Dept: VASCULAR LAB | Facility: MEDICAL CENTER | Age: 61
End: 2021-06-23
Attending: INTERNAL MEDICINE
Payer: MEDICARE

## 2021-06-23 DIAGNOSIS — I48.0 PAF (PAROXYSMAL ATRIAL FIBRILLATION) (HCC): ICD-10-CM

## 2021-06-23 DIAGNOSIS — Z98.890 STATUS POST MITRAL VALVE REPAIR: ICD-10-CM

## 2021-06-23 LAB — INR PPP: 2.1 (ref 2–3.5)

## 2021-06-23 PROCEDURE — 85610 PROTHROMBIN TIME: CPT

## 2021-06-23 PROCEDURE — 99212 OFFICE O/P EST SF 10 MIN: CPT | Performed by: NURSE PRACTITIONER

## 2021-06-23 NOTE — PROGRESS NOTES
Anticoagulation Summary  As of 2021    INR goal:  2.0-3.0   TTR:  40.2 % (3.2 y)   INR used for dosin.10 (2021)   Warfarin maintenance plan:  2.5 mg (5 mg x 0.5) every Tue; 5 mg (5 mg x 1) all other days   Weekly warfarin total:  32.5 mg   Plan last modified:  Brittnee Cisneros, A.P.NPriscilla (2021)   Next INR check:  2021   Target end date:  Indefinite    Indications    PAF (paroxysmal atrial fibrillation) (CMS-HCC) [I48.0]  Long term current use of anticoagulant therapy (Resolved) [Z79.01]  Status post mitral valve repair [Z98.890]             Anticoagulation Episode Summary     INR check location:      Preferred lab:      Send INR reminders to:      Comments:  Right arm only for BP checks      Anticoagulation Care Providers     Provider Role Specialty Phone number    [facility] Responsible  [phone]        Anticoagulation Patient Findings      HPI:  Kailey Velarde seen in clinic today for follow up on anticoagulation therapy in the presence of AF, MV repair hx.   She is scheduled for 3 teeth to be extracted tomorrow. INR needs to be < 2.0. She held warfarin Monday and Tuesday as instructed.  Denies any medication or diet changes.   No current symptoms of bleeding or thrombosis reported.  Verified dose with patient.    Pt is not on antiplatelet therapy.    A/P:   INR 2.1 today. Will have patient HOLD her warfarin tonight which will help lower her INR for tomorrow's procedure. Will fax result to Dr Anastacio DDS.  Pt will resume her warfarin tomorrow evening if okay with surgeon. Instructed to resume her usual dose.     Pt educated to contact our clinic with any changes in medications or s/s of bleeding or thrombosis. Pt is aware to seek immediate medical attention for falls, head injury or deep cuts    Follow up appointment in 1 week(s).    Next Appointment: Wednesday, 2021 at 11:30 am.    Brittnee HENDERSON        Cc: Dr Chaves

## 2021-06-25 LAB — INR BLD: 2.1 (ref 0.9–1.2)

## 2021-06-30 ENCOUNTER — ANTICOAGULATION VISIT (OUTPATIENT)
Dept: VASCULAR LAB | Facility: MEDICAL CENTER | Age: 61
End: 2021-06-30
Attending: INTERNAL MEDICINE
Payer: MEDICARE

## 2021-06-30 DIAGNOSIS — Z98.890 STATUS POST MITRAL VALVE REPAIR: ICD-10-CM

## 2021-06-30 DIAGNOSIS — I48.0 PAF (PAROXYSMAL ATRIAL FIBRILLATION) (HCC): ICD-10-CM

## 2021-06-30 LAB
INR BLD: 2.5 (ref 0.9–1.2)
INR PPP: 2.5 (ref 2–3.5)

## 2021-06-30 PROCEDURE — 99211 OFF/OP EST MAY X REQ PHY/QHP: CPT | Performed by: NURSE PRACTITIONER

## 2021-06-30 PROCEDURE — 85610 PROTHROMBIN TIME: CPT

## 2021-06-30 NOTE — PROGRESS NOTES
Anticoagulation Summary  As of 2021    INR goal:  2.0-3.0   TTR:  40.5 % (3.2 y)   INR used for dosin.50 (2021)   Warfarin maintenance plan:  2.5 mg (5 mg x 0.5) every Tue; 5 mg (5 mg x 1) all other days   Weekly warfarin total:  32.5 mg   Plan last modified:  Brittnee Cisneros, A.P.N. (2021)   Next INR check:  2021   Target end date:  Indefinite    Indications    PAF (paroxysmal atrial fibrillation) (CMS-HCC) [I48.0]  Long term current use of anticoagulant therapy (Resolved) [Z79.01]  Status post mitral valve repair [Z98.890]             Anticoagulation Episode Summary     INR check location:      Preferred lab:      Send INR reminders to:      Comments:  Right arm only for BP checks      Anticoagulation Care Providers     Provider Role Specialty Phone number    [facility] Responsible  [phone]        Anticoagulation Patient Findings      HPI:  Kailey Velarde seen in clinic today for follow up on anticoagulation therapy in the presence of AF.   Recovering from her dental procedure last week. Overall, doing well. Has f/u with her oral surgeon tomorrow.  Denies any medication or diet changes.   No current symptoms of bleeding or thrombosis reported.  Verified dose with patient.  BP recorded in vitals.    Pt is not on antiplatelet therapy.    A/P:   INR therapeutic.   Continue current regimen.     Pt educated to contact our clinic with any changes in medications or s/s of bleeding or thrombosis. Pt is aware to seek immediate medical attention for falls, head injury or deep cuts    Follow up appointment in 2 week(s).    Next Appointment: 2021 at 2:15 pm.    Brittnee HENDERSON

## 2021-07-01 ENCOUNTER — HOSPITAL ENCOUNTER (INPATIENT)
Facility: MEDICAL CENTER | Age: 61
LOS: 2 days | DRG: 312 | End: 2021-07-03
Attending: EMERGENCY MEDICINE | Admitting: INTERNAL MEDICINE
Payer: MEDICARE

## 2021-07-01 ENCOUNTER — APPOINTMENT (OUTPATIENT)
Dept: RADIOLOGY | Facility: MEDICAL CENTER | Age: 61
DRG: 312 | End: 2021-07-01
Attending: EMERGENCY MEDICINE
Payer: MEDICARE
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
## 2021-07-01 DIAGNOSIS — R55 SYNCOPE AND COLLAPSE: ICD-10-CM

## 2021-07-01 DIAGNOSIS — S00.83XA CONTUSION OF FACE, INITIAL ENCOUNTER: ICD-10-CM

## 2021-07-01 DIAGNOSIS — S01.81XA FACIAL LACERATION, INITIAL ENCOUNTER: ICD-10-CM

## 2021-07-01 DIAGNOSIS — E87.6 HYPOKALEMIA: ICD-10-CM

## 2021-07-01 DIAGNOSIS — S09.90XA CLOSED HEAD INJURY, INITIAL ENCOUNTER: ICD-10-CM

## 2021-07-01 DIAGNOSIS — E87.1 HYPONATREMIA: ICD-10-CM

## 2021-07-01 PROBLEM — R29.6 FALLS: Status: ACTIVE | Noted: 2021-07-01

## 2021-07-01 PROBLEM — R94.31 QT PROLONGATION: Status: ACTIVE | Noted: 2021-07-01

## 2021-07-01 LAB
25(OH)D3 SERPL-MCNC: 30 NG/ML (ref 30–100)
ALBUMIN SERPL BCP-MCNC: 3.9 G/DL (ref 3.2–4.9)
ALBUMIN/GLOB SERPL: 1.3 G/DL
ALP SERPL-CCNC: 62 U/L (ref 30–99)
ALT SERPL-CCNC: 14 U/L (ref 2–50)
ANION GAP SERPL CALC-SCNC: 11 MMOL/L (ref 7–16)
ANION GAP SERPL CALC-SCNC: 8 MMOL/L (ref 7–16)
APTT PPP: 43.1 SEC (ref 24.7–36)
AST SERPL-CCNC: 24 U/L (ref 12–45)
BASOPHILS # BLD AUTO: 0.4 % (ref 0–1.8)
BASOPHILS # BLD: 0.02 K/UL (ref 0–0.12)
BILIRUB SERPL-MCNC: 0.5 MG/DL (ref 0.1–1.5)
BUN SERPL-MCNC: 6 MG/DL (ref 8–22)
BUN SERPL-MCNC: 7 MG/DL (ref 8–22)
CALCIUM SERPL-MCNC: 8.8 MG/DL (ref 8.5–10.5)
CALCIUM SERPL-MCNC: 9.5 MG/DL (ref 8.5–10.5)
CHLORIDE SERPL-SCNC: 89 MMOL/L (ref 96–112)
CHLORIDE SERPL-SCNC: 93 MMOL/L (ref 96–112)
CK SERPL-CCNC: 42 U/L (ref 0–154)
CO2 SERPL-SCNC: 28 MMOL/L (ref 20–33)
CO2 SERPL-SCNC: 31 MMOL/L (ref 20–33)
CREAT SERPL-MCNC: 0.63 MG/DL (ref 0.5–1.4)
CREAT SERPL-MCNC: 0.67 MG/DL (ref 0.5–1.4)
EKG IMPRESSION: NORMAL
EKG IMPRESSION: NORMAL
EOSINOPHIL # BLD AUTO: 0.05 K/UL (ref 0–0.51)
EOSINOPHIL NFR BLD: 1 % (ref 0–6.9)
ERYTHROCYTE [DISTWIDTH] IN BLOOD BY AUTOMATED COUNT: 42.4 FL (ref 35.9–50)
GLOBULIN SER CALC-MCNC: 3.1 G/DL (ref 1.9–3.5)
GLUCOSE SERPL-MCNC: 102 MG/DL (ref 65–99)
GLUCOSE SERPL-MCNC: 97 MG/DL (ref 65–99)
HCT VFR BLD AUTO: 35 % (ref 37–47)
HGB BLD-MCNC: 11.8 G/DL (ref 12–16)
IMM GRANULOCYTES # BLD AUTO: 0.01 K/UL (ref 0–0.11)
IMM GRANULOCYTES NFR BLD AUTO: 0.2 % (ref 0–0.9)
INR PPP: 2.49 (ref 0.87–1.13)
LYMPHOCYTES # BLD AUTO: 0.86 K/UL (ref 1–4.8)
LYMPHOCYTES NFR BLD: 16.8 % (ref 22–41)
MAGNESIUM SERPL-MCNC: 1.7 MG/DL (ref 1.5–2.5)
MCH RBC QN AUTO: 29.7 PG (ref 27–33)
MCHC RBC AUTO-ENTMCNC: 33.7 G/DL (ref 33.6–35)
MCV RBC AUTO: 88.2 FL (ref 81.4–97.8)
MONOCYTES # BLD AUTO: 0.53 K/UL (ref 0–0.85)
MONOCYTES NFR BLD AUTO: 10.3 % (ref 0–13.4)
NEUTROPHILS # BLD AUTO: 3.66 K/UL (ref 2–7.15)
NEUTROPHILS NFR BLD: 71.3 % (ref 44–72)
NRBC # BLD AUTO: 0 K/UL
NRBC BLD-RTO: 0 /100 WBC
OSMOLALITY SERPL: 270 MOSM/KG H2O (ref 278–298)
PLATELET # BLD AUTO: 160 K/UL (ref 164–446)
PMV BLD AUTO: 10.2 FL (ref 9–12.9)
POTASSIUM SERPL-SCNC: 3.3 MMOL/L (ref 3.6–5.5)
POTASSIUM SERPL-SCNC: 3.7 MMOL/L (ref 3.6–5.5)
PROT SERPL-MCNC: 7 G/DL (ref 6–8.2)
PROTHROMBIN TIME: 26.2 SEC (ref 12–14.6)
RBC # BLD AUTO: 3.97 M/UL (ref 4.2–5.4)
SODIUM SERPL-SCNC: 128 MMOL/L (ref 135–145)
SODIUM SERPL-SCNC: 132 MMOL/L (ref 135–145)
TROPONIN T SERPL-MCNC: <6 NG/L (ref 6–19)
TROPONIN T SERPL-MCNC: <6 NG/L (ref 6–19)
TSH SERPL DL<=0.005 MIU/L-ACNC: 1 UIU/ML (ref 0.38–5.33)
WBC # BLD AUTO: 5.1 K/UL (ref 4.8–10.8)

## 2021-07-01 PROCEDURE — 84443 ASSAY THYROID STIM HORMONE: CPT

## 2021-07-01 PROCEDURE — 700111 HCHG RX REV CODE 636 W/ 250 OVERRIDE (IP): Performed by: EMERGENCY MEDICINE

## 2021-07-01 PROCEDURE — 70450 CT HEAD/BRAIN W/O DYE: CPT | Mod: ME

## 2021-07-01 PROCEDURE — 80053 COMPREHEN METABOLIC PANEL: CPT

## 2021-07-01 PROCEDURE — 84484 ASSAY OF TROPONIN QUANT: CPT | Mod: 91

## 2021-07-01 PROCEDURE — 700102 HCHG RX REV CODE 250 W/ 637 OVERRIDE(OP): Performed by: EMERGENCY MEDICINE

## 2021-07-01 PROCEDURE — 85025 COMPLETE CBC W/AUTO DIFF WBC: CPT

## 2021-07-01 PROCEDURE — 93005 ELECTROCARDIOGRAM TRACING: CPT | Performed by: EMERGENCY MEDICINE

## 2021-07-01 PROCEDURE — 700105 HCHG RX REV CODE 258: Performed by: EMERGENCY MEDICINE

## 2021-07-01 PROCEDURE — 96365 THER/PROPH/DIAG IV INF INIT: CPT

## 2021-07-01 PROCEDURE — 83930 ASSAY OF BLOOD OSMOLALITY: CPT

## 2021-07-01 PROCEDURE — A9270 NON-COVERED ITEM OR SERVICE: HCPCS | Performed by: EMERGENCY MEDICINE

## 2021-07-01 PROCEDURE — 83735 ASSAY OF MAGNESIUM: CPT

## 2021-07-01 PROCEDURE — 70486 CT MAXILLOFACIAL W/O DYE: CPT | Mod: ME

## 2021-07-01 PROCEDURE — 82550 ASSAY OF CK (CPK): CPT

## 2021-07-01 PROCEDURE — 85730 THROMBOPLASTIN TIME PARTIAL: CPT

## 2021-07-01 PROCEDURE — 700105 HCHG RX REV CODE 258: Performed by: INTERNAL MEDICINE

## 2021-07-01 PROCEDURE — 36415 COLL VENOUS BLD VENIPUNCTURE: CPT

## 2021-07-01 PROCEDURE — 0HQ1XZZ REPAIR FACE SKIN, EXTERNAL APPROACH: ICD-10-PCS | Performed by: EMERGENCY MEDICINE

## 2021-07-01 PROCEDURE — A9270 NON-COVERED ITEM OR SERVICE: HCPCS | Performed by: INTERNAL MEDICINE

## 2021-07-01 PROCEDURE — 82306 VITAMIN D 25 HYDROXY: CPT

## 2021-07-01 PROCEDURE — 99223 1ST HOSP IP/OBS HIGH 75: CPT | Mod: AI | Performed by: INTERNAL MEDICINE

## 2021-07-01 PROCEDURE — 96366 THER/PROPH/DIAG IV INF ADDON: CPT

## 2021-07-01 PROCEDURE — 303353 HCHG DERMABOND SKIN ADHESIVE

## 2021-07-01 PROCEDURE — 304999 HCHG REPAIR-SIMPLE/INTERMED LEVEL 1

## 2021-07-01 PROCEDURE — 99285 EMERGENCY DEPT VISIT HI MDM: CPT

## 2021-07-01 PROCEDURE — 700102 HCHG RX REV CODE 250 W/ 637 OVERRIDE(OP): Performed by: INTERNAL MEDICINE

## 2021-07-01 PROCEDURE — 770020 HCHG ROOM/CARE - TELE (206)

## 2021-07-01 PROCEDURE — 85610 PROTHROMBIN TIME: CPT

## 2021-07-01 PROCEDURE — 80048 BASIC METABOLIC PNL TOTAL CA: CPT

## 2021-07-01 RX ORDER — CHLORHEXIDINE GLUCONATE ORAL RINSE 1.2 MG/ML
15 SOLUTION DENTAL DAILY
COMMUNITY
Start: 2021-06-25 | End: 2022-08-29

## 2021-07-01 RX ORDER — WARFARIN SODIUM 5 MG/1
2.5-5 TABLET ORAL DAILY
COMMUNITY
End: 2021-11-10 | Stop reason: SDUPTHER

## 2021-07-01 RX ORDER — MAGNESIUM SULFATE HEPTAHYDRATE 40 MG/ML
2 INJECTION, SOLUTION INTRAVENOUS ONCE
Status: COMPLETED | OUTPATIENT
Start: 2021-07-01 | End: 2021-07-01

## 2021-07-01 RX ORDER — OXYCODONE HYDROCHLORIDE 5 MG/1
5-10 TABLET ORAL EVERY 6 HOURS PRN
Status: DISCONTINUED | OUTPATIENT
Start: 2021-07-01 | End: 2021-07-03 | Stop reason: HOSPADM

## 2021-07-01 RX ORDER — ENALAPRILAT 1.25 MG/ML
1.25 INJECTION INTRAVENOUS EVERY 6 HOURS PRN
Status: DISCONTINUED | OUTPATIENT
Start: 2021-07-01 | End: 2021-07-03 | Stop reason: HOSPADM

## 2021-07-01 RX ORDER — AMOXICILLIN 250 MG
2 CAPSULE ORAL 2 TIMES DAILY
Status: DISCONTINUED | OUTPATIENT
Start: 2021-07-01 | End: 2021-07-03 | Stop reason: HOSPADM

## 2021-07-01 RX ORDER — MORPHINE SULFATE 15 MG/1
15 TABLET ORAL 2 TIMES DAILY
Status: DISCONTINUED | OUTPATIENT
Start: 2021-07-01 | End: 2021-07-01

## 2021-07-01 RX ORDER — WARFARIN SODIUM 5 MG/1
5 TABLET ORAL
Status: DISCONTINUED | OUTPATIENT
Start: 2021-07-01 | End: 2021-07-03 | Stop reason: HOSPADM

## 2021-07-01 RX ORDER — ALBUTEROL SULFATE 90 UG/1
2 AEROSOL, METERED RESPIRATORY (INHALATION) EVERY 4 HOURS PRN
Status: DISCONTINUED | OUTPATIENT
Start: 2021-07-01 | End: 2021-07-03 | Stop reason: HOSPADM

## 2021-07-01 RX ORDER — VENLAFAXINE HYDROCHLORIDE 150 MG/1
150 CAPSULE, EXTENDED RELEASE ORAL EVERY EVENING
COMMUNITY
Start: 2021-06-15

## 2021-07-01 RX ORDER — POTASSIUM CHLORIDE 20 MEQ/1
40 TABLET, EXTENDED RELEASE ORAL ONCE
Status: COMPLETED | OUTPATIENT
Start: 2021-07-01 | End: 2021-07-01

## 2021-07-01 RX ORDER — VENLAFAXINE HYDROCHLORIDE 75 MG/1
150 CAPSULE, EXTENDED RELEASE ORAL EVERY EVENING
Status: DISCONTINUED | OUTPATIENT
Start: 2021-07-01 | End: 2021-07-03 | Stop reason: HOSPADM

## 2021-07-01 RX ORDER — CLONIDINE HYDROCHLORIDE 0.1 MG/1
0.1 TABLET ORAL EVERY 6 HOURS PRN
Status: DISCONTINUED | OUTPATIENT
Start: 2021-07-01 | End: 2021-07-03 | Stop reason: HOSPADM

## 2021-07-01 RX ORDER — MORPHINE SULFATE 15 MG/1
15 TABLET, FILM COATED, EXTENDED RELEASE ORAL EVERY 12 HOURS
Status: DISCONTINUED | OUTPATIENT
Start: 2021-07-01 | End: 2021-07-03 | Stop reason: HOSPADM

## 2021-07-01 RX ORDER — ARIPIPRAZOLE 10 MG/1
10 TABLET ORAL EVERY EVENING
Status: DISCONTINUED | OUTPATIENT
Start: 2021-07-01 | End: 2021-07-03 | Stop reason: HOSPADM

## 2021-07-01 RX ORDER — ALBUTEROL SULFATE 90 UG/1
2 AEROSOL, METERED RESPIRATORY (INHALATION) EVERY 4 HOURS PRN
Status: ON HOLD | COMMUNITY
End: 2023-08-29

## 2021-07-01 RX ORDER — PROMETHAZINE HYDROCHLORIDE 25 MG/1
12.5-25 SUPPOSITORY RECTAL EVERY 4 HOURS PRN
Status: DISCONTINUED | OUTPATIENT
Start: 2021-07-01 | End: 2021-07-03 | Stop reason: HOSPADM

## 2021-07-01 RX ORDER — ACETAMINOPHEN 325 MG/1
650 TABLET ORAL EVERY 6 HOURS PRN
Status: DISCONTINUED | OUTPATIENT
Start: 2021-07-01 | End: 2021-07-03 | Stop reason: HOSPADM

## 2021-07-01 RX ORDER — POTASSIUM CHLORIDE 20 MEQ/1
20 TABLET, EXTENDED RELEASE ORAL DAILY
Status: DISCONTINUED | OUTPATIENT
Start: 2021-07-01 | End: 2021-07-03 | Stop reason: HOSPADM

## 2021-07-01 RX ORDER — SODIUM CHLORIDE 9 MG/ML
1000 INJECTION, SOLUTION INTRAVENOUS ONCE
Status: COMPLETED | OUTPATIENT
Start: 2021-07-01 | End: 2021-07-01

## 2021-07-01 RX ORDER — ATORVASTATIN CALCIUM 20 MG/1
20 TABLET, FILM COATED ORAL NIGHTLY
Status: DISCONTINUED | OUTPATIENT
Start: 2021-07-01 | End: 2021-07-03 | Stop reason: HOSPADM

## 2021-07-01 RX ORDER — LABETALOL HYDROCHLORIDE 5 MG/ML
10 INJECTION, SOLUTION INTRAVENOUS EVERY 4 HOURS PRN
Status: DISCONTINUED | OUTPATIENT
Start: 2021-07-01 | End: 2021-07-03 | Stop reason: HOSPADM

## 2021-07-01 RX ORDER — METHOCARBAMOL 500 MG/1
750 TABLET, FILM COATED ORAL 3 TIMES DAILY PRN
Status: DISCONTINUED | OUTPATIENT
Start: 2021-07-01 | End: 2021-07-03 | Stop reason: HOSPADM

## 2021-07-01 RX ORDER — AMIODARONE HYDROCHLORIDE 200 MG/1
200 TABLET ORAL EVERY EVENING
Status: DISCONTINUED | OUTPATIENT
Start: 2021-07-01 | End: 2021-07-03 | Stop reason: HOSPADM

## 2021-07-01 RX ORDER — BISACODYL 10 MG
10 SUPPOSITORY, RECTAL RECTAL
Status: DISCONTINUED | OUTPATIENT
Start: 2021-07-01 | End: 2021-07-03 | Stop reason: HOSPADM

## 2021-07-01 RX ORDER — HYDROCODONE BITARTRATE AND ACETAMINOPHEN 10; 325 MG/1; MG/1
1 TABLET ORAL DAILY
Status: DISCONTINUED | OUTPATIENT
Start: 2021-07-02 | End: 2021-07-01

## 2021-07-01 RX ORDER — TRAZODONE HYDROCHLORIDE 100 MG/1
100 TABLET ORAL NIGHTLY
Status: DISCONTINUED | OUTPATIENT
Start: 2021-07-01 | End: 2021-07-03 | Stop reason: HOSPADM

## 2021-07-01 RX ORDER — CLINDAMYCIN HYDROCHLORIDE 150 MG/1
150 CAPSULE ORAL EVERY 8 HOURS
Status: ON HOLD | COMMUNITY
Start: 2021-06-25 | End: 2021-07-03

## 2021-07-01 RX ORDER — POLYETHYLENE GLYCOL 3350 17 G/17G
1 POWDER, FOR SOLUTION ORAL
Status: DISCONTINUED | OUTPATIENT
Start: 2021-07-01 | End: 2021-07-03 | Stop reason: HOSPADM

## 2021-07-01 RX ORDER — WARFARIN SODIUM 2.5 MG/1
2.5 TABLET ORAL
Status: DISCONTINUED | OUTPATIENT
Start: 2021-07-06 | End: 2021-07-03 | Stop reason: HOSPADM

## 2021-07-01 RX ORDER — SODIUM CHLORIDE 9 MG/ML
INJECTION, SOLUTION INTRAVENOUS CONTINUOUS
Status: DISCONTINUED | OUTPATIENT
Start: 2021-07-01 | End: 2021-07-03 | Stop reason: HOSPADM

## 2021-07-01 RX ORDER — PROMETHAZINE HYDROCHLORIDE 25 MG/1
12.5-25 TABLET ORAL EVERY 4 HOURS PRN
Status: DISCONTINUED | OUTPATIENT
Start: 2021-07-01 | End: 2021-07-03 | Stop reason: HOSPADM

## 2021-07-01 RX ORDER — PROCHLORPERAZINE EDISYLATE 5 MG/ML
5-10 INJECTION INTRAMUSCULAR; INTRAVENOUS EVERY 4 HOURS PRN
Status: DISCONTINUED | OUTPATIENT
Start: 2021-07-01 | End: 2021-07-03 | Stop reason: HOSPADM

## 2021-07-01 RX ORDER — VENLAFAXINE HYDROCHLORIDE 37.5 MG/1
75 CAPSULE, EXTENDED RELEASE ORAL DAILY
Status: DISCONTINUED | OUTPATIENT
Start: 2021-07-02 | End: 2021-07-03 | Stop reason: HOSPADM

## 2021-07-01 RX ORDER — PREGABALIN 100 MG/1
200 CAPSULE ORAL 3 TIMES DAILY
Status: DISCONTINUED | OUTPATIENT
Start: 2021-07-01 | End: 2021-07-03 | Stop reason: HOSPADM

## 2021-07-01 RX ORDER — ATORVASTATIN CALCIUM 20 MG/1
20 TABLET, FILM COATED ORAL NIGHTLY
COMMUNITY
End: 2021-09-13

## 2021-07-01 RX ORDER — HYDROCODONE BITARTRATE AND ACETAMINOPHEN 10; 325 MG/1; MG/1
1 TABLET ORAL DAILY
COMMUNITY
End: 2021-11-10

## 2021-07-01 RX ADMIN — ATORVASTATIN CALCIUM 20 MG: 20 TABLET, FILM COATED ORAL at 20:09

## 2021-07-01 RX ADMIN — SODIUM CHLORIDE: 9 INJECTION, SOLUTION INTRAVENOUS at 16:34

## 2021-07-01 RX ADMIN — POTASSIUM CHLORIDE 40 MEQ: 1500 TABLET, EXTENDED RELEASE ORAL at 14:11

## 2021-07-01 RX ADMIN — AMIODARONE HYDROCHLORIDE 200 MG: 200 TABLET ORAL at 18:06

## 2021-07-01 RX ADMIN — OXYCODONE 10 MG: 5 TABLET ORAL at 19:38

## 2021-07-01 RX ADMIN — SODIUM CHLORIDE 1000 ML: 9 INJECTION, SOLUTION INTRAVENOUS at 14:12

## 2021-07-01 RX ADMIN — VENLAFAXINE HYDROCHLORIDE 150 MG: 75 CAPSULE, EXTENDED RELEASE ORAL at 18:07

## 2021-07-01 RX ADMIN — MORPHINE SULFATE 15 MG: 15 TABLET, FILM COATED, EXTENDED RELEASE ORAL at 18:05

## 2021-07-01 RX ADMIN — WARFARIN SODIUM 5 MG: 5 TABLET ORAL at 18:06

## 2021-07-01 RX ADMIN — ARIPIPRAZOLE 10 MG: 10 TABLET ORAL at 18:06

## 2021-07-01 RX ADMIN — TRAZODONE HYDROCHLORIDE 100 MG: 100 TABLET ORAL at 20:08

## 2021-07-01 RX ADMIN — MAGNESIUM SULFATE 2 G: 2 INJECTION INTRAVENOUS at 14:14

## 2021-07-01 RX ADMIN — PREGABALIN 200 MG: 100 CAPSULE ORAL at 18:05

## 2021-07-01 ASSESSMENT — COGNITIVE AND FUNCTIONAL STATUS - GENERAL
SUGGESTED CMS G CODE MODIFIER MOBILITY: CH
MOBILITY SCORE: 24
SUGGESTED CMS G CODE MODIFIER DAILY ACTIVITY: CH
DAILY ACTIVITIY SCORE: 24

## 2021-07-01 ASSESSMENT — ENCOUNTER SYMPTOMS
DIZZINESS: 1
CARDIOVASCULAR NEGATIVE: 1
WEIGHT LOSS: 0
FEVER: 0
CHILLS: 0
PSYCHIATRIC NEGATIVE: 1
RESPIRATORY NEGATIVE: 1
MYALGIAS: 0
SEIZURES: 0
LOSS OF CONSCIOUSNESS: 0
SPEECH CHANGE: 0
SENSORY CHANGE: 0
HEADACHES: 0
FALLS: 1
TREMORS: 0
BACK PAIN: 1
NECK PAIN: 0
GASTROINTESTINAL NEGATIVE: 1
FOCAL WEAKNESS: 0
WEAKNESS: 0
TINGLING: 0

## 2021-07-01 ASSESSMENT — LIFESTYLE VARIABLES
ALCOHOL_USE: YES
DOES PATIENT WANT TO STOP DRINKING: NO
ON A TYPICAL DAY WHEN YOU DRINK ALCOHOL HOW MANY DRINKS DO YOU HAVE: 6
EVER HAD A DRINK FIRST THING IN THE MORNING TO STEADY YOUR NERVES TO GET RID OF A HANGOVER: YES
CONSUMPTION TOTAL: POSITIVE
HOW MANY TIMES IN THE PAST YEAR HAVE YOU HAD 5 OR MORE DRINKS IN A DAY: 18
TOTAL SCORE: 1
TOTAL SCORE: 1
HAVE PEOPLE ANNOYED YOU BY CRITICIZING YOUR DRINKING: NO
EVER FELT BAD OR GUILTY ABOUT YOUR DRINKING: NO
AVERAGE NUMBER OF DAYS PER WEEK YOU HAVE A DRINK CONTAINING ALCOHOL: 12
TOTAL SCORE: 1
HAVE YOU EVER FELT YOU SHOULD CUT DOWN ON YOUR DRINKING: NO

## 2021-07-01 ASSESSMENT — CHA2DS2 SCORE
AGE 65 TO 74: NO
SEX: FEMALE
AGE 75 OR GREATER: NO
CHA2DS2 VASC SCORE: 3
HYPERTENSION: YES
PRIOR STROKE OR TIA OR THROMBOEMBOLISM: NO
DIABETES: NO
CHF OR LEFT VENTRICULAR DYSFUNCTION: YES
VASCULAR DISEASE: NO

## 2021-07-01 ASSESSMENT — PAIN DESCRIPTION - PAIN TYPE
TYPE: CHRONIC PAIN
TYPE: CHRONIC PAIN

## 2021-07-01 ASSESSMENT — FIBROSIS 4 INDEX: FIB4 SCORE: 2.405351177211819462

## 2021-07-01 ASSESSMENT — PATIENT HEALTH QUESTIONNAIRE - PHQ9
2. FEELING DOWN, DEPRESSED, IRRITABLE, OR HOPELESS: NOT AT ALL
SUM OF ALL RESPONSES TO PHQ9 QUESTIONS 1 AND 2: 0
1. LITTLE INTEREST OR PLEASURE IN DOING THINGS: NOT AT ALL

## 2021-07-01 NOTE — ED NOTES
Med Rec completed: per pt at bedside     Pt is currently on a 7 day course of Clindamycin 150mg three times daily, scheduled to complete tomorrow, 7/2/21 with her morning dose.    Pt reports taking Effexor 150mg ER cap along with Effexor 75mg ER cap for a daily dose of 225mg.    [facility]  P: [phone]    Pt confirmed following allergies:  Allergies   Allergen Reactions   • Penicillins Rash              Pt's home medications:   Medication Sig   • atorvastatin (LIPITOR) 20 MG Tab Take 20 mg by mouth every evening.   • albuterol 108 (90 Base) MCG/ACT Aero Soln inhalation aerosol Inhale 2 Puffs every four hours as needed for Shortness of Breath.   • warfarin (COUMADIN) 5 MG Tab Take 2.5-5 mg by mouth every day. Tuesday = 2.5 mg    5 mg on every other day.   • HYDROcodone/acetaminophen (NORCO)  MG Tab Take 1 tablet by mouth every day.   • chlorhexidine (PERIDEX) 0.12 % Solution Take 15 mL by mouth every day. Swish and spit   • clindamycin (CLEOCIN) 150 MG Cap Take 150 mg by mouth every 8 hours.   • venlafaxine (EFFEXOR-XR) 150 MG extended-release capsule Take 150 mg by mouth every evening.   • amiodarone (CORDARONE) 200 MG Tab Take 1 Tab by mouth every day.   • methocarbamol (ROBAXIN) 750 MG Tab Take 750 mg by mouth 3 times a day as needed (Muscle pain).   • ARIPiprazole (ABILIFY) 10 MG Tab Take 10 mg by mouth every day.   • torsemide (DEMADEX) 20 MG Tab Take 1 Tab by mouth every day.   • potassium chloride SA (KDUR) 20 MEQ Tab CR Take 1 Tab by mouth every day.   • venlafaxine XR (EFFEXOR XR) 75 MG CAPSULE SR 24 HR Take 75 mg by mouth every day.   • traZODone (DESYREL) 100 MG Tab Take 100 mg by mouth every evening.   • oxyCODONE immediate release (ROXICODONE) 10 MG immediate release tablet Take 10 mg by mouth 3 times a day. TID with MS Contin   • pregabalin (LYRICA) 200 MG capsule Take 200 mg by mouth 3 times a day.   • morphine SR (MS CONTIN) 15 MG TB12 Take 1 Tab by mouth 3 times a  day.       Removed medications:   Medication Removal Reason   • [DISCONTINUED] atorvastatin (LIPITOR) 20 MG Tab Reconciled for timing change   • [DISCONTINUED] vitamin D (CHOLECALCIFEROL) 1000 UNIT Tab Pt reports not be taking   • [DISCONTINUED] Multiple Vitamin (MULTI-DAY PO) Pt reports not taking   • [DISCONTINUED] Blood Pressure Monitoring (B-D ASSURE BPM/AUTO ARM CUFF) Misc Supply   • [DISCONTINUED] VENTOLIN  (90 Base) MCG/ACT Aero Soln inhalation aerosol Reconciled for PRN change   • [DISCONTINUED] warfarin (COUMADIN) 5 MG Tab Reconciled dosing information per pt

## 2021-07-01 NOTE — ASSESSMENT & PLAN NOTE
Lightheadedness with no loss of consciousness  No palpitation or chest pain  EKG sinus rhythm with no arrhythmia however she has QTC  Multifactorial including medication side effects and dehydration, less likely to be vertigo  We will admit the patient to telemetry  Echo  CT scan for did not show any acute finding, less likely to be stroke no need for MRI at this time.  Gentle IV fluid and follow-up sodium level  PT and OT

## 2021-07-01 NOTE — PROGRESS NOTES
Inpatient Anticoagulation Service Note    Date: 7/1/2021    Reason for Anticoagulation: Atrial Fibrillation   Target INR: 2.0 to 3.0  [id number] VASc Score: 3  HAS-BLED Score: 0   Hemoglobin Value: (!) 11.8  Hematocrit Value: (!) 35  Lab Platelet Value: (!) 160    INR from last 7 days     Date/Time INR Value    07/01/21 13:10:01  (!) 2.49        Dose from last 7 days     Date/Time Dose (mg)    07/01/21 1609  5        Average Dose (mg): 4.64 (2.5mg on Tues and 5mg on all other days)  Significant Interactions: Amiodarone, Statin  Bridge Therapy: No    Reversal Agent Administered: Not Applicable    Plan: Continue warfarin 2.5mg on Tues and 5mg all other days. Monitor INR and for bleeding  Education Material Provided?: No  Pharmacist suggested discharge dosing: plan to d/c on home dose of 2.5mg on Tuesday and 5mg all other days unless change in dose is required     Gavin Bernal, PharmD, BCPS          \

## 2021-07-01 NOTE — H&P
Hospital Medicine History & Physical Note    Date of Service  7/1/2021    Primary Care Physician  Pema Peña M.D.    Consultants  None     Code Status  Full Code    Chief Complaint  Chief Complaint   Patient presents with   • T-5000 GLF     Pt bib EMS for 2x falls. Second fall was at a dental office and the patient hit the right side of her head/face. Pt reports no LOC. Pt is on warfarin for a pacemaker that she received 10-11 years ago. Pt is on 3L NC at baseline at all times. Pt also reports feeling dizzy the last few days.        History of Presenting Illness    61-year-old female with history of paroxysmal atrial fibrillation, removing atrial myxoma, s/p mitral valve repair pulmonary hypertension and obstructive sleep apnea on oxygen 24/7 presented 7/1 with syncope and lightheadedness, patient has had 2 episodes of lightheadedness and fall, happened when she was walking however also she had lightheadedness when standing up, denied significant vertigo or extremity weakness, denied chest pain or shortness of breath and no loss of consciousness, no fever also has no urinary or bowel symptoms, patient has history of pulmonary hypertension and on torsemide also she has chronic back pain on morphine 15 mg 3 times daily, on admission EKG showed sinus rhythm with prolonged QTC, no arrhythmia, no FAN or leukocytosis however she had hyponatremia 128 and low potassium 3.3, patient is on warfarin for atrial fibrillation and INR was 2.4, CT scan for head did not show any bleeding also CT scan for face showed some bruises with no significant hematoma.    Review of Systems  Review of Systems   Constitutional: Positive for malaise/fatigue. Negative for chills, fever and weight loss.   HENT: Negative.    Respiratory: Negative.    Cardiovascular: Negative.    Gastrointestinal: Negative.    Genitourinary: Negative.    Musculoskeletal: Positive for back pain and falls. Negative for joint pain, myalgias and neck pain.   Skin:  Negative.    Neurological: Positive for dizziness. Negative for tingling, tremors, sensory change, speech change, focal weakness, seizures, loss of consciousness, weakness and headaches.   Psychiatric/Behavioral: Negative.        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    Surgical History   has a past surgical history that includes mitral valve repair (11/17/2011); other orthopedic surgery; pacemaker insertion (November 2011); and cardiac cath, right heart (03/31/2018).     Family History  family history includes Heart Attack in her father.     Social History   reports that she quit smoking about 3 years ago. Her smoking use included cigarettes. She has a 39.00 pack-year smoking history. She has never used smokeless tobacco. She reports previous alcohol use. She reports that she does not use drugs.    Allergies  Allergies   Allergen Reactions   • Penicillins Rash             Medications  Prior to Admission Medications   Prescriptions Last Dose Informant Patient Reported? Taking?   ARIPiprazole (ABILIFY) 10 MG Tab 6/30/2021 at pm Patient Yes No   Sig: Take 10 mg by mouth every day.   HYDROcodone/acetaminophen (NORCO)  MG Tab 7/1/2021 at am Patient Yes Yes   Sig: Take 1 tablet by mouth every day.   albuterol 108 (90 Base) MCG/ACT Aero Soln inhalation aerosol 6/24/2021 at unknown Patient Yes Yes   Sig: Inhale 2 Puffs every four hours as needed for Shortness of Breath.   amiodarone (CORDARONE) 200 MG Tab 6/30/2021 at pm Patient No No   Sig: Take 1 Tab by mouth every day.   atorvastatin (LIPITOR) 20 MG Tab 6/30/2021 at pm Patient Yes Yes   Sig: Take 20 mg by mouth every evening.   chlorhexidine (PERIDEX) 0.12  % Solution 6/30/2021 at pm Patient Yes No   Sig: Take 15 mL by mouth every day. Swish and spit   clindamycin (CLEOCIN) 150 MG Cap 7/1/2021 at am Patient Yes No   Sig: Take 150 mg by mouth every 8 hours.   methocarbamol (ROBAXIN) 750 MG Tab 6/28/2021 at unknown Patient Yes No   Sig: Take 750 mg by mouth 3 times a day as needed (Muscle pain).   morphine SR (MS CONTIN) 15 MG TB12 >2 weeks at unknown Patient No No   Sig: Take 1 Tab by mouth 3 times a day.   oxyCODONE immediate release (ROXICODONE) 10 MG immediate release tablet 6/30/2021 at pm Patient Yes No   Sig: Take 10 mg by mouth 3 times a day. TID with MS Contin   potassium chloride SA (KDUR) 20 MEQ Tab CR 6/29/2021 at am Patient No No   Sig: Take 1 Tab by mouth every day.   pregabalin (LYRICA) 200 MG capsule 7/1/2021 at am Patient Yes No   Sig: Take 200 mg by mouth 3 times a day.   torsemide (DEMADEX) 20 MG Tab 6/29/2021 at am Patient No No   Sig: Take 1 Tab by mouth every day.   traZODone (DESYREL) 100 MG Tab 6/30/2021 at pm Patient Yes No   Sig: Take 100 mg by mouth every evening.   venlafaxine (EFFEXOR-XR) 150 MG extended-release capsule 6/30/2021 at pm Patient Yes No   Sig: Take 150 mg by mouth every evening.   venlafaxine XR (EFFEXOR XR) 75 MG CAPSULE SR 24 HR 6/30/2021 at pm Patient Yes No   Sig: Take 75 mg by mouth every day.   warfarin (COUMADIN) 5 MG Tab 6/30/2021 at pm Patient Yes Yes   Sig: Take 2.5-5 mg by mouth every day. Tuesday = 2.5 mg    5 mg on every other day.      Facility-Administered Medications: None       Physical Exam  Temp:  [36.4 °C (97.6 °F)] 36.4 °C (97.6 °F)  Pulse:  [61] 61  Resp:  [17] 17  BP: (123)/(57) 123/57  SpO2:  [96 %] 96 %    Physical Exam  Constitutional:       Appearance: She is obese. She is not ill-appearing.   HENT:      Head: Normocephalic.      Comments: Bruises on the left side of the face  Eyes:      General: No scleral icterus.  Cardiovascular:      Rate and Rhythm: Normal rate.      Heart sounds: No murmur heard.      Pulmonary:      Effort: Pulmonary effort is normal. No respiratory distress.      Breath sounds: No wheezing or rales.   Abdominal:      General: Bowel sounds are normal. There is no distension.      Palpations: Abdomen is soft.      Tenderness: There is no abdominal tenderness. There is no guarding.   Musculoskeletal:         General: No swelling or deformity.      Right lower leg: No edema.      Left lower leg: No edema.   Skin:     Findings: Bruising present. No lesion or rash.   Neurological:      General: No focal deficit present.      Mental Status: She is alert and oriented to person, place, and time. Mental status is at baseline.      Cranial Nerves: No cranial nerve deficit.      Motor: No weakness.   Psychiatric:         Mood and Affect: Mood normal.         Laboratory:  Recent Labs     07/01/21  1310   WBC 5.1   RBC 3.97*   HEMOGLOBIN 11.8*   HEMATOCRIT 35.0*   MCV 88.2   MCH 29.7   MCHC 33.7   RDW 42.4   PLATELETCT 160*   MPV 10.2     Recent Labs     07/01/21  1310   SODIUM 128*   POTASSIUM 3.3*   CHLORIDE 89*   CO2 28   GLUCOSE 97   BUN 7*   CREATININE 0.67   CALCIUM 9.5     Recent Labs     07/01/21  1310   ALTSGPT 14   ASTSGOT 24   ALKPHOSPHAT 62   TBILIRUBIN 0.5   GLUCOSE 97     Recent Labs     06/30/21  0000 07/01/21  1310   APTT  --  43.1*   INR 2.50 2.49*     No results for input(s): NTPROBNP in the last 72 hours.      No results for input(s): TROPONINT in the last 72 hours.    Imaging:  CT-HEAD W/O   Final Result      1.  No acute intracranial abnormality.   2.  LEFT facial soft tissue injury.      CT-MAXILLOFACIAL W/O PLUS RECONS   Final Result      1.  No facial fracture is identified.   2.  Mild mucosal thickening in the right maxillary sinus.   3.  Fluid in the mastoid air cells on the right.   4.  Left periorbital soft tissue swelling and with a lateral soft tissue laceration.   5.  Atherosclerotic plaque in the carotid arteries.   6.  Globes are intact.      EC-ECHOCARDIOGRAM COMPLETE  W/O CONT    (Results Pending)         Assessment/Plan:  I anticipate this patient will require at least two midnights for appropriate medical management, necessitating inpatient admission.    * Falls  Assessment & Plan  Lightheadedness with no loss of consciousness  No palpitation or chest pain  EKG sinus rhythm with no arrhythmia however she has QTC  Multifactorial including medication side effects and dehydration, less likely to be vertigo  We will admit the patient to telemetry  Echo  CT scan for did not show any acute finding, less likely to be stroke no need for MRI at this time.  Gentle IV fluid and follow-up sodium level  PT and OT        QT prolongation  Assessment & Plan  Likely related to medications and electrolyte abnormalities  Avoid medication affecting QTC  Telemetry  Check magnesium and keep the level above 2 and keep potassium level above 4    Hyponatremia  Assessment & Plan  Likely related to torsemide and dehydration  Check urine and serum osmolality  Gently IV fluid and check sodium level in 6 hours    PAF (paroxysmal atrial fibrillation) (CMS-HCC)- (present on admission)  Assessment & Plan  No RVR  Telemetry  Continue amiodarone and warfarin    Chronic low back pain- (present on admission)  Assessment & Plan  Patient is taking morphine 15 mg 3 times daily with oxycodone 10 mg for breakthrough  Try to weaning her from pain medications due to fall and dizzy      Atrial myxoma- (present on admission)  Assessment & Plan  Treated with surgery years ago in 2011  Repeat echo    Chronic respiratory failure with hypoxia (HCC)- (present on admission)  Assessment & Plan  Due to sleep apnea and pulmonary hypertension   oxygen 3 L nasal passage cannula 24/7  Close monitoring and hold torsemide at this time      Essential hypertension- (present on admission)  Assessment & Plan  Continue home medication  Stable      DVT prophylaxis, patient is on warfarin

## 2021-07-01 NOTE — ASSESSMENT & PLAN NOTE
Likely related to medications and electrolyte abnormalities  Avoid medication affecting QTC  Telemetry  Check magnesium and keep the level above 2 and keep potassium level above 4

## 2021-07-01 NOTE — ASSESSMENT & PLAN NOTE
Due to sleep apnea and pulmonary hypertension   oxygen 3 L nasal passage cannula 24/7  Close monitoring and hold torsemide at this time

## 2021-07-01 NOTE — ED TRIAGE NOTES
"Chief Complaint   Patient presents with   • T-5000 GLF     Pt bib EMS for 2x falls. Second fall was at a dental office and the patient hit the right side of her head/face. Pt reports no LOC. Pt is on warfarin for a pacemaker that she received 10-11 years ago. Pt is on 3L NC at baseline at all times. Pt also reports feeling dizzy the last few days.      /57   Pulse 61   Temp 36.4 °C (97.6 °F) (Temporal)   Resp 17   Ht 1.727 m (5' 8\")   Wt (!) 139 kg (306 lb)   LMP 01/01/2010   SpO2 96%   BMI 46.53 kg/m²     "

## 2021-07-01 NOTE — ASSESSMENT & PLAN NOTE
Likely related to torsemide and dehydration  Check urine and serum osmolality  Gently IV fluid and check sodium level in 6 hours

## 2021-07-01 NOTE — ED PROVIDER NOTES
ED Provider Note    Scribed for Krishan Diaz M.D. by Ramy Fish. 7/1/2021  12:52 PM    Primary care provider: Pema Peña M.D.  Means of arrival: EMS  History obtained from: Patient and EMS  History limited by: None    CHIEF COMPLAINT  Chief Complaint   Patient presents with    T-5000 GLF     Pt bib EMS for 2x falls. Second fall was at a dental office and the patient hit the right side of her head/face. Pt reports no LOC. Pt is on warfarin for a pacemaker that she received 10-11 years ago. Pt is on 3L NC at baseline at all times. Pt also reports feeling dizzy the last few days.        ZAYDA Velarde is a 61 y.o. female who presents to the Emergency Department via EMS following a fall that occurred earlier today. She was at the doctors office, and was going to sit down on the exam table when she suddenly felt weak and dizzy and fell forward, hitting her head on a stool that was in the room. She has associated headache, weakness, dizziness, and lower back pain. She denies any loss of consciousness. She notes that she just had surgery, and has been eating soft foods and not drinking enough water. She notes that her lower back pain is chronic, and does not come from her fall. She also notes that her weakness and dizziness has been going on for a few days. No alleviating or exacerbating factors noted.    REVIEW OF SYSTEMS  Pertinent positives include headache, weakness, dizziness and lower back pain. Pertinent negatives include no loss of consciousness.  All other systems reviewed and negative.    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    SURGICAL HISTORY   has a past surgical history  that includes mitral valve repair (11/17/2011); other orthopedic surgery; pacemaker insertion (November 2011); and cardiac cath, right heart (03/31/2018).    SOCIAL HISTORY  Social History     Tobacco Use    Smoking status: Former Smoker     Packs/day: 1.00     Years: 39.00     Pack years: 39.00     Types: Cigarettes     Quit date: 11/4/2017     Years since quitting: 3.6    Smokeless tobacco: Never Used    Tobacco comment: 4/12 Down to vapping now.    Vaping Use    Vaping Use: Every day    Start date: 11/4/2017   Substance Use Topics    Alcohol use: Not Currently     Comment: sober since 8/8/18!    Drug use: No     Types: Inhaled, Oral     Comment: hx of, denies currently      Social History     Substance and Sexual Activity   Drug Use No    Types: Inhaled, Oral    Comment: hx of, denies currently       FAMILY HISTORY  Family History   Problem Relation Age of Onset    Heart Attack Father        CURRENT MEDICATIONS  Home Medications       Reviewed by Bradley Singer PhT (Pharmacy Tech) on 07/01/21 at 1428  Med List Status: Complete     Medication Last Dose Status   albuterol 108 (90 Base) MCG/ACT Aero Soln inhalation aerosol 6/24/2021 Active   amiodarone (CORDARONE) 200 MG Tab 6/30/2021 Active   ARIPiprazole (ABILIFY) 10 MG Tab 6/30/2021 Active   atorvastatin (LIPITOR) 20 MG Tab 6/30/2021 Active   chlorhexidine (PERIDEX) 0.12 % Solution 6/30/2021 Active   clindamycin (CLEOCIN) 150 MG Cap 7/1/2021 Active   HYDROcodone/acetaminophen (NORCO)  MG Tab 7/1/2021 Active   methocarbamol (ROBAXIN) 750 MG Tab 6/28/2021 Active   morphine SR (MS CONTIN) 15 MG TB12 >2 weeks Active   oxyCODONE immediate release (ROXICODONE) 10 MG immediate release tablet 6/30/2021 Active   potassium chloride SA (KDUR) 20 MEQ Tab CR 6/29/2021 Active   pregabalin (LYRICA) 200 MG capsule 7/1/2021 Active   torsemide (DEMADEX) 20 MG Tab 6/29/2021 Active   traZODone (DESYREL) 100 MG Tab 6/30/2021 Active   venlafaxine (EFFEXOR-XR) 150 MG  "extended-release capsule 6/30/2021 Active   venlafaxine XR (EFFEXOR XR) 75 MG CAPSULE SR 24 HR 6/30/2021 Active   warfarin (COUMADIN) 5 MG Tab 6/30/2021 Active                    ALLERGIES  Allergies   Allergen Reactions    Penicillins Rash             PHYSICAL EXAM  VITAL SIGNS: /57   Pulse 61   Temp 36.4 °C (97.6 °F) (Temporal)   Resp 17   Ht 1.727 m (5' 8\")   Wt (!) 139 kg (306 lb)   LMP 01/01/2010   SpO2 96%   BMI 46.53 kg/m²     Constitutional: Well developed, Well nourished, Mild distress, Non-toxic appearance.   HENT: Normocephalic, Contusion and hematoma to left lateral periorbital area. 1 cm non-gaping laceration to left lateral eyebrow. Bilateral external ears normal, Oropharynx moist, No oral exudates. No teeth.  Eyes: PERRLA, EOMI, Conjunctiva normal, No discharge.   Neck: No tenderness, Supple, No stridor.   Lymphatic: No lymphadenopathy noted.   Cardiovascular: Normal heart rate, Normal rhythm.   Thorax & Lungs: Clear to auscultation bilaterally, No respiratory distress, No wheezing, No crackles.   Abdomen: Soft, No tenderness, No masses, No pulsatile masses.   Skin: Warm, Dry, No erythema, No rash.   Extremities:, No edema No cyanosis. Has skin tear and contusion of left forearm. Old bruising on right forearm.  Musculoskeletal: No major deformities noted.  Intact distal pulses. 2+ pitting edema bilateral lower extremities.  Neurologic: Awake, alert. Moves all extremities spontaneously.  Psychiatric: Affect normal, Judgment normal, Mood normal.       LABS  Results for orders placed or performed during the hospital encounter of 07/01/21   CBC WITH DIFFERENTIAL   Result Value Ref Range    WBC 5.1 4.8 - 10.8 K/uL    RBC 3.97 (L) 4.20 - 5.40 M/uL    Hemoglobin 11.8 (L) 12.0 - 16.0 g/dL    Hematocrit 35.0 (L) 37.0 - 47.0 %    MCV 88.2 81.4 - 97.8 fL    MCH 29.7 27.0 - 33.0 pg    MCHC 33.7 33.6 - 35.0 g/dL    RDW 42.4 35.9 - 50.0 fL    Platelet Count 160 (L) 164 - 446 K/uL    MPV 10.2 9.0 - " 12.9 fL    Neutrophils-Polys 71.30 44.00 - 72.00 %    Lymphocytes 16.80 (L) 22.00 - 41.00 %    Monocytes 10.30 0.00 - 13.40 %    Eosinophils 1.00 0.00 - 6.90 %    Basophils 0.40 0.00 - 1.80 %    Immature Granulocytes 0.20 0.00 - 0.90 %    Nucleated RBC 0.00 /100 WBC    Neutrophils (Absolute) 3.66 2.00 - 7.15 K/uL    Lymphs (Absolute) 0.86 (L) 1.00 - 4.80 K/uL    Monos (Absolute) 0.53 0.00 - 0.85 K/uL    Eos (Absolute) 0.05 0.00 - 0.51 K/uL    Baso (Absolute) 0.02 0.00 - 0.12 K/uL    Immature Granulocytes (abs) 0.01 0.00 - 0.11 K/uL    NRBC (Absolute) 0.00 K/uL   PROTHROMBIN TIME   Result Value Ref Range    PT 26.2 (H) 12.0 - 14.6 sec    INR 2.49 (H) 0.87 - 1.13   APTT   Result Value Ref Range    APTT 43.1 (H) 24.7 - 36.0 sec   COMP METABOLIC PANEL   Result Value Ref Range    Sodium 128 (L) 135 - 145 mmol/L    Potassium 3.3 (L) 3.6 - 5.5 mmol/L    Chloride 89 (L) 96 - 112 mmol/L    Co2 28 20 - 33 mmol/L    Anion Gap 11.0 7.0 - 16.0    Glucose 97 65 - 99 mg/dL    Bun 7 (L) 8 - 22 mg/dL    Creatinine 0.67 0.50 - 1.40 mg/dL    Calcium 9.5 8.5 - 10.5 mg/dL    AST(SGOT) 24 12 - 45 U/L    ALT(SGPT) 14 2 - 50 U/L    Alkaline Phosphatase 62 30 - 99 U/L    Total Bilirubin 0.5 0.1 - 1.5 mg/dL    Albumin 3.9 3.2 - 4.9 g/dL    Total Protein 7.0 6.0 - 8.2 g/dL    Globulin 3.1 1.9 - 3.5 g/dL    A-G Ratio 1.3 g/dL   ESTIMATED GFR   Result Value Ref Range    GFR If African American >60 >60 mL/min/1.73 m 2    GFR If Non African American >60 >60 mL/min/1.73 m 2   Troponin   Result Value Ref Range    Troponin T <6 6 - 19 ng/L   Troponin   Result Value Ref Range    Troponin T <6 6 - 19 ng/L   OSMOLALITY SERUM   Result Value Ref Range    Osmolality Serum 270 (L) 278 - 298 mOsm/kg H2O   MAGNESIUM   Result Value Ref Range    Magnesium 1.7 1.5 - 2.5 mg/dL   TSH WITH REFLEX TO FT4   Result Value Ref Range    TSH 1.000 0.380 - 5.330 uIU/mL   VITAMIN D,25 HYDROXY   Result Value Ref Range    25-Hydroxy   Vitamin D 25 30 30 - 100 ng/mL    CREATINE KINASE   Result Value Ref Range    CPK Total 42 0 - 154 U/L   Basic Metabolic Panel   Result Value Ref Range    Sodium 132 (L) 135 - 145 mmol/L    Potassium 3.7 3.6 - 5.5 mmol/L    Chloride 93 (L) 96 - 112 mmol/L    Co2 31 20 - 33 mmol/L    Glucose 102 (H) 65 - 99 mg/dL    Bun 6 (L) 8 - 22 mg/dL    Creatinine 0.63 0.50 - 1.40 mg/dL    Calcium 8.8 8.5 - 10.5 mg/dL    Anion Gap 8.0 7.0 - 16.0   ESTIMATED GFR   Result Value Ref Range    GFR If African American >60 >60 mL/min/1.73 m 2    GFR If Non African American >60 >60 mL/min/1.73 m 2   EKG (NOW)   Result Value Ref Range    Report       [facility] Emergency Dept.    Test Date:  2021  Pt Name:    [facility]              Department: ER  MRN:        [id number]                      Room:       BL 19  Gender:     Female                       Technician: [id number]  :        1960                   Requested By:WINIFRED PAGAN  Order #:    [id number]                    Reading MD: WINIFRED PAGAN MD    Measurements  Intervals                                Axis  Rate:       181                          P:          91  MN:         118                          QRS:        84  QRSD:       240                          T:  QT:         432  QTc:        751    Interpretive Statements  unable to interpret due to artifact  Electronically Signed On 2021 13:57:58 PDT by WINIFRED PAGAN MD     EKG   Result Value Ref Range    Report       [facility] Emergency Dept.    Test Date:  2021  Pt Name:    [facility]              Department: ER  MRN:        [id number]                      Room:       BL 19  Gender:     Female                       Technician: [id number]  :        1960                   Requested By:WINIFRED PAGAN  Order #:    [id number]                    Reading MD: WINIFRED PAGAN MD    Measurements  Intervals                                Axis  Rate:       62                            P:          -64  ND:         140                          QRS:        66  QRSD:       120                          T:          52  QT:         496  QTc:        504    Interpretive Statements  SINUS OR ECTOPIC ATRIAL RHYTHM  NONSPECIFIC INTRAVENTRICULAR CONDUCTION DELAY  BASELINE WANDER IN LEAD(S) II,III,aVF  Compared to ECG 07/01/2021 13:12:30  Ectopic atrial rhythm now present  Intraventricular conduction delay now present  Electronically Signed On 7-1-2021 13:58:03 PDT by WINIFRED LARSEN, MD          RADIOLOGY  CT-HEAD W/O   Final Result      1.  No acute intracranial abnormality.   2.  LEFT facial soft tissue injury.      CT-MAXILLOFACIAL W/O PLUS RECONS   Final Result      1.  No facial fracture is identified.   2.  Mild mucosal thickening in the right maxillary sinus.   3.  Fluid in the mastoid air cells on the right.   4.  Left periorbital soft tissue swelling and with a lateral soft tissue laceration.   5.  Atherosclerotic plaque in the carotid arteries.   6.  Globes are intact.      EC-ECHOCARDIOGRAM COMPLETE W/O CONT    (Results Pending)     The radiologist's interpretation of all radiological studies have been reviewed by me.    Laceration Repair Procedure Note    Indication: Laceration    Procedure: The patient was placed in the appropriate position and anesthesia around the laceration was not performed at the patient's request. The area was then irrigated with water. The laceration was closed with Dermabond. There were no additional lacerations requiring repair. The wound area was then dressed with a bandage.      Total repaired wound length: 1 cm.     Other Items: None    The patient tolerated the procedure well.    Complications: None          COURSE & MEDICAL DECISION MAKING  Pertinent Labs & Imaging studies reviewed. (See chart for details)    12:52 PM - Patient seen and examined at bedside. Ordered CT-Maxillofacial w/o, CT-head w/o, CBC with diff., Prothrombin, APTT, CMP, and EKG to  evaluate her symptoms.    1:57 PM - Patient will be treated with Kdur 40 mEq, magnesium sulfate 2 g, NS infusion 1,000 mg.    2:00 PM - Patient was reevaluated at bedside. Discussed lab and radiology results with the patient and informed them of the plan to hospitalize. Patient is understandable and agreeable of the plan of care. Patient was given the opportunity to ask questions.    2:11 PM - Paged Hospitalist    2:40 PM I discussed the patient's case and the above findings with Dr. Guerrero (Hospitalist) who agrees to hospitalize the patient.        Decision Making:  Patient is coming in status post falls.  The patient has had 2 falls in the last 3 days.  She struck her face, she is on anticoagulants, CT scans were obtained were unremarkable.  The patient's laboratory test show the patient has hypokalemia and hyponatremia.  Due to these electrolytes abnormalities and the patient multiple falls I believe the patient should be hospitalized, discussed the case with the hospitalist for hospitalization.    HYDRATION: Based on the patient's presentation of Dehydration the patient was given IV fluids. IV Hydration was used because oral hydration was not adequate alone. Upon recheck following hydration, the patient was improved.    DISPOSITION:  Patient will be hospitalized by Dr. Guerrero in guarded condition.    FINAL IMPRESSION  1. Fall, initial encounter    2. Closed head injury, initial encounter    3. Contusion of face, initial encounter    4. Facial laceration, initial encounter    5. Hyponatremia    6. Hypokalemia          Ramy MCRAE (Genovevaibmary), am scribing for, and in the presence of, Krishan Diaz M.D..    Electronically signed by: Ramy Fish (Alan), 7/1/2021    Krishan MCRAE M.D. personally performed the services described in this documentation, as scribed by Ramy Fish in my presence, and it is both accurate and complete.    The note accurately reflects work and decisions made  by me.  Krishan Diaz M.D.  7/1/2021  8:13 PM    C

## 2021-07-01 NOTE — ASSESSMENT & PLAN NOTE
Patient is taking morphine 15 mg 3 times daily with oxycodone 10 mg for breakthrough  Try to weaning her from pain medications due to fall and dizzy

## 2021-07-02 ENCOUNTER — TELEPHONE (OUTPATIENT)
Dept: CARDIOLOGY | Facility: MEDICAL CENTER | Age: 61
End: 2021-07-02

## 2021-07-02 LAB
ANION GAP SERPL CALC-SCNC: 7 MMOL/L (ref 7–16)
BASOPHILS # BLD AUTO: 0.7 % (ref 0–1.8)
BASOPHILS # BLD: 0.03 K/UL (ref 0–0.12)
BUN SERPL-MCNC: 6 MG/DL (ref 8–22)
CALCIUM SERPL-MCNC: 8.6 MG/DL (ref 8.5–10.5)
CHLORIDE SERPL-SCNC: 93 MMOL/L (ref 96–112)
CO2 SERPL-SCNC: 33 MMOL/L (ref 20–33)
CREAT SERPL-MCNC: 0.83 MG/DL (ref 0.5–1.4)
EOSINOPHIL # BLD AUTO: 0.15 K/UL (ref 0–0.51)
EOSINOPHIL NFR BLD: 3.7 % (ref 0–6.9)
ERYTHROCYTE [DISTWIDTH] IN BLOOD BY AUTOMATED COUNT: 44.6 FL (ref 35.9–50)
GLUCOSE SERPL-MCNC: 115 MG/DL (ref 65–99)
HCT VFR BLD AUTO: 34.8 % (ref 37–47)
HGB BLD-MCNC: 11.1 G/DL (ref 12–16)
IMM GRANULOCYTES # BLD AUTO: 0.02 K/UL (ref 0–0.11)
IMM GRANULOCYTES NFR BLD AUTO: 0.5 % (ref 0–0.9)
INR PPP: 2.45 (ref 0.87–1.13)
LYMPHOCYTES # BLD AUTO: 1.15 K/UL (ref 1–4.8)
LYMPHOCYTES NFR BLD: 28 % (ref 22–41)
MAGNESIUM SERPL-MCNC: 2.1 MG/DL (ref 1.5–2.5)
MCH RBC QN AUTO: 29.4 PG (ref 27–33)
MCHC RBC AUTO-ENTMCNC: 31.9 G/DL (ref 33.6–35)
MCV RBC AUTO: 92.3 FL (ref 81.4–97.8)
MONOCYTES # BLD AUTO: 0.42 K/UL (ref 0–0.85)
MONOCYTES NFR BLD AUTO: 10.2 % (ref 0–13.4)
NEUTROPHILS # BLD AUTO: 2.33 K/UL (ref 2–7.15)
NEUTROPHILS NFR BLD: 56.9 % (ref 44–72)
NRBC # BLD AUTO: 0 K/UL
NRBC BLD-RTO: 0 /100 WBC
OSMOLALITY SERPL: 276 MOSM/KG H2O (ref 278–298)
PLATELET # BLD AUTO: 151 K/UL (ref 164–446)
PMV BLD AUTO: 10.7 FL (ref 9–12.9)
POTASSIUM SERPL-SCNC: 4.1 MMOL/L (ref 3.6–5.5)
PROTHROMBIN TIME: 25.9 SEC (ref 12–14.6)
RBC # BLD AUTO: 3.77 M/UL (ref 4.2–5.4)
SODIUM SERPL-SCNC: 133 MMOL/L (ref 135–145)
WBC # BLD AUTO: 4.1 K/UL (ref 4.8–10.8)

## 2021-07-02 PROCEDURE — A9270 NON-COVERED ITEM OR SERVICE: HCPCS | Performed by: STUDENT IN AN ORGANIZED HEALTH CARE EDUCATION/TRAINING PROGRAM

## 2021-07-02 PROCEDURE — 302151 K-PAD 14X20: Performed by: STUDENT IN AN ORGANIZED HEALTH CARE EDUCATION/TRAINING PROGRAM

## 2021-07-02 PROCEDURE — 700111 HCHG RX REV CODE 636 W/ 250 OVERRIDE (IP): Performed by: STUDENT IN AN ORGANIZED HEALTH CARE EDUCATION/TRAINING PROGRAM

## 2021-07-02 PROCEDURE — 85610 PROTHROMBIN TIME: CPT

## 2021-07-02 PROCEDURE — 700102 HCHG RX REV CODE 250 W/ 637 OVERRIDE(OP): Performed by: STUDENT IN AN ORGANIZED HEALTH CARE EDUCATION/TRAINING PROGRAM

## 2021-07-02 PROCEDURE — A9270 NON-COVERED ITEM OR SERVICE: HCPCS | Performed by: INTERNAL MEDICINE

## 2021-07-02 PROCEDURE — 97165 OT EVAL LOW COMPLEX 30 MIN: CPT

## 2021-07-02 PROCEDURE — 700102 HCHG RX REV CODE 250 W/ 637 OVERRIDE(OP): Performed by: INTERNAL MEDICINE

## 2021-07-02 PROCEDURE — 83735 ASSAY OF MAGNESIUM: CPT

## 2021-07-02 PROCEDURE — 302131 K PAD MOTOR: Performed by: STUDENT IN AN ORGANIZED HEALTH CARE EDUCATION/TRAINING PROGRAM

## 2021-07-02 PROCEDURE — 83930 ASSAY OF BLOOD OSMOLALITY: CPT

## 2021-07-02 PROCEDURE — 85025 COMPLETE CBC W/AUTO DIFF WBC: CPT

## 2021-07-02 PROCEDURE — 97161 PT EVAL LOW COMPLEX 20 MIN: CPT

## 2021-07-02 PROCEDURE — 99233 SBSQ HOSP IP/OBS HIGH 50: CPT | Performed by: STUDENT IN AN ORGANIZED HEALTH CARE EDUCATION/TRAINING PROGRAM

## 2021-07-02 PROCEDURE — 80048 BASIC METABOLIC PNL TOTAL CA: CPT

## 2021-07-02 PROCEDURE — 770020 HCHG ROOM/CARE - TELE (206)

## 2021-07-02 PROCEDURE — 94664 DEMO&/EVAL PT USE INHALER: CPT

## 2021-07-02 PROCEDURE — 99222 1ST HOSP IP/OBS MODERATE 55: CPT | Performed by: INTERNAL MEDICINE

## 2021-07-02 RX ORDER — NICOTINE 21 MG/24HR
14 PATCH, TRANSDERMAL 24 HOURS TRANSDERMAL
Status: DISCONTINUED | OUTPATIENT
Start: 2021-07-02 | End: 2021-07-03 | Stop reason: HOSPADM

## 2021-07-02 RX ORDER — KETOROLAC TROMETHAMINE 30 MG/ML
30 INJECTION, SOLUTION INTRAMUSCULAR; INTRAVENOUS EVERY 6 HOURS PRN
Status: DISCONTINUED | OUTPATIENT
Start: 2021-07-02 | End: 2021-07-03 | Stop reason: HOSPADM

## 2021-07-02 RX ADMIN — MORPHINE SULFATE 15 MG: 15 TABLET, FILM COATED, EXTENDED RELEASE ORAL at 17:33

## 2021-07-02 RX ADMIN — OXYCODONE 10 MG: 5 TABLET ORAL at 08:03

## 2021-07-02 RX ADMIN — MORPHINE SULFATE 15 MG: 15 TABLET, FILM COATED, EXTENDED RELEASE ORAL at 05:38

## 2021-07-02 RX ADMIN — WARFARIN SODIUM 5 MG: 5 TABLET ORAL at 17:34

## 2021-07-02 RX ADMIN — TRAZODONE HYDROCHLORIDE 100 MG: 100 TABLET ORAL at 20:09

## 2021-07-02 RX ADMIN — OXYCODONE 10 MG: 5 TABLET ORAL at 15:09

## 2021-07-02 RX ADMIN — POTASSIUM CHLORIDE 20 MEQ: 1500 TABLET, EXTENDED RELEASE ORAL at 05:28

## 2021-07-02 RX ADMIN — OXYCODONE 10 MG: 5 TABLET ORAL at 01:26

## 2021-07-02 RX ADMIN — ARIPIPRAZOLE 10 MG: 10 TABLET ORAL at 17:33

## 2021-07-02 RX ADMIN — PREGABALIN 200 MG: 100 CAPSULE ORAL at 05:28

## 2021-07-02 RX ADMIN — NICOTINE 14 MG: 14 PATCH TRANSDERMAL at 17:34

## 2021-07-02 RX ADMIN — AMIODARONE HYDROCHLORIDE 200 MG: 200 TABLET ORAL at 17:33

## 2021-07-02 RX ADMIN — KETOROLAC TROMETHAMINE 30 MG: 60 INJECTION, SOLUTION INTRAMUSCULAR at 20:08

## 2021-07-02 RX ADMIN — VENLAFAXINE HYDROCHLORIDE 75 MG: 37.5 CAPSULE, EXTENDED RELEASE ORAL at 05:28

## 2021-07-02 RX ADMIN — VENLAFAXINE HYDROCHLORIDE 150 MG: 75 CAPSULE, EXTENDED RELEASE ORAL at 17:33

## 2021-07-02 RX ADMIN — ATORVASTATIN CALCIUM 20 MG: 20 TABLET, FILM COATED ORAL at 20:09

## 2021-07-02 RX ADMIN — OXYCODONE 10 MG: 5 TABLET ORAL at 21:54

## 2021-07-02 RX ADMIN — PREGABALIN 200 MG: 100 CAPSULE ORAL at 12:11

## 2021-07-02 RX ADMIN — PREGABALIN 200 MG: 100 CAPSULE ORAL at 17:34

## 2021-07-02 RX ADMIN — KETOROLAC TROMETHAMINE 30 MG: 60 INJECTION, SOLUTION INTRAMUSCULAR at 13:29

## 2021-07-02 ASSESSMENT — COGNITIVE AND FUNCTIONAL STATUS - GENERAL
WALKING IN HOSPITAL ROOM: A LITTLE
DAILY ACTIVITIY SCORE: 24
SUGGESTED CMS G CODE MODIFIER DAILY ACTIVITY: CH
MOBILITY SCORE: 18
STANDING UP FROM CHAIR USING ARMS: A LITTLE
TURNING FROM BACK TO SIDE WHILE IN FLAT BAD: A LITTLE
CLIMB 3 TO 5 STEPS WITH RAILING: A LITTLE
SUGGESTED CMS G CODE MODIFIER MOBILITY: CK
MOVING FROM LYING ON BACK TO SITTING ON SIDE OF FLAT BED: A LITTLE
MOVING TO AND FROM BED TO CHAIR: A LITTLE

## 2021-07-02 ASSESSMENT — ENCOUNTER SYMPTOMS
COUGH: 0
VOMITING: 0
BACK PAIN: 1
PALPITATIONS: 0
BLURRED VISION: 0
FATIGUE: 1
CHEST TIGHTNESS: 0
HEADACHES: 1
TREMORS: 0
WEAKNESS: 1
WEAKNESS: 0
FOCAL WEAKNESS: 0
NAUSEA: 0
SHORTNESS OF BREATH: 0
MYALGIAS: 0
ORTHOPNEA: 0
DIARRHEA: 0
CHILLS: 0
SINUS PAIN: 0
DIZZINESS: 1
EYE PAIN: 0
FEVER: 0
HEMOPTYSIS: 0
ABDOMINAL PAIN: 0

## 2021-07-02 ASSESSMENT — PAIN DESCRIPTION - PAIN TYPE
TYPE: CHRONIC PAIN
TYPE: ACUTE PAIN
TYPE: CHRONIC PAIN
TYPE: ACUTE PAIN
TYPE: CHRONIC PAIN
TYPE: CHRONIC PAIN
TYPE: ACUTE PAIN

## 2021-07-02 ASSESSMENT — GAIT ASSESSMENTS
DISTANCE (FEET): 50
DEVIATION: BRADYKINETIC
GAIT LEVEL OF ASSIST: SUPERVISED

## 2021-07-02 ASSESSMENT — ACTIVITIES OF DAILY LIVING (ADL): TOILETING: INDEPENDENT

## 2021-07-02 ASSESSMENT — FIBROSIS 4 INDEX: FIB4 SCORE: 2.55

## 2021-07-02 NOTE — PROGRESS NOTES
Inpatient Anticoagulation Service Note    Date: 7/2/2021    Reason for Anticoagulation: Atrial Fibrillation   Target INR: 2.0 to 3.0  [id number] VASc Score: 3  HAS-BLED Score: 0   Hemoglobin Value: (!) 11.1  Hematocrit Value: (!) 34.8  Lab Platelet Value: (!) 151    INR from last 7 days     Date/Time INR Value    07/02/21 01:23:01  (!) 2.45    07/01/21 13:10:01  (!) 2.49        Dose from last 7 days     Date/Time Dose (mg)    07/02/21 1437  5    07/01/21 1609  5        Average Dose (mg): 4.64 (2.5mg on Tues and 5mg on all other days)  Significant Interactions: Amiodarone, Statin  Bridge Therapy: No     Comments: INR is therapeutic on the pt's home warfarin dosing. Will continue the same.    Education Material Provided?: No  Pharmacist suggested discharge dosing: warfarin 5 mg daily except 2.5 mg on Tuesdays.     Joe Chowdhury, LeonoraD

## 2021-07-02 NOTE — CONSULTS
"Cardiology Initial Consultation    Date of Service  7/2/2021    Referring Physician  Az Erwin M.D.    Reason for Consultation  Ground-level fall    History of Presenting Illness  Melanie Velarde is a 61 y.o. female with a past medical history of multiple medical and cardiovascular problems as listed below who presented 7/1/2021 with ground level fall, dizziness and balance problems.    The patient tells me that 1 day prior to admission she was feeling dizzy all day.  At one point she was sitting down and got up to go to the kitchen and \"felt really dizzy\".  She leaned down to  a fork and fell over to the floor hitting her arm on the table and .  She did not lose consciousness.  She was able to get up on her own.  She did not seek medical attention.  The day of admission she was at the oral surgeon office Dr. Laureano.  She got up to go to the exam room and felt dizzy, lightheaded with balance problems having to lean against the wall.  When she got to the exam room leaned over to get into the chair she lost her balance, fell forward hitting her face.  She did not lose consciousness.  She was transferred to the ER.    In the ER the patient had evidence of a left orbital ecchymosis and bruising of her right jaw.  Head CT scan was unremarkable with left facial soft tissue injury and negative x-rays for fracture.  EKG showed sinus rhythm.  Blood pressure was stable.    The patient states that she has had balance problems and intermittently uses a walker at home.  She denies any angina pectoris.  At the time of her cardiac surgery she had no obstructive coronary artery disease.  She has been followed closely and [facility] by Dr. Leah Bojorquez.    Review of Systems  Review of Systems   Constitutional: Positive for fatigue.   Respiratory: Negative for chest tightness and shortness of breath.    Cardiovascular: Negative for palpitations.   Gastrointestinal: Negative for abdominal pain " and nausea.   Neurological: Positive for dizziness, weakness and headaches.   All other systems reviewed and are negative.      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 She also has no past medical history of Unspecified urinary incontinence.    Surgical History   has a past surgical history that includes mitral valve repair (11/17/2011); other orthopedic surgery; pacemaker insertion (November 2011); and cardiac cath, right heart (03/31/2018).    Family History  family history includes Heart Attack in her father.    Social History   reports that she quit smoking about 3 years ago. Her smoking use included cigarettes. She has a 39.00 pack-year smoking history. She has never used smokeless tobacco. She reports previous alcohol use. She reports that she does not use drugs.    Medications  Prior to Admission Medications   Prescriptions Last Dose Informant Patient Reported? Taking?   ARIPiprazole (ABILIFY) 10 MG Tab 6/30/2021 at pm Patient Yes No   Sig: Take 10 mg by mouth every day.   HYDROcodone/acetaminophen (NORCO)  MG Tab 7/1/2021 at am Patient Yes Yes   Sig: Take 1 tablet by mouth every day.   albuterol 108 (90 Base) MCG/ACT Aero Soln inhalation aerosol 6/24/2021 at unknown Patient Yes Yes   Sig: Inhale 2 Puffs every four hours as needed for Shortness of Breath.   amiodarone (CORDARONE) 200 MG Tab 6/30/2021 at pm Patient No No   Sig: Take 1 Tab by mouth every day.   atorvastatin (LIPITOR) 20 MG Tab 6/30/2021 at pm Patient Yes Yes   Sig: Take 20 mg by mouth every evening.   chlorhexidine (PERIDEX) 0.12 % Solution 6/30/2021 at pm Patient Yes No   Sig: Take 15 mL by mouth every day. Swish and spit    clindamycin (CLEOCIN) 150 MG Cap 7/1/2021 at am Patient Yes No   Sig: Take 150 mg by mouth every 8 hours.   methocarbamol (ROBAXIN) 750 MG Tab 6/28/2021 at unknown Patient Yes No   Sig: Take 750 mg by mouth 3 times a day as needed (Muscle pain).   morphine SR (MS CONTIN) 15 MG TB12 >2 weeks at unknown Patient No No   Sig: Take 1 Tab by mouth 3 times a day.   oxyCODONE immediate release (ROXICODONE) 10 MG immediate release tablet 6/30/2021 at pm Patient Yes No   Sig: Take 10 mg by mouth 3 times a day. TID with MS Contin   potassium chloride SA (KDUR) 20 MEQ Tab CR 6/29/2021 at am Patient No No   Sig: Take 1 Tab by mouth every day.   pregabalin (LYRICA) 200 MG capsule 7/1/2021 at am Patient Yes No   Sig: Take 200 mg by mouth 3 times a day.   torsemide (DEMADEX) 20 MG Tab 6/29/2021 at am Patient No No   Sig: Take 1 Tab by mouth every day.   traZODone (DESYREL) 100 MG Tab 6/30/2021 at pm Patient Yes No   Sig: Take 100 mg by mouth every evening.   venlafaxine (EFFEXOR-XR) 150 MG extended-release capsule 6/30/2021 at pm Patient Yes No   Sig: Take 150 mg by mouth every evening.   venlafaxine XR (EFFEXOR XR) 75 MG CAPSULE SR 24 HR 6/30/2021 at pm Patient Yes No   Sig: Take 75 mg by mouth every day.   warfarin (COUMADIN) 5 MG Tab 6/30/2021 at pm Patient Yes Yes   Sig: Take 2.5-5 mg by mouth every day. Tuesday = 2.5 mg    5 mg on every other day.      Facility-Administered Medications: None       Allergies  Allergies   Allergen Reactions   • Penicillins Rash             Vital signs in last 24 hours  Temp:  [35.8 °C (96.4 °F)-36.5 °C (97.7 °F)] 36.5 °C (97.7 °F)  Pulse:  [60-71] 62  Resp:  [16-21] 18  BP: (106-142)/(52-72) 122/63  SpO2:  [94 %-98 %] 98 %    Physical Exam  Physical Exam  Constitutional:       General: She is not in acute distress.  HENT:      Head: Normocephalic.      Comments: Periorbital ecchymoses left eye.  Bruising right jaw.  Eyes:      General: No scleral icterus.     Conjunctiva/sclera: Conjunctivae  normal.      Pupils: Pupils are equal, round, and reactive to light.   Neck:      Thyroid: No thyromegaly.      Vascular: No carotid bruit.      Comments: Normal jugular venous pressure.  Cardiovascular:      Rate and Rhythm: Normal rate and regular rhythm.      Pulses:           Carotid pulses are 1+ on the right side and 1+ on the left side.       Radial pulses are 1+ on the right side and 1+ on the left side.        Posterior tibial pulses are 1+ on the right side and 1+ on the left side.      Heart sounds: S1 normal and S2 normal. No murmur heard.   No friction rub. No gallop.       Comments: Sternal scar.  Pacemaker generator left subclavian area.  Pulmonary:      Effort: Pulmonary effort is normal.      Breath sounds: Normal breath sounds. No wheezing, rhonchi or rales.   Chest:      Comments: Increased AP diameter  Abdominal:      General: Bowel sounds are normal. There is no abdominal bruit.      Palpations: Abdomen is soft. There is no mass or pulsatile mass.      Tenderness: There is no abdominal tenderness.      Comments: Obese   Musculoskeletal:      Comments: Bruising right forearm.   Lymphadenopathy:      Cervical: No cervical adenopathy.   Skin:     General: Skin is warm and dry.      Nails: There is no clubbing.   Neurological:      Mental Status: She is alert and oriented to person, place, and time.   Psychiatric:         Behavior: Behavior normal.         Lab Review  Lab Results   Component Value Date/Time    WBC 4.1 (L) 07/02/2021 01:23 AM    RBC 3.77 (L) 07/02/2021 01:23 AM    HEMOGLOBIN 11.1 (L) 07/02/2021 01:23 AM    HEMATOCRIT 34.8 (L) 07/02/2021 01:23 AM    MCV 92.3 07/02/2021 01:23 AM    MCH 29.4 07/02/2021 01:23 AM    MCHC 31.9 (L) 07/02/2021 01:23 AM    MPV 10.7 07/02/2021 01:23 AM      Lab Results   Component Value Date/Time    SODIUM 133 (L) 07/02/2021 01:23 AM    POTASSIUM 4.1 07/02/2021 01:23 AM    CHLORIDE 93 (L) 07/02/2021 01:23 AM    CO2 33 07/02/2021 01:23 AM    GLUCOSE 115 (H)  07/02/2021 01:23 AM    BUN 6 (L) 07/02/2021 01:23 AM    CREATININE 0.83 07/02/2021 01:23 AM    BUNCREATRAT 13 10/09/2015 02:11 PM      Lab Results   Component Value Date/Time    ASTSGOT 24 07/01/2021 01:10 PM    ALTSGPT 14 07/01/2021 01:10 PM     Lab Results   Component Value Date/Time    CHOLSTRLTOT 200 (H) 07/25/2014 01:30 AM    LDL see below 07/25/2014 01:30 AM    HDL 37 (A) 07/25/2014 01:30 AM    TRIGLYCERIDE 574 (H) 07/25/2014 01:30 AM    TROPONINT <6 07/01/2021 06:29 PM       No results for input(s): NTPROBNP in the last 72 hours.    Cardiac Imaging and Procedures Review  EKG:  My personal interpretation of the EKG dated 7/2/2021 is sinus rhythm, rate 62.  IVCD.    Echocardiogram: 3/29/2018  Systolic function is difficult to assess in rapid atrial fibrillation,   but appears normal.  Severely dilated right ventricle.  Reduced right ventricular systolic function.  Mildly dilated left atrium.  Known mitral valve bioprosthesis which is functioning normally.  Estimated right ventricular systolic pressure  is 35 mmHg + JVP.  Compared to previous echocardiogram from 8/22/2016 the rhythm is now rapid atrial fibrillation. Estimated right-sided pressures are lower in this study, and the mitral bioprosthesis continues to function   normally.    Transesophageal echocardiogram 4/2/2018  Low normal LV systolic function, EF 50%.  Moderate RV dilation with reduced systolic function.  Mild biatrial enlargement.  Spontaneous echo contrast seen in the left atrial appendage but no   thrombus.  There is a mitral valve ring in place consistent with history of prior   repair, normal gradients, mild MR.  Compared to prior transthoracic echocardiogram, LVEF appears low   normal. RV still appears somewhat dilated with reduced function. The   mitral valve repair does not appear to be causing any hemodynamic   issues.    Cardiac surgery 11/17/2011  DATE OF OPERATION:  11/17/2011     REFERRING PHYSICIAN:  Wilberto Cheatham MD      PREOPERATIVE DIAGNOSES:  Large left atrial myxoma, mitral regurgitation,  hypertension, chronic back pain, congestive heart failure ([place]  Heart Association Class III), sciatica, depression, history of cocaine  and narcotic abuse, and tobacco abuse.     POSTOPERATIVE DIAGNOSES:  Large left atrial myxoma, mitral  regurgitation, hypertension, chronic back pain, congestive heart failure  ([place] Heart Association Class III), sciatica, depression, history of  cocaine and narcotic abuse, and tobacco abuse.     OPERATION PERFORMED:  Resection of large left atrial myxoma, mitral  valve repair (32-mm C-E flexible annuloplasty band), and intraoperative  transesophageal echocardiography.      Imaging  Chest X-Ray: Not done    MPI 7/25/2014   Normal left ventricular wall motion.  LV ejection fraction = 78%.   ECG Interpretation will be added as an addendum to this                       report.    No evidence of significant jeopardized viable myocardium or prior myocardial    infarction.    Normal left ventricular size, ejection fraction, and wall motion.    No EKG changes    No prior for comparison     Assessment  1.  Mechanical ground-level fall related to balance problems, positional dizziness without loss of consciousness.  2.  PAF  3.  Amiodarone therapy.  4.  Permanent pacemaker.  5.  S/P left myxoma resection mitral valve repair 2011.  6.  Chronic HFpEF, pulmonary hypertension compensated.  7.  Chronic anticoagulation on warfarin.  8.  Hypertension  9.  Dyslipidemia  10.  Morbid obesity  11.  H/O chronic tobacco use.  12.  Chronic balance problems.  13.  Chronic lung disease.    Recommendation Discussion  1.  Based on current clinical presentation information it appears the patient has been suffering from orthostatic dizziness and suffered a mechanical ground-level fall related to balance problems aggravated by orthostasis possibly relative dehydration and not a primary cardiac event.  2.  Nonetheless will get a  pacemaker interrogation to assess pacemaker function and rule out the presence of a significant arrhythmia.  3.  Agree with holding Demadex diuretics which the patient states that she only takes as needed.  4.  Agree with continuation of amiodarone, atorvastatin and warfarin.  5.  Agree with IV fluids.    Thank you for allowing me to participate in the care of this patient.    Please contact me with any questions.    Bay Aege M.D.   Cardiologist, [facility]  [phone]

## 2021-07-02 NOTE — THERAPY
"Physical Therapy   Initial Evaluation     Patient Name: Kailey Velarde  Age:  61 y.o., Sex:  female  Medical Record #: [id number]  Today's Date: 7/2/2021     Precautions: Fall Risk    Assessment  Ms. Velrade is a 60 y/o female who presents to acute secondary to fall, a fib, and chronic LBP. Pt reports she has these \"episodes\" where she falls due to sudden onset weakness and dizziness. States she generally gets notice before these attacks hit. Does have chronic LBP but doesn't use AD. Able to perform gait, transfers, and bed mobility without physical assist. No LOB. No AD used. No additional acute PT needs.    Plan    Recommend Physical Therapy for Evaluation only    DC Equipment Recommendations: None  Discharge Recommendations: Recommend home health for continued physical therapy services          Objective       07/02/21 0852   Prior Living Situation   Prior Services None   Housing / Facility 1 Story House   Steps Into Home 0   Equipment Owned None   Lives with - Patient's Self Care Capacity Alone and Able to Care For Self   Prior Level of Functional Mobility   Bed Mobility Independent  (sleeps in recliner)   Transfer Status Independent   Ambulation Independent   Distance Ambulation (Feet)   (community ambulator)   Assistive Devices Used None   History of Falls   History of Falls Yes   Date of Last Fall   (reason for admit)   Cognition    Cognition / Consciousness WDL   Comments community ambulator   Passive ROM Lower Body   Passive ROM Lower Body WDL   Active ROM Lower Body    Active ROM Lower Body  WDL   Strength Lower Body   Lower Body Strength  WDL   Sensation Lower Body   Comments numbness in B LE, reports is baseline   Balance Assessment   Sitting Balance (Static) Good   Sitting Balance (Dynamic) Good   Standing Balance (Static) Good   Standing Balance (Dynamic) Good   Weight Shift Sitting Good   Weight Shift Standing Good   Comments no AD   Gait Analysis   Gait Level Of Assist Supervised   Assistive " Device None   Distance (Feet) 50   # of Times Distance was Traveled 1   Deviation Bradykinetic   Weight Bearing Status no restrictions   Bed Mobility    Supine to Sit Supervised   Scooting Supervised   Functional Mobility   Sit to Stand Supervised   Bed, Chair, Wheelchair Transfer Supervised

## 2021-07-02 NOTE — CARE PLAN
Problem: Fall Risk  Goal: Patient will remain free from falls  Outcome: Progressing  Patient has remained free from falls and uses call light appropriately and has all fall precautions in place

## 2021-07-02 NOTE — DIETARY
NUTRITION SERVICES: BMI - Pt with BMI >40 (=Body mass index is 43.14 kg/m².), Class III obesity. Weight loss counseling not appropriate in acute care setting. RECOMMEND - If appropriate at DC please refer to outpatient nutrition services for weight management.

## 2021-07-02 NOTE — HOSPITAL COURSE
This Is a 61-year-old female with past medical history of paroxysmal A. fib, atrial myxoma status post surgical removal, status post mitral valve repair, pulmonary hypertension, obstructive sleep apnea on oxygen, sick sinus syndrome status post pacemaker placement presented to the ER with chief complaint of 2 episodes of fall without losing consciousness, associated with dizziness, lightheadedness and sudden positional change.  Patient was noted to have left-sided periorbital ecchymosis and bruising of right jaw.  Her head CT was unremarkable for any intracranial bleed, x-rays is negative for fracture.  Her EKG showed sinus rhythm.  Patient admitted to telemetry floor for fall secondary to possible orthostatic versus dehydration versus drug induced since patient is on long-acting morphine, treated with IV fluid, monitoring and echocardiogram.  Cardiology consulted recommended pacemaker interrogation to rule out presence of a significant arrhythmia.  Patient labs are also significant for mild hyponatremia, improving with IV fluids.  PM interrogation unremarkable, Cardiology cleared , pt evaluated by PT/OT recommended home PT/OT, patient additionally complaints of R ear wax, recommended to follow up with PCP for outpatient ENT and Neurology referral for further ENT work up and neurology assessment for evaluation of causes of recurrent falls. Patient also advised to stop torsemide in view of dehydration and orthostatic etiology.

## 2021-07-02 NOTE — RESPIRATORY CARE
"COPD EDUCATION by COPD CLINICAL EDUCATOR  7/2/2021  at  2:28 PM by Michael Trujillo, RRT     Patient interviewed by COPD education team.  Patient unable to participate in full program.  Short intervention has been conducted.  Talked about KAEL and how cpap/bipap works.    COPD Screen       COPD Assessment  COPD Clinical Specialists ONLY  COPD Education Initiated: Yes--Short Intervention  DME Company: [employer]  DME Equipment Type: O2  Physician Name: Pema Peña  Pulmonologist Name: Indy Tucker  Referrals Initiated: Yes  Pulmonary Rehab: Declined  Smoking Cessation: N/A (Quit smoking in 2017.)  Hospice: N/A  Home Health Care: Declined  [facility]: Declined  [facility]: N/A  [facility]: Declined  Private In-Home Care Agency: Declined  Is this a COPD exacerbation patient?: No  $ Demo/Eval of SVN's, MDI's and Aerosols: Yes    Meds to Beds  Would the patient like to opt in for Bedside Medication Delivery at Discharge?: Yes, interested     MY COPD ACTION PLAN     It is recommended that patients and physicians /healthcare providers complete this action plan together. This plan should be discussed at each physician visit and updated as needed.    The green, yellow and red zones show groups of symptoms of COPD. This list of symptoms is not comprehensive, and you may experience other symptoms. In the \"Actions\" column, your healthcare provider has recommended actions for you to take based on your symptoms.    Patient Name: Kailey Velarde   YOB: 1960   Last Updated on: 7/2/2021  2:26 PM   Green Zone:  I am doing well today Actions   •  Usual activitiy and exercise level •  Take daily medications   •  Usual amounts of cough and phlegm/mucus •  Use oxygen as prescribed   •  Sleep well at night •  Continue regular exercise/diet plan   •  Appetite is good •  At all times avoid cigarette smoke, inhaled irritants     Daily Medications (these medications are taken " "every day):   Budesonide-Formoterol Fumarate (Symbicort) 2 Puffs Twice daily     Additional Information:  Instructed to use a spacer with Albuterol and Symbicort.         Yellow Zone:  I am having a bad day or a COPD flare Actions   •  More breathless than usual •  Continue daily medications   •  I have less energy for my daily activities •  Use quick relief inhaler as ordered   •  Increased or thicker phlegm/mucus •  Use oxygen as prescribed   •  Using quick relief inhaler/nebulizer more often •  Get plenty of rest   •  Swelling of ankles more than usual •  Use pursed lip breathing   •  More coughing than usual •  At all times avoid cigarette smoke, inhaled irritants   •  I feel like I have a \"chest cold\"   •  Poor sleep and my symptoms woke me up   •  My appetite is not good   •  My medicine is not helping    •  Call provider immediately if symptoms don’t improve     Continue daily medications, add rescue medications:   Albuterol 2 Puffs Every 4 hours PRN       Medications to be used during a flare up, (as Discussed with Provider):              Red Zone:  I need urgent medical care Actions   •  Severe shortness of breath even at rest •  Call 911 or seek medical care immediately   •  Not able to do any activity because of breathing    •  Fever or shaking chills    •  Feeling confused or very drowsy     •  Chest pains    •  Coughing up blood              "

## 2021-07-02 NOTE — PROGRESS NOTES
4 Eyes Skin Assessment Completed by EDA Bravo and EDA Schilling.    Head Scab, Bruising and Swelling over left eye, bruising under chin bilaterally   Ears WDL  Nose WDL  Mouth WDL  Neck WDL  Breast/Chest WDL  Shoulder Blades WDL  Spine WDL  (R) Arm/Elbow/Hand Bruising and Abrasion  (L) Arm/Elbow/Hand Bruising  Abdomen WDL  Groin WDL  Scrotum/Coccyx/Buttocks WDL  (R) Leg WDL  (L) Leg WDL  (R) Heel/Foot/Toe dry and callus  (L) Heel/Foot/Toe dry and callus          Devices In Places Tele Box, Pulse Ox and Nasal Cannula      Interventions In Place Gray Ear Foams, Pillows and Pressure Redistribution Mattress    Possible Skin Injury Yes    Pictures Uploaded Into Epic Yes  Wound Consult Placed Yes  RN Wound Prevention Protocol Ordered No

## 2021-07-02 NOTE — THERAPY
"Occupational Therapy   Initial Evaluation     Patient Name: Kailey Velarde  Age:  61 y.o., Sex:  female  Medical Record #: [id number]  Today's Date: 7/2/2021     Precautions  Precautions: (P) Fall Risk    Assessment  Patient is 61 y.o. female admitted for falls, chronic low back pain. Pt lives in a [facility] alone, independent with all mobility and ADLs. Pt has history of chronic pain which attributes to her falls but does not use an AD at baseline nor thinks she needs one. Pt able to complete all mobility and ADLs with supervision, no further needs order will be completed as patient has no skilled needs.    Plan    Recommend Occupational Therapy for Evaluation only.    DC Equipment Recommendations: (P) None  Discharge Recommendations: (P) Anticipate that the patient will have no further occupational therapy needs after discharge from the hospital     Subjective    \"I'm good I don't need any help\"     Objective       07/02/21 0901   Prior Living Situation   Prior Services Home-Independent   Housing / Facility 1 Story House   Bathroom Set up Bathtub / Shower Combination;Grab Bars;Shower Chair   Equipment Owned Tub / Shower Seat;Grab Bar(s) In Tub / Shower   Prior Level of ADL Function   Self Feeding Independent   Grooming / Hygiene Independent   Bathing Independent   Dressing Independent   Toileting Independent   Prior Level of IADL Function   Medication Management Independent   Laundry Independent   Kitchen Mobility Independent   Finances Independent   Home Management Independent   Shopping Independent   Prior Level Of Mobility Independent Without Device in Community   Driving / Transportation Driving Independent   Occupation (Pre-Hospital Vocational) Retired Due To Disability   History of Falls   History of Falls Yes   Date of Last Fall   (reason for admit)   Precautions   Precautions Fall Risk   Pain 0 - 10 Group   Therapist Pain Assessment Post Activity Pain Same as Prior to Activity;Nurse Notified   Non Verbal " Descriptors   Non Verbal Scale  Calm   Cognition    Cognition / Consciousness WDL   Comments Cooperative, recpetive to therapy   Active ROM Upper Body   Active ROM Upper Body  WDL   Dominant Hand Right   Strength Upper Body   Upper Body Strength  WDL   Sensation Upper Body   Upper Extremity Sensation  WDL   Upper Body Muscle Tone   Upper Body Muscle Tone  WDL   Neurological Concerns   Neurological Concerns No   Coordination Upper Body   Coordination WDL   Balance Assessment   Sitting Balance (Static) Good   Sitting Balance (Dynamic) Good   Standing Balance (Static) Good   Standing Balance (Dynamic) Good   Weight Shift Sitting Good   Weight Shift Standing Good   Comments no AD   Bed Mobility    Supine to Sit Supervised   Sit to Supine Supervised   Scooting Supervised   Rolling Supervised   ADL Assessment   Grooming Supervision;Standing   Upper Body Dressing Supervision   Lower Body Dressing Supervision   Toileting Supervision   How much help from another person does the patient currently need...   Putting on and taking off regular lower body clothing? 4   Bathing (including washing, rinsing, and drying)? 4   Toileting, which includes using a toilet, bedpan, or urinal? 4   Putting on and taking off regular upper body clothing? 4   Taking care of personal grooming such as brushing teeth? 4   Eating meals? 4   6 Clicks Daily Activity Score 24   Functional Mobility   Sit to Stand Supervised   Bed, Chair, Wheelchair Transfer Supervised   Toilet Transfers Supervised   Transfer Method Stand Step   Mobility bed mobility, bathroom mobility, back to EOB   Comments no AD   Visual Perception   Visual Perception  Not Applicable   Edema / Skin Assessment   Edema / Skin  Not Assessed   Activity Tolerance   Sitting Edge of Bed left seated at EOB   Standing 12   Education Group   Education Provided Role of Occupational Therapist   Role of Occupational Therapist Patient Response Patient;Acceptance;Explanation   Problem List   Problem  List None   Interdisciplinary Plan of Care Collaboration   IDT Collaboration with  Nursing   Patient Position at End of Therapy Seated;Edge of Bed;Call Light within Reach;Tray Table within Reach;Phone within Reach   Collaboration Comments RN updated

## 2021-07-02 NOTE — CARE PLAN
Problem: Knowledge Deficit - Standard  Goal: Patient and family/care givers will demonstrate understanding of plan of care, disease process/condition, diagnostic tests and medications  Outcome: Progressing     Problem: Fall Risk  Goal: Patient will remain free from falls  Outcome: Progressing     The patient is Stable - Low risk of patient condition declining or worsening    Shift Goals  Clinical Goals: Ambulate  Patient Goals: Rest  Family Goals: N/A    Progress made toward(s) clinical / shift goals:  Fall precautions in place. Bed in lowest position. Non-skid socks in place. Personal belongings within reach. Mobility sign on door. Bed-alarm on. Call light within reach. Pt educated regarding fall prevention and verbalized understanding. Patient educated on importance of continuing to ambulate.

## 2021-07-02 NOTE — PROGRESS NOTES
Hospital Medicine Daily Progress Note    Date of Service  7/2/2021    Chief Complaint  Kailey Velarde is a 61 y.o. female admitted 7/1/2021 with dizziness and for    Hospital Course  No notes on file    Interval Problem Update  Patient complains of severe low back pain, on long-acting morphine and oxycodone for breakthrough pain  Cardiology consulted to rule out arrhythmia  Orthostatics were negative today, pacemaker interrogation as per cardiology  Follow-up echocardiogram  Continue with IV fluids, tele monitoring    I have personally seen and examined the patient at bedside. I discussed the plan of care with patient and bedside RN.    Consultants/Specialty  cardiology    Code Status  Full Code    Disposition  Patient is medically cleared pending echo and cardiology clearance.   Anticipate discharge to to home with close outpatient follow-up.  I have placed the appropriate orders for post-discharge needs.    Review of Systems  Review of Systems   Constitutional: Positive for malaise/fatigue. Negative for chills and fever.   HENT: Negative for ear pain and sinus pain.    Eyes: Negative for blurred vision and pain.   Respiratory: Negative for cough and hemoptysis.    Cardiovascular: Negative for chest pain and orthopnea.   Gastrointestinal: Negative for diarrhea, nausea and vomiting.   Genitourinary: Negative for dysuria and hematuria.   Musculoskeletal: Positive for back pain. Negative for myalgias.   Skin: Negative for itching.   Neurological: Positive for dizziness and headaches. Negative for tremors, focal weakness and weakness.   Psychiatric/Behavioral: Negative for suicidal ideas.        Physical Exam  Temp:  [35.8 °C (96.4 °F)-36.5 °C (97.7 °F)] 36.5 °C (97.7 °F)  Pulse:  [60-71] 62  Resp:  [16-19] 18  BP: (106-141)/(52-70) 122/63  SpO2:  [94 %-98 %] 98 %    Physical Exam  Vitals and nursing note reviewed.   Constitutional:       Appearance: Normal appearance.   HENT:      Head: Normocephalic and  atraumatic.      Comments: Periorbital bruising to L side of face     Right Ear: External ear normal.      Left Ear: External ear normal.      Mouth/Throat:      Mouth: Mucous membranes are moist.   Eyes:      Extraocular Movements: Extraocular movements intact.      Conjunctiva/sclera: Conjunctivae normal.      Pupils: Pupils are equal, round, and reactive to light.   Cardiovascular:      Rate and Rhythm: Normal rate and regular rhythm.      Pulses: Normal pulses.      Heart sounds: Normal heart sounds.   Pulmonary:      Effort: Pulmonary effort is normal.      Breath sounds: Normal breath sounds.   Abdominal:      General: Abdomen is flat. Bowel sounds are normal.      Palpations: Abdomen is soft.   Musculoskeletal:         General: Normal range of motion.      Cervical back: Normal range of motion and neck supple.      Comments: L arm bruise   Skin:     General: Skin is warm.      Capillary Refill: Capillary refill takes less than 2 seconds.   Neurological:      General: No focal deficit present.      Mental Status: She is alert and oriented to person, place, and time.   Psychiatric:         Mood and Affect: Mood normal.         Fluids  No intake or output data in the 24 hours ending 07/02/21 1557    Laboratory  Recent Labs     07/01/21  1310 07/02/21  0123   WBC 5.1 4.1*   RBC 3.97* 3.77*   HEMOGLOBIN 11.8* 11.1*   HEMATOCRIT 35.0* 34.8*   MCV 88.2 92.3   MCH 29.7 29.4   MCHC 33.7 31.9*   RDW 42.4 44.6   PLATELETCT 160* 151*   MPV 10.2 10.7     Recent Labs     07/01/21  1310 07/01/21  1829 07/02/21  0123   SODIUM 128* 132* 133*   POTASSIUM 3.3* 3.7 4.1   CHLORIDE 89* 93* 93*   CO2 28 31 33   GLUCOSE 97 102* 115*   BUN 7* 6* 6*   CREATININE 0.67 0.63 0.83   CALCIUM 9.5 8.8 8.6     Recent Labs     06/30/21  0000 07/01/21  1310 07/02/21  0123   APTT  --  43.1*  --    INR 2.50 2.49* 2.45*               Imaging  CT-HEAD W/O   Final Result      1.  No acute intracranial abnormality.   2.  LEFT facial soft tissue  injury.      CT-MAXILLOFACIAL W/O PLUS RECONS   Final Result      1.  No facial fracture is identified.   2.  Mild mucosal thickening in the right maxillary sinus.   3.  Fluid in the mastoid air cells on the right.   4.  Left periorbital soft tissue swelling and with a lateral soft tissue laceration.   5.  Atherosclerotic plaque in the carotid arteries.   6.  Globes are intact.      EC-ECHOCARDIOGRAM COMPLETE W/O CONT    (Results Pending)        Assessment/Plan  * Falls  Assessment & Plan  Lightheadedness with no loss of consciousness  No palpitation or chest pain  EKG sinus rhythm with no arrhythmia however she has QTC  Multifactorial including medication side effects and dehydration, less likely to be vertigo  We will admit the patient to telemetry  Echo  CT scan for did not show any acute finding, less likely to be stroke no need for MRI at this time.  Gentle IV fluid and follow-up sodium level  PT and OT        QT prolongation  Assessment & Plan  Likely related to medications and electrolyte abnormalities  Avoid medication affecting QTC  Telemetry  Check magnesium and keep the level above 2 and keep potassium level above 4    Hyponatremia  Assessment & Plan  Likely related to torsemide and dehydration  Check urine and serum osmolality  Gently IV fluid and check sodium level in 6 hours    Chronic respiratory failure with hypoxia (HCC)- (present on admission)  Assessment & Plan  Due to sleep apnea and pulmonary hypertension   oxygen 3 L nasal passage cannula 24/7  Close monitoring and hold torsemide at this time      Essential hypertension- (present on admission)  Assessment & Plan  Continue home medication  Stable    Atrial myxoma- (present on admission)  Assessment & Plan  Treated with surgery years ago in 2011  Repeat echo    PAF (paroxysmal atrial fibrillation) (CMS-HCC)- (present on admission)  Assessment & Plan  No RVR  Telemetry  Continue amiodarone and warfarin    Chronic low back pain- (present on  admission)  Assessment & Plan  Patient is taking morphine 15 mg 3 times daily with oxycodone 10 mg for breakthrough  Try to weaning her from pain medications due to fall and dizzy           VTE prophylaxis: therapeutic anticoagulation with warfarin    I have performed a physical exam and reviewed and updated ROS and Plan today (7/2/2021). In review of yesterday's note (7/1/2021), there are no changes except as documented above.

## 2021-07-02 NOTE — TELEPHONE ENCOUNTER
"Received Voalte message from SCARLETT stating \"I need a pacemaker interrogated\".     Spoke to Pearl VERAS and she stated to call the rep and see if they have a chance to interrogate the pacemaker.    Called [employer] Rep Juan GOLDSTEIN and asked to have pacemaker interrogated today.  Gave him the room number and patient name, return spelled patient name.     Notified SCARLETT.   "

## 2021-07-02 NOTE — PROGRESS NOTES
Assumed care of patient at bedside report from day RN. Updated on POC. Patient currently A & O x 4 on 3 L O2 with complaints of acute pain, see MAR. Call light within reach. Whiteboard updated. Fall precautions in place. Bed locked and in lowest position. All questions answered. No other needs indicated at this time.

## 2021-07-03 ENCOUNTER — APPOINTMENT (OUTPATIENT)
Dept: CARDIOLOGY | Facility: MEDICAL CENTER | Age: 61
DRG: 312 | End: 2021-07-03
Attending: INTERNAL MEDICINE
Payer: MEDICARE

## 2021-07-03 VITALS
SYSTOLIC BLOOD PRESSURE: 110 MMHG | HEIGHT: 68 IN | RESPIRATION RATE: 16 BRPM | DIASTOLIC BLOOD PRESSURE: 49 MMHG | HEART RATE: 63 BPM | TEMPERATURE: 97.1 F | OXYGEN SATURATION: 95 % | WEIGHT: 281.97 LBS | BODY MASS INDEX: 42.73 KG/M2

## 2021-07-03 LAB
ANION GAP SERPL CALC-SCNC: 7 MMOL/L (ref 7–16)
BASOPHILS # BLD AUTO: 0.7 % (ref 0–1.8)
BASOPHILS # BLD: 0.03 K/UL (ref 0–0.12)
BUN SERPL-MCNC: 13 MG/DL (ref 8–22)
CALCIUM SERPL-MCNC: 8.5 MG/DL (ref 8.5–10.5)
CHLORIDE SERPL-SCNC: 93 MMOL/L (ref 96–112)
CO2 SERPL-SCNC: 30 MMOL/L (ref 20–33)
CREAT SERPL-MCNC: 1.11 MG/DL (ref 0.5–1.4)
EOSINOPHIL # BLD AUTO: 0.16 K/UL (ref 0–0.51)
EOSINOPHIL NFR BLD: 3.7 % (ref 0–6.9)
ERYTHROCYTE [DISTWIDTH] IN BLOOD BY AUTOMATED COUNT: 46.5 FL (ref 35.9–50)
GLUCOSE SERPL-MCNC: 104 MG/DL (ref 65–99)
HCT VFR BLD AUTO: 33.5 % (ref 37–47)
HGB BLD-MCNC: 10.4 G/DL (ref 12–16)
IMM GRANULOCYTES # BLD AUTO: 0.01 K/UL (ref 0–0.11)
IMM GRANULOCYTES NFR BLD AUTO: 0.2 % (ref 0–0.9)
INR PPP: 2.78 (ref 0.87–1.13)
LV EJECT FRACT MOD 2C 99903: 53.64
LV EJECT FRACT MOD 4C 99902: 59.11
LV EJECT FRACT MOD BP 99901: 57.58
LYMPHOCYTES # BLD AUTO: 1.24 K/UL (ref 1–4.8)
LYMPHOCYTES NFR BLD: 29 % (ref 22–41)
MCH RBC QN AUTO: 29.3 PG (ref 27–33)
MCHC RBC AUTO-ENTMCNC: 31 G/DL (ref 33.6–35)
MCV RBC AUTO: 94.4 FL (ref 81.4–97.8)
MONOCYTES # BLD AUTO: 0.46 K/UL (ref 0–0.85)
MONOCYTES NFR BLD AUTO: 10.8 % (ref 0–13.4)
NEUTROPHILS # BLD AUTO: 2.37 K/UL (ref 2–7.15)
NEUTROPHILS NFR BLD: 55.6 % (ref 44–72)
NRBC # BLD AUTO: 0 K/UL
NRBC BLD-RTO: 0 /100 WBC
PLATELET # BLD AUTO: 134 K/UL (ref 164–446)
PMV BLD AUTO: 10.7 FL (ref 9–12.9)
POTASSIUM SERPL-SCNC: 4.6 MMOL/L (ref 3.6–5.5)
PROTHROMBIN TIME: 28.5 SEC (ref 12–14.6)
RBC # BLD AUTO: 3.55 M/UL (ref 4.2–5.4)
SODIUM SERPL-SCNC: 130 MMOL/L (ref 135–145)
WBC # BLD AUTO: 4.3 K/UL (ref 4.8–10.8)

## 2021-07-03 PROCEDURE — A9270 NON-COVERED ITEM OR SERVICE: HCPCS | Performed by: STUDENT IN AN ORGANIZED HEALTH CARE EDUCATION/TRAINING PROGRAM

## 2021-07-03 PROCEDURE — 700102 HCHG RX REV CODE 250 W/ 637 OVERRIDE(OP): Performed by: INTERNAL MEDICINE

## 2021-07-03 PROCEDURE — 700102 HCHG RX REV CODE 250 W/ 637 OVERRIDE(OP): Performed by: STUDENT IN AN ORGANIZED HEALTH CARE EDUCATION/TRAINING PROGRAM

## 2021-07-03 PROCEDURE — 700117 HCHG RX CONTRAST REV CODE 255: Performed by: INTERNAL MEDICINE

## 2021-07-03 PROCEDURE — 700105 HCHG RX REV CODE 258: Performed by: INTERNAL MEDICINE

## 2021-07-03 PROCEDURE — 93306 TTE W/DOPPLER COMPLETE: CPT | Mod: 26 | Performed by: INTERNAL MEDICINE

## 2021-07-03 PROCEDURE — 93306 TTE W/DOPPLER COMPLETE: CPT

## 2021-07-03 PROCEDURE — 85610 PROTHROMBIN TIME: CPT

## 2021-07-03 PROCEDURE — 85025 COMPLETE CBC W/AUTO DIFF WBC: CPT

## 2021-07-03 PROCEDURE — 99239 HOSP IP/OBS DSCHRG MGMT >30: CPT | Performed by: STUDENT IN AN ORGANIZED HEALTH CARE EDUCATION/TRAINING PROGRAM

## 2021-07-03 PROCEDURE — 99232 SBSQ HOSP IP/OBS MODERATE 35: CPT | Performed by: INTERNAL MEDICINE

## 2021-07-03 PROCEDURE — A9270 NON-COVERED ITEM OR SERVICE: HCPCS | Performed by: INTERNAL MEDICINE

## 2021-07-03 PROCEDURE — 80048 BASIC METABOLIC PNL TOTAL CA: CPT

## 2021-07-03 PROCEDURE — 700111 HCHG RX REV CODE 636 W/ 250 OVERRIDE (IP): Performed by: STUDENT IN AN ORGANIZED HEALTH CARE EDUCATION/TRAINING PROGRAM

## 2021-07-03 RX ADMIN — SODIUM CHLORIDE: 9 INJECTION, SOLUTION INTRAVENOUS at 05:39

## 2021-07-03 RX ADMIN — KETOROLAC TROMETHAMINE 30 MG: 60 INJECTION, SOLUTION INTRAMUSCULAR at 03:13

## 2021-07-03 RX ADMIN — OXYCODONE 10 MG: 5 TABLET ORAL at 10:24

## 2021-07-03 RX ADMIN — NICOTINE 14 MG: 14 PATCH TRANSDERMAL at 05:26

## 2021-07-03 RX ADMIN — POTASSIUM CHLORIDE 20 MEQ: 1500 TABLET, EXTENDED RELEASE ORAL at 05:25

## 2021-07-03 RX ADMIN — PREGABALIN 200 MG: 100 CAPSULE ORAL at 05:24

## 2021-07-03 RX ADMIN — MORPHINE SULFATE 15 MG: 15 TABLET, FILM COATED, EXTENDED RELEASE ORAL at 05:25

## 2021-07-03 RX ADMIN — OXYCODONE 10 MG: 5 TABLET ORAL at 04:13

## 2021-07-03 RX ADMIN — VENLAFAXINE HYDROCHLORIDE 75 MG: 37.5 CAPSULE, EXTENDED RELEASE ORAL at 05:25

## 2021-07-03 RX ADMIN — HUMAN ALBUMIN MICROSPHERES AND PERFLUTREN 3 ML: 10; .22 INJECTION, SOLUTION INTRAVENOUS at 13:25

## 2021-07-03 ASSESSMENT — ENCOUNTER SYMPTOMS
SHORTNESS OF BREATH: 0
PALPITATIONS: 0
NAUSEA: 0
CHEST TIGHTNESS: 0
DIZZINESS: 0
HEADACHES: 0
ABDOMINAL PAIN: 0

## 2021-07-03 ASSESSMENT — PAIN DESCRIPTION - PAIN TYPE
TYPE: CHRONIC PAIN

## 2021-07-03 NOTE — DISCHARGE PLANNING
Received Choice form at 0811  Agency/Facility Name: 1)[facility], 2)[facility]  Referral sent per Choice form @ [phone]

## 2021-07-03 NOTE — PROGRESS NOTES
Tele monitor removed from patient. All belongings gathered. Patient transported via wheelchair  to [facility].

## 2021-07-03 NOTE — CARE PLAN
Problem: Fall Risk  Goal: Patient will remain free from falls  Outcome: Progressing  Patient is steady when ambulating with standby assist and has remained free from falls

## 2021-07-03 NOTE — DISCHARGE PLANNING
Referral sent to 2)[facility]    Agency/Facility Name: [facility]  Spoke To: Renee  Outcome: Renee stated she would check in with the review team to see the status of the referral and f/u with this DPA    Agency/Facility Name: [facility]  Spoke To: Renee  Outcome: Renee states that pt is accepted and HH can see pt 7/5/21

## 2021-07-03 NOTE — PROGRESS NOTES
Patient to discharge lounge. IV removed and discharge instructions given, patient voiced understanding. Patient discharged via wheelchair with [employer] to home.

## 2021-07-03 NOTE — DISCHARGE INSTRUCTIONS
Discharge Instructions    Discharged to home by car with friend. Discharged via wheelchair, hospital escort: Yes.  Special equipment needed: Not Applicable    Be sure to schedule a follow-up appointment with your primary care doctor or any specialists as instructed.     Discharge Plan:        I understand that a diet low in cholesterol, fat, and sodium is recommended for good health. Unless I have been given specific instructions below for another diet, I accept this instruction as my diet prescription.   Other diet: Cardiac    Special Instructions: None    · Is patient discharged on Warfarin / Coumadin?   Yes    You are receiving the drug warfarin. Please understand the importance of monitoring warfarin with scheduled PT/INR blood draws.  [facility]up with [facility] in one week for INR lab..    IMPORTANT: HOW TO USE THIS INFORMATION:  This is a summary and does NOT have all possible information about this product. This information does not assure that this product is safe, effective, or appropriate for you. This information is not individual medical advice and does not substitute for the advice of your health care professional. Always ask your health care professional for complete information about this product and your specific health needs.      WARFARIN - ORAL (WARF-uh-rin)      COMMON BRAND NAME(S): Coumadin      WARNING:  Warfarin can cause very serious (possibly fatal) bleeding. This is more likely to occur when you first start taking this medication or if you take too much warfarin. To decrease your risk for bleeding, your doctor or other health care provider will monitor you closely and check your lab results (INR test) to make sure you are not taking too much warfarin. Keep all medical and laboratory appointments. Tell your doctor right away if you notice any signs of serious bleeding. See also Side Effects section.      USES:  This medication is used to treat blood clots (such as in deep vein  "thrombosis-DVT or pulmonary embolus-PE) and/or to prevent new clots from forming in your body. Preventing harmful blood clots helps to reduce the risk of a stroke or heart attack. Conditions that increase your risk of developing blood clots include a certain type of irregular heart rhythm (atrial fibrillation), heart valve replacement, recent heart attack, and certain surgeries (such as hip/knee replacement). Warfarin is commonly called a \"blood thinner,\" but the more correct term is \"anticoagulant.\" It helps to keep blood flowing smoothly in your body by decreasing the amount of certain substances (clotting proteins) in your blood.      HOW TO USE:  Read the Medication Guide provided by your pharmacist before you start taking warfarin and each time you get a refill. If you have any questions, ask your doctor or pharmacist. Take this medication by mouth with or without food as directed by your doctor or other health care professional, usually once a day. It is very important to take it exactly as directed. Do not increase the dose, take it more frequently, or stop using it unless directed by your doctor. Dosage is based on your medical condition, laboratory tests (such as INR), and response to treatment. Your doctor or other health care provider will monitor you closely while you are taking this medication to determine the right dose for you. Use this medication regularly to get the most benefit from it. To help you remember, take it at the same time each day. It is important to eat a balanced, consistent diet while taking warfarin. Some foods can affect how warfarin works in your body and may affect your treatment and dose. Avoid sudden large increases or decreases in your intake of foods high in vitamin K (such as broccoli, cauliflower, cabbage, brussels sprouts, kale, spinach, and other green leafy vegetables, liver, green tea, certain vitamin supplements). If you are trying to lose weight, check with your doctor " before you try to go on a diet. Cranberry products may also affect how your warfarin works. Limit the amount of cranberry juice (16 ounces/480 milliliters a day) or other cranberry products you may drink or eat.      SIDE EFFECTS:  Nausea, loss of appetite, or stomach/abdominal pain may occur. If any of these effects persist or worsen, tell your doctor or pharmacist promptly. Remember that your doctor has prescribed this medication because he or she has judged that the benefit to you is greater than the risk of side effects. Many people using this medication do not have serious side effects. This medication can cause serious bleeding if it affects your blood clotting proteins too much (shown by unusually high INR lab results). Even if your doctor stops your medication, this risk of bleeding can continue for up to a week. Tell your doctor right away if you have any signs of serious bleeding, including: unusual pain/swelling/discomfort, unusual/easy bruising, prolonged bleeding from cuts or gums, persistent/frequent nosebleeds, unusually heavy/prolonged menstrual flow, pink/dark urine, coughing up blood, vomit that is bloody or looks like coffee grounds, severe headache, dizziness/fainting, unusual or persistent tiredness/weakness, bloody/black/tarry stools, chest pain, shortness of breath, difficulty swallowing. Tell your doctor right away if any of these unlikely but serious side effects occur: persistent nausea/vomiting, severe stomach/abdominal pain, yellowing eyes/skin. This drug rarely has caused very serious (possibly fatal) problems if its effects lead to small blood clots (usually at the beginning of treatment). This can lead to severe skin/tissue damage that may require surgery or amputation if left untreated. Patients with certain blood conditions (protein C or S deficiency) may be at greater risk. Get medical help right away if any of these rare but serious side effects occur: painful/red/purplish patches  on the skin (such as on the toe, breast, abdomen), change in the amount of urine, vision changes, confusion, slurred speech, weakness on one side of the body. A very serious allergic reaction to this drug is rare. However, get medical help right away if you notice any symptoms of a serious allergic reaction, including: rash, itching/swelling (especially of the face/tongue/throat), severe dizziness, trouble breathing. This is not a complete list of possible side effects. If you notice other effects not listed above, contact your doctor or pharmacist. In the [place] - Call your doctor for medical advice about side effects. You may report side effects to FDA at 1-279-QAL-6858. In [place] - Call your doctor for medical advice about side effects. You may report side effects to [facility] [place] at 1-420.189.3937.      PRECAUTIONS:  Before taking warfarin, tell your doctor or pharmacist if you are allergic to it; or if you have any other allergies. This product may contain inactive ingredients, which can cause allergic reactions or other problems. Talk to your pharmacist for more details. Before using this medication, tell your doctor or pharmacist your medical history, especially of: blood disorders (such as anemia, hemophilia), bleeding problems (such as bleeding of the stomach/intestines, bleeding in the brain), blood vessel disorders (such as aneurysms), recent major injury/surgery, liver disease, alcohol use, mental/mood disorders (including memory problems), frequent falls/injuries. It is important that all your doctors and dentists know that you take warfarin. Before having surgery or any medical/dental procedures, tell your doctor or dentist that you are taking this medication and about all the products you use (including prescription drugs, nonprescription drugs, and herbal products). Avoid getting injections into the muscles. If you must have an injection into a muscle (for example, a flu shot), it should be given in the  arm. This way, it will be easier to check for bleeding and/or apply pressure bandages. This medication may cause stomach bleeding. Daily use of alcohol while using this medicine will increase your risk for stomach bleeding and may also affect how this medication works. Limit or avoid alcoholic beverages. If you have not been eating well, if you have an illness or infection that causes fever, vomiting, or diarrhea for more than 2 days, or if you start using any antibiotic medications, contact your doctor or pharmacist immediately because these conditions can affect how warfarin works. This medication can cause heavy bleeding. To lower the chance of getting cut, bruised, or injured, use great caution with sharp objects like safety razors and nail cutters. Use an electric razor when shaving and a soft toothbrush when brushing your teeth. Avoid activities such as contact sports. If you fall or injure yourself, especially if you hit your head, call your doctor immediately. Your doctor may need to check you. The Food & Drug Administration has stated that generic warfarin products are interchangeable. However, consult your doctor or pharmacist before switching warfarin products. Be careful not to take more than one medication that contains warfarin unless specifically directed by the doctor or health care provider who is monitoring your warfarin treatment. Older adults may be at greater risk for bleeding while using this drug. This medication is not recommended for use during pregnancy because of serious (possibly fatal) harm to an unborn baby. Discuss the use of reliable forms of birth control with your doctor. If you become pregnant or think you may be pregnant, tell your doctor immediately. If you are planning pregnancy, discuss a plan for managing your condition with your doctor before you become pregnant. Your doctor may switch the type of medication you use during pregnancy. Very small amounts of this medication may  "pass into breast milk but is unlikely to harm a nursing infant. Consult your doctor before breast-feeding.      DRUG INTERACTIONS:  Drug interactions may change how your medications work or increase your risk for serious side effects. This document does not contain all possible drug interactions. Keep a list of all the products you use (including prescription/nonprescription drugs and herbal products) and share it with your doctor and pharmacist. Do not start, stop, or change the dosage of any medicines without your doctor's approval. Warfarin interacts with many prescription, nonprescription, vitamin, and herbal products. This includes medications that are applied to the skin or inside the vagina or rectum. The interactions with warfarin usually result in an increase or decrease in the \"blood-thinning\" (anticoagulant) effect. Your doctor or other health care professional should closely monitor you to prevent serious bleeding or clotting problems. While taking warfarin, it is very important to tell your doctor or pharmacist of any changes in medications, vitamins, or herbal products that you are taking. Some products that may interact with this drug include: capecitabine, imatinib, mifepristone. Aspirin, aspirin-like drugs (salicylates), and nonsteroidal anti-inflammatory drugs (NSAIDs such as ibuprofen, naproxen, celecoxib) may have effects similar to warfarin. These drugs may increase the risk of bleeding problems if taken during treatment with warfarin. Carefully check all prescription/nonprescription product labels (including drugs applied to the skin such as pain-relieving creams) since the products may contain NSAIDs or salicylates. Talk to your doctor about using a different medication (such as acetaminophen) to treat pain/fever. Low-dose aspirin and related drugs (such as clopidogrel, ticlopidine) should be continued if prescribed by your doctor for specific medical reasons such as heart attack or stroke " prevention. Consult your doctor or pharmacist for more details. Many herbal products interact with warfarin. Tell your doctor before taking any herbal products, especially bromelains, coenzyme Q10, cranberry, danshen, dong quai, fenugreek, garlic, ginkgo biloba, ginseng, and Naselle's wort, among others. This medication may interfere with a certain laboratory test to measure theophylline levels, possibly causing false test results. Make sure laboratory personnel and all your doctors know you use this drug.      OVERDOSE:  If overdose is suspected, contact a poison control center or emergency room immediately. [place] residents can call the [place] Poison Hotline at 1-484.490.7695. [place] residents can call a provincial poison control center. Symptoms of overdose may include: bloody/black/tarry stools, pink/dark urine, unusual/prolonged bleeding.      NOTES:  Do not share this medication with others. Laboratory and/or medical tests (such as INR, complete blood count) must be performed periodically to monitor your progress or check for side effects. Consult your doctor for more details.      MISSED DOSE:  For the best possible benefit, do not miss any doses. If you do miss a dose and remember on the same day, take it as soon as you remember. If you remember on the next day, skip the missed dose and resume your usual dosing schedule. Do not double the dose to catch up because this could increase your risk for bleeding. Keep a record of missed doses to give to your doctor or pharmacist. Contact your doctor or pharmacist if you miss 2 or more doses in a row.      STORAGE:  Store at room temperature away from light and moisture. Do not store in the bathroom. Keep all medications away from children and pets. Do not flush medications down the toilet or pour them into a drain unless instructed to do so. Properly discard this product when it is  or no longer needed. Consult your pharmacist or local waste disposal  company for more details about how to safely discard your product.      MEDICAL ALERT:  Your condition and medication can cause complications in a medical emergency. For information about enrolling in MedicAlert, call 1-254.266.3659 ([place]) or 1-432.522.3341 ([place]).      Information last revised October 2010 Copyright(c) 2010 [employer]           Syncope  Syncope is when you pass out (faint) for a short time. It is caused by a sudden decrease in blood flow to the brain. Signs that you may be about to pass out include:  · Feeling dizzy or light-headed.  · Feeling sick to your stomach (nauseous).  · Seeing all white or all black.  · Having cold, clammy skin.  If you pass out, get help right away. Call your local emergency services (911 in the [place] Do not drive yourself to the hospital.  Follow these instructions at home:  Watch for any changes in your symptoms. Take these actions to stay safe and help with your symptoms:  Lifestyle  · Do not drive, use machinery, or play sports until your doctor says it is okay.  · Do not drink alcohol.  · Do not use any products that contain nicotine or tobacco, such as cigarettes and e-cigarettes. If you need help quitting, ask your doctor.  · Drink enough fluid to keep your pee (urine) pale yellow.  General instructions  · Take over-the-counter and prescription medicines only as told by your doctor.  · If you are taking blood pressure or heart medicine, sit up and stand up slowly. Spend a few minutes getting ready to sit and then stand. This can help you feel less dizzy.  · Have someone stay with you until you feel stable.  · If you start to feel like you might pass out, lie down right away and raise (elevate) your feet above the level of your heart. Breathe deeply and steadily. Wait until all of the symptoms are gone.  · Keep all follow-up visits as told by your doctor. This is important.  Get help right away if:  · You have a very bad headache.  · You pass out once or  more than once.  · You have pain in your chest, belly, or back.  · You have a very fast or uneven heartbeat (palpitations).  · It hurts to breathe.  · You are bleeding from your mouth or your bottom (rectum).  · You have black or tarry poop (stool).  · You have jerky movements that you cannot control (seizure).  · You are confused.  · You have trouble walking.  · You are very weak.  · You have vision problems.  These symptoms may be an emergency. Do not wait to see if the symptoms will go away. Get medical help right away. Call your local emergency services (911 in the [place] Do not drive yourself to the hospital.  Summary  · Syncope is when you pass out (faint) for a short time. It is caused by a sudden decrease in blood flow to the brain.  · Signs that you may be about to faint include feeling dizzy, light-headed, or sick to your stomach, seeing all white or all black, or having cold, clammy skin.  · If you start to feel like you might pass out, lie down right away and raise (elevate) your feet above the level of your heart. Breathe deeply and steadily. Wait until all of the symptoms are gone.  This information is not intended to replace advice given to you by your health care provider. Make sure you discuss any questions you have with your health care provider.  Document Released: 06/05/2009 Document Revised: 01/30/2019 Document Reviewed: 01/30/2019  [employer] Patient Education © 2020 [employer]    Depression / Suicide Risk    As you are discharged from this [facility] facility, it is important to learn how to keep safe from harming yourself.    Recognize the warning signs:  · Abrupt changes in personality, positive or negative- including increase in energy   · Giving away possessions  · Change in eating patterns- significant weight changes-  positive or negative  · Change in sleeping patterns- unable to sleep or sleeping all the time   · Unwillingness or inability to communicate  · Depression  · Unusual  sadness, discouragement and loneliness  · Talk of wanting to die  · Neglect of personal appearance   · Rebelliousness- reckless behavior  · Withdrawal from people/activities they love  · Confusion- inability to concentrate     If you or a loved one observes any of these behaviors or has concerns about self-harm, here's what you can do:  · Talk about it- your feelings and reasons for harming yourself  · Remove any means that you might use to hurt yourself (examples: pills, rope, extension cords, firearm)  · Get professional help from the community (Mental Health, Substance Abuse, psychological counseling)  · Do not be alone:Call your Safe Contact- someone whom you trust who will be there for you.  · Call your local CRISIS HOTLINE 948-2745 or 395-629-1613  · Call your local Children's Mobile Crisis Response Team [place] (725) 254-1770 or www.[employer]  · Call the toll free National Suicide Prevention Hotlines   · National Suicide Prevention Lifeline 528-455-KRKX (8887)  · National Hope Line Network 800-SUICIDE (129-0323)

## 2021-07-03 NOTE — PROGRESS NOTES
Cardiology Follow Up Progress Note    Date of Service  7/3/2021    Attending Physician  Az Erwin M.D.    ZAYDA Velarde is a 61 y.o. female with a past medical history of  multiple medical and cardiovascular problems as listed below who presented 7/1/2021 with ground level fall, dizziness and balance problems.     Interim Events  7/3: No cardiac events.  No cardiac symptoms.    Review of Systems  Review of Systems   Respiratory: Negative for chest tightness and shortness of breath.    Cardiovascular: Negative for palpitations.   Gastrointestinal: Negative for abdominal pain and nausea.   Neurological: Negative for dizziness and headaches.       Vital signs in last 24 hours  Temp:  [35.9 °C (96.7 °F)-36.5 °C (97.7 °F)] 35.9 °C (96.7 °F)  Pulse:  [60-69] 60  Resp:  [15-18] 16  BP: (100-127)/() 107/58  SpO2:  [94 %-98 %] 95 %    Physical Exam  Physical Exam  Constitutional:       General: She is not in acute distress.  HENT:      Head:      Comments: Periorbital ecchymoses left eye.  Bruising right jaw.  Neck:      Thyroid: No thyromegaly.      Vascular: No carotid bruit.      Comments: Normal jugular venous pressure.  Cardiovascular:      Rate and Rhythm: Normal rate and regular rhythm.      Pulses:           Carotid pulses are 1+ on the right side and 1+ on the left side.       Radial pulses are 1+ on the right side and 1+ on the left side.        Posterior tibial pulses are 1+ on the right side and 1+ on the left side.      Heart sounds: S1 normal and S2 normal. No murmur heard.   No friction rub. No gallop.       Comments: Sternal scar.  Pacemaker generator left subclavian area.  Pulmonary:      Effort: Pulmonary effort is normal.      Breath sounds: Normal breath sounds. No wheezing, rhonchi or rales.   Chest:      Comments: Increased AP diameter  Abdominal:      General: There is no abdominal bruit.      Palpations: There is no pulsatile mass.      Comments: Obese   Musculoskeletal:       Comments: Bruising right forearm.   Lymphadenopathy:      Cervical: No cervical adenopathy.   Skin:     General: Skin is warm and dry.      Nails: There is no clubbing.   Neurological:      Mental Status: She is alert and oriented to person, place, and time.   Psychiatric:         Behavior: Behavior normal.         Lab Review  Lab Results   Component Value Date/Time    WBC 4.3 (L) 07/03/2021 12:23 AM    RBC 3.55 (L) 07/03/2021 12:23 AM    HEMOGLOBIN 10.4 (L) 07/03/2021 12:23 AM    HEMATOCRIT 33.5 (L) 07/03/2021 12:23 AM    MCV 94.4 07/03/2021 12:23 AM    MCH 29.3 07/03/2021 12:23 AM    MCHC 31.0 (L) 07/03/2021 12:23 AM    MPV 10.7 07/03/2021 12:23 AM      Lab Results   Component Value Date/Time    SODIUM 130 (L) 07/03/2021 12:23 AM    POTASSIUM 4.6 07/03/2021 12:23 AM    CHLORIDE 93 (L) 07/03/2021 12:23 AM    CO2 30 07/03/2021 12:23 AM    GLUCOSE 104 (H) 07/03/2021 12:23 AM    BUN 13 07/03/2021 12:23 AM    CREATININE 1.11 07/03/2021 12:23 AM    BUNCREATRAT 13 10/09/2015 02:11 PM      Lab Results   Component Value Date/Time    ASTSGOT 24 07/01/2021 01:10 PM    ALTSGPT 14 07/01/2021 01:10 PM     Lab Results   Component Value Date/Time    CHOLSTRLTOT 200 (H) 07/25/2014 01:30 AM    LDL see below 07/25/2014 01:30 AM    HDL 37 (A) 07/25/2014 01:30 AM    TRIGLYCERIDE 574 (H) 07/25/2014 01:30 AM    TROPONINT <6 07/01/2021 06:29 PM       No results for input(s): NTPROBNP in the last 72 hours.    Cardiac Imaging and Procedures Review  EKG:  My personal interpretation of the EKG dated 7/2/2021 is sinus rhythm, rate 62.  IVCD.     Echocardiogram: 3/29/2018  Systolic function is difficult to assess in rapid atrial fibrillation,   but appears normal.  Severely dilated right ventricle.  Reduced right ventricular systolic function.  Mildly dilated left atrium.  Known mitral valve bioprosthesis which is functioning normally.  Estimated right ventricular systolic pressure  is 35 mmHg + JVP.  Compared to previous echocardiogram from  8/22/2016 the rhythm is now rapid atrial fibrillation. Estimated right-sided pressures are lower in this study, and the mitral bioprosthesis continues to function   normally.     Transesophageal echocardiogram 4/2/2018  Low normal LV systolic function, EF 50%.  Moderate RV dilation with reduced systolic function.  Mild biatrial enlargement.  Spontaneous echo contrast seen in the left atrial appendage but no   thrombus.  There is a mitral valve ring in place consistent with history of prior   repair, normal gradients, mild MR.  Compared to prior transthoracic echocardiogram, LVEF appears low   normal. RV still appears somewhat dilated with reduced function. The   mitral valve repair does not appear to be causing any hemodynamic   issues.     Cardiac surgery 11/17/2011  DATE OF OPERATION:  11/17/2011     REFERRING PHYSICIAN:  Wilberto Cheatham MD     PREOPERATIVE DIAGNOSES:  Large left atrial myxoma, mitral regurgitation,  hypertension, chronic back pain, congestive heart failure ([place]  Heart Association Class III), sciatica, depression, history of cocaine  and narcotic abuse, and tobacco abuse.     POSTOPERATIVE DIAGNOSES:  Large left atrial myxoma, mitral  regurgitation, hypertension, chronic back pain, congestive heart failure  ([place] Heart Association Class III), sciatica, depression, history of  cocaine and narcotic abuse, and tobacco abuse.     OPERATION PERFORMED:  Resection of large left atrial myxoma, mitral  valve repair (32-mm C-E flexible annuloplasty band), and intraoperative  transesophageal echocardiography.        Imaging  Chest X-Ray: Not done     MPI 7/25/2014   Normal left ventricular wall motion.  LV ejection fraction = 78%.   ECG Interpretation will be added as an addendum to this                       report.    No evidence of significant jeopardized viable myocardium or prior myocardial    infarction.    Normal left ventricular size, ejection fraction, and wall motion.    No EKG changes     No prior for comparison      Assessment  1.  Mechanical ground-level fall related to balance problems, positional dizziness without loss of consciousness.  2.  PAF  3.  Amiodarone therapy.  4.  Permanent pacemaker.  5.  S/P left myxoma resection mitral valve repair 2011.  6.  Chronic HFpEF, pulmonary hypertension compensated.  7.  Chronic anticoagulation on warfarin.  8.  Hypertension  9.  Dyslipidemia  10.  Morbid obesity  11.  H/O chronic tobacco use.  12.  Chronic balance problems.  13.  Chronic lung disease.     Recommendation Discussion  1.   Currently clinically stable from a cardiac standpoint.  No arrhythmias.  No heart failure.  2.  Pacemaker interrogation shows normal function no recent abnormal arrhythmias.  3.  Clinical presentation noncardiac.  4.  Cardiology will sign off.  5.  Needs intense daily physical rehab and conditioning.    Future Appointments   Date Time Provider Department Center   7/14/2021  1:15 PM Vanessa Hernandez, A.P.N. [facility] None   7/14/2021  2:15 PM V EX[facility] 4 [facility] None   7/19/2021  2:00 PM KI Sorenson.P.RPriscillaN. [facility] None       Thank you for allowing me to participate in the care of this patient.    Please contact me with any questions.    Bay Agee M.D.   Cardiologist, [facility]  [phone]

## 2021-07-03 NOTE — DISCHARGE PLANNING
Late entry; Anticipated Discharge Disposition:Home with HH    Action: Met with pt to discuss HH. [facility].     Barriers to Discharge: none.    Plan: Discharge to home.

## 2021-07-03 NOTE — PROGRESS NOTES
Assumed care of patient at bedside report from NOC RN. Updated on POC. Patient currently A & O x 4 on 3 L O2 NC with complaints of acute pain, see MAR. Call light within reach. Whiteboard updated. Fall precautions in place. Bed locked and in lowest position. All questions answered. No other needs indicated at this time.

## 2021-07-03 NOTE — DISCHARGE SUMMARY
Discharge Summary    CHIEF COMPLAINT ON ADMISSION  Chief Complaint   Patient presents with   • T-5000 GLF     Pt bib EMS for 2x falls. Second fall was at a dental office and the patient hit the right side of her head/face. Pt reports no LOC. Pt is on warfarin for a pacemaker that she received 10-11 years ago. Pt is on 3L NC at baseline at all times. Pt also reports feeling dizzy the last few days.        Reason for Admission  EMS     Admission Date  7/1/2021    CODE STATUS  Full Code    HPI & HOSPITAL COURSE    This Is a 61-year-old female with past medical history of paroxysmal A. fib, atrial myxoma status post surgical removal, status post mitral valve repair, pulmonary hypertension, obstructive sleep apnea on oxygen, sick sinus syndrome status post pacemaker placement presented to the ER with chief complaint of 2 episodes of fall without losing consciousness, associated with dizziness, lightheadedness and sudden positional change.  Patient was noted to have left-sided periorbital ecchymosis and bruising of right jaw.  Her head CT was unremarkable for any intracranial bleed, x-rays is negative for fracture.  Her EKG showed sinus rhythm.  Patient admitted to telemetry floor for fall secondary to possible orthostatic versus dehydration versus drug induced since patient is on long-acting morphine, treated with IV fluid, monitoring and echocardiogram.  Cardiology consulted recommended pacemaker interrogation to rule out presence of a significant arrhythmia.  Patient labs are also significant for mild hyponatremia, improving with IV fluids.  PM interrogation unremarkable, Cardiology cleared , pt evaluated by PT/OT recommended home PT/OT, patient additionally complaints of R ear wax, recommended to follow up with PCP for outpatient ENT and Neurology referral for further ENT work up and neurology assessment for evaluation of causes of recurrent falls. Patient also advised to stop torsemide in view of dehydration and  orthostatic etiology.     Patient further instructed to take atlease 2 minutes before changing position from lying down to standing and to not to rush from bed to avoid orthostatic causes. His Home Health is currently pending and will be arranged by the CM team     Therefore, she is discharged in good and stable condition to home with close outpatient follow-up.    The patient met 2-midnight criteria for an inpatient stay at the time of discharge.    Discharge Date  07/03/21    FOLLOW UP ITEMS POST DISCHARGE  Follow up with PCP for ENT and Neurology referral for recurrent falls    DISCHARGE DIAGNOSES  Principal Problem:    Falls POA: Unknown  Active Problems:    Chronic low back pain POA: Yes    PAF (paroxysmal atrial fibrillation) (CMS-HCC) POA: Yes    Atrial myxoma POA: Yes      Overview: November 2011: Status post LA myxoma resection, along with MV repair by       Dr. Moctezuma.    Essential hypertension (Chronic) POA: Yes    Chronic respiratory failure with hypoxia (HCC) (Chronic) POA: Yes    Hyponatremia POA: Unknown    QT prolongation POA: Unknown  Resolved Problems:    * No resolved hospital problems. *      FOLLOW UP  Future Appointments   Date Time Provider Department Center   7/14/2021  1:15 PM SARA CantuPSARITA [facility] None   7/14/2021  2:15 PM [facility] EXAM 4 [facility] None   7/19/2021  2:00 PM MELLY Sorenson [facility] None     Pema Peña M.D.  [address] [facility] [address]  [phone]            MEDICATIONS ON DISCHARGE     Medication List      CONTINUE taking these medications      Instructions   albuterol 108 (90 Base) MCG/ACT Aers inhalation aerosol   Inhale 2 Puffs every four hours as needed for Shortness of Breath.  Dose: 2 Puff     amiodarone 200 MG Tabs  Commonly known as: Cordarone   Take 1 Tab by mouth every day.  Dose: 200 mg     ARIPiprazole 10 MG Tabs  Commonly known as: Abilify   Take 10 mg by mouth every day.  Dose: 10 mg     atorvastatin 20 MG Tabs  Commonly known as: LIPITOR    Take 20 mg by mouth every evening.  Dose: 20 mg     chlorhexidine 0.12 % Soln  Commonly known as: PERIDEX   Take 15 mL by mouth every day. Swish and spit  Dose: 15 mL     HYDROcodone/acetaminophen  MG Tabs  Commonly known as: NORCO   Take 1 tablet by mouth every day.  Dose: 1 tablet     Lyrica 200 MG capsule  Generic drug: pregabalin   Take 200 mg by mouth 3 times a day.  Dose: 200 mg     methocarbamol 750 MG Tabs  Commonly known as: ROBAXIN   Take 750 mg by mouth 3 times a day as needed (Muscle pain).  Dose: 750 mg     morphine SR 15 MG Tb12  Commonly known as: MS CONTIN   Take 1 Tab by mouth 3 times a day.  Dose: 15 mg     oxyCODONE immediate release 10 MG immediate release tablet  Commonly known as: ROXICODONE   Take 10 mg by mouth 3 times a day. TID with MS Contin  Dose: 10 mg     potassium chloride SA 20 MEQ Tbcr  Commonly known as: Kdur   Take 1 Tab by mouth every day.  Dose: 20 mEq     traZODone 100 MG Tabs  Commonly known as: DESYREL   Take 100 mg by mouth every evening.  Dose: 100 mg     * venlafaxine XR 75 MG Cp24  Commonly known as: EFFEXOR XR   Take 75 mg by mouth every day.  Dose: 75 mg     * venlafaxine 150 MG extended-release capsule  Commonly known as: EFFEXOR-XR   Take 150 mg by mouth every evening.  Dose: 150 mg     warfarin 5 MG Tabs  Commonly known as: COUMADIN   Take 2.5-5 mg by mouth every day. Tuesday = 2.5 mg    5 mg on every other day.  Dose: 2.5-5 mg         * This list has 2 medication(s) that are the same as other medications prescribed for you. Read the directions carefully, and ask your doctor or other care provider to review them with you.            STOP taking these medications    clindamycin 150 MG Caps  Commonly known as: CLEOCIN     torsemide 20 MG Tabs  Commonly known as: DEMADEX            Allergies  Allergies   Allergen Reactions   • Penicillins Rash             DIET  Orders Placed This Encounter   Procedures   • Diet Order Diet: Cardiac     Standing Status:   Standing      Number of Occurrences:   1     Order Specific Question:   Diet:     Answer:   Cardiac [6]       ACTIVITY  As tolerated.  Weight bearing as tolerated    CONSULTATIONS  Cardiology    PROCEDURES  PM interrogation    LABORATORY  Lab Results   Component Value Date    SODIUM 130 (L) 07/03/2021    POTASSIUM 4.6 07/03/2021    CHLORIDE 93 (L) 07/03/2021    CO2 30 07/03/2021    GLUCOSE 104 (H) 07/03/2021    BUN 13 07/03/2021    CREATININE 1.11 07/03/2021        Lab Results   Component Value Date    WBC 4.3 (L) 07/03/2021    HEMOGLOBIN 10.4 (L) 07/03/2021    HEMATOCRIT 33.5 (L) 07/03/2021    PLATELETCT 134 (L) 07/03/2021        Total time of the discharge process exceeds 32 minutes.

## 2021-07-03 NOTE — CARE PLAN
Problem: Fall Risk  Goal: Patient will remain free from falls  Outcome: Progressing     Problem: Pain - Standard  Goal: Alleviation of pain or a reduction in pain to the patient’s comfort goal  Outcome: Progressing     The patient is Stable - Low risk of patient condition declining or worsening    Shift Goals  Clinical Goals: Pain control  Patient Goals: Pain control  Family Goals: N/A    Progress made toward(s) clinical / shift goals:  Fall precautions in place. Bed in lowest position. Non-skid socks in place. Personal belongings within reach. Mobility sign on door. Bed-alarm on. Call light within reach. Pt educated regarding fall prevention and verbalized understanding. Patient educated on pain medications available and on the difference between extended release and immediate release. Patient educated on IV toradol and on other methods of pain relief like hot or cold packs. Patient verbalized understanding.

## 2021-07-03 NOTE — FACE TO FACE
Face to Face Supporting Documentation - Home Health    The encounter with this patient was in whole or in part the primary reason for home health admission.    Date of encounter:   Patient:                    MRN:                       YOB: 2020  Kailey Velarde  [id number]  1960     Home health to see patient for:  Skilled Nursing care for assessment, interventions & education, Physical Therapy evaluation and treatment and Occupational therapy evaluation and treatment    Skilled need for:  Comment: falls with loss of balance and unsteadiness    Skilled nursing interventions to include:  Comment: Balance training and gait training    Homebound status evidenced by:  Needs the assistance of another person in order to leave the home. Leaving home requires a considerable and taxing effort. There is a normal inability to leave the home.    Community Physician to provide follow up care: Pema Peña M.D.     Optional Interventions? No      I certify the face to face encounter for this home health care referral meets the CMS requirements and the encounter/clinical assessment with the patient was, in whole, or in part, for the medical condition(s) listed above, which is the primary reason for home health care. Based on my clinical findings: the service(s) are medically necessary, support the need for home health care, and the homebound criteria are met.  I certify that this patient has had a face to face encounter by myself.  Az Erwin M.D. - NPI: [id number]

## 2021-07-14 ENCOUNTER — ANTICOAGULATION VISIT (OUTPATIENT)
Dept: VASCULAR LAB | Facility: MEDICAL CENTER | Age: 61
End: 2021-07-14
Attending: INTERNAL MEDICINE
Payer: MEDICARE

## 2021-07-14 ENCOUNTER — OFFICE VISIT (OUTPATIENT)
Dept: CARDIOLOGY | Facility: MEDICAL CENTER | Age: 61
End: 2021-07-14
Payer: MEDICARE

## 2021-07-14 VITALS
HEIGHT: 68 IN | RESPIRATION RATE: 16 BRPM | BODY MASS INDEX: 44.41 KG/M2 | DIASTOLIC BLOOD PRESSURE: 70 MMHG | HEART RATE: 75 BPM | OXYGEN SATURATION: 94 % | WEIGHT: 293 LBS | SYSTOLIC BLOOD PRESSURE: 110 MMHG

## 2021-07-14 DIAGNOSIS — I50.30 HEART FAILURE WITH PRESERVED EJECTION FRACTION, BORDERLINE, CLASS II (HCC): ICD-10-CM

## 2021-07-14 DIAGNOSIS — I50.812 CHRONIC RIGHT-SIDED HEART FAILURE (HCC): Chronic | ICD-10-CM

## 2021-07-14 DIAGNOSIS — Z98.890 STATUS POST MITRAL VALVE REPAIR: ICD-10-CM

## 2021-07-14 DIAGNOSIS — I10 ESSENTIAL HYPERTENSION: Chronic | ICD-10-CM

## 2021-07-14 DIAGNOSIS — Z79.899 HIGH RISK MEDICATION USE: ICD-10-CM

## 2021-07-14 DIAGNOSIS — I48.0 PAF (PAROXYSMAL ATRIAL FIBRILLATION) (HCC): ICD-10-CM

## 2021-07-14 DIAGNOSIS — Z95.0 PACEMAKER: ICD-10-CM

## 2021-07-14 DIAGNOSIS — I49.5 SICK SINUS SYNDROME (HCC): ICD-10-CM

## 2021-07-14 DIAGNOSIS — I48.4 ATYPICAL ATRIAL FLUTTER (HCC): ICD-10-CM

## 2021-07-14 LAB
INR BLD: 2 (ref 0.9–1.2)
INR PPP: 2 (ref 2–3.5)

## 2021-07-14 PROCEDURE — 99211 OFF/OP EST MAY X REQ PHY/QHP: CPT

## 2021-07-14 PROCEDURE — 85610 PROTHROMBIN TIME: CPT

## 2021-07-14 PROCEDURE — 99214 OFFICE O/P EST MOD 30 MIN: CPT | Performed by: NURSE PRACTITIONER

## 2021-07-14 ASSESSMENT — ENCOUNTER SYMPTOMS
WHEEZING: 0
HEMOPTYSIS: 0
SPUTUM PRODUCTION: 0
NAUSEA: 0
FEVER: 0
PALPITATIONS: 0
CLAUDICATION: 0
ORTHOPNEA: 0
DIZZINESS: 0
COUGH: 0
HEADACHES: 0
CHILLS: 0
PND: 0
SHORTNESS OF BREATH: 0
VOMITING: 0

## 2021-07-14 ASSESSMENT — FIBROSIS 4 INDEX: FIB4 SCORE: 2.87

## 2021-07-14 NOTE — PROGRESS NOTES
Chief Complaint   Patient presents with   • Palpitations   • Atrial Fibrillation     F/V Dx: PAF (paroxysmal atrial fibrillation) (CMS-HCC)   • Long QT Syndrome     F/V Dx: QT prolongation       Subjective:   Melanie Velarde is a 61 y.o. female who presents today for very complex heart disease.  Patient was last seen 7/21/2020 by Dr. Bojorquez.    Since 7/21/2020, patient has been hospitalized July 1-3, 2021 for ground-level fall, dizziness, and balance problems.     She has chronic diastolic congestive heart failure, prior mitral valve repair, paroxysmal atrial fibrillation/flutter and was cardioverted during the hospital stay in April 2018 after initiation of amiodarone therapy, JULY at that time revealed smoking left atrial appendage. She is on warfarin therapy, CHADS2 vasc of 4, no prior TIA/CVA.     She has had a pacemaker for sick sinus syndrome.     A paced 42%, V paced 1 %.  Feels heart racing at times.  Sometimes dizzy, no syncope.     We have been performing amiodarone surveillance.    Her most recent LFTs were up but she has been a heavy drinker. Still drinking.   She went into inpt rehab, just got out less than a week ago and continues to be sober.  Fatty liver by CT scan 3-28-18, mildly abnormal LFTs, etoh, fatty liver, amio.  LFTs normal on 4/3/2018 abnormal 8/2018 TSH was less than 0.005 on 3/29/2018. PFTs pending.     She has secondary pulmonary hypertension with dilated RV and RV dysfunction.   She had undergone right heart catheterization in March 2018 which showed an elevated wedge of 29 and normal cardiac output and pulmonary pressure of 41/25 there is a large V wave but she did not have significant MR on JULY.       Calcified coronary disease CT 3-28-18, warfarin, statin  Has HLP, on statin, no recent lipids.     She continues to have dyspnea on exertion.   Sees Pulm, now on O2 which she needs but not wearing it today.  Has KAEL but cannot tolerate the first 2 masks, cannot pay for the  third.  No concerns up to this point about amio toxicity.     She has [place] heart class III symptoms.   Weight has been up and and down.  Taking lasix PRN.     She has chronic pain syndrome.    She takes Abilify which can provoke tachyarrhythmias.      She has had dietary indiscretion with salt while in rehab and drinking more water to replace ETOH.    She quit smoking, now vapes.     No chest pain.  Has chronic right chest wall pain.     She had her device interrogated today which showed she has been in sinus rhythm since her cardioversion.     Blood work done at lab core reveals LFTs still up a bit and for some reason no TSH.    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   • Status post mitral valve repair November 2011    MV repair with 32mm Jonnie-Carpenter flexible annuloplasty ring.     Past Surgical History:   Procedure Laterality Date   • CARDIAC CATH, RIGHT HEART  03/31/2018    Right heart cath with high pressures.   • MITRAL VALVE REPAIR  11/17/2011    Performed by MARY ORTIZ at [facility]   • PACEMAKER INSERTION  November 2011    Medtronic Versa [id number] implanted by Dr. Amaya.   • OTHER ORTHOPEDIC SURGERY      Neck and back      Family History   Problem Relation Age of Onset   • Heart Attack Father      Social History     Socioeconomic History   • Marital status: Single     Spouse name: Not on file   • Number of children: Not on file   • Years of education: Not on file   • Highest education level: Not on file   Occupational History   • Not on file   Tobacco Use   • Smoking status: Former Smoker     Packs/day: 1.00     Years: 39.00      Pack years: 39.00     Types: Cigarettes     Quit date: 11/4/2017     Years since quitting: 3.6   • Smokeless tobacco: Never Used   • Tobacco comment: 4/12 Down to vapping now.    Vaping Use   • Vaping Use: Every day   • Start date: 11/4/2017   Substance and Sexual Activity   • Alcohol use: Yes     Comment: rarely   • Drug use: No     Types: Inhaled, Oral     Comment: hx of, denies currently   • Sexual activity: Not on file   Other Topics Concern   • Not on file   Social History Narrative   • Not on file     Social Determinants of Health     Financial Resource Strain:    • Difficulty of Paying Living Expenses:    Food Insecurity:    • Worried About Running Out of Food in the Last Year:    • Ran Out of Food in the Last Year:    Transportation Needs:    • Lack of Transportation (Medical):    • Lack of Transportation (Non-Medical):    Physical Activity:    • Days of Exercise per Week:    • Minutes of Exercise per Session:    Stress:    • Feeling of Stress :    Social Connections:    • Frequency of Communication with Friends and Family:    • Frequency of Social Gatherings with Friends and Family:    • Attends Alev[employer] Services:    • Active Member of Clubs or Organizations:    • Attends Club or Organization Meetings:    • Marital Status:    Intimate Partner Violence:    • Fear of Current or Ex-Partner:    • Emotionally Abused:    • Physically Abused:    • Sexually Abused:      Allergies   Allergen Reactions   • Penicillins Rash           Outpatient Encounter Medications as of 7/14/2021   Medication Sig Dispense Refill   • chlorhexidine (PERIDEX) 0.12 % Solution Take 15 mL by mouth every day. Swish and spit     • venlafaxine (EFFEXOR-XR) 150 MG extended-release capsule Take 150 mg by mouth every evening.     • atorvastatin (LIPITOR) 20 MG Tab Take 20 mg by mouth every evening.     • albuterol 108 (90 Base) MCG/ACT Aero Soln inhalation aerosol Inhale 2 Puffs every four hours as needed for Shortness of Breath.     •  "warfarin (COUMADIN) 5 MG Tab Take 2.5-5 mg by mouth every day. Tuesday = 2.5 mg    5 mg on every other day.     • HYDROcodone/acetaminophen (NORCO)  MG Tab Take 1 tablet by mouth every day.     • amiodarone (CORDARONE) 200 MG Tab Take 1 Tab by mouth every day. 90 Tab 3   • methocarbamol (ROBAXIN) 750 MG Tab Take 750 mg by mouth 3 times a day as needed (Muscle pain).     • ARIPiprazole (ABILIFY) 10 MG Tab Take 10 mg by mouth every day.     • potassium chloride SA (KDUR) 20 MEQ Tab CR Take 1 Tab by mouth every day. 100 Tab 3   • venlafaxine XR (EFFEXOR XR) 75 MG CAPSULE SR 24 HR Take 75 mg by mouth every day.     • oxyCODONE immediate release (ROXICODONE) 10 MG immediate release tablet Take 10 mg by mouth 3 times a day. TID with MS Contin     • pregabalin (LYRICA) 200 MG capsule Take 200 mg by mouth 3 times a day.     • morphine SR (MS CONTIN) 15 MG TB12 Take 1 Tab by mouth 3 times a day. 60 Tab 0   • traZODone (DESYREL) 100 MG Tab Take 100 mg by mouth every evening. (Patient not taking: Reported on 7/14/2021)       No facility-administered encounter medications on file as of 7/14/2021.     Review of Systems   Constitutional: Negative for chills and fever.   Respiratory: Negative for cough, hemoptysis, sputum production, shortness of breath and wheezing.    Cardiovascular: Negative for chest pain, palpitations, orthopnea, claudication, leg swelling and PND.   Gastrointestinal: Negative for nausea and vomiting.   Neurological: Negative for dizziness and headaches.   All other systems reviewed and are negative.       Objective:   /70 (BP Location: Right arm, Patient Position: Sitting, BP Cuff Size: Adult)   Pulse 75   Resp 16   Ht 1.727 m (5' 8\")   Wt (!) 134 kg (295 lb 4.8 oz)   LMP 01/01/2010   SpO2 94%   BMI 44.90 kg/m²     Physical Exam   Constitutional: She appears well-developed.   Neck: No JVD present.   Cardiovascular: Normal rate, regular rhythm and normal heart sounds.   No murmur " heard.  Pulmonary/Chest: Effort normal and breath sounds normal.   Abdominal: Soft.   Musculoskeletal:      Cervical back: Neck supple.   Neurological:   Cranial nerves II-XII WNL   Skin: Skin is warm and dry.   Psychiatric: Her behavior is normal. Judgment and thought content normal.   Nursing note and vitals reviewed.    EKG:  My personal interpretation of the EKG dated 7/2/2021 is sinus rhythm, rate 62.  IVCD.     Echocardiogram: 3/29/2018  Systolic function is difficult to assess in rapid atrial fibrillation,   but appears normal.  Severely dilated right ventricle.  Reduced right ventricular systolic function.  Mildly dilated left atrium.  Known mitral valve bioprosthesis which is functioning normally.  Estimated right ventricular systolic pressure  is 35 mmHg + JVP.  Compared to previous echocardiogram from 8/22/2016 the rhythm is now rapid atrial fibrillation. Estimated right-sided pressures are lower in this study, and the mitral bioprosthesis continues to function   normally.     Transesophageal echocardiogram 4/2/2018  Low normal LV systolic function, EF 50%.  Moderate RV dilation with reduced systolic function.  Mild biatrial enlargement.  Spontaneous echo contrast seen in the left atrial appendage but no   thrombus.  There is a mitral valve ring in place consistent with history of prior   repair, normal gradients, mild MR.  Compared to prior transthoracic echocardiogram, LVEF appears low   normal. RV still appears somewhat dilated with reduced function. The   mitral valve repair does not appear to be causing any hemodynamic   issues.     Cardiac surgery 11/17/2011  DATE OF OPERATION:  11/17/2011     REFERRING PHYSICIAN:  Wilberto Cheatham MD  PREOPERATIVE DIAGNOSES:  Large left atrial myxoma, mitral regurgitation,  hypertension, chronic back pain, congestive heart failure ([place]  Heart Association Class III), sciatica, depression, history of cocaine  and narcotic abuse, and tobacco  abuse.  POSTOPERATIVE DIAGNOSES:  Large left atrial myxoma, mitral  regurgitation, hypertension, chronic back pain, congestive heart failure  ([place] Heart Association Class III), sciatica, depression, history of  cocaine and narcotic abuse, and tobacco abuse.  OPERATION PERFORMED:  Resection of large left atrial myxoma, mitral  valve repair (32-mm C-E flexible annuloplasty band), and intraoperative  transesophageal echocardiography.        Imaging  Chest X-Ray: Not done     MPI 7/25/2014   Normal left ventricular wall motion.  LV ejection fraction = 78%.   ECG Interpretation will be added as an addendum to this                       report.    No evidence of significant jeopardized viable myocardium or prior myocardial    infarction.    Normal left ventricular size, ejection fraction, and wall motion.    No EKG changes    No prior for comparison     Assessment:     1. Chronic right-sided heart failure (HCC)     2. Heart failure with preserved ejection fraction, borderline, class II (HCC)     3. High risk medication use     4. Essential hypertension     5. Sick sinus syndrome (HCC)     6. PAF (paroxysmal atrial fibrillation) (CMS-HCC)     7. Atypical atrial flutter (HCC)     8. Pacemaker     9. Status post mitral valve repair         Medical Decision Making:  Today's Assessment / Status / Plan:   We will make no changes at this time.  Patient will continue with amiodarone 200 mg daily, atorvastatin 20 mg nightly, and warfarin as needed.    Patient will follow-up in approximately 6 months.

## 2021-07-14 NOTE — PROGRESS NOTES
Anticoagulation Summary  As of 2021    INR goal:  2.0-3.0   TTR:  40.6 % (3.2 y)   INR used for dosin.00 (2021)   Warfarin maintenance plan:  2.5 mg (5 mg x 0.5) every Tue; 5 mg (5 mg x 1) all other days   Weekly warfarin total:  32.5 mg   Plan last modified:  Brittnee Cisneros APriscillaPPriscillaNPriscilla (2021)   Next INR check:  2021   Target end date:  Indefinite    Indications    PAF (paroxysmal atrial fibrillation) (CMS-HCC) [I48.0]  Long term current use of anticoagulant therapy (Resolved) [Z79.01]  Status post mitral valve repair [Z98.890]             Anticoagulation Episode Summary     INR check location:      Preferred lab:      Send INR reminders to:      Comments:  Right arm only for BP checks      Anticoagulation Care Providers     Provider Role Specialty Phone number    [facility] Responsible  [phone]                Anticoagulation Patient Findings  Patient Findings     Positives:  Hospital admission (sustained a GLF and was hospitalized)    Negatives:  Signs/symptoms of thrombosis, Signs/symptoms of bleeding, Laboratory test error suspected, Change in health, Change in alcohol use, Change in activity, Upcoming invasive procedure, Emergency department visit, Upcoming dental procedure, Missed doses, Extra doses, Change in medications, Change in diet/appetite, Bruising, Other complaints          HPI:  Kailey Velarde seen in clinic today, on anticoagulation therapy with warfarin for atrial fibrillation  Changes to current medical/health status since last appt: see above  Denies signs/symptoms of bleeding and/or thrombosis since the last appt.    Denies any interval changes to diet  Denies any interval changes to medications since last appt.   Denies any complications or cost restrictions with current therapy.   Verified current warfarin dosing schedule.   BP done during cardiology visit  Medications reconciled     A/P   INR  therapeutic.   Pt is to continue with current  warfarin dosing regimen.    Follow up appointment in 3 week(s).    Aditi Gregory, PharmD

## 2021-07-19 ENCOUNTER — APPOINTMENT (OUTPATIENT)
Dept: SLEEP MEDICINE | Facility: MEDICAL CENTER | Age: 61
End: 2021-07-19
Payer: MEDICARE

## 2021-07-20 ENCOUNTER — SLEEP CENTER VISIT (OUTPATIENT)
Dept: SLEEP MEDICINE | Facility: MEDICAL CENTER | Age: 61
End: 2021-07-20
Payer: MEDICARE

## 2021-07-20 VITALS
HEART RATE: 81 BPM | OXYGEN SATURATION: 94 % | SYSTOLIC BLOOD PRESSURE: 126 MMHG | HEIGHT: 68 IN | DIASTOLIC BLOOD PRESSURE: 68 MMHG | WEIGHT: 293 LBS | BODY MASS INDEX: 44.41 KG/M2

## 2021-07-20 DIAGNOSIS — I27.20 PULMONARY HYPERTENSION (HCC): Chronic | ICD-10-CM

## 2021-07-20 DIAGNOSIS — I10 ESSENTIAL HYPERTENSION: Chronic | ICD-10-CM

## 2021-07-20 DIAGNOSIS — J96.11 CHRONIC RESPIRATORY FAILURE WITH HYPOXIA (HCC): Chronic | ICD-10-CM

## 2021-07-20 DIAGNOSIS — I51.89 LEFT VENTRICULAR DIASTOLIC DYSFUNCTION, NYHA CLASS 3: ICD-10-CM

## 2021-07-20 DIAGNOSIS — Z79.891 CHRONIC PRESCRIPTION OPIATE USE: ICD-10-CM

## 2021-07-20 DIAGNOSIS — Z95.0 PACEMAKER: ICD-10-CM

## 2021-07-20 DIAGNOSIS — I50.812 CHRONIC RIGHT-SIDED HEART FAILURE (HCC): Chronic | ICD-10-CM

## 2021-07-20 DIAGNOSIS — F51.04 CHRONIC INSOMNIA: ICD-10-CM

## 2021-07-20 DIAGNOSIS — G47.31 CENTRAL SLEEP APNEA: ICD-10-CM

## 2021-07-20 PROCEDURE — 99214 OFFICE O/P EST MOD 30 MIN: CPT | Performed by: NURSE PRACTITIONER

## 2021-07-20 ASSESSMENT — FIBROSIS 4 INDEX: FIB4 SCORE: 2.87

## 2021-07-20 NOTE — PROGRESS NOTES
"Chief Complaint   Patient presents with   • Follow-Up     Complex Sleep Apnea- Last seen 04/15/2021       HPI:      Mrs. Velarde is a 60 y/o female patient who is in today for CSA f/u. PMH includes chronic right-sided heart failure, paroxysmal atrial fibrillation, status post mitral valve repair, pulmonary hypertension, hypertension, obesity with BMI 45, former smoker quitting in 2017.    Patient was set up with a [employer] BiPAP ST on 5/10/2021. Compliance report from 4/22/21-7/20/21 was downloaded and reviewed with the patient which showed BiPAP ST IPAP/EPAP 17/15 cmH2O with 3L O2 bleed in, 17% compliance, 1 hrs 15 min use (0% compliance), AHI of 0.4. She is not tolerating the pressure and mask well.  She would like to switch over to the Mirtha view mask and also decrease the pressure slightly.  She has struggled with try to keep the mask on because it feels \"intrusive\" and she also does not tolerate the pressure.  She has trouble meeting the compliance because she is not able to tolerate the device.  Her sleep is significantly disrupted and she is ready to give up using the machine in order to get a good night of sleep.  She goes to bed at 11 pm and wakes up at 7 am.  She has trouble with staying asleep and wakes up through the night. She is utilizing trazodone 100 mg  1 tab nightly to help with sleep as prescribed by her psychiatrist.  She decreased the trazodone from 3 tabs to 1 and is now also utilizing over-the-counter melatonin 5 mg 3 tabs nightly as needed insomnia.  She denies morning headache or snoring.  She utilizes O2 at 3 L 24/7 and recently increased it to 3 from 2 because she feels better.  She continues to follow-up with the pain clinic for chronic pain which is managed with MS Contin ER 15 mg 3 times daily, oxycodone 10 mg 3 times daily and methocarbamol 750 mg nightly however she uses methocarbamol very sparingly.  She continues to follow-up with cardiology Dr. Lawson and continues to take Coumadin 5 " mg daily.    Sleep Study History:   PSG titration study from 4/8/21 indicated central sleep apnea. Sleep related hypoxia. The PAP titration was initiated with BiPAP ST 14/10 cm of water 10 BPM  and the pressure which was slowly titrated up in an attempt to eliminate sleep disordered breathing and snoring. The final pressure tested during the study was BiPAP 21/17 cm water and 16 BPM. Lowest bedt tolerated pressure was BiPAP ST 19/14 cm 16 BPM and O2 bleed in.A final pressure the patient was observed in the supine position  and in the REM sleep stage. The apnea hypopnea index improved to 2.1 per hour and O2 yogesh 85%.  She spent 256 min of sleep time below 89% O2 saturation. Snoring was resolved.     PSG titration from 2/23/21 indicated severe complex sleep apnea with AHI of 139.7/hr and O2 yogesh 73 %. Due to severity of the disease she met the split study protocol. The titration started with CPAP 5 cm and the highest tested pressure was BiPAP ST 17/12  Cm back up rate 14. The AHI was 122/hr. Sleep related hypoxia.     PSG titration from 5/16/20 indicated Complex sleep apnea. Sleep related hypoxia.  The PAP titration was initiated with BiPAP 9/5 cm of water and the pressure which was slowly titrated up in an attempt to eliminate sleep disordered breathing and snoring. The final pressure tested during the study was BiPAP 22/17 cm water. It was an incomplete titration, the best tolerated pressurse was BiPAP 18/14cm. At this pressure the patient was observed in the supine in the REM sleep stage. The apnea hypopnea index improved to 14.1 per hour and O2 yogesh 83%. The average O2 stauration was 88%. She spent 89 min of sleep time below 89% O2 saturation.     PSG study from 11/20/19 indicated severe central with AHI of 146/hr and O2 yogesh 76 %. 90% of the apneas were central apneas. Severe sleep hypoxia. Time spent with oxygen saturations below 89% was 264.1 minutes.     Echo from 3/29/18 indicated Systolic function is  difficult to assess in rapid atrial fibrillation, but appears normal. Severely dilated right ventricle. Reduced right ventricular systolic function. Mildly dilated left atrium. Known mitral valve bioprosthesis which is functioning normally. Estimated right ventricular systolic pressure is 35 mmHg + JVP. Compared to previous echocardiogram from 8/22/2016 the rhythm is now  rapid atrial fibrillation. Estimated right-sided pressures are lower in this study, and the mitral bioprosthesis continues to function normally.    ROS:    Constitutional: Denies fevers, Denies weight changes  Eyes: Denies changes in vision, no eye pain  Ears/Nose/Throat/Mouth: Denies nasal congestion or sore throat   Cardiovascular: Denies chest pain or palpitations   Respiratory: Denies shortness of breath , Denies cough  Gastrointestinal/Hepatic: Denies abdominal pain, nausea, vomiting,   Skin/Breast: Denies rash,   Neurological: Denies headache, confusion,   Psychiatric: denies mood disorder   Sleep: denies snoring       Past Medical History:   Diagnosis Date   • Atrial myxoma November 2011    Status post LA myxoma resection   • Backpain    • CAD (coronary artery disease)     pacemaker   • Chronic pain    • Dizziness     • Hyperlipidemia     • Hypertension    • Hypertriglyceridemia     • Indigestion    • Peripheral edema    • Sciatica    • Sick sinus syndrome (HCC) November 2011    Status post PPM implantation.   • Status post mitral valve repair November 2011    MV repair with 32mm Jonnie-Carpenter flexible annuloplasty ring.       Past Surgical History:   Procedure Laterality Date   • CARDIAC CATH, RIGHT HEART  03/31/2018    Right heart cath with high pressures.   • MITRAL VALVE REPAIR  11/17/2011    Performed by MARY [facility]Z at [facility]   • PACEMAKER INSERTION  November 2011    Medtronic Versa [id number] implanted by Dr. Amaya.   • OTHER ORTHOPEDIC SURGERY      Neck and back        Family History   Problem Relation Age of  Onset   • Heart Attack Father        Social History     Socioeconomic History   • Marital status: Single     Spouse name: Not on file   • Number of children: Not on file   • Years of education: Not on file   • Highest education level: Not on file   Occupational History   • Not on file   Tobacco Use   • Smoking status: Former Smoker     Packs/day: 1.00     Years: 39.00     Pack years: 39.00     Types: Cigarettes     Quit date: 11/4/2017     Years since quitting: 3.7   • Smokeless tobacco: Never Used   • Tobacco comment: 4/12 Down to vapping now.    Vaping Use   • Vaping Use: Every day   • Start date: 11/4/2017   Substance and Sexual Activity   • Alcohol use: Yes     Comment: rarely   • Drug use: No     Types: Inhaled, Oral     Comment: hx of, denies currently   • Sexual activity: Not on file   Other Topics Concern   • Not on file   Social History Narrative   • Not on file     Social Determinants of Health     Financial Resource Strain:    • Difficulty of Paying Living Expenses:    Food Insecurity:    • Worried About Running Out of Food in the Last Year:    • Ran Out of Food in the Last Year:    Transportation Needs:    • Lack of Transportation (Medical):    • Lack of Transportation (Non-Medical):    Physical Activity:    • Days of Exercise per Week:    • Minutes of Exercise per Session:    Stress:    • Feeling of Stress :    Social Connections:    • Frequency of Communication with Friends and Family:    • Frequency of Social Gatherings with Friends and Family:    • Attends Synagogue Services:    • Active Member of Clubs or Organizations:    • Attends Club or Organization Meetings:    • Marital Status:    Intimate Partner Violence:    • Fear of Current or Ex-Partner:    • Emotionally Abused:    • Physically Abused:    • Sexually Abused:        Allergies as of 07/20/2021 - Reviewed 07/20/2021   Allergen Reaction Noted   • Penicillins Rash 06/07/2009        Vitals:  Vitals:    07/20/21 1427   BP: 126/68   Pulse: 81    SpO2: 94%       Current medications as of today   Current Outpatient Medications   Medication Sig Dispense Refill   • chlorhexidine (PERIDEX) 0.12 % Solution Take 15 mL by mouth every day. Swish and spit     • venlafaxine (EFFEXOR-XR) 150 MG extended-release capsule Take 150 mg by mouth every evening.     • atorvastatin (LIPITOR) 20 MG Tab Take 20 mg by mouth every evening.     • albuterol 108 (90 Base) MCG/ACT Aero Soln inhalation aerosol Inhale 2 Puffs every four hours as needed for Shortness of Breath.     • warfarin (COUMADIN) 5 MG Tab Take 2.5-5 mg by mouth every day. Tuesday = 2.5 mg    5 mg on every other day.     • HYDROcodone/acetaminophen (NORCO)  MG Tab Take 1 tablet by mouth every day.     • amiodarone (CORDARONE) 200 MG Tab Take 1 Tab by mouth every day. 90 Tab 3   • methocarbamol (ROBAXIN) 750 MG Tab Take 750 mg by mouth 3 times a day as needed (Muscle pain).     • ARIPiprazole (ABILIFY) 10 MG Tab Take 10 mg by mouth every day.     • potassium chloride SA (KDUR) 20 MEQ Tab CR Take 1 Tab by mouth every day. 100 Tab 3   • venlafaxine XR (EFFEXOR XR) 75 MG CAPSULE SR 24 HR Take 75 mg by mouth every day.     • traZODone (DESYREL) 100 MG Tab Take 100 mg by mouth every evening.     • oxyCODONE immediate release (ROXICODONE) 10 MG immediate release tablet Take 10 mg by mouth 3 times a day. TID with MS Contin     • pregabalin (LYRICA) 200 MG capsule Take 200 mg by mouth 3 times a day.     • morphine SR (MS CONTIN) 15 MG TB12 Take 1 Tab by mouth 3 times a day. 60 Tab 0     No current facility-administered medications for this visit.         Physical Exam: Limited by COVID-19 precautions.  Appearance: Well developed, well nourished, no acute distress  Eyes: PERRL, EOM intact, sclera white, conjunctiva moist  Ears: no lesions or deformities  Hearing: grossly intact  Nose: no lesions or deformities  Respiratory effort: no intercostal retractions or use of accessory muscles  Extremities: no cyanosis or  edema  Abdomen: soft   Gait and Station: normal  Digits and nails: no clubbing, cyanosis, petechiae or nodes.  Cranial nerves: grossly intact  Skin: no visible rashes, lesions or ulcers noted  Orientation: Oriented to time, person and place  Mood and affect: mood and affect appropriate, normal interaction with examiner  Judgement: Intact    Assessment:  1. Central sleep apnea  DME Other    REFERRAL TO OTHER    DME Mask and Supplies   2. Pulmonary hypertension (HCC)     3. Chronic right-sided heart failure (HCC)     4. Left ventricular diastolic dysfunction, NYHA class 3     5. Pacemaker     6. Essential hypertension     7. Chronic respiratory failure with hypoxia (HCC)     8. BMI 40.0-44.9, adult (HCC)     9. Chronic insomnia     10. Chronic prescription opiate use           Plan  Discussed the cardiovascular and neuropsychiatric risks of untreated CSA; including but not limited to: HTN, DM, MI, ASCVD, CVA, CHF, traffic accidents.     1. Compliance report from 4/22/21-7/20/21 was downloaded and reviewed with the patient which showed BiPAP ST IPAP/EPAP 17/15 cmH2O with 3L O2 bleed in, 17% compliance, 1 hrs 15 min use (0% compliance), AHI of 0.4. Continue BiPAP ST with 3L O2 bleed in. Patient is not compliant with BiPAP ST therapy for management of KAEL. Advised patient to use the BiPAP ST and 3L O2 bleed in every night for more than four hours for optimal health benefit and to meet the health insurance 70% compliance guideline.  Patient is advised that she is at high risk of cardiovascular or neurovascular event should she stop using the BiPAP ST.    *DME order ([facility]) for mask (small amaraview mask or MOC) and supplies was provided today. Continue to clean mask and supplies weekly with soap and water, and change supplies per insurance guidelines.     *DME order to decrease BiPAP ST IPAP/EPAP to 15/9 cmH2O due to pressure intolerance was placed today. Will assess AHI at next office visit but the goal will be to  increase BiPAP ST back to 19/14 with respiratory rate of 16 after patient acclimates to therapy.    *[facility] referral for Remede Implant was placed today.   [facility]  [facility]  [facility]  [address], [place] [address]  Phone: [phone]  Fax: [phone]    2-7. Continue to f/u with cardiology, Dr. Bojorquez. Patient is on Coumadin.  Currently using supplemental oxygen at 3 L 24/7.  8. Continue to stay active as able.     9.  Sleep hygiene discussed. Recommend keeping a set sleep/wake schedule. Logging enough hours of sleep. Limiting/Avoiding naps. No caffeine after noon and no heavy meals in the evening.   Chronic insomnia: Utilizing trazodone 100 mg nightly as needed which is prescribed by psychiatry.  Also taking over-the-counter melatonin 5 mg 3 tabs nightly as needed insomnia.  10. Continue to follow-up with [place] [facility] chronic pain syndrome.  Currently patient is taking MS Contin ER 15 mg 3 times daily, oxycodone 10 mg 3 times daily. Methocarbamol 750 mg is used very sparingly.  11. Follow up with the appropriate healthcare practitioners for all other medical problems.  12. F/u in 4 weeks for CSA.       RL Hart.      This dictation was created using voice recognition software. The accuracy of the dictation is limited to the abilities of the software. I expect there may be some errors of grammar and possibly content.

## 2021-08-04 ENCOUNTER — ANTICOAGULATION VISIT (OUTPATIENT)
Dept: VASCULAR LAB | Facility: MEDICAL CENTER | Age: 61
End: 2021-08-04
Attending: INTERNAL MEDICINE
Payer: MEDICARE

## 2021-08-04 DIAGNOSIS — Z98.890 STATUS POST MITRAL VALVE REPAIR: ICD-10-CM

## 2021-08-04 DIAGNOSIS — I48.0 PAF (PAROXYSMAL ATRIAL FIBRILLATION) (HCC): ICD-10-CM

## 2021-08-04 LAB
INR BLD: 2.3 (ref 0.9–1.2)
INR PPP: 2.3 (ref 2–3.5)

## 2021-08-04 PROCEDURE — 99211 OFF/OP EST MAY X REQ PHY/QHP: CPT | Performed by: PHARMACIST

## 2021-08-04 PROCEDURE — 85610 PROTHROMBIN TIME: CPT

## 2021-08-04 NOTE — PROGRESS NOTES
Anticoagulation Summary  As of 2021    INR goal:  2.0-3.0   TTR:  41.7 % (3.3 y)   INR used for dosin.30 (2021)   Warfarin maintenance plan:  2.5 mg (5 mg x 0.5) every Tue; 5 mg (5 mg x 1) all other days   Weekly warfarin total:  32.5 mg   Plan last modified:  SARA KellyPSARITA (2021)   Next INR check:  2021   Target end date:  Indefinite    Indications    PAF (paroxysmal atrial fibrillation) (CMS-HCC) [I48.0]  Long term current use of anticoagulant therapy (Resolved) [Z79.01]  Status post mitral valve repair [Z98.890]             Anticoagulation Episode Summary     INR check location:      Preferred lab:      Send INR reminders to:      Comments:  Right arm only for BP checks      Anticoagulation Care Providers     Provider Role Specialty Phone number    [facility] Responsible  [phone]                Refer to Patient Findings for HPI:  Patient Findings     Positives:  Bruising    Negatives:  Signs/symptoms of thrombosis, Signs/symptoms of bleeding, Laboratory test error suspected, Change in health, Change in alcohol use, Change in activity, Upcoming invasive procedure, Emergency department visit, Upcoming dental procedure, Missed doses, Extra doses, Change in medications, Change in diet/appetite, Hospital admission, Other complaints            There were no vitals filed for this visit.   pt declined vitals    Verified current warfarin dosing schedule.    Medications reconciled yes  Pt is NOT on antiplatelet therapy       A/P   INR  2.3-therapeutic.     Warfarin dosing recommendation: Continue warfarin regimen.    Pt educated to contact our clinic with any changes in medications or s/s of bleeding or thrombosis. Pt is aware to seek immediate medical attention for falls, head injury or deep cuts.    Follow up appointment in 4 week(s).      Toshia Diaz, Pharmacy Intern  Enzo Tinsley, PharmD

## 2021-08-25 ENCOUNTER — SLEEP CENTER VISIT (OUTPATIENT)
Dept: SLEEP MEDICINE | Facility: MEDICAL CENTER | Age: 61
End: 2021-08-25
Payer: MEDICARE

## 2021-08-25 VITALS
OXYGEN SATURATION: 93 % | BODY MASS INDEX: 44.41 KG/M2 | HEIGHT: 68 IN | HEART RATE: 70 BPM | WEIGHT: 293 LBS | SYSTOLIC BLOOD PRESSURE: 126 MMHG | DIASTOLIC BLOOD PRESSURE: 78 MMHG

## 2021-08-25 DIAGNOSIS — Z79.891 CHRONIC PRESCRIPTION OPIATE USE: ICD-10-CM

## 2021-08-25 DIAGNOSIS — F51.04 CHRONIC INSOMNIA: ICD-10-CM

## 2021-08-25 DIAGNOSIS — I27.20 PULMONARY HYPERTENSION (HCC): ICD-10-CM

## 2021-08-25 DIAGNOSIS — I51.89 LEFT VENTRICULAR DIASTOLIC DYSFUNCTION, NYHA CLASS 3: ICD-10-CM

## 2021-08-25 DIAGNOSIS — J96.11 CHRONIC RESPIRATORY FAILURE WITH HYPOXIA (HCC): ICD-10-CM

## 2021-08-25 DIAGNOSIS — Z95.0 PACEMAKER: ICD-10-CM

## 2021-08-25 DIAGNOSIS — I50.812 CHRONIC RIGHT-SIDED HEART FAILURE (HCC): ICD-10-CM

## 2021-08-25 DIAGNOSIS — G47.31 CENTRAL SLEEP APNEA: ICD-10-CM

## 2021-08-25 PROCEDURE — 99213 OFFICE O/P EST LOW 20 MIN: CPT | Performed by: NURSE PRACTITIONER

## 2021-08-25 ASSESSMENT — FIBROSIS 4 INDEX: FIB4 SCORE: 2.87

## 2021-08-25 NOTE — PROGRESS NOTES
"Chief Complaint   Patient presents with   • Follow-Up     Central Sleep Apnea- Last seen 07/20/2021       HPI:      Mrs. Velarde is a 60 y/o female patient who is in today for CSA f/u. PMH includes chronic right-sided heart failure, paroxysmal atrial fibrillation, status post mitral valve repair, pulmonary hypertension, hypertension, obesity with BMI 45, former smoker quit in 2017.    Patient was set up with a [employer] BiPAP ST machine on 5/10/2021. Compliance report from 7/27/21-8/25/22 was downloaded and reviewed with the patient which showed BiPAP ST IPAP/EPAP 15/9 cmH2O with 3L O2 bleed in, RR 14 bpm, 7% compliance, 8 min use (0% compliance), AHI of 0.0. She is not tolerating the pressure and mask well. She has struggled with try to keep the mask on because it feels \"intrusive\" and she also does not tolerate the pressure.  She has trouble meeting the compliance because she is not able to tolerate the device or the mask on her face.  Her sleep is significantly disrupted and she is ready to give up using the machine in order to get a good night of sleep.  Patient reports that [facility] try to call her but she missed their call and try to reach out to them but they have not called her back.  Patient also reports that she does not have any transportation to get to [facility]. She goes to bed at 11 pm and wakes up at 7 am.  She has trouble with staying asleep and wakes up through the night. She is utilizing trazodone 100 mg 1 tab nightly to help with sleep which is prescribed by her psychiatrist. She denies morning headache or snoring.  She utilizes O2 at 3 L 24/7. She continues to follow-up with the pain clinic for chronic pain which is managed with MS Contin ER 15 mg 3 times daily, oxycodone 10 mg 3 times daily prn and methocarbamol 750 mg nightly; however she uses methocarbamol very sparingly.  She continues to follow-up with cardiology Dr. Bojorquez and continues to take Coumadin 5 mg daily. She denies any new health problems or " medications.    Sleep Study History:   PSG titration study from 4/8/21 indicated central sleep apnea. Sleep related hypoxia. The PAP titration was initiated with BiPAP ST 14/10 cm of water 10 BPM  and the pressure which was slowly titrated up in an attempt to eliminate sleep disordered breathing and snoring. The final pressure tested during the study was BiPAP 21/17 cm water and 16 BPM. Lowest bedt tolerated pressure was BiPAP ST 19/14 cm 16 BPM and O2 bleed in.A final pressure the patient was observed in the supine position  and in the REM sleep stage. The apnea hypopnea index improved to 2.1 per hour and O2 yogesh 85%.  She spent 256 min of sleep time below 89% O2 saturation. Snoring was resolved.      PSG titration from 2/23/21 indicated severe complex sleep apnea with AHI of 139.7/hr and O2 yogesh 73 %. Due to severity of the disease she met the split study protocol. The titration started with CPAP 5 cm and the highest tested pressure was BiPAP ST 17/12  Cm back up rate 14. The AHI was 122/hr. Sleep related hypoxia.     PSG titration from 5/16/20 indicated Complex sleep apnea. Sleep related hypoxia.  The PAP titration was initiated with BiPAP 9/5 cm of water and the pressure which was slowly titrated up in an attempt to eliminate sleep disordered breathing and snoring. The final pressure tested during the study was BiPAP 22/17 cm water. It was an incomplete titration, the best tolerated pressurse was BiPAP 18/14cm. At this pressure the patient was observed in the supine in the REM sleep stage. The apnea hypopnea index improved to 14.1 per hour and O2 yogesh 83%. The average O2 stauration was 88%. She spent 89 min of sleep time below 89% O2 saturation.     PSG study from 11/20/19 indicated severe central with AHI of 146/hr and O2 yogesh 76 %. 90% of the apneas were central apneas. Severe sleep hypoxia. Time spent with oxygen saturations below 89% was 264.1 minutes.    Echo from 3/29/18 indicated systolic function is  difficult to assess in rapid atrial fibrillation, but appears normal. Severely dilated right ventricle. Reduced right ventricular systolic function. Mildly dilated left atrium. Known mitral valve bioprosthesis which is functioning normally. Estimated right ventricular systolic pressure is 35 mmHg + JVP. Compared to previous echocardiogram from 8/22/2016 the rhythm is now  rapid atrial fibrillation. Estimated right-sided pressures are lower in this study, and the mitral bioprosthesis continues to function normally.    ROS:    Constitutional: Denies fevers, Denies weight changes  Eyes: Denies changes in vision, no eye pain  Ears/Nose/Throat/Mouth: Denies nasal congestion or sore throat   Cardiovascular: Denies chest pain or palpitations   Respiratory: Denies shortness of breath , Denies cough  Gastrointestinal/Hepatic: Denies abdominal pain, nausea, vomiting,   Skin/Breast: Denies rash,   Neurological: Denies headache, confusion,   Psychiatric: denies mood disorder   Sleep: denies snoring       Past Medical History:   Diagnosis Date   • Atrial myxoma November 2011    Status post LA myxoma resection   • Backpain    • CAD (coronary artery disease)     pacemaker   • Chronic pain    • Dizziness     • Hyperlipidemia     • Hypertension    • Hypertriglyceridemia     • Indigestion    • Peripheral edema    • Sciatica    • Sick sinus syndrome (HCC) November 2011    Status post PPM implantation.   • Status post mitral valve repair November 2011    MV repair with 32mm Jonnie-Carpenter flexible annuloplasty ring.       Past Surgical History:   Procedure Laterality Date   • CARDIAC CATH, RIGHT HEART  03/31/2018    Right heart cath with high pressures.   • MITRAL VALVE REPAIR  11/17/2011    Performed by MARY ORTIZ at [facility]   • PACEMAKER INSERTION  November 2011    Medtronic Versa [id number] implanted by Dr. Amaya.   • OTHER ORTHOPEDIC SURGERY      Neck and back        Family History   Problem Relation Age of  Onset   • Heart Attack Father        Social History     Socioeconomic History   • Marital status: Single     Spouse name: Not on file   • Number of children: Not on file   • Years of education: Not on file   • Highest education level: Not on file   Occupational History   • Not on file   Tobacco Use   • Smoking status: Former Smoker     Packs/day: 1.00     Years: 39.00     Pack years: 39.00     Types: Cigarettes     Quit date: 11/4/2017     Years since quitting: 3.8   • Smokeless tobacco: Never Used   • Tobacco comment: 4/12 Down to vapping now.    Vaping Use   • Vaping Use: Every day   • Start date: 11/4/2017   Substance and Sexual Activity   • Alcohol use: Yes     Comment: rarely   • Drug use: No     Types: Inhaled, Oral     Comment: hx of, denies currently   • Sexual activity: Not on file   Other Topics Concern   • Not on file   Social History Narrative   • Not on file     Social Determinants of Health     Financial Resource Strain:    • Difficulty of Paying Living Expenses:    Food Insecurity:    • Worried About Running Out of Food in the Last Year:    • Ran Out of Food in the Last Year:    Transportation Needs:    • Lack of Transportation (Medical):    • Lack of Transportation (Non-Medical):    Physical Activity:    • Days of Exercise per Week:    • Minutes of Exercise per Session:    Stress:    • Feeling of Stress :    Social Connections:    • Frequency of Communication with Friends and Family:    • Frequency of Social Gatherings with Friends and Family:    • Attends Baptism Services:    • Active Member of Clubs or Organizations:    • Attends Club or Organization Meetings:    • Marital Status:    Intimate Partner Violence:    • Fear of Current or Ex-Partner:    • Emotionally Abused:    • Physically Abused:    • Sexually Abused:        Allergies as of 08/25/2021 - Reviewed 08/25/2021   Allergen Reaction Noted   • Penicillins Rash 06/07/2009        Vitals:  Vitals:    08/25/21 0915   BP: 126/78   Pulse: 70    SpO2: 93%       Current medications as of today   Current Outpatient Medications   Medication Sig Dispense Refill   • chlorhexidine (PERIDEX) 0.12 % Solution Take 15 mL by mouth every day. Swish and spit     • venlafaxine (EFFEXOR-XR) 150 MG extended-release capsule Take 150 mg by mouth every evening.     • atorvastatin (LIPITOR) 20 MG Tab Take 20 mg by mouth every evening.     • albuterol 108 (90 Base) MCG/ACT Aero Soln inhalation aerosol Inhale 2 Puffs every four hours as needed for Shortness of Breath.     • warfarin (COUMADIN) 5 MG Tab Take 2.5-5 mg by mouth every day. Tuesday = 2.5 mg    5 mg on every other day.     • amiodarone (CORDARONE) 200 MG Tab Take 1 Tab by mouth every day. 90 Tab 3   • methocarbamol (ROBAXIN) 750 MG Tab Take 750 mg by mouth 3 times a day as needed (Muscle pain).     • ARIPiprazole (ABILIFY) 10 MG Tab Take 10 mg by mouth every day.     • potassium chloride SA (KDUR) 20 MEQ Tab CR Take 1 Tab by mouth every day. 100 Tab 3   • venlafaxine XR (EFFEXOR XR) 75 MG CAPSULE SR 24 HR Take 75 mg by mouth every day.     • traZODone (DESYREL) 100 MG Tab Take 100 mg by mouth every evening.     • oxyCODONE immediate release (ROXICODONE) 10 MG immediate release tablet Take 10 mg by mouth 3 times a day. TID with MS Contin     • pregabalin (LYRICA) 200 MG capsule Take 200 mg by mouth 3 times a day.     • morphine SR (MS CONTIN) 15 MG TB12 Take 1 Tab by mouth 3 times a day. 60 Tab 0   • HYDROcodone/acetaminophen (NORCO)  MG Tab Take 1 tablet by mouth every day.       No current facility-administered medications for this visit.         Physical Exam: Limited by COVID-19 precautions.  Appearance: Well developed, well nourished, no acute distress  Eyes: PERRL, EOM intact, sclera white, conjunctiva moist  Ears: no lesions or deformities  Hearing: grossly intact  Nose: no lesions or deformities  Respiratory effort: no intercostal retractions or use of accessory muscles. POC via NC in place.    Extremities: no cyanosis or edema  Abdomen: soft   Gait and Station: normal  Digits and nails: no clubbing, cyanosis, petechiae or nodes.  Cranial nerves: grossly intact  Skin: no visible rashes, lesions or ulcers noted  Orientation: Oriented to time, person and place  Mood and affect: mood and affect appropriate, normal interaction with examiner  Judgement: Intact    Assessment:  1. Central sleep apnea     2. Chronic right-sided heart failure (HCC)     3. Pulmonary hypertension (HCC)     4. Left ventricular diastolic dysfunction, NYHA class 3     5. Pacemaker     6. Chronic respiratory failure with hypoxia (HCC)     7. BMI 45.0-49.9, adult (HCC)     8. Chronic insomnia     9. Chronic prescription opiate use           Plan  Discussed the cardiovascular and neuropsychiatric risks of untreated CSA; including but not limited to: HTN, DM, MI, ASCVD, CVA, CHF, traffic accidents.     1. Compliance report from 7/27/21-8/25/22 was downloaded and reviewed with the patient which showed BiPAP ST IPAP/EPAP 15/9 cmH2O with 3L O2 bleed in, RR 14 bpm, 7% compliance, 8 min use (0% compliance), AHI of 0.0. Continue BiPAP and 3L O2 bleed in. Advised patient to use the BiPAP ST and 3L O2 bleed in every night for more than four hours for optimal health benefit and to meet the health insurance 70% compliance guideline.  Patient was also advised to try and use the mask intermittently throughout the day to help further acclimate to therapy.    *DME order ([facility]) for mask (nasal mask or MOC) and supplies was not needed today. Continue to clean mask and supplies weekly with soap and water, and change supplies per insurance guidelines.     *DME order for mask fit was placed today.     *[facility] referral for Remede Implant was placed on 7/20/21.   Advised patient to reach out to [facility] again and she was also provided with her contact information.  Our office will also call [facility] to help facilitate getting patient in touch with them.  Karmen SHERWOOD  [facility]  Heart Failure and Pulmonary Hypertension Clinic  [facility] [place] [facility]  [address] Floor, [place] [address]  Phone: [phone]  Fax: [phone]    2-6. Continue to f/u with cardiology, Dr. Bojorquez. Patient is on Coumadin.  Currently using supplemental oxygen at 3 L 24/7.  7.  Encouraged is a healthy diet and light conditioning to help with weight loss and management.  8. Sleep hygiene discussed. Recommend keeping a set sleep/wake schedule. Logging enough hours of sleep. Limiting/Avoiding naps. No caffeine after noon and no heavy meals in the evening.   Chronic insomnia: Utilizing trazodone 100 mg nightly as needed which is prescribed by psychiatry.  Also taking over-the-counter melatonin 5 mg 3 tabs nightly as needed insomnia.  9. Continue to follow-up with [facility] chronic pain syndrome.  Currently patient is taking MS Contin ER 15 mg 3 times daily, oxycodone 10 mg 3 times daily. Methocarbamol 750 mg is used very sparingly.  10. Follow up with the appropriate healthcare practitioners for all other medical problems.  11. F/u in 3 months for CSA.       RL Hart.      This dictation was created using voice recognition software. The accuracy of the dictation is limited to the abilities of the software. I expect there may be some errors of grammar and possibly content.

## 2021-08-25 NOTE — PATIENT INSTRUCTIONS
*[facility] referral for Remede Implant was placed on 7/20/21.   Karmen SHERWOOD [facility]  Heart Failure and Pulmonary Hypertension Clinic  [facility]  [address] Floor, [place] [address]  Phone: [phone]  Fax: [phone]

## 2021-09-01 ENCOUNTER — APPOINTMENT (OUTPATIENT)
Dept: VASCULAR LAB | Facility: MEDICAL CENTER | Age: 61
End: 2021-09-01
Attending: INTERNAL MEDICINE
Payer: MEDICARE

## 2021-09-02 ENCOUNTER — ANTICOAGULATION VISIT (OUTPATIENT)
Dept: VASCULAR LAB | Facility: MEDICAL CENTER | Age: 61
End: 2021-09-02
Attending: INTERNAL MEDICINE
Payer: MEDICARE

## 2021-09-02 DIAGNOSIS — I48.0 PAF (PAROXYSMAL ATRIAL FIBRILLATION) (HCC): ICD-10-CM

## 2021-09-02 DIAGNOSIS — Z98.890 STATUS POST MITRAL VALVE REPAIR: ICD-10-CM

## 2021-09-02 LAB
INR BLD: 2.9 (ref 0.9–1.2)
INR PPP: 2.9 (ref 2–3.5)

## 2021-09-02 PROCEDURE — 85610 PROTHROMBIN TIME: CPT

## 2021-09-02 PROCEDURE — 99211 OFF/OP EST MAY X REQ PHY/QHP: CPT

## 2021-09-02 NOTE — PROGRESS NOTES
Anticoagulation Summary  As of 2021    INR goal:  2.0-3.0   TTR:  43.0 % (3.3 y)   INR used for dosin.90 (2021)   Warfarin maintenance plan:  2.5 mg (5 mg x 0.5) every Tue; 5 mg (5 mg x 1) all other days   Weekly warfarin total:  32.5 mg   Plan last modified:  SARA KellyPSARITA (2021)   Next INR check:  2021   Target end date:  Indefinite    Indications    PAF (paroxysmal atrial fibrillation) (CMS-HCC) [I48.0]  Long term current use of anticoagulant therapy (Resolved) [Z79.01]  Status post mitral valve repair [Z98.890]             Anticoagulation Episode Summary     INR check location:      Preferred lab:      Send INR reminders to:      Comments:  Right arm only for BP checks      Anticoagulation Care Providers     Provider Role Specialty Phone number    [facility] Responsible  [phone]                    Refer to Patient Findings for HPI:  Patient Findings     Positives:  Change in alcohol use (Pt reports some alcohol use last night), Missed doses (Reports she missed dose 2 days ago)    Negatives:  Signs/symptoms of thrombosis, Signs/symptoms of bleeding, Laboratory test error suspected, Change in health, Change in activity, Upcoming invasive procedure, Emergency department visit, Upcoming dental procedure, Extra doses, Change in medications, Change in diet/appetite, Hospital admission, Bruising, Other complaints            There were no vitals filed for this visit.  pt declined vitals    Verified current warfarin dosing schedule.      Medications reconciled   Pt is not on antiplatelet therapy      A/P   INR  therapeutic.   Discussed importance of adherence and avoiding excessive alcohol use    Warfarin dosing recommendation: Pt is to continue with current warfarin dosing regimen and maintain current follow up frequency      Pt educated to contact our clinic with any changes in medications or s/s of bleeding or thrombosis. Pt is aware to seek immediate medical  attention for falls, head injury or deep cuts.    Follow up appointment in 4 week(s).    Yvon Crum, PharmD

## 2021-09-13 DIAGNOSIS — I48.0 PAF (PAROXYSMAL ATRIAL FIBRILLATION) (HCC): ICD-10-CM

## 2021-09-13 DIAGNOSIS — E78.2 MIXED HYPERLIPIDEMIA: ICD-10-CM

## 2021-09-13 RX ORDER — ATORVASTATIN CALCIUM 20 MG/1
TABLET, FILM COATED ORAL
Qty: 90 TABLET | Refills: 3 | Status: SHIPPED | OUTPATIENT
Start: 2021-09-13 | End: 2022-02-11 | Stop reason: SDUPTHER

## 2021-09-13 RX ORDER — AMIODARONE HYDROCHLORIDE 200 MG/1
TABLET ORAL
Qty: 90 TABLET | Refills: 1 | Status: SHIPPED | OUTPATIENT
Start: 2021-09-13 | End: 2022-02-11 | Stop reason: SDUPTHER

## 2021-09-30 ENCOUNTER — ANTICOAGULATION VISIT (OUTPATIENT)
Dept: VASCULAR LAB | Facility: MEDICAL CENTER | Age: 61
End: 2021-09-30
Attending: INTERNAL MEDICINE
Payer: MEDICARE

## 2021-09-30 DIAGNOSIS — I48.0 PAF (PAROXYSMAL ATRIAL FIBRILLATION) (HCC): ICD-10-CM

## 2021-09-30 DIAGNOSIS — Z98.890 STATUS POST MITRAL VALVE REPAIR: ICD-10-CM

## 2021-09-30 LAB — INR PPP: 8 (ref 2–3.5)

## 2021-09-30 PROCEDURE — 85610 PROTHROMBIN TIME: CPT

## 2021-09-30 PROCEDURE — 99212 OFFICE O/P EST SF 10 MIN: CPT | Performed by: NURSE PRACTITIONER

## 2021-09-30 NOTE — PROGRESS NOTES
Anticoagulation Summary  As of 2021    INR goal:  2.0-3.0   TTR:  42.1 % (3.4 y)   INR used for dosin.00 (2021)   Warfarin maintenance plan:  2.5 mg (5 mg x 0.5) every Tue; 5 mg (5 mg x 1) all other days   Weekly warfarin total:  32.5 mg   Plan last modified:  PALMIRA Kelly (2021)   Next INR check:  10/11/2021   Target end date:  Indefinite    Indications    PAF (paroxysmal atrial fibrillation) (CMS-HCC) [I48.0]  Long term current use of anticoagulant therapy (Resolved) [Z79.01]  Status post mitral valve repair [Z98.890]             Anticoagulation Episode Summary     INR check location:      Preferred lab:      Send INR reminders to:      Comments:  Right arm only for BP checks      Anticoagulation Care Providers     Provider Role Specialty Phone number    [facility] Responsible  [phone]                Refer to Patient Findings for HPI:      There were no vitals filed for this visit.   pt declined vitals    Verified current warfarin dosing schedule.    Medications reconciled   Pt is not on antiplatelet therapy      A/P   INR  supra-therapeutic. INR > 8.0. She declined repeat venapuncture as our protocol suggests. She also declined repeat POC INR. Cautioned about drinking alcohol.     Warfarin dosing recommendation: HOLD 3 doses of warfarin then take as outlined on calendar.     Pt educated to contact our clinic with any changes in medications or s/s of bleeding or thrombosis. Pt is aware to seek immediate medical attention for falls, head injury or deep cuts.    Follow up appointment in 1.5 weeks (soonest she can return).    PALMIRA Kelly

## 2021-10-01 LAB — INR BLD: >8 (ref 0.9–1.2)

## 2021-10-11 ENCOUNTER — PATIENT MESSAGE (OUTPATIENT)
Dept: HEALTH INFORMATION MANAGEMENT | Facility: OTHER | Age: 61
End: 2021-10-11

## 2021-10-11 ENCOUNTER — ANTICOAGULATION VISIT (OUTPATIENT)
Dept: VASCULAR LAB | Facility: MEDICAL CENTER | Age: 61
End: 2021-10-11
Attending: INTERNAL MEDICINE
Payer: MEDICARE

## 2021-10-11 DIAGNOSIS — Z98.890 STATUS POST MITRAL VALVE REPAIR: ICD-10-CM

## 2021-10-11 DIAGNOSIS — I48.0 PAF (PAROXYSMAL ATRIAL FIBRILLATION) (HCC): ICD-10-CM

## 2021-10-11 LAB
INR BLD: 1.6 (ref 0.9–1.2)
INR PPP: 1.6 (ref 2–3.5)

## 2021-10-11 PROCEDURE — 85610 PROTHROMBIN TIME: CPT

## 2021-10-11 PROCEDURE — 99212 OFFICE O/P EST SF 10 MIN: CPT

## 2021-10-11 NOTE — Clinical Note
Out of Range INR - figured I'd send you this one for completion's sake. :) Bolusing with 1.5x normal dose today, and patient is following up in 2 weeks.     Hermelinda Arrieta, PharmD  PGY1 Pharmacy Practice Resident

## 2021-10-11 NOTE — PROGRESS NOTES
Anticoagulation Summary  As of 10/11/2021    INR goal:  2.0-3.0   TTR:  41.9 % (3.4 y)   INR used for dosin.60 (10/11/2021)   Warfarin maintenance plan:  2.5 mg (5 mg x 0.5) every Tue; 5 mg (5 mg x 1) all other days   Weekly warfarin total:  32.5 mg   Plan last modified:  SARA KellyPPriscillaNPriscilla (2021)   Next INR check:  10/25/2021   Target end date:  Indefinite    Indications    PAF (paroxysmal atrial fibrillation) (CMS-HCC) [I48.0]  Long term current use of anticoagulant therapy (Resolved) [Z79.01]  Status post mitral valve repair [Z98.890]             Anticoagulation Episode Summary     INR check location:      Preferred lab:      Send INR reminders to:      Comments:  Right arm only for BP checks      Anticoagulation Care Providers     Provider Role Specialty Phone number    [facility] Responsible  [phone]        Refer to Patient Findings for HPI:  Patient Findings     Negatives:  Signs/symptoms of thrombosis, Signs/symptoms of bleeding, Laboratory test error suspected, Change in health, Change in alcohol use, Change in activity, Upcoming invasive procedure, Emergency department visit, Upcoming dental procedure, Missed doses, Extra doses, Change in medications, Change in diet/appetite, Hospital admission, Bruising, Other complaints          There were no vitals filed for this visit.    Verified current warfarin dosing schedule. Patient takes 2.5mg on  and 5mg all other days.     Medications reconciled  Pt is not on antiplatelet therapy    A/P   INR  SUB-therapeutic at 1.6.     Warfarin dosing recommendation: BOLUS with 7.5mg today, then continue normal dosing regimen of 2.5mg on Tuesday and 5mg all other days.     Pt educated to contact our clinic with any changes in medications or s/s of bleeding or thrombosis. Pt is aware to seek immediate medical attention for falls, head injury or deep cuts.    Follow up appointment in 2 week(s).    Hermelinda Arrieta, PharmD  PGY1 Pharmacy  Practice Resident    CC:  Perry Villa, LeonoraD

## 2021-10-25 ENCOUNTER — ANTICOAGULATION VISIT (OUTPATIENT)
Dept: VASCULAR LAB | Facility: MEDICAL CENTER | Age: 61
End: 2021-10-25
Attending: INTERNAL MEDICINE
Payer: MEDICARE

## 2021-10-25 DIAGNOSIS — I48.0 PAF (PAROXYSMAL ATRIAL FIBRILLATION) (HCC): ICD-10-CM

## 2021-10-25 DIAGNOSIS — Z98.890 STATUS POST MITRAL VALVE REPAIR: ICD-10-CM

## 2021-10-25 DIAGNOSIS — D68.69 SECONDARY HYPERCOAGULABLE STATE (HCC): ICD-10-CM

## 2021-10-25 LAB
INR BLD: 3.5 (ref 0.9–1.2)
INR PPP: 3.5 (ref 2–3.5)

## 2021-10-25 PROCEDURE — 85610 PROTHROMBIN TIME: CPT

## 2021-10-25 PROCEDURE — 99212 OFFICE O/P EST SF 10 MIN: CPT | Performed by: NURSE PRACTITIONER

## 2021-10-25 NOTE — PROGRESS NOTES
Anticoagulation Summary  As of 10/25/2021    INR goal:  2.0-3.0   TTR:  42.0 % (3.5 y)   INR used for dosing:  3.50 (10/25/2021)   Warfarin maintenance plan:  2.5 mg (5 mg x 0.5) every Tue; 5 mg (5 mg x 1) all other days   Weekly warfarin total:  32.5 mg   Plan last modified:  SARA KellyPSARITA (2/22/2021)   Next INR check:  11/10/2021   Target end date:  Indefinite    Indications    PAF (paroxysmal atrial fibrillation) (CMS-HCC) [I48.0]  Long term current use of anticoagulant therapy (Resolved) [Z79.01]  Status post mitral valve repair [Z98.890]             Anticoagulation Episode Summary     INR check location:      Preferred lab:      Send INR reminders to:      Comments:  Right arm only for BP checks      Anticoagulation Care Providers     Provider Role Specialty Phone number    [facility] Responsible  [phone]                Refer to Patient Findings for HPI:      There were no vitals filed for this visit.   pt declined vitals    Verified current warfarin dosing schedule.    Medications reconciled   Pt is not on antiplatelet therapy      A/P   INR  supra-therapeutic.     Warfarin dosing recommendation: Reduce one dose then resume your previous regimen.    Pt educated to contact our clinic with any changes in medications or s/s of bleeding or thrombosis. Pt is aware to seek immediate medical attention for falls, head injury or deep cuts.    Follow up appointment in 2 week(s).    PALMIRA Kelly

## 2021-11-10 ENCOUNTER — ANTICOAGULATION VISIT (OUTPATIENT)
Dept: VASCULAR LAB | Facility: MEDICAL CENTER | Age: 61
End: 2021-11-10
Attending: INTERNAL MEDICINE
Payer: MEDICARE

## 2021-11-10 VITALS — HEART RATE: 80 BPM | SYSTOLIC BLOOD PRESSURE: 139 MMHG | DIASTOLIC BLOOD PRESSURE: 83 MMHG

## 2021-11-10 DIAGNOSIS — Z98.890 STATUS POST MITRAL VALVE REPAIR: ICD-10-CM

## 2021-11-10 DIAGNOSIS — I48.0 PAF (PAROXYSMAL ATRIAL FIBRILLATION) (HCC): ICD-10-CM

## 2021-11-10 DIAGNOSIS — D68.69 SECONDARY HYPERCOAGULABLE STATE (HCC): ICD-10-CM

## 2021-11-10 LAB
INR BLD: 2.3 (ref 0.9–1.2)
INR PPP: 2.3 (ref 2–3.5)

## 2021-11-10 PROCEDURE — 99211 OFF/OP EST MAY X REQ PHY/QHP: CPT | Performed by: NURSE PRACTITIONER

## 2021-11-10 PROCEDURE — 85610 PROTHROMBIN TIME: CPT

## 2021-11-10 RX ORDER — WARFARIN SODIUM 5 MG/1
TABLET ORAL
Qty: 90 TABLET | Refills: 1 | Status: SHIPPED | OUTPATIENT
Start: 2021-11-10 | End: 2022-07-18

## 2021-11-10 NOTE — PROGRESS NOTES
Anticoagulation Summary  As of 11/10/2021    INR goal:  2.0-3.0   TTR:  42.2 % (3.5 y)   INR used for dosin.30 (11/10/2021)   Warfarin maintenance plan:  2.5 mg (5 mg x 0.5) every Tue; 5 mg (5 mg x 1) all other days   Weekly warfarin total:  32.5 mg   Plan last modified:  SARA KellyPSARITA (2021)   Next INR check:  2021   Target end date:  Indefinite    Indications    PAF (paroxysmal atrial fibrillation) (CMS-HCC) [I48.0]  Status post mitral valve repair [Z98.890]  Long term current use of anticoagulant therapy (Resolved) [Z79.01]  Secondary hypercoagulable state (HCC) [D68.69]             Anticoagulation Episode Summary     INR check location:      Preferred lab:      Send INR reminders to:      Comments:  Right arm only for BP checks      Anticoagulation Care Providers     Provider Role Specialty Phone number    [facility] Responsible  [phone]                Refer to Patient Findings for HPI:      Vitals:    11/10/21 1144   BP: 139/83   Pulse: 80       Verified current warfarin dosing schedule.    Medications reconciled   Pt is not on antiplatelet therapy      A/P   INR  -therapeutic.     Warfarin dosing recommendation: Continue current regimen.    Pt educated to contact our clinic with any changes in medications or s/s of bleeding or thrombosis. Pt is aware to seek immediate medical attention for falls, head injury or deep cuts.    Follow up appointment in 2 week(s).    PALMIRA Kelly

## 2021-11-19 ENCOUNTER — TELEPHONE (OUTPATIENT)
Dept: CARDIOLOGY | Facility: MEDICAL CENTER | Age: 61
End: 2021-11-19

## 2021-11-29 ENCOUNTER — ANTICOAGULATION VISIT (OUTPATIENT)
Dept: VASCULAR LAB | Facility: MEDICAL CENTER | Age: 61
End: 2021-11-29
Attending: INTERNAL MEDICINE
Payer: MEDICARE

## 2021-11-29 DIAGNOSIS — I48.0 PAF (PAROXYSMAL ATRIAL FIBRILLATION) (HCC): ICD-10-CM

## 2021-11-29 DIAGNOSIS — D68.69 SECONDARY HYPERCOAGULABLE STATE (HCC): ICD-10-CM

## 2021-11-29 DIAGNOSIS — Z98.890 STATUS POST MITRAL VALVE REPAIR: ICD-10-CM

## 2021-11-29 LAB
INR BLD: 4.6 (ref 0.9–1.2)
INR PPP: 4.6 (ref 2–3.5)

## 2021-11-29 PROCEDURE — 99212 OFFICE O/P EST SF 10 MIN: CPT | Performed by: NURSE PRACTITIONER

## 2021-11-29 PROCEDURE — 85610 PROTHROMBIN TIME: CPT

## 2021-11-29 NOTE — PROGRESS NOTES
Anticoagulation Summary  As of 2021    INR goal:  2.0-3.0   TTR:  42.0 % (3.6 y)   INR used for dosin.60 (2021)   Warfarin maintenance plan:  2.5 mg (5 mg x 0.5) every Tue; 5 mg (5 mg x 1) all other days   Weekly warfarin total:  32.5 mg   Plan last modified:  SARA KellyPPriscillaNPriscilla (2021)   Next INR check:  2021   Target end date:  Indefinite    Indications    PAF (paroxysmal atrial fibrillation) (CMS-HCC) [I48.0]  Status post mitral valve repair [Z98.890]  Long term current use of anticoagulant therapy (Resolved) [Z79.01]  Secondary hypercoagulable state (HCC) [D68.69]             Anticoagulation Episode Summary     INR check location:      Preferred lab:      Send INR reminders to:      Comments:  Right arm only for BP checks      Anticoagulation Care Providers     Provider Role Specialty Phone number    [facility] Responsible  [phone]                Refer to Patient Findings for HPI:  Patient Findings     Positives:  Change in alcohol use    Negatives:  Signs/symptoms of thrombosis, Signs/symptoms of bleeding, Laboratory test error suspected, Change in health, Change in activity, Upcoming invasive procedure, Emergency department visit, Upcoming dental procedure, Missed doses, Extra doses, Change in medications, Change in diet/appetite, Hospital admission, Bruising, Other complaints    Comments:  Reports heavy drinking yesterday. Understands alcohol increases her risk of bleeding and should be avoided.            There were no vitals filed for this visit.   pt declined vitals    Verified current warfarin dosing schedule.    Medications reconciled   Pt is not on antiplatelet therapy      A/P   INR  supra-therapeutic.     Warfarin dosing recommendation: HOLD two doses then resume your usual regimen.    Pt educated to contact our clinic with any changes in medications or s/s of bleeding or thrombosis. Pt is aware to seek immediate medical attention for falls, head  injury or deep cuts.    Follow up appointment in 2 week(s) per pt's request.    JOAQUINA Kelly.

## 2021-12-09 ENCOUNTER — TELEPHONE (OUTPATIENT)
Dept: SLEEP MEDICINE | Facility: MEDICAL CENTER | Age: 61
End: 2021-12-09

## 2021-12-09 NOTE — TELEPHONE ENCOUNTER
I was responding to a voce message regarding scheduling a missed appointment with Elena HENDERSON.   I called and spoke with patient as she indicates that she will begoing to [facility] in a few weeks and will call back later to schedule a follow up if still needed.

## 2022-01-03 ENCOUNTER — ANTICOAGULATION VISIT (OUTPATIENT)
Dept: VASCULAR LAB | Facility: MEDICAL CENTER | Age: 62
End: 2022-01-03
Attending: INTERNAL MEDICINE
Payer: MEDICARE

## 2022-01-03 DIAGNOSIS — I48.0 PAF (PAROXYSMAL ATRIAL FIBRILLATION) (HCC): ICD-10-CM

## 2022-01-03 DIAGNOSIS — D68.69 SECONDARY HYPERCOAGULABLE STATE (HCC): ICD-10-CM

## 2022-01-03 DIAGNOSIS — Z98.890 STATUS POST MITRAL VALVE REPAIR: ICD-10-CM

## 2022-01-03 LAB
INR BLD: 2.5 (ref 0.9–1.2)
INR PPP: 2.5 (ref 2–3.5)

## 2022-01-03 PROCEDURE — 85610 PROTHROMBIN TIME: CPT

## 2022-01-03 PROCEDURE — 99211 OFF/OP EST MAY X REQ PHY/QHP: CPT | Performed by: NURSE PRACTITIONER

## 2022-01-03 NOTE — PROGRESS NOTES
Anticoagulation Summary  As of 1/3/2022    INR goal:  2.0-3.0   TTR:  41.6 % (3.7 y)   INR used for dosin.50 (1/3/2022)   Warfarin maintenance plan:  2.5 mg (5 mg x 0.5) every Tue; 5 mg (5 mg x 1) all other days   Weekly warfarin total:  32.5 mg   Plan last modified:  SARA KellyPSARITA (2021)   Next INR check:  2022   Target end date:  Indefinite    Indications    PAF (paroxysmal atrial fibrillation) (CMS-HCC) [I48.0]  Status post mitral valve repair [Z98.890]  Long term current use of anticoagulant therapy (Resolved) [Z79.01]  Secondary hypercoagulable state (HCC) [D68.69]             Anticoagulation Episode Summary     INR check location:      Preferred lab:      Send INR reminders to:      Comments:  Right arm only for BP checks      Anticoagulation Care Providers     Provider Role Specialty Phone number    [facility] Responsible  [phone]                Refer to Patient Findings for HPI:  Patient Findings     Negatives:  Signs/symptoms of thrombosis, Signs/symptoms of bleeding, Laboratory test error suspected, Change in health, Change in alcohol use, Change in activity, Upcoming invasive procedure, Emergency department visit, Upcoming dental procedure, Missed doses, Extra doses, Change in medications, Change in diet/appetite, Hospital admission, Bruising, Other complaints    Comments:  Missed her last appointment. Continued her usual regimen.          There were no vitals filed for this visit.   pt declined vitals    Verified current warfarin dosing schedule.    Medications reconciled   Pt is not on antiplatelet therapy      A/P   INR  -therapeutic.     Warfarin dosing recommendation: Continue current regimen.    Pt educated to contact our clinic with any changes in medications or s/s of bleeding or thrombosis. Pt is aware to seek immediate medical attention for falls, head injury or deep cuts.    Follow up appointment in 3 week(s).    PALMIRA Kelly

## 2022-01-24 ENCOUNTER — ANTICOAGULATION VISIT (OUTPATIENT)
Dept: VASCULAR LAB | Facility: MEDICAL CENTER | Age: 62
End: 2022-01-24
Attending: INTERNAL MEDICINE
Payer: MEDICARE

## 2022-01-24 DIAGNOSIS — Z98.890 STATUS POST MITRAL VALVE REPAIR: ICD-10-CM

## 2022-01-24 DIAGNOSIS — I48.0 PAF (PAROXYSMAL ATRIAL FIBRILLATION) (HCC): ICD-10-CM

## 2022-01-24 DIAGNOSIS — D68.69 SECONDARY HYPERCOAGULABLE STATE (HCC): ICD-10-CM

## 2022-01-24 LAB
INR BLD: 4.6 (ref 0.9–1.2)
INR PPP: 4.6 (ref 2–3.5)

## 2022-01-24 PROCEDURE — 99212 OFFICE O/P EST SF 10 MIN: CPT | Performed by: NURSE PRACTITIONER

## 2022-01-24 PROCEDURE — 85610 PROTHROMBIN TIME: CPT

## 2022-01-24 NOTE — PROGRESS NOTES
Anticoagulation Summary  As of 2022    INR goal:  2.0-3.0   TTR:  41.3 % (3.7 y)   INR used for dosin.60 (2022)   Warfarin maintenance plan:  2.5 mg (5 mg x 0.5) every Tue; 5 mg (5 mg x 1) all other days   Weekly warfarin total:  32.5 mg   Plan last modified:  KI Kelly.P.NPriscilla (2021)   Next INR check:  2022   Target end date:  Indefinite    Indications    PAF (paroxysmal atrial fibrillation) (CMS-HCC) [I48.0]  Status post mitral valve repair [Z98.890]  Long term current use of anticoagulant therapy (Resolved) [Z79.01]  Secondary hypercoagulable state (HCC) [D68.69]             Anticoagulation Episode Summary     INR check location:      Preferred lab:      Send INR reminders to:      Comments:  Right arm only for BP checks      Anticoagulation Care Providers     Provider Role Specialty Phone number    [facility] Responsible  [phone]                Refer to Patient Findings for HPI:  Patient Findings     Positives:  Change in alcohol use    Comments:  Had more alcohol than usual this past week due to stress. Understands there is an increased risk of bleeding with concurrent warfarin.          There were no vitals filed for this visit.   pt declined vitals    Verified current warfarin dosing schedule.    Medications reconciled   Pt is not on antiplatelet therapy  Atrial fibrillation controlled on amio.  Secondary hypercoagulable state due to Atrial Fibrillation/Flutter on warfarin.      A/P   INR  supra-therapeutic.     Warfarin dosing recommendation: HOLD two doses of warfarin then resume your previous regimen.    Pt educated to contact our clinic with any changes in medications or s/s of bleeding or thrombosis. Pt is aware to seek immediate medical attention for falls, head injury or deep cuts.    Follow up appointment in 2 week(s) per pt's request. Suggested 1 week f/u.    KI Kelly.P.N.

## 2022-02-08 ENCOUNTER — TELEPHONE (OUTPATIENT)
Dept: CARDIOLOGY | Facility: MEDICAL CENTER | Age: 62
End: 2022-02-08

## 2022-02-08 NOTE — TELEPHONE ENCOUNTER
Left vm message to reschedule missed anticoagulation services appointment  Margareth Ewing, Clinical Pharmacist, CDE, CACP

## 2022-02-10 DIAGNOSIS — Z79.01 CHRONIC ANTICOAGULATION: ICD-10-CM

## 2022-02-11 ENCOUNTER — OFFICE VISIT (OUTPATIENT)
Dept: CARDIOLOGY | Facility: MEDICAL CENTER | Age: 62
End: 2022-02-11
Payer: MEDICARE

## 2022-02-11 ENCOUNTER — NON-PROVIDER VISIT (OUTPATIENT)
Dept: CARDIOLOGY | Facility: MEDICAL CENTER | Age: 62
End: 2022-02-11
Payer: MEDICARE

## 2022-02-11 VITALS
RESPIRATION RATE: 16 BRPM | HEIGHT: 68 IN | DIASTOLIC BLOOD PRESSURE: 82 MMHG | OXYGEN SATURATION: 93 % | SYSTOLIC BLOOD PRESSURE: 122 MMHG | BODY MASS INDEX: 44.41 KG/M2 | HEART RATE: 82 BPM | WEIGHT: 293 LBS

## 2022-02-11 DIAGNOSIS — Z95.0 PACEMAKER: ICD-10-CM

## 2022-02-11 DIAGNOSIS — E78.2 MIXED HYPERLIPIDEMIA: ICD-10-CM

## 2022-02-11 DIAGNOSIS — I50.812 CHRONIC RIGHT-SIDED HEART FAILURE (HCC): ICD-10-CM

## 2022-02-11 DIAGNOSIS — Z79.899 HIGH RISK MEDICATION USE: ICD-10-CM

## 2022-02-11 DIAGNOSIS — I48.0 PAF (PAROXYSMAL ATRIAL FIBRILLATION) (HCC): ICD-10-CM

## 2022-02-11 DIAGNOSIS — I49.5 SICK SINUS SYNDROME (HCC): ICD-10-CM

## 2022-02-11 DIAGNOSIS — Z98.890 STATUS POST MITRAL VALVE REPAIR: ICD-10-CM

## 2022-02-11 PROCEDURE — 99214 OFFICE O/P EST MOD 30 MIN: CPT | Performed by: NURSE PRACTITIONER

## 2022-02-11 PROCEDURE — 93280 PM DEVICE PROGR EVAL DUAL: CPT | Performed by: INTERNAL MEDICINE

## 2022-02-11 RX ORDER — ATORVASTATIN CALCIUM 20 MG/1
20 TABLET, FILM COATED ORAL DAILY
Qty: 90 TABLET | Refills: 3 | Status: SHIPPED | OUTPATIENT
Start: 2022-02-11 | End: 2023-03-24 | Stop reason: SDUPTHER

## 2022-02-11 RX ORDER — AMIODARONE HYDROCHLORIDE 200 MG/1
200 TABLET ORAL DAILY
Qty: 90 TABLET | Refills: 3 | Status: SHIPPED | OUTPATIENT
Start: 2022-02-11 | End: 2023-03-24 | Stop reason: SDUPTHER

## 2022-02-11 ASSESSMENT — ENCOUNTER SYMPTOMS
HEADACHES: 0
PALPITATIONS: 1
HEMOPTYSIS: 0
WHEEZING: 0
CHILLS: 0
COUGH: 0
CLAUDICATION: 0
NAUSEA: 0
SPUTUM PRODUCTION: 0
PND: 0
ORTHOPNEA: 1
SHORTNESS OF BREATH: 1
FEVER: 0
DIZZINESS: 0
VOMITING: 0

## 2022-02-11 ASSESSMENT — FIBROSIS 4 INDEX: FIB4 SCORE: 2.87

## 2022-02-11 NOTE — PROGRESS NOTES
Chief Complaint   Patient presents with   • Atrial Fibrillation     F/V Dx: PAF (paroxysmal atrial fibrillation) (CMS-HCC)   • Pulmonary Hypertension   • Palpitations       Subjective   Melanie Velarde is a 61 y.o. female who presents today for very complex heart disease.  Patient was last seen 7/21/2020 by Dr. Bojorquez and myself 7/14/2021.    Since 7/14/2021, the patient has been doing very well from a cardiovascular standpoint.  She is having some issues with arthritis and where a right wrist splint.  She is also having some night sweats and chills but is going to be talking to primary care.  She currently sees [facility] and is going to be having an sleep apnea implant placed.  She has occasional palpitations but they come and go.  She is currently on oxygen at 24/7 on 2 L.     Since 7/21/2020, patient has been hospitalized July 1-3, 2021 for ground-level fall, dizziness, and balance problems.     She has chronic diastolic congestive heart failure, prior mitral valve repair, paroxysmal atrial fibrillation/flutter and was cardioverted during the hospital stay in April 2018 after initiation of amiodarone therapy, JULY at that time revealed smoking left atrial appendage. She is on warfarin therapy, [id number] score   4, no prior TIA/CVA.     She has had a pacemaker for sick sinus syndrome.     A paced 42%, V paced 1 %.  Feels heart racing at times.  Sometimes dizzy, no syncope.     We have been performing amiodarone surveillance.    Her most recent LFTs were up but she has been a heavy drinker. Still drinking.   She went into inpt rehab, just got out less than a week ago and continues to be sober.  Fatty liver by CT scan 3-28-18, mildly abnormal LFTs, etoh, fatty liver, amio.  LFTs normal on 4/3/2018 abnormal 8/2018 TSH was less than 0.005 on 3/29/2018. PFTs pending.     She has secondary pulmonary hypertension with dilated RV and RV dysfunction.   She had undergone right heart catheterization in March 2018 which  showed an elevated wedge of 29 and normal cardiac output and pulmonary pressure of 41/25 there is a large V wave but she did not have significant MR on JLUY.       Calcified coronary disease CT 3-28-18, warfarin, statin  Has HLP, on statin, no recent lipids.     She continues to have dyspnea on exertion.   Sees Pulm, now on O2 which she needs but not wearing it today.  Has KAEL but cannot tolerate the first 2 masks, cannot pay for the third.  No concerns up to this point about amio toxicity.     She has [place] heart class III symptoms.   Weight has been up and and down.  Taking lasix PRN.     She has chronic pain syndrome.    She takes Abilify which can provoke tachyarrhythmias.      She has had dietary indiscretion with salt while in rehab and drinking more water to replace ETOH.    She quit smoking, now vapes.     No chest pain.  Has chronic right chest wall pain.     She had her device interrogated today which showed she has been in sinus rhythm since her cardioversion.     Blood work done at lab core reveals LFTs still up a bit and for some reason no TSH.    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   • Status post mitral valve repair November 2011    MV repair with 32mm Jonnie-Carpenter flexible annuloplasty ring.     Past Surgical History:   Procedure Laterality Date   • CARDIAC CATH, RIGHT HEART  03/31/2018    Right heart cath with high pressures.   • MITRAL VALVE REPAIR  11/17/2011    Performed by MARY ORTIZ at SURGERY [facility] ORS   • PACEMAKER INSERTION  November 2011    Medtronic Versa [id number] implanted by Dr. Amaya.   • OTHER ORTHOPEDIC SURGERY       Neck and back      Family History   Problem Relation Age of Onset   • Heart Attack Father      Social History     Socioeconomic History   • Marital status: Single     Spouse name: Not on file   • Number of children: Not on file   • Years of education: Not on file   • Highest education level: Not on file   Occupational History   • Not on file   Tobacco Use   • Smoking status: Former Smoker     Packs/day: 1.00     Years: 39.00     Pack years: 39.00     Types: Cigarettes     Quit date: 2017     Years since quittin.2   • Smokeless tobacco: Never Used   • Tobacco comment:  Down to vapping now.    Vaping Use   • Vaping Use: Every day   • Start date: 2017   Substance and Sexual Activity   • Alcohol use: Yes     Comment: rarely   • Drug use: No     Types: Inhaled, Oral     Comment: hx of, denies currently   • Sexual activity: Not on file   Other Topics Concern   • Not on file   Social History Narrative   • Not on file     Social Determinants of Health     Financial Resource Strain:    • Difficulty of Paying Living Expenses: Not on file   Food Insecurity:    • Worried About Running Out of Food in the Last Year: Not on file   • Ran Out of Food in the Last Year: Not on file   Transportation Needs:    • Lack of Transportation (Medical): Not on file   • Lack of Transportation (Non-Medical): Not on file   Physical Activity:    • Days of Exercise per Week: Not on file   • Minutes of Exercise per Session: Not on file   Stress:    • Feeling of Stress : Not on file   Social Connections:    • Frequency of Communication with Friends and Family: Not on file   • Frequency of Social Gatherings with Friends and Family: Not on file   • Attends Rastafarian Services: Not on file   • Active Member of Clubs or Organizations: Not on file   • Attends Club or Organization Meetings: Not on file   • Marital Status: Not on file   Intimate Partner Violence:    • Fear of Current or Ex-Partner: Not on file   • Emotionally Abused: Not  on file   • Physically Abused: Not on file   • Sexually Abused: Not on file   Housing Stability:    • Unable to Pay for Housing in the Last Year: Not on file   • Number of Places Lived in the Last Year: Not on file   • Unstable Housing in the Last Year: Not on file     Allergies   Allergen Reactions   • Penicillins Rash           Outpatient Encounter Medications as of 2/11/2022   Medication Sig Dispense Refill   • amiodarone (CORDARONE) 200 MG Tab Take 1 Tablet by mouth every day. 90 Tablet 3   • atorvastatin (LIPITOR) 20 MG Tab Take 1 Tablet by mouth every day. 90 Tablet 3   • warfarin (COUMADIN) 5 MG Tab Take 0.5-1 tab by mouth daily or as directed by the anticoagulation clinic. 90 Tablet 1   • chlorhexidine (PERIDEX) 0.12 % Solution Take 15 mL by mouth every day. Swish and spit     • venlafaxine (EFFEXOR-XR) 150 MG extended-release capsule Take 150 mg by mouth every evening.     • albuterol 108 (90 Base) MCG/ACT Aero Soln inhalation aerosol Inhale 2 Puffs every four hours as needed for Shortness of Breath.     • methocarbamol (ROBAXIN) 750 MG Tab Take 750 mg by mouth 3 times a day as needed (Muscle pain).     • ARIPiprazole (ABILIFY) 10 MG Tab Take 10 mg by mouth every day.     • potassium chloride SA (KDUR) 20 MEQ Tab CR Take 1 Tab by mouth every day. 100 Tab 3   • venlafaxine XR (EFFEXOR XR) 75 MG CAPSULE SR 24 HR Take 75 mg by mouth every day.     • traZODone (DESYREL) 100 MG Tab Take 100 mg by mouth every evening.     • pregabalin (LYRICA) 200 MG capsule Take 200 mg by mouth 3 times a day.     • [DISCONTINUED] amiodarone (CORDARONE) 200 MG Tab TAKE ONE TABLET BY MOUTH ONCE A DAY 90 Tablet 1   • [DISCONTINUED] atorvastatin (LIPITOR) 20 MG Tab TAKE ONE TABLET BY MOUTH ONCE A DAY 90 Tablet 3   • oxyCODONE immediate release (ROXICODONE) 10 MG immediate release tablet Take 10 mg by mouth 3 times a day. TID with MS Contin     • morphine SR (MS CONTIN) 15 MG TB12 Take 1 Tab by mouth 3 times a day. 60 Tab 0     No  "facility-administered encounter medications on file as of 2/11/2022.     Review of Systems   Constitutional: Negative for chills and fever.   Respiratory: Positive for shortness of breath. Negative for cough, hemoptysis, sputum production and wheezing.    Cardiovascular: Positive for palpitations and orthopnea. Negative for chest pain, claudication, leg swelling and PND.   Gastrointestinal: Negative for nausea and vomiting.   Neurological: Negative for dizziness and headaches.   All other systems reviewed and are negative.             Objective     /82 (BP Location: Right arm, Patient Position: Sitting, BP Cuff Size: Adult)   Pulse 82   Resp 16   Ht 1.727 m (5' 8\")   Wt (!) 138 kg (305 lb 3.2 oz)   LMP 01/01/2010   SpO2 93%   BMI 46.41 kg/m²     Physical Exam  Vitals and nursing note reviewed.   Constitutional:       Appearance: She is well-developed.   Neck:      Vascular: No JVD.   Cardiovascular:      Rate and Rhythm: Normal rate and regular rhythm.      Heart sounds: Normal heart sounds. No murmur heard.      Pulmonary:      Effort: Pulmonary effort is normal.      Breath sounds: Normal breath sounds.   Abdominal:      Palpations: Abdomen is soft.   Musculoskeletal:      Cervical back: Neck supple.   Skin:     General: Skin is warm and dry.   Neurological:      Comments: Cranial nerves II-XII WNL   Psychiatric:         Behavior: Behavior normal.         Thought Content: Thought content normal.         Judgment: Judgment normal.            EKG:  My personal interpretation of the EKG dated 7/2/2021 is sinus rhythm, rate 62.  IVCD.     Echocardiogram: 3/29/2018  Systolic function is difficult to assess in rapid atrial fibrillation,   but appears normal.  Severely dilated right ventricle.  Reduced right ventricular systolic function.  Mildly dilated left atrium.  Known mitral valve bioprosthesis which is functioning normally.  Estimated right ventricular systolic pressure  is 35 mmHg + JVP.  Compared to " previous echocardiogram from 8/22/2016 the rhythm is now rapid atrial fibrillation. Estimated right-sided pressures are lower in this study, and the mitral bioprosthesis continues to function   normally.     Transesophageal echocardiogram 4/2/2018  Low normal LV systolic function, EF 50%.  Moderate RV dilation with reduced systolic function.  Mild biatrial enlargement.  Spontaneous echo contrast seen in the left atrial appendage but no   thrombus.  There is a mitral valve ring in place consistent with history of prior   repair, normal gradients, mild MR.  Compared to prior transthoracic echocardiogram, LVEF appears low   normal. RV still appears somewhat dilated with reduced function. The   mitral valve repair does not appear to be causing any hemodynamic   issues.     Cardiac surgery 11/17/2011  DATE OF OPERATION:  11/17/2011     REFERRING PHYSICIAN:  Wilberto Cheatham MD  PREOPERATIVE DIAGNOSES:  Large left atrial myxoma, mitral regurgitation,  hypertension, chronic back pain, congestive heart failure ([place]  Heart Association Class III), sciatica, depression, history of cocaine  and narcotic abuse, and tobacco abuse.  POSTOPERATIVE DIAGNOSES:  Large left atrial myxoma, mitral  regurgitation, hypertension, chronic back pain, congestive heart failure  ([place] Heart Association Class III), sciatica, depression, history of  cocaine and narcotic abuse, and tobacco abuse.  OPERATION PERFORMED:  Resection of large left atrial myxoma, mitral  valve repair (32-mm C-E flexible annuloplasty band), and intraoperative  transesophageal echocardiography.        Imaging  Chest X-Ray: Not done     MPI 7/25/2014   Normal left ventricular wall motion.  LV ejection fraction = 78%.   ECG Interpretation will be added as an addendum to this                       report.    No evidence of significant jeopardized viable myocardium or prior myocardial    infarction.    Normal left ventricular size, ejection fraction, and wall motion.     No EKG changes    No prior for comparison     Assessment & Plan     1. PAF (paroxysmal atrial fibrillation) (CMS-HCC)  amiodarone (CORDARONE) 200 MG Tab   2. Mixed hyperlipidemia  atorvastatin (LIPITOR) 20 MG Tab   3. High risk medication use     4. Sick sinus syndrome (HCC)     5. Pacemaker     6. Chronic right-sided heart failure (HCC)     7. Status post mitral valve repair         Medical Decision Making: Today's Assessment/Status/Plan:   1.  Pacemaker check today, has 1-4.5 years left of the battery some a and V pacing with no episodes and no mode changes.  2.  Refill amiodarone 200 mg daily and atorvastatin 20 mg every evening  3.  Continue warfarin as directed  4.  Patient will follow-up in 6 months with PPM check

## 2022-02-14 ENCOUNTER — ANTICOAGULATION VISIT (OUTPATIENT)
Dept: VASCULAR LAB | Facility: MEDICAL CENTER | Age: 62
End: 2022-02-14
Attending: INTERNAL MEDICINE
Payer: MEDICARE

## 2022-02-14 DIAGNOSIS — D68.69 SECONDARY HYPERCOAGULABLE STATE (HCC): ICD-10-CM

## 2022-02-14 DIAGNOSIS — Z98.890 STATUS POST MITRAL VALVE REPAIR: ICD-10-CM

## 2022-02-14 DIAGNOSIS — I48.0 PAF (PAROXYSMAL ATRIAL FIBRILLATION) (HCC): ICD-10-CM

## 2022-02-14 LAB — INR PPP: 2.8 (ref 2–3.5)

## 2022-02-14 PROCEDURE — 99211 OFF/OP EST MAY X REQ PHY/QHP: CPT | Performed by: NURSE PRACTITIONER

## 2022-02-14 PROCEDURE — 85610 PROTHROMBIN TIME: CPT

## 2022-02-14 NOTE — PROGRESS NOTES
Anticoagulation Summary  As of 2022    INR goal:  2.0-3.0   TTR:  40.8 % (3.8 y)   INR used for dosin.80 (2022)   Warfarin maintenance plan:  2.5 mg (5 mg x 0.5) every Tue; 5 mg (5 mg x 1) all other days   Weekly warfarin total:  32.5 mg   Plan last modified:  SARA KellyPPriscillaNPriscilla (2021)   Next INR check:  3/7/2022   Target end date:  Indefinite    Indications    PAF (paroxysmal atrial fibrillation) (CMS-HCC) [I48.0]  Status post mitral valve repair [Z98.890]  Long term current use of anticoagulant therapy (Resolved) [Z79.01]  Secondary hypercoagulable state (HCC) [D68.69]             Anticoagulation Episode Summary     INR check location:      Preferred lab:      Send INR reminders to:      Comments:  Right arm only for BP checks      Anticoagulation Care Providers     Provider Role Specialty Phone number    [facility] Responsible  [phone]                Refer to Patient Findings for HPI:  Patient Findings     Positives:  Change in alcohol use    Negatives:  Signs/symptoms of thrombosis, Signs/symptoms of bleeding, Laboratory test error suspected, Change in health, Change in activity, Upcoming invasive procedure, Emergency department visit, Upcoming dental procedure, Missed doses, Extra doses, Change in medications, Change in diet/appetite, Hospital admission, Bruising, Other complaints    Comments:  Is a chronic drinker. Reports drinking the same amount daily. Understands she has a higher risk of bleeding with ETOH.          There were no vitals filed for this visit.   pt declined vitals    Verified current warfarin dosing schedule.    Medications reconciled   Pt is not on antiplatelet therapy      A/P   INR  -therapeutic.     Warfarin dosing recommendation: Continue current regimen.    Pt educated to contact our clinic with any changes in medications or s/s of bleeding or thrombosis. Pt is aware to seek immediate medical attention for falls, head injury or deep  cuts.    Follow up appointment in 3 week(s).    JOAQUINA Kelly.

## 2022-02-16 LAB — INR BLD: 2.8 (ref 0.9–1.2)

## 2022-03-09 ENCOUNTER — ANTICOAGULATION VISIT (OUTPATIENT)
Dept: VASCULAR LAB | Facility: MEDICAL CENTER | Age: 62
End: 2022-03-09
Attending: INTERNAL MEDICINE
Payer: MEDICARE

## 2022-03-09 DIAGNOSIS — I48.0 PAF (PAROXYSMAL ATRIAL FIBRILLATION) (HCC): ICD-10-CM

## 2022-03-09 DIAGNOSIS — D68.69 SECONDARY HYPERCOAGULABLE STATE (HCC): ICD-10-CM

## 2022-03-09 DIAGNOSIS — Z98.890 STATUS POST MITRAL VALVE REPAIR: ICD-10-CM

## 2022-03-09 LAB
INR BLD: 1.6 (ref 0.9–1.2)
INR PPP: 1.6 (ref 2–3.5)

## 2022-03-09 PROCEDURE — 99212 OFFICE O/P EST SF 10 MIN: CPT | Performed by: NURSE PRACTITIONER

## 2022-03-09 PROCEDURE — 85610 PROTHROMBIN TIME: CPT

## 2022-03-09 NOTE — PROGRESS NOTES
Anticoagulation Summary  As of 3/9/2022    INR goal:  2.0-3.0   TTR:  41.3 % (3.9 y)   INR used for dosin.60 (3/9/2022)   Warfarin maintenance plan:  2.5 mg (5 mg x 0.5) every Tue; 5 mg (5 mg x 1) all other days   Weekly warfarin total:  32.5 mg   Plan last modified:  SARA KellyPSARITA (2021)   Next INR check:  3/23/2022   Target end date:  Indefinite    Indications    PAF (paroxysmal atrial fibrillation) (CMS-HCC) [I48.0]  Status post mitral valve repair [Z98.890]  Long term current use of anticoagulant therapy (Resolved) [Z79.01]  Secondary hypercoagulable state (HCC) [D68.69]             Anticoagulation Episode Summary     INR check location:      Preferred lab:      Send INR reminders to:      Comments:  Right arm only for BP checks      Anticoagulation Care Providers     Provider Role Specialty Phone number    [facility] Responsible  [phone]                Refer to Patient Findings for HPI:  Patient Findings     Negatives:  Signs/symptoms of thrombosis, Signs/symptoms of bleeding, Laboratory test error suspected, Change in health, Change in alcohol use, Change in activity, Upcoming invasive procedure, Emergency department visit, Upcoming dental procedure, Missed doses, Extra doses, Change in medications, Change in diet/appetite, Hospital admission, Bruising, Other complaints          There were no vitals filed for this visit.   pt declined vitals    Verified current warfarin dosing schedule.    Medications reconciled   Pt is not on antiplatelet therapy      A/P   INR  sub-therapeutic. No hx of CVA or TIA.    Warfarin dosing recommendation: Take 7.5 mg (1.5 tabs) tonight then resume your previous regimen.    Pt educated to contact our clinic with any changes in medications or s/s of bleeding or thrombosis. Pt is aware to seek immediate medical attention for falls, head injury or deep cuts.    Follow up appointment in 2 week(s) per pt's request.    SARA KellyPSARITA

## 2022-03-23 ENCOUNTER — APPOINTMENT (OUTPATIENT)
Dept: VASCULAR LAB | Facility: MEDICAL CENTER | Age: 62
End: 2022-03-23
Attending: INTERNAL MEDICINE
Payer: MEDICARE

## 2022-03-28 ENCOUNTER — TELEPHONE (OUTPATIENT)
Dept: CARDIOLOGY | Facility: MEDICAL CENTER | Age: 62
End: 2022-03-28
Payer: MEDICARE

## 2022-03-28 DIAGNOSIS — I27.20 PULMONARY HYPERTENSION (HCC): ICD-10-CM

## 2022-03-28 DIAGNOSIS — I50.812 CHRONIC RIGHT-SIDED HEART FAILURE (HCC): ICD-10-CM

## 2022-03-28 NOTE — TELEPHONE ENCOUNTER
"Received progress note from [facility] on 3/25 from Venkatesh Shore NP. She states \"Dr. Cisneros recommended starting Spironolactone 25 mg daily and SGLT2 inhibitor for Ms. Velarde. We checked with her insurance that PA is not required for Farxiga or Jardiance. Would you please kindly initiate?\"     Dr. Cisneros's note is viewable in pt's chart. It does not state anything about switching Warfarin, it states to continue Warfarin.     To DB, please advise on Spironolactone and SGLT2 inhibitor.       "

## 2022-03-28 NOTE — TELEPHONE ENCOUNTER
DS    Pt called stating DB was supposed to change her medication from warfarin to something different as suggested by [facility]. Please call Pt back at [phone].    Thank you

## 2022-03-30 ENCOUNTER — ANTICOAGULATION VISIT (OUTPATIENT)
Dept: VASCULAR LAB | Facility: MEDICAL CENTER | Age: 62
End: 2022-03-30
Attending: INTERNAL MEDICINE
Payer: MEDICARE

## 2022-03-30 DIAGNOSIS — D68.69 SECONDARY HYPERCOAGULABLE STATE (HCC): ICD-10-CM

## 2022-03-30 DIAGNOSIS — Z98.890 STATUS POST MITRAL VALVE REPAIR: ICD-10-CM

## 2022-03-30 DIAGNOSIS — I48.0 PAF (PAROXYSMAL ATRIAL FIBRILLATION) (HCC): ICD-10-CM

## 2022-03-30 LAB — INR PPP: 3.2 (ref 2–3.5)

## 2022-03-30 PROCEDURE — 85610 PROTHROMBIN TIME: CPT

## 2022-03-30 PROCEDURE — 99212 OFFICE O/P EST SF 10 MIN: CPT | Performed by: NURSE PRACTITIONER

## 2022-03-30 NOTE — PROGRESS NOTES
Anticoagulation Summary  As of 3/30/2022    INR goal:  2.0-3.0   TTR:  41.6 % (3.9 y)   INR used for dosing:  3.20 (3/30/2022)   Warfarin maintenance plan:  2.5 mg (5 mg x 0.5) every Tue; 5 mg (5 mg x 1) all other days   Weekly warfarin total:  32.5 mg   Plan last modified:  SARA KellyPSARITA (2/22/2021)   Next INR check:  4/20/2022   Target end date:  Indefinite    Indications    PAF (paroxysmal atrial fibrillation) (CMS-HCC) [I48.0]  Status post mitral valve repair [Z98.890]  Long term current use of anticoagulant therapy (Resolved) [Z79.01]  Secondary hypercoagulable state (HCC) [D68.69]             Anticoagulation Episode Summary     INR check location:      Preferred lab:      Send INR reminders to:      Comments:  Right arm only for BP checks      Anticoagulation Care Providers     Provider Role Specialty Phone number    [facility] Responsible  [phone]                Refer to Patient Findings for HPI:  Patient Findings     Negatives:  Signs/symptoms of thrombosis, Signs/symptoms of bleeding, Laboratory test error suspected, Change in health, Change in alcohol use, Change in activity, Upcoming invasive procedure, Emergency department visit, Upcoming dental procedure, Missed doses, Extra doses, Change in medications, Change in diet/appetite, Hospital admission, Bruising, Other complaints          There were no vitals filed for this visit.   pt declined vitals    Verified current warfarin dosing schedule.    Medications reconciled   Pt is not on antiplatelet therapy      A/P   INR  supra-therapeutic at 3.2.     Warfarin dosing recommendation: Tonight take 2.5 mg (1/2 tab) then resume your previous regimen.    Pt educated to contact our clinic with any changes in medications or s/s of bleeding or thrombosis. Pt is aware to seek immediate medical attention for falls, head injury or deep cuts.    Follow up appointment in 3 week(s) per patient.    SARA KellyPSARITA

## 2022-03-31 LAB — INR BLD: 3.2 (ref 0.9–1.2)

## 2022-03-31 RX ORDER — SPIRONOLACTONE 25 MG/1
25 TABLET ORAL DAILY
Qty: 90 TABLET | Refills: 3 | Status: SHIPPED | OUTPATIENT
Start: 2022-03-31 | End: 2022-08-29 | Stop reason: SDUPTHER

## 2022-03-31 NOTE — TELEPHONE ENCOUNTER
JOAQUINA Cantu.  You 22 hours ago (11:58 AM)     Can you please explain there are no instructions to change warfarin her blood thinner. I am fine with starting spironolactone 25 mg daily another heart failure/water pill and empagliflozin 10 mg daily. Please explain jardiance can cause UTI or genital yeast infection. These happen rarely. If she has any concerns we can see her in the office before starting, but I believe [facility] likely explained these things to her.   Thanks, Sushma    Message text      Called pt and informed her. Pt states she is already on a diuretic, Torsemide as needed. Reviewed chart, pt was hospitalized last July (2021) and instructed to stop taking Torsemide. Informed pt of this. She will stop taking Torsemide PRN and start Spironolactone and also Jardiance. Rx's sent. Pt advised to call w/ any concerns.

## 2022-04-19 ENCOUNTER — TELEPHONE (OUTPATIENT)
Dept: CARDIOLOGY | Facility: MEDICAL CENTER | Age: 62
End: 2022-04-19
Payer: MEDICARE

## 2022-04-19 NOTE — TELEPHONE ENCOUNTER
MYA Hightower from [facility] , is looking for the Pace maker info, she faxed over.     Katja  - [phone]  Fax #  - [phone]    Thank you,   Edita BRO

## 2022-04-20 ENCOUNTER — ANTICOAGULATION VISIT (OUTPATIENT)
Dept: VASCULAR LAB | Facility: MEDICAL CENTER | Age: 62
End: 2022-04-20
Attending: INTERNAL MEDICINE
Payer: MEDICARE

## 2022-04-20 DIAGNOSIS — D68.69 SECONDARY HYPERCOAGULABLE STATE (HCC): ICD-10-CM

## 2022-04-20 DIAGNOSIS — Z98.890 STATUS POST MITRAL VALVE REPAIR: ICD-10-CM

## 2022-04-20 DIAGNOSIS — I48.0 PAF (PAROXYSMAL ATRIAL FIBRILLATION) (HCC): ICD-10-CM

## 2022-04-20 LAB — INR PPP: 3.6 (ref 2–3.5)

## 2022-04-20 PROCEDURE — 99212 OFFICE O/P EST SF 10 MIN: CPT | Performed by: NURSE PRACTITIONER

## 2022-04-20 PROCEDURE — 85610 PROTHROMBIN TIME: CPT

## 2022-04-20 NOTE — TELEPHONE ENCOUNTER
Jenni from [facility] called regarding the Pts device interrogation report. Pt is having surgery on 4/22/2022.  # [phone]  Fax# [phone]    Thank you

## 2022-04-20 NOTE — PROGRESS NOTES
Anticoagulation Summary  As of 4/20/2022    INR goal:  2.0-3.0   TTR:  41.0 % (4 y)   INR used for dosing:  3.60 (4/20/2022)   Warfarin maintenance plan:  2.5 mg (5 mg x 0.5) every Tue; 5 mg (5 mg x 1) all other days   Weekly warfarin total:  32.5 mg   Plan last modified:  PALMIRA Kelly (2/22/2021)   Next INR check:  5/4/2022   Target end date:  Indefinite    Indications    PAF (paroxysmal atrial fibrillation) (CMS-HCC) [I48.0]  Status post mitral valve repair [Z98.890]  Long term current use of anticoagulant therapy (Resolved) [Z79.01]  Secondary hypercoagulable state (HCC) [D68.69]             Anticoagulation Episode Summary     INR check location:      Preferred lab:      Send INR reminders to:      Comments:  Right arm only for BP checks      Anticoagulation Care Providers     Provider Role Specialty Phone number    [facility] Responsible  [phone]                Refer to Patient Findings for HPI:  Patient Findings     Positives:  Upcoming invasive procedure    Negatives:  Signs/symptoms of thrombosis, Signs/symptoms of bleeding, Laboratory test error suspected, Change in health, Change in alcohol use, Change in activity, Emergency department visit, Upcoming dental procedure, Missed doses, Extra doses, Change in medications, Change in diet/appetite, Hospital admission, Bruising, Other complaints    Comments:  Pt is scheduled to have an implanted device for sleep apnea with a specialist at [facility] on Friday. States they told her to hold her warfarin the evening prior to the procedure. CHADSVASC score = 3 (sex, htn, chf).          There were no vitals filed for this visit.   pt declined vitals    Verified current warfarin dosing schedule.    Medications reconciled   Pt is not on antiplatelet therapy      A/P   INR  supra-therapeutic. She has her procedure on Friday and is holding tomorrow's warfarin dose. Will have pt hold tonight's dose as well given low CHADS score.    Warfarin  dosing recommendation: HOLD warfarin tonight and tomorrow. Resume taking when okay by the doctor doing your procedure. Resume your previous warfarin dose.    Pt educated to contact our clinic with any changes in medications or s/s of bleeding or thrombosis. Pt is aware to seek immediate medical attention for falls, head injury or deep cuts.    Follow up appointment in 2 week(s) per pt's request.    JOAQUINA Kelly.

## 2022-04-21 LAB — INR BLD: 3.6 (ref 0.9–1.2)

## 2022-04-26 ENCOUNTER — TELEPHONE (OUTPATIENT)
Dept: CARDIOLOGY | Facility: MEDICAL CENTER | Age: 62
End: 2022-04-26
Payer: MEDICARE

## 2022-04-26 NOTE — TELEPHONE ENCOUNTER
DB  Patient needs to discuss her medication with you.The Jardiance is causing her to sweat  and is giving her the shakes. Please reach out to her at [phone].    Thank you,  Susan GOINS

## 2022-04-26 NOTE — TELEPHONE ENCOUNTER
Returned call. Pt states she cannot tolerate Jardiance as she got the shakes and profuse sweating. She stopped taking it about 3 days ago and her s/s have improved. She would like to remain off of it. Informed her that DB will be back in office on Thursday and I will let her know. If DB has any additional recommendations, we will let the pt know.     To DB, any recommendations?

## 2022-04-28 NOTE — TELEPHONE ENCOUNTER
JOAQUINA Cantu.  You 19 hours ago (2:48 PM)     Can you check with the pharmacist to see if these are common symptoms? Please ask if they have recommendations.   Thanks, Sushma    Message text      To today's in-office pharmacist

## 2022-04-29 RX ORDER — DAPAGLIFLOZIN 5 MG/1
5 TABLET, FILM COATED ORAL DAILY
Qty: 90 TABLET | Refills: 3 | Status: SHIPPED | OUTPATIENT
Start: 2022-04-29 | End: 2022-05-03

## 2022-04-29 NOTE — TELEPHONE ENCOUNTER
JOAQUINA Cantu.  You 17 hours ago (5:13 PM)     Okay for Farxiga 5 mg.  Please make sure patient discontinues her Jardiance.   Thank you, Sushma    Message text      You  PALMIRA Cantu 20 hours ago (2:03 PM)       Tam ROQUE, would you like pt to try Farxiga 5 mg daily?     Message text      Aditi Reyes PharmD  You 20 hours ago (1:58 PM)       Tam Chapa,     Her symptoms sound like hypoglycemia. Typically we do not see high rates of hypoglycemia with SGLT2 inhibitors, but it's not impossible.     I would have suggested she try the lower dose of Jardiance but saw she was already on Jardiance 10 mg daily, which is the lowest dose.     Maybe pt can try Farxiga 5 mg daily and see if she reacts the same way.     Aditi    Message text      You  Aditi Reyes PharmD 23 hours ago (11:15 AM)       Tam Pennington, do you have any input or recommendations? Thank you!    Routing comment      Rx sent. [employer] message sent to pt.

## 2022-05-02 ENCOUNTER — TELEPHONE (OUTPATIENT)
Dept: CARDIOLOGY | Facility: MEDICAL CENTER | Age: 62
End: 2022-05-02
Payer: MEDICARE

## 2022-05-02 NOTE — TELEPHONE ENCOUNTER
DB    Pt states she needs to have her prescription sent to [facility] on [address]. She thinks it is the Farxgia, 5 mg that was being called in. See previous encounter on 04/29/2022. Please update the Pharmacy on her Chart to [facility].    Any questions please call her at   PH: [phone]    Thank You,  Brittnee SQUIRES

## 2022-05-03 RX ORDER — DAPAGLIFLOZIN 5 MG/1
5 TABLET, FILM COATED ORAL DAILY
Qty: 30 TABLET | Refills: 6 | Status: SHIPPED | OUTPATIENT
Start: 2022-05-03 | End: 2023-05-02

## 2022-05-04 ENCOUNTER — ANTICOAGULATION VISIT (OUTPATIENT)
Dept: VASCULAR LAB | Facility: MEDICAL CENTER | Age: 62
End: 2022-05-04
Attending: INTERNAL MEDICINE
Payer: MEDICARE

## 2022-05-04 DIAGNOSIS — Z98.890 STATUS POST MITRAL VALVE REPAIR: ICD-10-CM

## 2022-05-04 DIAGNOSIS — D68.69 SECONDARY HYPERCOAGULABLE STATE (HCC): ICD-10-CM

## 2022-05-04 DIAGNOSIS — I48.0 PAF (PAROXYSMAL ATRIAL FIBRILLATION) (HCC): ICD-10-CM

## 2022-05-04 LAB — INR PPP: 1.7 (ref 2–3.5)

## 2022-05-04 PROCEDURE — 99212 OFFICE O/P EST SF 10 MIN: CPT | Performed by: NURSE PRACTITIONER

## 2022-05-04 PROCEDURE — 85610 PROTHROMBIN TIME: CPT

## 2022-05-04 NOTE — PROGRESS NOTES
Anticoagulation Summary  As of 2022    INR goal:  2.0-3.0   TTR:  41.1 % (4 y)   INR used for dosin.70 (2022)   Warfarin maintenance plan:  2.5 mg (5 mg x 0.5) every Tue; 5 mg (5 mg x 1) all other days   Weekly warfarin total:  32.5 mg   Plan last modified:  PALMIRA Kelly (2021)   Next INR check:  2022   Target end date:  Indefinite    Indications    PAF (paroxysmal atrial fibrillation) (CMS-HCC) [I48.0]  Status post mitral valve repair [Z98.890]  Long term current use of anticoagulant therapy (Resolved) [Z79.01]  Secondary hypercoagulable state (HCC) [D68.69]             Anticoagulation Episode Summary     INR check location:      Preferred lab:      Send INR reminders to:      Comments:  Right arm only for BP checks      Anticoagulation Care Providers     Provider Role Specialty Phone number    [facility] Responsible  [phone]                Refer to Patient Findings for HPI:  Patient Findings     Negatives:  Signs/symptoms of thrombosis, Signs/symptoms of bleeding, Laboratory test error suspected, Change in health, Change in alcohol use, Change in activity, Upcoming invasive procedure, Emergency department visit, Upcoming dental procedure, Missed doses, Change in medications, Change in diet/appetite, Hospital admission, Bruising, Other complaints          There were no vitals filed for this visit.   pt declined vitals    Verified current warfarin dosing schedule.    Medications reconciled   Pt is not on antiplatelet therapy      A/P   INR  sub-therapeutic.     Warfarin dosing recommendation: Tonight take 7.5 mg, then resume your previous regimen.    Pt educated to contact our clinic with any changes in medications or s/s of bleeding or thrombosis. Pt is aware to seek immediate medical attention for falls, head injury or deep cuts.    Follow up appointment in 3 week(s) per pt's request.    PALMIRA Kelly

## 2022-05-05 LAB — INR BLD: 1.7 (ref 0.9–1.2)

## 2022-05-25 ENCOUNTER — ANTICOAGULATION VISIT (OUTPATIENT)
Dept: VASCULAR LAB | Facility: MEDICAL CENTER | Age: 62
End: 2022-05-25
Attending: INTERNAL MEDICINE
Payer: MEDICARE

## 2022-05-25 DIAGNOSIS — D68.69 SECONDARY HYPERCOAGULABLE STATE (HCC): ICD-10-CM

## 2022-05-25 DIAGNOSIS — I48.0 PAF (PAROXYSMAL ATRIAL FIBRILLATION) (HCC): ICD-10-CM

## 2022-05-25 DIAGNOSIS — Z98.890 STATUS POST MITRAL VALVE REPAIR: ICD-10-CM

## 2022-05-25 LAB
INR BLD: 7.4 (ref 0.9–1.2)
INR PPP: 7.4 (ref 2–3.5)

## 2022-05-25 PROCEDURE — 85610 PROTHROMBIN TIME: CPT

## 2022-05-25 PROCEDURE — 99212 OFFICE O/P EST SF 10 MIN: CPT

## 2022-05-25 NOTE — PROGRESS NOTES
Anticoagulation Summary  As of 2022    INR goal:  2.0-3.0   TTR:  40.5 % (4.1 y)   INR used for dosin.40 (2022)   Warfarin maintenance plan:  2.5 mg (5 mg x 0.5) every Tue; 5 mg (5 mg x 1) all other days   Weekly warfarin total:  32.5 mg   Plan last modified:  SARA KellyPPriscillaNPriscilla (2021)   Next INR check:  2022   Target end date:  Indefinite    Indications    PAF (paroxysmal atrial fibrillation) (CMS-HCC) [I48.0]  Status post mitral valve repair [Z98.890]  Long term current use of anticoagulant therapy (Resolved) [Z79.01]  Secondary hypercoagulable state (HCC) [D68.69]             Anticoagulation Episode Summary     INR check location:      Preferred lab:      Send INR reminders to:      Comments:  Right arm only for BP checks      Anticoagulation Care Providers     Provider Role Specialty Phone number    [facility] Responsible  [phone]                Refer to Patient Findings for HPI:  Patient Findings     Positives:  Change in alcohol use (had a beer prior to this appt)    Negatives:  Signs/symptoms of thrombosis, Signs/symptoms of bleeding, Laboratory test error suspected, Change in health, Change in activity, Upcoming invasive procedure, Emergency department visit, Upcoming dental procedure, Missed doses, Extra doses, Change in medications, Change in diet/appetite, Hospital admission, Bruising, Other complaints          There were no vitals filed for this visit.   pt declined vitals    Verified current warfarin dosing schedule.    Medications reconciled       A/P   INR  supra-therapeutic. Pt declined 2nd POCT and declined venipuncture draw.    Warfarin dosing recommendation: Hold warfarin x 3 days then continue regimen.    Pt educated to contact our clinic with any changes in medications or s/s of bleeding or thrombosis. Pt is aware to seek immediate medical attention for falls, head injury or deep cuts.    Follow up appointment in 1 week(s). Asked pt to be seen  in 48 hrs but pt declined to be seen sooner.    Aditi Reyes, PharmD

## 2022-06-01 ENCOUNTER — ANTICOAGULATION VISIT (OUTPATIENT)
Dept: VASCULAR LAB | Facility: MEDICAL CENTER | Age: 62
End: 2022-06-01
Attending: INTERNAL MEDICINE
Payer: MEDICARE

## 2022-06-01 DIAGNOSIS — I48.0 PAF (PAROXYSMAL ATRIAL FIBRILLATION) (HCC): ICD-10-CM

## 2022-06-01 DIAGNOSIS — D68.69 SECONDARY HYPERCOAGULABLE STATE (HCC): ICD-10-CM

## 2022-06-01 DIAGNOSIS — Z98.890 STATUS POST MITRAL VALVE REPAIR: ICD-10-CM

## 2022-06-01 LAB
INR BLD: 2.4 (ref 0.9–1.2)
INR PPP: 2.4 (ref 2–3.5)

## 2022-06-01 PROCEDURE — 99211 OFF/OP EST MAY X REQ PHY/QHP: CPT | Performed by: NURSE PRACTITIONER

## 2022-06-01 PROCEDURE — 85610 PROTHROMBIN TIME: CPT

## 2022-06-01 NOTE — PROGRESS NOTES
Anticoagulation Summary  As of 2022    INR goal:  2.0-3.0   TTR:  40.5 % (4.1 y)   INR used for dosin.40 (2022)   Warfarin maintenance plan:  2.5 mg (5 mg x 0.5) every Tue; 5 mg (5 mg x 1) all other days   Weekly warfarin total:  32.5 mg   Plan last modified:  Brittnee Cisneros APriscillaP.NPriscilla (2021)   Next INR check:  6/15/2022   Target end date:  Indefinite    Indications    PAF (paroxysmal atrial fibrillation) (CMS-HCC) [I48.0]  Status post mitral valve repair [Z98.890]  Long term current use of anticoagulant therapy (Resolved) [Z79.01]  Secondary hypercoagulable state (HCC) [D68.69]             Anticoagulation Episode Summary     INR check location:      Preferred lab:      Send INR reminders to:      Comments:  Right arm only for BP checks      Anticoagulation Care Providers     Provider Role Specialty Phone number    [facility] Responsible  [phone]                Refer to Patient Findings for HPI:  Patient Findings     Positives:  Change in alcohol use    Negatives:  Signs/symptoms of thrombosis, Signs/symptoms of bleeding, Laboratory test error suspected, Change in health, Change in activity, Upcoming invasive procedure, Emergency department visit, Upcoming dental procedure, Missed doses, Extra doses, Change in medications, Change in diet/appetite, Hospital admission, Bruising, Other complaints    Comments:  Pt reports having an extra beer last week the day of her INR. Has reduced her intake this week. Is aware of the risk of bleeding and knows she should completely avoid ETOH.          There were no vitals filed for this visit.   pt declined vitals    Verified current warfarin dosing schedule.    Medications reconciled   Pt is not on antiplatelet therapy      A/P   INR  -therapeutic. INR down from 7.4 with three dose holds.    Warfarin dosing recommendation: Resume taking 2.5 mg (1/2 tab) on , 5 mg all other days.    Pt educated to contact our clinic with any changes  in medications or s/s of bleeding or thrombosis. Pt is aware to seek immediate medical attention for falls, head injury or deep cuts.    Follow up appointment in 2 week(s) per pt's request even though I recommend sooner follow up given recent elevated INR.    JOAQUINA Kelly.

## 2022-06-16 ENCOUNTER — ANTICOAGULATION VISIT (OUTPATIENT)
Dept: VASCULAR LAB | Facility: MEDICAL CENTER | Age: 62
End: 2022-06-16
Attending: INTERNAL MEDICINE
Payer: MEDICARE

## 2022-06-16 DIAGNOSIS — Z98.890 STATUS POST MITRAL VALVE REPAIR: ICD-10-CM

## 2022-06-16 DIAGNOSIS — I48.0 PAF (PAROXYSMAL ATRIAL FIBRILLATION) (HCC): ICD-10-CM

## 2022-06-16 DIAGNOSIS — D68.69 SECONDARY HYPERCOAGULABLE STATE (HCC): ICD-10-CM

## 2022-06-16 LAB — INR PPP: 3.5 (ref 2–3.5)

## 2022-06-16 PROCEDURE — 85610 PROTHROMBIN TIME: CPT

## 2022-06-16 PROCEDURE — 99212 OFFICE O/P EST SF 10 MIN: CPT | Performed by: NURSE PRACTITIONER

## 2022-06-16 NOTE — PROGRESS NOTES
Anticoagulation Summary  As of 6/16/2022    INR goal:  2.0-3.0   TTR:  40.6 % (4.1 y)   INR used for dosing:  3.50 (6/16/2022)   Warfarin maintenance plan:  2.5 mg (5 mg x 0.5) every Tue; 5 mg (5 mg x 1) all other days   Weekly warfarin total:  32.5 mg   Plan last modified:  SARA KellyPSARITA (2/22/2021)   Next INR check:  6/29/2022   Target end date:  Indefinite    Indications    PAF (paroxysmal atrial fibrillation) (CMS-HCC) [I48.0]  Status post mitral valve repair [Z98.890]  Long term current use of anticoagulant therapy (Resolved) [Z79.01]  Secondary hypercoagulable state (HCC) [D68.69]             Anticoagulation Episode Summary     INR check location:      Preferred lab:      Send INR reminders to:      Comments:  Right arm only for BP checks      Anticoagulation Care Providers     Provider Role Specialty Phone number    [facility] Responsible  [phone]                Refer to Patient Findings for HPI:  Patient Findings     Negatives:  Signs/symptoms of thrombosis, Signs/symptoms of bleeding, Laboratory test error suspected, Change in health, Change in alcohol use, Change in activity, Upcoming invasive procedure, Emergency department visit, Upcoming dental procedure, Missed doses, Extra doses, Change in medications, Change in diet/appetite, Hospital admission, Bruising, Other complaints          There were no vitals filed for this visit.   pt declined vitals    Verified current warfarin dosing schedule.    Medications reconciled   Pt is not on antiplatelet therapy      A/P   INR  supra-therapeutic. Pt again counseled about the risks with ETOH.     Warfarin dosing recommendation: Tonight take 2.5 mg then resume your usual regimen.    Pt educated to contact our clinic with any changes in medications or s/s of bleeding or thrombosis. Pt is aware to seek immediate medical attention for falls, head injury or deep cuts.    Follow up appointment in 2 week(s).    PALMIRA Kelly

## 2022-06-17 LAB — INR BLD: 3.5 (ref 0.9–1.2)

## 2022-06-29 ENCOUNTER — ANTICOAGULATION VISIT (OUTPATIENT)
Dept: VASCULAR LAB | Facility: MEDICAL CENTER | Age: 62
End: 2022-06-29
Attending: INTERNAL MEDICINE
Payer: MEDICARE

## 2022-06-29 DIAGNOSIS — Z98.890 STATUS POST MITRAL VALVE REPAIR: ICD-10-CM

## 2022-06-29 DIAGNOSIS — D68.69 SECONDARY HYPERCOAGULABLE STATE (HCC): ICD-10-CM

## 2022-06-29 DIAGNOSIS — I48.0 PAF (PAROXYSMAL ATRIAL FIBRILLATION) (HCC): ICD-10-CM

## 2022-06-29 LAB
INR BLD: 3.9 (ref 0.9–1.2)
INR PPP: 3.9 (ref 2–3.5)

## 2022-06-29 PROCEDURE — 85610 PROTHROMBIN TIME: CPT

## 2022-06-29 PROCEDURE — 99212 OFFICE O/P EST SF 10 MIN: CPT | Performed by: PHARMACIST

## 2022-06-29 RX ORDER — MORPHINE SULFATE 15 MG/1
TABLET, FILM COATED, EXTENDED RELEASE ORAL
COMMUNITY
Start: 2022-06-22 | End: 2022-08-29

## 2022-06-29 RX ORDER — METHOCARBAMOL 750 MG/1
750 TABLET, FILM COATED ORAL
COMMUNITY
End: 2022-08-29

## 2022-06-29 RX ORDER — POTASSIUM CHLORIDE 750 MG/1
20 TABLET, FILM COATED, EXTENDED RELEASE ORAL
COMMUNITY
End: 2022-08-29

## 2022-06-29 NOTE — PROGRESS NOTES
Anticoagulation Summary  As of 6/29/2022    INR goal:  2.0-3.0   TTR:  40.2 % (4.2 y)   INR used for dosing:  3.90 (6/29/2022)   Warfarin maintenance plan:  2.5 mg (5 mg x 0.5) every Tue; 5 mg (5 mg x 1) all other days   Weekly warfarin total:  32.5 mg   Plan last modified:  Brittnee Cisneros A.P.NPriscilla (2/22/2021)   Next INR check:  7/20/2022   Target end date:  Indefinite    Indications    PAF (paroxysmal atrial fibrillation) (CMS-HCC) [I48.0]  Status post mitral valve repair [Z98.890]  Long term current use of anticoagulant therapy (Resolved) [Z79.01]  Secondary hypercoagulable state (HCC) [D68.69]             Anticoagulation Episode Summary     INR check location:      Preferred lab:      Send INR reminders to:      Comments:  Right arm only for BP checks      Anticoagulation Care Providers     Provider Role Specialty Phone number    [facility] Responsible  [phone]                Refer to Patient Findings for HPI:  Patient Findings     Negatives:  Signs/symptoms of thrombosis, Signs/symptoms of bleeding, Laboratory test error suspected, Change in health, Change in alcohol use, Change in activity, Upcoming invasive procedure, Emergency department visit, Upcoming dental procedure, Missed doses, Extra doses, Change in medications, Change in diet/appetite, Hospital admission, Bruising, Other complaints          There were no vitals filed for this visit.  pt declined vitals    Verified current warfarin dosing schedule.    Medications reconciled   Pt is not on antiplatelet therapy      A/P   INR  remains supra-therapeutic at 3.9.     Warfarin dosing recommendation: Instructed patient HOLD X 1, then resume current warfarin regimen.    Pt educated to contact our clinic with any changes in medications or s/s of bleeding or thrombosis. Pt is aware to seek immediate medical attention for falls, head injury or deep cuts.    Follow up appointment in 3 week(s).    Enzo Tinsley, LeonoraD, BCACP

## 2022-07-11 ENCOUNTER — OFFICE VISIT (OUTPATIENT)
Dept: SLEEP MEDICINE | Facility: MEDICAL CENTER | Age: 62
End: 2022-07-11
Payer: MEDICARE

## 2022-07-11 VITALS
SYSTOLIC BLOOD PRESSURE: 134 MMHG | WEIGHT: 293 LBS | DIASTOLIC BLOOD PRESSURE: 78 MMHG | BODY MASS INDEX: 44.41 KG/M2 | HEIGHT: 68 IN | OXYGEN SATURATION: 93 % | RESPIRATION RATE: 16 BRPM | HEART RATE: 101 BPM

## 2022-07-11 DIAGNOSIS — G47.31 COMPLEX SLEEP APNEA SYNDROME: Chronic | ICD-10-CM

## 2022-07-11 ASSESSMENT — FIBROSIS 4 INDEX: FIB4 SCORE: 2.92

## 2022-07-11 NOTE — PROGRESS NOTES
[facility] Follow Up Note     Date: 7/11/2022 / Time: 1:15 PM    Patient ID:   Name:             Kailey Velarde   YOB: 1960  Age:                 62 y.o.  female   MRN:               [id number]      Thank you for requesting a sleep medicine consultation on Kailey Velarde at the sleep center. She presents today with the chief complaints of complex sleep apnea and Remede activation follow up.     HISTORY OF PRESENT ILLNESS:       Pt was on BiPAP ST 15/9 cm BR 14 BPM and O2 bleed in at 3 LPM. She had Remede implant at [facility] on She denies any symptoms of RLS, narcolepsy or any symptoms to suggest parasomnias such as nightmares, sleep walking or acting out of dreams.        SLEEP HISTORY   PSG titration study from 4/8/21 indicated central sleep apnea. Sleep related hypoxia. The PAP titration was initiated with BiPAP ST 14/10 cm of water 10 BPM  and the pressure which was slowly titrated up in an attempt to eliminate sleep disordered breathing and snoring. The final pressure tested during the study was BiPAP 21/17 cm water and 16 BPM. Lowest bedt tolerated pressure was BiPAP ST 19/14 cm 16 BPM and O2 bleed in.A final pressure the patient was observed in the supine position  and in the REM sleep stage. The apnea hypopnea index improved to 2.1 per hour and O2 yogesh 85%.  She spent 256 min of sleep time below 89% O2 saturation. Snoring was resolved.      PSG titration from 2/23/21 indicated severe complex sleep apnea with AHI of 139.7/hr and O2 yogesh 73 %. Due to severity of the disease she met the split study protocol. The titration started with CPAP 5 cm and the highest tested pressure was BiPAP ST 17/12  Cm back up rate 14. The AHI was 122/hr. Sleep related hypoxia.      PSG titration from 5/16/20 indicated Complex sleep apnea. Sleep related hypoxia.  The PAP titration was initiated with BiPAP 9/5 cm of water and the pressure which was slowly titrated up in an attempt to  eliminate sleep disordered breathing and snoring. The final pressure tested during the study was BiPAP 22/17 cm water. It was an incomplete titration, the best tolerated pressurse was BiPAP 18/14cm. At this pressure the patient was observed in the supine in the REM sleep stage. The apnea hypopnea index improved to 14.1 per hour and O2 yogesh 83%. The average O2 stauration was 88%. She spent 89 min of sleep time below 89% O2 saturation.     PSG study from 11/20/19 indicated severe central with AHI of 146/hr and O2 yogesh 76 %. 90% of the apneas were central apneas. Severe sleep hypoxia. Time spent with oxygen saturations below 89% was 264.1 minutes.     Echo from 3/29/18 indicated systolic function is difficult to assess in rapid atrial fibrillation, but appears normal. Severely dilated right ventricle. Reduced right ventricular systolic function. Mildly dilated left atrium. Known mitral valve bioprosthesis which is functioning normally. Estimated right ventricular systolic pressure is 35 mmHg + JVP. Compared to previous echocardiogram from 8/22/2016 the rhythm is now  rapid atrial fibrillation. Estimated right-sided pressures are lower in this study, and the mitral bioprosthesis continues to function normally.      REVIEW OF SYSTEMS:       Constitutional: Denies fevers, Denies weight changes  Eyes: Denies changes in vision, no eye pain  Ears/Nose/Throat/Mouth: Denies nasal congestion or sore throat   Cardiovascular: Denies chest pain or palpitations   Respiratory: Denies shortness of breath , Denies cough  Gastrointestinal/Hepatic: Denies abdominal pain, nausea, vomiting, diarrhea, constipation or GI bleeding   Genitourinary: Deniesdysuria or frequency  Musculoskeletal/Rheum: Denies  joint pain and swelling   Skin/Breast: Denies rash,   Neurological: Denies headache, confusion, memory loss or focal weakness/parasthesias  Psychiatric: denies mood disorder       Comprehensive review of systems form is reviewed with the  patient and is attached in the EMR.     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    She has no past medical history of Unspecified urinary incontinence.  MEDS:   Current Outpatient Medications:     morphine ER (MS CONTIN) 15 MG Tab CR tablet, , Disp: , Rfl:     methocarbamol (ROBAXIN) 750 MG Tab, Take 750 mg by mouth., Disp: , Rfl:     potassium chloride ER (KLOR-CON) 10 MEQ tablet, Take 20 mEq by mouth., Disp: , Rfl:     Dapagliflozin Propanediol (FARXIGA) 5 MG Tab, Take 5 mg by mouth every day., Disp: 30 Tablet, Rfl: 6    spironolactone (ALDACTONE) 25 MG Tab, Take 1 Tablet by mouth every day., Disp: 90 Tablet, Rfl: 3    amiodarone (CORDARONE) 200 MG Tab, Take 1 Tablet by mouth every day., Disp: 90 Tablet, Rfl: 3    atorvastatin (LIPITOR) 20 MG Tab, Take 1 Tablet by mouth every day., Disp: 90 Tablet, Rfl: 3    warfarin (COUMADIN) 5 MG Tab, Take 0.5-1 tab by mouth daily or as directed by the anticoagulation clinic., Disp: 90 Tablet, Rfl: 1    chlorhexidine (PERIDEX) 0.12 % Solution, Take 15 mL by mouth every day. Swish and spit, Disp: , Rfl:     venlafaxine (EFFEXOR-XR) 150 MG extended-release capsule, Take 150 mg by mouth every evening., Disp: , Rfl:     albuterol 108 (90 Base) MCG/ACT Aero Soln inhalation aerosol, Inhale 2 Puffs every four hours as needed for Shortness of Breath., Disp: , Rfl:     methocarbamol (ROBAXIN) 750 MG Tab, Take 750 mg by mouth 3 times a day as needed (Muscle pain)., Disp: , Rfl:     ARIPiprazole (ABILIFY) 10 MG Tab, Take 10 mg by mouth every day., Disp: , Rfl:     potassium chloride SA (KDUR) 20 MEQ Tab CR, Take 1 Tab by mouth every day., Disp: 100 Tab, Rfl: 3    venlafaxine XR (EFFEXOR XR) 75 MG  "CAPSULE SR 24 HR, Take 75 mg by mouth every day., Disp: , Rfl:     traZODone (DESYREL) 100 MG Tab, Take 100 mg by mouth every evening., Disp: , Rfl:     oxyCODONE immediate release (ROXICODONE) 10 MG immediate release tablet, Take 10 mg by mouth 3 times a day. TID with MS Contin, Disp: , Rfl:     pregabalin (LYRICA) 200 MG capsule, Take 200 mg by mouth 3 times a day., Disp: , Rfl:     morphine SR (MS CONTIN) 15 MG TB12, Take 1 Tab by mouth 3 times a day., Disp: 60 Tab, Rfl: 0  ALLERGIES:   Allergies   Allergen Reactions    Penicillins Rash           SURGHX:   Past Surgical History:   Procedure Laterality Date    CARDIAC CATH, RIGHT HEART  03/31/2018    Right heart cath with high pressures.    MITRAL VALVE REPAIR  11/17/2011    Performed by MARY ORTIZ at [facility] OR[facility]    PACEMAKER INSERTION  November 2011    Medtronic Versa [id number] implanted by Dr. Amaya.    OTHER ORTHOPEDIC SURGERY      Neck and back      SOCHX:  reports that she quit smoking about 4 years ago. Her smoking use included cigarettes. She has a 39.00 pack-year smoking history. She has never used smokeless tobacco. She reports current alcohol use. She reports that she does not use drugs..  FH:   Family History   Problem Relation Age of Onset    Heart Attack Father          Physical Exam:  Vitals/ General Appearance:   Weight/BMI: Body mass index is 44.85 kg/m².  Resp 16   Ht 1.727 m (5' 8\")   Wt (!) 134 kg (295 lb)   Vitals:    07/11/22 1313   Resp: 16   Weight: (!) 134 kg (295 lb)   Height: 1.727 m (5' 8\")       Pt. is alert and oriented to time, place and person. Cooperative and in no apparent distress.       Constitutional: Alert, no distress, well-groomed.  Skin: No rashes in visible areas.  Eye: Round. Conjunctiva clear, lids normal. No icterus.   ENMT: Lips pink without lesions, good dentition, moist mucous membranes. Phonation normal.  Neck: No masses, no thyromegaly. Moves freely without pain.  CV: Pulse as reported by " patient  Respiratory: Unlabored respiratory effort, no cough or audible wheeze  Psych: Alert and oriented x3, normal affect and mood.     ASSESSMENT AND PLAN     1.Complex Sleep ApneaThe pathophysiology of sleep anea and the increased risk of cardiovascular morbidity from untreated sleep apnea is discussed in detail with the patient.  She is urged to avoid supine sleep, weight gain and alcoholic beverages since all of these can worsen sleep apnea. She is cautioned against drowsy driving. If She feels sleepy while driving, She must pull over for a break/nap, rather than persist on the road, in the interest of She own safety and that of others on the road.   Plan   - Out going settings on Remed were :   ***     - compliance was reinforced     2. Regarding treatment of other past medical problems and general health maintenance,  She is urged to follow up with PCP.

## 2022-07-18 DIAGNOSIS — I48.0 PAF (PAROXYSMAL ATRIAL FIBRILLATION) (HCC): ICD-10-CM

## 2022-07-18 RX ORDER — WARFARIN SODIUM 5 MG/1
TABLET ORAL
Qty: 90 TABLET | Refills: 1 | Status: SHIPPED | OUTPATIENT
Start: 2022-07-18 | End: 2022-08-31

## 2022-07-20 ENCOUNTER — ANTICOAGULATION VISIT (OUTPATIENT)
Dept: VASCULAR LAB | Facility: MEDICAL CENTER | Age: 62
End: 2022-07-20
Attending: INTERNAL MEDICINE
Payer: MEDICARE

## 2022-07-20 DIAGNOSIS — Z98.890 STATUS POST MITRAL VALVE REPAIR: ICD-10-CM

## 2022-07-20 DIAGNOSIS — D68.69 SECONDARY HYPERCOAGULABLE STATE (HCC): ICD-10-CM

## 2022-07-20 DIAGNOSIS — I48.0 PAF (PAROXYSMAL ATRIAL FIBRILLATION) (HCC): ICD-10-CM

## 2022-07-20 LAB
INR BLD: 2.7 (ref 0.9–1.2)
INR PPP: 2.7 (ref 2–3.5)

## 2022-07-20 PROCEDURE — 99211 OFF/OP EST MAY X REQ PHY/QHP: CPT | Performed by: NURSE PRACTITIONER

## 2022-07-20 PROCEDURE — 85610 PROTHROMBIN TIME: CPT

## 2022-07-20 NOTE — PROGRESS NOTES
Anticoagulation Summary  As of 2022    INR goal:  2.0-3.0   TTR:  40.1 % (4.2 y)   INR used for dosin.70 (2022)   Warfarin maintenance plan:  2.5 mg (5 mg x 0.5) every Tue; 5 mg (5 mg x 1) all other days   Weekly warfarin total:  32.5 mg   No change documented:  PALMIRA Kelly   Plan last modified:  PALMIRA Kelly (2021)   Next INR check:  2022   Target end date:  Indefinite    Indications    PAF (paroxysmal atrial fibrillation) (CMS-HCC) [I48.0]  Status post mitral valve repair [Z98.890]  Long term current use of anticoagulant therapy (Resolved) [Z79.01]  Secondary hypercoagulable state (HCC) [D68.69]             Anticoagulation Episode Summary     INR check location:      Preferred lab:      Send INR reminders to:      Comments:  Right arm only for BP checks      Anticoagulation Care Providers     Provider Role Specialty Phone number    [facility] Responsible  [phone]                Refer to Patient Findings for HPI:  Patient Findings     Negatives:  Signs/symptoms of thrombosis, Signs/symptoms of bleeding, Laboratory test error suspected, Change in health, Change in alcohol use, Change in activity, Upcoming invasive procedure, Emergency department visit, Upcoming dental procedure, Missed doses, Extra doses, Change in medications, Change in diet/appetite, Hospital admission, Bruising, Other complaints          There were no vitals filed for this visit.   pt declined vitals    Verified current warfarin dosing schedule.    Medications reconciled   Pt is not on antiplatelet therapy      A/P   INR  -therapeutic.     Warfarin dosing recommendation: Continue current regimen.    Pt educated to contact our clinic with any changes in medications or s/s of bleeding or thrombosis. Pt is aware to seek immediate medical attention for falls, head injury or deep cuts.    Follow up appointment in 4 week(s) per pt's request.    PALMIRA Kelly

## 2022-08-09 ENCOUNTER — OFFICE VISIT (OUTPATIENT)
Dept: SLEEP MEDICINE | Facility: MEDICAL CENTER | Age: 62
End: 2022-08-09
Payer: MEDICARE

## 2022-08-09 VITALS
WEIGHT: 293 LBS | HEIGHT: 68 IN | OXYGEN SATURATION: 93 % | DIASTOLIC BLOOD PRESSURE: 88 MMHG | SYSTOLIC BLOOD PRESSURE: 132 MMHG | RESPIRATION RATE: 16 BRPM | BODY MASS INDEX: 44.41 KG/M2 | HEART RATE: 81 BPM

## 2022-08-09 ASSESSMENT — FIBROSIS 4 INDEX: FIB4 SCORE: 2.92

## 2022-08-17 ENCOUNTER — ANTICOAGULATION VISIT (OUTPATIENT)
Dept: VASCULAR LAB | Facility: MEDICAL CENTER | Age: 62
End: 2022-08-17
Attending: INTERNAL MEDICINE
Payer: MEDICARE

## 2022-08-17 DIAGNOSIS — I48.0 PAF (PAROXYSMAL ATRIAL FIBRILLATION) (HCC): ICD-10-CM

## 2022-08-17 DIAGNOSIS — Z98.890 STATUS POST MITRAL VALVE REPAIR: ICD-10-CM

## 2022-08-17 DIAGNOSIS — D68.69 SECONDARY HYPERCOAGULABLE STATE (HCC): ICD-10-CM

## 2022-08-17 LAB
INR BLD: 2 (ref 0.9–1.2)
INR PPP: 2 (ref 2–3.5)

## 2022-08-17 PROCEDURE — 99211 OFF/OP EST MAY X REQ PHY/QHP: CPT

## 2022-08-17 PROCEDURE — 85610 PROTHROMBIN TIME: CPT

## 2022-08-17 NOTE — PROGRESS NOTES
Anticoagulation Summary  As of 2022      INR goal:  2.0-3.0   TTR:  41.1 % (4.3 y)   INR used for dosin.00 (2022)   Warfarin maintenance plan:  2.5 mg (5 mg x 0.5) every Tue; 5 mg (5 mg x 1) all other days   Weekly warfarin total:  32.5 mg   Plan last modified:  Brittnee Cisneors A.P.NPriscilla (2021)   Next INR check:  2022   Target end date:  Indefinite    Indications    PAF (paroxysmal atrial fibrillation) (CMS-HCC) [I48.0]  Status post mitral valve repair [Z98.890]  Long term current use of anticoagulant therapy (Resolved) [Z79.01]  Secondary hypercoagulable state (HCC) [D68.69]                 Anticoagulation Episode Summary       INR check location:      Preferred lab:      Send INR reminders to:      Comments:  Right arm only for BP checks          Anticoagulation Care Providers       Provider Role Specialty Phone number    [facility] Responsible  [phone]                  Refer to Patient Findings for HPI:  Patient Findings       Negatives:  Signs/symptoms of thrombosis, Signs/symptoms of bleeding, Laboratory test error suspected, Change in health, Change in alcohol use, Change in activity, Upcoming invasive procedure, Emergency department visit, Upcoming dental procedure, Missed doses, Extra doses, Change in medications, Change in diet/appetite, Hospital admission, Bruising, Other complaints            There were no vitals filed for this visit.   pt declined vitals    Verified current warfarin dosing schedule.    Medications reconciled       A/P   INR  -therapeutic.     Warfarin dosing recommendation: Pt is to continue with current warfarin dosing regimen.      Pt educated to contact our clinic with any changes in medications or s/s of bleeding or thrombosis. Pt is aware to seek immediate medical attention for falls, head injury or deep cuts.    Follow up appointment in 4 week(s).    Aditi Reyes, PharmD  Jeremiah Barcenas, Pharmacy Student

## 2022-08-24 ENCOUNTER — OFFICE VISIT (OUTPATIENT)
Dept: SLEEP MEDICINE | Facility: MEDICAL CENTER | Age: 62
End: 2022-08-24
Payer: MEDICARE

## 2022-08-24 VITALS
WEIGHT: 293 LBS | OXYGEN SATURATION: 94 % | HEIGHT: 68 IN | RESPIRATION RATE: 14 BRPM | BODY MASS INDEX: 44.41 KG/M2 | DIASTOLIC BLOOD PRESSURE: 60 MMHG | HEART RATE: 97 BPM | SYSTOLIC BLOOD PRESSURE: 126 MMHG

## 2022-08-24 DIAGNOSIS — G47.31 CENTRAL SLEEP APNEA: ICD-10-CM

## 2022-08-24 PROCEDURE — 99213 OFFICE O/P EST LOW 20 MIN: CPT | Mod: 25 | Performed by: STUDENT IN AN ORGANIZED HEALTH CARE EDUCATION/TRAINING PROGRAM

## 2022-08-24 PROCEDURE — 95977 ALYS CPLX CN NPGT PRGRMG: CPT | Performed by: STUDENT IN AN ORGANIZED HEALTH CARE EDUCATION/TRAINING PROGRAM

## 2022-08-24 ASSESSMENT — FIBROSIS 4 INDEX: FIB4 SCORE: 2.92

## 2022-08-24 NOTE — PROGRESS NOTES
[facility] Follow-up Visit    CC: Follow up regarding programming to Remede Device     HPI:  Kailey Velarde is a 62 y.o.female  with morbid obesity, paroxysmal atrial fibrillation, and acute respiratory failure with hypoxia on supplemental oxygen, HFpEF, hyperlipidemia, pulmonary hypertension, QT prolongation, chronic pain, and central sleep apnea status post phrenic nerve stimulator.  Presents to sleep clinic today for Remede implant programming.    She reports she is having difficulty with the current programming on her device.  She feels that the 15-minute delay to get to sleep is too short.  In addition the ramping program she feels is becoming too strong.  States last night she woke up due to the generator kicking in.    Remedy representatives Altagracia and Micheal were present today who performed interrogation as well as programming of device.    Incoming settings  Start time 0100 stop time 0600  Battery voltage 2.96 V  Stimulation electrode pair 5-1  Pulse width 300 µs  Frequency 40 Hz  Supine min and max 3.0-6.3 left min max 3.0-6.3 right min max 3.0-6.3    Nightly titration  Rising ramp duration 50%  Following up titration 20%  Ramp baseline amplitude 50%  Sensor activity threshold 9  Sensor activity window 2.0 minutes  Sleep latency 15 minutes  BioBreak threshold 1 minute  Pitch threshold 50 degrees    Sleep History  Remede implanted 4/22/22 activated on 7/11/2022    Failed PAP therapy and was evaluated for Remede implant    PSG titration study from 4/8/21 indicated central sleep apnea. Sleep related hypoxia. The PAP titration was initiated with BiPAP ST 14/10 cm of water 10 BPM  and the pressure which was slowly titrated up in an attempt to eliminate sleep disordered breathing and snoring. The final pressure tested during the study was BiPAP 21/17 cm water and 16 BPM. Lowest bedt tolerated pressure was BiPAP ST 19/14 cm 16 BPM and O2 bleed in.A final pressure the patient was observed in the  supine position  and in the REM sleep stage. The apnea hypopnea index improved to 2.1 per hour and O2 yogesh 85%.  She spent 256 min of sleep time below 89% O2 saturation. Snoring was resolved.      PSG titration from 2/23/21 indicated severe complex sleep apnea with AHI of 139.7/hr and O2 yogesh 73 %. Due to severity of the disease she met the split study protocol. The titration started with CPAP 5 cm and the highest tested pressure was BiPAP ST 17/12  Cm back up rate 14. The AHI was 122/hr. Sleep related hypoxia.      PSG titration from 5/16/20 indicated Complex sleep apnea. Sleep related hypoxia.  The PAP titration was initiated with BiPAP 9/5 cm of water and the pressure which was slowly titrated up in an attempt to eliminate sleep disordered breathing and snoring. The final pressure tested during the study was BiPAP 22/17 cm water. It was an incomplete titration, the best tolerated pressurse was BiPAP 18/14cm. At this pressure the patient was observed in the supine in the REM sleep stage. The apnea hypopnea index improved to 14.1 per hour and O2 yogesh 83%. The average O2 stauration was 88%. She spent 89 min of sleep time below 89% O2 saturation.     PSG study from 11/20/19 indicated severe central with AHI of 146/hr and O2 yogesh 76 %. 90% of the apneas were central apneas. Severe sleep hypoxia. Time spent with oxygen saturations below 89% was 264.1 minutes.     Echo from 3/29/18 indicated systolic function is difficult to assess in rapid atrial fibrillation, but appears normal. Severely dilated right ventricle. Reduced right ventricular systolic function. Mildly dilated left atrium. Known mitral valve bioprosthesis which is functioning normally. Estimated right ventricular systolic pressure is 35 mmHg + JVP. Compared to previous echocardiogram from 8/22/2016 the rhythm is now  rapid atrial fibrillation. Estimated right-sided pressures are lower in this study, and the mitral bioprosthesis continues to function  normally.    Patient Active Problem List    Diagnosis Date Noted    Central sleep apnea 03/03/2022    Secondary hypercoagulable state ([facility]) 10/25/2021    Falls 07/01/2021    Hyponatremia 07/01/2021    QT prolongation 07/01/2021    Chronic respiratory failure with hypoxia ([facility]) 04/15/2021    High risk medication use 08/01/2018    Sick sinus syndrome ([facility]) 05/30/2018    Chronic right-sided heart failure ([facility]) 05/30/2018    Heart failure with preserved ejection fraction, borderline, class II ([facility]) 05/30/2018    Left ventricular diastolic dysfunction, NYHA class 3 05/30/2018    Thrombocytopenia ([facility]) 04/04/2018    FAN (acute kidney injury) ([facility]) 04/03/2018    Circulating anticoagulants ([facility]) 03/28/2018    Obesity 03/28/2018    Hx of pleural effusion 03/12/2018    Palpitations 02/22/2018    Essential hypertension 02/22/2018    Pulmonary hypertension ([facility]) 06/19/2017    Localized edema 08/29/2016    History of sick sinus syndrome 10/01/2013    Pacemaker 11/26/2012    Mixed hyperlipidemia 07/03/2012    PAF (paroxysmal atrial fibrillation) ([facility]) 12/07/2011    Atrial myxoma 12/07/2011    Status post mitral valve repair 12/07/2011    Chronic low back pain 11/16/2011       Past Medical History:   Diagnosis Date    Atrial myxoma November 2011    Status post LA myxoma resection    Backpain     CAD (coronary artery disease)     pacemaker    Chronic pain     Dizziness      Hyperlipidemia      Hypertension     Hypertriglyceridemia      Indigestion     Peripheral edema     Sciatica     Sick sinus syndrome ([facility]) November 2011    Status post PPM implantation.    Status post mitral valve repair November 2011    MV repair with 32mm Jonnie-Carpenter flexible annuloplasty ring.        Past Surgical History:   Procedure Laterality Date    CARDIAC CATH, RIGHT HEART  03/31/2018    Right heart cath with high pressures.    MITRAL VALVE REPAIR  11/17/2011    Performed by MARY ORTIZ at SURGERY [facility]    PACEMAKER INSERTION   2011    Medtronic Versa [id number] implanted by Dr. Amaya.    OTHER ORTHOPEDIC SURGERY      Neck and back        Family History   Problem Relation Age of Onset    Heart Attack Father        Social History     Socioeconomic History    Marital status: Single     Spouse name: Not on file    Number of children: Not on file    Years of education: Not on file    Highest education level: Not on file   Occupational History    Not on file   Tobacco Use    Smoking status: Former     Packs/day: 1.00     Years: 39.00     Pack years: 39.00     Types: Cigarettes     Quit date: 2017     Years since quittin.8    Smokeless tobacco: Never    Tobacco comments:      Down to vapping now.    Vaping Use    Vaping Use: Every day    Start date: 2017   Substance and Sexual Activity    Alcohol use: Yes     Comment: rarely    Drug use: No     Types: Inhaled, Oral     Comment: hx of, denies currently    Sexual activity: Not on file   Other Topics Concern    Not on file   Social History Narrative    Not on file     Social Determinants of Health     Financial Resource Strain: Not on file   Food Insecurity: Not on file   Transportation Needs: Not on file   Physical Activity: Not on file   Stress: Not on file   Social Connections: Not on file   Intimate Partner Violence: Not on file   Housing Stability: Not on file       Current Outpatient Medications   Medication Sig Dispense Refill    warfarin (COUMADIN) 5 MG Tab TAKE ONE-HALF TO ONE TABLET BY MOUTH DAILY OR AS DIRECTED BY THE ANTICOAGULATION CLINIC 90 Tablet 1    morphine ER (MS CONTIN) 15 MG Tab CR tablet       methocarbamol (ROBAXIN) 750 MG Tab Take 750 mg by mouth.      potassium chloride ER (KLOR-CON) 10 MEQ tablet Take 20 mEq by mouth.      Dapagliflozin Propanediol (FARXIGA) 5 MG Tab Take 5 mg by mouth every day. 30 Tablet 6    spironolactone (ALDACTONE) 25 MG Tab Take 1 Tablet by mouth every day. 90 Tablet 3    amiodarone (CORDARONE) 200 MG Tab Take 1 Tablet by mouth  "every day. 90 Tablet 3    atorvastatin (LIPITOR) 20 MG Tab Take 1 Tablet by mouth every day. 90 Tablet 3    chlorhexidine (PERIDEX) 0.12 % Solution Take 15 mL by mouth every day. Swish and spit      venlafaxine (EFFEXOR-XR) 150 MG extended-release capsule Take 150 mg by mouth every evening.      albuterol 108 (90 Base) MCG/ACT Aero Soln inhalation aerosol Inhale 2 Puffs every four hours as needed for Shortness of Breath.      methocarbamol (ROBAXIN) 750 MG Tab Take 750 mg by mouth 3 times a day as needed (Muscle pain).      ARIPiprazole (ABILIFY) 10 MG Tab Take 10 mg by mouth every day.      potassium chloride SA (KDUR) 20 MEQ Tab CR Take 1 Tab by mouth every day. 100 Tab 3    venlafaxine XR (EFFEXOR XR) 75 MG CAPSULE SR 24 HR Take 75 mg by mouth every day.      traZODone (DESYREL) 100 MG Tab Take 100 mg by mouth every evening.      oxyCODONE immediate release (ROXICODONE) 10 MG immediate release tablet Take 10 mg by mouth 3 times a day. TID with MS Contin      pregabalin (LYRICA) 200 MG capsule Take 200 mg by mouth 3 times a day.      morphine SR (MS CONTIN) 15 MG TB12 Take 1 Tab by mouth 3 times a day. 60 Tab 0     No current facility-administered medications for this visit.        ALLERGIES: Penicillins    ROS  Constitutional: Denies fevers, Denies weight changes  Ears/Nose/Throat/Mouth: Denies nasal congestion or sore throat   Cardiovascular: Denies chest pain  Respiratory: Denies shortness of breath, Denies cough  Gastrointestinal/Hepatic: Denies nausea, vomiting  Sleep: see HPI      PHYSICAL EXAM  /60 (BP Location: Left arm, Patient Position: Sitting, BP Cuff Size: Large adult)   Pulse 97   Resp 14   Ht 1.727 m (5' 8\")   Wt (!) 140 kg (309 lb)   LMP 01/01/2010   SpO2 94% Comment: 3L 24/7  BMI 46.98 kg/m²   Appearance: Well-nourished, well-developed, no acute distress  Eyes:  No scleral icterus , EOMI  ENMT: masked  Neurologic: without focal signs; oriented to person, time, place, and purpose; " judgement intact      Medical Decision Making   Assessment and Plan  Kailey Velarde is a 62 y.o.female  with morbid obesity, paroxysmal atrial fibrillation, and acute respiratory failure with hypoxia on supplemental oxygen, HFpEF, hyperlipidemia, pulmonary hypertension, QT prolongation, chronic pain, and central sleep apnea status post phrenic nerve stimulator.  Presents to sleep clinic today for Remede implant programming.    Central sleep apnea  She reports difficulty with current settings to device in terms of delay as well as intensity.  Remede implant was activated on 7/11/2022.  Device was interrogated and programmed at today's visit  Outgoing ettings  Start time 0100 stop time 0600  Battery voltage 2.96 V  Stimulation electrode pair 5-1  Pulse width 300 µs  Frequency 40 Hz  Supine min and max 2.0-6.0, left min max 2.0-6.0, right min max 2.0-6.0    Nightly titration  Rising ramp duration 70%  Following up titration 10%  Ramp baseline amplitude 50%  Sensor activity threshold 8  Sensor activity window 2.0 minutes  Sleep latency 15 minutes  BioBreak threshold 1 minute  Pitch threshold 80 degrees    Plan  -Patient settings were changed today as above  -We will plan to follow-up at scheduled appointment in September.  -Advised to reach out via [employer] with any concerns or questions      Have advised the patient to follow up with the appropriate healthcare practitioners for all other medical problems and issues.    Return for Keep scheduled appointment September.      Please note portions of this record was created using voice recognition software. I have made every reasonable attempt to correct obvious errors, but I expect that there are errors of grammar and possibly content I did not discover before finalizing the note.

## 2022-08-29 ENCOUNTER — DOCUMENTATION (OUTPATIENT)
Dept: VASCULAR LAB | Facility: MEDICAL CENTER | Age: 62
End: 2022-08-29

## 2022-08-29 ENCOUNTER — OFFICE VISIT (OUTPATIENT)
Dept: CARDIOLOGY | Facility: MEDICAL CENTER | Age: 62
End: 2022-08-29
Payer: MEDICARE

## 2022-08-29 DIAGNOSIS — I50.812 CHRONIC RIGHT-SIDED HEART FAILURE (HCC): ICD-10-CM

## 2022-08-29 DIAGNOSIS — I48.0 PAROXYSMAL ATRIAL FIBRILLATION (HCC): ICD-10-CM

## 2022-08-29 DIAGNOSIS — Z79.01 CHRONIC ANTICOAGULATION: ICD-10-CM

## 2022-08-29 DIAGNOSIS — E78.5 DYSLIPIDEMIA: ICD-10-CM

## 2022-08-29 DIAGNOSIS — D68.69 SECONDARY HYPERCOAGULABLE STATE (HCC): ICD-10-CM

## 2022-08-29 DIAGNOSIS — I10 ESSENTIAL HYPERTENSION: Chronic | ICD-10-CM

## 2022-08-29 DIAGNOSIS — G47.31 CENTRAL SLEEP APNEA: ICD-10-CM

## 2022-08-29 DIAGNOSIS — I27.20 PULMONARY HYPERTENSION (HCC): ICD-10-CM

## 2022-08-29 PROCEDURE — 99214 OFFICE O/P EST MOD 30 MIN: CPT | Performed by: INTERNAL MEDICINE

## 2022-08-29 PROCEDURE — 93000 ELECTROCARDIOGRAM COMPLETE: CPT | Performed by: INTERNAL MEDICINE

## 2022-08-29 RX ORDER — SPIRONOLACTONE 50 MG/1
50 TABLET, FILM COATED ORAL DAILY
Qty: 90 TABLET | Refills: 3 | Status: SHIPPED | OUTPATIENT
Start: 2022-08-29 | End: 2023-05-02 | Stop reason: SDUPTHER

## 2022-08-29 ASSESSMENT — FIBROSIS 4 INDEX: FIB4 SCORE: 2.92

## 2022-08-29 NOTE — PROGRESS NOTES
Received message from Dr Yoo that patient will be switching from warfarin to Eliquis. Spoke with pt. Scheduled her on 8/31/22 in the anticoagulation clinic and will plan to transition patient to Eliquis at that time.    Brittnee HENDERSON

## 2022-08-30 ENCOUNTER — NON-PROVIDER VISIT (OUTPATIENT)
Dept: CARDIOLOGY | Facility: MEDICAL CENTER | Age: 62
End: 2022-08-30
Payer: MEDICARE

## 2022-08-30 VITALS
BODY MASS INDEX: 44.41 KG/M2 | SYSTOLIC BLOOD PRESSURE: 138 MMHG | OXYGEN SATURATION: 92 % | HEART RATE: 66 BPM | WEIGHT: 293 LBS | RESPIRATION RATE: 16 BRPM | DIASTOLIC BLOOD PRESSURE: 62 MMHG | HEIGHT: 68 IN

## 2022-08-30 DIAGNOSIS — Z95.0 CARDIAC PACEMAKER IN SITU: ICD-10-CM

## 2022-08-30 DIAGNOSIS — I49.5 SICK SINUS SYNDROME (HCC): ICD-10-CM

## 2022-08-30 LAB — EKG IMPRESSION: NORMAL

## 2022-08-30 PROCEDURE — 93280 PM DEVICE PROGR EVAL DUAL: CPT | Performed by: INTERNAL MEDICINE

## 2022-08-30 NOTE — PROGRESS NOTES
Cardiology Follow-up Consultation Note    Date of note:    8/30/2022    Primary Care Provider: Pema Peña M.D.    Name:             Kailey Velarde   YOB: 1960  MRN:               [id number]    Chief Complaint   Patient presents with    Atrial Fibrillation     Follow up         HISTORY OF PRESENT ILLNESS  Ms. Kailey Velarde is a 62 y.o. female who returns to see us for follow-up    Pertinent history:  Chronic HFpEF  Left atrial myxoma, mitral regurgitation s/p mitral valve repair with 32-mm C-E flexible annuloplasty band with Dr. Ely in 2011  Paroxysmal atrial fibrillation/flutter, cardioverted during hospital stay in April 2018, was initiated on amiodarone  SSS s/p pacemaker implant  On anticoagulation with warfarin, [id number] score of 4 with no prior TIA/CVA  Pulmonary venous hypertension likely from HFpEF.  Underwent RHC in March 2018 which showed elevated wedge of 29 with normal cardiac output  Central sleep apnea s/p Remede implant at [facility]    Last clinic visit: 2/11/2022 with BEATRIZ Mckeon.  Previous patient of Dr. Bojorquez.  Is new to me.    Interim History:  Since her last visit, continues to do well from a cardiovascular perspective.  No symptoms of chest pain or pressure that has been bothersome.  No symptoms of orthopnea, PND or dyspnea on exertion.      Past Medical History:   Diagnosis Date    Atrial myxoma November 2011    Status post LA myxoma resection    Backpain     CAD (coronary artery disease)     pacemaker    Chronic pain     Dizziness      Hyperlipidemia      Hypertension     Hypertriglyceridemia      Indigestion     Peripheral edema     Sciatica     Sick sinus syndrome (HCC) November 2011    Status post PPM implantation.    Status post mitral valve repair November 2011    MV repair with 32mm Jonnie-Carpenter flexible annuloplasty ring.         Past Surgical History:   Procedure Laterality Date    CARDIAC CATH, RIGHT HEART  03/31/2018    Right  heart cath with high pressures.    MITRAL VALVE REPAIR  11/17/2011    Performed by MARY ORTIZ at SURGERY [facility]    PACEMAKER INSERTION  November 2011    Medtronic Versa [id number] implanted by Dr. Amaya.    OTHER ORTHOPEDIC SURGERY      Neck and back          Current Outpatient Medications   Medication Sig Dispense Refill    apixaban (ELIQUIS) 5mg Tab Take 1 Tablet by mouth 2 times a day. 60 Tablet 6    spironolactone (ALDACTONE) 50 MG Tab Take 1 Tablet by mouth every day. 90 Tablet 3    warfarin (COUMADIN) 5 MG Tab TAKE ONE-HALF TO ONE TABLET BY MOUTH DAILY OR AS DIRECTED BY THE ANTICOAGULATION CLINIC 90 Tablet 1    Dapagliflozin Propanediol (FARXIGA) 5 MG Tab Take 5 mg by mouth every day. 30 Tablet 6    amiodarone (CORDARONE) 200 MG Tab Take 1 Tablet by mouth every day. 90 Tablet 3    atorvastatin (LIPITOR) 20 MG Tab Take 1 Tablet by mouth every day. 90 Tablet 3    venlafaxine (EFFEXOR-XR) 150 MG extended-release capsule Take 150 mg by mouth every evening.      albuterol 108 (90 Base) MCG/ACT Aero Soln inhalation aerosol Inhale 2 Puffs every four hours as needed for Shortness of Breath.      methocarbamol (ROBAXIN) 750 MG Tab Take 750 mg by mouth 3 times a day as needed (Muscle pain).      ARIPiprazole (ABILIFY) 10 MG Tab Take 10 mg by mouth every day.      venlafaxine XR (EFFEXOR XR) 75 MG CAPSULE SR 24 HR Take 75 mg by mouth every day.      traZODone (DESYREL) 100 MG Tab Take 100 mg by mouth every evening.      oxyCODONE immediate release (ROXICODONE) 10 MG immediate release tablet Take 10 mg by mouth 3 times a day. TID with MS Contin      pregabalin (LYRICA) 200 MG capsule Take 200 mg by mouth 3 times a day.      morphine SR (MS CONTIN) 15 MG TB12 Take 1 Tab by mouth 3 times a day. 60 Tab 0     No current facility-administered medications for this visit.         Allergies   Allergen Reactions    Penicillins Rash               Family History   Problem Relation Age of Onset    Heart Attack Father   "        Social History     Socioeconomic History    Marital status: Single     Spouse name: Not on file    Number of children: Not on file    Years of education: Not on file    Highest education level: Not on file   Occupational History    Not on file   Tobacco Use    Smoking status: Former     Packs/day: 1.00     Years: 39.00     Pack years: 39.00     Types: Cigarettes     Quit date: 2017     Years since quittin.8    Smokeless tobacco: Never    Tobacco comments:      Down to vapping now.    Vaping Use    Vaping Use: Every day    Start date: 2017   Substance and Sexual Activity    Alcohol use: Yes     Comment: rarely    Drug use: No     Types: Inhaled, Oral     Comment: hx of, denies currently    Sexual activity: Not on file   Other Topics Concern    Not on file   Social History Narrative    Not on file     Social Determinants of Health     Financial Resource Strain: Not on file   Food Insecurity: Not on file   Transportation Needs: Not on file   Physical Activity: Not on file   Stress: Not on file   Social Connections: Not on file   Intimate Partner Violence: Not on file   Housing Stability: Not on file         Physical Exam:  Ambulatory Vitals  /62 (BP Location: Left arm, Patient Position: Sitting)   Pulse 66   Resp 16   Ht 1.727 m (5' 8\")   Wt (!) 142 kg (313 lb)   SpO2 92%    Oxygen Therapy:     BP Readings from Last 4 Encounters:   22 138/62   22 126/60   22 132/88   22 134/78       Weight/BMI: Body mass index is 47.59 kg/m².  Wt Readings from Last 4 Encounters:   22 (!) 142 kg (313 lb)   22 (!) 140 kg (309 lb)   22 (!) 140 kg (309 lb)   22 (!) 134 kg (295 lb)       GEN: Well developed, well nourished and in no acute distress.  HEART: no significant JVD, regular rate and rhythm, normal S1 and S2, no murmurs, no third heart sounds, normal cardiac palpation  LUNG: clear to auscultation bilaterally, no wheezing, no crackles, normal " respiratory effort on room air  ABDOMEN: soft, non-tender, non-distended, normal bowel sounds throughout  EXTREMITIES: no peripheral edema noted  VASCULAR: no significantly elevated jugular venous pressure, no carotid bruits noted, radial pulses 2+ and equal      Lab Data Review:  Lab Results   Component Value Date/Time    CHOLSTRLTOT 200 (H) 07/25/2014 01:30 AM    LDL see below 07/25/2014 01:30 AM    HDL 37 (A) 07/25/2014 01:30 AM    TRIGLYCERIDE 574 (H) 07/25/2014 01:30 AM       Lab Results   Component Value Date/Time    SODIUM 130 (L) 07/03/2021 12:23 AM    POTASSIUM 4.6 07/03/2021 12:23 AM    CHLORIDE 93 (L) 07/03/2021 12:23 AM    CO2 30 07/03/2021 12:23 AM    GLUCOSE 104 (H) 07/03/2021 12:23 AM    BUN 13 07/03/2021 12:23 AM    CREATININE 1.11 07/03/2021 12:23 AM    BUNCREATRAT 13 10/09/2015 02:11 PM     Lab Results   Component Value Date/Time    ALKPHOSPHAT 62 07/01/2021 01:10 PM    ASTSGOT 24 07/01/2021 01:10 PM    ALTSGPT 14 07/01/2021 01:10 PM    TBILIRUBIN 0.5 07/01/2021 01:10 PM      Lab Results   Component Value Date/Time    WBC 4.3 (L) 07/03/2021 12:23 AM     Lab Results   Component Value Date/Time    HBA1C 5.3 11/17/2011 01:35 AM       Cardiac Imaging and Procedures Review:    EKG dated 8/29/2022: My personal interpretation is sinus rhythm    Echo dated 7/3/2021:   My personal interpretation is normal left ventricular systolic function with EF 55-60%, known mitral valve repair with normal transvalvular gradients.    RHC (3/31/2018):   Cardiac output is 5.4 liters per minute, cardiac index 2.3 liters per minute per meter squared.  The following pressures are given in mmHg: right atrial pressure mean of 15, V waves 17, pulmonary artery pressure 41/25, and RV pressure 40/11.  Mean wedge pressure 29    Radiology test Review:  Device interrogation (2/11/2022):   Normal functioning device, a paced 38.7% without evidence of A. fib.  Battery life: 1-4.5 years      Assessment & Plan     1. Paroxysmal atrial  fibrillation (HCC)  EKG    apixaban (ELIQUIS) 5mg Tab      2. Secondary hypercoagulable state (HCC)  apixaban (ELIQUIS) 5mg Tab      3. Chronic anticoagulation  apixaban (ELIQUIS) 5mg Tab      4. Essential hypertension  spironolactone (ALDACTONE) 50 MG Tab      5. Central sleep apnea        6. Dyslipidemia        7. Chronic right-sided heart failure (HCC)  spironolactone (ALDACTONE) 50 MG Tab      8. Pulmonary hypertension (HCC)  spironolactone (ALDACTONE) 50 MG Tab          Doing well from a cardiovascular perspective.  Compensated from a heart failure perspective.  Refill sent for spironolactone 50 mg daily.  Continue Farxiga 5 mg daily.    Blood pressure well controlled.    No recent episodes of atrial fibrillation, continue amiodarone 200 mg daily.  Understands that this is a high risk medication.    Has been on anticoagulation with warfarin but has had labile INR.  Has history of mitral valve repair which was due to left atrial myxoma rather than valvular A. fib.  Transition warfarin to DOAC with Eliquis 5 mg twice daily.  Will reach out to anticoagulation clinic to coordinate initiation of Eliquis once INR is less than 2.    Will obtain repeat echo next year to monitor transvalvular gradients across mitral valve repair.      All of patient's excellent questions were answered to the best of my knowledge and to her satisfaction.  It was a pleasure seeing Ms. Kailey Velarde in my clinic today. Return in about 6 months (around 2/28/2023). Patient is aware to call the cardiology clinic with any questions or concerns.      Denny Yoo MD  [facility]  [facility], [facility]  [address] [address]  Phone: [phone]  Fax: [phone]    Please note that this dictation was created using voice recognition software. I have made every reasonable attempt to correct obvious errors, but it is possible there are errors of grammar and possibly  content that I did not discover before finalizing the note.

## 2022-08-31 ENCOUNTER — ANTICOAGULATION VISIT (OUTPATIENT)
Dept: VASCULAR LAB | Facility: MEDICAL CENTER | Age: 62
End: 2022-08-31
Attending: INTERNAL MEDICINE
Payer: MEDICARE

## 2022-08-31 DIAGNOSIS — I48.0 PAF (PAROXYSMAL ATRIAL FIBRILLATION) (HCC): ICD-10-CM

## 2022-08-31 DIAGNOSIS — Z98.890 STATUS POST MITRAL VALVE REPAIR: ICD-10-CM

## 2022-08-31 DIAGNOSIS — D68.69 SECONDARY HYPERCOAGULABLE STATE (HCC): ICD-10-CM

## 2022-08-31 LAB
INR BLD: 3.6 (ref 0.9–1.2)
INR PPP: 3.6 (ref 2–3.5)

## 2022-08-31 PROCEDURE — 99212 OFFICE O/P EST SF 10 MIN: CPT | Performed by: NURSE PRACTITIONER

## 2022-08-31 PROCEDURE — 85610 PROTHROMBIN TIME: CPT

## 2022-08-31 NOTE — PROGRESS NOTES
Anticoagulation Summary  As of 8/31/2022      INR goal:  2.0-3.0   TTR:  41.3 % (4.3 y)   INR used for dosing:  3.60 (8/31/2022)   Warfarin maintenance plan:  No maintenance plan   Weekly warfarin total:  32.5 mg   Plan last modified:  PALMIRA Kelly (2/22/2021)   Next INR check:  9/2/2022   Target end date:  Indefinite    Indications    PAF (paroxysmal atrial fibrillation) (CMS-HCC) [I48.0]  Status post mitral valve repair [Z98.890]  Long term current use of anticoagulant therapy (Resolved) [Z79.01]  Secondary hypercoagulable state (HCC) [D68.69]                 Anticoagulation Episode Summary       INR check location:      Preferred lab:      Send INR reminders to:      Comments:  Right arm only for BP checks          Anticoagulation Care Providers       Provider Role Specialty Phone number    [facility] Responsible  [phone]                  Refer to Patient Findings for HPI:  Patient Findings       Positives:  Signs/symptoms of bleeding, Change in alcohol use, Missed doses    Negatives:  Signs/symptoms of thrombosis, Laboratory test error suspected, Change in health, Change in activity, Upcoming invasive procedure, Emergency department visit, Upcoming dental procedure, Extra doses, Change in medications, Change in diet/appetite, Hospital admission, Bruising, Other complaints    Comments:  She recently cut her finger by accident in the kitchen. Noticed it took longer than usual to bleed but did stop bleeding with a few minutes. Drinks ETOH regularly. Had more than normal this week. Aware of the increased risk of bleeding. Held her warfarin dose yesterday.            There were no vitals filed for this visit.   pt declined vitals    Verified current warfarin dosing schedule.    Medications reconciled   Pt is not on antiplatelet therapy    She has already picked up her rx for Eliquis 5 mg. It is affordable.   Indication for DOAC therapy.  Importance of monitoring and compliance.    Monitoring parameters, signs and symptoms of bleeding or clotting.  DOAC therapy, side effects, potential DDIs, OTC medications  Hormonal therapy - to avoid  Pregnancy - n/a  DDI none with Eliquis  Pt is not taking any antiplatelet/NSAID therapy and rosie void.  Lifestyle safety, ie smoking, hobby safety, fall safety/prevention  Procedures for missed doses or suspected missed doses, surgeries/procedures, travel, dental work, any medication changes  Discussed risk of bleeding with ETOH. Recommend she avoid.    A/P   INR  3.6. She understands she is NOT to start Eliquis until our clinic tells her to start.    Warfarin dosing recommendation: STOP WARFARIN    Pt educated to contact our clinic with any changes in medications or s/s of bleeding or thrombosis. Pt is aware to seek immediate medical attention for falls, head injury or deep cuts.    Follow up appointment in 2 days to reassess INR.    JOAQUINA Kelly.

## 2022-09-02 ENCOUNTER — ANTICOAGULATION VISIT (OUTPATIENT)
Dept: VASCULAR LAB | Facility: MEDICAL CENTER | Age: 62
End: 2022-09-02
Attending: INTERNAL MEDICINE
Payer: MEDICARE

## 2022-09-02 DIAGNOSIS — I48.0 PAF (PAROXYSMAL ATRIAL FIBRILLATION) (HCC): ICD-10-CM

## 2022-09-02 DIAGNOSIS — D68.69 SECONDARY HYPERCOAGULABLE STATE (HCC): ICD-10-CM

## 2022-09-02 DIAGNOSIS — Z98.890 STATUS POST MITRAL VALVE REPAIR: ICD-10-CM

## 2022-09-02 LAB
INR BLD: 1.6 (ref 0.9–1.2)
INR PPP: 1.6 (ref 2–3.5)

## 2022-09-02 PROCEDURE — 99211 OFF/OP EST MAY X REQ PHY/QHP: CPT

## 2022-09-02 PROCEDURE — 85610 PROTHROMBIN TIME: CPT

## 2022-09-02 NOTE — PROGRESS NOTES
Anticoagulation Summary  As of 2022      INR goal:  2.0-3.0   TTR:  41.3 % (4.3 y)   INR used for dosin.60 (2022)   Warfarin maintenance plan:  No maintenance plan   Weekly warfarin total:  32.5 mg   Plan last modified:  PALMIRA Kelly (2021)   Next INR check:  2022   Target end date:  Indefinite    Indications    PAF (paroxysmal atrial fibrillation) (CMS-HCC) [I48.0]  Status post mitral valve repair [Z98.890]  Long term current use of anticoagulant therapy (Resolved) [Z79.01]  Secondary hypercoagulable state (HCC) [D68.69]                 Anticoagulation Episode Summary       INR check location:      Preferred lab:      Send INR reminders to:      Comments:  Right arm only for BP checks          Anticoagulation Care Providers       Provider Role Specialty Phone number    [facility] Responsible  [phone]                  Refer to Patient Findings for HPI:  Patient Findings       Negatives:  Signs/symptoms of thrombosis, Signs/symptoms of bleeding, Laboratory test error suspected, Change in health, Change in alcohol use, Change in activity, Upcoming invasive procedure, Emergency department visit, Upcoming dental procedure, Missed doses, Extra doses, Change in medications, Change in diet/appetite, Hospital admission, Bruising, Other complaints            There were no vitals filed for this visit.  pt declined vitals    Verified current warfarin dosing schedule.    Medications reconciled  Pt is not on antiplatelet therapy      A/P   INR is sub-therapeutic at 1.6.     Warfarin dosing recommendation: Patient was seen today to transition from warfarin to Eliquis.    INR today is at 1.6. Patient has Eliquis at home. She is to STOP warfarin and is to begin 5 mg twice daily starting tonight.    Pt educated to contact our clinic with any changes in medications or s/s of bleeding or thrombosis. Pt is aware to seek immediate medical attention for falls, head injury or deep  cuts.    Follow up appointment in 8 week(s).    Bill Sepulveda, LeonoraD

## 2022-09-14 ENCOUNTER — APPOINTMENT (OUTPATIENT)
Dept: VASCULAR LAB | Facility: MEDICAL CENTER | Age: 62
End: 2022-09-14
Attending: INTERNAL MEDICINE
Payer: MEDICARE

## 2022-09-15 ENCOUNTER — TELEPHONE (OUTPATIENT)
Dept: SLEEP MEDICINE | Facility: MEDICAL CENTER | Age: 62
End: 2022-09-15

## 2022-09-15 ENCOUNTER — OFFICE VISIT (OUTPATIENT)
Dept: SLEEP MEDICINE | Facility: MEDICAL CENTER | Age: 62
End: 2022-09-15
Payer: MEDICARE

## 2022-09-15 VITALS
BODY MASS INDEX: 44.41 KG/M2 | WEIGHT: 293 LBS | SYSTOLIC BLOOD PRESSURE: 140 MMHG | OXYGEN SATURATION: 91 % | HEIGHT: 68 IN | HEART RATE: 95 BPM | DIASTOLIC BLOOD PRESSURE: 68 MMHG | RESPIRATION RATE: 14 BRPM

## 2022-09-15 DIAGNOSIS — Z45.42 ENCOUNTER FOR ADJUSTMENT AND MANAGEMENT OF NEUROSTIMULATOR: ICD-10-CM

## 2022-09-15 DIAGNOSIS — G47.31 CENTRAL SLEEP APNEA: ICD-10-CM

## 2022-09-15 PROCEDURE — 95977 ALYS CPLX CN NPGT PRGRMG: CPT | Performed by: STUDENT IN AN ORGANIZED HEALTH CARE EDUCATION/TRAINING PROGRAM

## 2022-09-15 PROCEDURE — 99213 OFFICE O/P EST LOW 20 MIN: CPT | Mod: 25 | Performed by: STUDENT IN AN ORGANIZED HEALTH CARE EDUCATION/TRAINING PROGRAM

## 2022-09-15 ASSESSMENT — FIBROSIS 4 INDEX: FIB4 SCORE: 2.92

## 2022-09-15 NOTE — TELEPHONE ENCOUNTER
Altagracia and Allyssa from [facility] came by my desk after finishing with the pt and informed that they would like the pt to F[facility] in about 4-6 weeks. Informed them that the message will be pass to Dr. Farah to see when I can schedule the apt. Please advise.

## 2022-09-15 NOTE — PROGRESS NOTES
[facility] Follow-up Visit    CC: Follow-up regarding programming of phrenic nerve stimulator      HPI:  Kailey Velarde is a 62 y.o.female  with paroxysmal atrial fibrillation, morbid obesity, chronic respiratory failure requiring supplemental oxygen, HFpEF, hyperlipidemia, pulmonary hypertension, prolonged QT syndrome, chronic pain, and central sleep apnea status post phrenic nerve stimulator.  Presents to sleep clinic today regarding her remede device.    She reports since last visit she is having difficulty with her positioning.  She is finding that with laying in her recliner at night the device is coming on when she does not wanted to have it is hard to get it to go off with her fully sitting up.  She feels that she would need to get all the way out of the chair in order for the device to stop stimulating.  She is hoping that she can go back to previous settings.  She currently finds that amplitude is doing well.    Remede device representatives were present to perform the interrogation and programming of device.    Incoming settings  Start time 0100 stop time 0600  Battery voltage 2.96 V  Stimulation electrode pair 5-1  Pulse width 300 µs  Frequency 40 Hz  Supine min and max 2.0-6.0, left min max 2.0-6.0, right min max 2.0-6.0     Nightly titration  Rising ramp duration 70%  Following up titration 10%  Ramp baseline amplitude 50%  Sensor activity threshold 8  Sensor activity window 2.0 minutes  Sleep latency 15 minutes  BioBreak threshold 1 minute  Pitch threshold 80 degrees     Sleep History  Remede implanted 4/22/22 activated on 7/11/2022     Failed PAP therapy and was evaluated for Remede implant     PSG titration study from 4/8/21 indicated central sleep apnea. Sleep related hypoxia. The PAP titration was initiated with BiPAP ST 14/10 cm of water 10 BPM  and the pressure which was slowly titrated up in an attempt to eliminate sleep disordered breathing and snoring. The final pressure  tested during the study was BiPAP 21/17 cm water and 16 BPM. Lowest bedt tolerated pressure was BiPAP ST 19/14 cm 16 BPM and O2 bleed in.A final pressure the patient was observed in the supine position  and in the REM sleep stage. The apnea hypopnea index improved to 2.1 per hour and O2 yogesh 85%.  She spent 256 min of sleep time below 89% O2 saturation. Snoring was resolved.      PSG titration from 2/23/21 indicated severe complex sleep apnea with AHI of 139.7/hr and O2 yogesh 73 %. Due to severity of the disease she met the split study protocol. The titration started with CPAP 5 cm and the highest tested pressure was BiPAP ST 17/12  Cm back up rate 14. The AHI was 122/hr. Sleep related hypoxia.      PSG titration from 5/16/20 indicated Complex sleep apnea. Sleep related hypoxia.  The PAP titration was initiated with BiPAP 9/5 cm of water and the pressure which was slowly titrated up in an attempt to eliminate sleep disordered breathing and snoring. The final pressure tested during the study was BiPAP 22/17 cm water. It was an incomplete titration, the best tolerated pressurse was BiPAP 18/14cm. At this pressure the patient was observed in the supine in the REM sleep stage. The apnea hypopnea index improved to 14.1 per hour and O2 yogesh 83%. The average O2 stauration was 88%. She spent 89 min of sleep time below 89% O2 saturation.     PSG study from 11/20/19 indicated severe central with AHI of 146/hr and O2 yogesh 76 %. 90% of the apneas were central apneas. Severe sleep hypoxia. Time spent with oxygen saturations below 89% was 264.1 minutes.     Echo from 3/29/18 indicated systolic function is difficult to assess in rapid atrial fibrillation, but appears normal. Severely dilated right ventricle. Reduced right ventricular systolic function. Mildly dilated left atrium. Known mitral valve bioprosthesis which is functioning normally. Estimated right ventricular systolic pressure is 35 mmHg + JVP. Compared to previous  echocardiogram from 8/22/2016 the rhythm is now  rapid atrial fibrillation. Estimated right-sided pressures are lower in this study, and the mitral bioprosthesis continues to function normally.       Patient Active Problem List    Diagnosis Date Noted    Central sleep apnea 03/03/2022    Secondary hypercoagulable state ([facility]) 10/25/2021    Falls 07/01/2021    Hyponatremia 07/01/2021    QT prolongation 07/01/2021    Chronic respiratory failure with hypoxia ([facility]) 04/15/2021    High risk medication use 08/01/2018    Sick sinus syndrome ([facility]) 05/30/2018    Chronic right-sided heart failure ([facility]) 05/30/2018    Heart failure with preserved ejection fraction, borderline, class II ([facility]) 05/30/2018    Left ventricular diastolic dysfunction, NYHA class 3 05/30/2018    Thrombocytopenia ([facility]) 04/04/2018    FAN (acute kidney injury) ([facility]) 04/03/2018    Circulating anticoagulants ([facility]) 03/28/2018    Obesity 03/28/2018    Hx of pleural effusion 03/12/2018    Palpitations 02/22/2018    Essential hypertension 02/22/2018    Pulmonary hypertension ([facility]) 06/19/2017    Localized edema 08/29/2016    History of sick sinus syndrome 10/01/2013    Pacemaker 11/26/2012    Mixed hyperlipidemia 07/03/2012    PAF (paroxysmal atrial fibrillation) (CMS-[facility]) 12/07/2011    Atrial myxoma 12/07/2011    Status post mitral valve repair 12/07/2011    Chronic low back pain 11/16/2011       Past Medical History:   Diagnosis Date    Atrial myxoma November 2011    Status post LA myxoma resection    Backpain     CAD (coronary artery disease)     pacemaker    Chronic pain     Dizziness      Hyperlipidemia      Hypertension     Hypertriglyceridemia      Indigestion     Peripheral edema     Sciatica     Sick sinus syndrome ([facility]) November 2011    Status post PPM implantation.    Status post mitral valve repair November 2011    MV repair with 32mm Jonnie-Carpenter flexible annuloplasty ring.        Past Surgical History:   Procedure Laterality Date    CARDIAC  CATH, RIGHT HEART  2018    Right heart cath with high pressures.    MITRAL VALVE REPAIR  2011    Performed by MARY ORTIZ at [facility]    PACEMAKER INSERTION  2011    Medtronic Versa [id number] implanted by Dr. Amaya.    OTHER ORTHOPEDIC SURGERY      Neck and back        Family History   Problem Relation Age of Onset    Heart Attack Father        Social History     Socioeconomic History    Marital status: Single     Spouse name: Not on file    Number of children: Not on file    Years of education: Not on file    Highest education level: Not on file   Occupational History    Not on file   Tobacco Use    Smoking status: Former     Packs/day: 1.00     Years: 39.00     Pack years: 39.00     Types: Cigarettes     Quit date: 2017     Years since quittin.8    Smokeless tobacco: Never    Tobacco comments:      Down to vapping now.    Vaping Use    Vaping Use: Every day    Start date: 2017   Substance and Sexual Activity    Alcohol use: Yes     Comment: rarely    Drug use: No     Types: Inhaled, Oral     Comment: hx of, denies currently    Sexual activity: Not on file   Other Topics Concern    Not on file   Social History Narrative    Not on file     Social Determinants of Health     Financial Resource Strain: Not on file   Food Insecurity: Not on file   Transportation Needs: Not on file   Physical Activity: Not on file   Stress: Not on file   Social Connections: Not on file   Intimate Partner Violence: Not on file   Housing Stability: Not on file       Current Outpatient Medications   Medication Sig Dispense Refill    apixaban (ELIQUIS) 5mg Tab Take 1 Tablet by mouth 2 times a day. 60 Tablet 6    spironolactone (ALDACTONE) 50 MG Tab Take 1 Tablet by mouth every day. 90 Tablet 3    Dapagliflozin Propanediol (FARXIGA) 5 MG Tab Take 5 mg by mouth every day. 30 Tablet 6    amiodarone (CORDARONE) 200 MG Tab Take 1 Tablet by mouth every day. 90 Tablet 3    atorvastatin (LIPITOR)  "20 MG Tab Take 1 Tablet by mouth every day. 90 Tablet 3    venlafaxine (EFFEXOR-XR) 150 MG extended-release capsule Take 150 mg by mouth every evening.      albuterol 108 (90 Base) MCG/ACT Aero Soln inhalation aerosol Inhale 2 Puffs every four hours as needed for Shortness of Breath.      methocarbamol (ROBAXIN) 750 MG Tab Take 750 mg by mouth 3 times a day as needed (Muscle pain).      ARIPiprazole (ABILIFY) 10 MG Tab Take 10 mg by mouth every day.      venlafaxine XR (EFFEXOR XR) 75 MG CAPSULE SR 24 HR Take 75 mg by mouth every day.      traZODone (DESYREL) 100 MG Tab Take 100 mg by mouth every evening.      oxyCODONE immediate release (ROXICODONE) 10 MG immediate release tablet Take 10 mg by mouth 3 times a day. TID with MS Contin      pregabalin (LYRICA) 200 MG capsule Take 200 mg by mouth 3 times a day.      morphine SR (MS CONTIN) 15 MG TB12 Take 1 Tab by mouth 3 times a day. 60 Tab 0     No current facility-administered medications for this visit.        ALLERGIES: Penicillins    ROS  Constitutional: Denies fevers, Denies weight changes  Ears/Nose/Throat/Mouth: Denies nasal congestion or sore throat   Cardiovascular: Denies chest pain  Respiratory: Denies shortness of breath, Denies cough  Gastrointestinal/Hepatic: Denies nausea, vomiting  Sleep: see HPI      PHYSICAL EXAM  BP (!) 140/68 (BP Location: Right arm, Patient Position: Sitting, BP Cuff Size: Large adult)   Pulse 95   Resp 14   Ht 1.727 m (5' 8\")   Wt (!) 140 kg (308 lb)   LMP 01/01/2010   SpO2 91%   BMI 46.83 kg/m²   Appearance: Well-nourished, well-developed, no acute distress  Eyes:  No scleral icterus , EOMI  ENMT: masked  Musculoskeletal:  Grossly normal; gait and station normal; digits and nails normal  Skin:  No rashes, petechiae, cyanosis  Neurologic: without focal signs; oriented to person, time, place, and purpose; judgement intact      Medical Decision Making   Assessment and Plan  Kailey Velarde is a 62 y.o.female  with " paroxysmal atrial fibrillation, morbid obesity, chronic respiratory failure requiring supplemental oxygen, HFpEF, hyperlipidemia, pulmonary hypertension, prolonged QT syndrome, chronic pain, and central sleep apnea status post phrenic nerve stimulator.  Presents to sleep clinic today regarding her remede device.    Central sleep apnea  She reports difficulty with current settings to device in terms of the angle at which it is turning off and turning on..  Remede implant was activated on 7/11/2022.    Device was interrogated and programmed at today's visit    Outgoing settings  Start time 0100 stop time 0600  Battery voltage 2.97 V  Stimulation electrode pair 5-1  Pulse width 300 µs  Frequency 40 Hz  Lead impedance 343 ohms  Supine min and max 5.0-8.0,   left min max 5.0-8.0,   right min max 5.0-8.0     Nightly titration  Sleep latency 15 minutes  BioBreak threshold 1 minute  Pitch threshold 55 degrees    Have advised the patient to follow up with the appropriate healthcare practitioners for all other medical problems and issues.    Return in about 6 weeks (around 10/27/2022).      Please note portions of this record was created using voice recognition software. I have made every reasonable attempt to correct obvious errors, but I expect that there are errors of grammar and possibly content I did not discover before finalizing the note.

## 2022-09-20 NOTE — TELEPHONE ENCOUNTER
Per Dr. Farah Please plan for a 1120am appt. called pt and schedule [facility] apt on 10/19/2022 with Dr. Farah @ 11:20 am and the remede team was informed of this apt.

## 2022-09-22 DIAGNOSIS — I48.0 PAF (PAROXYSMAL ATRIAL FIBRILLATION) (HCC): ICD-10-CM

## 2022-09-23 NOTE — PROGRESS NOTES
Pt called asking about labs required prior to her DOAC f/u appt.    Advised pt we need CMP & CBC prior to f/u.    Pt states she plans to get labs drawn at [facility]. She will have results faxed to our clinic.    Arnulfo Stovall, LeonoraD, BCACP

## 2022-10-19 ENCOUNTER — OFFICE VISIT (OUTPATIENT)
Dept: SLEEP MEDICINE | Facility: MEDICAL CENTER | Age: 62
End: 2022-10-19
Payer: MEDICARE

## 2022-10-19 VITALS
RESPIRATION RATE: 14 BRPM | BODY MASS INDEX: 44.41 KG/M2 | HEIGHT: 68 IN | DIASTOLIC BLOOD PRESSURE: 62 MMHG | WEIGHT: 293 LBS | SYSTOLIC BLOOD PRESSURE: 148 MMHG | HEART RATE: 94 BPM | OXYGEN SATURATION: 92 %

## 2022-10-19 DIAGNOSIS — Z45.42 ENCOUNTER FOR ADJUSTMENT AND MANAGEMENT OF NEUROSTIMULATOR: ICD-10-CM

## 2022-10-19 DIAGNOSIS — G47.31 CENTRAL SLEEP APNEA: ICD-10-CM

## 2022-10-19 PROCEDURE — 95976 ALYS SMPL CN NPGT PRGRMG: CPT | Performed by: STUDENT IN AN ORGANIZED HEALTH CARE EDUCATION/TRAINING PROGRAM

## 2022-10-19 PROCEDURE — 99213 OFFICE O/P EST LOW 20 MIN: CPT | Mod: 25 | Performed by: STUDENT IN AN ORGANIZED HEALTH CARE EDUCATION/TRAINING PROGRAM

## 2022-10-19 ASSESSMENT — FIBROSIS 4 INDEX: FIB4 SCORE: 2.92

## 2022-10-19 ASSESSMENT — PATIENT HEALTH QUESTIONNAIRE - PHQ9: CLINICAL INTERPRETATION OF PHQ2 SCORE: 0

## 2022-10-21 NOTE — PROGRESS NOTES
[facility] Follow-up Visit    CC: Follow-up regarding programming of phrenic nerve stimulator      HPI:  Kailey Velarde is a 62 y.o.female  with HFpEF paroxysmal atrial fibrillation, morbid obesity, pulmonary hypertension, hyperlipidemia, prolonged QT syndrome, chronic respiratory failure requiring supplemental oxygen, chronic pain, and central sleep apnea status post phrenic nerve stimulator (Remede Device).  Presents to sleep clinic today for adjustment in her phrenic nerve stimulator.    [facility].    She reports since last visit her device is working well in terms of pitch.  She is happy with the current settings in that regard but not so much with the intensity of device.  She is finding that when the device comes on at night it is waking her up.  She feels that the impulse is too strong.  She is hopeful that with the change in voltage she will be able to get a sound night sleep.    Imcoming Settings  Start time 0100 stop time 0600  Battery voltage 2.97 V  Stimulation electrode pair 5-1  Pulse width 300 µs  Frequency 40 Hz  Lead impedance 343 ohms  Supine min and max 5.0-8.0,   left min max 5.0-8.0,   right min max 5.0-8.0  Respiratory rate: 14bpm     Nightly titration  Sleep latency 15 minutes  BioBreak threshold 1 minute  Pitch threshold 55 degrees     Sleep History  Remede implanted 4/22/22 activated on 7/11/2022     Failed PAP therapy and was evaluated for Remede implant     PSG titration study from 4/8/21 indicated central sleep apnea. Sleep related hypoxia. The PAP titration was initiated with BiPAP ST 14/10 cm of water 10 BPM  and the pressure which was slowly titrated up in an attempt to eliminate sleep disordered breathing and snoring. The final pressure tested during the study was BiPAP 21/17 cm water and 16 BPM. Lowest bedt tolerated pressure was BiPAP ST 19/14 cm 16 BPM and O2 bleed in.A final pressure the patient was observed in the supine position   and in the REM sleep stage. The apnea hypopnea index improved to 2.1 per hour and O2 yogesh 85%.  She spent 256 min of sleep time below 89% O2 saturation. Snoring was resolved.      PSG titration from 2/23/21 indicated severe complex sleep apnea with AHI of 139.7/hr and O2 yogesh 73 %. Due to severity of the disease she met the split study protocol. The titration started with CPAP 5 cm and the highest tested pressure was BiPAP ST 17/12  Cm back up rate 14. The AHI was 122/hr. Sleep related hypoxia.      PSG titration from 5/16/20 indicated Complex sleep apnea. Sleep related hypoxia.  The PAP titration was initiated with BiPAP 9/5 cm of water and the pressure which was slowly titrated up in an attempt to eliminate sleep disordered breathing and snoring. The final pressure tested during the study was BiPAP 22/17 cm water. It was an incomplete titration, the best tolerated pressurse was BiPAP 18/14cm. At this pressure the patient was observed in the supine in the REM sleep stage. The apnea hypopnea index improved to 14.1 per hour and O2 yogesh 83%. The average O2 stauration was 88%. She spent 89 min of sleep time below 89% O2 saturation.     PSG study from 11/20/19 indicated severe central with AHI of 146/hr and O2 yogesh 76 %. 90% of the apneas were central apneas. Severe sleep hypoxia. Time spent with oxygen saturations below 89% was 264.1 minutes.     Echo from 3/29/18 indicated systolic function is difficult to assess in rapid atrial fibrillation, but appears normal. Severely dilated right ventricle. Reduced right ventricular systolic function. Mildly dilated left atrium. Known mitral valve bioprosthesis which is functioning normally. Estimated right ventricular systolic pressure is 35 mmHg + JVP. Compared to previous echocardiogram from 8/22/2016 the rhythm is now  rapid atrial fibrillation. Estimated right-sided pressures are lower in this study, and the mitral bioprosthesis continues to function normally.    Patient  Active Problem List    Diagnosis Date Noted    Central sleep apnea 03/03/2022    Secondary hypercoagulable state ([facility]) 10/25/2021    Falls 07/01/2021    Hyponatremia 07/01/2021    QT prolongation 07/01/2021    Chronic respiratory failure with hypoxia ([facility]) 04/15/2021    High risk medication use 08/01/2018    Sick sinus syndrome ([facility]) 05/30/2018    Chronic right-sided heart failure ([facility]) 05/30/2018    Heart failure with preserved ejection fraction, borderline, class II ([facility]) 05/30/2018    Left ventricular diastolic dysfunction, NYHA class 3 05/30/2018    Thrombocytopenia ([facility]) 04/04/2018    FNA (acute kidney injury) ([facility]) 04/03/2018    Circulating anticoagulants ([facility]) 03/28/2018    Obesity 03/28/2018    Hx of pleural effusion 03/12/2018    Palpitations 02/22/2018    Essential hypertension 02/22/2018    Pulmonary hypertension ([facility]) 06/19/2017    Localized edema 08/29/2016    History of sick sinus syndrome 10/01/2013    Pacemaker 11/26/2012    Mixed hyperlipidemia 07/03/2012    PAF (paroxysmal atrial fibrillation) ([facility]) 12/07/2011    Atrial myxoma 12/07/2011    Status post mitral valve repair 12/07/2011    Chronic low back pain 11/16/2011       Past Medical History:   Diagnosis Date    Atrial myxoma November 2011    Status post LA myxoma resection    Backpain     CAD (coronary artery disease)     pacemaker    Chronic pain     Dizziness      Hyperlipidemia      Hypertension     Hypertriglyceridemia      Indigestion     Peripheral edema     Sciatica     Sick sinus syndrome ([facility]) November 2011    Status post PPM implantation.    Status post mitral valve repair November 2011    MV repair with 32mm Jonnie-Carpenter flexible annuloplasty ring.        Past Surgical History:   Procedure Laterality Date    CARDIAC CATH, RIGHT HEART  03/31/2018    Right heart cath with high pressures.    MITRAL VALVE REPAIR  11/17/2011    Performed by MARY ORTIZ at SURGERY [facility]    PACEMAKER INSERTION  November 2011     Medtronic Versa VEDR01 implanted by Dr. Amaya.    OTHER ORTHOPEDIC SURGERY      Neck and back        Family History   Problem Relation Age of Onset    Heart Attack Father        Social History     Socioeconomic History    Marital status: Single     Spouse name: Not on file    Number of children: Not on file    Years of education: Not on file    Highest education level: Not on file   Occupational History    Not on file   Tobacco Use    Smoking status: Former     Packs/day: 1.00     Years: 39.00     Pack years: 39.00     Types: Cigarettes     Quit date: 2017     Years since quittin.9    Smokeless tobacco: Never    Tobacco comments:      Down to vapping now.    Vaping Use    Vaping Use: Every day    Start date: 2017   Substance and Sexual Activity    Alcohol use: Yes     Comment: rarely    Drug use: No     Types: Inhaled, Oral     Comment: hx of, denies currently    Sexual activity: Not on file   Other Topics Concern    Not on file   Social History Narrative    Not on file     Social Determinants of Health     Financial Resource Strain: Not on file   Food Insecurity: Not on file   Transportation Needs: Not on file   Physical Activity: Not on file   Stress: Not on file   Social Connections: Not on file   Intimate Partner Violence: Not on file   Housing Stability: Not on file       Current Outpatient Medications   Medication Sig Dispense Refill    apixaban (ELIQUIS) 5mg Tab Take 1 Tablet by mouth 2 times a day. 60 Tablet 6    spironolactone (ALDACTONE) 50 MG Tab Take 1 Tablet by mouth every day. 90 Tablet 3    Dapagliflozin Propanediol (FARXIGA) 5 MG Tab Take 5 mg by mouth every day. 30 Tablet 6    amiodarone (CORDARONE) 200 MG Tab Take 1 Tablet by mouth every day. 90 Tablet 3    atorvastatin (LIPITOR) 20 MG Tab Take 1 Tablet by mouth every day. 90 Tablet 3    venlafaxine (EFFEXOR-XR) 150 MG extended-release capsule Take 150 mg by mouth every evening.      albuterol 108 (90 Base) MCG/ACT Aero Soln  "inhalation aerosol Inhale 2 Puffs every four hours as needed for Shortness of Breath.      methocarbamol (ROBAXIN) 750 MG Tab Take 750 mg by mouth 3 times a day as needed (Muscle pain).      ARIPiprazole (ABILIFY) 10 MG Tab Take 10 mg by mouth every day.      venlafaxine XR (EFFEXOR XR) 75 MG CAPSULE SR 24 HR Take 75 mg by mouth every day.      traZODone (DESYREL) 100 MG Tab Take 100 mg by mouth every evening.      oxyCODONE immediate release (ROXICODONE) 10 MG immediate release tablet Take 10 mg by mouth 3 times a day. TID with MS Contin      pregabalin (LYRICA) 200 MG capsule Take 200 mg by mouth 3 times a day.      morphine SR (MS CONTIN) 15 MG TB12 Take 1 Tab by mouth 3 times a day. 60 Tab 0     No current facility-administered medications for this visit.        ALLERGIES: Penicillins    ROS  Constitutional: Denies fevers, Denies weight changes  Ears/Nose/Throat/Mouth: Denies nasal congestion or sore throat   Cardiovascular: Denies chest pain  Respiratory: Denies shortness of breath, Denies cough  Gastrointestinal/Hepatic: Denies nausea, vomiting  Sleep: see HPI      PHYSICAL EXAM  BP (!) 148/62 (BP Location: Left arm, Patient Position: Sitting, BP Cuff Size: Large adult)   Pulse 94   Resp 14   Ht 1.727 m (5' 8\")   Wt (!) 141 kg (311 lb)   LMP 01/01/2010   SpO2 92% Comment: 3L O2  BMI 47.29 kg/m²   Appearance: Well-nourished, well-developed, no acute distress  Eyes:  No scleral icterus , EOMI  ENMT: masked  Musculoskeletal:  Grossly normal; gait and station normal; digits and nails normal  Skin:  No rashes, petechiae, cyanosis  Neurologic: without focal signs; oriented to person, time, place, and purpose; judgement intact      Medical Decision Making   Assessment and Plan  Kailey Velarde is a 62 y.o.female  with HFpEF paroxysmal atrial fibrillation, morbid obesity, pulmonary hypertension, hyperlipidemia, prolonged QT syndrome, chronic respiratory failure requiring supplemental oxygen, chronic " pain, and central sleep apnea status post phrenic nerve stimulator (Remede Device).  Presents to sleep clinic today for adjustment in her phrenic nerve stimulator.    Central sleep apnea  Patient is experiencing difficulty in terms of intensity of stimulation.  She is currently happy with the angle at which it turns on and off.  Normally implant was activated on 7/11/2022    Device was interrogated and programmed at today's visit    Outgoing settings  Start time 0100 stop time 0600  Battery voltage 2.97 V  Stimulation electrode pair 5-1  Pulse width 300 µs  Frequency 40 Hz  Lead impedance 343 ohms  Supine min and max 5.0-7.0,   left min max 5.0-7.0,   right min max 5.0-7.0  Respiratory Rate: 13bpm     Nightly titration  Sleep latency 10 minutes  BioBreak threshold 1 minute  Pitch threshold 55 degrees    Have advised the patient to follow up with the appropriate healthcare practitioners for all other medical problems and issues.    Return in about 6 weeks (around 11/30/2022).      Please note portions of this record was created using voice recognition software. I have made every reasonable attempt to correct obvious errors, but I expect that there are errors of grammar and possibly content I did not discover before finalizing the note.

## 2022-11-02 ENCOUNTER — APPOINTMENT (OUTPATIENT)
Dept: VASCULAR LAB | Facility: MEDICAL CENTER | Age: 62
End: 2022-11-02
Attending: INTERNAL MEDICINE
Payer: MEDICARE

## 2022-11-07 ENCOUNTER — PATIENT MESSAGE (OUTPATIENT)
Dept: HEALTH INFORMATION MANAGEMENT | Facility: OTHER | Age: 62
End: 2022-11-07

## 2022-11-14 NOTE — TELEPHONE ENCOUNTER
Attempted to call Julien @[phone] to get recent BMP results. Per medical records, they do not have anything on file. Last labs noted from April. Attempted to call Dr. Grijalva's MA to discuss, no answer. L/m for her to please call back.    Concentration Of Kenalog Solution Injected (Mg/Ml): 5.0 X Size Of Lesion In Cm (Optional): 0 Detail Level: Simple Show Inventory Tab: Show Total Volume (Ccs): 2 Kenalog Preparation: Kenalog Administered By (Optional): Dr. Singh Validate Note Data When Using Inventory: Yes Medical Necessity Clause: This procedure was medically necessary because the lesions that were treated were: Consent: The risks of atrophy were reviewed with the patient. Include Z78.9 (Other Specified Conditions Influencing Health Status) As An Associated Diagnosis?: No

## 2022-11-18 ENCOUNTER — ANTICOAGULATION VISIT (OUTPATIENT)
Dept: VASCULAR LAB | Facility: MEDICAL CENTER | Age: 62
End: 2022-11-18
Attending: INTERNAL MEDICINE
Payer: MEDICARE

## 2022-11-18 VITALS — SYSTOLIC BLOOD PRESSURE: 124 MMHG | DIASTOLIC BLOOD PRESSURE: 73 MMHG | HEART RATE: 73 BPM

## 2022-11-18 DIAGNOSIS — D68.69 SECONDARY HYPERCOAGULABLE STATE (HCC): ICD-10-CM

## 2022-11-18 DIAGNOSIS — Z98.890 STATUS POST MITRAL VALVE REPAIR: ICD-10-CM

## 2022-11-18 DIAGNOSIS — I48.0 PAF (PAROXYSMAL ATRIAL FIBRILLATION) (HCC): ICD-10-CM

## 2022-11-18 PROCEDURE — 99211 OFF/OP EST MAY X REQ PHY/QHP: CPT

## 2022-11-18 NOTE — PROGRESS NOTES
Target end date:Indefinite     Indication: PAF     Drug: Eliquis 5 mg BID     CHADsVASC = 3 (CHF, gender, HTN)     Health Status Since Last Assessment   Patient denies any new relevant medical problems, ED visits or hospitalizations   Patient denies any embolic events (stroke/tia/systemic embolism)    Adherence with DOAC Therapy   Pt has not missed any doses in the average week    Bleeding Risk Assessment     denies Epistaxis   Pt denies any excessive or unusual bleeding/hematomas.  Pt denies any GI bleeds or hematemesis.  Pt denies any concerning daily headache or sub dural hematoma symptoms.     Pt denies any hematuria    Latest Hemoglobin 13.4   ETOH overuse none     Creatinine Clearance/Renal Function     Latest ClCr ->100 ml/min    Hepatic function   Latest LFTs - WNL   Pt denies any history of liver dysfunction      Drug Interactions   Platelets: 159   ASA/other antiplatelets none   NSAID none   Other drug interactions    Verified no anticonvulsant or azole therapy, education provided for future use.     Examination   Blood Pressure 124/73   Symptomatic hypotension none   Significant gait impairment/imbalance/high risk for falls? Patient gets some dizziness because she is on oxygen, no falls reported    Final Assessment and Recommendations:   Patient appears stable from the anticoagulation standpoint.     Benefits of continued DOAC therapy outweigh risks for this patient   Recommend pt continue with current DOAC therapy    DOAC is affordable ($9 per month)     Other Actions: cmp/ cbc hemogram ordered prior to next visit    Follow up:   Will follow up with patient 4 months. Per patient request      Hermelinda Patel, PharmD      Addendum 12/12/22: Labs reviewed from [facility] and note updated.

## 2022-12-12 ENCOUNTER — TELEPHONE (OUTPATIENT)
Dept: SLEEP MEDICINE | Facility: MEDICAL CENTER | Age: 62
End: 2022-12-12
Payer: MEDICARE

## 2022-12-12 NOTE — TELEPHONE ENCOUNTER
Called pt to confirm apt schedule 12/21/2022 @  1:00 pm with Dr. Farah for [facility]. Also Altagracia from :[facility] was informed of the apt time and date.

## 2022-12-21 ENCOUNTER — APPOINTMENT (OUTPATIENT)
Dept: SLEEP MEDICINE | Facility: MEDICAL CENTER | Age: 62
End: 2022-12-21
Payer: MEDICARE

## 2023-01-27 ENCOUNTER — TELEPHONE (OUTPATIENT)
Dept: CARDIOLOGY | Facility: MEDICAL CENTER | Age: 63
End: 2023-01-27
Payer: MEDICARE

## 2023-01-27 NOTE — TELEPHONE ENCOUNTER
Phone Number Called: [phone]    Call outcome: Spoke to patient regarding message below.    Message: Called to inform patient that farxiga is also prescribed for heart failure. Patient verbalizes understanding. Patient states she has a friend who is a retired RN who told her to stop taking Farxiga because it is for DM2. Offered to share information with patient regarding Farxiga and Heart Failure via [employer]. Patient stated she would like that. Real Time Contentt message to patient.

## 2023-01-27 NOTE — TELEPHONE ENCOUNTER
HO    Caller: Kailey Velarde    Topic/issue: FARXIGA    Patient would like to know why she is  taking this medication when she is not diabetic. Please advise.    Thank you,  Elio HENAO    Callback Number: [phone] (home)

## 2023-02-09 ENCOUNTER — TELEMEDICINE (OUTPATIENT)
Dept: SLEEP MEDICINE | Facility: MEDICAL CENTER | Age: 63
End: 2023-02-09
Payer: MEDICARE

## 2023-02-09 VITALS — BODY MASS INDEX: 44.41 KG/M2 | WEIGHT: 293 LBS | HEIGHT: 68 IN

## 2023-02-09 DIAGNOSIS — G47.31 CENTRAL SLEEP APNEA: ICD-10-CM

## 2023-02-09 DIAGNOSIS — F51.04 CHRONIC INSOMNIA: ICD-10-CM

## 2023-02-09 PROCEDURE — 99214 OFFICE O/P EST MOD 30 MIN: CPT | Mod: 95 | Performed by: STUDENT IN AN ORGANIZED HEALTH CARE EDUCATION/TRAINING PROGRAM

## 2023-02-09 ASSESSMENT — FIBROSIS 4 INDEX: FIB4 SCORE: 2.92

## 2023-02-09 NOTE — PROGRESS NOTES
[facility] Virtual Follow Up Note     Date: 2023 / Time: 1:29 PM    CC: Telemedicine sleep follow-up    This sleep consultation is provided using Telemedicine and secure encrypted software. Consent was obtained.    Consulting MD: Yossi Farah M.D.  Requesting Physician: Pema Peña M.D.  Patient name:      Kailey Velarde  : 1960  MRN: [id number]     This visit was conducted via Zoom using secure and encrypted videoconferencing technology.   The patient was in a private location at home in the [facility].    The patient's identity was confirmed and verbal consent was obtained for this virtual visit.      HISTORY OF PRESENT ILLNESS:     Kailey Velarde is a 62 y.o. female with paroxysmal atrial fibrillation, HFpEF, morbid obesity, pulmonary hypertension, hyperlipidemia, prolonged QT syndrome, chronic respiratory failure requiring supplemental oxygen, chronic pain, central sleep apnea status post phrenic nerve stimulator.  Presents to sleep clinic today to discuss trouble regarding sleep and her phrenic nerve stimulator.    She continues to have trouble sleeping.  She states she is now falling asleep between 6 and 9 PM.  She will often wake up around 9 PM to 9:30 PM and stay awake until approximately 2 to 3 AM.  She will sometimes be able to fall asleep until the morning but this varies.  Part of her concern with getting back to sleep is that her phrenic nerve stimulator starts stimulating at 1 AM.  She feels that when it turns on it wakes her up and she is unable to sleep through the stimulation.  Due to this she is sleeping sitting up to not allow the stimulator to be turned on.    She reports she has struggled with her sleep schedule for many years.  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  Due to her pain and her mobility status she is not going outside regularly.  She is in the process of trying to get a mobility  scooter and feels when she is able to do that she will to get out more frequently.  States she often does not like lit rooms.  She reports using melatonin in the past to help with sleep and did find it helpful for a week or so.    She did miss her previous appointment with the remede team for adjustments to her phrenic nerve stimulator.    MEDICAL HISTORY  Past Medical History:   Diagnosis Date    Atrial myxoma 2011    Status post LA myxoma resection    Backpain     CAD (coronary artery disease)     pacemaker    Chronic pain     Dizziness      Hyperlipidemia      Hypertension     Hypertriglyceridemia      Indigestion     Peripheral edema     Sciatica     Sick sinus syndrome ([facility]) 2011    Status post PPM implantation.    Status post mitral valve repair 2011    MV repair with 32mm Jonnie-Carpenter flexible annuloplasty ring.        SURGICAL HISTORY  Past Surgical History:   Procedure Laterality Date    CARDIAC CATH, RIGHT HEART  2018    Right heart cath with high pressures.    MITRAL VALVE REPAIR  2011    Performed by MARY ORTIZ at SURGERY [facility]    PACEMAKER INSERTION  2011    Medtronic Versa VEDR01 implanted by Dr. Amaya.    OTHER ORTHOPEDIC SURGERY      Neck and back         FAMILY HISTORY  Family History   Problem Relation Age of Onset    Heart Attack Father        SOCIAL HISTORY  Social History     Socioeconomic History    Marital status: Single   Tobacco Use    Smoking status: Former     Packs/day: 1.00     Years: 39.00     Pack years: 39.00     Types: Cigarettes     Quit date: 2017     Years since quittin.2    Smokeless tobacco: Never    Tobacco comments:      Down to vapping now.    Vaping Use    Vaping Use: Every day    Start date: 2017   Substance and Sexual Activity    Alcohol use: Yes     Comment: rarely    Drug use: No     Types: Inhaled, Oral     Comment: hx of, denies currently        CURRENT MEDICATIONS  Current Outpatient  "Medications   Medication Sig Dispense Refill    apixaban (ELIQUIS) 5mg Tab Take 1 Tablet by mouth 2 times a day. 60 Tablet 6    spironolactone (ALDACTONE) 50 MG Tab Take 1 Tablet by mouth every day. 90 Tablet 3    Dapagliflozin Propanediol (FARXIGA) 5 MG Tab Take 5 mg by mouth every day. 30 Tablet 6    amiodarone (CORDARONE) 200 MG Tab Take 1 Tablet by mouth every day. 90 Tablet 3    atorvastatin (LIPITOR) 20 MG Tab Take 1 Tablet by mouth every day. 90 Tablet 3    venlafaxine (EFFEXOR-XR) 150 MG extended-release capsule Take 150 mg by mouth every evening.      albuterol 108 (90 Base) MCG/ACT Aero Soln inhalation aerosol Inhale 2 Puffs every four hours as needed for Shortness of Breath.      methocarbamol (ROBAXIN) 750 MG Tab Take 750 mg by mouth 3 times a day as needed (Muscle pain).      ARIPiprazole (ABILIFY) 10 MG Tab Take 10 mg by mouth every day.      venlafaxine XR (EFFEXOR XR) 75 MG CAPSULE SR 24 HR Take 75 mg by mouth every day.      traZODone (DESYREL) 100 MG Tab Take 100 mg by mouth every evening.      oxyCODONE immediate release (ROXICODONE) 10 MG immediate release tablet Take 10 mg by mouth 3 times a day. TID with MS Contin      pregabalin (LYRICA) 200 MG capsule Take 200 mg by mouth 3 times a day.      morphine SR (MS CONTIN) 15 MG TB12 Take 1 Tab by mouth 3 times a day. 60 Tab 0     No current facility-administered medications for this visit.       REVIEW OF SYSTEMS  Constitutional: Denies fevers, Denies weight changes  Ears/Nose/Throat/Mouth: Denies nasal congestion or sore throat   Cardiovascular: Denies chest pain or palpitations   Respiratory: Denies shortness of breath, Denies cough  Gastrointestinal/Hepatic: Denies abdominal pain, nausea, vomiting, diarrhea  Musculoskeletal/Rheum: Denies  joint pain and swelling   Sleep: See HPI      Physical Examination:  Vitals/ General Appearance:   Weight/BMI: Body mass index is 45.61 kg/m².  Ht 1.727 m (5' 8\")   Wt (!) 136 kg (300 lb)   Vitals:    02/09/23 " "1325   Weight: (!) 136 kg (300 lb)   Height: 1.727 m (5' 8\")       General: alert and oriented to time, place and person. Cooperative and in no apparent distress.   Constitutional: Alert, no distress, well-groomed.  Voice: normal volume and lisa  Head: normocephalic   Pulmonary: Voice normal volume and lisa. No sounds or signs of increased work of breathing.   Neurologic: No involuntary movements     Assessment and Plan  Kailey Velarde is a 62 y.o. female with paroxysmal atrial fibrillation, HFpEF, morbid obesity, pulmonary hypertension, hyperlipidemia, prolonged QT syndrome, chronic respiratory failure requiring supplemental oxygen, chronic pain, central sleep apnea status post phrenic nerve stimulator.  Presents to sleep clinic today to discuss trouble regarding sleep and her phrenic nerve stimulator.    The medical record was reviewed.    Central sleep apnea  She is having difficulty with her phrenic nerve stimulator.  She finds that with the turning on it is waking her up.  Due to this she is avoiding sleeping in a more supine position due to concern it would turn on her device.  She is eager to have her device adjusted to see if can be more comfortable for her.    Plan  -Continues to have difficulty with phrenic nerve stimulator settings  -We will work with coordinating Remede team scheduling an appointment in our office for adjustment    Chronic insomnia  Patient is having difficulty with her sleep pattern.  Insomnia versus a regular sleep phase.  She does have a bedtime and wake time however it is not where she would like it to be.  Sleep phase is more advanced with going to sleep around 6 PM.  However she is only sleeping for 3 hours and then waking up for a few hours and then going back to sleep.  Ideally she would like to able to get to sleep around 10 to 11 PM and sleep until early morning.  This would also allow for the timing of her stimulator to be active.  Discussed melatonin therapy " and light therapy.  Due to her wanting to delay her sleep phase more recommended she start light therapy in the evening.  Advised her to start a 10,000 Lux therapy lamp for 30 to 45 minutes around 530 to 6 PM.    Plan  -Keep same bedtime and wake time as able   -Adjust bedtime slowly by 30-minute increments weekly  -Recommended to try to get outside during the day and get good sun exposure.  -Start 10,000 Lux light therapy between 530 and 6 PM for 30 to 45 minutes.    Have advised the patient to follow up with the appropriate healthcare practitioners for all other medical problems and issues.    Return for Next available remede appointment .      Please note portions of this record was created using voice recognition software. I have made every reasonable attempt to correct obvious errors, but I expect that there are errors of grammar and possibly content I did not discover before finalizing the note.

## 2023-02-10 DIAGNOSIS — I48.0 PAF (PAROXYSMAL ATRIAL FIBRILLATION) (HCC): ICD-10-CM

## 2023-02-10 NOTE — PATIENT INSTRUCTIONS
Start-a light therapy box with 10,000 Lux intensity.  These can be found on [employer] and other sites online.  -Begin therapy at 5:30 PM with a light being in your visual field for 30 to 45 minutes.  -Do this regularly for a week.  -Slowly begin to use light therapy later in the evening such as starting light therapy at 6 PM after the first week for 30 to 45 minutes.  Do this regularly for a week and then push to 630PM start time.

## 2023-02-24 ENCOUNTER — APPOINTMENT (OUTPATIENT)
Dept: CARDIOLOGY | Facility: MEDICAL CENTER | Age: 63
End: 2023-02-24
Payer: MEDICARE

## 2023-03-06 ENCOUNTER — TELEPHONE (OUTPATIENT)
Dept: SLEEP MEDICINE | Facility: MEDICAL CENTER | Age: 63
End: 2023-03-06
Payer: MEDICARE

## 2023-03-06 NOTE — TELEPHONE ENCOUNTER
Patient did call asking if we did have contact information for [facility], I put patient on hold to locate the contact info and the line disconnected. I did call patient back and it went to voicemail, I did then leave a voicemail of the contact info for [facility], I also let patient know to give us a call if any other questions.

## 2023-03-08 ENCOUNTER — TELEPHONE (OUTPATIENT)
Dept: SLEEP MEDICINE | Facility: MEDICAL CENTER | Age: 63
End: 2023-03-08
Payer: MEDICARE

## 2023-03-08 NOTE — TELEPHONE ENCOUNTER
ALYM for the pt asking her if 3/15/2023  @ 11:20 am works for her for a remede Fv with , I advise the pt to give me a call back to confirm the apt.

## 2023-03-10 ENCOUNTER — APPOINTMENT (OUTPATIENT)
Dept: CARDIOLOGY | Facility: MEDICAL CENTER | Age: 63
End: 2023-03-10
Payer: MEDICARE

## 2023-03-12 ENCOUNTER — APPOINTMENT (OUTPATIENT)
Dept: URGENT CARE | Facility: CLINIC | Age: 63
End: 2023-03-12
Payer: MEDICARE

## 2023-03-15 ENCOUNTER — OFFICE VISIT (OUTPATIENT)
Dept: SLEEP MEDICINE | Facility: MEDICAL CENTER | Age: 63
End: 2023-03-15
Attending: STUDENT IN AN ORGANIZED HEALTH CARE EDUCATION/TRAINING PROGRAM
Payer: MEDICARE

## 2023-03-15 VITALS
SYSTOLIC BLOOD PRESSURE: 114 MMHG | HEART RATE: 96 BPM | HEIGHT: 68 IN | BODY MASS INDEX: 44.41 KG/M2 | OXYGEN SATURATION: 92 % | DIASTOLIC BLOOD PRESSURE: 78 MMHG | RESPIRATION RATE: 16 BRPM | WEIGHT: 293 LBS

## 2023-03-15 DIAGNOSIS — G47.31 CENTRAL SLEEP APNEA: ICD-10-CM

## 2023-03-15 DIAGNOSIS — Z45.42 ENCOUNTER FOR ADJUSTMENT AND MANAGEMENT OF NEUROSTIMULATOR: ICD-10-CM

## 2023-03-15 DIAGNOSIS — F51.04 CHRONIC INSOMNIA: ICD-10-CM

## 2023-03-15 PROCEDURE — 99212 OFFICE O/P EST SF 10 MIN: CPT | Performed by: STUDENT IN AN ORGANIZED HEALTH CARE EDUCATION/TRAINING PROGRAM

## 2023-03-15 PROCEDURE — 99213 OFFICE O/P EST LOW 20 MIN: CPT | Performed by: STUDENT IN AN ORGANIZED HEALTH CARE EDUCATION/TRAINING PROGRAM

## 2023-03-15 ASSESSMENT — FIBROSIS 4 INDEX: FIB4 SCORE: 2.92

## 2023-03-15 NOTE — PROGRESS NOTES
[facility] Follow-up Visit    CC: Need of phrenic nerve stimulator programming      HPI:  Kailey Velarde is a 62 y.o.female  with paroxysmal atrial fibrillation, HFpEF, morbid obesity, pulmonary hypertension, hyperlipidemia, prolonged QT syndrome, chronic respiratory failure requiring supplemental oxygen, chronic pain, and central sleep apnea status post phrenic nerve stimulator.  Presents to sleep clinic today regarding adjustment to phrenic nerve stimulator.    She continues to have trouble sleeping.  She continues to find that the device will wake her up at night.  She states that her bedtime has shifted to going to bed around 10 PM to midnight.  She states she is still waking up at 1 AM.  She attributes some of her awakening at night due to her phrenic nerve stimulator.      Since last televisit she states she was unable to purchase a therapy light due to cost.  She continues to be frustrated regarding her sleep.    [facility] personnel onsite to program phrenic nerve stimulator.    Incoming settings  Start time 0100 stop time 0600  Battery voltage 2.97 V  Stimulation electrode pair 5-1  Pulse width 300 µs  Frequency 40 Hz  Lead impedance 343 ohms  Supine min and max 5.0-7.0,   left min max 5.0-7.0,   right min max 5.0-7.0  Respiratory Rate: 13bpm      Nightly titration  Sleep latency 10 minutes  BioBreak threshold 1 minute  Pitch threshold 55 degrees    Patient Active Problem List    Diagnosis Date Noted    Central sleep apnea 03/03/2022    Secondary hypercoagulable state (HCC) 10/25/2021    Falls 07/01/2021    Hyponatremia 07/01/2021    QT prolongation 07/01/2021    Chronic respiratory failure with hypoxia (HCC) 04/15/2021    High risk medication use 08/01/2018    Sick sinus syndrome (HCC) 05/30/2018    Chronic right-sided heart failure (HCC) 05/30/2018    Heart failure with preserved ejection fraction, borderline, class II (HCC) 05/30/2018    Left ventricular diastolic dysfunction, NYHA class 3  05/30/2018    Thrombocytopenia ([facility]) 04/04/2018    FAN (acute kidney injury) ([facility]) 04/03/2018    Circulating anticoagulants ([facility]) 03/28/2018    Obesity 03/28/2018    Hx of pleural effusion 03/12/2018    Palpitations 02/22/2018    Essential hypertension 02/22/2018    Pulmonary hypertension ([facility]) 06/19/2017    Localized edema 08/29/2016    History of sick sinus syndrome 10/01/2013    Pacemaker 11/26/2012    Mixed hyperlipidemia 07/03/2012    PAF (paroxysmal atrial fibrillation) (CMS-[facility]) 12/07/2011    Atrial myxoma 12/07/2011    Status post mitral valve repair 12/07/2011    Chronic low back pain 11/16/2011       Past Medical History:   Diagnosis Date    Atrial myxoma November 2011    Status post LA myxoma resection    Backpain     CAD (coronary artery disease)     pacemaker    Chronic pain     Dizziness      Hyperlipidemia      Hypertension     Hypertriglyceridemia      Indigestion     Peripheral edema     Sciatica     Sick sinus syndrome ([facility]) November 2011    Status post PPM implantation.    Status post mitral valve repair November 2011    MV repair with 32mm Jonnie-Carpenter flexible annuloplasty ring.        Past Surgical History:   Procedure Laterality Date    CARDIAC CATH, RIGHT HEART  03/31/2018    Right heart cath with high pressures.    MITRAL VALVE REPAIR  11/17/2011    Performed by MARY ORTIZ at SURGERY [facility]    PACEMAKER INSERTION  November 2011    Medtronic Versa VEDR01 implanted by Dr. Amaya.    OTHER ORTHOPEDIC SURGERY      Neck and back        Family History   Problem Relation Age of Onset    Heart Attack Father        Social History     Socioeconomic History    Marital status: Single     Spouse name: Not on file    Number of children: Not on file    Years of education: Not on file    Highest education level: Not on file   Occupational History    Not on file   Tobacco Use    Smoking status: Former     Packs/day: 1.00     Years: 39.00     Pack years: 39.00     Types: Cigarettes     Quit  date: 2017     Years since quittin.3    Smokeless tobacco: Never    Tobacco comments:      Down to vapping now.    Vaping Use    Vaping Use: Every day    Start date: 2017   Substance and Sexual Activity    Alcohol use: Yes     Comment: rarely    Drug use: No     Types: Inhaled, Oral     Comment: hx of, denies currently    Sexual activity: Not on file   Other Topics Concern    Not on file   Social History Narrative    Not on file     Social Determinants of Health     Financial Resource Strain: Not on file   Food Insecurity: Not on file   Transportation Needs: Not on file   Physical Activity: Not on file   Stress: Not on file   Social Connections: Not on file   Intimate Partner Violence: Not on file   Housing Stability: Not on file       Current Outpatient Medications   Medication Sig Dispense Refill    apixaban (ELIQUIS) 5mg Tab Take 1 Tablet by mouth 2 times a day. 60 Tablet 6    spironolactone (ALDACTONE) 50 MG Tab Take 1 Tablet by mouth every day. 90 Tablet 3    Dapagliflozin Propanediol (FARXIGA) 5 MG Tab Take 5 mg by mouth every day. 30 Tablet 6    amiodarone (CORDARONE) 200 MG Tab Take 1 Tablet by mouth every day. 90 Tablet 3    atorvastatin (LIPITOR) 20 MG Tab Take 1 Tablet by mouth every day. 90 Tablet 3    venlafaxine (EFFEXOR-XR) 150 MG extended-release capsule Take 150 mg by mouth every evening.      albuterol 108 (90 Base) MCG/ACT Aero Soln inhalation aerosol Inhale 2 Puffs every four hours as needed for Shortness of Breath.      methocarbamol (ROBAXIN) 750 MG Tab Take 750 mg by mouth 3 times a day as needed (Muscle pain).      ARIPiprazole (ABILIFY) 10 MG Tab Take 10 mg by mouth every day.      venlafaxine XR (EFFEXOR XR) 75 MG CAPSULE SR 24 HR Take 75 mg by mouth every day.      traZODone (DESYREL) 100 MG Tab Take 100 mg by mouth every evening.      oxyCODONE immediate release (ROXICODONE) 10 MG immediate release tablet Take 10 mg by mouth 3 times a day. TID with MS Contin       "pregabalin (LYRICA) 200 MG capsule Take 200 mg by mouth 3 times a day.      morphine SR (MS CONTIN) 15 MG TB12 Take 1 Tab by mouth 3 times a day. 60 Tab 0     No current facility-administered medications for this visit.        ALLERGIES: Penicillins    ROS  Constitutional: Denies fevers, Denies weight changes  Ears/Nose/Throat/Mouth: Denies nasal congestion or sore throat   Cardiovascular: Denies chest pain  Respiratory: Denies shortness of breath, Denies cough  Gastrointestinal/Hepatic: Denies nausea, vomiting  Sleep: see HPI      PHYSICAL EXAM  /78 (BP Location: Left arm, Patient Position: Sitting, BP Cuff Size: Large adult)   Pulse 96   Resp 16   Ht 1.727 m (5' 8\")   Wt (!) 138 kg (304 lb)   LMP 01/01/2010   SpO2 92%   BMI 46.22 kg/m²   Appearance: Well-nourished, well-developed, no acute distress  Eyes:  No scleral icterus , EOMI  Musculoskeletal:  Grossly normal; ; digits and nails normal  Skin:  No rashes, petechiae, cyanosis  Neurologic: without focal signs; oriented to person, time, place, and purpose; judgement intact      Medical Decision Making   Assessment and Plan  Kailey Velarde is a 62 y.o.female  with paroxysmal atrial fibrillation, HFpEF, morbid obesity, pulmonary hypertension, hyperlipidemia, prolonged QT syndrome, chronic respiratory failure requiring supplemental oxygen, chronic pain, and central sleep apnea status post phrenic nerve stimulator.  Presents to sleep clinic today regarding adjustment to phrenic nerve stimulator.    The medical record was reviewed.    Central sleep apnea  Patient continues to have difficulty using phrenic nerve stimulator.  She finds that it wakes her up at night.  Today's visit was regarding adjustment to phrenic nerve stimulator.  [facility] staff onsite to adjust settings to device.    Outgoing settings  Start time 0100 stop time 0600  Battery voltage 2.97 V  Stimulation electrode pair 5-1 1  Pulse width 3 1 00 µs  Frequency 40 Hz  Lead impedance " 503 ohms  Supine min and max 4.5-5.5,   left min max 4.5- 5.5,   right min max 4.5-5.5  Respiratory Rate: 13bpm      Nightly titration  Sleep latency 10 minutes  BioBreak threshold 1 minute  Pitch threshold 55 degrees    Plan  -Continue to use phrenic nerve stimulator nightly at new settings  -Recommended follow-up in June for programming of phrenic nerve stimulator    Insomnia and irregular sleep phase  Discussed importance of maintaining a regular bedtime.  One of her main complaints is that her device wakes her up.  Advised that ideally with changes made at today's visit by the [facility]ede team she may be able to get a more continuous sleep.    Plan  -Continue to keep a regular bed and wake time  -Encouraged to try to use phrenic nerve stimulator nightly  -We will plan to follow-up in 2 months with a televisit  Have advised the patient to follow up with the appropriate healthcare practitioners for all other medical problems and issues.    Return in about 2 months (around 5/15/2023).      Please note portions of this record was created using voice recognition software. I have made every reasonable attempt to correct obvious errors, but I expect that there are errors of grammar and possibly content I did not discover before finalizing the note.

## 2023-03-22 ENCOUNTER — APPOINTMENT (OUTPATIENT)
Dept: VASCULAR LAB | Facility: MEDICAL CENTER | Age: 63
End: 2023-03-22
Payer: MEDICARE

## 2023-03-24 ENCOUNTER — TELEPHONE (OUTPATIENT)
Dept: CARDIOLOGY | Facility: MEDICAL CENTER | Age: 63
End: 2023-03-24
Payer: MEDICARE

## 2023-03-24 DIAGNOSIS — I48.0 PAF (PAROXYSMAL ATRIAL FIBRILLATION) (HCC): ICD-10-CM

## 2023-03-24 DIAGNOSIS — E78.2 MIXED HYPERLIPIDEMIA: ICD-10-CM

## 2023-03-24 DIAGNOSIS — Z79.01 CHRONIC ANTICOAGULATION: ICD-10-CM

## 2023-03-24 DIAGNOSIS — D68.69 SECONDARY HYPERCOAGULABLE STATE (HCC): ICD-10-CM

## 2023-03-24 DIAGNOSIS — I48.0 PAROXYSMAL ATRIAL FIBRILLATION (HCC): ICD-10-CM

## 2023-03-24 NOTE — TELEPHONE ENCOUNTER
DA        Caller: Kailey Velarde    Medication Name and Dosage: atorvastatin (LIPITOR) 20 MG Tab, amiodarone (CORDARONE) 200 MG Tab, apixaban (ELIQUIS) 5mg Tab     Please call your pharmacy and have them send us a refill request or speak to a live representative, RX number may have changed.    Medication amount left: less then a week left    Preferred Pharmacy: [facility] - [place], [place] - [facility] [address]    Other questions (Topic): n/a    Callback Number (Will only call for issues): [phone]    Thank you   -Saud ABARCA

## 2023-03-24 NOTE — TELEPHONE ENCOUNTER
ATORVASTATIN    Is the patient due for a refill? Yes    Was the patient seen the past year? Yes    Date of last office visit: 8/9/2022    Does the patient have an upcoming appointment?  Yes   If yes, When? 4/18/2023    Provider to refill:HO    Does the patients insurance require a 100 day supply?  No

## 2023-03-27 RX ORDER — ATORVASTATIN CALCIUM 20 MG/1
20 TABLET, FILM COATED ORAL DAILY
Qty: 90 TABLET | Refills: 0 | Status: SHIPPED | OUTPATIENT
Start: 2023-03-27 | End: 2023-05-02 | Stop reason: SDUPTHER

## 2023-03-27 RX ORDER — AMIODARONE HYDROCHLORIDE 200 MG/1
200 TABLET ORAL DAILY
Qty: 90 TABLET | Refills: 0 | Status: SHIPPED | OUTPATIENT
Start: 2023-03-27 | End: 2023-05-02 | Stop reason: SDUPTHER

## 2023-03-30 ENCOUNTER — ANTICOAGULATION VISIT (OUTPATIENT)
Dept: VASCULAR LAB | Facility: MEDICAL CENTER | Age: 63
End: 2023-03-30
Attending: INTERNAL MEDICINE
Payer: MEDICARE

## 2023-03-30 VITALS — HEART RATE: 68 BPM | SYSTOLIC BLOOD PRESSURE: 127 MMHG | DIASTOLIC BLOOD PRESSURE: 74 MMHG

## 2023-03-30 DIAGNOSIS — D68.69 SECONDARY HYPERCOAGULABLE STATE (HCC): ICD-10-CM

## 2023-03-30 DIAGNOSIS — Z98.890 STATUS POST MITRAL VALVE REPAIR: ICD-10-CM

## 2023-03-30 DIAGNOSIS — I48.0 PAF (PAROXYSMAL ATRIAL FIBRILLATION) (HCC): ICD-10-CM

## 2023-03-30 PROCEDURE — 99212 OFFICE O/P EST SF 10 MIN: CPT

## 2023-03-30 NOTE — PROGRESS NOTES
"                              Target end date:Indefinite                                Indication: PAF                                Drug: Eliquis 5 mg BID                                CHADsVASC = 3 (CHF, gender, HTN)    Health Status Since Last Assessment   Patient denies any new relevant medical problems, ED visits or hospitalizations   Patient denies any embolic events (stroke/tia/systemic embolism)    Adherence with DOAC Therapy   Pt has 0-1 missed any doses in the average week   Possible that she may have doubled up on her Eliquis dose.    Bleeding Risk Assessment     Positive for Epistaxis. Patient had a persistant nose bleed for two days.  She reports she might have taken more Eliquis than prescribed.  Also endorses having \"a few too many\" drinks of alcohol the day prior to this occurrence. Pt urged to use a pill organizer.    She does consume ETOH, warned pt about the interaction and suggest she reduce her consumption.     Pt denies any excessive or unusual bleeding/hematomas.  Pt denies any GI bleeds or hematemesis.  Pt denies any concerning daily headache or sub dural hematoma symptoms.     Pt denies any hematuria    Latest Hemoglobin Overdue, pt went to [facility]- will attempt to get their results.    ETOH overuse 3-4 beers see above.      Creatinine Clearance/Renal Function     Latest ClCr Overdue, pt went to [facility]- will attempt to get their results.     Hepatic function   Latest LFTs Overdue, pt went to [facility]- will attempt to get their results.    Pt denies any history of liver dysfunction      Drug Interactions   Platelets: Overdue, pt went to [facility]- will attempt to get their results.    ASA/other antiplatelets Pt wanted to leave before we could review   NSAID Pt wanted to leave before we could review   Other drug interactions Pt wanted to leave before we could review   Pt wanted to leave before we could review Verified no anticonvulsant or azole therapy, education provided for future " use.     Examination   Blood Pressure WNL   Symptomatic hypotension none   Significant gait impairment/imbalance/high risk for falls? Uses oxygen with a cord    Final Assessment and Recommendations:   Patient appears stable from the anticoagulation standpoint.     Benefits of continued DOAC therapy outweigh risks for this patient   Recommend pt continue with current DOAC therapy    DOAC is  affordable     Other Actions: cmp/ cbc hemogram ordered prior to next visit    Follow up:   Will follow up with patient 6  months.     Perry Villa, LeonoraD

## 2023-04-18 ENCOUNTER — APPOINTMENT (OUTPATIENT)
Dept: CARDIOLOGY | Facility: MEDICAL CENTER | Age: 63
End: 2023-04-18
Payer: MEDICARE

## 2023-05-02 ENCOUNTER — OFFICE VISIT (OUTPATIENT)
Dept: CARDIOLOGY | Facility: MEDICAL CENTER | Age: 63
End: 2023-05-02
Payer: MEDICARE

## 2023-05-02 VITALS
SYSTOLIC BLOOD PRESSURE: 120 MMHG | HEART RATE: 90 BPM | OXYGEN SATURATION: 93 % | HEIGHT: 68 IN | RESPIRATION RATE: 18 BRPM | WEIGHT: 293 LBS | DIASTOLIC BLOOD PRESSURE: 80 MMHG | BODY MASS INDEX: 44.41 KG/M2

## 2023-05-02 DIAGNOSIS — Z98.890 STATUS POST MITRAL VALVE REPAIR: ICD-10-CM

## 2023-05-02 DIAGNOSIS — I50.812 CHRONIC RIGHT-SIDED HEART FAILURE (HCC): ICD-10-CM

## 2023-05-02 DIAGNOSIS — I48.0 PAF (PAROXYSMAL ATRIAL FIBRILLATION) (HCC): ICD-10-CM

## 2023-05-02 DIAGNOSIS — I10 ESSENTIAL HYPERTENSION: Chronic | ICD-10-CM

## 2023-05-02 DIAGNOSIS — I27.20 PULMONARY HYPERTENSION (HCC): Chronic | ICD-10-CM

## 2023-05-02 DIAGNOSIS — Z79.01 CHRONIC ANTICOAGULATION: ICD-10-CM

## 2023-05-02 DIAGNOSIS — D68.69 SECONDARY HYPERCOAGULABLE STATE (HCC): ICD-10-CM

## 2023-05-02 DIAGNOSIS — I50.30 HEART FAILURE WITH PRESERVED EJECTION FRACTION, BORDERLINE, CLASS II (HCC): ICD-10-CM

## 2023-05-02 DIAGNOSIS — I48.0 PAROXYSMAL ATRIAL FIBRILLATION (HCC): ICD-10-CM

## 2023-05-02 DIAGNOSIS — E78.2 MIXED HYPERLIPIDEMIA: ICD-10-CM

## 2023-05-02 DIAGNOSIS — G47.31 CENTRAL SLEEP APNEA: ICD-10-CM

## 2023-05-02 DIAGNOSIS — Z86.79 HISTORY OF SICK SINUS SYNDROME: ICD-10-CM

## 2023-05-02 DIAGNOSIS — Z95.0 PACEMAKER: ICD-10-CM

## 2023-05-02 DIAGNOSIS — D15.1 ATRIAL MYXOMA: ICD-10-CM

## 2023-05-02 PROBLEM — R26.81 UNSTEADY GAIT: Status: ACTIVE | Noted: 2023-03-17

## 2023-05-02 PROBLEM — I49.5 SICK SINUS SYNDROME (HCC): Status: RESOLVED | Noted: 2018-05-30 | Resolved: 2023-05-02

## 2023-05-02 PROBLEM — R06.02 SOB (SHORTNESS OF BREATH) ON EXERTION: Status: ACTIVE | Noted: 2023-03-17

## 2023-05-02 PROBLEM — M62.81 GENERALIZED MUSCLE WEAKNESS: Status: ACTIVE | Noted: 2023-03-17

## 2023-05-02 PROBLEM — Z79.899 HIGH RISK MEDICATION USE: Status: RESOLVED | Noted: 2018-08-01 | Resolved: 2023-05-02

## 2023-05-02 PROBLEM — R00.2 PALPITATIONS: Status: RESOLVED | Noted: 2018-02-22 | Resolved: 2023-05-02

## 2023-05-02 PROCEDURE — 99214 OFFICE O/P EST MOD 30 MIN: CPT

## 2023-05-02 RX ORDER — AMIODARONE HYDROCHLORIDE 200 MG/1
200 TABLET ORAL DAILY
Qty: 100 TABLET | Refills: 3 | Status: SHIPPED | OUTPATIENT
Start: 2023-05-02

## 2023-05-02 RX ORDER — EMPAGLIFLOZIN 10 MG/1
10 TABLET, FILM COATED ORAL DAILY
Qty: 100 TABLET | Refills: 3 | Status: SHIPPED | OUTPATIENT
Start: 2023-05-02

## 2023-05-02 RX ORDER — SPIRONOLACTONE 50 MG/1
50 TABLET, FILM COATED ORAL DAILY
Qty: 100 TABLET | Refills: 3 | Status: SHIPPED | OUTPATIENT
Start: 2023-05-02

## 2023-05-02 RX ORDER — ATORVASTATIN CALCIUM 20 MG/1
20 TABLET, FILM COATED ORAL DAILY
Qty: 100 TABLET | Refills: 3 | Status: SHIPPED | OUTPATIENT
Start: 2023-05-02

## 2023-05-02 ASSESSMENT — ENCOUNTER SYMPTOMS
SHORTNESS OF BREATH: 1
DEPRESSION: 0
ORTHOPNEA: 0
MUSCULOSKELETAL NEGATIVE: 1
NERVOUS/ANXIOUS: 0
GASTROINTESTINAL NEGATIVE: 1
PND: 0
CONSTITUTIONAL NEGATIVE: 1
PALPITATIONS: 1
NEUROLOGICAL NEGATIVE: 1
EYES NEGATIVE: 1

## 2023-05-02 ASSESSMENT — FIBROSIS 4 INDEX: FIB4 SCORE: 2.92

## 2023-05-02 NOTE — PROGRESS NOTES
"Chief Complaint   Patient presents with    Atrial Fibrillation     F/V Dx: Paroxysmal atrial fibrillation (HCC)    Shortness of Breath    Congestive Heart Failure     F/V Dx: Heart failure with preserved ejection fraction, borderline, class II (HCC)       Arthur Velarde is a 62 y.o. female who presents today for routine follow up. They have a history of Chronic HFpEF, Left atrial myxoma, mitral regurgitation s/p mitral valve repair with 32-mm C-E flexible annuloplasty band with Dr. Ely in 2011, Paroxysmal atrial fibrillation/flutter, cardioverted during hospital stay in April 2018, was initiated on amiodarone, SSS s/p pacemaker implant, On anticoagulation with warfarin, [id number] score of 4 with no prior TIA/CVA, Pulmonary venous hypertension (likely from HFpEF/KAEL).  Underwent RHC in March 2018 which showed elevated wedge of 29 with normal cardiac output, Central sleep apnea s/p [facility] implant at [facility]    They are a patient of Dr. Yoo, last seen on 8/29/2022.  At that visit she was doing well from a cardiac perspective, she was transitioned to Eliquis.     Since their last visit they have been \"having some trouble with her afib.\" She is having some fast heart rates, denies palpitations. Chronic shortness of breath, denies leg swelling. She is recovering from a URI.  Unfortunately she had a friend die recently, and this is the 5-year anniversary of her brother dying.  She has been dealing with a lot of emotional stress.    Activity: uses a walker, she is able to walk short distances        Past Medical History:   Diagnosis Date    Atrial myxoma November 2011    Status post LA myxoma resection    Backpain     CAD (coronary artery disease)     pacemaker    Chronic pain     Dizziness      Hyperlipidemia      Hypertension     Hypertriglyceridemia      Indigestion     Peripheral edema     Sciatica     Sick sinus syndrome (HCC) November 2011    Status post PPM implantation.    Status post " mitral valve repair 2011    MV repair with 32mm Jonnie-Carpenter flexible annuloplasty ring.     Past Surgical History:   Procedure Laterality Date    CARDIAC CATH, RIGHT HEART  2018    Right heart cath with high pressures.    MITRAL VALVE REPAIR  2011    Performed by MARY ORTIZ at SURGERY [facility]    PACEMAKER INSERTION  2011    Medtronic Versa VEDR01 implanted by Dr. Amaya.    OTHER ORTHOPEDIC SURGERY      Neck and back      Family History   Problem Relation Age of Onset    Heart Attack Father      Social History     Socioeconomic History    Marital status: Single     Spouse name: Not on file    Number of children: Not on file    Years of education: Not on file    Highest education level: Not on file   Occupational History    Not on file   Tobacco Use    Smoking status: Former     Packs/day: 1.00     Years: 39.00     Pack years: 39.00     Types: Cigarettes     Quit date: 2017     Years since quittin.4    Smokeless tobacco: Never    Tobacco comments:     Vapes only   Vaping Use    Vaping Use: Every day    Start date: 2017   Substance and Sexual Activity    Alcohol use: Yes     Alcohol/week: 8.4 oz     Types: 14 Cans of beer per week    Drug use: Yes     Types: Inhaled, Oral, Marijuana     Comment: medical marijuana    Sexual activity: Not on file   Other Topics Concern    Not on file   Social History Narrative    Not on file     Social Determinants of Health     Financial Resource Strain: Not on file   Food Insecurity: Not on file   Transportation Needs: Not on file   Physical Activity: Not on file   Stress: Not on file   Social Connections: Not on file   Intimate Partner Violence: Not on file   Housing Stability: Not on file     Allergies   Allergen Reactions    Penicillins Rash           Outpatient Encounter Medications as of 2023   Medication Sig Dispense Refill    amiodarone (CORDARONE) 200 MG Tab Take 1 Tablet by mouth every day. 90 Tablet 0     "atorvastatin (LIPITOR) 20 MG Tab Take 1 Tablet by mouth every day. 90 Tablet 0    apixaban (ELIQUIS) 5mg Tab Take 1 Tablet by mouth 2 times a day. 60 Tablet 3    spironolactone (ALDACTONE) 50 MG Tab Take 1 Tablet by mouth every day. 90 Tablet 3    Dapagliflozin Propanediol (FARXIGA) 5 MG Tab Take 5 mg by mouth every day. 30 Tablet 6    venlafaxine (EFFEXOR-XR) 150 MG extended-release capsule Take 150 mg by mouth every evening.      albuterol 108 (90 Base) MCG/ACT Aero Soln inhalation aerosol Inhale 2 Puffs every four hours as needed for Shortness of Breath.      methocarbamol (ROBAXIN) 750 MG Tab Take 750 mg by mouth 3 times a day as needed (Muscle pain).      ARIPiprazole (ABILIFY) 10 MG Tab Take 10 mg by mouth every day.      venlafaxine XR (EFFEXOR XR) 75 MG CAPSULE SR 24 HR Take 75 mg by mouth every day.      traZODone (DESYREL) 100 MG Tab Take 100 mg by mouth every evening.      oxyCODONE immediate release (ROXICODONE) 10 MG immediate release tablet Take 10 mg by mouth 3 times a day. TID with MS Contin      pregabalin (LYRICA) 200 MG capsule Take 200 mg by mouth 3 times a day.      morphine SR (MS CONTIN) 15 MG TB12 Take 1 Tab by mouth 3 times a day. 60 Tab 0     No facility-administered encounter medications on file as of 5/2/2023.     Review of Systems   Constitutional: Negative.    HENT: Negative.     Eyes: Negative.    Respiratory:  Positive for shortness of breath.    Cardiovascular:  Positive for palpitations. Negative for chest pain, orthopnea, leg swelling and PND.   Gastrointestinal: Negative.    Genitourinary: Negative.    Musculoskeletal: Negative.    Skin: Negative.    Neurological: Negative.    Endo/Heme/Allergies: Negative.    Psychiatric/Behavioral:  Negative for depression. The patient is not nervous/anxious.             Objective     /80 (BP Location: Left arm, Patient Position: Sitting, BP Cuff Size: Adult)   Pulse 90   Resp 18   Ht 1.727 m (5' 8\")   Wt (!) 134 kg (296 lb)   LMP " 01/01/2010   SpO2 93%   BMI 45.01 kg/m²     Physical Exam  Constitutional:       Appearance: Normal appearance. She is obese.   HENT:      Head: Normocephalic and atraumatic.   Neck:      Vascular: No JVD.   Cardiovascular:      Rate and Rhythm: Normal rate and regular rhythm.      Pulses: Normal pulses.      Heart sounds: Normal heart sounds. No murmur heard.    No friction rub.   Pulmonary:      Effort: Pulmonary effort is normal. No respiratory distress.      Breath sounds: Normal breath sounds.   Abdominal:      General: There is no distension.      Palpations: Abdomen is soft.   Musculoskeletal:      Right lower leg: No edema.      Left lower leg: No edema.   Skin:     General: Skin is warm and dry.   Neurological:      General: No focal deficit present.      Mental Status: She is alert and oriented to person, place, and time.   Psychiatric:         Mood and Affect: Mood normal.         Behavior: Behavior normal.          Lab Results   Component Value Date/Time    CHOLSTRLTOT 200 (H) 07/25/2014 01:30 AM    LDL see below 07/25/2014 01:30 AM    HDL 37 (A) 07/25/2014 01:30 AM    TRIGLYCERIDE 574 (H) 07/25/2014 01:30 AM       Lab Results   Component Value Date/Time    SODIUM 130 (L) 07/03/2021 12:23 AM    POTASSIUM 4.6 07/03/2021 12:23 AM    CHLORIDE 93 (L) 07/03/2021 12:23 AM    CO2 30 07/03/2021 12:23 AM    GLUCOSE 104 (H) 07/03/2021 12:23 AM    BUN 13 07/03/2021 12:23 AM    CREATININE 1.11 07/03/2021 12:23 AM    BUNCREATRAT 13 10/09/2015 02:11 PM     Lab Results   Component Value Date/Time    ALKPHOSPHAT 62 07/01/2021 01:10 PM    ASTSGOT 24 07/01/2021 01:10 PM    ALTSGPT 14 07/01/2021 01:10 PM    TBILIRUBIN 0.5 07/01/2021 01:10 PM      RHC (3/31/2018):   Cardiac output is 5.4 liters per minute, cardiac index 2.3 liters per minute per meter squared.  The following pressures are given in mmHg: right atrial pressure mean of 15, V waves 17, pulmonary artery pressure 41/25, and RV pressure 40/11.  Mean wedge  pressure 29     Radiology test Review:  Device interrogation (2/11/2022):   Normal functioning device, a paced 38.7% without evidence of A. fib.  Battery life: 1-4.5 years    Echocardiogram 7/3/2021  CONCLUSIONS  Normal left ventricular systolic function.  Left ventricular ejection fraction is visually estimated to be 55-59%.  The right ventricle was normal in size and function.  No evidence of valvular abnormality based on Doppler evaluation.   Ascending aorta diameter is 3.8 cm.  Mitral Valve  The mitral valve is not well visualized. No stenosis or regurgitation   seen.    Assessment & Plan     1. Atrial myxoma        2. Pacemaker        3. Pulmonary hypertension ([facility])        4. Essential hypertension        5. PAF (paroxysmal atrial fibrillation) (CMS-HCC)        6. Status post mitral valve repair        7. Heart failure with preserved ejection fraction, borderline, class II ([facility])        8. History of sick sinus syndrome        9. Central sleep apnea            Medical Decision Making: Today's Assessment/Status/Plan:        History of atrial myxoma status post MVR  -Last echo from 2021, and mitral valve was not well visualized  -Repeat echocardiogram ordered     Sick sinus syndrome, status post pacemaker  -Last transmission in epic from August 2022, I have reached out to the electrophysiology team  -She will schedule a pacer check    Paroxysmal atrial fibrillation  -Continue amiodarone 200 mg daily  -Eliquis 5 mg twice daily  - none noted on most recent pacer check    HFpEF with history of pulmonary hypertension and KAEL  -EF 55%  -Continue spironolactone 50 mg daily, change Farxiga to Jardiance 10 mg daily per insurance  -No lower extremity edema  -Class II symptoms  - encourage weight loss, heart healthy diet, and increased activity as tolerated    Hyperlipidemia  -Most recent LDL 82  -Continue atorvastatin 20 mg daily  -Goal of less than 100  -Check lipid panel annually      Follow-up with Dr. Yoo in 6  months    This note was dictated using Dragon speech recognition software.

## 2023-05-05 ENCOUNTER — APPOINTMENT (OUTPATIENT)
Dept: CARDIOLOGY | Facility: MEDICAL CENTER | Age: 63
End: 2023-05-05
Payer: MEDICARE

## 2023-05-17 ENCOUNTER — NON-PROVIDER VISIT (OUTPATIENT)
Dept: CARDIOLOGY | Facility: MEDICAL CENTER | Age: 63
End: 2023-05-17
Payer: MEDICARE

## 2023-05-17 DIAGNOSIS — Z95.0 PACEMAKER: ICD-10-CM

## 2023-05-17 DIAGNOSIS — I49.5 SICK SINUS SYNDROME (HCC): ICD-10-CM

## 2023-05-17 PROCEDURE — 93280 PM DEVICE PROGR EVAL DUAL: CPT | Performed by: INTERNAL MEDICINE

## 2023-05-17 PROCEDURE — 93280 PM DEVICE PROGR EVAL DUAL: CPT | Mod: 26 | Performed by: INTERNAL MEDICINE

## 2023-06-29 ENCOUNTER — TELEPHONE (OUTPATIENT)
Dept: CARDIOLOGY | Facility: MEDICAL CENTER | Age: 63
End: 2023-06-29
Payer: MEDICARE

## 2023-06-29 DIAGNOSIS — Z45.010 PACEMAKER AT END OF BATTERY LIFE: ICD-10-CM

## 2023-06-29 NOTE — TELEPHONE ENCOUNTER
Dr. Patel,    I'm going to schedule this gen change with you. This patient is taking Eliquis. Would you like her to hold the Eliquis for this procedure or continue taking it?    Stefania - can you please enter the order for this PM gen change?    Thank You,  Shanae

## 2023-06-29 NOTE — TELEPHONE ENCOUNTER
Patients device nearing WYATT--please contact patient to schedule for gen change.  Can be done August/September.    Patient has dual chamber Medtronic pacemaker.

## 2023-07-07 NOTE — TELEPHONE ENCOUNTER
Patient scheduled for PM gen change on 8-29-23 with Dr. Patel. Patient has been instructed to check in at 6:00 for 8:00 case time. Hold Eliquis 1 dose. Message sent to garcia Dukes with [employer] notified

## 2023-07-07 NOTE — TELEPHONE ENCOUNTER
Called patient to schedule this procedure. I was unable to leave a message as her voicemail is not set up. I sent a My Chart message requesting a call to schedule this procedure.

## 2023-07-24 ENCOUNTER — APPOINTMENT (OUTPATIENT)
Dept: ADMISSIONS | Facility: MEDICAL CENTER | Age: 63
End: 2023-07-24
Attending: INTERNAL MEDICINE
Payer: MEDICARE

## 2023-08-01 ENCOUNTER — OFFICE VISIT (OUTPATIENT)
Dept: SLEEP MEDICINE | Facility: MEDICAL CENTER | Age: 63
End: 2023-08-01
Attending: STUDENT IN AN ORGANIZED HEALTH CARE EDUCATION/TRAINING PROGRAM
Payer: MEDICARE

## 2023-08-01 VITALS
WEIGHT: 292 LBS | OXYGEN SATURATION: 92 % | SYSTOLIC BLOOD PRESSURE: 118 MMHG | HEIGHT: 68 IN | HEART RATE: 72 BPM | BODY MASS INDEX: 44.25 KG/M2 | DIASTOLIC BLOOD PRESSURE: 60 MMHG

## 2023-08-01 DIAGNOSIS — Z45.42 ENCOUNTER FOR ADJUSTMENT AND MANAGEMENT OF NEUROSTIMULATOR: ICD-10-CM

## 2023-08-01 DIAGNOSIS — G47.31 CENTRAL SLEEP APNEA: Primary | ICD-10-CM

## 2023-08-01 DIAGNOSIS — F51.04 CHRONIC INSOMNIA: ICD-10-CM

## 2023-08-01 DIAGNOSIS — Z96.82 S/P INSERTION OF PHRENIC NERVE STIMULATOR: ICD-10-CM

## 2023-08-01 PROCEDURE — 99213 OFFICE O/P EST LOW 20 MIN: CPT | Performed by: STUDENT IN AN ORGANIZED HEALTH CARE EDUCATION/TRAINING PROGRAM

## 2023-08-01 PROCEDURE — 3074F SYST BP LT 130 MM HG: CPT | Performed by: STUDENT IN AN ORGANIZED HEALTH CARE EDUCATION/TRAINING PROGRAM

## 2023-08-01 PROCEDURE — 3078F DIAST BP <80 MM HG: CPT | Performed by: STUDENT IN AN ORGANIZED HEALTH CARE EDUCATION/TRAINING PROGRAM

## 2023-08-01 PROCEDURE — 99211 OFF/OP EST MAY X REQ PHY/QHP: CPT | Performed by: STUDENT IN AN ORGANIZED HEALTH CARE EDUCATION/TRAINING PROGRAM

## 2023-08-01 ASSESSMENT — PATIENT HEALTH QUESTIONNAIRE - PHQ9: CLINICAL INTERPRETATION OF PHQ2 SCORE: 0

## 2023-08-01 NOTE — PROGRESS NOTES
[facility] Follow-up Visit    CC: Follow-up regarding management of central sleep apnea and to discuss poor sleep      HPI:  Kailey Velarde is a 63 y.o.female  with HFpEF, chronic respiratory failure on supplemental oxygen, paroxysmal atrial fibrillation, morbid obesity, pulmonary hypertension, hyperlipidemia, prolonged QT syndrome, chronic pain, and central sleep apnea status post phrenic nerve stimulator.  She presents today to follow-up regarding management of central sleep apnea and to discuss poor sleep.    She continues to have difficulty using her phrenic nerve stimulator.  She feels that when it comes on the device wakes her up.  Due to this she is unable to stay asleep.  She will often sit up above the threshold of the device or turn off the device with a magnet.    In terms of her sleep she feels her sleep has improved slightly.  She is now falling asleep between 11 PM and 12 AM.  In doing so she is getting to sleep faster.  She attributes some of this to using THC.  She is waking approximately 3 times a night and she is unsure as to why.  Most of the time she can get back to sleep there are some nights where she cannot get back to sleep effectively.  She states ideally she would like to be able to sleep through the night without awakenings.  She is hopeful that with changes made to her device she will build to sleep more soundly.      She is becoming somewhat frustrated regarding her hypoglossal nerve stimulator.  She feels at this point it would be helpful to have it working properly.      She is also having difficulty with her sleep currently due to her normal sleeping chair being repaired.    Programming interrogation of phrenic nerve stimulator  [employer]ede technician onsite performing interrogation and programming.    Battery Voltage = 2.98V GOOD  Best Vector: Cathode 5, Anode 1  Pulse Width: 210 uS  Frequency: 40 Hz  Respiratory Rate Set: 13 BPM (Asynchronous Stimulation), Intrinsic  rate = 16 BPM  Lead Impedance (ohms):  5-1=457 ?, 1-can=310 ? , 2-can=258 ?, 3-can=362 ?, 4-ikn=317 ?, 5- can=310 ?, 6-can=301 ?    Patient sleeps in SUPINE in a recliner during the night and in the daytime.    Outputs:  Supine:  5.0 mA - 8.0 mA/4.5 mA - 5.5 mA/2.0 mA - 3.0 mA  Left: 5.0 mA - 8.0 mA to/4.5 mA - 5.5 mA/2.0 mA - 3.0 mA  Right: 5.0 mA - 8.0 mA to/4.5 mA - 5.5 mA/2.0 mA - 3.0 mA  Prone: 5.0 mA - 8.0 mA to/4.5 mA - 5.5 mA/2.0 mA - 3.0 mA    Weekly Titration: OFF    Ramps: Rise 40%/ Fall 20%/ Baseline 50%  Therapy Time: 1:00 A.M. - 6:00 A.M. (Patient to bed: 23:00 and Patient Rise Time: 08:00) (sleeps throughout day)  Pitch:  set 55°   Right-Supine Threshold: -30°, Left-Supine Threshold: 40°    Timers:  Sleep Latency = 10 min, when patient lays down and all parameters are met it will turn on in 10 minutes  Bio Break = 1 min (patient must remain upright in order for therapy to turn off, and start the latency timer if meeting all conditions)  Suspension Window = 1.0 min (therapy suspended when changing sleeping position e.g right -> supine)  Asynchronous stabilization window = 3.0 minutes  Asynchronous evaluation window = 2.0 minutes    Settings changed at today's visit  Pulse Width, Stimulation, Summary: 300 s to 210 s  Maximum Dose (hh:mm), Scheduled Sleep, Summary: 09:00 to 05:00  Supine Amp., Amplitudes, Summary: 4.5 mA to 2.0 mA  Left Amp., Amplitudes, Summary: 4.5 mA to 2.0 mA  Prone Amp., Amplitudes, Summary: 4.5 mA to 2.0 mA  Right Amp., Amplitudes, Summary: 4.5 mA to 2.0 mA  Asynchronous Stabilization Window, Activity, Summary: 0 m to 3 m  Asynchronous Evaluation Window, Activity, Summary: 1 m to 2 m  Max. Supine Amp., Amplitudes, Summary: 5.5 mA to 3.0 mA  Max. Left Amp., Amplitudes, Summary: 5.5 mA to 3.0 mA  Max. Prone Amp., Amplitudes, Summary: 5.5 mA to 3.0 mA  Max. Right Amp., Amplitudes, Summary: 5.5 mA to 3.0 mA  Quadrant Min, Graphic, Acclimation: 4.5 mA to 2.0 mA  Quadrant Max, Graphic,  Acclimation: 5.5 mA to 3.0 mA  ContWaveDay, Full night Waveform, Therapy/Sensors: 29 to 1   ContWaveMonth, Full night Waveform, Therapy/Sensors: Mar to Oc      Patient Active Problem List    Diagnosis Date Noted    Unsteady gait 03/17/2023    SOB (shortness of breath) on exertion 03/17/2023    Generalized muscle weakness 03/17/2023    Central sleep apnea 03/03/2022    Secondary hypercoagulable state (HCC) 10/25/2021    Falls 07/01/2021    Hyponatremia 07/01/2021    QT prolongation 07/01/2021    Chronic respiratory failure with hypoxia ([facility]) 04/15/2021    Heart failure with preserved ejection fraction, borderline, class II ([facility]) 05/30/2018    Left ventricular diastolic dysfunction, NYHA class 3 05/30/2018    Thrombocytopenia ([facility]) 04/04/2018    FAN (acute kidney injury) ([facility]) 04/03/2018    Circulating anticoagulants ([facility]) 03/28/2018    Obesity 03/28/2018    Hx of pleural effusion 03/12/2018    Essential hypertension 02/22/2018    Pulmonary hypertension ([facility]) 06/19/2017    Localized edema 08/29/2016    History of sick sinus syndrome 10/01/2013    Pacemaker 11/26/2012    Mixed hyperlipidemia 07/03/2012    PAF (paroxysmal atrial fibrillation) ([facility]) 12/07/2011    Atrial myxoma 12/07/2011    Status post mitral valve repair 12/07/2011    Chronic low back pain 11/16/2011       Past Medical History:   Diagnosis Date    Atrial myxoma November 2011    Status post LA myxoma resection    Backpain     CAD (coronary artery disease)     pacemaker    Chronic pain     Dizziness      Hyperlipidemia      Hypertension     Hypertriglyceridemia      Indigestion     Peripheral edema     Sciatica     Sick sinus syndrome ([facility]) November 2011    Status post PPM implantation.    Status post mitral valve repair November 2011    MV repair with 32mm Jonnie-Carpenter flexible annuloplasty ring.        Past Surgical History:   Procedure Laterality Date    CARDIAC CATH, RIGHT HEART  03/31/2018    Right heart cath with high pressures.    MITRAL  VALVE REPAIR  2011    Performed by MARY ORTIZ at SURGERY [facility] ORS    PACEMAKER INSERTION  2011    Medtronic Versa [id number] implanted by Dr. Amaya.    OTHER ORTHOPEDIC SURGERY      Neck and back        Family History   Problem Relation Age of Onset    Heart Attack Father        Social History     Socioeconomic History    Marital status: Single     Spouse name: Not on file    Number of children: Not on file    Years of education: Not on file    Highest education level: Not on file   Occupational History    Not on file   Tobacco Use    Smoking status: Former     Packs/day: 1.00     Years: 39.00     Pack years: 39.00     Types: Cigarettes     Quit date: 2017     Years since quittin.7    Smokeless tobacco: Never    Tobacco comments:     Vapes only   Vaping Use    Vaping Use: Every day    Start date: 2017   Substance and Sexual Activity    Alcohol use: Yes     Alcohol/week: 8.4 oz     Types: 14 Cans of beer per week    Drug use: Yes     Types: Inhaled, Oral, Marijuana     Comment: medical marijuana    Sexual activity: Not on file   Other Topics Concern    Not on file   Social History Narrative    Not on file     Social Determinants of Health     Financial Resource Strain: Not on file   Food Insecurity: Not on file   Transportation Needs: Not on file   Physical Activity: Not on file   Stress: Not on file   Social Connections: Not on file   Intimate Partner Violence: Not on file   Housing Stability: Not on file       Current Outpatient Medications   Medication Sig Dispense Refill    apixaban (ELIQUIS) 5mg Tab Take 1 Tablet by mouth 2 times a day. 200 Tablet 3    atorvastatin (LIPITOR) 20 MG Tab Take 1 Tablet by mouth every day. 100 Tablet 3    Empagliflozin (JARDIANCE) 10 MG Tab tablet Take 1 Tablet by mouth every day. 100 Tablet 3    spironolactone (ALDACTONE) 50 MG Tab Take 1 Tablet by mouth every day. 100 Tablet 3    venlafaxine (EFFEXOR-XR) 150 MG extended-release capsule Take 150  "mg by mouth every evening.      albuterol 108 (90 Base) MCG/ACT Aero Soln inhalation aerosol Inhale 2 Puffs every four hours as needed for Shortness of Breath.      methocarbamol (ROBAXIN) 750 MG Tab Take 750 mg by mouth 3 times a day as needed (Muscle pain).      ARIPiprazole (ABILIFY) 10 MG Tab Take 10 mg by mouth every day.      venlafaxine XR (EFFEXOR XR) 75 MG CAPSULE SR 24 HR Take 75 mg by mouth every day.      traZODone (DESYREL) 100 MG Tab Take 100 mg by mouth every evening.      oxyCODONE immediate release (ROXICODONE) 10 MG immediate release tablet Take 10 mg by mouth 3 times a day. TID with MS Contin      pregabalin (LYRICA) 200 MG capsule Take 200 mg by mouth 3 times a day.      morphine SR (MS CONTIN) 15 MG TB12 Take 1 Tab by mouth 3 times a day. 60 Tab 0    amiodarone (CORDARONE) 200 MG Tab Take 1 Tablet by mouth every day. 100 Tablet 3     No current facility-administered medications for this visit.        ALLERGIES: Penicillins    ROS  Constitutional: Denies fevers, Denies weight changes  Ears/Nose/Throat/Mouth: Denies nasal congestion or sore throat   Cardiovascular: Denies chest pain  Respiratory: Denies shortness of breath, Denies cough  Gastrointestinal/Hepatic: Denies nausea, vomiting  Sleep: see HPI      PHYSICAL EXAM  /60 (BP Location: Right arm, Patient Position: Sitting, BP Cuff Size: Large adult)   Pulse 72   Ht 1.727 m (5' 8\")   Wt (!) 132 kg (292 lb)   LMP 01/01/2010   SpO2 92%   BMI 44.40 kg/m²   Appearance: Well-nourished, well-developed, no acute distress  Eyes:  No scleral icterus , EOMI  Musculoskeletal:  Grossly normal; gait and station normal; digits and nails normal  Skin:  No rashes, petechiae, cyanosis  Neurologic: without focal signs; oriented to person, time, place, and purpose; judgement intact      Medical Decision Making   Assessment and Plan  Kailey Velarde is a 63 y.o.female  with HFpEF, chronic respiratory failure on supplemental oxygen, paroxysmal " atrial fibrillation, morbid obesity, pulmonary hypertension, hyperlipidemia, prolonged QT syndrome, chronic pain, and central sleep apnea status post phrenic nerve stimulator.  She presents today to follow-up regarding management of central sleep apnea and to discuss poor sleep.    The medical record was reviewed.    Central sleep apnea   She continues to have poor usage of her phrenic nerve stimulator.  She attributes most of this due to discomfort with the device.  She finds that when it turns on at night it wakes her up and has a difficult time getting back to sleep with the device.    Multiple settings were changed at today's visit.  Her pulse width was shortened her overall amplitude was decreased.  In addition.  At time between adjustments was lengthened.  With all the adjustments made at today's visit there is hope that she may be able to use it more consistently at night.    Plan  -Multiple changes were made to phrenic nerve stimulator at today's visit  -Encouraged to continue to try to use device nightly as it may aid in her severe central sleep apnea  -Treating her central sleep apnea may improve her continuity of sleep    Poor sleep and interrupted sleep  Discussed importance of regular wake time and bedtime.  Advised that given the severity of her sleep apnea treating it may improve her continuity of sleep.  Advised that THC Gummies are not FDA approved for sleep but if she is noticing benefit she may continue to use them at around risk.  Recommended she reach out within any concerns or questions.        Have advised the patient to follow up with the appropriate healthcare practitioners for all other medical problems and issues.    Return in about 3 months (around 11/1/2023).      Please note portions of this record was created using voice recognition software. I have made every reasonable attempt to correct obvious errors, but I expect that there are errors of grammar and possibly content I did not  discover before finalizing the note.

## 2023-08-09 ENCOUNTER — PRE-ADMISSION TESTING (OUTPATIENT)
Dept: ADMISSIONS | Facility: MEDICAL CENTER | Age: 63
End: 2023-08-09
Attending: INTERNAL MEDICINE
Payer: MEDICARE

## 2023-08-25 ENCOUNTER — PRE-ADMISSION TESTING (OUTPATIENT)
Dept: ADMISSIONS | Facility: MEDICAL CENTER | Age: 63
End: 2023-08-25
Attending: INTERNAL MEDICINE
Payer: MEDICARE

## 2023-08-25 ENCOUNTER — HOSPITAL ENCOUNTER (OUTPATIENT)
Dept: CARDIOLOGY | Facility: MEDICAL CENTER | Age: 63
End: 2023-08-25
Payer: MEDICARE

## 2023-08-25 DIAGNOSIS — Z01.812 PRE-OPERATIVE LABORATORY EXAMINATION: ICD-10-CM

## 2023-08-25 DIAGNOSIS — Z01.810 PRE-OPERATIVE CARDIOVASCULAR EXAMINATION: ICD-10-CM

## 2023-08-25 DIAGNOSIS — Z98.890 STATUS POST MITRAL VALVE REPAIR: ICD-10-CM

## 2023-08-25 LAB
ALBUMIN SERPL BCP-MCNC: 4.5 G/DL (ref 3.2–4.9)
ALBUMIN/GLOB SERPL: 1.6 G/DL
ALP SERPL-CCNC: 72 U/L (ref 30–99)
ALT SERPL-CCNC: 16 U/L (ref 2–50)
ANION GAP SERPL CALC-SCNC: 12 MMOL/L (ref 7–16)
AST SERPL-CCNC: 23 U/L (ref 12–45)
BILIRUB SERPL-MCNC: 0.6 MG/DL (ref 0.1–1.5)
BUN SERPL-MCNC: 8 MG/DL (ref 8–22)
CALCIUM ALBUM COR SERPL-MCNC: 9 MG/DL (ref 8.5–10.5)
CALCIUM SERPL-MCNC: 9.4 MG/DL (ref 8.5–10.5)
CHLORIDE SERPL-SCNC: 94 MMOL/L (ref 96–112)
CO2 SERPL-SCNC: 26 MMOL/L (ref 20–33)
CREAT SERPL-MCNC: 0.81 MG/DL (ref 0.5–1.4)
EKG IMPRESSION: NORMAL
ERYTHROCYTE [DISTWIDTH] IN BLOOD BY AUTOMATED COUNT: 45.3 FL (ref 35.9–50)
GFR SERPLBLD CREATININE-BSD FMLA CKD-EPI: 82 ML/MIN/1.73 M 2
GLOBULIN SER CALC-MCNC: 2.8 G/DL (ref 1.9–3.5)
GLUCOSE SERPL-MCNC: 102 MG/DL (ref 65–99)
HCT VFR BLD AUTO: 39.4 % (ref 37–47)
HGB BLD-MCNC: 13.1 G/DL (ref 12–16)
INR PPP: 1.31 (ref 0.87–1.13)
LV EJECT FRACT  99904: 55
MCH RBC QN AUTO: 31.3 PG (ref 27–33)
MCHC RBC AUTO-ENTMCNC: 33.2 G/DL (ref 32.2–35.5)
MCV RBC AUTO: 94.3 FL (ref 81.4–97.8)
PLATELET # BLD AUTO: 188 K/UL (ref 164–446)
PMV BLD AUTO: 9.9 FL (ref 9–12.9)
POTASSIUM SERPL-SCNC: 3.9 MMOL/L (ref 3.6–5.5)
PROT SERPL-MCNC: 7.3 G/DL (ref 6–8.2)
PROTHROMBIN TIME: 16.4 SEC (ref 12–14.6)
RBC # BLD AUTO: 4.18 M/UL (ref 4.2–5.4)
SODIUM SERPL-SCNC: 132 MMOL/L (ref 135–145)
WBC # BLD AUTO: 6.2 K/UL (ref 4.8–10.8)

## 2023-08-25 PROCEDURE — 80053 COMPREHEN METABOLIC PANEL: CPT

## 2023-08-25 PROCEDURE — 93306 TTE W/DOPPLER COMPLETE: CPT

## 2023-08-25 PROCEDURE — 85027 COMPLETE CBC AUTOMATED: CPT

## 2023-08-25 PROCEDURE — 93005 ELECTROCARDIOGRAM TRACING: CPT

## 2023-08-25 PROCEDURE — 93306 TTE W/DOPPLER COMPLETE: CPT | Mod: 26 | Performed by: INTERNAL MEDICINE

## 2023-08-25 PROCEDURE — 36415 COLL VENOUS BLD VENIPUNCTURE: CPT

## 2023-08-25 PROCEDURE — 85610 PROTHROMBIN TIME: CPT

## 2023-08-25 PROCEDURE — 93010 ELECTROCARDIOGRAM REPORT: CPT | Performed by: INTERNAL MEDICINE

## 2023-08-29 ENCOUNTER — HOSPITAL ENCOUNTER (OUTPATIENT)
Facility: MEDICAL CENTER | Age: 63
End: 2023-08-29
Attending: INTERNAL MEDICINE | Admitting: INTERNAL MEDICINE
Payer: MEDICARE

## 2023-08-29 ENCOUNTER — APPOINTMENT (OUTPATIENT)
Dept: CARDIOLOGY | Facility: MEDICAL CENTER | Age: 63
End: 2023-08-29
Attending: INTERNAL MEDICINE
Payer: MEDICARE

## 2023-08-29 VITALS
TEMPERATURE: 97.2 F | DIASTOLIC BLOOD PRESSURE: 58 MMHG | RESPIRATION RATE: 16 BRPM | WEIGHT: 292.99 LBS | HEIGHT: 68 IN | BODY MASS INDEX: 44.41 KG/M2 | SYSTOLIC BLOOD PRESSURE: 122 MMHG | HEART RATE: 66 BPM | OXYGEN SATURATION: 96 %

## 2023-08-29 DIAGNOSIS — Z45.010 PACEMAKER AT END OF BATTERY LIFE: ICD-10-CM

## 2023-08-29 DIAGNOSIS — Z95.0 PACEMAKER: ICD-10-CM

## 2023-08-29 PROCEDURE — 160035 HCHG PACU - 1ST 60 MINS PHASE I

## 2023-08-29 PROCEDURE — C1785 PMKR, DUAL, RATE-RESP: HCPCS

## 2023-08-29 PROCEDURE — 160002 HCHG RECOVERY MINUTES (STAT)

## 2023-08-29 PROCEDURE — 700111 HCHG RX REV CODE 636 W/ 250 OVERRIDE (IP)

## 2023-08-29 PROCEDURE — 99152 MOD SED SAME PHYS/QHP 5/>YRS: CPT | Performed by: INTERNAL MEDICINE

## 2023-08-29 PROCEDURE — 33228 REMV&REPLC PM GEN DUAL LEAD: CPT | Performed by: INTERNAL MEDICINE

## 2023-08-29 PROCEDURE — 160046 HCHG PACU - 1ST 60 MINS PHASE II

## 2023-08-29 PROCEDURE — 700101 HCHG RX REV CODE 250

## 2023-08-29 RX ORDER — MORPHINE SULFATE 15 MG/1
15 TABLET, FILM COATED, EXTENDED RELEASE ORAL EVERY 12 HOURS
COMMUNITY

## 2023-08-29 RX ORDER — ACETAMINOPHEN 325 MG/1
650 TABLET ORAL EVERY 4 HOURS PRN
Status: DISCONTINUED | OUTPATIENT
Start: 2023-08-29 | End: 2023-08-29 | Stop reason: HOSPADM

## 2023-08-29 RX ORDER — MIDAZOLAM HYDROCHLORIDE 1 MG/ML
INJECTION INTRAMUSCULAR; INTRAVENOUS
Status: COMPLETED
Start: 2023-08-29 | End: 2023-08-29

## 2023-08-29 RX ORDER — DOXYCYCLINE 100 MG/1
100 CAPSULE ORAL 2 TIMES DAILY
Qty: 8 CAPSULE | Refills: 0 | Status: SHIPPED | OUTPATIENT
Start: 2023-08-29

## 2023-08-29 RX ORDER — DIPHENHYDRAMINE HYDROCHLORIDE 50 MG/ML
INJECTION INTRAMUSCULAR; INTRAVENOUS
Status: COMPLETED
Start: 2023-08-29 | End: 2023-08-29

## 2023-08-29 RX ORDER — SODIUM CHLORIDE 9 MG/ML
1000 INJECTION, SOLUTION INTRAVENOUS CONTINUOUS
Status: DISCONTINUED | OUTPATIENT
Start: 2023-08-29 | End: 2023-08-29 | Stop reason: HOSPADM

## 2023-08-29 RX ORDER — TRAMADOL HYDROCHLORIDE 50 MG/1
50 TABLET ORAL EVERY 6 HOURS PRN
Status: DISCONTINUED | OUTPATIENT
Start: 2023-08-29 | End: 2023-08-29 | Stop reason: HOSPADM

## 2023-08-29 RX ORDER — LIDOCAINE HYDROCHLORIDE 20 MG/ML
INJECTION, SOLUTION INFILTRATION; PERINEURAL
Status: COMPLETED
Start: 2023-08-29 | End: 2023-08-29

## 2023-08-29 RX ORDER — CEFAZOLIN SODIUM 1 G/3ML
INJECTION, POWDER, FOR SOLUTION INTRAMUSCULAR; INTRAVENOUS
Status: COMPLETED
Start: 2023-08-29 | End: 2023-08-29

## 2023-08-29 RX ADMIN — MIDAZOLAM HYDROCHLORIDE 2 MG: 1 INJECTION, SOLUTION INTRAMUSCULAR; INTRAVENOUS at 08:47

## 2023-08-29 RX ADMIN — CEFAZOLIN 4000 MG: 1 INJECTION, POWDER, FOR SOLUTION INTRAMUSCULAR; INTRAVENOUS at 08:26

## 2023-08-29 RX ADMIN — FENTANYL CITRATE 100 MCG: 50 INJECTION, SOLUTION INTRAMUSCULAR; INTRAVENOUS at 08:47

## 2023-08-29 RX ADMIN — DIPHENHYDRAMINE HYDROCHLORIDE 25 MG: 50 INJECTION, SOLUTION INTRAMUSCULAR; INTRAVENOUS at 08:47

## 2023-08-29 RX ADMIN — LIDOCAINE HYDROCHLORIDE: 20 INJECTION, SOLUTION INFILTRATION; PERINEURAL at 08:49

## 2023-08-29 RX ADMIN — MIDAZOLAM HYDROCHLORIDE 2 MG: 1 INJECTION, SOLUTION INTRAMUSCULAR; INTRAVENOUS at 08:46

## 2023-08-29 RX ADMIN — LIDOCAINE HYDROCHLORIDE: 20 INJECTION, SOLUTION INFILTRATION; PERINEURAL at 08:28

## 2023-08-29 RX ADMIN — FENTANYL CITRATE 100 MCG: 50 INJECTION, SOLUTION INTRAMUSCULAR; INTRAVENOUS at 08:46

## 2023-08-29 ASSESSMENT — PAIN DESCRIPTION - PAIN TYPE
TYPE: CHRONIC PAIN
TYPE: CHRONIC PAIN;SURGICAL PAIN
TYPE: CHRONIC PAIN

## 2023-08-29 ASSESSMENT — FIBROSIS 4 INDEX
FIB4 SCORE: 1.9
FIB4 SCORE: 1.9

## 2023-08-29 NOTE — DISCHARGE INSTRUCTIONS
HOME CARE INSTRUCTIONS  Device Generator Change Discharge Instructions/[facility]    1.  No showers until seen in follow up; may take sponge bath.  Keep dressing dry & in place until seen at for you follow up visit at the cardiology office.     2.  Call our office ([phone]) if you notice any increased swelling, redness, warmth, or drainage at the implant site.  3.  Needs to be seen in emergency if you develop fever > 101F or uncontrolled pain.  4.  Always check with device clinic before any planned MRI to see if device is MRI compatible.  5. Do not place cell phones or mobile devices directly over implanted device.     ACTIVITY: Rest and take it easy for the first 24 hours.  A responsible adult is recommended to remain with you during that time.  It is normal to feel sleepy.  We encourage you to not do anything that requires balance, judgment or coordination.    FOR 24 HOURS DO NOT:  Drive, operate machinery or run household appliances.  Drink beer or alcoholic beverages.  Make important decisions or sign legal documents.    SPECIAL INSTRUCTIONS:   Restart eliquis tomorrow    DIET: To avoid nausea, slowly advance diet as tolerated, avoiding spicy or greasy foods for the first day.  Add more substantial food to your diet according to your physician's instructions.  Babies can be fed formula or breast milk as soon as they are hungry.  INCREASE FLUIDS AND FIBER TO AVOID CONSTIPATION.    SURGICAL DRESSING/BATHING: keep dressing clean and dry for 7 days    MEDICATIONS: Resume taking daily medication.  Take prescribed pain medication with food.  If no medication is prescribed, you may take non-aspirin pain medication if needed.  PAIN MEDICATION CAN BE VERY CONSTIPATING.  Take a stool softener or laxative such as senokot, pericolace, or milk of magnesia if needed.    Prescription given for _Doxycycline_.  Last pain medication given at No oral pain medications given during recovery.    A follow-up appointment  should be arranged with your doctor in 1 week in Device Clinic; call to schedule.    You should CALL YOUR PHYSICIAN if you develop:  Fever greater than 101 degrees F.  Pain not relieved by medication, or persistent nausea or vomiting.  Excessive bleeding (blood soaking through dressing) or unexpected drainage from the wound.  Extreme redness or swelling around the incision site, drainage of pus or foul smelling drainage.  Inability to urinate or empty your bladder within 8 hours.  Problems with breathing or chest pain.    You should call 911 if you develop problems with breathing or chest pain.  If you are unable to contact your doctor or surgical center, you should go to the nearest emergency room or urgent care center.  Physician's telephone #: [phone]    MILD FLU-LIKE SYMPTOMS ARE NORMAL.  YOU MAY EXPERIENCE GENERALIZED MUSCLE ACHES, THROAT IRRITATION, HEADACHE AND/OR SOME NAUSEA.    If any questions arise, call your doctor.  If your doctor is not available, please feel free to call the Surgical Center at [phone].  The Center is open Monday through Friday from 7AM to 7PM.      A registered nurse may call you a few days after your surgery to see how you are doing after your procedure.    You may also receive a survey in the mail within the next two weeks and we ask that you take a few moments to complete the survey and return it to us.  Our goal is to provide you with very good care and we value your comments.     Depression / Suicide Risk    As you are discharged from this [facility] facility, it is important to learn how to keep safe from harming yourself.    Recognize the warning signs:  Abrupt changes in personality, positive or negative- including increase in energy   Giving away possessions  Change in eating patterns- significant weight changes-  positive or negative  Change in sleeping patterns- unable to sleep or sleeping all the time   Unwillingness or inability to  communicate  Depression  Unusual sadness, discouragement and loneliness  Talk of wanting to die  Neglect of personal appearance   Rebelliousness- reckless behavior  Withdrawal from people/activities they love  Confusion- inability to concentrate     If you or a loved one observes any of these behaviors or has concerns about self-harm, here's what you can do:  Talk about it- your feelings and reasons for harming yourself  Remove any means that you might use to hurt yourself (examples: pills, rope, extension cords, firearm)  Get professional help from the community (Mental Health, Substance Abuse, psychological counseling)  Do not be alone:Call your Safe Contact- someone whom you trust who will be there for you.  Call your local CRISIS HOTLINE 936-1208 or 155-363-5276  Call your local Children's Mobile Crisis Response Team [place] (753) 633-3789 or www.NearWoo  Call the toll free National Suicide Prevention Hotlines   National Suicide Prevention Lifeline 034-498-MWHT (8254)  National Hope Line Network 800-SUICIDE (539-6525)    I acknowledge receipt and understanding of these Home Care instructions.

## 2023-08-29 NOTE — PROGRESS NOTES
1010- Pt arrived to recovery phase 2 from PACU. A&O x 3 on 3L O2(maintenance level). Pain level 7/10. Pt reports she takes oxycodone regularly at home.     1015- Instructions reviewed with ANNITA Salmon, on the phone. All questions answered.     1030- Pt discharged home via private vehicle. Wheeled to car by SEAMUS Casas.

## 2023-08-29 NOTE — OP REPORT
Electrophysiology Procedure Note  [facility]    PROCEDURE: Dual-chamber pacemaker generator change,   moderate sedation administered by RN and supervised by physician    : Gautam Patel M.D.    ANESTHESIA: Moderate sedation,  start time 824, stop time 845  the moderate sedation document has been reviewed, signed and scanned into media     ESTIMATED BLOOD LOSS: 20 cc.    SPECIMENS: None.    COMPLICATIONS: None    INDICATION: WYATT    PRE-PROCEDURE ECG: Atrial pacing    POST-PROCEDURE ECG: Atrial pacing    DESCRIPTION OF PROCEDURE: After informed written consent, the patient was brought to the electrophysiology lab in the fasting, unsedated state. The patient was prepped and draped in the usual sterile fashion. The procedure was performed under moderate sedation with local anesthetic. An incision was made with a scalpel along the old scar. Access to the device pocket was made using a combination of blunt dissection and electrocautery. The old generator and leads were freed from adhesions and the generator disconnected from the leads. Leads tested fine.  The pocket was irrigated with antibiotic solution, and the leads were attached to new Generator and inserted back in the pocket. The wound was closed with three layers of absorbable sutures and covered with Steri-Strips.   I personally supervised the administration of moderate sedation by the RN and observed the level of consciousness and physiologic status throughout the procedure.  Following recovery from sedation, the patient was transferred to a monitored bed.    IMPLANTED DEVICE INFORMATION:    Pulse generator is a Medtronic model W1DR01  Serial number [id number]    LEAD INFORMATION:  1. Right atrial lead is a Medtronic model 5076-52, serial number [id number], P wave 2 millivolts, threshold 1 Volts, pacing impedance 779 Ohms, implant date 11/24/2011  2. Right ventricular lead is a Medtronic model 5076-58, serial number [id number], R wave 7  millivolts, threshold 0.75 Volts, pacing impedance 532 Ohms, implant date 11/24/2011      DEVICE PROGRAMMING:    As previous programmed     IMPRESSIONS:  1. Successful Dual chamber pacemaker generator change.    RECOMMENDATIONS:  1. Transfer to PPU.  2. Follow-up in device clinic for wound check and device interrogation.

## 2023-08-29 NOTE — H&P
Physician H&P    Patient ID:  Kailey Velarde  [id number]  63 y.o. 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  Permanent pacemaker placed same year.  PAF on Eliquis held 1 dose on amiodarone.  For generator change.    Past Medical History:  has a past medical history of Arthritis (08/09/2023), Atrial myxoma (11/01/2011), Backpain, Breath shortness (08/09/2023), CAD (coronary artery disease), Chronic pain, Dental disorder (08/09/2023), Dizziness ( ), Hemorrhagic disorder (HCC) (08/09/2023), High cholesterol (08/09/2023), Hyperlipidemia ( ), Hypertension (08/09/2023), Hypertriglyceridemia ( ), Indigestion (08/09/2023), Pacemaker (08/09/2023), Peripheral edema, Psychiatric problem (08/09/2023), Sciatica, Sick sinus syndrome (HCC) (11/01/2011), Sleep apnea (08/09/2023), and Status post mitral valve repair (11/01/2011).    She has no past medical history of Unspecified urinary incontinence.  Past Surgical History:  has a past surgical history that includes mitral valve repair (11/17/2011); other orthopedic surgery; pacemaker insertion (11/2011); cardiac cath, right heart (03/31/2018); and other (08/09/2023).  Past Social History:  reports that she quit smoking about 5 years ago. Her smoking use included cigarettes. She started smoking about 44 years ago. She has a 39.0 pack-year smoking history. She has never used smokeless tobacco. She reports that she does not currently use alcohol. She reports current drug use. Drugs: Inhaled, Oral, and Marijuana.  Past Family History:   Family History   Problem Relation Age of Onset    Heart Attack Father      Allergies: Penicillins    Current Medications:  Prior to Admission medications    Medication Sig Start Date End Date Taking? Authorizing Provider   morphine ER (MS CONTIN) 15 MG Tab CR tablet Take 15 mg by mouth  "every 12 hours.   Yes Physician Outpatient   amiodarone (CORDARONE) 200 MG Tab Take 1 Tablet by mouth every day. 5/2/23   MELLY Cardona   apixaban (ELIQUIS) 5mg Tab Take 1 Tablet by mouth 2 times a day. 5/2/23   MELLY Cardona   atorvastatin (LIPITOR) 20 MG Tab Take 1 Tablet by mouth every day. 5/2/23   MELLY Cardona   Empagliflozin (JARDIANCE) 10 MG Tab tablet Take 1 Tablet by mouth every day. 5/2/23   MELLY Cardona   spironolactone (ALDACTONE) 50 MG Tab Take 1 Tablet by mouth every day. 5/2/23   RAFAEL CardonaRSARITA   venlafaxine (EFFEXOR-XR) 150 MG extended-release capsule Take 150 mg by mouth every evening. Pt takes 75 mg and 150 mg for a total dose of 250 mg 6/15/21   Physician Outpatient   ARIPiprazole (ABILIFY) 10 MG Tab Take 10 mg by mouth every day. 7/11/20   Physician Outpatient   venlafaxine XR (EFFEXOR XR) 75 MG CAPSULE SR 24 HR Take 75 mg by mouth every day. Pt takes 75 mg and 150 mg for a total dose of 250 mg    Physician Outpatient   traZODone (DESYREL) 100 MG Tab Take 100 mg by mouth every evening.    Physician Outpatient   oxyCODONE immediate release (ROXICODONE) 10 MG immediate release tablet Take 10 mg by mouth 2 times a day.    Physician Outpatient   pregabalin (LYRICA) 200 MG capsule Take 200 mg by mouth 3 times a day.    Physician Outpatient       Review of Systems:  ROS  /64   Pulse 62   Temp 36.1 °C (97 °F) (Temporal)   Resp 18   Ht 1.727 m (5' 8\")   Wt (!) 133 kg (292 lb 15.9 oz)   SpO2 97%     Physical Examination:  Physical Exam  Vitals reviewed.   Constitutional:       General: She is not in acute distress.     Appearance: She is well-developed. She is not diaphoretic.   Eyes:      Conjunctiva/sclera: Conjunctivae normal.   Neck:      Thyroid: No thyroid mass or thyromegaly.      Vascular: No JVD.      Trachea: No tracheal deviation.   Cardiovascular:      Rate and Rhythm: Normal rate and regular " rhythm.      Heart sounds: No murmur heard.  Pulmonary:      Effort: Pulmonary effort is normal. No respiratory distress.      Breath sounds: Normal breath sounds.   Chest:      Chest wall: No tenderness.   Abdominal:      Palpations: Abdomen is soft.      Tenderness: There is no abdominal tenderness.   Musculoskeletal:         General: Normal range of motion.   Skin:     General: Skin is warm and dry.   Neurological:      Mental Status: She is alert and oriented to person, place, and time.      Motor: No tremor.   Psychiatric:         Behavior: Behavior normal.         Impression:  1.  Permanent pacemaker at [facility] for generator change.  The risks, benefits, and alternatives to permanent pacemaker replacement were discussed in great detail.  Specific risks mentioned to the patient including bleeding, cardiac perforation with possible tamponade possibly requiring pericardiocentesis or open heart surgery.  In addition the possibility of lead dislodgment (2-3%), pneumothorax (3%), hemothorax, infection were discussed.  Also, mentioned were the risk of death, stroke, and myocardial infarction.  The patient verbalized understanding of the potential complications and wishes to proceed with the procedure.

## 2023-09-05 ENCOUNTER — NON-PROVIDER VISIT (OUTPATIENT)
Dept: CARDIOLOGY | Facility: MEDICAL CENTER | Age: 63
End: 2023-09-05

## 2023-09-05 ENCOUNTER — NON-PROVIDER VISIT (OUTPATIENT)
Dept: CARDIOLOGY | Facility: MEDICAL CENTER | Age: 63
End: 2023-09-05
Payer: MEDICARE

## 2023-09-05 DIAGNOSIS — Z95.0 PACEMAKER: ICD-10-CM

## 2023-09-05 DIAGNOSIS — Z86.79 HISTORY OF SICK SINUS SYNDROME: ICD-10-CM

## 2023-09-05 PROCEDURE — 93280 PM DEVICE PROGR EVAL DUAL: CPT | Mod: 26 | Performed by: INTERNAL MEDICINE

## 2023-09-05 PROCEDURE — 93280 PM DEVICE PROGR EVAL DUAL: CPT | Performed by: INTERNAL MEDICINE

## 2023-09-08 NOTE — PROGRESS NOTES
Wound site is healing well.  Patient advised to watch for increased redness, swelling, oozing or fever--verbalized understanding.      Reviewed remote monitoring and billing with patient--verbalized understanding.

## 2023-09-12 NOTE — CARDIAC REMOTE MONITOR - SCAN
Device transmission reviewed. Device demonstrated appropriate function.       Electronically Signed by: Hans Pinedo M.D.    9/19/2023  4:02 PM

## 2023-09-26 ENCOUNTER — TELEPHONE (OUTPATIENT)
Dept: CARDIOLOGY | Facility: MEDICAL CENTER | Age: 63
End: 2023-09-26
Payer: MEDICARE

## 2023-09-26 NOTE — TELEPHONE ENCOUNTER
Last OV: 05/02/2023  Proposed Surgery: Colon W Bx  Surgery Date: Based on clearance   Requesting Office Name: [facility]  Fax Number: [phone]  Preference of Location (default is surgery center unless specified by Cardiologist or [facility])  Prior Clearance Addressed: No      Anticoags/Antiplatelets: Apixaban   Outstanding Cardiac Imaging : No  Stent, Cardiac Devices, or Catheterization: Yes  Within the last 6 months. Forward to provider to review.  Ablation, TAVR/Valve (including open heart), Cardioversion: Yes  Date: 4/5/2018  >3 months post procedure for dental work request OR >6 months post procedure, send clearance letter  Recent Cardiac Hospitalization: Yes  Date:  8/29/2023            When: Hospitalized in the last 6 months. Forward to provider to review.   History (cardiac history):   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    Status post LA myxoma resection    Backpain     Breath shortness 08/09/2023    with exertion, uses O2    CAD (coronary artery disease)     pacemaker    Chronic pain     Dental disorder 08/09/2023    dentures full upper and lower    Dizziness      Hemorrhagic disorder (HCC) 08/09/2023    on eliquis    High cholesterol 08/09/2023    medicated    Hyperlipidemia      Hypertension 08/09/2023    medicated    Hypertriglyceridemia      Indigestion 08/09/2023    at times    Pacemaker 08/09/2023    at present    Peripheral edema     Psychiatric problem 08/09/2023    depression, medicated    Sciatica     Sick sinus syndrome (HCC) 11/01/2011    Status post PPM implantation.    Sleep apnea 08/09/2023    stimulator placed    Status post mitral valve repair 11/01/2011    MV repair with 32mm Jonnie-Carpenter flexible annuloplasty ring.             Surgical Clearance Letter Sent: NO. Provider to advise.   **Scan clearance request letter into [facility].**

## 2023-09-26 NOTE — TELEPHONE ENCOUNTER
Surgical clearance for Colon W Bx  -Patient is likely a low cardiac risk for intended surgery  -If deemed a high risk for bleeding, patient may hold their DOAC for 2 days preoperatively, and resume ASAP preferably no later than POD 1

## 2023-09-26 NOTE — LETTER
PROCEDURE/SURGERY CLEARANCE FORM      Encounter Date: 9/26/2023    Patient: Kailey Velarde  YOB: 1960    CARDIOLOGIST:  MELLY Cardona    REFERRING DOCTOR:  No ref. provider found    The above patient is cleared to have the following procedure/surgery: Colon w Bx                                            Additional comments: Patient is likely a low cardiac risk for intended surgery  -If deemed a high risk for bleeding, patient may hold their DOAC for 2 days preoperatively, and resume ASAP preferably no later than POD 1       Electronically signed         MD Signature   MELLY Cardona    PROCEDURE/SURGERY CLEARANCE FORM    Date: 9/26/2023   Patient Name: Kailey Velarde    Dear Surgeon or Proceduralist,      Thank you for your request for cardiac stratification of our mutual patient Kailey Velarde 1960. We have reviewed their [facility] records; and to the best of our understanding this patient has not had stenting, ablation, cardiothoracic surgery or hospitalization for cardiovascular reasons in the past 6 months.  Kailey Velarde has been seen within the past 18 months and is considered to have non-modifiable cardiac risk for this low-risk procedure/surgery. They may proceed from a cardiovascular standpoint and may hold their antiplatelet/anticoagulation as briefly as possible. Please have patient resume this medication when hemodynamically stable to do so.     Aspirin or Prasugrel   - hold 7 days prior to procedure/surgery, resume when hemodynamically stable      Clopidrogrel or Ticagrelor  - hold 7 days for all neurological procedures, hold 5 days prior to all other procedure/surgery,  resume when hemodynamically stable     Warfarin - hold 7 days for all neurological procedures, hold 5 days prior to all other procedure/surgery and coordinate with [facility] ([phone]) INR testing and dose management.       Pradaxa/Xarelto/Eliquis/Savesya - hold 1 day prior to procedure for low bleeding risk procedure, 2 days for high bleeding risk procedure, or consider holding 3 days or longer for patients with reduced kidney function (CrCl <30mL/min) or spinal/cranial surgeries/procedures.      If they have a mechanical heart valve, please coordinate with [facility] ([phone]) the proper management of their anticoagulant in the periprocedural or perioperative period.      Some patients have higher risk for cardiovascular complications or holding medication. If our patient has had prior complications of holding antiplatelet or anticoagulants in the past and we have seen them after these events, we have addressed these concerns with the patient. They are at an unknown degree of increased risk for recurrent complication.  You may hold anticoagulation/antiplatelets for the procedure or surgery if the benefits of the procedure or surgery outweigh this nonmodifiable risk.      If Kailey Velarde 1960 has new symptoms of heart failure decompensation, unstable arrythmia, or angina please reach out and we will assess the patient.      If you have other patient-specific concerns, please feel free to reach out to the patient's cardiologist directly at [phone].     Thank you,       [facility]

## 2023-10-11 ENCOUNTER — ANCILLARY PROCEDURE (OUTPATIENT)
Dept: VASCULAR LAB | Facility: MEDICAL CENTER | Age: 63
End: 2023-10-11
Attending: INTERNAL MEDICINE
Payer: MEDICARE

## 2023-10-11 ENCOUNTER — NON-PROVIDER VISIT (OUTPATIENT)
Dept: CARDIOLOGY | Facility: MEDICAL CENTER | Age: 63
End: 2023-10-11
Payer: MEDICARE

## 2023-10-11 ENCOUNTER — NON-PROVIDER VISIT (OUTPATIENT)
Dept: CARDIOLOGY | Facility: MEDICAL CENTER | Age: 63
End: 2023-10-11

## 2023-10-11 ENCOUNTER — DOCUMENTATION (OUTPATIENT)
Dept: VASCULAR LAB | Facility: MEDICAL CENTER | Age: 63
End: 2023-10-11

## 2023-10-11 DIAGNOSIS — Z98.890 STATUS POST MITRAL VALVE REPAIR: ICD-10-CM

## 2023-10-11 DIAGNOSIS — I51.89 LEFT VENTRICULAR DIASTOLIC DYSFUNCTION, NYHA CLASS 3: ICD-10-CM

## 2023-10-11 DIAGNOSIS — Z86.79 HISTORY OF SICK SINUS SYNDROME: ICD-10-CM

## 2023-10-11 DIAGNOSIS — Z95.0 PACEMAKER: ICD-10-CM

## 2023-10-11 DIAGNOSIS — I48.0 PAF (PAROXYSMAL ATRIAL FIBRILLATION) (HCC): ICD-10-CM

## 2023-10-11 DIAGNOSIS — D68.69 SECONDARY HYPERCOAGULABLE STATE (HCC): ICD-10-CM

## 2023-10-11 PROCEDURE — 99212 OFFICE O/P EST SF 10 MIN: CPT | Performed by: PHARMACIST

## 2023-10-11 PROCEDURE — 93280 PM DEVICE PROGR EVAL DUAL: CPT | Mod: 26 | Performed by: INTERNAL MEDICINE

## 2023-10-11 PROCEDURE — 93280 PM DEVICE PROGR EVAL DUAL: CPT | Performed by: INTERNAL MEDICINE

## 2023-10-11 NOTE — PROGRESS NOTES
"   Target end date:infeindin      Indication: PAF     Drug: Eliquis 5mg BID     CHADsVASC = at least 3    Health Status Since Last Assessment   Patient denies any new relevant medical problems, ED visits or hospitalizations   Patient denies any embolic events (stroke/tia/systemic embolism)    Adherence with DOAC Therapy   Pt has 1-2 every 2 weeks missed doses    Bleeding Risk Assessment     No Epistaxis   Pt denies any excessive or unusual bleeding/hematomas.  Pt denies any GI bleeds or hematemesis.  Pt denies any concerning daily headache or sub dural hematoma symptoms.     Pt denies any hematuria    ETOH overuse 2-3 beers   Latest Reference Range & Units Most Recent   Hemoglobin 12.0 - 16.0 g/dL 13.1  8/25/23 09:17   Platelet Count 164 - 446 K/uL 188  8/25/23 09:17   Creatinine 0.50 - 1.40 mg/dL 0.81  8/25/23 09:17   GFR (CKD-EPI) >60 mL/min/1.73 m 2 82  8/25/23 09:17       Creatinine Clearance/Renal Function     Latest ClCr 177    Hepatic function   Latest LFTs WNL   Pt denies any history of liver dysfunction      Drug Interactions   Platelets: 188   ASA/other antiplatelets unknonwn   NSAID unknown   Other drug interactions N/A    Verified no anticonvulsant or azole therapy, education provided for future use.      Patient did not wish to proceed with today's visit as she was thoroughly convinced all the information today was \"talked about with both of you guys last time\", even though this provider has not met with this patient in the past.  She was agitated about being here and wished to leave to attend her next appt this AM.     Other Actions: cmp/ cbc hemogram ordered prior to next visit    Follow up:   Will follow up with patient 6  months.      Enzo Tinsley, PharmD, BCACP  Kunal Rosales, Pharmacy Intern  "

## 2023-10-11 NOTE — PROGRESS NOTES
Wound site is healing well. Pt advised to watch for increased redness, swelling, oozing or fever. Pt verbalized understanding. See flowsheet.     Billing and remote monitoring explained and acknowledged.

## 2023-10-11 NOTE — CARDIAC REMOTE MONITOR - SCAN
Device transmission reviewed. Device demonstrated appropriate function.       Electronically Signed by: Gautam Patel M.D.    10/11/2023  12:42 PM

## 2023-10-11 NOTE — PROGRESS NOTES
[facility]    Received anticoagulation clearance from [facility] regarding upcoming colon w/ bx.    Procedure date TBD    Pt is on apixaban (Eliquis) for atrial fibrillation.    Per current CHEST guidelines, pt is at low risk of VTE/stroke given CHADSVASC =3.             Per current guidelines/package insert, pt will need to hold 2 days prior to procedure, then restart 1 day post-op under the operating provider's discretion.          Faxed clearance to [phone]; (will send to [place] to scan in media)    Aditi Reyes, LeonoraD

## 2023-10-30 NOTE — PROGRESS NOTES
Cardiology Progress Note               Author: Russ Bah Date & Time created: 2018  11:56 AM       Consultation for RV failure      Admitted with increased shortness of breath ×5-6 months, fatigue, lower extremity edema, palpitations, pulmonary emboli was ruled out, was sent to ER from cardiology office secondary to current symptoms      History of mitral valve repair , atrial fib first diagnosed on , on Xarelto, SSS s/p PPM  , left atrial myxoma resection, chronic pain, hypertension, obesity, tobacco use, hyperlipidemia,      Labs reviewed  Sodium, potassium, creatinine stable  Troponin negative  BNP 76        BP = 115/64  HR = 70's NSR    Echocardiogram 3/29/18, known mitral valve bioprosthesis functioning normally, EF 55%,  rapid A. fib, severely dilated right ventricle, enlarged right atrium    Review of Systems   Respiratory: Negative for cough and shortness of breath.    Cardiovascular: Negative for chest pain, palpitations, orthopnea, claudication, leg swelling and PND.       Physical Exam   Constitutional: She is oriented to person, place, and time.   HENT:   Head: Normocephalic.   Eyes: Conjunctivae are normal.   Neck: JVD present.   Cardiovascular: Normal rate.  An irregularly irregular rhythm present.   Pulses:       Carotid pulses are 2+ on the right side, and 2+ on the left side.       Radial pulses are 2+ on the right side, and 2+ on the left side.        Posterior tibial pulses are 2+ on the right side, and 2+ on the left side.   Pulmonary/Chest: She has no wheezes.   Abdominal: Soft.   Musculoskeletal: She exhibits edema.   Neurological: She is alert and oriented to person, place, and time.   Skin: Skin is warm and dry.       Hemodynamics:  Temp (24hrs), Av °C (96.8 °F), Min:35.6 °C (96 °F), Max:36.6 °C (97.9 °F)  Temperature: (!) 35.6 °C (96 °F)  Pulse  Av.2  Min: 43  Max: 129   Blood Pressure: 115/64     Respiratory:    Respiration: 18, Pulse Oximetry: 91 %      Work Of Breathing / Effort: Mild  RUL Breath Sounds: Clear, RML Breath Sounds: Clear, RLL Breath Sounds: Clear, SANTANA Breath Sounds: Clear, LLL Breath Sounds: Clear  Fluids:  Date 03/30/18 0700 - 03/31/18 0659   Shift 0690-7344 6781-5783 8049-0571 24 Hour Total   I  N  T  A  K  E   P.O. 580   580    Shift Total 580   580   O  U  T  P  U  T   Urine 1600   1600    Shift Total 1600   1600   Weight (kg) 125.9 125.9 125.9 125.9       Weight: (!) 134.3 kg (296 lb 1.2 oz)  GI/Nutrition:  Orders Placed This Encounter   Procedures   • DIET ORDER     Standing Status:   Standing     Number of Occurrences:   1     Order Specific Question:   Diet:     Answer:   2 Gram Sodium [7]     Lab Results:  Recent Labs      04/03/18   0002  04/04/18   0505  04/05/18   0307   WBC  6.9  4.8  4.7*   RBC  3.82*  3.60*  3.51*   HEMOGLOBIN  11.7*  10.7*  10.6*   HEMATOCRIT  36.4*  34.3*  33.5*   MCV  95.3  95.3  95.4   MCH  30.6  29.7  30.2   MCHC  32.1*  31.2*  31.6*   RDW  50.3*  49.8  49.4   PLATELETCT  130*  94*  93*   MPV  10.4  11.1  10.9     Recent Labs      04/03/18   0002  04/04/18   0505  04/05/18   0307   SODIUM  136  132*  137   POTASSIUM  4.7  4.3  4.5   CHLORIDE  99  101  102   CO2  27  26  30   GLUCOSE  96  150*  106*   BUN  19  20  13   CREATININE  2.31*  1.19  0.92   CALCIUM  8.5  8.5  9.1     Recent Labs      04/03/18   0002  04/03/18   1546  04/03/18   2250  04/04/18   0505   APTT   --   40.8*  196.0*  110.0*   INR  1.28*   --    --   1.69*                     Medical Decision Making, by Problem:  Active Hospital Problems    Diagnosis   • *Volume overload [E87.70]   • History of sick sinus syndrome [Z86.79]   • Pacemaker [Z95.0]   • Hyperlipidemia [E78.5]   • Status post mitral valve repair [Z98.890]   • Acute respiratory failure with hypoxia (CMS-HCC) [J96.01]   • Chronic low back pain [M54.5, G89.29]   • Circulating anticoagulants (CMS-HCC) [D68.318]   • Obesity [E66.9]   • Pulmonary hypertension [I27.20]   • Tobacco use  "disorder [F17.200]   • PAF (paroxysmal atrial fibrillation) (CMS-HCC) [I48.0]       Assessment/Plan:    S/p successful DCCV on 4/5/18    Acute on chronic diastolic heart failure    - Weight 296 pounds ( patient reports 270# at home \"max \" ), repeat standing weight today  -Diuretics on hold secondary to overdiuresis      Secondary pulmonary hypertension  -PE was ruled out  -Dilated RV and RV dysfunction by echo      Persistent atrial fib/flutter  - s/p successful DCCV 4/5/18  - On amiodarone 400 mg TID ( initiated 4/3/18 at 15:00 )  Failed BB and CCB   - On therapeutic Lovenox/warfarin, INR 1.69    Tobacco abuse   -  nicotine patch    Chronic pain   - On MS Contin 15 mg every 8 hours & oxycodone 10 mg 3 times daily      TSH < 0.005 with free T4 0.96  - Outpatient follow-up    S/p JULY 4/2/18  - Revealed mild mitral regurgitation, prior repair, normal gradients, does not appear to be causing any hemodynamic issues    Quality-Core Measures   Reviewed items::  Medications reviewed and Labs reviewed    " Griseofulvin Pregnancy And Lactation Text: This medication is Pregnancy Category X and is known to cause serious birth defects. It is unknown if this medication is excreted in breast milk but breast feeding should be avoided.

## 2023-11-02 ENCOUNTER — OFFICE VISIT (OUTPATIENT)
Dept: CARDIOLOGY | Facility: MEDICAL CENTER | Age: 63
End: 2023-11-02
Payer: MEDICARE

## 2023-11-02 VITALS
HEIGHT: 68 IN | WEIGHT: 291 LBS | DIASTOLIC BLOOD PRESSURE: 64 MMHG | HEART RATE: 78 BPM | SYSTOLIC BLOOD PRESSURE: 110 MMHG | BODY MASS INDEX: 44.1 KG/M2 | OXYGEN SATURATION: 95 % | RESPIRATION RATE: 14 BRPM

## 2023-11-02 DIAGNOSIS — E78.2 MIXED HYPERLIPIDEMIA: ICD-10-CM

## 2023-11-02 DIAGNOSIS — G47.31 CENTRAL SLEEP APNEA: ICD-10-CM

## 2023-11-02 DIAGNOSIS — I50.30 HEART FAILURE WITH PRESERVED EJECTION FRACTION, BORDERLINE, CLASS II (HCC): ICD-10-CM

## 2023-11-02 DIAGNOSIS — E66.01 CLASS 3 SEVERE OBESITY DUE TO EXCESS CALORIES WITH SERIOUS COMORBIDITY AND BODY MASS INDEX (BMI) OF 40.0 TO 44.9 IN ADULT (HCC): ICD-10-CM

## 2023-11-02 DIAGNOSIS — I10 ESSENTIAL HYPERTENSION: Chronic | ICD-10-CM

## 2023-11-02 DIAGNOSIS — I48.0 PAF (PAROXYSMAL ATRIAL FIBRILLATION) (HCC): ICD-10-CM

## 2023-11-02 DIAGNOSIS — I27.20 PULMONARY HYPERTENSION (HCC): Chronic | ICD-10-CM

## 2023-11-02 DIAGNOSIS — Z95.0 PACEMAKER: ICD-10-CM

## 2023-11-02 DIAGNOSIS — D15.1 ATRIAL MYXOMA: ICD-10-CM

## 2023-11-02 DIAGNOSIS — Z98.890 STATUS POST MITRAL VALVE REPAIR: ICD-10-CM

## 2023-11-02 DIAGNOSIS — Z79.899 HIGH RISK MEDICATION USE: ICD-10-CM

## 2023-11-02 DIAGNOSIS — Z86.79 HISTORY OF SICK SINUS SYNDROME: ICD-10-CM

## 2023-11-02 PROBLEM — E66.813 CLASS 3 SEVERE OBESITY DUE TO EXCESS CALORIES WITH SERIOUS COMORBIDITY AND BODY MASS INDEX (BMI) OF 40.0 TO 44.9 IN ADULT (HCC): Status: ACTIVE | Noted: 2018-03-28

## 2023-11-02 PROBLEM — I51.89 LEFT VENTRICULAR DIASTOLIC DYSFUNCTION, NYHA CLASS 3: Status: RESOLVED | Noted: 2018-05-30 | Resolved: 2023-11-02

## 2023-11-02 PROCEDURE — 3078F DIAST BP <80 MM HG: CPT

## 2023-11-02 PROCEDURE — 99214 OFFICE O/P EST MOD 30 MIN: CPT

## 2023-11-02 PROCEDURE — 99213 OFFICE O/P EST LOW 20 MIN: CPT

## 2023-11-02 PROCEDURE — 3074F SYST BP LT 130 MM HG: CPT

## 2023-11-02 ASSESSMENT — ENCOUNTER SYMPTOMS
PALPITATIONS: 0
NEUROLOGICAL NEGATIVE: 1
GASTROINTESTINAL NEGATIVE: 1
NERVOUS/ANXIOUS: 0
BACK PAIN: 1
PND: 0
EYES NEGATIVE: 1
ORTHOPNEA: 0
SHORTNESS OF BREATH: 1
CONSTITUTIONAL NEGATIVE: 1
DEPRESSION: 0

## 2023-11-02 ASSESSMENT — FIBROSIS 4 INDEX: FIB4 SCORE: 1.9

## 2023-11-02 NOTE — PROGRESS NOTES
"No chief complaint on file.      Subjective     Melanie Velarde is a 63 y.o. female who presents today for 6-month follow up. They have a history of Chronic HFpEF, Left atrial myxoma, mitral regurgitation s/p mitral valve repair with 32-mm C-E flexible annuloplasty band with Dr. Ely in 2011, Paroxysmal atrial fibrillation/flutter, cardioverted during hospital stay in April 2018, was initiated on amiodarone, SSS s/p pacemaker implant, on anticoagulation with warfarin, [id number] score of 4 with no prior TIA/CVA, Pulmonary venous hypertension (likely from HFpEF/KAEL).  Underwent RHC in March 2018 which showed elevated wedge of 29 with normal cardiac output, Central sleep apnea s/p Remede implant at [facility]     Seen by Dr. Yoo on 8/29/2022.  At that visit she was doing well from a cardiac perspective, she was transitioned to Eliquis.      Seen by myself on 5/2/2023 Since their last visit they have been \"having some trouble with her afib.\" She is having some fast heart rates, denies palpitations. Chronic shortness of breath, denies leg swelling. She is recovering from a URI.  Unfortunately she had a friend die recently, and this is the 5-year anniversary of her brother dying.  She has been dealing with a lot of emotional stress. She uses a walker, able to walk short distances.  I ordered a repeat echocardiogram for her since her mitral valve was not well visualized on her last echocardiogram.    She underwent pacemaker GEN change on 8/29/2023    Today 11/2/2023 she is at her baseline breathing she is on 3L home O2. She is limited in her activity due to her chronic back pain. NYHA class II    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    Status post LA myxoma resection    Backpain     Breath shortness 08/09/2023    with exertion, uses O2    CAD (coronary artery disease)     pacemaker    Chronic pain     Dental disorder 08/09/2023    " dentures full upper and lower    Dizziness      Hemorrhagic disorder (HCC) 2023    on eliquis    High cholesterol 2023    medicated    Hyperlipidemia      Hypertension 2023    medicated    Hypertriglyceridemia      Indigestion 2023    at times    Pacemaker 2023    at present    Peripheral edema     Psychiatric problem 2023    depression, medicated    Sciatica     Sick sinus syndrome (HCC) 2011    Status post PPM implantation.    Sleep apnea 2023    stimulator placed    Status post mitral valve repair 2011    MV repair with 32mm Jonnie-Carpenter flexible annuloplasty ring.     Past Surgical History:   Procedure Laterality Date    OTHER  2023    inspire upper nerve stim for Apnea    CARDIAC CATH, RIGHT HEART  2018    Right heart cath with high pressures.    MITRAL VALVE REPAIR  2011    Performed by MARY ORTIZ at [facility]    PACEMAKER INSERTION  2011    Medtronic Versa [id number] implanted by Dr. Amaya.    OTHER ORTHOPEDIC SURGERY      Neck and back      Family History   Problem Relation Age of Onset    Heart Attack Father      Social History     Socioeconomic History    Marital status: Single     Spouse name: Not on file    Number of children: Not on file    Years of education: Not on file    Highest education level: Not on file   Occupational History    Not on file   Tobacco Use    Smoking status: Former     Current packs/day: 0.00     Average packs/day: 1 pack/day for 39.0 years (39.0 ttl pk-yrs)     Types: Cigarettes     Start date: 1978     Quit date: 2017     Years since quittin.9    Smokeless tobacco: Never    Tobacco comments:     Vapes only   Vaping Use    Vaping Use: Former    Start date: 2017    Substances: Nicotine   Substance and Sexual Activity    Alcohol use: Not Currently     Comment: occasionally    Drug use: Yes     Types: Inhaled, Oral, Marijuana     Comment: medical marijuana, daily     Sexual activity: Not on file   Other Topics Concern    Not on file   Social History Narrative    Not on file     Social Determinants of Health     Financial Resource Strain: Not on file   Food Insecurity: Not on file   Transportation Needs: Not on file   Physical Activity: Not on file   Stress: Not on file   Social Connections: Not on file   Intimate Partner Violence: Not on file   Housing Stability: Not on file     Allergies   Allergen Reactions    Penicillins Rash           Outpatient Encounter Medications as of 11/2/2023   Medication Sig Dispense Refill    morphine ER (MS CONTIN) 15 MG Tab CR tablet Take 15 mg by mouth every 12 hours.      doxycycline (MONODOX) 100 MG capsule Take 1 Capsule by mouth 2 times a day. 8 Capsule 0    amiodarone (CORDARONE) 200 MG Tab Take 1 Tablet by mouth every day. 100 Tablet 3    apixaban (ELIQUIS) 5mg Tab Take 1 Tablet by mouth 2 times a day. 200 Tablet 3    atorvastatin (LIPITOR) 20 MG Tab Take 1 Tablet by mouth every day. 100 Tablet 3    Empagliflozin (JARDIANCE) 10 MG Tab tablet Take 1 Tablet by mouth every day. 100 Tablet 3    spironolactone (ALDACTONE) 50 MG Tab Take 1 Tablet by mouth every day. 100 Tablet 3    venlafaxine (EFFEXOR-XR) 150 MG extended-release capsule Take 150 mg by mouth every evening. Pt takes 75 mg and 150 mg for a total dose of 250 mg      ARIPiprazole (ABILIFY) 10 MG Tab Take 10 mg by mouth every day.      venlafaxine XR (EFFEXOR XR) 75 MG CAPSULE SR 24 HR Take 75 mg by mouth every day. Pt takes 75 mg and 150 mg for a total dose of 250 mg      traZODone (DESYREL) 100 MG Tab Take 100 mg by mouth every evening.      oxyCODONE immediate release (ROXICODONE) 10 MG immediate release tablet Take 10 mg by mouth 2 times a day.      pregabalin (LYRICA) 200 MG capsule Take 200 mg by mouth 3 times a day.       No facility-administered encounter medications on file as of 11/2/2023.     Review of Systems   Constitutional: Negative.    HENT: Negative.     Eyes:  "Negative.    Respiratory:  Positive for shortness of breath.    Cardiovascular:  Negative for chest pain, palpitations, orthopnea, leg swelling and PND.   Gastrointestinal: Negative.    Genitourinary: Negative.    Musculoskeletal:  Positive for back pain.   Skin: Negative.    Neurological: Negative.    Endo/Heme/Allergies: Negative.    Psychiatric/Behavioral:  Negative for depression. The patient is not nervous/anxious.               Objective     /64 (BP Location: Left arm, Patient Position: Sitting, BP Cuff Size: Adult)   Pulse 78   Resp 14   Ht 1.727 m (5' 8\")   Wt (!) 132 kg (291 lb)   LMP 01/01/2010   SpO2 95%   BMI 44.25 kg/m²     Physical Exam  Constitutional:       Appearance: Normal appearance. She is obese.   HENT:      Head: Normocephalic and atraumatic.   Neck:      Vascular: No JVD.   Cardiovascular:      Rate and Rhythm: Normal rate and regular rhythm.      Pulses: Normal pulses.      Heart sounds: Normal heart sounds. No murmur heard.     No friction rub.   Pulmonary:      Effort: Pulmonary effort is normal. No respiratory distress.      Breath sounds: Decreased air movement present. Examination of the right-lower field reveals decreased breath sounds. Decreased breath sounds present.   Abdominal:      General: There is no distension.      Palpations: Abdomen is soft.   Musculoskeletal:      Right lower leg: No edema.      Left lower leg: No edema.   Skin:     General: Skin is warm and dry.   Neurological:      General: No focal deficit present.      Mental Status: She is alert and oriented to person, place, and time.   Psychiatric:         Mood and Affect: Mood normal.         Behavior: Behavior normal.            Lab Results   Component Value Date/Time    CHOLSTRLTOT 200 (H) 07/25/2014 01:30 AM    LDL see below 07/25/2014 01:30 AM    HDL 37 (A) 07/25/2014 01:30 AM    TRIGLYCERIDE 574 (H) 07/25/2014 01:30 AM       Lab Results   Component Value Date/Time    SODIUM 132 (L) 08/25/2023 09:17 " AM    POTASSIUM 3.9 08/25/2023 09:17 AM    CHLORIDE 94 (L) 08/25/2023 09:17 AM    CO2 26 08/25/2023 09:17 AM    GLUCOSE 102 (H) 08/25/2023 09:17 AM    BUN 8 08/25/2023 09:17 AM    CREATININE 0.81 08/25/2023 09:17 AM    BUNCREATRAT 13 10/09/2015 02:11 PM     Lab Results   Component Value Date/Time    ALKPHOSPHAT 72 08/25/2023 09:17 AM    ASTSGOT 23 08/25/2023 09:17 AM    ALTSGPT 16 08/25/2023 09:17 AM    TBILIRUBIN 0.6 08/25/2023 09:17 AM      Echocardiogram 8/25/2023  CONCLUSIONS  Technically difficult study, patient refused contrast.  Normal left ventricular systolic function.  Dilated left atrium.  Known mitral valve repair which is functioning normally with   appropriate transvalvular gradient based on Doppler.    Assessment & Plan     1. Atrial myxoma        2. Status post mitral valve repair        3. Pulmonary hypertension ([facility])        4. Essential hypertension        5. PAF (paroxysmal atrial fibrillation) (CMS-[facility])        6. Mixed hyperlipidemia        7. Pacemaker        8. History of sick sinus syndrome        9. Heart failure with preserved ejection fraction, borderline, class II ([facility])        10. Central sleep apnea        11. High risk medication use  TSH WITH REFLEX TO FT4    DX-CHEST-2 VIEWS      12. Class 3 severe obesity due to excess calories with serious comorbidity and body mass index (BMI) of 40.0 to 44.9 in adult ([facility])            Medical Decision Making: Today's Assessment/Status/Plan:        History of atrial myxoma status post MVR  -Last echo from 2021, and mitral valve was not well visualized  -Repeat echocardiogram showed normal valve function     Sick sinus syndrome, status post pacemaker  -Last transmission on 10/11/2023 demonstrated appropriate function  -Continue with regular pacer checks     Paroxysmal atrial fibrillation  -Continue amiodarone 200 mg daily  -Eliquis 5 mg twice daily  - -Amiodarone risks have been discussed in detail including: QT prolongation and torsades de pointes,  conduction system impairment, photosensitivity, blue-gray skin discoloration, hyperthyroidism, hypothyroidism, AST or ALT elevation, corneal micro deposits, optic neuropathy, and pulmonary toxicity.  -Recommended annual surveillance includes ECG, skin examination, TSH with reflex (ordered), AST and ALT, yearly eye exam, and chest x-ray (ordered)  - she will get her testing done at [facility] with history of pulmonary hypertension and KAEL  -EF 55%  -Volume status: euvolemic on exam  -Continue spironolactone 50 mg daily, Jardiance 10 mg daily per insurance  -No lower extremity edema  -Class II symptoms  - encourage weight loss, heart healthy diet, and increased activity as tolerated     Hyperlipidemia  -Most recent LDL 82  -Continue atorvastatin 20 mg daily  -Goal of less than 100  -Check lipid panel annually    Obesity  Discussed in extensive detail regarding lifestyle modifications, focusing on dietary changes.     Discussed following recommendations with the patient to improve dietary choices:  1.  Increase amount of whole foods and grains (fruits/vegetables from the produce section without processing).  2.  Pay special attention to the nutrition facts with goal of decreasing saturated fat, foods with high cholesterol and high overall fat content.  3.  Reducing portion size with healthy snack options  4.  Avoid processed/packaged/frozen meals with more than 5 ingredients on the label, or multiple ingredients you don't recognize.  5.  Avoiding red meat and replacing with fresh fruits, vegetables and lean meat.  6.  Minimize sugar.  This means any product which has added sugar in the label (soda, candy, and many processed foods).          Follow-up with Zari HENDERSON in 6 months     This note was dictated using Dragon speech recognition software.

## 2023-12-04 ENCOUNTER — TELEPHONE (OUTPATIENT)
Dept: CARDIOLOGY | Facility: MEDICAL CENTER | Age: 63
End: 2023-12-04
Payer: MEDICARE

## 2023-12-04 NOTE — TELEPHONE ENCOUNTER
Last OV: 11.2.2023  Proposed Surgery: Facet joint nerve ablation L3-S1 w/EPI at a facility/light sedation   Surgery Date: TBD  Requesting Office Name: [facility]   Fax Number: [phone]  Preference of Location (default is surgery center unless specified by Cardiologist or LIVIER)  Prior Clearance Addressed: No      Anticoags/Antiplatelets: Apixaban   Outstanding Cardiac Imaging : No  Stent, Cardiac Devices, or Catheterization: Yes  Within the last 6 months. Forward to provider to review.  Ablation, TAVR/Valve (including open heart), Cardioversion: No  Recent Cardiac Hospitalization: Yes  Date:  8.29.2023            When: Hospitalized in the last 6 months. Forward to provider to review.   History (cardiac history):   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    Status post LA myxoma resection    Backpain     Breath shortness 08/09/2023    with exertion, uses O2    CAD (coronary artery disease)     pacemaker    Chronic pain     Dental disorder 08/09/2023    dentures full upper and lower    Dizziness      Hemorrhagic disorder (HCC) 08/09/2023    on eliquis    High cholesterol 08/09/2023    medicated    Hyperlipidemia      Hypertension 08/09/2023    medicated    Hypertriglyceridemia      Indigestion 08/09/2023    at times    Pacemaker 08/09/2023    at present    Peripheral edema     Psychiatric problem 08/09/2023    depression, medicated    Sciatica     Sick sinus syndrome (HCC) 11/01/2011    Status post PPM implantation.    Sleep apnea 08/09/2023    stimulator placed    Status post mitral valve repair 11/01/2011    MV repair with 32mm Jonnie-Carpenter flexible annuloplasty ring.             Surgical Clearance Letter Sent: NO. Provider to advise.   **Scan clearance request letter into [facility].**

## 2023-12-04 NOTE — LETTER
PROCEDURE/SURGERY CLEARANCE FORM      Encounter Date: 12/4/2023    Patient: Kailey Velarde  YOB: 1960    CARDIOLOGIST:  MELLY Cardona    REFERRING DOCTOR:  No ref. provider found        The above patient is cleared to have the following procedure/surgery:  Facet joint nerve ablation L3-S1 w/EPI at a facility/light sedation                                            Additional comments: Surgical clearance for Facet joint nerve ablation  -Patient likely a moderate cardiovascular risk for intended surgery  -RCRI score 1-point, class II risk  -If deemed a high risk for bleeding, patient may hold their DOAC for 2 days preoperatively, and resume ASAP preferably no later than POD 1             SARA [email or web address].[email or web address]  Electronically Signed

## 2023-12-05 NOTE — TELEPHONE ENCOUNTER
Surgical clearance for Facet joint nerve ablation  -Patient likely a moderate cardiovascular risk for intended surgery  -RCRI score 1-point, class II risk  -If deemed a high risk for bleeding, patient may hold their DOAC for 2 days preoperatively, and resume ASAP preferably no later than POD 1

## 2024-01-11 ENCOUNTER — APPOINTMENT (OUTPATIENT)
Dept: CARDIOLOGY | Facility: MEDICAL CENTER | Age: 64
End: 2024-01-11
Payer: MEDICARE

## 2024-01-20 ENCOUNTER — APPOINTMENT (OUTPATIENT)
Dept: RADIOLOGY | Facility: MEDICAL CENTER | Age: 64
End: 2024-01-20
Payer: MEDICARE

## 2024-01-27 ENCOUNTER — APPOINTMENT (OUTPATIENT)
Dept: RADIOLOGY | Facility: MEDICAL CENTER | Age: 64
End: 2024-01-27
Payer: MEDICARE

## 2024-01-30 ENCOUNTER — NON-PROVIDER VISIT (OUTPATIENT)
Dept: CARDIOLOGY | Facility: MEDICAL CENTER | Age: 64
End: 2024-01-30
Payer: MEDICARE

## 2024-01-30 DIAGNOSIS — Z86.79 HISTORY OF SICK SINUS SYNDROME: ICD-10-CM

## 2024-01-30 DIAGNOSIS — Z95.0 CARDIAC PACEMAKER IN SITU: ICD-10-CM

## 2024-01-30 PROCEDURE — 93280 PM DEVICE PROGR EVAL DUAL: CPT | Mod: 26 | Performed by: NURSE PRACTITIONER

## 2024-01-30 PROCEDURE — 93280 PM DEVICE PROGR EVAL DUAL: CPT | Performed by: NURSE PRACTITIONER

## 2024-01-30 NOTE — PROGRESS NOTES
Normal device function. Patient is c/o some intermittent discomfort at the PPM site, does feel worse with palpitation. She does report that the discomfort has improved since implantation. Incision appears well healed with no signs of infection. Discussed concerning s/sx of infection and encouraged her to call if any symptoms develop.

## 2024-02-21 ENCOUNTER — TELEPHONE (OUTPATIENT)
Dept: CARDIOLOGY | Facility: MEDICAL CENTER | Age: 64
End: 2024-02-21
Payer: MEDICARE

## 2024-02-21 NOTE — TELEPHONE ENCOUNTER
Caller: Alysha with [facility]    Topic/issue: pt will be having a pain procedure and had a few questions regarding medication and pacemaker. Appt is for 02/27/2024    Callback Number:[phone] ask shannon Encarnacion    Thank You    Brittnee SQUIRES

## 2024-02-21 NOTE — TELEPHONE ENCOUNTER
Phone Number Called: [phone]    Call outcome:  spoke to Alysha     Message: Patient has an upcoming joint nerve ablation of her lumbar sacral region. Office is wondering how long to hold Eliquis prior to procedure. Office is also wondering if they need to do anything with pacemaker such as put a magnet over it during procedure. Advised that this RN would reach out for recommendations.     To : can patient hold Eliquis 2 days preop/okay to have procedure?     To device clinic: is PPM okay with radiofrequency/MRI compatible?

## 2024-02-22 NOTE — TELEPHONE ENCOUNTER
If deemed a high risk for bleeding, patient may hold their DOAC for 2 days preoperatively, and resume ASAP preferably no later than POD 1

## 2024-02-22 NOTE — TELEPHONE ENCOUNTER
Spoke with patient advised will send device information for procedure to [facility]--verbalized understanding and appreciative of call.

## 2024-03-01 DIAGNOSIS — I48.0 PAF (PAROXYSMAL ATRIAL FIBRILLATION) (HCC): ICD-10-CM

## 2024-03-27 ENCOUNTER — TELEMEDICINE (OUTPATIENT)
Dept: SLEEP MEDICINE | Facility: MEDICAL CENTER | Age: 64
End: 2024-03-27
Attending: PHYSICIAN ASSISTANT
Payer: MEDICARE

## 2024-03-27 VITALS
BODY MASS INDEX: 42.28 KG/M2 | HEART RATE: 78 BPM | SYSTOLIC BLOOD PRESSURE: 100 MMHG | WEIGHT: 279 LBS | DIASTOLIC BLOOD PRESSURE: 62 MMHG | HEIGHT: 68 IN

## 2024-03-27 DIAGNOSIS — Z96.82 S/P INSERTION OF PHRENIC NERVE STIMULATOR: ICD-10-CM

## 2024-03-27 DIAGNOSIS — G47.31 CENTRAL SLEEP APNEA: ICD-10-CM

## 2024-03-27 DIAGNOSIS — J96.11 CHRONIC RESPIRATORY FAILURE WITH HYPOXIA (HCC): Chronic | ICD-10-CM

## 2024-03-27 DIAGNOSIS — F51.04 CHRONIC INSOMNIA: ICD-10-CM

## 2024-03-27 ASSESSMENT — ENCOUNTER SYMPTOMS
HEADACHES: 0
COUGH: 0
CHILLS: 0
PALPITATIONS: 0
FEVER: 0
SHORTNESS OF BREATH: 0
SINUS PAIN: 0
WEIGHT LOSS: 0
DIZZINESS: 0
HEARTBURN: 0
SPUTUM PRODUCTION: 0
WHEEZING: 0
ORTHOPNEA: 0
TREMORS: 0
INSOMNIA: 0
SORE THROAT: 0

## 2024-03-27 ASSESSMENT — FIBROSIS 4 INDEX: FIB4 SCORE: 1.9

## 2024-03-27 NOTE — PATIENT INSTRUCTIONS
1-using Remede device but reports difficulty getting ahold of them   2-we will check with Remede for follow up  3-remains on oxygen currently at 3L especially at night  4-reports sleeping in recliner  5-follow up in 6 months, sooner if needed

## 2024-03-27 NOTE — PROGRESS NOTES
"Virtual Visit: Established Patient   This visit was conducted via Zoom using secure and encrypted videoconferencing technology.   The patient was in their home in the [facility].    The patient's identity was confirmed and verbal consent was obtained for this virtual visit.     Subjective:     Chief Complaint   Patient presents with    Follow-Up     8/1/23  KAEL        HPI:  Kailey Velarde is a 63 y.o. year old female here today for follow-up on central sleep apnea.  Last seen in clinic 8/1/2023 by Dr. Yossi Farah.     Past Medical History: Chronic respiratory failure on supplemental O2, paroxysmal atrial fibrillation, pulmonary hypertension, heart failure, prolonged QT syndrome, mitral valve repair, pacemaker, morbid obesity, chronic pain, generalized muscle weakness, phrenic nerve stimulator for central sleep apnea.    Vitals:  /62   Pulse 78   Ht 1.727 m (5' 8\")   Wt (!) 127 kg (279 lb)   LMP 01/01/2010   BMI 42.42 kg/m²     Recent Imaging: Echocardiogram obtained 8/25/2023 demonstrating technically difficult study, normal left ventricular chamber size, wall thickness, low normal systolic function, normal diastolic function, LVEF estimated at 55%.  Normal right ventricular size and systolic function.  Dilated left atrium, no tricuspid regurgitation, known mitral valve repair functioning normally, estimated RVSP of 28 mmHg.    Patient primary concern is continued difficulty using her phrenic nerve stimulator.  She reports having had device implanted in [place] but being lost to follow-up.  She is [facility] we [facility].  She reports having her device set to run from 1-6 AM.  And will nap in chair until 1:00 AM but unable to sleep with device functioning.  Rep was onsite during last visit and device interrogation was performed at that time.      Polysomnogram obtained 1120/2019 demonstrating overall .7 events per hour with 90% of the " events being central apnea.  Low O2 sat of 75% with sats less than 89% for 264.1 minutes.  Findings consistent with severe central sleep apnea and severe sleep hypoxia.    Split-night titration study obtained 2/23/2021 demonstrated overall AHI of 139.7 events per hour increasing to 180 events per hour in supine position.  31% of the apneas were central in nature, low O2 sat of 73%.  Findings consistent with severe complex sleep apnea, high as tested pressure BiPAP ST 17/12 with uncontrolled sleep apnea.  Dedicated BiPAP ST/ASV titration was recommended.    Titration study obtained 4/8/2021 demonstrated successful BiPAP titration with resolution snoring, recommendation for BiPAP 19/14 with O2 at 1 L bleeding.    Symptoms insomnia and fatigue        Review of Systems   Constitutional:  Positive for malaise/fatigue. Negative for chills, fever and weight loss.   HENT:  Positive for hearing loss (right ear). Negative for congestion, nosebleeds, sinus pain, sore throat and tinnitus.    Eyes:         Presc glasses   Respiratory:  Negative for cough, sputum production, shortness of breath and wheezing.    Cardiovascular:  Negative for chest pain, palpitations, orthopnea and leg swelling.   Gastrointestinal:  Negative for heartburn.        No teeth, lost dentures, no swallowing issues    Neurological:  Negative for dizziness, tremors and headaches.   Psychiatric/Behavioral:  The patient does not have insomnia.        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    Status post LA myxoma resection    Backpain     Breath shortness 08/09/2023    with exertion, uses O2    CAD (coronary artery disease)     pacemaker    Chronic pain     Dental disorder 08/09/2023    dentures full upper and lower    Dizziness      Hemorrhagic disorder (HCC) 08/09/2023    on eliquis    High cholesterol 08/09/2023    medicated    Hyperlipidemia      Hypertension 08/09/2023     medicated    Hypertriglyceridemia      Indigestion 2023    at times    Pacemaker 2023    at present    Peripheral edema     Psychiatric problem 2023    depression, medicated    Sciatica     Sick sinus syndrome (HCC) 2011    Status post PPM implantation.    Sleep apnea 2023    stimulator placed    Status post mitral valve repair 2011    MV repair with 32mm Jonnie-Carpenter flexible annuloplasty ring.       Past Surgical History:   Procedure Laterality Date    OTHER  2023    inspire upper nerve stim for Apnea    CARDIAC CATH, RIGHT HEART  2018    Right heart cath with high pressures.    MITRAL VALVE REPAIR  2011    Performed by MARY ORTIZ at SURGERY [facility]    PACEMAKER INSERTION  2011    Medtronic Versa [id number] implanted by Dr. Amaya.    OTHER ORTHOPEDIC SURGERY      Neck and back        Family History   Problem Relation Age of Onset    Heart Attack Father        Social History     Socioeconomic History    Marital status: Single     Spouse name: Not on file    Number of children: Not on file    Years of education: Not on file    Highest education level: Not on file   Occupational History    Not on file   Tobacco Use    Smoking status: Former     Current packs/day: 0.00     Average packs/day: 1 pack/day for 39.0 years (39.0 ttl pk-yrs)     Types: Cigarettes     Start date: 1978     Quit date: 2017     Years since quittin.3    Smokeless tobacco: Never    Tobacco comments:     Vapes only   Vaping Use    Vaping Use: Former    Start date: 2017    Substances: Nicotine   Substance and Sexual Activity    Alcohol use: Not Currently     Comment: occasionally    Drug use: Yes     Types: Inhaled, Oral, Marijuana     Comment: medical marijuana, daily    Sexual activity: Not on file   Other Topics Concern    Not on file   Social History Narrative    Not on file     Social Determinants of Health     Financial Resource Strain: Not on file   Food  Insecurity: Not on file   Transportation Needs: Not on file   Physical Activity: Not on file   Stress: Not on file   Social Connections: Not on file   Intimate Partner Violence: Not on file   Housing Stability: Not on file       Allergies as of 03/27/2024 - Reviewed 03/27/2024   Allergen Reaction Noted    Penicillins Rash 06/07/2009          Current medications as of today   Current Outpatient Medications   Medication Sig Dispense Refill    morphine ER (MS CONTIN) 15 MG Tab CR tablet Take 15 mg by mouth every 12 hours.      doxycycline (MONODOX) 100 MG capsule Take 1 Capsule by mouth 2 times a day. 8 Capsule 0    amiodarone (CORDARONE) 200 MG Tab Take 1 Tablet by mouth every day. 100 Tablet 3    apixaban (ELIQUIS) 5mg Tab Take 1 Tablet by mouth 2 times a day. 200 Tablet 3    atorvastatin (LIPITOR) 20 MG Tab Take 1 Tablet by mouth every day. 100 Tablet 3    Empagliflozin (JARDIANCE) 10 MG Tab tablet Take 1 Tablet by mouth every day. 100 Tablet 3    spironolactone (ALDACTONE) 50 MG Tab Take 1 Tablet by mouth every day. 100 Tablet 3    venlafaxine (EFFEXOR-XR) 150 MG extended-release capsule Take 150 mg by mouth every evening. Pt takes 75 mg and 150 mg for a total dose of 250 mg      ARIPiprazole (ABILIFY) 10 MG Tab Take 10 mg by mouth every day.      venlafaxine XR (EFFEXOR XR) 75 MG CAPSULE SR 24 HR Take 75 mg by mouth every day. Pt takes 75 mg and 150 mg for a total dose of 250 mg      traZODone (DESYREL) 100 MG Tab Take 100 mg by mouth every evening.      oxyCODONE immediate release (ROXICODONE) 10 MG immediate release tablet Take 10 mg by mouth 2 times a day.      pregabalin (LYRICA) 200 MG capsule Take 200 mg by mouth 3 times a day.       No current facility-administered medications for this visit.          Objective:   Physical Exam:  Constitutional: Alert, no distress, well-groomed.  Skin: No rashes in visible areas.  Eye: Round. Conjunctiva clear, lids normal. No icterus.   ENMT: Lips pink without lesions,  good dentition, moist mucous membranes. Phonation normal.  Neck: No masses, no thyromegaly. Moves freely without pain.  Respiratory: Unlabored respiratory effort, no cough or audible wheeze  Psych: Alert and oriented x3, normal affect and mood.     Assessment and Plan:   The following treatment plan was discussed:     1. Central sleep apnea    Patient reports using remedy device but not tolerating well.  Reports difficulty getting hold of team.  We did reach out to them for follow-up and patient was notified of available appointments in clinic to meet with remedy representative.  She will call patient and offer appropriate dates.      2. S/P insertion of phrenic nerve stimulator    3. Chronic insomnia    Reports continues to sleep in recliner but unable to sleep when device activated.    4. Chronic respiratory failure with hypoxia (HCC)    Remains on O2 at 3 L especially at night.  Continued benefit with O2 use.    Follow-up:   Return in about 6 months (around 9/27/2024) for Return with MD. or earlier if appointment available with remedy rep and physician.

## 2024-04-06 ENCOUNTER — HOSPITAL ENCOUNTER (OUTPATIENT)
Dept: LAB | Facility: MEDICAL CENTER | Age: 64
End: 2024-04-06
Payer: MEDICARE

## 2024-04-06 ENCOUNTER — HOSPITAL ENCOUNTER (OUTPATIENT)
Dept: RADIOLOGY | Facility: MEDICAL CENTER | Age: 64
End: 2024-04-06
Payer: MEDICARE

## 2024-04-06 DIAGNOSIS — Z79.899 HIGH RISK MEDICATION USE: ICD-10-CM

## 2024-04-06 LAB — TSH SERPL DL<=0.005 MIU/L-ACNC: 2.49 UIU/ML (ref 0.38–5.33)

## 2024-04-06 PROCEDURE — 36415 COLL VENOUS BLD VENIPUNCTURE: CPT

## 2024-04-06 PROCEDURE — 71046 X-RAY EXAM CHEST 2 VIEWS: CPT

## 2024-04-06 PROCEDURE — 84443 ASSAY THYROID STIM HORMONE: CPT

## 2024-04-10 ENCOUNTER — APPOINTMENT (OUTPATIENT)
Dept: VASCULAR LAB | Facility: MEDICAL CENTER | Age: 64
End: 2024-04-10
Attending: INTERNAL MEDICINE
Payer: MEDICARE

## 2024-04-11 ENCOUNTER — APPOINTMENT (OUTPATIENT)
Dept: VASCULAR LAB | Facility: MEDICAL CENTER | Age: 64
End: 2024-04-11
Payer: MEDICARE

## 2024-04-11 DIAGNOSIS — I50.30 HEART FAILURE WITH PRESERVED EJECTION FRACTION, BORDERLINE, CLASS II (HCC): ICD-10-CM

## 2024-04-11 NOTE — TELEPHONE ENCOUNTER
Received request via: Patient    Was the patient seen in the last year in this department? Yes 11.02.23    Does the patient have an active prescription (recently filled or refills available) for medication(s) requested? No    Pharmacy Name: [facility]    Does the patient have prison Plus and need 100 day supply (blood pressure, diabetes and cholesterol meds only)? Patient does not have SCP    Thank you,     Leah SEN

## 2024-04-12 RX ORDER — EMPAGLIFLOZIN 10 MG/1
10 TABLET, FILM COATED ORAL DAILY
Qty: 100 TABLET | Refills: 1 | Status: SHIPPED | OUTPATIENT
Start: 2024-04-12

## 2024-04-15 ENCOUNTER — APPOINTMENT (OUTPATIENT)
Dept: VASCULAR LAB | Facility: MEDICAL CENTER | Age: 64
End: 2024-04-15
Attending: INTERNAL MEDICINE
Payer: MEDICARE

## 2024-04-16 ENCOUNTER — ANTICOAGULATION VISIT (OUTPATIENT)
Dept: VASCULAR LAB | Facility: MEDICAL CENTER | Age: 64
End: 2024-04-16
Attending: INTERNAL MEDICINE
Payer: MEDICARE

## 2024-04-16 VITALS — HEART RATE: 81 BPM | DIASTOLIC BLOOD PRESSURE: 62 MMHG | SYSTOLIC BLOOD PRESSURE: 109 MMHG

## 2024-04-16 DIAGNOSIS — I48.0 PAF (PAROXYSMAL ATRIAL FIBRILLATION) (HCC): ICD-10-CM

## 2024-04-16 DIAGNOSIS — Z98.890 STATUS POST MITRAL VALVE REPAIR: ICD-10-CM

## 2024-04-16 DIAGNOSIS — D68.69 SECONDARY HYPERCOAGULABLE STATE (HCC): ICD-10-CM

## 2024-04-16 PROCEDURE — 99212 OFFICE O/P EST SF 10 MIN: CPT

## 2024-04-16 NOTE — PROGRESS NOTES
Target end date: Indefinite     Indication: PAF     Drug: Eliquis 5mg BID     CHADsVASC = 3     HAS-BLED = 1    Health Status Since Last Assessment   Patient denies any new relevant medical problems, ED visits or hospitalizations   Patient denies any embolic events (stroke/tia/systemic embolism)     Adherence with DOAC Therapy   Pt has NO missed any doses in the average week    Bleeding Risk Assessment     Denies Epistaxis   Pt denies any excessive or unusual bleeding/hematomas.  Pt denies any GI bleeds or hematemesis.  Pt denies any concerning daily headache or sub dural hematoma symptoms.     Pt denies any hematuria    Latest Hemoglobin 13.1   ETOH overuse 1-2 beers daily      Creatinine Clearance/Renal Function     Latest ClCr ~143 ml/min    Latest Reference Range & Units 08/25/23 09:17   Creatinine 0.50 - 1.40 mg/dL 0.81   GFR (CKD-EPI) >60 mL/min/1.73 m 2 82     Hepatic function   Latest LFTs WNL    Pt denies any history of liver dysfunction    Latest Reference Range & Units 08/25/23 09:17   AST(SGOT) 12 - 45 U/L 23   ALT(SGPT) 2 - 50 U/L 16      Drug Interactions   Platelets: 188   ASA/other antiplatelets denies   NSAID denies   Other drug interactions none   Verified no anticonvulsant or azole therapy, education provided for future use.     Examination   Blood Pressure 109/62   Symptomatic hypotension occasional    Significant gait impairment/imbalance/high risk for falls? none    Final Assessment and Recommendations:   Patient appears stable from the anticoagulation standpoint.     Benefits of continued DOAC therapy outweigh risks for this patient   Recommend pt continue with current DOAC therapy with Eliquis 5mg BID    DOAC is affordable     Other Actions: cmp/ cbc hemogram ordered prior to next visit    Follow up:   Will follow up with patient 6 months.      Josefina LeaD

## 2024-05-07 ENCOUNTER — NON-PROVIDER VISIT (OUTPATIENT)
Dept: CARDIOLOGY | Facility: MEDICAL CENTER | Age: 64
End: 2024-05-07

## 2024-05-07 ENCOUNTER — OFFICE VISIT (OUTPATIENT)
Dept: CARDIOLOGY | Facility: MEDICAL CENTER | Age: 64
End: 2024-05-07
Payer: MEDICARE

## 2024-05-07 ENCOUNTER — NON-PROVIDER VISIT (OUTPATIENT)
Dept: CARDIOLOGY | Facility: MEDICAL CENTER | Age: 64
End: 2024-05-07
Attending: FAMILY MEDICINE
Payer: MEDICARE

## 2024-05-07 VITALS
RESPIRATION RATE: 16 BRPM | OXYGEN SATURATION: 93 % | WEIGHT: 281 LBS | DIASTOLIC BLOOD PRESSURE: 70 MMHG | BODY MASS INDEX: 42.59 KG/M2 | HEIGHT: 68 IN | HEART RATE: 84 BPM | SYSTOLIC BLOOD PRESSURE: 114 MMHG

## 2024-05-07 DIAGNOSIS — I27.20 PULMONARY HYPERTENSION (HCC): Chronic | ICD-10-CM

## 2024-05-07 DIAGNOSIS — Z98.890 STATUS POST MITRAL VALVE REPAIR: ICD-10-CM

## 2024-05-07 DIAGNOSIS — G47.31 CENTRAL SLEEP APNEA: ICD-10-CM

## 2024-05-07 DIAGNOSIS — I50.30 HEART FAILURE WITH PRESERVED EJECTION FRACTION, BORDERLINE, CLASS II (HCC): ICD-10-CM

## 2024-05-07 DIAGNOSIS — Z95.0 PACEMAKER: ICD-10-CM

## 2024-05-07 DIAGNOSIS — D68.69 SECONDARY HYPERCOAGULABLE STATE (HCC): ICD-10-CM

## 2024-05-07 DIAGNOSIS — I48.0 PAROXYSMAL ATRIAL FIBRILLATION (HCC): ICD-10-CM

## 2024-05-07 DIAGNOSIS — Z86.79 HISTORY OF SICK SINUS SYNDROME: ICD-10-CM

## 2024-05-07 DIAGNOSIS — E66.01 CLASS 3 SEVERE OBESITY DUE TO EXCESS CALORIES WITH SERIOUS COMORBIDITY AND BODY MASS INDEX (BMI) OF 40.0 TO 44.9 IN ADULT (HCC): ICD-10-CM

## 2024-05-07 DIAGNOSIS — I10 ESSENTIAL HYPERTENSION: Chronic | ICD-10-CM

## 2024-05-07 DIAGNOSIS — E78.2 MIXED HYPERLIPIDEMIA: ICD-10-CM

## 2024-05-07 DIAGNOSIS — I50.812 CHRONIC RIGHT-SIDED HEART FAILURE (HCC): ICD-10-CM

## 2024-05-07 DIAGNOSIS — I48.0 PAF (PAROXYSMAL ATRIAL FIBRILLATION) (HCC): ICD-10-CM

## 2024-05-07 DIAGNOSIS — D15.1 ATRIAL MYXOMA: ICD-10-CM

## 2024-05-07 DIAGNOSIS — Z79.01 CHRONIC ANTICOAGULATION: ICD-10-CM

## 2024-05-07 PROCEDURE — 3074F SYST BP LT 130 MM HG: CPT

## 2024-05-07 PROCEDURE — 3078F DIAST BP <80 MM HG: CPT

## 2024-05-07 PROCEDURE — 99214 OFFICE O/P EST MOD 30 MIN: CPT

## 2024-05-07 RX ORDER — SPIRONOLACTONE 50 MG/1
50 TABLET, FILM COATED ORAL DAILY
Qty: 100 TABLET | Refills: 3 | Status: SHIPPED | OUTPATIENT
Start: 2024-05-07

## 2024-05-07 RX ORDER — EMPAGLIFLOZIN 10 MG/1
10 TABLET, FILM COATED ORAL DAILY
Qty: 100 TABLET | Refills: 3 | Status: SHIPPED | OUTPATIENT
Start: 2024-05-07

## 2024-05-07 RX ORDER — ATORVASTATIN CALCIUM 20 MG/1
20 TABLET, FILM COATED ORAL DAILY
Qty: 100 TABLET | Refills: 3 | Status: SHIPPED | OUTPATIENT
Start: 2024-05-07

## 2024-05-07 RX ORDER — AMIODARONE HYDROCHLORIDE 200 MG/1
200 TABLET ORAL DAILY
Qty: 100 TABLET | Refills: 3 | Status: SHIPPED | OUTPATIENT
Start: 2024-05-07

## 2024-05-07 ASSESSMENT — ENCOUNTER SYMPTOMS
EYES NEGATIVE: 1
PND: 0
NEUROLOGICAL NEGATIVE: 1
GASTROINTESTINAL NEGATIVE: 1
SHORTNESS OF BREATH: 0
CONSTITUTIONAL NEGATIVE: 1
ORTHOPNEA: 0
DEPRESSION: 0
MUSCULOSKELETAL NEGATIVE: 1
NERVOUS/ANXIOUS: 0
PALPITATIONS: 0

## 2024-05-07 ASSESSMENT — FIBROSIS 4 INDEX: FIB4 SCORE: 1.9

## 2024-05-07 NOTE — PROGRESS NOTES
Chief Complaint   Patient presents with    Atrial Fibrillation    Hyperlipidemia       Subjective     Melanie Velarde is a 63 y.o. female who presents today for follow up. They have a history of Chronic HFpEF, Left atrial myxoma, mitral regurgitation s/p mitral valve repair with Dr. Ely in 2011, Paroxysmal atrial fibrillation/flutter, cardioverted during hospital stay in April 2018, was initiated on amiodarone, SSS s/p pacemaker implant, on anticoagulation with eliquis, Pulmonary venous hypertension (likely from HFpEF/KAEL).  Underwent RHC in March 2018 which showed elevated wedge of 29 with normal cardiac output, Central sleep apnea s/p Remede implant at [facility]     Seen by myself on 11/2/2023 she is at her baseline breathing she is on 3L home O2. She is limited in her activity due to her chronic back pain. NYHA class II    Today 5/7/2024 she is having some discomfort at her pacer site since the gen change in August. She is working on weaning herself off of daytime O2.    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    Status post LA myxoma resection    Backpain     Breath shortness 08/09/2023    with exertion, uses O2    CAD (coronary artery disease)     pacemaker    Chronic pain     Dental disorder 08/09/2023    dentures full upper and lower    Dizziness      Hemorrhagic disorder (HCC) 08/09/2023    on eliquis    High cholesterol 08/09/2023    medicated    Hyperlipidemia      Hypertension 08/09/2023    medicated    Hypertriglyceridemia      Indigestion 08/09/2023    at times    Pacemaker 08/09/2023    at present    Peripheral edema     Psychiatric problem 08/09/2023    depression, medicated    Sciatica     Sick sinus syndrome (HCC) 11/01/2011    Status post PPM implantation.    Sleep apnea 08/09/2023    stimulator placed    Status post mitral valve repair 11/01/2011    MV repair with 32mm Jonnie-Carpenter flexible annuloplasty ring.      Past Surgical History:   Procedure Laterality Date    OTHER  2023    inspire upper nerve stim for Apnea    CARDIAC CATH, RIGHT HEART  2018    Right heart cath with high pressures.    MITRAL VALVE REPAIR  2011    Performed by MARY ORTIZ at SURGERY [facility]    PACEMAKER INSERTION  2011    Medtronic Versa VEDR01 implanted by Dr. Amaya.    OTHER ORTHOPEDIC SURGERY      Neck and back      Family History   Problem Relation Age of Onset    Heart Attack Father      Social History     Socioeconomic History    Marital status: Single     Spouse name: Not on file    Number of children: Not on file    Years of education: Not on file    Highest education level: Not on file   Occupational History    Not on file   Tobacco Use    Smoking status: Former     Current packs/day: 0.00     Average packs/day: 1 pack/day for 39.0 years (39.0 ttl pk-yrs)     Types: Cigarettes     Start date: 1978     Quit date: 2017     Years since quittin.5    Smokeless tobacco: Never    Tobacco comments:     Vapes only   Vaping Use    Vaping Use: Former    Start date: 2017    Substances: Nicotine   Substance and Sexual Activity    Alcohol use: Yes     Comment: occasionally    Drug use: Yes     Types: Inhaled, Oral, Marijuana     Comment: medical marijuana, daily    Sexual activity: Not on file   Other Topics Concern    Not on file   Social History Narrative    Not on file     Social Determinants of Health     Financial Resource Strain: Not on file   Food Insecurity: Not on file   Transportation Needs: Not on file   Physical Activity: Not on file   Stress: Not on file   Social Connections: Not on file   Intimate Partner Violence: Not on file   Housing Stability: Not on file     Allergies   Allergen Reactions    Penicillins Rash           Outpatient Encounter Medications as of 2024   Medication Sig Dispense Refill    Empagliflozin (JARDIANCE) 10 MG Tab tablet Take 1 Tablet by mouth every day. 100  "Tablet 1    morphine ER (MS CONTIN) 15 MG Tab CR tablet Take 15 mg by mouth every 12 hours.      doxycycline (MONODOX) 100 MG capsule Take 1 Capsule by mouth 2 times a day. 8 Capsule 0    amiodarone (CORDARONE) 200 MG Tab Take 1 Tablet by mouth every day. 100 Tablet 3    apixaban (ELIQUIS) 5mg Tab Take 1 Tablet by mouth 2 times a day. 200 Tablet 3    atorvastatin (LIPITOR) 20 MG Tab Take 1 Tablet by mouth every day. 100 Tablet 3    spironolactone (ALDACTONE) 50 MG Tab Take 1 Tablet by mouth every day. 100 Tablet 3    venlafaxine (EFFEXOR-XR) 150 MG extended-release capsule Take 150 mg by mouth every evening. Pt takes 75 mg and 150 mg for a total dose of 250 mg      ARIPiprazole (ABILIFY) 10 MG Tab Take 10 mg by mouth every day.      venlafaxine XR (EFFEXOR XR) 75 MG CAPSULE SR 24 HR Take 75 mg by mouth every day. Pt takes 75 mg and 150 mg for a total dose of 250 mg      traZODone (DESYREL) 100 MG Tab Take 100 mg by mouth every evening.      oxyCODONE immediate release (ROXICODONE) 10 MG immediate release tablet Take 10 mg by mouth 2 times a day.      pregabalin (LYRICA) 200 MG capsule Take 200 mg by mouth 3 times a day.       No facility-administered encounter medications on file as of 5/7/2024.     Review of Systems   Constitutional: Negative.    HENT: Negative.     Eyes: Negative.    Respiratory:  Negative for shortness of breath.    Cardiovascular:  Negative for chest pain, palpitations, orthopnea, leg swelling and PND.        Pain over pacer site   Gastrointestinal: Negative.    Genitourinary: Negative.    Musculoskeletal: Negative.    Skin: Negative.    Neurological: Negative.    Endo/Heme/Allergies: Negative.    Psychiatric/Behavioral:  Negative for depression. The patient is not nervous/anxious.               Objective     /70 (BP Location: Left arm, Patient Position: Sitting, BP Cuff Size: Adult)   Pulse 84   Resp 16   Ht 1.727 m (5' 8\")   Wt (!) 127 kg (281 lb)   LMP 01/01/2010   SpO2 93%   BMI " 42.73 kg/m²     Physical Exam  Constitutional:       Appearance: Normal appearance. She is obese.   HENT:      Head: Normocephalic and atraumatic.   Neck:      Vascular: No JVD.   Cardiovascular:      Rate and Rhythm: Normal rate and regular rhythm.      Pulses: Normal pulses.      Heart sounds: Normal heart sounds. No murmur heard.     No friction rub.   Pulmonary:      Effort: Pulmonary effort is normal. No respiratory distress.      Breath sounds: Normal breath sounds.   Abdominal:      General: There is no distension.      Palpations: Abdomen is soft.   Musculoskeletal:      Right lower leg: No edema.      Left lower leg: No edema.   Skin:     General: Skin is warm and dry.   Neurological:      General: No focal deficit present.      Mental Status: She is alert and oriented to person, place, and time.   Psychiatric:         Mood and Affect: Mood normal.         Behavior: Behavior normal.            Lab Results   Component Value Date/Time    CHOLSTRLTOT 200 (H) 07/25/2014 01:30 AM    LDL see below 07/25/2014 01:30 AM    HDL 37 (A) 07/25/2014 01:30 AM    TRIGLYCERIDE 574 (H) 07/25/2014 01:30 AM       Lab Results   Component Value Date/Time    SODIUM 132 (L) 08/25/2023 09:17 AM    POTASSIUM 3.9 08/25/2023 09:17 AM    CHLORIDE 94 (L) 08/25/2023 09:17 AM    CO2 26 08/25/2023 09:17 AM    GLUCOSE 102 (H) 08/25/2023 09:17 AM    BUN 8 08/25/2023 09:17 AM    CREATININE 0.81 08/25/2023 09:17 AM    BUNCREATRAT 13 10/09/2015 02:11 PM     Lab Results   Component Value Date/Time    ALKPHOSPHAT 72 08/25/2023 09:17 AM    ASTSGOT 23 08/25/2023 09:17 AM    ALTSGPT 16 08/25/2023 09:17 AM    TBILIRUBIN 0.6 08/25/2023 09:17 AM      Echocardiogram 8/25/2023  CONCLUSIONS  Technically difficult study, patient refused contrast.  Normal left ventricular systolic function.  Dilated left atrium.  Known mitral valve repair which is functioning normally with   appropriate transvalvular gradient based on Doppler.    Assessment & Plan     1.  Atrial myxoma        2. PAF (paroxysmal atrial fibrillation) (CMS-[facility])  EKG      3. Status post mitral valve repair        4. Central sleep apnea        5. Class 3 severe obesity due to excess calories with serious comorbidity and body mass index (BMI) of 40.0 to 44.9 in adult ([facility])        6. Essential hypertension        7. Heart failure with preserved ejection fraction, borderline, class II ([facility])        8. History of sick sinus syndrome        9. Mixed hyperlipidemia        10. Pacemaker        11. Pulmonary hypertension ([facility])            Medical Decision Making: Today's Assessment/Status/Plan:        History of atrial myxoma status post MVR  -echocardiogram showed normal valve function     Sick sinus syndrome, status post pacemaker  -Last transmission demonstrated appropriate function  -Continue with regular pacer checks  -Her pacer site is very tender to palpation, no signs of erythema or ecchymosis, recommended that she follow-up with Dr. Patel     Paroxysmal atrial fibrillation  -Continue amiodarone 200 mg daily  -Eliquis 5 mg twice daily  - -Amiodarone risks have been discussed in detail including: QT prolongation and torsades de pointes, conduction system impairment, photosensitivity, blue-gray skin discoloration, hyperthyroidism, hypothyroidism, AST or ALT elevation, corneal micro deposits, optic neuropathy, and pulmonary toxicity.  -Recommended annual surveillance includes ECG, skin examination, TSH with reflex (ordered), AST and ALT, yearly eye exam, and chest x-ray (ordered)  - she will get her testing done at [facility] with history of pulmonary hypertension and KAEL  -EF 55%  -Volume status: euvolemic on exam  -Continue spironolactone 50 mg daily, Jardiance 10 mg daily per insurance  -No lower extremity edema  -Class II symptoms  - encourage weight loss, heart healthy diet, and increased activity as tolerated     Hyperlipidemia  -Most recent LDL 82  -Continue atorvastatin 20 mg daily  -Goal of less  than 100  -Check lipid panel annually     Obesity  Discussed in extensive detail regarding lifestyle modifications, focusing on dietary changes.     Discussed following recommendations with the patient to improve dietary choices:  1.  Increase amount of whole foods and grains (fruits/vegetables from the produce section without processing).  2.  Pay special attention to the nutrition facts with goal of decreasing saturated fat, foods with high cholesterol and high overall fat content.  3.  Reducing portion size with healthy snack options  4.  Avoid processed/packaged/frozen meals with more than 5 ingredients on the label, or multiple ingredients you don't recognize.  5.  Avoiding red meat and replacing with fresh fruits, vegetables and lean meat.  6.  Minimize sugar.  This means any product which has added sugar in the label (soda, candy, and many processed foods).           Follow-up with Dr. Bojorquez in 6 months, Dr. Patel at next available     This note was dictated using Dragon speech recognition software.

## 2024-05-07 NOTE — CARDIAC REMOTE MONITOR - SCAN
Device transmission reviewed. Device demonstrated appropriate function.       Electronically Signed by: Hans Pinedo M.D.    5/25/2024  12:22 PM

## 2024-05-09 LAB — EKG IMPRESSION: NORMAL

## 2024-05-09 PROCEDURE — 93010 ELECTROCARDIOGRAM REPORT: CPT | Performed by: INTERNAL MEDICINE

## 2024-06-12 ENCOUNTER — OFFICE VISIT (OUTPATIENT)
Dept: CARDIOLOGY | Facility: MEDICAL CENTER | Age: 64
End: 2024-06-12
Attending: INTERNAL MEDICINE
Payer: MEDICARE

## 2024-06-12 VITALS
HEIGHT: 68 IN | SYSTOLIC BLOOD PRESSURE: 118 MMHG | RESPIRATION RATE: 16 BRPM | BODY MASS INDEX: 41.86 KG/M2 | HEART RATE: 74 BPM | WEIGHT: 276.2 LBS | OXYGEN SATURATION: 94 % | DIASTOLIC BLOOD PRESSURE: 64 MMHG

## 2024-06-12 DIAGNOSIS — E66.01 CLASS 3 SEVERE OBESITY DUE TO EXCESS CALORIES WITH SERIOUS COMORBIDITY AND BODY MASS INDEX (BMI) OF 40.0 TO 44.9 IN ADULT (HCC): ICD-10-CM

## 2024-06-12 DIAGNOSIS — D15.1 ATRIAL MYXOMA: ICD-10-CM

## 2024-06-12 DIAGNOSIS — I48.0 PAF (PAROXYSMAL ATRIAL FIBRILLATION) (HCC): ICD-10-CM

## 2024-06-12 DIAGNOSIS — Z98.890 STATUS POST MITRAL VALVE REPAIR: ICD-10-CM

## 2024-06-12 PROCEDURE — 93280 PM DEVICE PROGR EVAL DUAL: CPT | Mod: 26 | Performed by: INTERNAL MEDICINE

## 2024-06-12 PROCEDURE — 99204 OFFICE O/P NEW MOD 45 MIN: CPT | Mod: 25 | Performed by: INTERNAL MEDICINE

## 2024-06-12 PROCEDURE — 99213 OFFICE O/P EST LOW 20 MIN: CPT | Performed by: INTERNAL MEDICINE

## 2024-06-12 PROCEDURE — 93280 PM DEVICE PROGR EVAL DUAL: CPT | Performed by: INTERNAL MEDICINE

## 2024-06-12 PROCEDURE — 3078F DIAST BP <80 MM HG: CPT | Performed by: INTERNAL MEDICINE

## 2024-06-12 PROCEDURE — 3074F SYST BP LT 130 MM HG: CPT | Performed by: INTERNAL MEDICINE

## 2024-06-12 ASSESSMENT — FIBROSIS 4 INDEX: FIB4 SCORE: 1.93

## 2024-06-12 NOTE — PROGRESS NOTES
Chief Complaint   Patient presents with    Hypertension    Atrial Fibrillation       Subjective     Melanie Velarde is a 64 y.o. female who presents today status post permanent pacemaker.  Generator change by me.  Complains of pain.  Only intermittently.  PAF on amiodarone.  On DOAC.  Previous myxoma removal and mitral valve repair in 2011.  Atrial pacemaker by Dr. Amaya in 2011.    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    Status post [place] myxoma resection    Backpain     Breath shortness 08/09/2023    with exertion, uses O2    CAD (coronary artery disease)     pacemaker    Chronic pain     Dental disorder 08/09/2023    dentures full upper and lower    Dizziness      Hemorrhagic disorder (HCC) 08/09/2023    on eliquis    High cholesterol 08/09/2023    medicated    Hyperlipidemia      Hypertension 08/09/2023    medicated    Hypertriglyceridemia      Indigestion 08/09/2023    at times    Pacemaker 08/09/2023    at present    Peripheral edema     Psychiatric problem 08/09/2023    depression, medicated    Sciatica     Sick sinus syndrome (HCC) 11/01/2011    Status post PPM implantation.    Sleep apnea 08/09/2023    stimulator placed    Status post mitral valve repair 11/01/2011    MV repair with 32mm Jonnie-Carpenter flexible annuloplasty ring.     Past Surgical History:   Procedure Laterality Date    OTHER  08/09/2023    inspire upper nerve stim for Apnea    CARDIAC CATH, RIGHT HEART  03/31/2018    Right heart cath with high pressures.    MITRAL VALVE REPAIR  11/17/2011    Performed by MARY ORTIZ at SURG[facility]S    PACEMAKER INSERTION  11/2011    Medtronic Versa [id number] implanted by Dr. Amaya.    OTHER ORTHOPEDIC SURGERY      Neck and back      Family History   Problem Relation Age of Onset    Heart Attack Father      Social History     Socioeconomic History    Marital status: Single     Spouse name: Not on file    Number of  children: Not on file    Years of education: Not on file    Highest education level: Not on file   Occupational History    Not on file   Tobacco Use    Smoking status: Former     Current packs/day: 0.00     Average packs/day: 1 pack/day for 39.0 years (39.0 ttl pk-yrs)     Types: Cigarettes     Start date: 1978     Quit date: 2017     Years since quittin.6    Smokeless tobacco: Never    Tobacco comments:     Vapes only   Vaping Use    Vaping status: Former    Start date: 2017    Substances: Nicotine   Substance and Sexual Activity    Alcohol use: Yes     Comment: occasionally    Drug use: Yes     Types: Inhaled, Oral, Marijuana     Comment: medical marijuana, daily    Sexual activity: Not on file   Other Topics Concern    Not on file   Social History Narrative    Not on file     Social Determinants of Health     Financial Resource Strain: Not on file   Food Insecurity: Not on file   Transportation Needs: Not on file   Physical Activity: Not on file   Stress: Not on file   Social Connections: Not on file   Intimate Partner Violence: Not on file   Housing Stability: Not on file     Allergies   Allergen Reactions    Penicillins Rash           Outpatient Encounter Medications as of 2024   Medication Sig Dispense Refill    amiodarone (CORDARONE) 200 MG Tab Take 1 Tablet by mouth every day. 100 Tablet 3    apixaban (ELIQUIS) 5mg Tab Take 1 Tablet by mouth 2 times a day. 200 Tablet 3    atorvastatin (LIPITOR) 20 MG Tab Take 1 Tablet by mouth every day. 100 Tablet 3    Empagliflozin (JARDIANCE) 10 MG Tab tablet Take 1 Tablet by mouth every day. 100 Tablet 3    spironolactone (ALDACTONE) 50 MG Tab Take 1 Tablet by mouth every day. 100 Tablet 3    morphine ER (MS CONTIN) 15 MG Tab CR tablet Take 15 mg by mouth every 12 hours.      doxycycline (MONODOX) 100 MG capsule Take 1 Capsule by mouth 2 times a day. 8 Capsule 0    venlafaxine (EFFEXOR-XR) 150 MG extended-release capsule Take 150 mg by mouth every  "evening. Pt takes 75 mg and 150 mg for a total dose of 250 mg      ARIPiprazole (ABILIFY) 10 MG Tab Take 10 mg by mouth every day.      venlafaxine XR (EFFEXOR XR) 75 MG CAPSULE SR 24 HR Take 75 mg by mouth every day. Pt takes 75 mg and 150 mg for a total dose of 250 mg      traZODone (DESYREL) 100 MG Tab Take 100 mg by mouth every evening.      oxyCODONE immediate release (ROXICODONE) 10 MG immediate release tablet Take 10 mg by mouth 2 times a day.      pregabalin (LYRICA) 200 MG capsule Take 200 mg by mouth 3 times a day.       No facility-administered encounter medications on file as of 6/12/2024.     ROS           Objective     /64 (BP Location: Left arm, Patient Position: Sitting, BP Cuff Size: Adult)   Pulse 74   Resp 16   Ht 1.727 m (5' 8\")   Wt (!) 125 kg (276 lb 3.2 oz)   LMP 01/01/2010   SpO2 94%   BMI 42.00 kg/m²     Physical Exam  Constitutional:       Appearance: She is well-developed.   HENT:      Head: Normocephalic and atraumatic.   Eyes:      Pupils: Pupils are equal, round, and reactive to light.   Cardiovascular:      Rate and Rhythm: Normal rate and regular rhythm.      Heart sounds: Normal heart sounds. No murmur heard.     No friction rub. No gallop.      Comments: Pacer site is clear no cardioversion no infection.  Pulmonary:      Effort: Pulmonary effort is normal.      Breath sounds: Normal breath sounds.   Abdominal:      General: Bowel sounds are normal.      Palpations: Abdomen is soft.   Musculoskeletal:         General: Normal range of motion.      Cervical back: Normal range of motion and neck supple.   Skin:     General: Skin is warm.   Neurological:      Mental Status: She is alert and oriented to person, place, and time.      Cranial Nerves: No cranial nerve deficit.   Psychiatric:         Behavior: Behavior normal.         Thought Content: Thought content normal.         Judgment: Judgment normal.                Assessment & Plan     1. PAF (paroxysmal atrial " fibrillation) (CMS-[facility])        2. Status post mitral valve repair        3. Class 3 severe obesity due to excess calories with serious comorbidity and body mass index (BMI) of 40.0 to 44.9 in adult ([facility])        4. Atrial myxoma            Medical Decision Making: Today's Assessment/Status/Plan:   1.  Status post permanent pacemaker for sick sinus syndrome.  Would not recommend any intervention at this time.  2.  Status post myxoma removal and mitral valve repair.  3.  PAF on amiodarone.  4.  Anticoagulation on DOAC.  5.  Follow-up with general cardiology.

## 2024-08-06 NOTE — PROGRESS NOTES
"    Medical Decision Making:  Today's Assessment / Status / Plan:   -I am managing complex chronic disease including MVR, amiodarone therapy, PAF, hypertension, pacemaker, Eliquis therapy.  -Amiodarone is high risk medication associated with bradycardia, liver lung and thyroid toxicity.  Needs LFTs and TSH.  -PFTS for amio if lungs worsen  -Currently not needing her furosemide which is great.  -Jardiance therapy is fantastic.  -Doing well on her Eliquis, needs follow-up CBC  -Pacemaker looking good.  -Needs updated blood work, will take to [facility], LFTs, TSH  -Cont primary prevention statin    Chief Complaint:   Chief Complaint   Patient presents with    Atrial Fibrillation    Hypertension    Hyperlipidemia     Kailey \"Melanie\" MUKUL Velarde is a 64 y.o. female who returns for management of complex disease including mitral valve repair, PAF, atrial myxoma resection, hypertension, chronic HFpEF, pacemaker, sinus no dysfunction, hyperlipidemia, chronic anticoagulation spontaneous contrast JACQUE.    Prior mitral valve repair, 2011 with 32 mm Jonnie Carpenter flexible annuloplasty ring, also [place] myxoma removed at that time.    She continues to have dyspnea on exertion, [place] 2-3, multifactorial.  No significant orthopnea or lower extremity swelling.   Sees Pulm, now on O2 which she needs but not wearing it today.  Has KAEL but cannot tolerate the first 2 masks, cannot pay for the third.  No concerns up to this point about amio toxicity.  Taking lasix PRN but has not needed this in a long time.  On Jardiance also which is fantastic.    Paroxysmal atrial fibrillation/flutter and was cardioverted during the hospital stay in April 2018 after initiation of amiodarone therapy.    She is on Eliquis now, CHADS2 vasc of 4, no prior TIA/CVA.  Previous JULY with spontaneous contrast.    She has had a pacemaker for sick sinus syndrome.     A paced 42%, V paced 1 %.  Feels heart racing at times.  Sometimes dizzy, no syncope.  Generator change " with Dr. Gautam Patel 8/29/2023.    We have been performing amiodarone surveillance.    Her most recent LFTs were up but she has been a heavy drinker. Still drinking.   She went into inpt rehab, just got out less than a week ago and continues to be sober.  Fatty liver by CT scan 3-28-18, mildly abnormal LFTs, etoh, fatty liver, amio.  LFTs normal on 4/3/2018 abnormal 8/2018 TSH was less than 0.005 on 3/29/2018.   Updated labs ordered today.    She has secondary pulmonary hypertension with dilated RV and RV dysfunction.   She had undergone right heart catheterization in March 2018 which showed an elevated wedge of 29 and normal cardiac output and pulmonary pressure of 41/25 there is a large V wave but she did not have significant MR on JULY.    This did improve over time.    Calcified coronary disease CT 3-28-18, statin.  Has HLP, on statin, no recent lipids.    She has chronic pain syndrome.    She takes Abilify which can provoke tachyarrhythmias.     She has had dietary indiscretion with salt while in rehab and drinking more water to replace ETOH.  Quit drinking 20 days ago.  She quit smoking, then vaping but stopped that.  Smokes cannabis.     Previously elevated LFTs but this did resolve.    Has central KAEL, has an implant now, placed in [place].  Prior attempts to titrate up blood to side effects so they turned the device back down and she is not certain how much benefit she is receiving from the device.  Previously given oxygen but not needing it recently.    Not limited by chest pain, pressure or tightness with activity.  Has chronic right chest wall pain that is not a problem.  No significant palpitations.  Can get lightheadedness when she stands up, no syncope.  Remote history of syncope in the doctor's office which ended up in head trauma and had to go to the ED because she is on blood thinners.  No symptoms of leg claudication.  No stroke/TIA like symptoms.    Lives in [place].  Point of contact is her  friend Viky who is an RN.  Mother alive in [place] with her brother.    DATA REVIEWED by me:  ECG 5-9-23 atrial paced, nonspecific intraventricular conduction delay with borderline diffuse ST depression  April 3, 2018 sinus rhythm, rate 68, anterior T-wave inversion  ECG 5-30-18, NSR, 67,  anterior T wave inversion    Pacemaker check:  5/7/2024  Normal function, atrial paced 80.6%,  0.2%.  PM 7-   A paced 42%, V paced 1 %, no mode whiches.    Echo 8-25-23  Technically difficult study, patient refused contrast.  Normal left ventricular systolic function.  Dilated left atrium.  Known mitral valve repair which is functioning normally with   appropriate transvalvular gradient based on Doppler.    Echo 3/29/2018 patient in rapid atrial fibrillation, severely developed dilated RV, mild left atrial enlargement, RVSP 35 mmHg above right atrial pressure, EF 55%     JULY 4/2/2018 EF 50%, moderate RV dilatation with systolic function reduction, mild biatrial enlargement, spontaneous contrast in the left atrial appendage but no thrombus, mitral valve ring in place    CT the chest March 28, 2018, no evidence of pulmonary embolism, right lower lobe atelectasis with groundglass opacities and mild edema, trace right pleural effusion, coronary artery calcifications, hepatic steatosis and splenomegaly    Cardioversion report 4/5/2018 converted with 200 J ×1 with return to sinus rhythm and ventricular pacing    Most recent labs:     Lab Results   Component Value Date/Time    HEMOGLOBIN 13.1 08/25/2023 09:17 AM    HEMATOCRIT 39.4 08/25/2023 09:17 AM    MCV 94.3 08/25/2023 09:17 AM    INR 1.31 (H) 08/25/2023 09:17 AM      Lab Results   Component Value Date/Time    SODIUM 132 (L) 08/25/2023 09:17 AM    POTASSIUM 3.9 08/25/2023 09:17 AM    CHLORIDE 94 (L) 08/25/2023 09:17 AM    CO2 26 08/25/2023 09:17 AM    GLUCOSE 102 (H) 08/25/2023 09:17 AM    BUN 8 08/25/2023 09:17 AM    CREATININE 0.81 08/25/2023 09:17 AM      Lab Results    Component Value Date/Time    ASTSGOT 23 08/25/2023 09:17 AM    ALTSGPT 16 08/25/2023 09:17 AM    ALBUMIN 4.5 08/25/2023 09:17 AM      Lab Results   Component Value Date/Time    CHOLSTRLTOT 200 (H) 07/25/2014 01:30 AM    LDL see below 07/25/2014 01:30 AM    HDL 37 (A) 07/25/2014 01:30 AM    TRIGLYCERIDE 574 (H) 07/25/2014 01:30 AM       April 6, 2018 hemoglobin 10.2, platelets 93, sodium 135, potassium 4.4, creatinine 0.67    7-30-18  Cr. 0.75, K 3.7, Na 142  AST 92, ALT 45, , TB 0.4  Hbg A1C 5.3  hgb 12.6, hct 38.9, plts 135    8-21-18 INR 2.5      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    Status post [place] myxoma resection    Backpain     Breath shortness 08/09/2023    with exertion, uses O2    CAD (coronary artery disease)     pacemaker    Chronic pain     Dental disorder 08/09/2023    dentures full upper and lower    Dizziness      Hemorrhagic disorder (HCC) 08/09/2023    on eliquis    High cholesterol 08/09/2023    medicated    Hyperlipidemia      Hypertension 08/09/2023    medicated    Hypertriglyceridemia      Indigestion 08/09/2023    at times    Pacemaker 08/09/2023    at present    Peripheral edema     Psychiatric problem 08/09/2023    depression, medicated    Sciatica     Sick sinus syndrome (HCC) 11/01/2011    Status post PPM implantation.    Sleep apnea 08/09/2023    stimulator placed    Status post mitral valve repair 11/01/2011    MV repair with 32mm Jonnie-Carpenter flexible annuloplasty ring.     Past Surgical History:   Procedure Laterality Date    OTHER  08/09/2023    inspire upper nerve stim for Apnea    CARDIAC CATH, RIGHT HEART  03/31/2018    Right heart cath with high pressures.    MITRAL VALVE REPAIR  11/17/2011    Performed by MARY ORTIZ at SUR[facility] ORS    PACEMAKER INSERTION  11/2011    Medtronic Versa [id number] implanted by Dr. Amaya.    OTHER ORTHOPEDIC SURGERY      Neck and back      Family  History   Problem Relation Age of Onset    Heart Attack Father      Social History     Socioeconomic History    Marital status: Single     Spouse name: Not on file    Number of children: Not on file    Years of education: Not on file    Highest education level: Not on file   Occupational History    Not on file   Tobacco Use    Smoking status: Former     Current packs/day: 0.00     Average packs/day: 1 pack/day for 39.0 years (39.0 ttl pk-yrs)     Types: Cigarettes     Start date: 1978     Quit date: 2017     Years since quittin.7    Smokeless tobacco: Never    Tobacco comments:     Vapes only   Vaping Use    Vaping status: Former    Start date: 2017    Substances: Nicotine   Substance and Sexual Activity    Alcohol use: Yes     Comment: occasionally    Drug use: Yes     Types: Inhaled, Oral, Marijuana     Comment: medical marijuana, daily    Sexual activity: Not on file   Other Topics Concern    Not on file   Social History Narrative    Not on file     Social Determinants of Health     Financial Resource Strain: Not on file   Food Insecurity: Not on file   Transportation Needs: Not on file   Physical Activity: Not on file   Stress: Not on file   Social Connections: Not on file   Intimate Partner Violence: Not on file   Housing Stability: Not on file     Allergies   Allergen Reactions    Penicillins Rash             Current Outpatient Medications   Medication Sig Dispense Refill    amiodarone (CORDARONE) 200 MG Tab Take 1 Tablet by mouth every day. 100 Tablet 3    apixaban (ELIQUIS) 5mg Tab Take 1 Tablet by mouth 2 times a day. 200 Tablet 3    atorvastatin (LIPITOR) 20 MG Tab Take 1 Tablet by mouth every day. 100 Tablet 3    Empagliflozin (JARDIANCE) 10 MG Tab tablet Take 1 Tablet by mouth every day. 100 Tablet 3    spironolactone (ALDACTONE) 50 MG Tab Take 1 Tablet by mouth every day. 100 Tablet 3    morphine ER (MS CONTIN) 15 MG Tab CR tablet Take 15 mg by mouth every 12 hours.      doxycycline  "(MONODOX) 100 MG capsule Take 1 Capsule by mouth 2 times a day. 8 Capsule 0    venlafaxine (EFFEXOR-XR) 150 MG extended-release capsule Take 150 mg by mouth every evening. Pt takes 75 mg and 150 mg for a total dose of 250 mg      ARIPiprazole (ABILIFY) 10 MG Tab Take 10 mg by mouth every day.      venlafaxine XR (EFFEXOR XR) 75 MG CAPSULE SR 24 HR Take 75 mg by mouth every day. Pt takes 75 mg and 150 mg for a total dose of 250 mg      traZODone (DESYREL) 100 MG Tab Take 100 mg by mouth every evening.      oxyCODONE immediate release (ROXICODONE) 10 MG immediate release tablet Take 10 mg by mouth 2 times a day.      pregabalin (LYRICA) 200 MG capsule Take 200 mg by mouth 3 times a day.       No current facility-administered medications for this visit.       ROS  All others systems reviewed and negative.     Objective:     /62 (BP Location: Left arm, Patient Position: Sitting, BP Cuff Size: Adult)   Pulse 83   Resp 18   Ht 1.727 m (5' 8\")   Wt 125 kg (275 lb)   SpO2 91%  Body mass index is 41.81 kg/m².    Physical Exam   General: No acute distress. Well nourished.  HEENT: EOM grossly intact, no scleral icterus, no pharyngeal erythema.   Neck:  No JVD, no bruits, trachea midline  CVS: RRR today. Normal S1, S2. 2/6 syst murmur. 2+ pititng LE edema.  2+ radial pulses, well-healed scar across the chest, PM pocket intack  Resp: CTAB. No wheezing or crackles/rhonchi. Normal respiratory effort.  Abdomen: Soft, NT, no maco hepatomegaly, obese.  MSK/Ext: No clubbing or cyanosis.  Skin: Warm and dry, no rashes.  Neurological: CN III-XII grossly intact. No focal deficits.   Psych: A&O x 3, appropriate affect, good judgement    Physical Exam listed below was completed in entrety today and unchanged from 7- except where noted.  Some elements were copied from my note of that same day, which have been updated where appropriate and all reflect current meedical decision making from today.  I have confirmed and " edited as necessary, the PFSH and ROS obtained by others.        Assessment:     1. Status post mitral valve repair        2. PAF (paroxysmal atrial fibrillation) (CMS-HCC)        3. Atrial myxoma        4. Central sleep apnea        5. Essential hypertension        6. Heart failure with preserved ejection fraction, borderline, class II (HCC)        7. Sinus node dysfunction (HCC)        8. Mixed hyperlipidemia  Lipid Profile    Lipoprotein (a)      9. Pacemaker        10. Pulmonary hypertension (HCC)        11. Secondary hypercoagulable state (HCC)        12. Chronic anticoagulation  CBC WITHOUT DIFFERENTIAL      13. Chronic right-sided heart failure (HCC)        14. On amiodarone therapy  Comp Metabolic Panel    TSH WITH REFLEX TO FT4          Written instructions given today:    -Remember the amiodarone can mess around with your thyroid and your liver function so we need to get blood work at the very minimum once a year.  -Fasting blood work at the [facility] lab at your convenience.    Return in about 6 months (around 2/7/2025).    It is my pleasure to participate in the care of Ms. Velarde.  Please do not hesitate to contact me with questions or concerns.    Leah Bojorquez MD, [facility]  Cardiologist [facility]    Please note that this dictation was created using voice recognition software. I have made every reasonable attempt to correct obvious errors, but it is possible there are errors of grammar and possibly content that I did not discover before finalizing the note.

## 2024-08-07 ENCOUNTER — OFFICE VISIT (OUTPATIENT)
Dept: CARDIOLOGY | Facility: MEDICAL CENTER | Age: 64
End: 2024-08-07
Attending: INTERNAL MEDICINE
Payer: MEDICARE

## 2024-08-07 VITALS
RESPIRATION RATE: 18 BRPM | HEART RATE: 83 BPM | OXYGEN SATURATION: 91 % | WEIGHT: 275 LBS | SYSTOLIC BLOOD PRESSURE: 124 MMHG | BODY MASS INDEX: 41.68 KG/M2 | DIASTOLIC BLOOD PRESSURE: 62 MMHG | HEIGHT: 68 IN

## 2024-08-07 DIAGNOSIS — I48.0 PAF (PAROXYSMAL ATRIAL FIBRILLATION) (HCC): ICD-10-CM

## 2024-08-07 DIAGNOSIS — D68.69 SECONDARY HYPERCOAGULABLE STATE (HCC): ICD-10-CM

## 2024-08-07 DIAGNOSIS — G47.31 CENTRAL SLEEP APNEA: ICD-10-CM

## 2024-08-07 DIAGNOSIS — Z79.899 ON AMIODARONE THERAPY: ICD-10-CM

## 2024-08-07 DIAGNOSIS — I50.812 CHRONIC RIGHT-SIDED HEART FAILURE (HCC): ICD-10-CM

## 2024-08-07 DIAGNOSIS — I50.30 HEART FAILURE WITH PRESERVED EJECTION FRACTION, BORDERLINE, CLASS II (HCC): ICD-10-CM

## 2024-08-07 DIAGNOSIS — I27.20 PULMONARY HYPERTENSION (HCC): ICD-10-CM

## 2024-08-07 DIAGNOSIS — E78.2 MIXED HYPERLIPIDEMIA: ICD-10-CM

## 2024-08-07 DIAGNOSIS — Z79.01 CHRONIC ANTICOAGULATION: ICD-10-CM

## 2024-08-07 DIAGNOSIS — I49.5 SINUS NODE DYSFUNCTION (HCC): ICD-10-CM

## 2024-08-07 DIAGNOSIS — D15.1 ATRIAL MYXOMA: ICD-10-CM

## 2024-08-07 DIAGNOSIS — Z98.890 STATUS POST MITRAL VALVE REPAIR: ICD-10-CM

## 2024-08-07 DIAGNOSIS — Z95.0 PACEMAKER: ICD-10-CM

## 2024-08-07 DIAGNOSIS — I10 ESSENTIAL HYPERTENSION: ICD-10-CM

## 2024-08-07 PROCEDURE — 99213 OFFICE O/P EST LOW 20 MIN: CPT | Performed by: INTERNAL MEDICINE

## 2024-08-07 PROCEDURE — G2211 COMPLEX E/M VISIT ADD ON: HCPCS | Performed by: INTERNAL MEDICINE

## 2024-08-07 PROCEDURE — 3074F SYST BP LT 130 MM HG: CPT | Performed by: INTERNAL MEDICINE

## 2024-08-07 PROCEDURE — 3078F DIAST BP <80 MM HG: CPT | Performed by: INTERNAL MEDICINE

## 2024-08-07 PROCEDURE — 99214 OFFICE O/P EST MOD 30 MIN: CPT | Performed by: INTERNAL MEDICINE

## 2024-08-07 ASSESSMENT — FIBROSIS 4 INDEX: FIB4 SCORE: 1.93

## 2024-08-07 NOTE — LETTER
"     [facility] - Operated by [facility]   [address], [address]  [place] [address]  Phone: [phone]  Fax: [phone]              Kailey Velarde  1960    Encounter Date: 8/7/2024    Leah Bojorquez M.D.          PROGRESS NOTE:      Medical Decision Making:  Today's Assessment / Status / Plan:   -I am managing complex chronic disease including MVR, amiodarone therapy, PAF, hypertension, pacemaker, Eliquis therapy.  -Amiodarone is high risk medication associated with bradycardia, liver lung and thyroid toxicity.  Needs LFTs and TSH.  -PFTS for amio if lungs worsen  -Currently not needing her furosemide which is great.  -Jardiance therapy is fantastic.  -Doing well on her Eliquis, needs follow-up CBC  -Pacemaker looking good.  -Needs updated blood work, will take to [facility], LFTs, TSH  -Cont primary prevention statin    Chief Complaint:   Chief Complaint   Patient presents with    Atrial Fibrillation    Hypertension    Hyperlipidemia     Kailey \"Melanie\" MUKUL Velarde is a 64 y.o. female who returns for management of complex disease including mitral valve repair, PAF, atrial myxoma resection, hypertension, chronic HFpEF, pacemaker, sinus no dysfunction, hyperlipidemia, chronic anticoagulation spontaneous contrast JACQUE.    Prior mitral valve repair, 2011 with 32 mm Jonnie Carpenter flexible annuloplasty ring, also [place] myxoma removed at that time.    She continues to have dyspnea on exertion, [place] 2-3, multifactorial.  No significant orthopnea or lower extremity swelling.   Sees Pulm, now on O2 which she needs but not wearing it today.  Has KAEL but cannot tolerate the first 2 masks, cannot pay for the third.  No concerns up to this point about amio toxicity.  Taking lasix PRN but has not needed this in a long time.  On Jardiance also which is fantastic.    Paroxysmal atrial fibrillation/flutter and was cardioverted during the hospital stay " in April 2018 after initiation of amiodarone therapy.    She is on Eliquis now, CHADS2 vasc of 4, no prior TIA/CVA.  Previous JULY with spontaneous contrast.    She has had a pacemaker for sick sinus syndrome.     A paced 42%, V paced 1 %.  Feels heart racing at times.  Sometimes dizzy, no syncope.  Generator change with Dr. Gautam Patel 8/29/2023.    We have been performing amiodarone surveillance.    Her most recent LFTs were up but she has been a heavy drinker. Still drinking.   She went into inpt rehab, just got out less than a week ago and continues to be sober.  Fatty liver by CT scan 3-28-18, mildly abnormal LFTs, etoh, fatty liver, amio.  LFTs normal on 4/3/2018 abnormal 8/2018 TSH was less than 0.005 on 3/29/2018.   Updated labs ordered today.    She has secondary pulmonary hypertension with dilated RV and RV dysfunction.   She had undergone right heart catheterization in March 2018 which showed an elevated wedge of 29 and normal cardiac output and pulmonary pressure of 41/25 there is a large V wave but she did not have significant MR on JULY.    This did improve over time.    Calcified coronary disease CT 3-28-18, statin.  Has HLP, on statin, no recent lipids.    She has chronic pain syndrome.    She takes Abilify which can provoke tachyarrhythmias.     She has had dietary indiscretion with salt while in rehab and drinking more water to replace ETOH.  Quit drinking 20 days ago.  She quit smoking, then vaping but stopped that.  Smokes cannabis.     Previously elevated LFTs but this did resolve.    Has central KAEL, has an implant now, placed in [place].  Prior attempts to titrate up blood to side effects so they turned the device back down and she is not certain how much benefit she is receiving from the device.  Previously given oxygen but not needing it recently.    Not limited by chest pain, pressure or tightness with activity.  Has chronic right chest wall pain that is not a problem.  No significant  palpitations.  Can get lightheadedness when she stands up, no syncope.  Remote history of syncope in the doctor's office which ended up in head trauma and had to go to the ED because she is on blood thinners.  No symptoms of leg claudication.  No stroke/TIA like symptoms.    Lives in [place].  Point of contact is her friend Viky who is an RN.  Mother alive in [place] with her brother.    DATA REVIEWED by me:  ECG 5-9-23 atrial paced, nonspecific intraventricular conduction delay with borderline diffuse ST depression  April 3, 2018 sinus rhythm, rate 68, anterior T-wave inversion  ECG 5-30-18, NSR, 67,  anterior T wave inversion    Pacemaker check:  5/7/2024  Normal function, atrial paced 80.6%,  0.2%.  PM 7-   A paced 42%, V paced 1 %, no mode whiches.    Echo 8-25-23  Technically difficult study, patient refused contrast.  Normal left ventricular systolic function.  Dilated left atrium.  Known mitral valve repair which is functioning normally with   appropriate transvalvular gradient based on Doppler.    Echo 3/29/2018 patient in rapid atrial fibrillation, severely developed dilated RV, mild left atrial enlargement, RVSP 35 mmHg above right atrial pressure, EF 55%     JULY 4/2/2018 EF 50%, moderate RV dilatation with systolic function reduction, mild biatrial enlargement, spontaneous contrast in the left atrial appendage but no thrombus, mitral valve ring in place    CT the chest March 28, 2018, no evidence of pulmonary embolism, right lower lobe atelectasis with groundglass opacities and mild edema, trace right pleural effusion, coronary artery calcifications, hepatic steatosis and splenomegaly    Cardioversion report 4/5/2018 converted with 200 J ×1 with return to sinus rhythm and ventricular pacing    Most recent labs:     Lab Results   Component Value Date/Time    HEMOGLOBIN 13.1 08/25/2023 09:17 AM    HEMATOCRIT 39.4 08/25/2023 09:17 AM    MCV 94.3 08/25/2023 09:17 AM    INR 1.31 (H) 08/25/2023 09:17 AM       Lab Results   Component Value Date/Time    SODIUM 132 (L) 08/25/2023 09:17 AM    POTASSIUM 3.9 08/25/2023 09:17 AM    CHLORIDE 94 (L) 08/25/2023 09:17 AM    CO2 26 08/25/2023 09:17 AM    GLUCOSE 102 (H) 08/25/2023 09:17 AM    BUN 8 08/25/2023 09:17 AM    CREATININE 0.81 08/25/2023 09:17 AM      Lab Results   Component Value Date/Time    ASTSGOT 23 08/25/2023 09:17 AM    ALTSGPT 16 08/25/2023 09:17 AM    ALBUMIN 4.5 08/25/2023 09:17 AM      Lab Results   Component Value Date/Time    CHOLSTRLTOT 200 (H) 07/25/2014 01:30 AM    LDL see below 07/25/2014 01:30 AM    HDL 37 (A) 07/25/2014 01:30 AM    TRIGLYCERIDE 574 (H) 07/25/2014 01:30 AM       April 6, 2018 hemoglobin 10.2, platelets 93, sodium 135, potassium 4.4, creatinine 0.67    7-30-18  Cr. 0.75, K 3.7, Na 142  AST 92, ALT 45, , TB 0.4  Hbg A1C 5.3  hgb 12.6, hct 38.9, plts 135    8-21-18 INR 2.5      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    Status post [place] myxoma resection    Backpain     Breath shortness 08/09/2023    with exertion, uses O2    CAD (coronary artery disease)     pacemaker    Chronic pain     Dental disorder 08/09/2023    dentures full upper and lower    Dizziness      Hemorrhagic disorder (HCC) 08/09/2023    on eliquis    High cholesterol 08/09/2023    medicated    Hyperlipidemia      Hypertension 08/09/2023    medicated    Hypertriglyceridemia      Indigestion 08/09/2023    at times    Pacemaker 08/09/2023    at present    Peripheral edema     Psychiatric problem 08/09/2023    depression, medicated    Sciatica     Sick sinus syndrome (HCC) 11/01/2011    Status post PPM implantation.    Sleep apnea 08/09/2023    stimulator placed    Status post mitral valve repair 11/01/2011    MV repair with 32mm Jonnie-Carpenter flexible annuloplasty ring.     Past Surgical History:   Procedure Laterality Date    OTHER  08/09/2023    inspire upper nerve stim for Apnea     CARDIAC CATH, RIGHT HEART  2018    Right heart cath with high pressures.    MITRAL VALVE REPAIR  2011    Performed by MARY ORTIZ at SUR[facility]    PACEMAKER INSERTION  2011    Medtronic Versa [id number] implanted by Dr. Amaya.    OTHER ORTHOPEDIC SURGERY      Neck and back      Family History   Problem Relation Age of Onset    Heart Attack Father      Social History     Socioeconomic History    Marital status: Single     Spouse name: Not on file    Number of children: Not on file    Years of education: Not on file    Highest education level: Not on file   Occupational History    Not on file   Tobacco Use    Smoking status: Former     Current packs/day: 0.00     Average packs/day: 1 pack/day for 39.0 years (39.0 ttl pk-yrs)     Types: Cigarettes     Start date: 1978     Quit date: 2017     Years since quittin.7    Smokeless tobacco: Never    Tobacco comments:     Vapes only   Vaping Use    Vaping status: Former    Start date: 2017    Substances: Nicotine   Substance and Sexual Activity    Alcohol use: Yes     Comment: occasionally    Drug use: Yes     Types: Inhaled, Oral, Marijuana     Comment: medical marijuana, daily    Sexual activity: Not on file   Other Topics Concern    Not on file   Social History Narrative    Not on file     Social Determinants of Health     Financial Resource Strain: Not on file   Food Insecurity: Not on file   Transportation Needs: Not on file   Physical Activity: Not on file   Stress: Not on file   Social Connections: Not on file   Intimate Partner Violence: Not on file   Housing Stability: Not on file     Allergies   Allergen Reactions    Penicillins Rash             Current Outpatient Medications   Medication Sig Dispense Refill    amiodarone (CORDARONE) 200 MG Tab Take 1 Tablet by mouth every day. 100 Tablet 3    apixaban (ELIQUIS) 5mg Tab Take 1 Tablet by mouth 2 times a day. 200 Tablet 3    atorvastatin (LIPITOR) 20 MG Tab Take 1 Tablet  "by mouth every day. 100 Tablet 3    Empagliflozin (JARDIANCE) 10 MG Tab tablet Take 1 Tablet by mouth every day. 100 Tablet 3    spironolactone (ALDACTONE) 50 MG Tab Take 1 Tablet by mouth every day. 100 Tablet 3    morphine ER (MS CONTIN) 15 MG Tab CR tablet Take 15 mg by mouth every 12 hours.      doxycycline (MONODOX) 100 MG capsule Take 1 Capsule by mouth 2 times a day. 8 Capsule 0    venlafaxine (EFFEXOR-XR) 150 MG extended-release capsule Take 150 mg by mouth every evening. Pt takes 75 mg and 150 mg for a total dose of 250 mg      ARIPiprazole (ABILIFY) 10 MG Tab Take 10 mg by mouth every day.      venlafaxine XR (EFFEXOR XR) 75 MG CAPSULE SR 24 HR Take 75 mg by mouth every day. Pt takes 75 mg and 150 mg for a total dose of 250 mg      traZODone (DESYREL) 100 MG Tab Take 100 mg by mouth every evening.      oxyCODONE immediate release (ROXICODONE) 10 MG immediate release tablet Take 10 mg by mouth 2 times a day.      pregabalin (LYRICA) 200 MG capsule Take 200 mg by mouth 3 times a day.       No current facility-administered medications for this visit.       ROS  All others systems reviewed and negative.     Objective:     /62 (BP Location: Left arm, Patient Position: Sitting, BP Cuff Size: Adult)   Pulse 83   Resp 18   Ht 1.727 m (5' 8\")   Wt 125 kg (275 lb)   SpO2 91%  Body mass index is 41.81 kg/m².    Physical Exam   General: No acute distress. Well nourished.  HEENT: EOM grossly intact, no scleral icterus, no pharyngeal erythema.   Neck:  No JVD, no bruits, trachea midline  CVS: RRR today. Normal S1, S2. 2/6 syst murmur. 2+ pititng LE edema.  2+ radial pulses, well-healed scar across the chest, PM pocket intack  Resp: CTAB. No wheezing or crackles/rhonchi. Normal respiratory effort.  Abdomen: Soft, NT, no maco hepatomegaly, obese.  MSK/Ext: No clubbing or cyanosis.  Skin: Warm and dry, no rashes.  Neurological: CN III-XII grossly intact. No focal deficits.   Psych: A&O x 3, appropriate affect, " good judgement    Physical Exam listed below was completed in entrety today and unchanged from 7- except where noted.  Some elements were copied from my note of that same day, which have been updated where appropriate and all reflect current meedical decision making from today.  I have confirmed and edited as necessary, the PFSH and ROS obtained by others.        Assessment:     1. Status post mitral valve repair        2. PAF (paroxysmal atrial fibrillation) (CMS-HCC)        3. Atrial myxoma        4. Central sleep apnea        5. Essential hypertension        6. Heart failure with preserved ejection fraction, borderline, class II (HCC)        7. Sinus node dysfunction (HCC)        8. Mixed hyperlipidemia  Lipid Profile    Lipoprotein (a)      9. Pacemaker        10. Pulmonary hypertension (HCC)        11. Secondary hypercoagulable state (HCC)        12. Chronic anticoagulation  CBC WITHOUT DIFFERENTIAL      13. Chronic right-sided heart failure (HCC)        14. On amiodarone therapy  Comp Metabolic Panel    TSH WITH REFLEX TO FT4              Written instructions given today:    -Get blood work done, fasting if possible, take to [facility]  -Take the torsemide and potassium every day, morning or afternoon depending on your convenience.     Return in about 6 months (around 2/7/2025).    It is my pleasure to participate in the care of Ms. Velarde.  Please do not hesitate to contact me with questions or concerns.    Leah Bojorquez MD, [facility]  Cardiologist [facility]    Please note that this dictation was created using voice recognition software. I have made every reasonable attempt to correct obvious errors, but it is possible there are errors of grammar and possibly content that I did not discover before finalizing the note.        No Recipients

## 2024-08-07 NOTE — LETTER
"     [facility] - Operated by [facility]   [address], [address]  [place] [address]  Phone: [phone]  Fax: [phone]              Kailey Velarde  1960    Encounter Date: 8/7/2024    Leah Bojorquez M.D.          PROGRESS NOTE:      Medical Decision Making:  Today's Assessment / Status / Plan:   -I am managing complex chronic disease including MVR, amiodarone therapy, PAF, hypertension, pacemaker, Eliquis therapy.  -Amiodarone is high risk medication associated with bradycardia, liver lung and thyroid toxicity.  Needs LFTs and TSH.  -PFTS for amio if lungs worsen  -Currently not needing her furosemide which is great.  -Jardiance therapy is fantastic.  -Doing well on her Eliquis, needs follow-up CBC  -Pacemaker looking good.  -Needs updated blood work, will take to HOPES, LFTs, TSH  -Cont primary prevention statin    Chief Complaint:   Chief Complaint   Patient presents with   • Atrial Fibrillation   • Hypertension   • Hyperlipidemia     Kailey \"Melanie\" MUKUL Velarde is a 64 y.o. female who returns for management of complex disease including mitral valve repair, PAF, atrial myxoma resection, hypertension, chronic HFpEF, pacemaker, sinus no dysfunction, hyperlipidemia, chronic anticoagulation spontaneous contrast JACQUE.    Prior mitral valve repair, 2011 with 32 mm Jonnie Carpenter flexible annuloplasty ring, also [place] myxoma removed at that time.    She continues to have dyspnea on exertion, [place] 2-3, multifactorial.  No significant orthopnea or lower extremity swelling.   Sees Pulm, now on O2 which she needs but not wearing it today.  Has KAEL but cannot tolerate the first 2 masks, cannot pay for the third.  No concerns up to this point about amio toxicity.  Taking lasix PRN but has not needed this in a long time.  On Jardiance also which is fantastic.    Paroxysmal atrial fibrillation/flutter and was cardioverted during the hospital " stay in April 2018 after initiation of amiodarone therapy.    She is on Eliquis now, CHADS2 vasc of 4, no prior TIA/CVA.  Previous JULY with spontaneous contrast.    She has had a pacemaker for sick sinus syndrome.     A paced 42%, V paced 1 %.  Feels heart racing at times.  Sometimes dizzy, no syncope.  Generator change with Dr. Gautam Patel 8/29/2023.    We have been performing amiodarone surveillance.    Her most recent LFTs were up but she has been a heavy drinker. Still drinking.   She went into inpt rehab, just got out less than a week ago and continues to be sober.  Fatty liver by CT scan 3-28-18, mildly abnormal LFTs, etoh, fatty liver, amio.  LFTs normal on 4/3/2018 abnormal 8/2018 TSH was less than 0.005 on 3/29/2018.   Updated labs ordered today.    She has secondary pulmonary hypertension with dilated RV and RV dysfunction.   She had undergone right heart catheterization in March 2018 which showed an elevated wedge of 29 and normal cardiac output and pulmonary pressure of 41/25 there is a large V wave but she did not have significant MR on JULY.    This did improve over time.    Calcified coronary disease CT 3-28-18, statin.  Has HLP, on statin, no recent lipids.    She has chronic pain syndrome.    She takes Abilify which can provoke tachyarrhythmias.     She has had dietary indiscretion with salt while in rehab and drinking more water to replace ETOH.  Quit drinking 20 days ago.  She quit smoking, then vaping but stopped that.  Smokes cannabis.     Previously elevated LFTs but this did resolve.    Has central KAEL, has an implant now, placed in [place].  Prior attempts to titrate up blood to side effects so they turned the device back down and she is not certain how much benefit she is receiving from the device.  Previously given oxygen but not needing it recently.    Not limited by chest pain, pressure or tightness with activity.  Has chronic right chest wall pain that is not a problem.  No  significant palpitations.  Can get lightheadedness when she stands up, no syncope.  Remote history of syncope in the doctor's office which ended up in head trauma and had to go to the ED because she is on blood thinners.  No symptoms of leg claudication.  No stroke/TIA like symptoms.    Lives in [place].  Point of contact is her friend Viky who is an RN.  Mother alive in [place] with her brother.    DATA REVIEWED by me:  ECG 5-9-23 atrial paced, nonspecific intraventricular conduction delay with borderline diffuse ST depression  April 3, 2018 sinus rhythm, rate 68, anterior T-wave inversion  ECG 5-30-18, NSR, 67,  anterior T wave inversion    Pacemaker check:  5/7/2024  Normal function, atrial paced 80.6%,  0.2%.  PM 7-   A paced 42%, V paced 1 %, no mode whiches.    Echo 8-25-23  Technically difficult study, patient refused contrast.  Normal left ventricular systolic function.  Dilated left atrium.  Known mitral valve repair which is functioning normally with   appropriate transvalvular gradient based on Doppler.    Echo 3/29/2018 patient in rapid atrial fibrillation, severely developed dilated RV, mild left atrial enlargement, RVSP 35 mmHg above right atrial pressure, EF 55%     JULY 4/2/2018 EF 50%, moderate RV dilatation with systolic function reduction, mild biatrial enlargement, spontaneous contrast in the left atrial appendage but no thrombus, mitral valve ring in place    CT the chest March 28, 2018, no evidence of pulmonary embolism, right lower lobe atelectasis with groundglass opacities and mild edema, trace right pleural effusion, coronary artery calcifications, hepatic steatosis and splenomegaly    Cardioversion report 4/5/2018 converted with 200 J ×1 with return to sinus rhythm and ventricular pacing    Most recent labs:     Lab Results   Component Value Date/Time    HEMOGLOBIN 13.1 08/25/2023 09:17 AM    HEMATOCRIT 39.4 08/25/2023 09:17 AM    MCV 94.3 08/25/2023 09:17 AM    INR 1.31 (H) 08/25/2023  09:17 AM      Lab Results   Component Value Date/Time    SODIUM 132 (L) 08/25/2023 09:17 AM    POTASSIUM 3.9 08/25/2023 09:17 AM    CHLORIDE 94 (L) 08/25/2023 09:17 AM    CO2 26 08/25/2023 09:17 AM    GLUCOSE 102 (H) 08/25/2023 09:17 AM    BUN 8 08/25/2023 09:17 AM    CREATININE 0.81 08/25/2023 09:17 AM      Lab Results   Component Value Date/Time    ASTSGOT 23 08/25/2023 09:17 AM    ALTSGPT 16 08/25/2023 09:17 AM    ALBUMIN 4.5 08/25/2023 09:17 AM      Lab Results   Component Value Date/Time    CHOLSTRLTOT 200 (H) 07/25/2014 01:30 AM    LDL see below 07/25/2014 01:30 AM    HDL 37 (A) 07/25/2014 01:30 AM    TRIGLYCERIDE 574 (H) 07/25/2014 01:30 AM       April 6, 2018 hemoglobin 10.2, platelets 93, sodium 135, potassium 4.4, creatinine 0.67    7-30-18  Cr. 0.75, K 3.7, Na 142  AST 92, ALT 45, , TB 0.4  Hbg A1C 5.3  hgb 12.6, hct 38.9, plts 135    8-21-18 INR 2.5      Past Medical History:   Diagnosis Date   • Arthritis 08/09/2023    several fingers, back   • Atrial myxoma 11/01/2011    Status post [place] myxoma resection   • Backpain    • Breath shortness 08/09/2023    with exertion, uses O2   • CAD (coronary artery disease)     pacemaker   • Chronic pain    • Dental disorder 08/09/2023    dentures full upper and lower   • Dizziness     • Hemorrhagic disorder (HCC) 08/09/2023    on eliquis   • High cholesterol 08/09/2023    medicated   • Hyperlipidemia     • Hypertension 08/09/2023    medicated   • Hypertriglyceridemia     • Indigestion 08/09/2023    at times   • Pacemaker 08/09/2023    at present   • Peripheral edema    • Psychiatric problem 08/09/2023    depression, medicated   • Sciatica    • Sick sinus syndrome (HCC) 11/01/2011    Status post PPM implantation.   • Sleep apnea 08/09/2023    stimulator placed   • Status post mitral valve repair 11/01/2011    MV repair with 32mm Jonnie-Carpenter flexible annuloplasty ring.     Past Surgical History:   Procedure Laterality Date   • OTHER  08/09/2023    inspire  upper nerve stim for Apnea   • CARDIAC CATH, RIGHT HEART  2018    Right heart cath with high pressures.   • MITRAL VALVE REPAIR  2011    Performed by MARY ORTIZ at [facility]   • PACEMAKER INSERTION  2011    Medtronic Versa [id number] implanted by Dr. Amaya.   • OTHER ORTHOPEDIC SURGERY      Neck and back      Family History   Problem Relation Age of Onset   • Heart Attack Father      Social History     Socioeconomic History   • Marital status: Single     Spouse name: Not on file   • Number of children: Not on file   • Years of education: Not on file   • Highest education level: Not on file   Occupational History   • Not on file   Tobacco Use   • Smoking status: Former     Current packs/day: 0.00     Average packs/day: 1 pack/day for 39.0 years (39.0 ttl pk-yrs)     Types: Cigarettes     Start date: 1978     Quit date: 2017     Years since quittin.7   • Smokeless tobacco: Never   • Tobacco comments:     Vapes only   Vaping Use   • Vaping status: Former   • Start date: 2017   • Substances: Nicotine   Substance and Sexual Activity   • Alcohol use: Yes     Comment: occasionally   • Drug use: Yes     Types: Inhaled, Oral, Marijuana     Comment: medical marijuana, daily   • Sexual activity: Not on file   Other Topics Concern   • Not on file   Social History Narrative   • Not on file     Social Determinants of Health     Financial Resource Strain: Not on file   Food Insecurity: Not on file   Transportation Needs: Not on file   Physical Activity: Not on file   Stress: Not on file   Social Connections: Not on file   Intimate Partner Violence: Not on file   Housing Stability: Not on file     Allergies   Allergen Reactions   • Penicillins Rash             Current Outpatient Medications   Medication Sig Dispense Refill   • amiodarone (CORDARONE) 200 MG Tab Take 1 Tablet by mouth every day. 100 Tablet 3   • apixaban (ELIQUIS) 5mg Tab Take 1 Tablet by mouth 2 times a day. 200 Tablet  "3   • atorvastatin (LIPITOR) 20 MG Tab Take 1 Tablet by mouth every day. 100 Tablet 3   • Empagliflozin (JARDIANCE) 10 MG Tab tablet Take 1 Tablet by mouth every day. 100 Tablet 3   • spironolactone (ALDACTONE) 50 MG Tab Take 1 Tablet by mouth every day. 100 Tablet 3   • morphine ER (MS CONTIN) 15 MG Tab CR tablet Take 15 mg by mouth every 12 hours.     • doxycycline (MONODOX) 100 MG capsule Take 1 Capsule by mouth 2 times a day. 8 Capsule 0   • venlafaxine (EFFEXOR-XR) 150 MG extended-release capsule Take 150 mg by mouth every evening. Pt takes 75 mg and 150 mg for a total dose of 250 mg     • ARIPiprazole (ABILIFY) 10 MG Tab Take 10 mg by mouth every day.     • venlafaxine XR (EFFEXOR XR) 75 MG CAPSULE SR 24 HR Take 75 mg by mouth every day. Pt takes 75 mg and 150 mg for a total dose of 250 mg     • traZODone (DESYREL) 100 MG Tab Take 100 mg by mouth every evening.     • oxyCODONE immediate release (ROXICODONE) 10 MG immediate release tablet Take 10 mg by mouth 2 times a day.     • pregabalin (LYRICA) 200 MG capsule Take 200 mg by mouth 3 times a day.       No current facility-administered medications for this visit.       ROS  All others systems reviewed and negative.     Objective:     /62 (BP Location: Left arm, Patient Position: Sitting, BP Cuff Size: Adult)   Pulse 83   Resp 18   Ht 1.727 m (5' 8\")   Wt 125 kg (275 lb)   SpO2 91%  Body mass index is 41.81 kg/m².    Physical Exam   General: No acute distress. Well nourished.  HEENT: EOM grossly intact, no scleral icterus, no pharyngeal erythema.   Neck:  No JVD, no bruits, trachea midline  CVS: RRR today. Normal S1, S2. 2/6 syst murmur. 2+ pititng LE edema.  2+ radial pulses, well-healed scar across the chest, PM pocket intack  Resp: CTAB. No wheezing or crackles/rhonchi. Normal respiratory effort.  Abdomen: Soft, NT, no maco hepatomegaly, obese.  MSK/Ext: No clubbing or cyanosis.  Skin: Warm and dry, no rashes.  Neurological: CN III-XII grossly " intact. No focal deficits.   Psych: A&O x 3, appropriate affect, good judgement    Physical Exam listed below was completed in entrety today and unchanged from 7- except where noted.  Some elements were copied from my note of that same day, which have been updated where appropriate and all reflect current meedical decision making from today.  I have confirmed and edited as necessary, the PFSH and ROS obtained by others.        Assessment:     1. Status post mitral valve repair        2. PAF (paroxysmal atrial fibrillation) (CMS-HCC)        3. Atrial myxoma        4. Central sleep apnea        5. Essential hypertension        6. Heart failure with preserved ejection fraction, borderline, class II (HCC)        7. Sinus node dysfunction (HCC)        8. Mixed hyperlipidemia  Lipid Profile    Lipoprotein (a)      9. Pacemaker        10. Pulmonary hypertension (HCC)        11. Secondary hypercoagulable state (HCC)        12. Chronic anticoagulation  CBC WITHOUT DIFFERENTIAL      13. Chronic right-sided heart failure (HCC)        14. On amiodarone therapy  Comp Metabolic Panel    TSH WITH REFLEX TO FT4          Written instructions given today:    -Remember the amiodarone can mess around with your thyroid and your liver function so we need to get blood work at the very minimum once a year.  -Fasting blood work at the [facility] lab at your convenience.    Return in about 6 months (around 2/7/2025).    It is my pleasure to participate in the care of Ms. Velarde.  Please do not hesitate to contact me with questions or concerns.    Leah Bojorquez MD, [facility]  Cardiologist [facility]    Please note that this dictation was created using voice recognition software. I have made every reasonable attempt to correct obvious errors, but it is possible there are errors of grammar and possibly content that I did not discover before finalizing the note.      Pema Peña M.D.  [address] [facility]  [address]  Via Fax: [phone]

## 2024-08-07 NOTE — PATIENT INSTRUCTIONS
-Remember the amiodarone can mess around with your thyroid and your liver function so we need to get blood work at the very minimum once a year.  -Fasting blood work at the [facility] lab at your convenience.

## 2024-08-12 ENCOUNTER — TELEPHONE (OUTPATIENT)
Dept: VASCULAR LAB | Facility: MEDICAL CENTER | Age: 64
End: 2024-08-12
Payer: MEDICARE

## 2024-08-12 DIAGNOSIS — I48.0 PAF (PAROXYSMAL ATRIAL FIBRILLATION) (HCC): ICD-10-CM

## 2024-08-12 NOTE — TELEPHONE ENCOUNTER
Caller: Melanie Velarde    Topic/issue: Patient needs a refill on her ELIQUIS she uses [facility] on [address]     Callback Number: [phone]    Thank You   Karmen PEACOCK

## 2024-08-13 NOTE — TELEPHONE ENCOUNTER
Medication ordered 8/12 by Enzo Tinsley, Pharm D with vascular medicine    Phone Number Called: [phone]    Call outcome: Spoke to patient regarding message below.    Message: Called to let pt know her eliquis is being processed through authorizations. She states she has only one week left of meds.

## 2024-08-13 NOTE — TELEPHONE ENCOUNTER
Caller: Kailey Velarde    Topic/issue: MEDICAL ADVICE    Kailey states that her pharmacy never received the prescription for the XARELTO. After looking at the prescription in the pt chart it says in the prescription:        Please advise.    Thank you,  Elio HENAO    Callback Number: [phone]

## 2024-08-15 ENCOUNTER — TELEPHONE (OUTPATIENT)
Dept: VASCULAR LAB | Facility: MEDICAL CENTER | Age: 64
End: 2024-08-15
Payer: MEDICARE

## 2024-08-15 NOTE — TELEPHONE ENCOUNTER
Received Refill PA request via [facility]  for ELIQUIS 5MG TABLETS. (Quantity: 180, Day Supply:90)     Insurance: HU[employer] D   Member ID:  [id number]  BIN: [id number]  PCN: [id number]  Group: [id number]     Ran Test claim via [place] & medication  insurance not contracted with pharmacy. Will release Rx to pharmacy on file: [facility]--[address], [place] [address]     TYRON Padilla, PhT  Vascular Pharmacy Liaison (Rx Coordinator)  P: [phone]  8/15/2024 3:12 PM

## 2024-08-17 ENCOUNTER — HOSPITAL ENCOUNTER (OUTPATIENT)
Dept: LAB | Facility: MEDICAL CENTER | Age: 64
End: 2024-08-17
Attending: INTERNAL MEDICINE
Payer: MEDICARE

## 2024-08-17 DIAGNOSIS — E78.2 MIXED HYPERLIPIDEMIA: ICD-10-CM

## 2024-08-17 DIAGNOSIS — Z79.899 ON AMIODARONE THERAPY: ICD-10-CM

## 2024-08-17 DIAGNOSIS — Z79.01 CHRONIC ANTICOAGULATION: ICD-10-CM

## 2024-08-17 LAB
ALBUMIN SERPL BCP-MCNC: 4.2 G/DL (ref 3.2–4.9)
ALBUMIN/GLOB SERPL: 1.2 G/DL
ALP SERPL-CCNC: 71 U/L (ref 30–99)
ALT SERPL-CCNC: 13 U/L (ref 2–50)
ANION GAP SERPL CALC-SCNC: 14 MMOL/L (ref 7–16)
AST SERPL-CCNC: 18 U/L (ref 12–45)
BILIRUB SERPL-MCNC: 0.6 MG/DL (ref 0.1–1.5)
BUN SERPL-MCNC: 7 MG/DL (ref 8–22)
CALCIUM ALBUM COR SERPL-MCNC: 9.5 MG/DL (ref 8.5–10.5)
CALCIUM SERPL-MCNC: 9.7 MG/DL (ref 8.5–10.5)
CHLORIDE SERPL-SCNC: 99 MMOL/L (ref 96–112)
CHOLEST SERPL-MCNC: 128 MG/DL (ref 100–199)
CO2 SERPL-SCNC: 24 MMOL/L (ref 20–33)
CREAT SERPL-MCNC: 0.76 MG/DL (ref 0.5–1.4)
ERYTHROCYTE [DISTWIDTH] IN BLOOD BY AUTOMATED COUNT: 45.8 FL (ref 35.9–50)
GFR SERPLBLD CREATININE-BSD FMLA CKD-EPI: 88 ML/MIN/1.73 M 2
GLOBULIN SER CALC-MCNC: 3.4 G/DL (ref 1.9–3.5)
GLUCOSE SERPL-MCNC: 85 MG/DL (ref 65–99)
HCT VFR BLD AUTO: 41.6 % (ref 37–47)
HDLC SERPL-MCNC: 39 MG/DL
HGB BLD-MCNC: 13.3 G/DL (ref 12–16)
LDLC SERPL CALC-MCNC: 69 MG/DL
MCH RBC QN AUTO: 28.9 PG (ref 27–33)
MCHC RBC AUTO-ENTMCNC: 32 G/DL (ref 32.2–35.5)
MCV RBC AUTO: 90.2 FL (ref 81.4–97.8)
PLATELET # BLD AUTO: 195 K/UL (ref 164–446)
PMV BLD AUTO: 10 FL (ref 9–12.9)
POTASSIUM SERPL-SCNC: 3.6 MMOL/L (ref 3.6–5.5)
PROT SERPL-MCNC: 7.6 G/DL (ref 6–8.2)
RBC # BLD AUTO: 4.61 M/UL (ref 4.2–5.4)
SODIUM SERPL-SCNC: 137 MMOL/L (ref 135–145)
TRIGL SERPL-MCNC: 99 MG/DL (ref 0–149)
TSH SERPL DL<=0.005 MIU/L-ACNC: 1.99 UIU/ML (ref 0.38–5.33)
WBC # BLD AUTO: 5.6 K/UL (ref 4.8–10.8)

## 2024-08-17 PROCEDURE — 80061 LIPID PANEL: CPT

## 2024-08-17 PROCEDURE — 36415 COLL VENOUS BLD VENIPUNCTURE: CPT

## 2024-08-17 PROCEDURE — 80053 COMPREHEN METABOLIC PANEL: CPT

## 2024-08-17 PROCEDURE — 85027 COMPLETE CBC AUTOMATED: CPT

## 2024-08-17 PROCEDURE — 83695 ASSAY OF LIPOPROTEIN(A): CPT

## 2024-08-17 PROCEDURE — 84443 ASSAY THYROID STIM HORMONE: CPT

## 2024-08-19 LAB — LPA SERPL-MCNC: 12 MG/DL

## 2024-10-17 ENCOUNTER — APPOINTMENT (OUTPATIENT)
Dept: VASCULAR LAB | Facility: MEDICAL CENTER | Age: 64
End: 2024-10-17
Attending: INTERNAL MEDICINE
Payer: MEDICARE

## 2024-10-31 ENCOUNTER — APPOINTMENT (OUTPATIENT)
Dept: VASCULAR LAB | Facility: MEDICAL CENTER | Age: 64
End: 2024-10-31
Attending: INTERNAL MEDICINE
Payer: MEDICARE

## 2024-10-31 ENCOUNTER — DOCUMENTATION (OUTPATIENT)
Dept: VASCULAR LAB | Facility: MEDICAL CENTER | Age: 64
End: 2024-10-31

## 2024-11-04 ENCOUNTER — DOCUMENTATION (OUTPATIENT)
Dept: VASCULAR LAB | Facility: MEDICAL CENTER | Age: 64
End: 2024-11-04
Payer: MEDICARE

## 2024-11-04 NOTE — PROGRESS NOTES
Returned pt's call. She missed her Eliquis appt last week. Soonest she can reschedule is for Thursday, Dec 5th at 11:00 am. Pt requests I sent a [employer] message to her with her appt info which I will do.    Brittnee HENDERSON

## 2024-11-14 DIAGNOSIS — Z79.01 CHRONIC ANTICOAGULATION: ICD-10-CM

## 2024-12-05 ENCOUNTER — APPOINTMENT (OUTPATIENT)
Dept: VASCULAR LAB | Facility: MEDICAL CENTER | Age: 64
End: 2024-12-05
Attending: INTERNAL MEDICINE
Payer: MEDICARE

## 2024-12-11 NOTE — PROGRESS NOTES
Annual Physical Exam  Jamila Freeman,       SUBJECTIVE:     Aleta Mendez is a 44 year old female presenting for annual wellness exam.    Concerns:     #Numbness/tingling  -Left hand and foot, whole hand and foot, nothing in the wrist/ankle, (mostly in fingers and toes)  -Patient denies any changes in strength or color  -Patient thinks it may be related to stress, flares up when she's stressed from work  -Has been present for the last year or so  -Intermittent, roughly once per day, can last a few hours    Pt ways to de-stress:  -used to walk with dog, but dog hasn't been able to walk as much past year  -reads  -plays violin  -good friends that she can talk to when she's overwhelmed and stressed     PMHx: Reviewed with patient  Meds: Reviewed with patient  Allergies: Reviewed with patient, NKLEIEL  Recent ED/Hospitalizations: None in last 6mo  Surgical Hx: Breast surgery (28yo, R and L, and 29yo on L only) for breast masses (fibroadenomas)  Fam Hx: No family cancer hx, not in close contact with family   Vaccines: patient declines today, has a busy day tomorrow  Menstruation: Regular period, LMP last week  Birth Control: OCPs  Pap Hx: UTD, plan for repeat in March 2025    Routine Health Maintenance  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, 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    Review of  MS Aflutter   /.10/  tachs up to 147   Systems  Review of Systems   Constitutional: Negative.    HENT: Negative.     Eyes: Negative.    Respiratory: Negative.  Negative for shortness of breath.    Cardiovascular: Negative.  Negative for chest pain and palpitations.   Gastrointestinal: Negative.    Endocrine: Negative.    Genitourinary: Negative.    Musculoskeletal: Negative.    Skin: Negative.    Neurological:  Positive for numbness. Negative for tremors, seizures and weakness.   Psychiatric/Behavioral: Negative.         Recent PHQ 2/9 Score    PHQ 2:  PHQ 2 Score Adult PHQ 2 Score Adult PHQ 2 Interpretation Little interest or pleasure in activity?   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    Calcium Carb-Cholecalciferol (calcium carbonate-cholecalciferol) 500-100 MG-UNIT chewable tablet Calcium 500   1 tab po daily    Multiple Vitamins-Minerals (MULTIVITAMIN ADULT EXTRA C PO) multivitamin   1 tab po daily     No current facility-administered medications for this visit.       ALLERGIES:  ALLERGIES:   Allergen Reactions    Kiwi   (Food Or Med) Other (See Comments)     Itchy throat    Mushroom   (Food) GI UPSET    [place] (Diagnostic) Other (See Comments)     Itchy throat       PAST SURGICAL HISTORY:  Past Surgical History:   Procedure Laterality Date    Biopsy of breast, incisional Bilateral 07/2010    Rt Phylloides excised, Lt  fibroadenoma    Breast surgery Left 2006    Bx    Colonoscopy diagnostic  12/2010    and EGD    Colposcopy,loop electrd cervix excis  12/2013       FAMILY HISTORY:  Family History   Problem Relation Age of Onset    Osteoarthritis Mother     Asthma Mother     Depression Mother     Musculoskeletal Mother         fibromyalgia    Genitourinary Mother         endometriosis    Hypertension Father     Hyperlipidemia Father     Genitourinary Sister         endometriosis    Retinal detachment Sister     Patient is unaware of any medical problems Sister     Patient is unaware of any medical problems Maternal Grandmother     Other Maternal Grandfather         polio (MSK)    Hyperlipidemia Maternal Grandfather     Dementia/Alzheimers Paternal Grandmother     Diabetes Paternal Grandfather         type 2    Cancer, Prostate Paternal Grandfather     Dementia/Alzheimers Paternal Grandfather        SOCIAL HISTORY:  Social History     Tobacco Use    Smoking status: Never    Smokeless tobacco: Never   Vaping Use    Vaping status: never used   Substance Use Topics    Alcohol use: Not Currently    Drug use: Not Currently       OBJECTIVE  Vitals:    12/11/24 1439   BP: 115/83   BP Location: RUE - Right upper extremity   Patient Position: Sitting   Cuff Size: Regular   Pulse: 94   Resp: 16   Temp: 97.9 °F (36.6 °C)   TempSrc: Temporal   Weight: 63.3 kg (139 lb 8 oz)   LMP: 12/09/2024       Physical Exam  Vitals reviewed.   Constitutional:       General: She is not in acute distress.     Appearance: Normal appearance. She is not toxic-appearing.   HENT:      Head: Normocephalic and atraumatic.      Right Ear: External ear normal.      Left Ear: External ear normal.      Nose: Nose normal.      Mouth/Throat:      Mouth: Mucous membranes are moist.      Pharynx: Oropharynx is clear.      Neck: Neck supple.   Eyes:      General: No scleral icterus.     Extraocular Movements: Extraocular movements intact.      Conjunctiva/sclera: Conjunctivae  normal.      Pupils: Pupils are equal, round, and reactive to light.   Cardiovascular:      Rate and Rhythm: Normal rate and regular rhythm.      Pulses: Normal pulses.      Heart sounds: Normal heart sounds.   Pulmonary:      Effort: Pulmonary effort is normal. No respiratory distress.      Breath sounds: Normal breath sounds.   Abdominal:      General: Abdomen is flat. Bowel sounds are normal. There is no distension.      Palpations: Abdomen is soft.      Tenderness: There is no abdominal tenderness. There is no guarding.   Musculoskeletal:      Right lower leg: No edema.      Left lower leg: No edema.   Skin:     General: Skin is warm.   Neurological:      General: No focal deficit present.      Mental Status: She is alert and oriented to person, place, and time.      Sensory: No sensory deficit.      Motor: No weakness.      Coordination: Coordination normal.      Gait: Gait normal.      Deep Tendon Reflexes: Reflexes normal.   Psychiatric:         Mood and Affect: Mood normal.         Assessment   Aleta Mendez is a 44 year old female presenting for annual wellness exam.    #Routine Health Maintenance  -Vitals Reviewed, discussed with patient  -Mammogram - UTD, screening mammo done June, found to have fibroadenoma per bx done June 2024, follows with Dr. Benjamin LAWSON, for management  -Colon cancer screening- at 44 yo unless otherwise indicated  -Immunizations - per orders  -Labs - per orders  -Pap - previous done March 2024, ASCUS with HPV neg, plan for repeat in 1 year; follows with Dr. Benjamin LAWSON, for management  -STI Screening - offered, patient declines screening  -Diet and exercise counseling provided today    #Paresthesias of left hand and foot  -Intermittent, without weakness   -Symptoms have been on and off for past year  -Physical exam and hx overall reassuring  -Referral for Neurology given today   -Labs per orders  -CTM  -Return precautions discussed    Problem List Items Addressed This Visit     None  Visit Diagnoses       Annual physical exam    -  Primary    Relevant Orders    Hepatitis B Surface Antibody (Completed)    Thyroid Stimulating Hormone Reflex (Completed)    Lipid Panel With Reflex (Completed)    Comprehensive Metabolic Panel (Completed)    Glycohemoglobin (Completed)    Paresthesias        Relevant Orders    SERVICE TO NEUROLOGY             Follow Up: Return if symptoms worsen or fail to improve.

## 2024-12-19 ENCOUNTER — ANTICOAGULATION VISIT (OUTPATIENT)
Dept: VASCULAR LAB | Facility: MEDICAL CENTER | Age: 64
End: 2024-12-19
Attending: INTERNAL MEDICINE
Payer: MEDICARE

## 2024-12-19 DIAGNOSIS — Z98.890 STATUS POST MITRAL VALVE REPAIR: ICD-10-CM

## 2024-12-19 DIAGNOSIS — I48.0 PAF (PAROXYSMAL ATRIAL FIBRILLATION) (HCC): ICD-10-CM

## 2024-12-19 DIAGNOSIS — D68.69 SECONDARY HYPERCOAGULABLE STATE (HCC): ICD-10-CM

## 2024-12-19 PROCEDURE — 99212 OFFICE O/P EST SF 10 MIN: CPT | Performed by: NURSE PRACTITIONER

## 2024-12-19 NOTE — PROGRESS NOTES
Target end date: Indefinite     Indication: PAF     Drug: Eliquis 5mg BID     CHADsVASC = 3     HAS-BLED = 1    Health Status Since Last Assessment   Patient denies any new relevant medical problems, ED visits or hospitalizations   Patient denies any embolic events (stroke/tia/systemic embolism)     Adherence with DOAC Therapy   Pt has NO missed any doses in the average week    Bleeding Risk Assessment     Denies Epistaxis   Pt denies any excessive or unusual bleeding/hematomas.  Pt denies any GI bleeds or hematemesis.  Pt denies any concerning daily headache or sub dural hematoma symptoms.     Pt denies any hematuria    Latest Hemoglobin 13.3   ETOH overuse complete abstinence for about 3 months     Creatinine Clearance/Renal Function     Latest ClCr ~149 ml/min   Hepatic function   Latest LFTs WNL    Pt denies any history of liver dysfunction      Drug Interactions   Platelets: 195   ASA/other antiplatelets denies   NSAID denies   Other drug interactions none   Verified no anticonvulsant or azole therapy, education provided for future use.     Examination   Blood Pressure declined   Symptomatic hypotension denies   Significant gait impairment/imbalance/high risk for falls? none    Final Assessment and Recommendations:   Patient appears stable from the anticoagulation standpoint.     Benefits of continued DOAC therapy outweigh risks for this patient   Recommend pt continue with current DOAC therapy with Eliquis 5mg BID    DOAC is affordable   Refill sent to [facility].   Praised her for stopping alcohol.    Continue taking Eliquis 5 mg by mouth twice daily     Other Actions: cmp/ cbc hemogram ordered prior to next visit    Follow up:   Will follow up with patient 6 months.      Brittnee HENDERSON

## 2025-03-13 DIAGNOSIS — I48.0 PAF (PAROXYSMAL ATRIAL FIBRILLATION) (HCC): ICD-10-CM

## 2025-03-17 NOTE — CARE PLAN
Is This A New Presentation, Or A Follow-Up?: Skin Lesion Problem: Pain Management  Goal: Pain level will decrease to patient's comfort goal    Intervention: Follow pain managment plan developed in collaboration with patient and Interdisciplinary Team  Administered pain medication as prescribed for chronic lower back pain.

## 2025-04-01 ENCOUNTER — TELEPHONE (OUTPATIENT)
Dept: SLEEP MEDICINE | Facility: MEDICAL CENTER | Age: 65
End: 2025-04-01
Payer: MEDICARE

## 2025-04-01 NOTE — TELEPHONE ENCOUNTER
Patient LVM saying that she is a remede patient, patient would like to make an appointment. Patient was last seen 3.27.23 telemedicine with Andressa Tomlin.

## 2025-04-02 NOTE — TELEPHONE ENCOUNTER
Patient has been scheduled   Can you see if she can come in on 4/23/2025 at 1015am to meet with [employer]?

## 2025-04-23 ENCOUNTER — OFFICE VISIT (OUTPATIENT)
Dept: SLEEP MEDICINE | Facility: MEDICAL CENTER | Age: 65
End: 2025-04-23
Attending: STUDENT IN AN ORGANIZED HEALTH CARE EDUCATION/TRAINING PROGRAM
Payer: MEDICARE

## 2025-04-23 VITALS
RESPIRATION RATE: 16 BRPM | SYSTOLIC BLOOD PRESSURE: 116 MMHG | BODY MASS INDEX: 37.72 KG/M2 | HEART RATE: 87 BPM | HEIGHT: 68 IN | OXYGEN SATURATION: 95 % | WEIGHT: 248.9 LBS | DIASTOLIC BLOOD PRESSURE: 78 MMHG

## 2025-04-23 DIAGNOSIS — G47.31 CENTRAL SLEEP APNEA: ICD-10-CM

## 2025-04-23 DIAGNOSIS — Z96.82 S/P INSERTION OF PHRENIC NERVE STIMULATOR: ICD-10-CM

## 2025-04-23 PROCEDURE — 99213 OFFICE O/P EST LOW 20 MIN: CPT | Performed by: STUDENT IN AN ORGANIZED HEALTH CARE EDUCATION/TRAINING PROGRAM

## 2025-04-23 PROCEDURE — 3074F SYST BP LT 130 MM HG: CPT | Performed by: STUDENT IN AN ORGANIZED HEALTH CARE EDUCATION/TRAINING PROGRAM

## 2025-04-23 PROCEDURE — 3078F DIAST BP <80 MM HG: CPT | Performed by: STUDENT IN AN ORGANIZED HEALTH CARE EDUCATION/TRAINING PROGRAM

## 2025-04-23 PROCEDURE — 95976 ALYS SMPL CN NPGT PRGRMG: CPT | Performed by: STUDENT IN AN ORGANIZED HEALTH CARE EDUCATION/TRAINING PROGRAM

## 2025-04-23 ASSESSMENT — FIBROSIS 4 INDEX: FIB4 SCORE: 1.61

## 2025-04-23 NOTE — PROGRESS NOTES
[facility] Follow-up Visit    CC: Follow-up regarding management of central sleep apnea      HPI:  Kailey Velarde is a 64 y.o.female  with history of paroxysmal atrial fibrillation, HFpEF, history of chronic respiratory failure requiring supplemental oxygen 24 hours a day, obesity, pulmonary hypertension, hyperlipidemia, chronic pain, and central sleep apnea status post phrenic nerve stimulator.  Presents today to follow-up regarding management of central sleep apnea.    Patient was last seen in clinic by Andressa Tomlin in March 2024.  This was a televisit.  She was last seen physically in clinic in August 2023.    She continues have difficulty with her phrenic nerve stimulator.    Since her last visit with our office she has discontinued supplemental oxygen.  She was previously on oxygen 24 hours a day.  She stopped her oxygen maybe 5 to 6 months ago.  She feels that she does no longer needs oxygen.  She has been losing weight with the help of Ozempic.    She feels that her sleep is still interrupted.  She is unsure if the device is working however she does have difficulty using it.  She will often sit above threshold for activation.  In addition she does have a magnet at home and will sometimes turn off with the magnet.    Interrogation of phrenic nerve stimulator  Device was interrogated at today's visit by carolyne team.  Technician onsite to help with interrogation.  Device is showing very limited usage.  Device reports majority of the nights she turns off the device with the magnet or sits above its threshold for activation.  In the last 30 days only 1 day was spent with more than an hour of usage at night.    Only settings changed at today's visit was sleep latency was adjusted from 10 minutes to 15 minutes.      Patient Active Problem List    Diagnosis Date Noted    Unsteady gait 03/17/2023    SOB (shortness of breath) on exertion 03/17/2023    Generalized muscle weakness 03/17/2023    Central  sleep apnea 03/03/2022    Secondary hypercoagulable state ([facility]) 10/25/2021    Falls 07/01/2021    Hyponatremia 07/01/2021    QT prolongation 07/01/2021    Chronic respiratory failure with hypoxia ([facility]) 04/15/2021    Heart failure with preserved ejection fraction, borderline, class II ([facility]) 05/30/2018    Thrombocytopenia ([facility]) 04/04/2018    FAN (acute kidney injury) ([facility]) 04/03/2018    Circulating anticoagulants ([facility]) 03/28/2018    Class 3 severe obesity due to excess calories with serious comorbidity and body mass index (BMI) of 40.0 to 44.9 in adult 03/28/2018    Hx of pleural effusion 03/12/2018    Essential hypertension 02/22/2018    Pulmonary hypertension ([facility]) 06/19/2017    Localized edema 08/29/2016    History of sick sinus syndrome 10/01/2013    Pacemaker 11/26/2012    Mixed hyperlipidemia 07/03/2012    PAF (paroxysmal atrial fibrillation) ([facility]) 12/07/2011    Atrial myxoma 12/07/2011    Status post mitral valve repair 12/07/2011    Chronic low back pain 11/16/2011       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    Status post LA myxoma resection    Backpain     Breath shortness 08/09/2023    with exertion, uses O2    CAD (coronary artery disease)     pacemaker    Chronic pain     Dental disorder 08/09/2023    dentures full upper and lower    Dizziness      Hemorrhagic disorder ([facility]) 08/09/2023    on eliquis    High cholesterol 08/09/2023    medicated    Hyperlipidemia      Hypertension 08/09/2023    medicated    Hypertriglyceridemia      Indigestion 08/09/2023    at times    Pacemaker 08/09/2023    at present    Peripheral edema     Psychiatric problem 08/09/2023    depression, medicated    Sciatica     Sick sinus syndrome ([facility]) 11/01/2011    Status post PPM implantation.    Sleep apnea 08/09/2023    stimulator placed    Status post mitral valve repair 11/01/2011    MV repair with 32mm Jonnie-Carpenter flexible annuloplasty ring.         Past Surgical History:   Procedure Laterality Date    OTHER  2023    inspire upper nerve stim for Apnea    CARDIAC CATH, RIGHT HEART  2018    Right heart cath with high pressures.    MITRAL VALVE REPAIR  2011    Performed by MARY ORTIZ at [facility]    PACEMAKER INSERTION  2011    Medtronic Versa [id number] implanted by Dr. Amaya.    OTHER ORTHOPEDIC SURGERY      Neck and back        Family History   Problem Relation Age of Onset    Heart Attack Father        Social History     Socioeconomic History    Marital status: Single     Spouse name: Not on file    Number of children: Not on file    Years of education: Not on file    Highest education level: Not on file   Occupational History    Not on file   Tobacco Use    Smoking status: Former     Current packs/day: 0.00     Average packs/day: 1 pack/day for 39.0 years (39.0 ttl pk-yrs)     Types: Cigarettes     Start date: 1978     Quit date: 2017     Years since quittin.4    Smokeless tobacco: Never    Tobacco comments:     Vapes only   Vaping Use    Vaping status: Former    Start date: 2017    Substances: Nicotine   Substance and Sexual Activity    Alcohol use: Yes     Comment: occasionally    Drug use: Yes     Types: Inhaled, Oral, Marijuana     Comment: medical marijuana, daily    Sexual activity: Not on file   Other Topics Concern    Not on file   Social History Narrative    Not on file     Social Drivers of Health     Financial Resource Strain: Not on file   Food Insecurity: Not on file   Transportation Needs: Not on file   Physical Activity: Not on file   Stress: Not on file   Social Connections: Not on file   Intimate Partner Violence: Not on file   Housing Stability: Not on file       Current Outpatient Medications   Medication Sig Dispense Refill    apixaban (ELIQUIS) 5mg Tab Take 1 Tablet by mouth 2 times a day. 180 Tablet 1    amiodarone (CORDARONE) 200 MG Tab Take 1 Tablet by mouth every day. 100  "Tablet 3    atorvastatin (LIPITOR) 20 MG Tab Take 1 Tablet by mouth every day. 100 Tablet 3    Empagliflozin (JARDIANCE) 10 MG Tab tablet Take 1 Tablet by mouth every day. 100 Tablet 3    spironolactone (ALDACTONE) 50 MG Tab Take 1 Tablet by mouth every day. 100 Tablet 3    morphine ER (MS CONTIN) 15 MG Tab CR tablet Take 15 mg by mouth every 12 hours.      venlafaxine (EFFEXOR-XR) 150 MG extended-release capsule Take 150 mg by mouth every evening. Pt takes 75 mg and 150 mg for a total dose of 250 mg      ARIPiprazole (ABILIFY) 10 MG Tab Take 10 mg by mouth every day.      venlafaxine XR (EFFEXOR XR) 75 MG CAPSULE SR 24 HR Take 75 mg by mouth every day. Pt takes 75 mg and 150 mg for a total dose of 250 mg      traZODone (DESYREL) 100 MG Tab Take 100 mg by mouth every evening.      oxyCODONE immediate release (ROXICODONE) 10 MG immediate release tablet Take 10 mg by mouth 2 times a day.      pregabalin (LYRICA) 200 MG capsule Take 200 mg by mouth 3 times a day.       No current facility-administered medications for this visit.        ALLERGIES: Penicillins    ROS  Constitutional: Denies fevers, Denies weight changes  Ears/Nose/Throat/Mouth: Denies nasal congestion or sore throat   Cardiovascular: Denies chest pain  Respiratory: Denies shortness of breath, Denies cough  Gastrointestinal/Hepatic: Denies nausea, vomiting  Sleep: see HPI      PHYSICAL EXAM  /78 (BP Location: Left arm, Patient Position: Sitting, BP Cuff Size: Large adult)   Pulse 87   Resp 16   Ht 1.727 m (5' 8\")   Wt 113 kg (248 lb 14.4 oz)   LMP 01/01/2010   SpO2 95%   BMI 37.85 kg/m²   Appearance: Well-nourished, well-developed, no acute distress  Eyes:  No scleral icterus , EOMI  Musculoskeletal:  Grossly normal; gait and station normal; digits and nails normal  Skin:  No rashes, petechiae, cyanosis  Neurologic: without focal signs; oriented to person, time, place, and purpose; judgement intact      Medical Decision Making   Assessment " and Plan  Kailey Marisol Velarde is a 64 y.o.female  with history of paroxysmal atrial fibrillation, HFpEF, history of chronic respiratory failure requiring supplemental oxygen 24 hours a day, obesity, pulmonary hypertension, hyperlipidemia, chronic pain, and central sleep apnea status post phrenic nerve stimulator.  Presents today to follow-up regarding management of central sleep apnea.    The medical record was reviewed.    Central sleep apnea  Patient is having difficulty using her phrenic nerve stimulator.  She continues to have very poor usage of device.  Her device is set very specifically to her previous settings.  After visit with patient discussed with Remede regarding plan moving forward.  Given difficulty with tolerating device at night and limited usage patient may benefit from PSG titration study with phrenic nerve stimulator.  This may allow to know thresholds needed and a goal to reach to control for events.  Will await availability of technician to be present for PSG titration study.  Once we have dates that are available we will reach out to patient to schedule.    PLAN:   - Will likely proceed with PSG phrenic nerve stimulator titration  -Simple programming completed at today's visit with lengthening sleep latency time  -Advised to reach out via [employer] with questions     Patients with KAEL are at increased risk of cardiovascular disease including coronary artery disease, systemic arterial hypertension, pulmonary arterial hypertension, cardiac arrythmias, and stroke. The patient was advised to avoid driving a motor vehicle when drowsy.    Return for after sleep study.      Please note portions of this record was created using voice recognition software. I have made every reasonable attempt to correct obvious errors, but I expect that there are errors of grammar and possibly content I did not discover before finalizing the note.

## 2025-05-02 NOTE — OR NURSING
0900 Pt over from cath lab post gen change. Left chest site CDI and soft, no bleeding. Pt awake and alert, denies nausea. VSS.  0915 Tolerating orals  0920 Called Viky, pt's friend and updated her on POC  0950 Pressure dressing removed. Left chest site is clean, dry and intact and soft, no signs of bleeding.  1000 Report to Wilver RN  1005 Pt wheeled over to Phase II    97

## 2025-05-06 ENCOUNTER — PHARMACY VISIT (OUTPATIENT)
Dept: PHARMACY | Facility: MEDICAL CENTER | Age: 65
End: 2025-05-06
Payer: COMMERCIAL

## 2025-05-06 PROCEDURE — RXMED WILLOW AMBULATORY MEDICATION CHARGE

## 2025-05-06 RX ORDER — MORPHINE SULFATE 15 MG/1
TABLET, FILM COATED, EXTENDED RELEASE ORAL
Qty: 60 TABLET | Refills: 0 | OUTPATIENT
Start: 2025-05-06

## 2025-05-07 ENCOUNTER — TELEPHONE (OUTPATIENT)
Dept: CARDIOLOGY | Facility: MEDICAL CENTER | Age: 65
End: 2025-05-07
Payer: MEDICARE

## 2025-05-07 DIAGNOSIS — I10 ESSENTIAL HYPERTENSION: Chronic | ICD-10-CM

## 2025-05-07 DIAGNOSIS — I27.20 PULMONARY HYPERTENSION (HCC): Chronic | ICD-10-CM

## 2025-05-07 DIAGNOSIS — I50.812 CHRONIC RIGHT-SIDED HEART FAILURE (HCC): ICD-10-CM

## 2025-05-07 RX ORDER — SPIRONOLACTONE 50 MG/1
50 TABLET, FILM COATED ORAL DAILY
Qty: 100 TABLET | Refills: 3 | Status: SHIPPED | OUTPATIENT
Start: 2025-05-07

## 2025-05-07 NOTE — TELEPHONE ENCOUNTER
Phone Number Called: [phone]     Call outcome: Spoke to patient regarding message below.    Message: Called to inform patient of prescription being sent to pharmacy. Patient verbalized understanding. No further questions at this time.     Spironolactone refilled per LS recommendations.

## 2025-05-07 NOTE — TELEPHONE ENCOUNTER
[place]      Caller: Melanie Velarde     Topic/issue: Patient needs a new script for her spironolactone (ALDACTONE) 50 MG Tab and she is using [facility] on [address], pt has a week left of medication- please send new script     Callback Number: [phone]    Thank You   Karmen PEACOCK

## 2025-05-07 NOTE — TELEPHONE ENCOUNTER
Yes.  I have known Melanie for years.  Please refill her spironolactone.  She gets blood work at [facility].  Thank you.

## 2025-05-14 DIAGNOSIS — Z98.890 STATUS POST MITRAL VALVE REPAIR: ICD-10-CM

## 2025-05-14 DIAGNOSIS — I48.0 PAF (PAROXYSMAL ATRIAL FIBRILLATION) (HCC): ICD-10-CM

## 2025-05-14 DIAGNOSIS — D68.69 SECONDARY HYPERCOAGULABLE STATE (HCC): ICD-10-CM

## 2025-06-16 DIAGNOSIS — E78.2 MIXED HYPERLIPIDEMIA: ICD-10-CM

## 2025-06-16 RX ORDER — ATORVASTATIN CALCIUM 20 MG/1
20 TABLET, FILM COATED ORAL DAILY
Qty: 100 TABLET | Refills: 0 | Status: SHIPPED | OUTPATIENT
Start: 2025-06-16

## 2025-06-16 NOTE — TELEPHONE ENCOUNTER
Received request via: Patient    Was the patient seen in the last year in this department? Yes    Does the patient have an active prescription (recently filled or refills available) for medication(s) requested? No    Pharmacy Name: [facility]     Does the patient have intermediate Plus and need 100-day supply? (This applies to ALL medications) Patient does not have SCP

## 2025-06-19 ENCOUNTER — APPOINTMENT (OUTPATIENT)
Dept: VASCULAR LAB | Facility: MEDICAL CENTER | Age: 65
End: 2025-06-19
Attending: INTERNAL MEDICINE
Payer: MEDICARE

## 2025-07-01 DIAGNOSIS — I48.0 PAF (PAROXYSMAL ATRIAL FIBRILLATION) (HCC): ICD-10-CM

## 2025-07-01 DIAGNOSIS — Z79.899 ON AMIODARONE THERAPY: Primary | ICD-10-CM

## 2025-07-01 RX ORDER — AMIODARONE HYDROCHLORIDE 200 MG/1
200 TABLET ORAL DAILY
Qty: 100 TABLET | Refills: 0 | Status: SHIPPED | OUTPATIENT
Start: 2025-07-01

## 2025-07-01 NOTE — TELEPHONE ENCOUNTER
[place]  Caller: Kailey Velarde    Topic/issue: Patient is scheduled for an appointment with LS in November and will need a refill before then.    Callback Number: [phone]    Thank you,  Aisha HARRELL

## 2025-07-01 NOTE — TELEPHONE ENCOUNTER
Chart reviewed - last OV 08/07/2024 with LS  Next FV - 11/21/2025  Last labs 08/17/2024; CXR 04/17/2024; EKG 05/09/2024  Per Rx protocol courtesy refill sent  Will send message to  to contact pt per recommendation Return in about 6 months (around 2/7/2025).     Phone Number Called: [phone]    Call outcome: Spoke to patient regarding message below.    Message: Called to discuss refill of amiodarone - pt states still taking medication she will go get her lab and EKG done next week at [facility] and EKG in the office will place orders. Transferred to scheduling line. Scheduled non provider visit for 7/3/2025 labs ordered.

## 2025-07-03 ENCOUNTER — APPOINTMENT (OUTPATIENT)
Dept: VASCULAR LAB | Facility: MEDICAL CENTER | Age: 65
End: 2025-07-03
Attending: INTERNAL MEDICINE
Payer: MEDICARE

## 2025-07-03 ENCOUNTER — TELEPHONE (OUTPATIENT)
Dept: VASCULAR LAB | Facility: MEDICAL CENTER | Age: 65
End: 2025-07-03
Payer: MEDICARE

## 2025-07-03 ENCOUNTER — APPOINTMENT (OUTPATIENT)
Dept: CARDIOLOGY | Facility: MEDICAL CENTER | Age: 65
End: 2025-07-03
Attending: INTERNAL MEDICINE
Payer: MEDICARE

## 2025-07-03 NOTE — TELEPHONE ENCOUNTER
Called and s/w pt regarding her call from earlier today. Pt reports that she has already rescheduled.     Brad Medina PharmD  Authorized Nuclear Pharmacist (ANP)

## 2025-07-03 NOTE — TELEPHONE ENCOUNTER
Caller: Kailey Velarde    Topic/issue: Patient called to cancel her anticoag appointment for this morning 7/3. I offered to reschedule patient, but she requested to speak with Brittnee on the phone prior to rescheduling. Please call patient back when available.     Callback Number: [phone]    Thank you,  Lissy ABARCA

## 2025-07-16 ENCOUNTER — NON-PROVIDER VISIT (OUTPATIENT)
Dept: CARDIOLOGY | Facility: MEDICAL CENTER | Age: 65
End: 2025-07-16
Attending: INTERNAL MEDICINE
Payer: MEDICARE

## 2025-07-16 ENCOUNTER — ANTICOAGULATION VISIT (OUTPATIENT)
Dept: VASCULAR LAB | Facility: MEDICAL CENTER | Age: 65
End: 2025-07-16
Attending: INTERNAL MEDICINE
Payer: MEDICARE

## 2025-07-16 DIAGNOSIS — I48.0 PAF (PAROXYSMAL ATRIAL FIBRILLATION) (HCC): Primary | ICD-10-CM

## 2025-07-16 DIAGNOSIS — D68.69 SECONDARY HYPERCOAGULABLE STATE (HCC): ICD-10-CM

## 2025-07-16 DIAGNOSIS — Z98.890 STATUS POST MITRAL VALVE REPAIR: ICD-10-CM

## 2025-07-16 DIAGNOSIS — I50.30 HEART FAILURE WITH PRESERVED EJECTION FRACTION, BORDERLINE, CLASS II (HCC): ICD-10-CM

## 2025-07-16 LAB — EKG IMPRESSION: NORMAL

## 2025-07-16 PROCEDURE — 99212 OFFICE O/P EST SF 10 MIN: CPT | Performed by: NURSE PRACTITIONER

## 2025-07-16 PROCEDURE — 93005 ELECTROCARDIOGRAM TRACING: CPT | Mod: TC

## 2025-07-16 PROCEDURE — 93010 ELECTROCARDIOGRAM REPORT: CPT | Performed by: INTERNAL MEDICINE

## 2025-07-16 RX ORDER — EMPAGLIFLOZIN 10 MG/1
10 TABLET, FILM COATED ORAL DAILY
Qty: 90 TABLET | Refills: 1 | Status: SHIPPED | OUTPATIENT
Start: 2025-07-16

## 2025-07-16 NOTE — PROGRESS NOTES
Referral Date: 3/13/25    Target end date: indefinite  Indication: AF  Drug: Eliquis 5 mg BID  CHADsVASC = 3 (chf, htn, female)  HAS-BLED = 1 (etoh)    Health Status Since Last Assessment  Pt denies any new relevant medical problems, ED visits or hospitalizations  Pt denies any embolic events (stroke/tia/systemic embolism)      Adherence with DOAC Therapy  Pt has missed an occasional PM dose in the average week.  DOAC is affordable    Bleeding Risk Assessment  Pt denies epistaxis  Pt denies any hematuria  Pt denies any excessive or unusual bleeding/hematomas.  Pt denies any GI bleeds or hematemesis.  Pt denies any concerning daily headache or subdural hematoma symptoms.    Latest Hemoglobin: WNL - overdue for labs.   Hemoglobin   Date Value Ref Range Status   08/17/2024 13.3 12.0 - 16.0 g/dL Final     Latest Platelets: WNL - overdue for labs  Platelet Count   Date Value Ref Range Status   08/17/2024 195 164 - 446 K/uL Final          Creatinine Clearance/Renal Function  Latest CrCl: >100 ml/min  Eliquis for AF: Scr > 1.5, age < 80, wt > 60kg - renal dose adjustment not indicated    Hepatic function  Latest LFTs: WNL  Pt denies any history of liver dysfunction   Pt reports  ETOH overuse. Aware of the increased risk of bleeding and counseled.     Drug Interactions  ASA/other antiplatelets: None  NSAID: None  Other drug interactions: None  Verified no anticonvulsant or azole therapy, education provided for future use.     Examination  Blood Pressure Declined  There were no vitals filed for this visit.  Symptomatic hypotension: Denies   Significant gait impairment/imbalance/high risk for falls? No obvious risks    Final Assessment and Recommendations:  Patient appears stable from the anticoagulation standpoint.    Benefits of continued DOAC therapy outweigh risks for this patient  Recommend pt continue with current DOAC therapy: Eliquis 5 mg BID Confirmed she has refills and copay affordable.  She is having labs done  next month for her cardiologist. She will have a CBC, CMP drawn at that time.    Continue taking Eliquis 5 mg by mouth twice daily      Other Actions: CMP/CBC hemogram ordered for next month and prior to next visit    Follow up:  Will follow up with patient 6 month(s).     JOAQUINA Kelly.

## 2025-07-16 NOTE — TELEPHONE ENCOUNTER
Is the patient due for a refill? Yes    Was the patient seen the last 15 months? Yes    Date of last office visit: 8/7/24    Does the patient have an upcoming appointment?  Yes   If yes, When? 11/21/25    Provider to refill:LS    Does the patient have [facility] Plus and need 100-day supply? (This applies to ALL medications) Patient does not have SCP

## 2025-07-16 NOTE — PROGRESS NOTES
Patient was here today for EKG. EKG performed and transferred into patients chart. Paper copy of EKG given to Bhumika VERAS. RN will contact patient to follow up unless patient is symptomatic.   Is patient reporting any symptoms? No   If yes, RN to visit exam room

## 2025-08-27 ENCOUNTER — HOSPITAL ENCOUNTER (OUTPATIENT)
Dept: LAB | Facility: MEDICAL CENTER | Age: 65
End: 2025-08-27
Attending: INTERNAL MEDICINE
Payer: MEDICARE

## 2025-08-27 ENCOUNTER — HOSPITAL ENCOUNTER (OUTPATIENT)
Dept: LAB | Facility: MEDICAL CENTER | Age: 65
End: 2025-08-27
Attending: NURSE PRACTITIONER
Payer: MEDICARE

## 2025-08-27 DIAGNOSIS — I48.0 PAF (PAROXYSMAL ATRIAL FIBRILLATION) (HCC): ICD-10-CM

## 2025-08-27 DIAGNOSIS — Z98.890 STATUS POST MITRAL VALVE REPAIR: ICD-10-CM

## 2025-08-27 DIAGNOSIS — Z79.899 ON AMIODARONE THERAPY: ICD-10-CM

## 2025-08-27 DIAGNOSIS — D68.69 SECONDARY HYPERCOAGULABLE STATE (HCC): ICD-10-CM

## 2025-08-27 LAB
ALBUMIN SERPL BCP-MCNC: 4.2 G/DL (ref 3.2–4.9)
ALBUMIN SERPL BCP-MCNC: 4.3 G/DL (ref 3.2–4.9)
ALBUMIN/GLOB SERPL: 1.1 G/DL
ALBUMIN/GLOB SERPL: 1.2 G/DL
ALP SERPL-CCNC: 81 U/L (ref 30–99)
ALP SERPL-CCNC: 82 U/L (ref 30–99)
ALT SERPL-CCNC: 13 U/L (ref 2–50)
ALT SERPL-CCNC: 13 U/L (ref 2–50)
ANION GAP SERPL CALC-SCNC: 13 MMOL/L (ref 7–16)
ANION GAP SERPL CALC-SCNC: 13 MMOL/L (ref 7–16)
AST SERPL-CCNC: 21 U/L (ref 12–45)
AST SERPL-CCNC: 22 U/L (ref 12–45)
BILIRUB SERPL-MCNC: 0.8 MG/DL (ref 0.1–1.5)
BILIRUB SERPL-MCNC: 0.8 MG/DL (ref 0.1–1.5)
BUN SERPL-MCNC: 10 MG/DL (ref 8–22)
BUN SERPL-MCNC: 9 MG/DL (ref 8–22)
CALCIUM ALBUM COR SERPL-MCNC: 9.8 MG/DL (ref 8.5–10.5)
CALCIUM ALBUM COR SERPL-MCNC: 9.9 MG/DL (ref 8.5–10.5)
CALCIUM SERPL-MCNC: 10 MG/DL (ref 8.5–10.5)
CALCIUM SERPL-MCNC: 10.1 MG/DL (ref 8.5–10.5)
CHLORIDE SERPL-SCNC: 96 MMOL/L (ref 96–112)
CHLORIDE SERPL-SCNC: 97 MMOL/L (ref 96–112)
CO2 SERPL-SCNC: 25 MMOL/L (ref 20–33)
CO2 SERPL-SCNC: 25 MMOL/L (ref 20–33)
CREAT SERPL-MCNC: 0.86 MG/DL (ref 0.5–1.4)
CREAT SERPL-MCNC: 0.87 MG/DL (ref 0.5–1.4)
ERYTHROCYTE [DISTWIDTH] IN BLOOD BY AUTOMATED COUNT: 49.8 FL (ref 35.9–50)
GFR SERPLBLD CREATININE-BSD FMLA CKD-EPI: 74 ML/MIN/1.73 M 2
GFR SERPLBLD CREATININE-BSD FMLA CKD-EPI: 75 ML/MIN/1.73 M 2
GLOBULIN SER CALC-MCNC: 3.7 G/DL (ref 1.9–3.5)
GLOBULIN SER CALC-MCNC: 3.7 G/DL (ref 1.9–3.5)
GLUCOSE SERPL-MCNC: 86 MG/DL (ref 65–99)
GLUCOSE SERPL-MCNC: 87 MG/DL (ref 65–99)
HCT VFR BLD AUTO: 38.9 % (ref 37–47)
HGB BLD-MCNC: 12.7 G/DL (ref 12–16)
MCH RBC QN AUTO: 27.7 PG (ref 27–33)
MCHC RBC AUTO-ENTMCNC: 32.6 G/DL (ref 32.2–35.5)
MCV RBC AUTO: 84.7 FL (ref 81.4–97.8)
PLATELET # BLD AUTO: 231 K/UL (ref 164–446)
PMV BLD AUTO: 10.2 FL (ref 9–12.9)
POTASSIUM SERPL-SCNC: 4.1 MMOL/L (ref 3.6–5.5)
POTASSIUM SERPL-SCNC: 4.2 MMOL/L (ref 3.6–5.5)
PROT SERPL-MCNC: 7.9 G/DL (ref 6–8.2)
PROT SERPL-MCNC: 8 G/DL (ref 6–8.2)
RBC # BLD AUTO: 4.59 M/UL (ref 4.2–5.4)
SODIUM SERPL-SCNC: 134 MMOL/L (ref 135–145)
SODIUM SERPL-SCNC: 135 MMOL/L (ref 135–145)
TSH SERPL DL<=0.005 MIU/L-ACNC: 0.72 UIU/ML (ref 0.38–5.33)
WBC # BLD AUTO: 5.6 K/UL (ref 4.8–10.8)

## 2025-08-27 PROCEDURE — 85027 COMPLETE CBC AUTOMATED: CPT

## 2025-08-27 PROCEDURE — 84443 ASSAY THYROID STIM HORMONE: CPT

## 2025-08-27 PROCEDURE — 80053 COMPREHEN METABOLIC PANEL: CPT

## 2025-08-27 PROCEDURE — 36415 COLL VENOUS BLD VENIPUNCTURE: CPT

## 2025-08-27 PROCEDURE — 80053 COMPREHEN METABOLIC PANEL: CPT | Mod: 91

## 2025-08-29 ENCOUNTER — OFFICE VISIT (OUTPATIENT)
Dept: URGENT CARE | Facility: CLINIC | Age: 65
End: 2025-08-29
Payer: MEDICARE

## 2025-08-29 VITALS
DIASTOLIC BLOOD PRESSURE: 72 MMHG | HEART RATE: 92 BPM | SYSTOLIC BLOOD PRESSURE: 120 MMHG | OXYGEN SATURATION: 95 % | WEIGHT: 239 LBS | HEIGHT: 68 IN | BODY MASS INDEX: 36.22 KG/M2 | RESPIRATION RATE: 22 BRPM | TEMPERATURE: 97.6 F

## 2025-08-29 DIAGNOSIS — K12.2 ORAL ABSCESS: Primary | ICD-10-CM

## 2025-08-29 RX ORDER — LIDOCAINE HYDROCHLORIDE 20 MG/ML
15 SOLUTION OROPHARYNGEAL PRN
Qty: 1200 ML | Refills: 0 | Status: SHIPPED | OUTPATIENT
Start: 2025-08-29

## 2025-08-29 ASSESSMENT — FIBROSIS 4 INDEX: FIB4 SCORE: 1.69

## 2025-08-29 ASSESSMENT — ENCOUNTER SYMPTOMS
CHILLS: 0
FEVER: 0